# Patient Record
Sex: FEMALE | Race: WHITE | NOT HISPANIC OR LATINO | Employment: OTHER | URBAN - METROPOLITAN AREA
[De-identification: names, ages, dates, MRNs, and addresses within clinical notes are randomized per-mention and may not be internally consistent; named-entity substitution may affect disease eponyms.]

---

## 2017-02-26 ENCOUNTER — HOSPITAL ENCOUNTER (OUTPATIENT)
Facility: HOSPITAL | Age: 72
Setting detail: OBSERVATION
Discharge: HOME | End: 2017-03-01
Attending: EMERGENCY MEDICINE | Admitting: HOSPITALIST

## 2017-02-26 ENCOUNTER — APPOINTMENT (OUTPATIENT)
Dept: GENERAL RADIOLOGY | Facility: HOSPITAL | Age: 72
End: 2017-02-26

## 2017-02-26 DIAGNOSIS — J44.1 COPD EXACERBATION (HCC): Primary | ICD-10-CM

## 2017-02-26 DIAGNOSIS — J96.01 ACUTE RESPIRATORY FAILURE WITH HYPOXIA (HCC): ICD-10-CM

## 2017-02-26 LAB
ALBUMIN SERPL-MCNC: 4.1 G/DL (ref 3.5–5.2)
ALBUMIN/GLOB SERPL: 1.4 G/DL
ALP SERPL-CCNC: 270 U/L (ref 39–117)
ALT SERPL W P-5'-P-CCNC: 24 U/L (ref 1–33)
ANION GAP SERPL CALCULATED.3IONS-SCNC: 13.2 MMOL/L
ARTERIAL PATENCY WRIST A: POSITIVE
AST SERPL-CCNC: 18 U/L (ref 1–32)
ATMOSPHERIC PRESS: 754.4 MMHG
B PERT DNA SPEC QL NAA+PROBE: NOT DETECTED
BASE EXCESS BLDA CALC-SCNC: 4.6 MMOL/L (ref 0–2)
BASOPHILS # BLD AUTO: 0.02 10*3/MM3 (ref 0–0.2)
BASOPHILS NFR BLD AUTO: 0.1 % (ref 0–1.5)
BDY SITE: ABNORMAL
BILIRUB SERPL-MCNC: 0.4 MG/DL (ref 0.1–1.2)
BUN BLD-MCNC: 10 MG/DL (ref 8–23)
BUN/CREAT SERPL: 11.2 (ref 7–25)
C PNEUM DNA NPH QL NAA+NON-PROBE: NOT DETECTED
CALCIUM SPEC-SCNC: 9.8 MG/DL (ref 8.6–10.5)
CHLORIDE SERPL-SCNC: 94 MMOL/L (ref 98–107)
CHOLEST SERPL-MCNC: 233 MG/DL (ref 0–200)
CK MB SERPL-CCNC: 6.15 NG/ML
CK SERPL-CCNC: 102 U/L (ref 20–180)
CO2 SERPL-SCNC: 27.8 MMOL/L (ref 22–29)
CREAT BLD-MCNC: 0.89 MG/DL (ref 0.57–1)
DEPRECATED RDW RBC AUTO: 45.4 FL (ref 37–54)
EOSINOPHIL # BLD AUTO: 0.12 10*3/MM3 (ref 0–0.7)
EOSINOPHIL NFR BLD AUTO: 0.9 % (ref 0.3–6.2)
ERYTHROCYTE [DISTWIDTH] IN BLOOD BY AUTOMATED COUNT: 14 % (ref 11.7–13)
FLUAV H1 2009 PAND RNA NPH QL NAA+PROBE: NOT DETECTED
FLUAV H1 HA GENE NPH QL NAA+PROBE: NOT DETECTED
FLUAV H3 RNA NPH QL NAA+PROBE: NOT DETECTED
FLUAV SUBTYP SPEC NAA+PROBE: NOT DETECTED
FLUBV RNA ISLT QL NAA+PROBE: NOT DETECTED
GFR SERPL CREATININE-BSD FRML MDRD: 62 ML/MIN/1.73
GLOBULIN UR ELPH-MCNC: 3 GM/DL
GLUCOSE BLD-MCNC: 88 MG/DL (ref 65–99)
GLUCOSE BLDC GLUCOMTR-MCNC: 119 MG/DL (ref 70–130)
GLUCOSE BLDC GLUCOMTR-MCNC: 183 MG/DL (ref 70–130)
HADV DNA SPEC NAA+PROBE: NOT DETECTED
HBA1C MFR BLD: 5.63 % (ref 4.8–5.6)
HCO3 BLDA-SCNC: 28 MMOL/L (ref 22–28)
HCOV 229E RNA SPEC QL NAA+PROBE: NOT DETECTED
HCOV HKU1 RNA SPEC QL NAA+PROBE: NOT DETECTED
HCOV NL63 RNA SPEC QL NAA+PROBE: NOT DETECTED
HCOV OC43 RNA SPEC QL NAA+PROBE: NOT DETECTED
HCT VFR BLD AUTO: 39.7 % (ref 35.6–45.5)
HDLC SERPL-MCNC: 91 MG/DL (ref 40–60)
HGB BLD-MCNC: 13.5 G/DL (ref 11.9–15.5)
HMPV RNA NPH QL NAA+NON-PROBE: NOT DETECTED
HOLD SPECIMEN: NORMAL
HOLD SPECIMEN: NORMAL
HPIV1 RNA SPEC QL NAA+PROBE: NOT DETECTED
HPIV2 RNA SPEC QL NAA+PROBE: NOT DETECTED
HPIV3 RNA NPH QL NAA+PROBE: NOT DETECTED
HPIV4 P GENE NPH QL NAA+PROBE: NOT DETECTED
IMM GRANULOCYTES # BLD: 0.11 10*3/MM3 (ref 0–0.03)
IMM GRANULOCYTES NFR BLD: 0.8 % (ref 0–0.5)
LDLC SERPL CALC-MCNC: 126 MG/DL (ref 0–100)
LDLC/HDLC SERPL: 1.38 {RATIO}
LYMPHOCYTES # BLD AUTO: 4.27 10*3/MM3 (ref 0.9–4.8)
LYMPHOCYTES NFR BLD AUTO: 31.1 % (ref 19.6–45.3)
M PNEUMO IGG SER IA-ACNC: NOT DETECTED
MAGNESIUM SERPL-MCNC: 1.8 MG/DL (ref 1.6–2.4)
MCH RBC QN AUTO: 30.3 PG (ref 26.9–32)
MCHC RBC AUTO-ENTMCNC: 34 G/DL (ref 32.4–36.3)
MCV RBC AUTO: 89.2 FL (ref 80.5–98.2)
MODALITY: ABNORMAL
MONOCYTES # BLD AUTO: 2.06 10*3/MM3 (ref 0.2–1.2)
MONOCYTES NFR BLD AUTO: 15 % (ref 5–12)
NEUTROPHILS # BLD AUTO: 7.13 10*3/MM3 (ref 1.9–8.1)
NEUTROPHILS NFR BLD AUTO: 52.1 % (ref 42.7–76)
NT-PROBNP SERPL-MCNC: 171 PG/ML (ref 5–900)
PCO2 BLDA: 36.5 MM HG (ref 35–45)
PH BLDA: 7.49 PH UNITS (ref 7.35–7.45)
PLATELET # BLD AUTO: 376 10*3/MM3 (ref 140–500)
PMV BLD AUTO: 10.1 FL (ref 6–12)
PO2 BLDA: 70 MM HG (ref 80–100)
POTASSIUM BLD-SCNC: 3.9 MMOL/L (ref 3.5–5.2)
PROT SERPL-MCNC: 7.1 G/DL (ref 6–8.5)
RBC # BLD AUTO: 4.45 10*6/MM3 (ref 3.9–5.2)
RHINOVIRUS RNA SPEC NAA+PROBE: NOT DETECTED
RSV RNA NPH QL NAA+NON-PROBE: NOT DETECTED
SAO2 % BLDCOA: 95.2 % (ref 92–99)
SODIUM BLD-SCNC: 135 MMOL/L (ref 136–145)
TOTAL RATE: 20 BREATHS/MINUTE
TRIGL SERPL-MCNC: 82 MG/DL (ref 0–150)
TROPONIN T SERPL-MCNC: 0.01 NG/ML (ref 0–0.03)
TROPONIN T SERPL-MCNC: <0.01 NG/ML (ref 0–0.03)
TROPONIN T SERPL-MCNC: <0.01 NG/ML (ref 0–0.03)
TSH SERPL DL<=0.05 MIU/L-ACNC: 0.3 MIU/ML (ref 0.27–4.2)
VLDLC SERPL-MCNC: 16.4 MG/DL (ref 5–40)
WBC NRBC COR # BLD: 13.71 10*3/MM3 (ref 4.5–10.7)
WHOLE BLOOD HOLD SPECIMEN: NORMAL
WHOLE BLOOD HOLD SPECIMEN: NORMAL

## 2017-02-26 PROCEDURE — 99285 EMERGENCY DEPT VISIT HI MDM: CPT

## 2017-02-26 PROCEDURE — 84443 ASSAY THYROID STIM HORMONE: CPT | Performed by: NURSE PRACTITIONER

## 2017-02-26 PROCEDURE — 80053 COMPREHEN METABOLIC PANEL: CPT | Performed by: EMERGENCY MEDICINE

## 2017-02-26 PROCEDURE — 93005 ELECTROCARDIOGRAM TRACING: CPT | Performed by: EMERGENCY MEDICINE

## 2017-02-26 PROCEDURE — 87205 SMEAR GRAM STAIN: CPT | Performed by: INTERNAL MEDICINE

## 2017-02-26 PROCEDURE — 83880 ASSAY OF NATRIURETIC PEPTIDE: CPT | Performed by: EMERGENCY MEDICINE

## 2017-02-26 PROCEDURE — 25010000002 MAGNESIUM SULFATE IN D5W 1G/100ML (PREMIX) 10-5 MG/ML-% SOLUTION: Performed by: NURSE PRACTITIONER

## 2017-02-26 PROCEDURE — 96376 TX/PRO/DX INJ SAME DRUG ADON: CPT

## 2017-02-26 PROCEDURE — 83735 ASSAY OF MAGNESIUM: CPT | Performed by: NURSE PRACTITIONER

## 2017-02-26 PROCEDURE — 93005 ELECTROCARDIOGRAM TRACING: CPT | Performed by: INTERNAL MEDICINE

## 2017-02-26 PROCEDURE — 84484 ASSAY OF TROPONIN QUANT: CPT | Performed by: EMERGENCY MEDICINE

## 2017-02-26 PROCEDURE — 82803 BLOOD GASES ANY COMBINATION: CPT

## 2017-02-26 PROCEDURE — 94640 AIRWAY INHALATION TREATMENT: CPT

## 2017-02-26 PROCEDURE — 87798 DETECT AGENT NOS DNA AMP: CPT | Performed by: INTERNAL MEDICINE

## 2017-02-26 PROCEDURE — 71010 HC CHEST PA OR AP: CPT

## 2017-02-26 PROCEDURE — 87581 M.PNEUMON DNA AMP PROBE: CPT | Performed by: INTERNAL MEDICINE

## 2017-02-26 PROCEDURE — G0378 HOSPITAL OBSERVATION PER HR: HCPCS

## 2017-02-26 PROCEDURE — 85025 COMPLETE CBC W/AUTO DIFF WBC: CPT | Performed by: EMERGENCY MEDICINE

## 2017-02-26 PROCEDURE — 84484 ASSAY OF TROPONIN QUANT: CPT | Performed by: NURSE PRACTITIONER

## 2017-02-26 PROCEDURE — 80061 LIPID PANEL: CPT | Performed by: NURSE PRACTITIONER

## 2017-02-26 PROCEDURE — 94799 UNLISTED PULMONARY SVC/PX: CPT

## 2017-02-26 PROCEDURE — 99222 1ST HOSP IP/OBS MODERATE 55: CPT | Performed by: INTERNAL MEDICINE

## 2017-02-26 PROCEDURE — 25010000002 METHYLPREDNISOLONE PER 125 MG: Performed by: EMERGENCY MEDICINE

## 2017-02-26 PROCEDURE — 87070 CULTURE OTHR SPECIMN AEROBIC: CPT | Performed by: INTERNAL MEDICINE

## 2017-02-26 PROCEDURE — 25010000003 CEFTRIAXONE PER 250 MG: Performed by: INTERNAL MEDICINE

## 2017-02-26 PROCEDURE — 25010000002 METHYLPREDNISOLONE PER 125 MG: Performed by: INTERNAL MEDICINE

## 2017-02-26 PROCEDURE — 87486 CHLMYD PNEUM DNA AMP PROBE: CPT | Performed by: INTERNAL MEDICINE

## 2017-02-26 PROCEDURE — 83036 HEMOGLOBIN GLYCOSYLATED A1C: CPT | Performed by: INTERNAL MEDICINE

## 2017-02-26 PROCEDURE — 82962 GLUCOSE BLOOD TEST: CPT

## 2017-02-26 PROCEDURE — 63710000001 INSULIN ASPART PER 5 UNITS: Performed by: INTERNAL MEDICINE

## 2017-02-26 PROCEDURE — 87633 RESP VIRUS 12-25 TARGETS: CPT | Performed by: INTERNAL MEDICINE

## 2017-02-26 PROCEDURE — 93010 ELECTROCARDIOGRAM REPORT: CPT | Performed by: INTERNAL MEDICINE

## 2017-02-26 PROCEDURE — 36600 WITHDRAWAL OF ARTERIAL BLOOD: CPT

## 2017-02-26 PROCEDURE — 82550 ASSAY OF CK (CPK): CPT | Performed by: NURSE PRACTITIONER

## 2017-02-26 PROCEDURE — 84484 ASSAY OF TROPONIN QUANT: CPT | Performed by: INTERNAL MEDICINE

## 2017-02-26 PROCEDURE — 82553 CREATINE MB FRACTION: CPT | Performed by: NURSE PRACTITIONER

## 2017-02-26 RX ORDER — CYCLOBENZAPRINE HCL 10 MG
10 TABLET ORAL 3 TIMES DAILY PRN
Status: DISCONTINUED | OUTPATIENT
Start: 2017-02-26 | End: 2017-03-01 | Stop reason: HOSPADM

## 2017-02-26 RX ORDER — CEFUROXIME AXETIL 250 MG/1
250 TABLET ORAL 2 TIMES DAILY
COMMUNITY
End: 2018-02-19

## 2017-02-26 RX ORDER — METHYLPREDNISOLONE SODIUM SUCCINATE 125 MG/2ML
60 INJECTION, POWDER, LYOPHILIZED, FOR SOLUTION INTRAMUSCULAR; INTRAVENOUS EVERY 6 HOURS
Status: DISCONTINUED | OUTPATIENT
Start: 2017-02-26 | End: 2017-02-27

## 2017-02-26 RX ORDER — POTASSIUM CHLORIDE 750 MG/1
10 TABLET, FILM COATED, EXTENDED RELEASE ORAL 2 TIMES DAILY
COMMUNITY
End: 2017-03-30 | Stop reason: HOSPADM

## 2017-02-26 RX ORDER — METHYLPREDNISOLONE SODIUM SUCCINATE 125 MG/2ML
125 INJECTION, POWDER, LYOPHILIZED, FOR SOLUTION INTRAMUSCULAR; INTRAVENOUS ONCE
Status: COMPLETED | OUTPATIENT
Start: 2017-02-26 | End: 2017-02-26

## 2017-02-26 RX ORDER — IPRATROPIUM BROMIDE AND ALBUTEROL SULFATE 2.5; .5 MG/3ML; MG/3ML
3 SOLUTION RESPIRATORY (INHALATION) EVERY 4 HOURS PRN
Status: ON HOLD | COMMUNITY
End: 2020-03-26 | Stop reason: SDUPTHER

## 2017-02-26 RX ORDER — CYCLOBENZAPRINE HCL 10 MG
10 TABLET ORAL 3 TIMES DAILY PRN
Status: ON HOLD | COMMUNITY
End: 2017-03-30

## 2017-02-26 RX ORDER — SODIUM CHLORIDE 0.9 % (FLUSH) 0.9 %
10 SYRINGE (ML) INJECTION AS NEEDED
Status: DISCONTINUED | OUTPATIENT
Start: 2017-02-26 | End: 2017-03-01 | Stop reason: HOSPADM

## 2017-02-26 RX ORDER — FAMOTIDINE 20 MG/1
20 TABLET, FILM COATED ORAL 2 TIMES DAILY
Status: DISCONTINUED | OUTPATIENT
Start: 2017-02-26 | End: 2017-02-27

## 2017-02-26 RX ORDER — BUDESONIDE 0.5 MG/2ML
0.5 INHALANT ORAL
Status: DISCONTINUED | OUTPATIENT
Start: 2017-02-26 | End: 2017-03-01

## 2017-02-26 RX ORDER — CEFTRIAXONE SODIUM 1 G/50ML
1 INJECTION, SOLUTION INTRAVENOUS EVERY 24 HOURS
Status: DISCONTINUED | OUTPATIENT
Start: 2017-02-26 | End: 2017-03-01 | Stop reason: HOSPADM

## 2017-02-26 RX ORDER — PREDNISONE 10 MG/1
10 TABLET ORAL DAILY
COMMUNITY
End: 2017-03-30 | Stop reason: HOSPADM

## 2017-02-26 RX ORDER — MELATONIN
1000 DAILY
COMMUNITY
End: 2022-01-13

## 2017-02-26 RX ORDER — NICOTINE POLACRILEX 4 MG
15 LOZENGE BUCCAL
Status: DISCONTINUED | OUTPATIENT
Start: 2017-02-26 | End: 2017-03-01 | Stop reason: HOSPADM

## 2017-02-26 RX ORDER — DEXTROSE MONOHYDRATE 25 G/50ML
25 INJECTION, SOLUTION INTRAVENOUS
Status: DISCONTINUED | OUTPATIENT
Start: 2017-02-26 | End: 2017-03-01 | Stop reason: HOSPADM

## 2017-02-26 RX ORDER — METOPROLOL SUCCINATE 25 MG/1
25 TABLET, EXTENDED RELEASE ORAL EVERY 12 HOURS SCHEDULED
Status: DISCONTINUED | OUTPATIENT
Start: 2017-02-26 | End: 2017-03-01 | Stop reason: HOSPADM

## 2017-02-26 RX ORDER — RANITIDINE 150 MG/1
150 TABLET ORAL 2 TIMES DAILY
Status: ON HOLD | COMMUNITY
End: 2020-03-21

## 2017-02-26 RX ORDER — BUDESONIDE AND FORMOTEROL FUMARATE DIHYDRATE 160; 4.5 UG/1; UG/1
2 AEROSOL RESPIRATORY (INHALATION)
Status: DISCONTINUED | OUTPATIENT
Start: 2017-02-26 | End: 2017-02-26

## 2017-02-26 RX ORDER — IPRATROPIUM BROMIDE AND ALBUTEROL SULFATE 2.5; .5 MG/3ML; MG/3ML
3 SOLUTION RESPIRATORY (INHALATION) EVERY 4 HOURS PRN
Status: DISCONTINUED | OUTPATIENT
Start: 2017-02-26 | End: 2017-03-01 | Stop reason: HOSPADM

## 2017-02-26 RX ORDER — IPRATROPIUM BROMIDE AND ALBUTEROL SULFATE 2.5; .5 MG/3ML; MG/3ML
3 SOLUTION RESPIRATORY (INHALATION)
Status: DISCONTINUED | OUTPATIENT
Start: 2017-02-26 | End: 2017-02-26

## 2017-02-26 RX ORDER — IPRATROPIUM BROMIDE AND ALBUTEROL SULFATE 2.5; .5 MG/3ML; MG/3ML
3 SOLUTION RESPIRATORY (INHALATION) ONCE
Status: COMPLETED | OUTPATIENT
Start: 2017-02-26 | End: 2017-02-26

## 2017-02-26 RX ORDER — MELATONIN
1000 DAILY
Status: DISCONTINUED | OUTPATIENT
Start: 2017-02-26 | End: 2017-03-01 | Stop reason: HOSPADM

## 2017-02-26 RX ORDER — ARFORMOTEROL TARTRATE 15 UG/2ML
15 SOLUTION RESPIRATORY (INHALATION)
Status: DISCONTINUED | OUTPATIENT
Start: 2017-02-26 | End: 2017-03-01

## 2017-02-26 RX ORDER — ALBUTEROL SULFATE 90 UG/1
2 AEROSOL, METERED RESPIRATORY (INHALATION) EVERY 4 HOURS PRN
COMMUNITY
End: 2018-02-19 | Stop reason: ALTCHOICE

## 2017-02-26 RX ORDER — ALBUTEROL SULFATE 2.5 MG/3ML
2.5 SOLUTION RESPIRATORY (INHALATION)
Status: COMPLETED | OUTPATIENT
Start: 2017-02-26 | End: 2017-02-26

## 2017-02-26 RX ORDER — POTASSIUM CHLORIDE 750 MG/1
20 CAPSULE, EXTENDED RELEASE ORAL DAILY
Status: DISCONTINUED | OUTPATIENT
Start: 2017-02-26 | End: 2017-02-27

## 2017-02-26 RX ADMIN — METHYLPREDNISOLONE SODIUM SUCCINATE 60 MG: 125 INJECTION, POWDER, FOR SOLUTION INTRAMUSCULAR; INTRAVENOUS at 14:32

## 2017-02-26 RX ADMIN — VITAMIN D, TAB 1000IU (100/BT) 1000 UNITS: 25 TAB at 14:32

## 2017-02-26 RX ADMIN — IPRATROPIUM BROMIDE AND ALBUTEROL SULFATE 3 ML: .5; 3 SOLUTION RESPIRATORY (INHALATION) at 10:09

## 2017-02-26 RX ADMIN — METOPROLOL SUCCINATE 25 MG: 25 TABLET, FILM COATED, EXTENDED RELEASE ORAL at 20:50

## 2017-02-26 RX ADMIN — CEFTRIAXONE SODIUM 1 G: 1 INJECTION, SOLUTION INTRAVENOUS at 16:29

## 2017-02-26 RX ADMIN — METHYLPREDNISOLONE SODIUM SUCCINATE 60 MG: 125 INJECTION, POWDER, FOR SOLUTION INTRAMUSCULAR; INTRAVENOUS at 20:50

## 2017-02-26 RX ADMIN — MAGNESIUM SULFATE HEPTAHYDRATE 1 G: 1 INJECTION, SOLUTION INTRAVENOUS at 20:50

## 2017-02-26 RX ADMIN — ALBUTEROL SULFATE 2.5 MG: 2.5 SOLUTION RESPIRATORY (INHALATION) at 10:40

## 2017-02-26 RX ADMIN — FAMOTIDINE 20 MG: 20 TABLET, FILM COATED ORAL at 17:24

## 2017-02-26 RX ADMIN — ALBUTEROL SULFATE 2.5 MG: 2.5 SOLUTION RESPIRATORY (INHALATION) at 11:01

## 2017-02-26 RX ADMIN — METHYLPREDNISOLONE SODIUM SUCCINATE 125 MG: 125 INJECTION, POWDER, FOR SOLUTION INTRAMUSCULAR; INTRAVENOUS at 10:22

## 2017-02-26 RX ADMIN — INSULIN ASPART 2 UNITS: 100 INJECTION, SOLUTION INTRAVENOUS; SUBCUTANEOUS at 20:51

## 2017-02-26 RX ADMIN — POTASSIUM CHLORIDE 20 MEQ: 750 CAPSULE, EXTENDED RELEASE ORAL at 14:32

## 2017-02-26 RX ADMIN — ARFORMOTEROL TARTRATE 15 MCG: 15 SOLUTION RESPIRATORY (INHALATION) at 21:49

## 2017-02-26 RX ADMIN — BUDESONIDE 0.5 MG: 0.5 INHALANT RESPIRATORY (INHALATION) at 21:49

## 2017-02-26 RX ADMIN — NICOTINE 1 PATCH: 7 PATCH TRANSDERMAL at 14:33

## 2017-02-26 RX ADMIN — AZITHROMYCIN DIHYDRATE 500 MG: 500 INJECTION, POWDER, LYOPHILIZED, FOR SOLUTION INTRAVENOUS at 14:32

## 2017-02-27 ENCOUNTER — APPOINTMENT (OUTPATIENT)
Dept: CARDIOLOGY | Facility: HOSPITAL | Age: 72
End: 2017-02-27

## 2017-02-27 LAB
ANION GAP SERPL CALCULATED.3IONS-SCNC: 15.1 MMOL/L
BH CV ECHO MEAS - ACS: 1.6 CM
BH CV ECHO MEAS - AO MAX PG (FULL): 10 MMHG
BH CV ECHO MEAS - AO MEAN PG (FULL): 2 MMHG
BH CV ECHO MEAS - AO MEAN PG: 5 MMHG
BH CV ECHO MEAS - AO ROOT AREA (BSA CORRECTED): 2.2
BH CV ECHO MEAS - AO ROOT AREA: 9.1 CM^2
BH CV ECHO MEAS - AO ROOT DIAM: 3.4 CM
BH CV ECHO MEAS - AO V2 MAX: 160 CM/SEC
BH CV ECHO MEAS - AO V2 MEAN: 108 CM/SEC
BH CV ECHO MEAS - AO V2 VTI: 27.9 CM
BH CV ECHO MEAS - AVA(I,A): 1.8 CM^2
BH CV ECHO MEAS - AVA(I,D): 1.8 CM^2
BH CV ECHO MEAS - BSA(HAYCOCK): 1.5 M^2
BH CV ECHO MEAS - BSA: 1.5 M^2
BH CV ECHO MEAS - BZI_BMI: 21.2 KILOGRAMS/M^2
BH CV ECHO MEAS - BZI_METRIC_HEIGHT: 157.5 CM
BH CV ECHO MEAS - BZI_METRIC_WEIGHT: 52.6 KG
BH CV ECHO MEAS - CONTRAST EF (2CH): 73 ML/M^2
BH CV ECHO MEAS - CONTRAST EF 4CH: 73.1 ML/M^2
BH CV ECHO MEAS - EDV(CUBED): 91.1 ML
BH CV ECHO MEAS - EDV(MOD-SP2): 63 ML
BH CV ECHO MEAS - EDV(MOD-SP4): 67 ML
BH CV ECHO MEAS - EDV(TEICH): 92.4 ML
BH CV ECHO MEAS - EF(CUBED): 84.8 %
BH CV ECHO MEAS - EF(MOD-SP2): 73 %
BH CV ECHO MEAS - EF(MOD-SP4): 73.1 %
BH CV ECHO MEAS - EF(TEICH): 78.2 %
BH CV ECHO MEAS - ESV(CUBED): 13.8 ML
BH CV ECHO MEAS - ESV(MOD-SP2): 17 ML
BH CV ECHO MEAS - ESV(MOD-SP4): 18 ML
BH CV ECHO MEAS - ESV(TEICH): 20.2 ML
BH CV ECHO MEAS - FS: 46.7 %
BH CV ECHO MEAS - IVS/LVPW: 1
BH CV ECHO MEAS - IVSD: 0.8 CM
BH CV ECHO MEAS - LA DIMENSION: 2.6 CM
BH CV ECHO MEAS - LA/AO: 0.76
BH CV ECHO MEAS - LAT PEAK E' VEL: 7 CM/SEC
BH CV ECHO MEAS - LV DIASTOLIC VOL/BSA (35-75): 44.2 ML/M^2
BH CV ECHO MEAS - LV MASS(C)D: 113.6 GRAMS
BH CV ECHO MEAS - LV MASS(C)DI: 74.9 GRAMS/M^2
BH CV ECHO MEAS - LV MEAN PG: 3 MMHG
BH CV ECHO MEAS - LV SYSTOLIC VOL/BSA (12-30): 11.9 ML/M^2
BH CV ECHO MEAS - LV V1 MAX: 112 CM/SEC
BH CV ECHO MEAS - LV V1 MEAN: 75.8 CM/SEC
BH CV ECHO MEAS - LV V1 VTI: 21.8 CM
BH CV ECHO MEAS - LVIDD: 4.5 CM
BH CV ECHO MEAS - LVIDS: 2.4 CM
BH CV ECHO MEAS - LVLD AP2: 6.8 CM
BH CV ECHO MEAS - LVLD AP4: 6.8 CM
BH CV ECHO MEAS - LVLS AP2: 5.6 CM
BH CV ECHO MEAS - LVLS AP4: 5.7 CM
BH CV ECHO MEAS - LVOT AREA (M): 2.3 CM^2
BH CV ECHO MEAS - LVOT AREA: 2.3 CM^2
BH CV ECHO MEAS - LVOT DIAM: 1.7 CM
BH CV ECHO MEAS - LVPWD: 0.8 CM
BH CV ECHO MEAS - MED PEAK E' VEL: 9 CM/SEC
BH CV ECHO MEAS - MV A DUR: 0.1 SEC
BH CV ECHO MEAS - MV A MAX VEL: 75.8 CM/SEC
BH CV ECHO MEAS - MV DEC SLOPE: 458 CM/SEC^2
BH CV ECHO MEAS - MV DEC TIME: 0.18 SEC
BH CV ECHO MEAS - MV E MAX VEL: 78.8 CM/SEC
BH CV ECHO MEAS - MV E/A: 1
BH CV ECHO MEAS - MV MEAN PG: 3 MMHG
BH CV ECHO MEAS - MV P1/2T MAX VEL: 80.1 CM/SEC
BH CV ECHO MEAS - MV P1/2T: 51.2 MSEC
BH CV ECHO MEAS - MV V2 MEAN: 80 CM/SEC
BH CV ECHO MEAS - MV V2 VTI: 19.1 CM
BH CV ECHO MEAS - MVA P1/2T LCG: 2.7 CM^2
BH CV ECHO MEAS - MVA(P1/2T): 4.3 CM^2
BH CV ECHO MEAS - MVA(VTI): 2.6 CM^2
BH CV ECHO MEAS - PA ACC SLOPE: 1371 CM/SEC^2
BH CV ECHO MEAS - PA ACC TIME: 0.1 SEC
BH CV ECHO MEAS - PA MAX PG: 7.4 MMHG
BH CV ECHO MEAS - PA PR(ACCEL): 36.3 MMHG
BH CV ECHO MEAS - PA V2 MAX: 136 CM/SEC
BH CV ECHO MEAS - PULM A REVS DUR: 0.12 SEC
BH CV ECHO MEAS - PULM A REVS VEL: 39.4 CM/SEC
BH CV ECHO MEAS - PULM DIAS VEL: 64.1 CM/SEC
BH CV ECHO MEAS - PULM S/D: 0.57
BH CV ECHO MEAS - PULM SYS VEL: 36.8 CM/SEC
BH CV ECHO MEAS - QP/QS: 0.93
BH CV ECHO MEAS - RV MEAN PG: 1 MMHG
BH CV ECHO MEAS - RV V1 MEAN: 53.3 CM/SEC
BH CV ECHO MEAS - RV V1 VTI: 16.2 CM
BH CV ECHO MEAS - RVOT AREA: 2.8 CM^2
BH CV ECHO MEAS - RVOT DIAM: 1.9 CM
BH CV ECHO MEAS - SI(AO): 167 ML/M^2
BH CV ECHO MEAS - SI(CUBED): 51 ML/M^2
BH CV ECHO MEAS - SI(LVOT): 32.6 ML/M^2
BH CV ECHO MEAS - SI(MOD-SP2): 30.3 ML/M^2
BH CV ECHO MEAS - SI(MOD-SP4): 32.3 ML/M^2
BH CV ECHO MEAS - SI(TEICH): 47.7 ML/M^2
BH CV ECHO MEAS - SV(AO): 253.3 ML
BH CV ECHO MEAS - SV(CUBED): 77.3 ML
BH CV ECHO MEAS - SV(LVOT): 49.5 ML
BH CV ECHO MEAS - SV(MOD-SP2): 46 ML
BH CV ECHO MEAS - SV(MOD-SP4): 49 ML
BH CV ECHO MEAS - SV(RVOT): 45.9 ML
BH CV ECHO MEAS - SV(TEICH): 72.3 ML
BH CV ECHO MEAS - TAPSE (>1.6): 2.4 CM2
BH CV XLRA - RV BASE: 2.7 CM
BH CV XLRA - RV LENGTH: 5.6 CM
BH CV XLRA - RV MID: 2 CM
BH CV XLRA - TDI S': 23 CM/SEC
BUN BLD-MCNC: 12 MG/DL (ref 8–23)
BUN/CREAT SERPL: 14.5 (ref 7–25)
CALCIUM SPEC-SCNC: 9.4 MG/DL (ref 8.6–10.5)
CHLORIDE SERPL-SCNC: 95 MMOL/L (ref 98–107)
CO2 SERPL-SCNC: 22.9 MMOL/L (ref 22–29)
CREAT BLD-MCNC: 0.83 MG/DL (ref 0.57–1)
DEPRECATED RDW RBC AUTO: 45.6 FL (ref 37–54)
E/E' RATIO: 10
ERYTHROCYTE [DISTWIDTH] IN BLOOD BY AUTOMATED COUNT: 14 % (ref 11.7–13)
GFR SERPL CREATININE-BSD FRML MDRD: 68 ML/MIN/1.73
GLUCOSE BLD-MCNC: 168 MG/DL (ref 65–99)
GLUCOSE BLDC GLUCOMTR-MCNC: 135 MG/DL (ref 70–130)
GLUCOSE BLDC GLUCOMTR-MCNC: 145 MG/DL (ref 70–130)
GLUCOSE BLDC GLUCOMTR-MCNC: 179 MG/DL (ref 70–130)
GLUCOSE BLDC GLUCOMTR-MCNC: 187 MG/DL (ref 70–130)
HCT VFR BLD AUTO: 36.3 % (ref 35.6–45.5)
HGB BLD-MCNC: 12.4 G/DL (ref 11.9–15.5)
LEFT ATRIUM VOLUME INDEX: 28 ML/M2
LEFT ATRIUM VOLUME: 36 CM3
LV EF 2D ECHO EST: 71 %
MAGNESIUM SERPL-MCNC: 2.3 MG/DL (ref 1.6–2.4)
MCH RBC QN AUTO: 30.4 PG (ref 26.9–32)
MCHC RBC AUTO-ENTMCNC: 34.2 G/DL (ref 32.4–36.3)
MCV RBC AUTO: 89 FL (ref 80.5–98.2)
PLATELET # BLD AUTO: 360 10*3/MM3 (ref 140–500)
PMV BLD AUTO: 10.9 FL (ref 6–12)
POTASSIUM BLD-SCNC: 4.2 MMOL/L (ref 3.5–5.2)
RBC # BLD AUTO: 4.08 10*6/MM3 (ref 3.9–5.2)
SODIUM BLD-SCNC: 133 MMOL/L (ref 136–145)
WBC NRBC COR # BLD: 10.24 10*3/MM3 (ref 4.5–10.7)

## 2017-02-27 PROCEDURE — 82962 GLUCOSE BLOOD TEST: CPT

## 2017-02-27 PROCEDURE — 93306 TTE W/DOPPLER COMPLETE: CPT | Performed by: INTERNAL MEDICINE

## 2017-02-27 PROCEDURE — 25010000003 CEFTRIAXONE PER 250 MG: Performed by: INTERNAL MEDICINE

## 2017-02-27 PROCEDURE — 63710000001 INSULIN ASPART PER 5 UNITS: Performed by: INTERNAL MEDICINE

## 2017-02-27 PROCEDURE — 85027 COMPLETE CBC AUTOMATED: CPT | Performed by: INTERNAL MEDICINE

## 2017-02-27 PROCEDURE — 94799 UNLISTED PULMONARY SVC/PX: CPT

## 2017-02-27 PROCEDURE — 80048 BASIC METABOLIC PNL TOTAL CA: CPT | Performed by: INTERNAL MEDICINE

## 2017-02-27 PROCEDURE — 25010000002 METHYLPREDNISOLONE PER 125 MG: Performed by: INTERNAL MEDICINE

## 2017-02-27 PROCEDURE — 83735 ASSAY OF MAGNESIUM: CPT | Performed by: INTERNAL MEDICINE

## 2017-02-27 PROCEDURE — 25010000002 PERFLUTREN (DEFINITY) 8.476 MG IN SODIUM CHLORIDE 10 ML INJECTION: Performed by: HOSPITALIST

## 2017-02-27 PROCEDURE — 96376 TX/PRO/DX INJ SAME DRUG ADON: CPT

## 2017-02-27 PROCEDURE — C8929 TTE W OR WO FOL WCON,DOPPLER: HCPCS

## 2017-02-27 PROCEDURE — 99233 SBSQ HOSP IP/OBS HIGH 50: CPT | Performed by: INTERNAL MEDICINE

## 2017-02-27 PROCEDURE — G0378 HOSPITAL OBSERVATION PER HR: HCPCS

## 2017-02-27 RX ORDER — PANTOPRAZOLE SODIUM 40 MG/1
40 TABLET, DELAYED RELEASE ORAL
Status: DISCONTINUED | OUTPATIENT
Start: 2017-02-28 | End: 2017-03-01 | Stop reason: HOSPADM

## 2017-02-27 RX ORDER — METHYLPREDNISOLONE SODIUM SUCCINATE 40 MG/ML
40 INJECTION, POWDER, LYOPHILIZED, FOR SOLUTION INTRAMUSCULAR; INTRAVENOUS EVERY 12 HOURS
Status: DISCONTINUED | OUTPATIENT
Start: 2017-02-28 | End: 2017-02-28

## 2017-02-27 RX ADMIN — METOPROLOL SUCCINATE 25 MG: 25 TABLET, FILM COATED, EXTENDED RELEASE ORAL at 08:41

## 2017-02-27 RX ADMIN — POTASSIUM CHLORIDE 20 MEQ: 750 CAPSULE, EXTENDED RELEASE ORAL at 08:41

## 2017-02-27 RX ADMIN — PERFLUTREN 2 ML: 6.52 INJECTION, SUSPENSION INTRAVENOUS at 13:28

## 2017-02-27 RX ADMIN — ARFORMOTEROL TARTRATE 15 MCG: 15 SOLUTION RESPIRATORY (INHALATION) at 21:07

## 2017-02-27 RX ADMIN — ARFORMOTEROL TARTRATE 15 MCG: 15 SOLUTION RESPIRATORY (INHALATION) at 08:03

## 2017-02-27 RX ADMIN — METHYLPREDNISOLONE SODIUM SUCCINATE 60 MG: 125 INJECTION, POWDER, FOR SOLUTION INTRAMUSCULAR; INTRAVENOUS at 02:08

## 2017-02-27 RX ADMIN — BUDESONIDE 0.5 MG: 0.5 INHALANT RESPIRATORY (INHALATION) at 08:03

## 2017-02-27 RX ADMIN — METHYLPREDNISOLONE SODIUM SUCCINATE 60 MG: 125 INJECTION, POWDER, FOR SOLUTION INTRAMUSCULAR; INTRAVENOUS at 08:42

## 2017-02-27 RX ADMIN — CEFTRIAXONE SODIUM 1 G: 1 INJECTION, SOLUTION INTRAVENOUS at 15:10

## 2017-02-27 RX ADMIN — INSULIN ASPART 2 UNITS: 100 INJECTION, SOLUTION INTRAVENOUS; SUBCUTANEOUS at 08:43

## 2017-02-27 RX ADMIN — VITAMIN D, TAB 1000IU (100/BT) 1000 UNITS: 25 TAB at 08:41

## 2017-02-27 RX ADMIN — METHYLPREDNISOLONE SODIUM SUCCINATE 60 MG: 125 INJECTION, POWDER, FOR SOLUTION INTRAMUSCULAR; INTRAVENOUS at 15:11

## 2017-02-27 RX ADMIN — METOPROLOL SUCCINATE 25 MG: 25 TABLET, FILM COATED, EXTENDED RELEASE ORAL at 22:40

## 2017-02-27 RX ADMIN — FAMOTIDINE 20 MG: 20 TABLET, FILM COATED ORAL at 08:41

## 2017-02-27 RX ADMIN — AZITHROMYCIN DIHYDRATE 500 MG: 500 INJECTION, POWDER, LYOPHILIZED, FOR SOLUTION INTRAVENOUS at 15:53

## 2017-02-27 RX ADMIN — NICOTINE 1 PATCH: 7 PATCH TRANSDERMAL at 15:11

## 2017-02-28 LAB
ALBUMIN SERPL-MCNC: 3.4 G/DL (ref 3.5–5.2)
ALBUMIN/GLOB SERPL: 1.2 G/DL
ALP SERPL-CCNC: 230 U/L (ref 39–117)
ALT SERPL W P-5'-P-CCNC: 27 U/L (ref 1–33)
ANION GAP SERPL CALCULATED.3IONS-SCNC: 13.3 MMOL/L
AST SERPL-CCNC: 19 U/L (ref 1–32)
BASOPHILS # BLD AUTO: 0.01 10*3/MM3 (ref 0–0.2)
BASOPHILS NFR BLD AUTO: 0.1 % (ref 0–1.5)
BILIRUB SERPL-MCNC: 0.3 MG/DL (ref 0.1–1.2)
BUN BLD-MCNC: 18 MG/DL (ref 8–23)
BUN/CREAT SERPL: 22.8 (ref 7–25)
CALCIUM SPEC-SCNC: 9.7 MG/DL (ref 8.6–10.5)
CHLORIDE SERPL-SCNC: 95 MMOL/L (ref 98–107)
CHOLEST SERPL-MCNC: 235 MG/DL (ref 0–200)
CO2 SERPL-SCNC: 26.7 MMOL/L (ref 22–29)
CREAT BLD-MCNC: 0.79 MG/DL (ref 0.57–1)
DEPRECATED RDW RBC AUTO: 47 FL (ref 37–54)
EOSINOPHIL # BLD AUTO: 0 10*3/MM3 (ref 0–0.7)
EOSINOPHIL NFR BLD AUTO: 0 % (ref 0.3–6.2)
ERYTHROCYTE [DISTWIDTH] IN BLOOD BY AUTOMATED COUNT: 14.3 % (ref 11.7–13)
GFR SERPL CREATININE-BSD FRML MDRD: 72 ML/MIN/1.73
GLOBULIN UR ELPH-MCNC: 2.9 GM/DL
GLUCOSE BLD-MCNC: 121 MG/DL (ref 65–99)
GLUCOSE BLDC GLUCOMTR-MCNC: 112 MG/DL (ref 70–130)
GLUCOSE BLDC GLUCOMTR-MCNC: 136 MG/DL (ref 70–130)
GLUCOSE BLDC GLUCOMTR-MCNC: 138 MG/DL (ref 70–130)
GLUCOSE BLDC GLUCOMTR-MCNC: 186 MG/DL (ref 70–130)
HCT VFR BLD AUTO: 36.7 % (ref 35.6–45.5)
HDLC SERPL-MCNC: 91 MG/DL (ref 40–60)
HGB BLD-MCNC: 12.1 G/DL (ref 11.9–15.5)
IMM GRANULOCYTES # BLD: 0.09 10*3/MM3 (ref 0–0.03)
IMM GRANULOCYTES NFR BLD: 0.6 % (ref 0–0.5)
LDLC SERPL CALC-MCNC: 130 MG/DL (ref 0–100)
LDLC/HDLC SERPL: 1.42 {RATIO}
LYMPHOCYTES # BLD AUTO: 2.45 10*3/MM3 (ref 0.9–4.8)
LYMPHOCYTES NFR BLD AUTO: 17 % (ref 19.6–45.3)
MCH RBC QN AUTO: 29.7 PG (ref 26.9–32)
MCHC RBC AUTO-ENTMCNC: 33 G/DL (ref 32.4–36.3)
MCV RBC AUTO: 90.2 FL (ref 80.5–98.2)
MONOCYTES # BLD AUTO: 1.22 10*3/MM3 (ref 0.2–1.2)
MONOCYTES NFR BLD AUTO: 8.5 % (ref 5–12)
NEUTROPHILS # BLD AUTO: 10.63 10*3/MM3 (ref 1.9–8.1)
NEUTROPHILS NFR BLD AUTO: 73.8 % (ref 42.7–76)
NT-PROBNP SERPL-MCNC: 300.5 PG/ML (ref 0–900)
PLATELET # BLD AUTO: 371 10*3/MM3 (ref 140–500)
PMV BLD AUTO: 10.9 FL (ref 6–12)
POTASSIUM BLD-SCNC: 4.3 MMOL/L (ref 3.5–5.2)
PROT SERPL-MCNC: 6.3 G/DL (ref 6–8.5)
RBC # BLD AUTO: 4.07 10*6/MM3 (ref 3.9–5.2)
SODIUM BLD-SCNC: 135 MMOL/L (ref 136–145)
TRIGL SERPL-MCNC: 72 MG/DL (ref 0–150)
TSH SERPL DL<=0.05 MIU/L-ACNC: 0.53 MIU/ML (ref 0.27–4.2)
VLDLC SERPL-MCNC: 14.4 MG/DL (ref 5–40)
WBC NRBC COR # BLD: 14.4 10*3/MM3 (ref 4.5–10.7)

## 2017-02-28 PROCEDURE — G8980 MOBILITY D/C STATUS: HCPCS

## 2017-02-28 PROCEDURE — 96376 TX/PRO/DX INJ SAME DRUG ADON: CPT

## 2017-02-28 PROCEDURE — G0378 HOSPITAL OBSERVATION PER HR: HCPCS

## 2017-02-28 PROCEDURE — 80061 LIPID PANEL: CPT | Performed by: HOSPITALIST

## 2017-02-28 PROCEDURE — 82962 GLUCOSE BLOOD TEST: CPT

## 2017-02-28 PROCEDURE — G8989 SELF CARE D/C STATUS: HCPCS

## 2017-02-28 PROCEDURE — 93005 ELECTROCARDIOGRAM TRACING: CPT | Performed by: INTERNAL MEDICINE

## 2017-02-28 PROCEDURE — 97110 THERAPEUTIC EXERCISES: CPT

## 2017-02-28 PROCEDURE — G8987 SELF CARE CURRENT STATUS: HCPCS

## 2017-02-28 PROCEDURE — 94640 AIRWAY INHALATION TREATMENT: CPT

## 2017-02-28 PROCEDURE — 94799 UNLISTED PULMONARY SVC/PX: CPT

## 2017-02-28 PROCEDURE — 85025 COMPLETE CBC W/AUTO DIFF WBC: CPT | Performed by: HOSPITALIST

## 2017-02-28 PROCEDURE — 96372 THER/PROPH/DIAG INJ SC/IM: CPT

## 2017-02-28 PROCEDURE — 80053 COMPREHEN METABOLIC PANEL: CPT | Performed by: HOSPITALIST

## 2017-02-28 PROCEDURE — 83880 ASSAY OF NATRIURETIC PEPTIDE: CPT | Performed by: HOSPITALIST

## 2017-02-28 PROCEDURE — G8979 MOBILITY GOAL STATUS: HCPCS

## 2017-02-28 PROCEDURE — 97161 PT EVAL LOW COMPLEX 20 MIN: CPT

## 2017-02-28 PROCEDURE — 25010000003 CEFTRIAXONE PER 250 MG: Performed by: INTERNAL MEDICINE

## 2017-02-28 PROCEDURE — G8978 MOBILITY CURRENT STATUS: HCPCS

## 2017-02-28 PROCEDURE — 25010000002 ENOXAPARIN PER 10 MG: Performed by: INTERNAL MEDICINE

## 2017-02-28 PROCEDURE — 25010000002 METHYLPREDNISOLONE PER 40 MG: Performed by: HOSPITALIST

## 2017-02-28 PROCEDURE — G8988 SELF CARE GOAL STATUS: HCPCS

## 2017-02-28 PROCEDURE — 63710000001 INSULIN ASPART PER 5 UNITS: Performed by: INTERNAL MEDICINE

## 2017-02-28 PROCEDURE — 84443 ASSAY THYROID STIM HORMONE: CPT | Performed by: HOSPITALIST

## 2017-02-28 PROCEDURE — 97165 OT EVAL LOW COMPLEX 30 MIN: CPT

## 2017-02-28 PROCEDURE — 93010 ELECTROCARDIOGRAM REPORT: CPT | Performed by: INTERNAL MEDICINE

## 2017-02-28 PROCEDURE — 99233 SBSQ HOSP IP/OBS HIGH 50: CPT | Performed by: INTERNAL MEDICINE

## 2017-02-28 RX ORDER — PREDNISONE 20 MG/1
40 TABLET ORAL
Status: DISCONTINUED | OUTPATIENT
Start: 2017-03-01 | End: 2017-03-01 | Stop reason: HOSPADM

## 2017-02-28 RX ADMIN — ARFORMOTEROL TARTRATE 15 MCG: 15 SOLUTION RESPIRATORY (INHALATION) at 21:08

## 2017-02-28 RX ADMIN — IPRATROPIUM BROMIDE AND ALBUTEROL SULFATE 3 ML: .5; 3 SOLUTION RESPIRATORY (INHALATION) at 13:25

## 2017-02-28 RX ADMIN — ENOXAPARIN SODIUM 50 MG: 60 INJECTION SUBCUTANEOUS at 07:27

## 2017-02-28 RX ADMIN — METHYLPREDNISOLONE SODIUM SUCCINATE 40 MG: 40 INJECTION, POWDER, FOR SOLUTION INTRAMUSCULAR; INTRAVENOUS at 14:32

## 2017-02-28 RX ADMIN — TIOTROPIUM BROMIDE 1 CAPSULE: 18 CAPSULE ORAL; RESPIRATORY (INHALATION) at 07:08

## 2017-02-28 RX ADMIN — METOPROLOL SUCCINATE 25 MG: 25 TABLET, FILM COATED, EXTENDED RELEASE ORAL at 10:04

## 2017-02-28 RX ADMIN — AZITHROMYCIN DIHYDRATE 500 MG: 500 INJECTION, POWDER, LYOPHILIZED, FOR SOLUTION INTRAVENOUS at 14:31

## 2017-02-28 RX ADMIN — INSULIN ASPART 2 UNITS: 100 INJECTION, SOLUTION INTRAVENOUS; SUBCUTANEOUS at 12:57

## 2017-02-28 RX ADMIN — CEFTRIAXONE SODIUM 1 G: 1 INJECTION, SOLUTION INTRAVENOUS at 12:57

## 2017-02-28 RX ADMIN — METOPROLOL SUCCINATE 25 MG: 25 TABLET, FILM COATED, EXTENDED RELEASE ORAL at 21:42

## 2017-02-28 RX ADMIN — NICOTINE 1 PATCH: 7 PATCH TRANSDERMAL at 14:32

## 2017-02-28 RX ADMIN — METHYLPREDNISOLONE SODIUM SUCCINATE 40 MG: 40 INJECTION, POWDER, FOR SOLUTION INTRAMUSCULAR; INTRAVENOUS at 03:22

## 2017-02-28 RX ADMIN — BUDESONIDE 0.5 MG: 0.5 INHALANT RESPIRATORY (INHALATION) at 07:08

## 2017-02-28 RX ADMIN — VITAMIN D, TAB 1000IU (100/BT) 1000 UNITS: 25 TAB at 10:05

## 2017-02-28 RX ADMIN — PANTOPRAZOLE SODIUM 40 MG: 40 TABLET, DELAYED RELEASE ORAL at 07:23

## 2017-02-28 RX ADMIN — ENOXAPARIN SODIUM 50 MG: 60 INJECTION SUBCUTANEOUS at 15:49

## 2017-02-28 RX ADMIN — ARFORMOTEROL TARTRATE 15 MCG: 15 SOLUTION RESPIRATORY (INHALATION) at 07:08

## 2017-03-01 ENCOUNTER — APPOINTMENT (OUTPATIENT)
Dept: RESPIRATORY THERAPY | Facility: HOSPITAL | Age: 72
End: 2017-03-01
Attending: INTERNAL MEDICINE

## 2017-03-01 VITALS
HEART RATE: 76 BPM | WEIGHT: 116 LBS | HEIGHT: 62 IN | OXYGEN SATURATION: 92 % | SYSTOLIC BLOOD PRESSURE: 120 MMHG | BODY MASS INDEX: 21.35 KG/M2 | DIASTOLIC BLOOD PRESSURE: 75 MMHG | RESPIRATION RATE: 20 BRPM | TEMPERATURE: 97.9 F

## 2017-03-01 LAB
ALBUMIN SERPL-MCNC: 3 G/DL (ref 3.5–5.2)
ALBUMIN/GLOB SERPL: 1 G/DL
ALP SERPL-CCNC: 212 U/L (ref 39–117)
ALT SERPL W P-5'-P-CCNC: 28 U/L (ref 1–33)
ANION GAP SERPL CALCULATED.3IONS-SCNC: 10.9 MMOL/L
AST SERPL-CCNC: 19 U/L (ref 1–32)
BACTERIA SPEC RESP CULT: NORMAL
BASOPHILS # BLD AUTO: 0.02 10*3/MM3 (ref 0–0.2)
BASOPHILS NFR BLD AUTO: 0.1 % (ref 0–1.5)
BILIRUB SERPL-MCNC: 0.4 MG/DL (ref 0.1–1.2)
BUN BLD-MCNC: 18 MG/DL (ref 8–23)
BUN/CREAT SERPL: 23.7 (ref 7–25)
CALCIUM SPEC-SCNC: 9.4 MG/DL (ref 8.6–10.5)
CHLORIDE SERPL-SCNC: 97 MMOL/L (ref 98–107)
CK MB SERPL-CCNC: 2.08 NG/ML
CK SERPL-CCNC: 30 U/L (ref 20–180)
CO2 SERPL-SCNC: 27.1 MMOL/L (ref 22–29)
CREAT BLD-MCNC: 0.76 MG/DL (ref 0.57–1)
DEPRECATED RDW RBC AUTO: 48.4 FL (ref 37–54)
EOSINOPHIL # BLD AUTO: 0.01 10*3/MM3 (ref 0–0.7)
EOSINOPHIL NFR BLD AUTO: 0.1 % (ref 0.3–6.2)
ERYTHROCYTE [DISTWIDTH] IN BLOOD BY AUTOMATED COUNT: 14.4 % (ref 11.7–13)
GFR SERPL CREATININE-BSD FRML MDRD: 75 ML/MIN/1.73
GLOBULIN UR ELPH-MCNC: 3.1 GM/DL
GLUCOSE BLD-MCNC: 95 MG/DL (ref 65–99)
GLUCOSE BLDC GLUCOMTR-MCNC: 104 MG/DL (ref 70–130)
GLUCOSE BLDC GLUCOMTR-MCNC: 94 MG/DL (ref 70–130)
GRAM STN SPEC: NORMAL
HCT VFR BLD AUTO: 37.5 % (ref 35.6–45.5)
HGB BLD-MCNC: 12.4 G/DL (ref 11.9–15.5)
IMM GRANULOCYTES # BLD: 0.09 10*3/MM3 (ref 0–0.03)
IMM GRANULOCYTES NFR BLD: 0.7 % (ref 0–0.5)
LYMPHOCYTES # BLD AUTO: 2.68 10*3/MM3 (ref 0.9–4.8)
LYMPHOCYTES NFR BLD AUTO: 19.8 % (ref 19.6–45.3)
MAGNESIUM SERPL-MCNC: 2.3 MG/DL (ref 1.6–2.4)
MCH RBC QN AUTO: 30.5 PG (ref 26.9–32)
MCHC RBC AUTO-ENTMCNC: 33.1 G/DL (ref 32.4–36.3)
MCV RBC AUTO: 92.4 FL (ref 80.5–98.2)
MONOCYTES # BLD AUTO: 1.86 10*3/MM3 (ref 0.2–1.2)
MONOCYTES NFR BLD AUTO: 13.8 % (ref 5–12)
NEUTROPHILS # BLD AUTO: 8.86 10*3/MM3 (ref 1.9–8.1)
NEUTROPHILS NFR BLD AUTO: 65.5 % (ref 42.7–76)
NT-PROBNP SERPL-MCNC: 222.9 PG/ML (ref 5–900)
PLATELET # BLD AUTO: 312 10*3/MM3 (ref 140–500)
PMV BLD AUTO: 10.4 FL (ref 6–12)
POTASSIUM BLD-SCNC: 3.9 MMOL/L (ref 3.5–5.2)
PROT SERPL-MCNC: 6.1 G/DL (ref 6–8.5)
RBC # BLD AUTO: 4.06 10*6/MM3 (ref 3.9–5.2)
SODIUM BLD-SCNC: 135 MMOL/L (ref 136–145)
TROPONIN T SERPL-MCNC: <0.01 NG/ML (ref 0–0.03)
WBC NRBC COR # BLD: 13.52 10*3/MM3 (ref 4.5–10.7)

## 2017-03-01 PROCEDURE — 25010000003 CEFTRIAXONE PER 250 MG: Performed by: INTERNAL MEDICINE

## 2017-03-01 PROCEDURE — 63710000001 PREDNISONE PER 5 MG: Performed by: HOSPITALIST

## 2017-03-01 PROCEDURE — 83880 ASSAY OF NATRIURETIC PEPTIDE: CPT | Performed by: INTERNAL MEDICINE

## 2017-03-01 PROCEDURE — G0378 HOSPITAL OBSERVATION PER HR: HCPCS

## 2017-03-01 PROCEDURE — 80053 COMPREHEN METABOLIC PANEL: CPT | Performed by: INTERNAL MEDICINE

## 2017-03-01 PROCEDURE — 94799 UNLISTED PULMONARY SVC/PX: CPT

## 2017-03-01 PROCEDURE — 82553 CREATINE MB FRACTION: CPT | Performed by: INTERNAL MEDICINE

## 2017-03-01 PROCEDURE — 83735 ASSAY OF MAGNESIUM: CPT | Performed by: INTERNAL MEDICINE

## 2017-03-01 PROCEDURE — 94010 BREATHING CAPACITY TEST: CPT

## 2017-03-01 PROCEDURE — 82962 GLUCOSE BLOOD TEST: CPT

## 2017-03-01 PROCEDURE — 85025 COMPLETE CBC W/AUTO DIFF WBC: CPT | Performed by: INTERNAL MEDICINE

## 2017-03-01 PROCEDURE — 84484 ASSAY OF TROPONIN QUANT: CPT | Performed by: INTERNAL MEDICINE

## 2017-03-01 PROCEDURE — 82550 ASSAY OF CK (CPK): CPT | Performed by: INTERNAL MEDICINE

## 2017-03-01 PROCEDURE — 93005 ELECTROCARDIOGRAM TRACING: CPT | Performed by: INTERNAL MEDICINE

## 2017-03-01 PROCEDURE — 93010 ELECTROCARDIOGRAM REPORT: CPT | Performed by: INTERNAL MEDICINE

## 2017-03-01 RX ORDER — CETIRIZINE HYDROCHLORIDE 5 MG/1
5 TABLET ORAL DAILY
Qty: 30 TABLET | Refills: 0 | Status: SHIPPED | OUTPATIENT
Start: 2017-03-01 | End: 2017-03-31

## 2017-03-01 RX ORDER — PROPAFENONE HYDROCHLORIDE 150 MG/1
150 TABLET, COATED ORAL EVERY 8 HOURS SCHEDULED
Qty: 90 TABLET | Refills: 0 | Status: SHIPPED | OUTPATIENT
Start: 2017-03-01 | End: 2017-03-30 | Stop reason: HOSPADM

## 2017-03-01 RX ORDER — PROPAFENONE HYDROCHLORIDE 150 MG/1
150 TABLET, COATED ORAL EVERY 8 HOURS SCHEDULED
Status: DISCONTINUED | OUTPATIENT
Start: 2017-03-01 | End: 2017-03-01 | Stop reason: HOSPADM

## 2017-03-01 RX ORDER — CETIRIZINE HYDROCHLORIDE 10 MG/1
5 TABLET ORAL DAILY
Status: DISCONTINUED | OUTPATIENT
Start: 2017-03-01 | End: 2017-03-01 | Stop reason: HOSPADM

## 2017-03-01 RX ORDER — METOPROLOL SUCCINATE 25 MG/1
25 TABLET, EXTENDED RELEASE ORAL EVERY 12 HOURS SCHEDULED
Qty: 60 TABLET | Refills: 0 | Status: SHIPPED | OUTPATIENT
Start: 2017-03-01 | End: 2017-03-30 | Stop reason: HOSPADM

## 2017-03-01 RX ORDER — ALBUTEROL SULFATE 2.5 MG/3ML
2.5 SOLUTION RESPIRATORY (INHALATION) ONCE
Status: COMPLETED | OUTPATIENT
Start: 2017-03-01 | End: 2017-03-01

## 2017-03-01 RX ORDER — BUDESONIDE AND FORMOTEROL FUMARATE DIHYDRATE 160; 4.5 UG/1; UG/1
2 AEROSOL RESPIRATORY (INHALATION)
Status: DISCONTINUED | OUTPATIENT
Start: 2017-03-01 | End: 2017-03-01 | Stop reason: HOSPADM

## 2017-03-01 RX ADMIN — ALBUTEROL SULFATE 2.5 MG: 2.5 SOLUTION RESPIRATORY (INHALATION) at 08:59

## 2017-03-01 RX ADMIN — VITAMIN D, TAB 1000IU (100/BT) 1000 UNITS: 25 TAB at 08:30

## 2017-03-01 RX ADMIN — NICOTINE 1 PATCH: 7 PATCH TRANSDERMAL at 14:03

## 2017-03-01 RX ADMIN — PROPAFENONE HYDROCHLORIDE 150 MG: 150 TABLET, COATED ORAL at 14:02

## 2017-03-01 RX ADMIN — CETIRIZINE HYDROCHLORIDE 5 MG: 10 TABLET, FILM COATED ORAL at 08:30

## 2017-03-01 RX ADMIN — CEFTRIAXONE SODIUM 1 G: 1 INJECTION, SOLUTION INTRAVENOUS at 14:02

## 2017-03-01 RX ADMIN — PREDNISONE 40 MG: 20 TABLET ORAL at 08:31

## 2017-03-01 RX ADMIN — METOPROLOL SUCCINATE 25 MG: 25 TABLET, FILM COATED, EXTENDED RELEASE ORAL at 08:30

## 2017-03-01 RX ADMIN — PANTOPRAZOLE SODIUM 40 MG: 40 TABLET, DELAYED RELEASE ORAL at 07:33

## 2017-03-01 RX ADMIN — APIXABAN 5 MG: 5 TABLET, FILM COATED ORAL at 08:30

## 2017-03-01 RX ADMIN — PROPAFENONE HYDROCHLORIDE 150 MG: 150 TABLET, COATED ORAL at 04:20

## 2017-03-01 NOTE — PROGRESS NOTES
"[facility]      Patient ID: Celia Baird   Age: 72 y.o.  Sex: female  :  1945  MRN: [id number]                LOS: 3 days   Patient Care Team:  Slick Gomez MD as PCP - General (Internal Medicine)      Referring MD:  Eulogio Mcdaniel MD    .  Chief Complaint   Patient presents with   • Shortness of Breath     Pt had husbands home O2  on upon arrival.        Admitting Diagnosis (chief complaint):  <principal problem not specified>    HPI        This is a pleasant 72 year white female who presented to [facility] after 4 weeks progressive worsening in her breathing. She states she has had increased cough, Shob, congestion and saw her PCP and was started on antibiotics, steroids, and breathing treatments. These did not help and she was noted SHOB with min exertion. She has been limited in her ability to perform ADL's where as about 4 weeks ago she was able to clean her house, grocery shop, and perform ADL's without much difficulty. She is a smoker and has COPD. She is not on 02 but her  is so she has been using his 02 to help when she gets very shob. Admits to fever and chills and general malaise.      We have been asked to see her for AFib with RVR. She has a history of afib dating back many years and did have a work up at [facility] about 4-5 years ago for a similar episode of COPD and did have palps then. She was seen by cardiology and had a stress test and an echo of her heart per her report and everything was normal. She was started on diltiazem po but after 2 years had swelling in her legs with this. This was then switched by one of her providers to metoprolol tartrate 12.5 mg BID. She admits to breakthrough palpitations off and on but mostly when her \"breathing acts up\". Denies any syncope or near syncope with these. She does not have any chest pain or pressure, orthopnea, PND, or issues with weight gain or swelling. She feel her heart pounds in her chest very forceful at times " so much so she feels like the bed is shaking. She has never been on any blood thinner. She does not recall ever discussing the risks of stroke with afib with any of her providers. She is not prone to falls and denies any issues with bleeding. She does bruise very easily and has very frail skin she reports.         Subjective     Review of Systems   Respiratory: Positive for shortness of breath.         Constitution: Positive for weakness and malaise/fatigue.   HENT: Positive for congestion.   Eyes: Negative for blurred vision and pain.   Cardiovascular: Positive for dyspnea on exertion, irregular heartbeat and palpitations. Negative for chest pain, leg swelling, near-syncope and paroxysmal nocturnal dyspnea.   Respiratory: Positive for cough, shortness of breath, sputum production and wheezing.   Endocrine: Negative for cold intolerance.   Hematologic/Lymphatic: Negative for bleeding problem. Bruises/bleeds easily.   Skin: Positive for dry skin. Negative for rash.   Musculoskeletal: Positive for muscle weakness. Negative for joint swelling.   Gastrointestinal: Negative for melena, nausea and vomiting.   Genitourinary: Negative for hematuria.   Neurological: Negative for dizziness.   Psychiatric/Behavioral: Negative for altered mental status. The patient is nervous/anxious.   Allergic/Immunologic: Positive for environmental allergies and persistent infections.        Past Medical History   Diagnosis Date   • COPD (chronic obstructive pulmonary disease)    • History of frequent urinary tract infections    • Irregular heart beat    • Kidney stones    • PBC (primary biliary cirrhosis)        Past Surgical History   Procedure Laterality Date   • Cholecystectomy     • Tubal abdominal ligation         Social History     Social History   • Marital status:      Spouse name: N/A   • Number of children: N/A   • Years of education: N/A     Occupational History   • Not on file.     Social History Main Topics   • Smoking  status: Current Every Day Smoker     Packs/day: 0.50     Types: Cigarettes   • Smokeless tobacco: Not on file   • Alcohol use No   • Drug use: No   • Sexual activity: Not on file     Other Topics Concern   • Not on file     Social History Narrative   • No narrative on file       History reviewed. No pertinent family history.    Shortness of Breath         Allergies   Allergen Reactions   • Levaquin [Levofloxacin]    • Sulfa Antibiotics             Current Meds:   Current Facility-Administered Medications   Medication Dose Route Frequency Provider Last Rate Last Dose   • arformoterol (BROVANA) nebulizer solution 15 mcg  15 mcg Nebulization BID - RT Toño Patel MD   15 mcg at 02/28/17 2108   • budesonide (PULMICORT) nebulizer solution 0.5 mg  0.5 mg Nebulization Daily - RT Toño Patel MD   0.5 mg at 02/28/17 0708   • cefTRIAXone (ROCEPHIN) IVPB 1 g  1 g Intravenous Q24H Pao Bautista MD   1 g at 02/28/17 1257   • cholecalciferol (VITAMIN D3) tablet 1,000 Units  1,000 Units Oral Daily Pao Bautista MD   1,000 Units at 02/28/17 1005   • cyclobenzaprine (FLEXERIL) tablet 10 mg  10 mg Oral TID PRN Pao Bautista MD       • dextrose (D50W) solution 25 g  25 g Intravenous Q15 Min PRN Pao Bautista MD       • dextrose (GLUTOSE) oral gel 15 g  15 g Oral Q15 Min PRN Pao Bautista MD       • enoxaparin (LOVENOX) syringe 50 mg  1 mg/kg Subcutaneous Q12H Andrew Shipley Jr., MD   50 mg at 02/28/17 1549   • glucagon (human recombinant) (GLUCAGEN DIAGNOSTIC) injection 1 mg  1 mg Subcutaneous Q15 Min PRN Pao Bautista MD       • Influenza Vac Subunit Quad (FLUCELVAX) injection 0.5 mL  0.5 mL Intramuscular During Hospitalization Pao Bautista MD       • insulin aspart (novoLOG) injection 0-7 Units  0-7 Units Subcutaneous 4x Daily AC & at Bedtime Pao Bautista MD   2 Units at 02/28/17 1257   • ipratropium-albuterol (DUO-NEB) nebulizer solution 3 mL  3 mL  "Nebulization Q4H PRN Pao Bautista MD   3 mL at 02/28/17 1325   • metoprolol succinate XL (TOPROL-XL) 24 hr tablet 25 mg  25 mg Oral Q12H BRAYAN Saucedo   25 mg at 02/28/17 2142   • nicotine (NICODERM CQ) 7 MG/24HR patch 1 patch  1 patch Transdermal Q24H Pao Bautista MD   1 patch at 02/28/17 1432   • pantoprazole (PROTONIX) EC tablet 40 mg  40 mg Oral Q AM Eulogio Mcdaniel MD   40 mg at 02/28/17 0723   • predniSONE (DELTASONE) tablet 40 mg  40 mg Oral Daily With Breakfast Eulogio Mcdaniel MD       • sodium chloride 0.9 % flush 10 mL  10 mL Intravenous PRN Yovani Ojeda MD       • sodium chloride 0.9 % flush 10 mL  10 mL Intravenous PRN Yovani Ojeda MD       • tiotropium (SPIRIVA) 18 MCG per inhalation capsule 1 capsule  1 capsule Inhalation Daily - RT Toño Patel MD   1 capsule at 02/28/17 0708         Medication Review:     arformoterol 15 mcg Nebulization BID - RT   budesonide 0.5 mg Nebulization Daily - RT   ceftriaxone 1 g Intravenous Q24H   cholecalciferol 1,000 Units Oral Daily   enoxaparin 1 mg/kg Subcutaneous Q12H   insulin aspart 0-7 Units Subcutaneous 4x Daily AC & at Bedtime   metoprolol succinate XL 25 mg Oral Q12H   nicotine 1 patch Transdermal Q24H   pantoprazole 40 mg Oral Q AM   predniSONE 40 mg Oral Daily With Breakfast   tiotropium 1 capsule Inhalation Daily - RT         Objective     Vital Signs  Temp:  [97.8 °F (36.6 °C)-98.9 °F (37.2 °C)] 98.3 °F (36.8 °C)  Heart Rate:  [79-91] 80  Resp:  [16-20] 16  BP: (126-152)/(69-89) 133/73    Visit Vitals   • /73 (BP Location: Right arm, Patient Position: Lying)   • Pulse 80   • Temp 98.3 °F (36.8 °C) (Oral)   • Resp 16   • Ht 62\" (157.5 cm)   • Wt 116 lb (52.6 kg)   • SpO2 90%   • BMI 21.22 kg/m2       OBJNOHEADERBEGIN@ Physical Exam  Constitutional: She is oriented to person, place, and time. She appears well-developed.   Thin, frail, elderly   HENT:   Head: Normocephalic and atraumatic.   Right Ear: External ear " normal.   Left Ear: External ear normal.   Mouth/Throat: Oropharynx is clear and moist. No oropharyngeal exudate.   Eyes: Conjunctivae and EOM are normal. Pupils are equal, round, and reactive to light. Right eye exhibits no discharge. Left eye exhibits no discharge. No scleral icterus.   Neck: No JVD present.   Cardiovascular: Normal rate and regular rhythm.   No murmur heard.  Pulmonary/Chest: No respiratory distress. She has wheezes. She exhibits no tenderness.   Short of breath with conversation   Abdominal: Soft. Bowel sounds are normal. She exhibits no distension and no mass. There is no tenderness. There is no rebound and no guarding.   Musculoskeletal: She exhibits no edema, tenderness or deformity.   Lymphadenopathy:   She has no cervical adenopathy.   Neurological: She is alert and oriented to person, place, and time. No cranial nerve deficit.   Skin: Skin is warm and dry. No rash noted.   Left lower arm bruising/ hematoma from lab stick    Psychiatric: She has a normal mood and affect. Her behavior is normal. Judgment and thought content normal.     WEIGHTS:     Wt Readings from Last 1 Encounters:   02/26/17 1308 116 lb (52.6 kg)   02/26/17 0902 116 lb (52.6 kg)       Results Review:     I reviewed the patient's new clinical results    LABS:  Lab Results   Component Value Date    CALCIUM 9.7 02/28/2017   @LABRCNTIP(MG:3,NA:3,K:3,CL:3,co2:3,bun:3,  creatinine:3,labglom:3,glucose,calcium:3,WBC:3,HGB:3,PLT:3,ALT:3,AST:3)@  Lab Results   Component Value Date    CKTOTAL 102 02/26/2017    CKMB 6.15 (H) 02/26/2017    TROPONINT 0.010 02/26/2017     Estimated Creatinine Clearance: 50.3 mL/min (by C-G formula based on Cr of 0.79).  Lab Results   Component Value Date    CHOL 235 (H) 02/28/2017    CHOL 233 (H) 02/26/2017     Lab Results   Component Value Date    HDL 91 (H) 02/28/2017    HDL 91 (H) 02/26/2017     No results found for: LDL  Lab Results   Component Value Date    TRIG 72 02/28/2017    TRIG 82 02/26/2017      No components found for: CHOLHDL  @RESUFAST(GLUCOSE,BUN,CREATININE,EGFRIFNONA,EGFRIFAFRI,BCR,  SODIUM,POTASSIUM,CHLORIDE,CO2,CALCIUM,PROTENTOTREF,ALBUMIN,  GLOBULIN,LABIL2,BILIRUBIN,ALK PHOS,AST,ALT)@  @RESUFAST(GLUCOSE,CALCIUM,NA,K,CO2,CL,BUN,CREATININE,EGFRIFAFRI,  EGFRIFNONA,BCR,ANIONGAP)@  Lab Results   Component Value Date    WBC 14.40 (H) 02/28/2017    HGB 12.1 02/28/2017    HCT 36.7 02/28/2017    MCV 90.2 02/28/2017     02/28/2017     No results found for: DDIMER  Lab Results   Component Value Date    TSH 0.525 02/28/2017     Lab Results   Component Value Date    CKTOTAL 102 02/26/2017     No results found for: DIGOXIN  No results found for: INR, PROTIME  Estimated Creatinine Clearance: 50.3 mL/min (by C-G formula based on Cr of 0.79).      Results from last 7 days  Lab Units 02/26/17  1833 02/26/17  1711 02/26/17  0919   CK TOTAL U/L 102  --   --    TROPONIN T ng/mL 0.010 <0.010 <0.010       Results from last 7 days  Lab Units 02/27/17  0314   MAGNESIUM mg/dL 2.3       Results from last 7 days  Lab Units 02/28/17  0312   SODIUM mmol/L 135*   POTASSIUM mmol/L 4.3   BUN mg/dL 18   CREATININE mg/dL 0.79   CALCIUM mg/dL 9.7     @LABRCNTIPbnp@    Results from last 7 days  Lab Units 02/28/17  0312 02/27/17  0314 02/26/17  0919   WBC 10*3/mm3 14.40* 10.24 13.71*   HEMOGLOBIN g/dL 12.1 12.4 13.5   HEMATOCRIT % 36.7 36.3 39.7   PLATELETS 10*3/mm3 371 360 376           Lab Results (last 24 hours)     Procedure Component Value Units Date/Time    CBC & Differential [[id number]] Collected:  02/28/17 0312    Specimen:  Blood Updated:  02/28/17 1907    Narrative:       The following orders were created for panel order CBC & Differential.  Procedure                               Abnormality         Status                     ---------                               -----------         ------                     CBC Auto Differential[[id number]]         Abnormal            Final result                 Please view  results for these tests on the individual orders.    CBC Auto Differential [[id number]]  (Abnormal) Collected:  02/28/17 0312    Specimen:  Blood Updated:  02/28/17 0427     WBC 14.40 (H) 10*3/mm3      RBC 4.07 10*6/mm3      Hemoglobin 12.1 g/dL      Hematocrit 36.7 %      MCV 90.2 fL      MCH 29.7 pg      MCHC 33.0 g/dL      RDW 14.3 (H) %      RDW-SD 47.0 fl      MPV 10.9 fL      Platelets 371 10*3/mm3      Neutrophil % 73.8 %      Lymphocyte % 17.0 (L) %      Monocyte % 8.5 %      Eosinophil % 0.0 (L) %      Basophil % 0.1 %      Immature Grans % 0.6 (H) %      Neutrophils, Absolute 10.63 (H) 10*3/mm3      Lymphocytes, Absolute 2.45 10*3/mm3      Monocytes, Absolute 1.22 (H) 10*3/mm3      Eosinophils, Absolute 0.00 10*3/mm3      Basophils, Absolute 0.01 10*3/mm3      Immature Grans, Absolute 0.09 (H) 10*3/mm3     Lipid Panel [[id number]]  (Abnormal) Collected:  02/28/17 0312    Specimen:  Blood Updated:  02/28/17 0452     Total Cholesterol 235 (H) mg/dL      Triglycerides 72 mg/dL      HDL Cholesterol 91 (H) mg/dL      LDL Cholesterol  130 (H) mg/dL      VLDL Cholesterol 14.4 mg/dL      LDL/HDL Ratio 1.42     Narrative:       Cholesterol Reference Ranges  (U.S. Department of Health and Human Services ATP III Classifications)    Desirable          <200 mg/dL  Borderline High    200-239 mg/dL  High Risk          >240 mg/dL      Triglyceride Reference Ranges  (U.S. Department of Health and Human Services ATP III Classifications)    Normal           <150 mg/dL  Borderline High  150-199 mg/dL  High             200-499 mg/dL  Very High        >500 mg/dL    HDL Reference Ranges  (U.S. Department of Health and Human Services ATP III Classifcations)    Low     <40 mg/dl (major risk factor for CHD)  High    >60 mg/dl ('negative' risk factor for CHD)        LDL Reference Ranges  (U.S. Department of Health and Human Services ATP III Classifcations)    Optimal          <100 mg/dL  Near Optimal     100-129 mg/dL  Borderline High   130-159 mg/dL  High             160-189 mg/dL  Very High        >189 mg/dL    Comprehensive Metabolic Panel [[id number]]  (Abnormal) Collected:  02/28/17 0312    Specimen:  Blood Updated:  02/28/17 0454     Glucose 121 (H) mg/dL      BUN 18 mg/dL      Creatinine 0.79 mg/dL      Sodium 135 (L) mmol/L      Potassium 4.3 mmol/L      Chloride 95 (L) mmol/L      CO2 26.7 mmol/L      Calcium 9.7 mg/dL      Total Protein 6.3 g/dL      Albumin 3.40 (L) g/dL      ALT (SGPT) 27 U/L      AST (SGOT) 19 U/L      Alkaline Phosphatase 230 (H) U/L      Total Bilirubin 0.3 mg/dL      eGFR Non African Amer 72 mL/min/1.73      Globulin 2.9 gm/dL      A/G Ratio 1.2 g/dL      BUN/Creatinine Ratio 22.8      Anion Gap 13.3 mmol/L     Narrative:       The MDRD GFR formula is only valid for adults with stable renal function between ages 18 and 70.    TSH [[id number]]  (Normal) Collected:  02/28/17 0312    Specimen:  Blood Updated:  02/28/17 0501     TSH 0.525 mIU/mL     BNP [[id number]]  (Normal) Collected:  02/28/17 0312    Specimen:  Blood Updated:  02/28/17 0501     proBNP 300.5 pg/mL     Narrative:       Among patients with dyspnea, NT-proBNP is highly sensitive for the detection of acute congestive heart failure. In addition NT-proBNP of <300 pg/ml effectively rules out acute congestive heart failure with 99% negative predictive value.    POC Glucose Fingerstick [[id number]]  (Abnormal) Collected:  02/28/17 0724    Specimen:  Blood Updated:  02/28/17 0725     Glucose 136 (H) mg/dL     Narrative:       Meter: [id number] : [id number] Britt Larry    Respiratory Culture [[id number]] Collected:  02/26/17 2203    Specimen:  Sputum from Cough Updated:  02/28/17 0920     Respiratory Culture Heavy growth (4+) Normal Respiratory Nasreen       Organism under investigation.        Gram Stain Result Rare (1+) WBCs seen              Rare (1+) Epithelial cells per low power field             Mixed bacterial morphotypes seen on Gram Stain    POC Glucose  Fingerstick [[id number]]  (Abnormal) Collected:  02/28/17 1242    Specimen:  Blood Updated:  02/28/17 1254     Glucose 186 (H) mg/dL     Narrative:       Meter: [id number] : [id number] Collins Villa    POC Glucose Fingerstick [[id number]]  (Normal) Collected:  02/28/17 1637    Specimen:  Blood Updated:  02/28/17 1638     Glucose 112 mg/dL     Narrative:       Meter: [id number] : [id number] Britt Larry    POC Glucose Fingerstick [[id number]]  (Abnormal) Collected:  02/28/17 2057    Specimen:  Blood Updated:  02/28/17 2100     Glucose 138 (H) mg/dL     Narrative:       Meter: [id number] : [id number] Gerardo Portillo          Imaging Results (last 72 hours)     Procedure Component Value Units Date/Time    XR Chest 1 View [[id number]] Collected:  02/26/17 1027     Updated:  02/26/17 1511    Narrative:       AP CHEST 02/26/2017     HISTORY: Shortness of breath.     FINDINGS: The heart size is within normal limits. Lungs appear free of  acute infiltrates. 1 or 2 tiny calcified granulomas are seen in the  right lower lung. There is some aortic calcification. Right carotid and  possibly left carotid calcification is also noted.       Impression:       No acute process.     This report was finalized on 2/26/2017 3:08 PM by Dr. Han James MD.             Imaging Results (all)     Procedure Component Value Units Date/Time    XR Chest 1 View [[id number]] Collected:  02/26/17 1027     Updated:  02/26/17 1511    Narrative:       AP CHEST 02/26/2017     HISTORY: Shortness of breath.     FINDINGS: The heart size is within normal limits. Lungs appear free of  acute infiltrates. 1 or 2 tiny calcified granulomas are seen in the  right lower lung. There is some aortic calcification. Right carotid and  possibly left carotid calcification is also noted.       Impression:       No acute process.     This report was finalized on 2/26/2017 3:08 PM by Dr. Han James MD.             ECG/EMG Results (last 24 hours)      Procedure Component Value Units Date/Time    Adult Transthoracic Echo Complete With Contrast [[id number]] Collected:  02/27/17 1248     BSA 1.5 m^2 Updated:  02/27/17 2254     IVSd 0.8 cm      LVIDd 4.5 cm      LVIDs 2.4 cm      LVPWd 0.8 cm      IVS/LVPW 1.0      FS 46.7 %      EDV(Teich) 92.4 ml      ESV(Teich) 20.2 ml      EF(Teich) 78.2 %      EDV(cubed) 91.1 ml      ESV(cubed) 13.8 ml      EF(cubed) 84.8 %      LV mass(C)d 113.6 grams      LV mass(C)dI 74.9 grams/m^2      SV(Teich) 72.3 ml      SI(Teich) 47.7 ml/m^2      SV(cubed) 77.3 ml      SI(cubed) 51.0 ml/m^2      Ao root diam 3.4 cm      Ao root area 9.1 cm^2      ACS 1.6 cm      LA dimension 2.6 cm      LA/Ao 0.76      LVOT diam 1.7 cm      LVOT area 2.3 cm^2      LVOT area(traced) 2.3 cm^2      RVOT diam 1.9 cm      RVOT area 2.8 cm^2      LVLd ap4 6.8 cm      EDV(MOD-sp4) 67.0 ml      LVLs ap4 5.7 cm      ESV(MOD-sp4) 18.0 ml      EF(MOD-sp4) 73.1 %      LVLd ap2 6.8 cm      EDV(MOD-sp2) 63.0 ml      LVLs ap2 5.6 cm      ESV(MOD-sp2) 17.0 ml      EF(MOD-sp2) 73.0 %      SV(MOD-sp4) 49.0 ml      SI(MOD-sp4) 32.3 ml/m^2      SV(MOD-sp2) 46.0 ml      SI(MOD-sp2) 30.3 ml/m^2      Ao root area (BSA corrected) 2.2      Ao root area (BSA corrected) 73.0 ml/m^2      CONTRAST EF 4CH 73.1 ml/m^2      LV Diastolic corrected for BSA 44.2 ml/m^2      LV Systolic corrected for BSA 11.9 ml/m^2      MV A dur 0.1 sec      MV E max delmi 78.8 cm/sec      MV A max delmi 75.8 cm/sec      MV E/A 1.0      MV V2 mean 80.0 cm/sec      MV mean PG 3.0 mmHg      MV V2 VTI 19.1 cm      MVA(VTI) 2.6 cm^2      MV P1/2t max delmi 80.1 cm/sec      MV P1/2t 51.2 msec      MVA(P1/2t) 4.3 cm^2      MV dec slope 458.0 cm/sec^2      MV dec time 0.18 sec      Ao V2 mean 108.0 cm/sec      Ao mean PG 5.0 mmHg      Ao mean PG (full) 2.0 mmHg      Ao V2 VTI 27.9 cm      LUIS(I,A) 1.8 cm^2      LUIS(I,D) 1.8 cm^2      LV V1 mean PG 3.0 mmHg      LV V1 mean 75.8 cm/sec      LV V1 VTI 21.8 cm      SV(Ao)  253.3 ml      SI(Ao) 167.0 ml/m^2      SV(LVOT) 49.5 ml      SV(RVOT) 45.9 ml      SI(LVOT) 32.6 ml/m^2      PA V2 max 136.0 cm/sec      PA max PG 7.4 mmHg      PA acc slope 1371 cm/sec^2      PA acc time 0.1 sec      RV V1 mean PG 1.0 mmHg      RV V1 mean 53.3 cm/sec      RV V1 VTI 16.2 cm      PA pr(Accel) 36.3 mmHg      Pulm Sys Jae 36.8 cm/sec      Pulm Cano Jae 64.1 cm/sec      Pulm S/D 0.57      Qp/Qs 0.93      Pulm A Revs Dur 0.12 sec      Pulm A Revs Jae 39.4 cm/sec      MVA P1/2T LCG 2.7 cm^2       CV ECHO ADALBERTO - BZI_BMI 21.2 kilograms/m^2       CV ECHO ADALBERTO - BSA(HAYCOCK) 1.5 m^2       CV ECHO ADALBERTO - BZI_METRIC_WEIGHT 52.6 kg       CV ECHO ADALBERTO - BZI_METRIC_HEIGHT 157.5 cm      TDI S' 23.00 cm/sec      RV Base 2.70 cm      RV Length 5.60 cm      RV Mid 2.00 cm      LA volume 36.0 cm3      E/E' ratio 10.0      LA Volume Index 28.0 mL/m2      Ao max PG (full) 10.0 mmHg      Ao pk jae 160.0 cm/sec      Lat Peak E' Jae 7.0 cm/sec      LV V1 max 112.0 cm/sec      Med Peak E' Jae 9.00 cm/sec      TAPSE (>1.6) 2.40 cm2      Echo EF Estimated 71 %     Narrative:       · All left ventricular wall segments contract normally.  · Left ventricular function is hyperdynamic (EF > 70).  · Mild aortic valve stenosis is present.                 Assessment:         [unfilled]    Active Problems:    COPD exacerbation      Assessment/Plan     I not only counseled the patient today on the significant factors noted in the assessment and plan, but I also recommended that the patient reduce salt and saturated animal fat intake in diet, as well as to perform scheduled exercise on a regular basis.    Pt is likely having in and out of AFib at home based off her history. We will up her metoprolol to xl 25 mg BID with hold parameters. We will need to monitor for worsening pulmonary status with this. She has tolerated low dose tartrate well in past, denying any worsening in her breathing when this was started 2 years ago. Ideally  "because of her underlying pulmonary disease CCB would be a better choice but she states she had issues with \"severe\" swelling with this. [facility] would also not be a good choice with her lung disease. WE will obtain echo and repeat an ekg. If heart structurally normal could consider other antiarrhythmics. Will check thyroid panel. She is currently not on telemetry floor. I feel she would benefit from closer monitoring of her heart rhythm while we make adjustments in her medication. Currently her heart rate is 80-90's and is regular. First trop neg. Will order second trop to assess for ischemia and check lipid panel, cpk, and mb, and mag. If mag is low or low side of normal will replace as this will help maintain SR.      I did begin the conversation about oral anticoagulants and her CVA risk. [id number] is 2 so oral AC is recommended to prevent stroke due to embolization of left atrial thrombus. He son is at bedside. Both seemed overwhelmed with this information and she kept repeating, they said my heart was good 4 years ago. We will start with lovenox therapeutic dosing for now and discuss oral AC further.      With her smoking history, ischemia workup should be done once her pulmonary status has improved. Last nuc stress > 4 years ago (normal by her report).         I did discuss patient with Dr. Yan and his plan is outlined above.      Thank you for the consult we look forward to following along in her care.     Oralia Mendes, BRAYAN  02/26/17  5:50 PM     Addendum: Lipid panel shows total cholesterol 23, HDL 91, , VLDL16.4, Trigs 82. TSH 0.305, Mag was 1.8. I have ordered 1 GM mag IV now as this is at lower limits of normal. Would like to keep mag above 2. Trop < 0.01 x 2.  with MB 6.15.      I agree with above note. I personally carried out physical exam, reviewed all pertinent clinical data, discussed assessment with APRN, then determined optimal plan.   I have also discussed the assessment and " plan with family.     72-year-old white female with history of paroxysmal atrial fibrillation, COPD, frequent UTIs, biliary cirrhosis, who presents with worsening shortness of breath associated with cough and chest congestion. She been tried on antibiotics steroids and breathing treatments at home without effect. Cardiology consultation was requested to evaluate atrial fibrillation with RVR. The patient states that the only time she has significant episodes of atrial fibrillation when she is in the hospital, sick with COPD exacerbation, treated with beta agonists. She can tell when she goes out of rhythm as her heart beats hard like it is pounding. Sometimes it will race but that rarely happens now that she's been put on metoprolol by her primary care physician. To further evaluate her atrial fibrillation, will obtain echocardiogram with Doppler. We'll get thyroid function tests TSH, magnesium level. The cause of her atrial fibrillation this admission is likely lung disease, beta agonist therapy and steroids therapy. If she does convert to sinus rhythm, I will put her on an antiarrhythmic agent such as Rythmol. I will look of the echo first before I do that.     She has never been on anticoagulation. As she gets older, there is more chance that the atrial fibrillation will recur. She therefore is at risk for cardioembolic event. She needs to make up her mind whether she wants to go on anticoagulation or not.     2/27    Echocardiogram today shows hyperdynamic left ventricle with ejection fraction greater than 70%.  There is mild aortic stenosis.  No contraindication to antiarrhythmic agent.  If She does convert to sinus rhythm, then I will put her on Rythmol.  I don't want to start Rythmol until she converts to sinus rhythm, to decrease the probability of cardiac embolic event.    We'll start on Lovenox 1 mg/kg subcutaneous every 12 hours.    2/28    She is converted to sinus rhythm spontaneously.  We'll start  Rythmol to prevent recurrence of atrial fibrillation.  As an outpatient, I may decide to stop the Rythmol, as she seems he had only when she is sick in the hospital with COPD exacerbation treated with beta agonist.  If she wants to check her pulse, she can take Rythmol on a when necessary basis should she go back in atrial fibrillation.  She has to be very compliant with taking her pulse on a every day basis.      QRS duration is normal.  We'll follow QRS duration while on Rythmol to make sure QRS duration does not lengthen exceedingly.    Andrew Shipley M.D.  February 28, 2017    Andrew Shipley MD  2/28/17    EMR Dragon/Transcription disclaimer:   Much of this encounter note is an electronic transcription/translation of spoken language to printed text. The electronic translation of spoken language may permit erroneous, or at times, nonsensical words or phrases to be inadvertently transcribed; Although I have reviewed the note for such errors, some may still exist.

## 2017-03-01 NOTE — PROGRESS NOTES
DAILY PROGRESS NOTE   [facility]        Patient Identification:  Name: Celia Baird  Age: 72 y.o.  Sex: female  :  1945  MRN: [id number]                     Primary Care Physician: Slick Gomez MD    Chief Complaint:    DOING BETTER  NO NEW COMPLAINTS    HPI;72 year old female who came to the emergency room with cough, congestion and shortness of breath which has been present for several weeks off and on; she was seen by her pcp and has been on antibiotics and steroids as well as breathing treatments but did not improved; her  has had similar symptoms; she has a history of copd and continues to smoke 1/2ppd; her  is on home oxygen and she has been using his portable oxygen at home although she is not usually on it; she has had subjective fever and chills; she feels better after receiving steroids, mininebs and antibiotics in the ED        Review of Systems  See history of present illness and past medical history.  Patient denies headache, dizziness, syncope, falls, trauma, change in vision, change in hearing, change in taste, changes in weight, changes in appetite, focal weakness, numbness, or paresthesia.  Patient denies chest pain, palpitations, dyspnea, orthopnea, PND, cough, sinus pressure, rhinorrhea, epistaxis, hemoptysis, nausea, vomiting,hematemesis, diarrhea, constipation or hematchezia.  Denies cold or heat intolerance, polydipsia, polyuria, polyphagia. Denies hematuria, pyuria, dysuria, hesitancy, frequency or urgency. Denies consumption of raw and under cooked meats foods or change in water source.  Denies fever, chills, sweats, night sweats.  Denies missing any routine medications. Remainder of ROS is negative.    Objective:  tMax 24 hrs: Temp (24hrs), Av.1 °F (36.7 °C), Min:97.9 °F (36.6 °C), Max:98.3 °F (36.8 °C)    Vitals Ranges:   Temp:  [97.9 °F (36.6 °C)-98.3 °F (36.8 °C)] 97.9 °F (36.6 °C)  Heart Rate:  [70-91] 76  Resp:  [16-20] 20  BP:  "(120-149)/(73-89) 120/75      Exam:  Visit Vitals   • /75 (BP Location: Right arm, Patient Position: Lying)   • Pulse 76   • Temp 97.9 °F (36.6 °C) (Oral)   • Resp 20   • Ht 62\" (157.5 cm)   • Wt 116 lb (52.6 kg)   • SpO2 92%   • BMI 21.22 kg/m2       General Appearance:    Alert, cooperative, no distress, appears stated age   Head:    Normocephalic, without obvious abnormality, atraumatic   Eyes:    PERRL, conjunctiva/corneas clear, EOM's intact, both eyes   Ears:    Normal external ear canals, both ears   Nose:   Nares normal, septum midline, mucosa normal, no drainage    or sinus tenderness   Throat:   Lips, mucosa, and tongue normal   Neck:   Supple, symmetrical, trachea midline, no adenopathy;     thyroid:  no enlargement/tenderness/nodules; no carotid    bruit or JVD   Back:     Symmetric, no curvature, ROM normal, no CVA tenderness   Lungs:     Clear to auscultation bilaterally, respirations unlabored   Chest Wall:    No tenderness or deformity    Heart:    Regular rate and rhythm, S1 and S2 normal, no murmur, rub   or gallop   Abdomen:     Soft, non-tender, bowel sounds active all four quadrants,     no masses, no hepatomegaly, no splenomegaly   Extremities:   Extremities normal, atraumatic, no cyanosis or edema   Pulses:   2+ and symmetric all extremities   Skin:   Skin color, texture, turgor normal, no rashes or lesions   Lymph nodes:   Cervical, supraclavicular, and axillary nodes normal   Neurologic:   CNII-XII intact, normal strength, sensation intact throughout      .    Data Review:  Lab Results   Component Value Date    WBC 13.52 (H) 03/01/2017    HGB 12.4 03/01/2017    HCT 37.5 03/01/2017     03/01/2017     Lab Results   Component Value Date     (L) 03/01/2017    K 3.9 03/01/2017    CL 97 (L) 03/01/2017    CO2 27.1 03/01/2017    BUN 18 03/01/2017    CREATININE 0.76 03/01/2017    GLUCOSE 95 03/01/2017     Lab Results   Component Value Date    CALCIUM 9.4 03/01/2017    MG 2.3 " 03/01/2017     Lab Results   Component Value Date    AST 19 03/01/2017    ALT 28 03/01/2017    ALKPHOS 212 (H) 03/01/2017         Results from last 7 days  Lab Units 02/28/17  0312   TSH mIU/mL 0.525       Results from last 7 days  Lab Units 02/26/17  1711   HEMOGLOBIN A1C % 5.63*      Imaging Results (all)     Procedure Component Value Units Date/Time    XR Chest 1 View [[id number]] Collected:  02/26/17 1027     Updated:  02/26/17 1027    Narrative:       AP CHEST 02/26/2017     HISTORY: Shortness of breath.     FINDINGS: The heart size is within normal limits. Lungs appear free of  acute infiltrates. 1 or 2 tiny calcified granulomas are seen in the  right lower lung. There is some aortic calcification. Right carotid and  possibly left carotid calcification is also noted.       Impression:       No acute process.             Current Facility-Administered Medications:   •  apixaban (ELIQUIS) tablet 5 mg, 5 mg, Oral, Q12H, Andrew Shipley Jr., MD, 5 mg at 03/01/17 0830  •  budesonide-formoterol (SYMBICORT) 160-4.5 MCG/ACT inhaler 2 puff, 2 puff, Inhalation, BID - RT, Toño Patel MD  •  cefTRIAXone (ROCEPHIN) IVPB 1 g, 1 g, Intravenous, Q24H, Pao Bautista MD, 1 g at 02/28/17 1257  •  cetirizine (zyrTEC) tablet 5 mg, 5 mg, Oral, Daily, Andrew Shipley Jr., MD, 5 mg at 03/01/17 0830  •  cholecalciferol (VITAMIN D3) tablet 1,000 Units, 1,000 Units, Oral, Daily, Pao Bautista MD, 1,000 Units at 03/01/17 0830  •  cyclobenzaprine (FLEXERIL) tablet 10 mg, 10 mg, Oral, TID PRN, Pao Bautista MD  •  dextrose (D50W) solution 25 g, 25 g, Intravenous, Q15 Min PRN, Pao Bautista MD  •  dextrose (GLUTOSE) oral gel 15 g, 15 g, Oral, Q15 Min PRN, Pao Bautista MD  •  glucagon (human recombinant) (GLUCAGEN DIAGNOSTIC) injection 1 mg, 1 mg, Subcutaneous, Q15 Min PRN, Pao Bautista MD  •  Influenza Vac Subunit Quad (FLUCELVAX) injection 0.5 mL, 0.5 mL, Intramuscular, During  Hospitalization, Pao Bautista MD  •  insulin aspart (novoLOG) injection 0-7 Units, 0-7 Units, Subcutaneous, 4x Daily AC & at Bedtime, Pao Bautista MD, 2 Units at 02/28/17 1257  •  ipratropium-albuterol (DUO-NEB) nebulizer solution 3 mL, 3 mL, Nebulization, Q4H PRN, Pao Bautista MD, 3 mL at 02/28/17 1325  •  metoprolol succinate XL (TOPROL-XL) 24 hr tablet 25 mg, 25 mg, Oral, Q12H, Oralia Mendes, APRN, 25 mg at 03/01/17 0830  •  nicotine (NICODERM CQ) 7 MG/24HR patch 1 patch, 1 patch, Transdermal, Q24H, Pao Bautista MD, 1 patch at 02/28/17 1432  •  pantoprazole (PROTONIX) EC tablet 40 mg, 40 mg, Oral, Q AM, Eulogio Mcdaniel MD, 40 mg at 03/01/17 0733  •  predniSONE (DELTASONE) tablet 40 mg, 40 mg, Oral, Daily With Breakfast, Eulogio Mcdaniel MD, 40 mg at 03/01/17 0831  •  propafenone (RHTHYMOL) tablet 150 mg, 150 mg, Oral, Q8H, Andrew Shipley Jr., MD, 150 mg at 03/01/17 0420  •  sodium chloride 0.9 % flush 10 mL, 10 mL, Intravenous, PRN, Yovani Ojeda MD  •  Insert peripheral IV, , , Once **AND** sodium chloride 0.9 % flush 10 mL, 10 mL, Intravenous, PRN, Yovani Ojeda MD  •  tiotropium (SPIRIVA) 18 MCG per inhalation capsule 1 capsule, 1 capsule, Inhalation, Daily - RT, Toño Patel MD, 1 capsule at 02/28/17 0708       Assessment:  Active Problems:    COPD exacerbation  acute hypoxic respiratory failure  Acute purulent bronchitis  PAF  HTN  GERD    Plan:  DISCHARGE  SUMMARY DICTATED      Eulogio Mcdaniel MD  3/1/2017  1:43 PM

## 2017-03-01 NOTE — PROGRESS NOTES
Continued Stay Note  [facility]     Patient Name: Celia Baird  MRN: [id number]  Today's Date: 3/1/2017    Admit Date: 2/26/2017          Discharge Plan       03/01/17 1428    Case Management/Social Work Plan    Plan Home    Patient/Family In Agreement With Plan yes    Additional Comments Noted plans for discharge. Followed up with patient who says no home needs. She plans home today with family assist.               Discharge Codes     None        Expected Discharge Date and Time     Expected Discharge Date Expected Discharge Time    Mar 1, 2017             Shameka Mccray RN

## 2017-03-01 NOTE — PROGRESS NOTES
Daily Progress Note.     Celia Baird  72 y.o.  female  3/1/2017    Brief problem (summary)  This is a 72-year-old female who is a smoker and still smoking is been having increasing shortness breath for the past 1 month. She has been treated as an outpatient with antibiotics and steroids as she was not improving she was brought to the hospital. She is on oxygen but visibly dyspneic. She also has atrial fibrillation. She complains of having some cough but not able to bring up any sputum. No hemoptysis and no fever    Today, feels lot better, off oxygen and less dyspneic      She had pulmonary function test today at the [facility].  FVC 1.55 L 57%  FEV1  0.73 L which is 36%  Ratio 47%, no significant response to bronchodilator therapy  Total lung capacity 4.24 L 89%, with a residual volume of 2.58 L which is 120%  Carbon monoxide diffusion is 3.86 which is 22% of the predicted    His pulmonary function tests shows patient has stage IV COPD    PMS/FH/SH: reviewed and unchanged.  Medications:     apixaban 5 mg Oral Q12H   budesonide-formoterol 2 puff Inhalation BID - RT   ceftriaxone 1 g Intravenous Q24H   cetirizine 5 mg Oral Daily   cholecalciferol 1,000 Units Oral Daily   insulin aspart 0-7 Units Subcutaneous 4x Daily AC & at Bedtime   metoprolol succinate XL 25 mg Oral Q12H   nicotine 1 patch Transdermal Q24H   pantoprazole 40 mg Oral Q AM   predniSONE 40 mg Oral Daily With Breakfast   propafenone 150 mg Oral Q8H   tiotropium 1 capsule Inhalation Daily - RT          ROS:  Respiratory ROS: positive for - shortness of breath  negative for - cough, hemoptysis or wheezing    Objective:  Temp:  [97.9 °F (36.6 °C)-98.3 °F (36.8 °C)] 97.9 °F (36.6 °C)  I/O last 3 completed shifts:  In: 1080 [P.O.:1080]  Out: 1300 [Urine:1300]  I/O this shift:  In: 240 [P.O.:240]  Out: -     Physical Exam   Constitutional: She is oriented to person, place, and time. She appears well-developed and well-nourished. No distress.   HENT:   Head:  Normocephalic and atraumatic.   Eyes: Pupils are equal, round, and reactive to light. No scleral icterus.   Cardiovascular: Normal rate and regular rhythm.    Pulmonary/Chest: Effort normal. No respiratory distress. She has decreased breath sounds. Wheezes: scant.   Abdominal: Soft. Bowel sounds are normal. There is no hepatosplenomegaly.   Neurological: She is alert and oriented to person, place, and time.   Skin: No cyanosis. Nails show no clubbing.   Psychiatric: She has a normal mood and affect. Her behavior is normal.         Results from last 7 days  Lab Units 03/01/17 0522 02/28/17 0312 02/27/17 0314   WBC 10*3/mm3 13.52* 14.40* 10.24   HEMOGLOBIN g/dL 12.4 12.1 12.4   HEMATOCRIT % 37.5 36.7 36.3   PLATELETS 10*3/mm3 312 371 360       Results from last 7 days  Lab Units 03/01/17 0522 02/28/17 0312 02/27/17 0314   SODIUM mmol/L 135* 135* 133*   POTASSIUM mmol/L 3.9 4.3 4.2   CHLORIDE mmol/L 97* 95* 95*   TOTAL CO2 mmol/L 27.1 26.7 22.9   BUN mg/dL 18 18 12   CREATININE mg/dL 0.76 0.79 0.83   GLUCOSE mg/dL 95 121* 168*   CALCIUM mg/dL 9.4 9.7 9.4       ASSESSMENT/ PLAN:  · Acute hypoxic respiratory failure, resolved  · COPD exacerbation, she has stage IV COPD  · Continued tobacco use. I talked to the patient about smoking cessation and health benefits.   · Atrial fibrillation. Now, NSR.       OK discharged home on prednisone taper and also to resume her Spiriva and Advair at home.  She wants to follow-up with me in the office.   Make an appointment in 4 weeks to see me      Toño Patel MD,[facility]  Pulmonary, Critical Care and Sleep Medicine.  [facility]    3/1/2017    EMR Dragon/Transcription disclaimer:   Much of this encounter note is an electronic transcription/translation of spoken language to printed text. The electronic translation of spoken language may permit erroneous, or at times, nonsensical words or phrases to be inadvertently transcribed; Although I have  reviewed the note for such errors, some may still exist.

## 2017-03-01 NOTE — DISCHARGE SUMMARY
DATE OF ADMISSION: 02/26/2017  DATE OF DISCHARGE: 03/01/2017    FINAL DIAGNOSES:  1. Acute hypoxic respiratory failure, resolved.  2. Acute exacerbation of chronic obstructive pulmonary disease.   3. Paroxysmal atrial fibrillation, now in normal sinus rhythm.   4. Hypertension.   5. Gastroesophageal reflux disease.     DISCHARGE MEDICATIONS: Per discharge medication reconciliation sheet.     CONSULTATIONS:   1. Pulmonary   2. Cardiology     PROCEDURE: Echocardiogram was obtained, which showed ejection fraction of 70%.    HISTORY OF PRESENT ILLNESS: Patient is a 72-year-old white female with history of COPD, who continues to smoke, admitted through emergency room with cough, congestion, shortness of breath. Workup revealed acute hypoxic respiratory failure with acute exacerbation of COPD, and further workup revealed that she was in AFib. She was admitted for management.     HOSPITAL COURSE: Patient was admitted. She was treated for COPD and hypoxic respiratory failure, which has resolved, and she converted back to sinus rhythm. Patients antibiotics and steroids have been tapered. She was started on Rythmol and Eliquis, which she is going to follow with Dr. Andrew Shipley. Patient has been cleared by Cardiology and Pulmonary to be discharged. She is afebrile, blood pressure 120/75, heart rate 76, O2 saturation 92% on room air. All of her labs look good. Potassium is 3.9. White count is high, but that is from the steroids.     DIET: Regular.     ACTIVITY: As tolerated.     DISCHARGE INSTRUCTIONS:   1. She is to keep followup with Primary Doctor in 1 week.   2. Follow up with Cardiology and Pulmonary per their instructions.   3. Patient is to keep all followup appointments and take medications as directed.         Eulogio Mcdaniel M.D.  AA:winifred  D:   03/01/2017 13:50:06  T:   03/01/2017 14:13:18  Job ID:   [id number]  Document ID:   [id number]  cc:

## 2017-03-01 NOTE — PLAN OF CARE
Problem: Patient Care Overview (Adult)  Goal: Plan of Care Review  Outcome: Ongoing (interventions implemented as appropriate)    03/01/17 1204   Coping/Psychosocial Response Interventions   Plan Of Care Reviewed With patient   Patient Care Overview   Progress improving   Outcome Evaluation   Outcome Summary/Follow up Plan Pt had PFTs this AM and did well. Small amount of wheezes present. No complaints at this time. Will continue to monitor.       Goal: Adult Individualization and Mutuality  Outcome: Ongoing (interventions implemented as appropriate)  Goal: Discharge Needs Assessment  Outcome: Ongoing (interventions implemented as appropriate)    Problem: COPD, Chronic Bronchitis/Emphysema (Adult)  Goal: Signs and Symptoms of Listed Potential Problems Will be Absent or Manageable (COPD, Chronic Bronchitis/Emphysema)  Outcome: Ongoing (interventions implemented as appropriate)    Problem: Fall Risk (Adult)  Goal: Identify Related Risk Factors and Signs and Symptoms  Outcome: Ongoing (interventions implemented as appropriate)  Goal: Absence of Falls  Outcome: Ongoing (interventions implemented as appropriate)

## 2017-03-06 ENCOUNTER — TELEPHONE (OUTPATIENT)
Dept: CARDIOLOGY | Facility: CLINIC | Age: 72
End: 2017-03-06

## 2017-03-06 NOTE — TELEPHONE ENCOUNTER
----- Message from Vera Mendoza sent at 3/3/2017  1:49 PM EST -----  Regarding: HP 4 WK F/U GPS OFF  Contact: [phone]  Pt is calling to schedule her 4 wk f/u from [facility]  GPS is off. When do you want to see her?  She only has enough meds for 4 wks.

## 2017-03-26 ENCOUNTER — APPOINTMENT (OUTPATIENT)
Dept: GENERAL RADIOLOGY | Facility: HOSPITAL | Age: 72
End: 2017-03-26

## 2017-03-26 ENCOUNTER — APPOINTMENT (OUTPATIENT)
Dept: CT IMAGING | Facility: HOSPITAL | Age: 72
End: 2017-03-26

## 2017-03-26 ENCOUNTER — HOSPITAL ENCOUNTER (INPATIENT)
Facility: HOSPITAL | Age: 72
LOS: 4 days | Discharge: HOME HEALTH | End: 2017-03-30
Attending: EMERGENCY MEDICINE | Admitting: INTERNAL MEDICINE

## 2017-03-26 DIAGNOSIS — S16.1XXA CERVICAL STRAIN, INITIAL ENCOUNTER: ICD-10-CM

## 2017-03-26 DIAGNOSIS — I60.9 SUBARACHNOID HEMORRHAGE (HCC): Primary | ICD-10-CM

## 2017-03-26 DIAGNOSIS — S42.002A CLOSED NONDISPLACED FRACTURE OF LEFT CLAVICLE, UNSPECIFIED PART OF CLAVICLE, INITIAL ENCOUNTER: ICD-10-CM
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
## 2017-03-26 DIAGNOSIS — T14.8XXA MULTIPLE SKIN TEARS: ICD-10-CM

## 2017-03-26 DIAGNOSIS — R26.2 DIFFICULTY WALKING: ICD-10-CM

## 2017-03-26 LAB
ALBUMIN SERPL-MCNC: 3.4 G/DL (ref 3.5–5.2)
ALBUMIN/GLOB SERPL: 1.1 G/DL
ALP SERPL-CCNC: 291 U/L (ref 39–117)
ALT SERPL W P-5'-P-CCNC: 31 U/L (ref 1–33)
ANION GAP SERPL CALCULATED.3IONS-SCNC: 10.6 MMOL/L
APTT PPP: 37.7 SECONDS (ref 22.7–35.4)
AST SERPL-CCNC: 35 U/L (ref 1–32)
BACTERIA UR QL AUTO: ABNORMAL /HPF
BASOPHILS # BLD AUTO: 0.1 10*3/MM3 (ref 0–0.2)
BASOPHILS NFR BLD AUTO: 0.6 % (ref 0–1.5)
BILIRUB SERPL-MCNC: 0.5 MG/DL (ref 0.1–1.2)
BILIRUB UR QL STRIP: NEGATIVE
BUN BLD-MCNC: 10 MG/DL (ref 8–23)
BUN/CREAT SERPL: 9.7 (ref 7–25)
CALCIUM SPEC-SCNC: 9.5 MG/DL (ref 8.6–10.5)
CHLORIDE SERPL-SCNC: 100 MMOL/L (ref 98–107)
CLARITY UR: ABNORMAL
CO2 SERPL-SCNC: 25.4 MMOL/L (ref 22–29)
COLOR UR: ABNORMAL
CREAT BLD-MCNC: 1.03 MG/DL (ref 0.57–1)
DEPRECATED RDW RBC AUTO: 47.7 FL (ref 37–54)
EOSINOPHIL # BLD AUTO: 0.2 10*3/MM3 (ref 0–0.7)
EOSINOPHIL NFR BLD AUTO: 1.3 % (ref 0.3–6.2)
ERYTHROCYTE [DISTWIDTH] IN BLOOD BY AUTOMATED COUNT: 14.8 % (ref 11.7–13)
GFR SERPL CREATININE-BSD FRML MDRD: 53 ML/MIN/1.73
GLOBULIN UR ELPH-MCNC: 3 GM/DL
GLUCOSE BLD-MCNC: 96 MG/DL (ref 65–99)
GLUCOSE BLDC GLUCOMTR-MCNC: 107 MG/DL (ref 70–130)
GLUCOSE BLDC GLUCOMTR-MCNC: 113 MG/DL (ref 70–130)
GLUCOSE UR STRIP-MCNC: NEGATIVE MG/DL
HCT VFR BLD AUTO: 36.2 % (ref 35.6–45.5)
HGB BLD-MCNC: 12.2 G/DL (ref 11.9–15.5)
HGB UR QL STRIP.AUTO: NEGATIVE
HYALINE CASTS UR QL AUTO: ABNORMAL /LPF
IMM GRANULOCYTES # BLD: 0.13 10*3/MM3 (ref 0–0.03)
IMM GRANULOCYTES NFR BLD: 0.8 % (ref 0–0.5)
INR PPP: 1.64 (ref 0.9–1.1)
KETONES UR QL STRIP: NEGATIVE
LEUKOCYTE ESTERASE UR QL STRIP.AUTO: ABNORMAL
LYMPHOCYTES # BLD AUTO: 3.51 10*3/MM3 (ref 0.9–4.8)
LYMPHOCYTES NFR BLD AUTO: 22.2 % (ref 19.6–45.3)
MAGNESIUM SERPL-MCNC: 2 MG/DL (ref 1.6–2.4)
MCH RBC QN AUTO: 29.9 PG (ref 26.9–32)
MCHC RBC AUTO-ENTMCNC: 33.7 G/DL (ref 32.4–36.3)
MCV RBC AUTO: 88.7 FL (ref 80.5–98.2)
MONOCYTES # BLD AUTO: 1.88 10*3/MM3 (ref 0.2–1.2)
MONOCYTES NFR BLD AUTO: 11.9 % (ref 5–12)
NEUTROPHILS # BLD AUTO: 9.99 10*3/MM3 (ref 1.9–8.1)
NEUTROPHILS NFR BLD AUTO: 63.2 % (ref 42.7–76)
NITRITE UR QL STRIP: NEGATIVE
NRBC BLD MANUAL-RTO: 0 /100 WBC (ref 0–0)
PH UR STRIP.AUTO: 6 [PH] (ref 5–8)
PLATELET # BLD AUTO: 379 10*3/MM3 (ref 140–500)
PMV BLD AUTO: 10.3 FL (ref 6–12)
POTASSIUM BLD-SCNC: 4.5 MMOL/L (ref 3.5–5.2)
PROT SERPL-MCNC: 6.4 G/DL (ref 6–8.5)
PROT UR QL STRIP: ABNORMAL
PROTHROMBIN TIME: 18.8 SECONDS (ref 11.7–14.2)
RBC # BLD AUTO: 4.08 10*6/MM3 (ref 3.9–5.2)
RBC # UR: ABNORMAL /HPF
REF LAB TEST METHOD: ABNORMAL
SODIUM BLD-SCNC: 136 MMOL/L (ref 136–145)
SP GR UR STRIP: 1.02 (ref 1–1.03)
SQUAMOUS #/AREA URNS HPF: ABNORMAL /HPF
TROPONIN T SERPL-MCNC: <0.01 NG/ML (ref 0–0.03)
UROBILINOGEN UR QL STRIP: ABNORMAL
WBC NRBC COR # BLD: 15.81 10*3/MM3 (ref 4.5–10.7)
WBC UR QL AUTO: ABNORMAL /HPF

## 2017-03-26 PROCEDURE — 85610 PROTHROMBIN TIME: CPT | Performed by: NURSE PRACTITIONER

## 2017-03-26 PROCEDURE — 73030 X-RAY EXAM OF SHOULDER: CPT

## 2017-03-26 PROCEDURE — 25010000002 MORPHINE PER 10 MG: Performed by: NURSE PRACTITIONER

## 2017-03-26 PROCEDURE — 70450 CT HEAD/BRAIN W/O DYE: CPT

## 2017-03-26 PROCEDURE — 94799 UNLISTED PULMONARY SVC/PX: CPT

## 2017-03-26 PROCEDURE — 73060 X-RAY EXAM OF HUMERUS: CPT

## 2017-03-26 PROCEDURE — 25010000002 ONDANSETRON PER 1 MG: Performed by: NURSE PRACTITIONER

## 2017-03-26 PROCEDURE — 87186 SC STD MICRODIL/AGAR DIL: CPT | Performed by: NURSE PRACTITIONER

## 2017-03-26 PROCEDURE — 84484 ASSAY OF TROPONIN QUANT: CPT | Performed by: NURSE PRACTITIONER

## 2017-03-26 PROCEDURE — 81001 URINALYSIS AUTO W/SCOPE: CPT | Performed by: NURSE PRACTITIONER

## 2017-03-26 PROCEDURE — 25010000002 PROTHROMBIN COMPLEX CONC HUMAN 1000 UNITS KIT: Performed by: NURSE PRACTITIONER

## 2017-03-26 PROCEDURE — 83735 ASSAY OF MAGNESIUM: CPT | Performed by: NURSE PRACTITIONER

## 2017-03-26 PROCEDURE — 87077 CULTURE AEROBIC IDENTIFY: CPT | Performed by: NURSE PRACTITIONER

## 2017-03-26 PROCEDURE — 71100 X-RAY EXAM RIBS UNI 2 VIEWS: CPT

## 2017-03-26 PROCEDURE — 25010000002 MORPHINE PER 10 MG

## 2017-03-26 PROCEDURE — 87086 URINE CULTURE/COLONY COUNT: CPT | Performed by: NURSE PRACTITIONER

## 2017-03-26 PROCEDURE — 80053 COMPREHEN METABOLIC PANEL: CPT | Performed by: NURSE PRACTITIONER

## 2017-03-26 PROCEDURE — 71010 HC CHEST PA OR AP: CPT

## 2017-03-26 PROCEDURE — C9132 KCENTRA, PER I.U.: HCPCS | Performed by: NURSE PRACTITIONER

## 2017-03-26 PROCEDURE — 93005 ELECTROCARDIOGRAM TRACING: CPT | Performed by: NURSE PRACTITIONER

## 2017-03-26 PROCEDURE — 85025 COMPLETE CBC W/AUTO DIFF WBC: CPT | Performed by: NURSE PRACTITIONER

## 2017-03-26 PROCEDURE — 99284 EMERGENCY DEPT VISIT MOD MDM: CPT

## 2017-03-26 PROCEDURE — 93010 ELECTROCARDIOGRAM REPORT: CPT | Performed by: INTERNAL MEDICINE

## 2017-03-26 PROCEDURE — 72125 CT NECK SPINE W/O DYE: CPT

## 2017-03-26 PROCEDURE — 25010000002 ONDANSETRON PER 1 MG

## 2017-03-26 PROCEDURE — 82962 GLUCOSE BLOOD TEST: CPT

## 2017-03-26 PROCEDURE — 85730 THROMBOPLASTIN TIME PARTIAL: CPT | Performed by: NURSE PRACTITIONER

## 2017-03-26 RX ORDER — PREDNISONE 10 MG/1
10 TABLET ORAL DAILY
Status: DISCONTINUED | OUTPATIENT
Start: 2017-03-26 | End: 2017-03-27

## 2017-03-26 RX ORDER — ONDANSETRON 2 MG/ML
4 INJECTION INTRAMUSCULAR; INTRAVENOUS ONCE
Status: COMPLETED | OUTPATIENT
Start: 2017-03-26 | End: 2017-03-26

## 2017-03-26 RX ORDER — BUDESONIDE AND FORMOTEROL FUMARATE DIHYDRATE 160; 4.5 UG/1; UG/1
2 AEROSOL RESPIRATORY (INHALATION)
Status: DISCONTINUED | OUTPATIENT
Start: 2017-03-26 | End: 2017-03-30 | Stop reason: HOSPADM

## 2017-03-26 RX ORDER — SODIUM CHLORIDE 0.9 % (FLUSH) 0.9 %
10 SYRINGE (ML) INJECTION AS NEEDED
Status: DISCONTINUED | OUTPATIENT
Start: 2017-03-26 | End: 2017-03-30 | Stop reason: HOSPADM

## 2017-03-26 RX ORDER — IPRATROPIUM BROMIDE AND ALBUTEROL SULFATE 2.5; .5 MG/3ML; MG/3ML
3 SOLUTION RESPIRATORY (INHALATION) EVERY 4 HOURS PRN
Status: DISCONTINUED | OUTPATIENT
Start: 2017-03-26 | End: 2017-03-30 | Stop reason: HOSPADM

## 2017-03-26 RX ORDER — IPRATROPIUM BROMIDE AND ALBUTEROL SULFATE 2.5; .5 MG/3ML; MG/3ML
SOLUTION RESPIRATORY (INHALATION)
Status: DISPENSED
Start: 2017-03-26 | End: 2017-03-27

## 2017-03-26 RX ORDER — ONDANSETRON 2 MG/ML
INJECTION INTRAMUSCULAR; INTRAVENOUS
Status: COMPLETED
Start: 2017-03-26 | End: 2017-03-26

## 2017-03-26 RX ORDER — ONDANSETRON 2 MG/ML
4 INJECTION INTRAMUSCULAR; INTRAVENOUS EVERY 6 HOURS PRN
Status: DISCONTINUED | OUTPATIENT
Start: 2017-03-26 | End: 2017-03-30 | Stop reason: HOSPADM

## 2017-03-26 RX ORDER — SODIUM CHLORIDE 0.9 % (FLUSH) 0.9 %
1-10 SYRINGE (ML) INJECTION AS NEEDED
Status: DISCONTINUED | OUTPATIENT
Start: 2017-03-26 | End: 2017-03-30 | Stop reason: HOSPADM

## 2017-03-26 RX ORDER — IPRATROPIUM BROMIDE AND ALBUTEROL SULFATE 2.5; .5 MG/3ML; MG/3ML
3 SOLUTION RESPIRATORY (INHALATION) EVERY 6 HOURS PRN
Status: DISCONTINUED | OUTPATIENT
Start: 2017-03-26 | End: 2017-03-30 | Stop reason: HOSPADM

## 2017-03-26 RX ORDER — IPRATROPIUM BROMIDE AND ALBUTEROL SULFATE 2.5; .5 MG/3ML; MG/3ML
3 SOLUTION RESPIRATORY (INHALATION)
Status: DISCONTINUED | OUTPATIENT
Start: 2017-03-26 | End: 2017-03-27

## 2017-03-26 RX ADMIN — ONDANSETRON 4 MG: 2 INJECTION INTRAMUSCULAR; INTRAVENOUS at 15:32

## 2017-03-26 RX ADMIN — LIDOCAINE HYDROCHLORIDE: 20 JELLY TOPICAL at 15:34

## 2017-03-26 RX ADMIN — MORPHINE SULFATE 4 MG: 4 INJECTION, SOLUTION INTRAMUSCULAR; INTRAVENOUS at 13:23

## 2017-03-26 RX ADMIN — MORPHINE SULFATE 4 MG: 4 INJECTION, SOLUTION INTRAMUSCULAR; INTRAVENOUS at 15:33

## 2017-03-26 RX ADMIN — ONDANSETRON 4 MG: 2 INJECTION INTRAMUSCULAR; INTRAVENOUS at 13:21

## 2017-03-26 RX ADMIN — PROTHROMBIN, COAGULATION FACTOR VII HUMAN, COAGULATION FACTOR IX HUMAN, COAGULATION FACTOR X HUMAN, PROTEIN C, PROTEIN S HUMAN, AND WATER 2754 UNITS: KIT at 14:50

## 2017-03-26 RX ADMIN — IPRATROPIUM BROMIDE AND ALBUTEROL SULFATE 3 ML: .5; 3 SOLUTION RESPIRATORY (INHALATION) at 16:38

## 2017-03-26 RX ADMIN — Medication 4 MG: at 15:33

## 2017-03-26 RX ADMIN — Medication 1.5 ML: at 15:00

## 2017-03-27 ENCOUNTER — APPOINTMENT (OUTPATIENT)
Dept: CARDIOLOGY | Facility: HOSPITAL | Age: 72
End: 2017-03-27

## 2017-03-27 ENCOUNTER — APPOINTMENT (OUTPATIENT)
Dept: CT IMAGING | Facility: HOSPITAL | Age: 72
End: 2017-03-27

## 2017-03-27 LAB
CHOLEST SERPL-MCNC: 223 MG/DL (ref 0–200)
GLUCOSE BLDC GLUCOMTR-MCNC: 106 MG/DL (ref 70–130)
GLUCOSE BLDC GLUCOMTR-MCNC: 109 MG/DL (ref 70–130)
GLUCOSE BLDC GLUCOMTR-MCNC: 119 MG/DL (ref 70–130)
GLUCOSE BLDC GLUCOMTR-MCNC: 21 MG/DL (ref 70–130)
HBA1C MFR BLD: 5.7 % (ref 4.8–5.6)
HDLC SERPL-MCNC: 54 MG/DL (ref 40–60)
LDLC SERPL CALC-MCNC: 149 MG/DL (ref 0–100)
LDLC/HDLC SERPL: 2.77 {RATIO}
TRIGL SERPL-MCNC: 98 MG/DL (ref 0–150)
TROPONIN T SERPL-MCNC: <0.01 NG/ML (ref 0–0.03)
VLDLC SERPL-MCNC: 19.6 MG/DL (ref 5–40)

## 2017-03-27 PROCEDURE — 97110 THERAPEUTIC EXERCISES: CPT

## 2017-03-27 PROCEDURE — 92610 EVALUATE SWALLOWING FUNCTION: CPT

## 2017-03-27 PROCEDURE — 93225 XTRNL ECG REC<48 HRS REC: CPT

## 2017-03-27 PROCEDURE — 97162 PT EVAL MOD COMPLEX 30 MIN: CPT

## 2017-03-27 PROCEDURE — 94799 UNLISTED PULMONARY SVC/PX: CPT

## 2017-03-27 PROCEDURE — 99221 1ST HOSP IP/OBS SF/LOW 40: CPT | Performed by: INTERNAL MEDICINE

## 2017-03-27 PROCEDURE — 94640 AIRWAY INHALATION TREATMENT: CPT

## 2017-03-27 PROCEDURE — 25010000002 ONDANSETRON PER 1 MG: Performed by: INTERNAL MEDICINE

## 2017-03-27 PROCEDURE — 83036 HEMOGLOBIN GLYCOSYLATED A1C: CPT | Performed by: INTERNAL MEDICINE

## 2017-03-27 PROCEDURE — 80061 LIPID PANEL: CPT | Performed by: INTERNAL MEDICINE

## 2017-03-27 PROCEDURE — 99222 1ST HOSP IP/OBS MODERATE 55: CPT | Performed by: NURSE PRACTITIONER

## 2017-03-27 PROCEDURE — 93226 XTRNL ECG REC<48 HR SCAN A/R: CPT

## 2017-03-27 PROCEDURE — 82962 GLUCOSE BLOOD TEST: CPT

## 2017-03-27 PROCEDURE — 70450 CT HEAD/BRAIN W/O DYE: CPT

## 2017-03-27 PROCEDURE — 84484 ASSAY OF TROPONIN QUANT: CPT | Performed by: NURSE PRACTITIONER

## 2017-03-27 RX ORDER — FAMOTIDINE 20 MG/1
20 TABLET, FILM COATED ORAL DAILY
Status: DISCONTINUED | OUTPATIENT
Start: 2017-03-27 | End: 2017-03-27

## 2017-03-27 RX ORDER — FAMOTIDINE 20 MG/1
20 TABLET, FILM COATED ORAL 2 TIMES DAILY
Status: DISCONTINUED | OUTPATIENT
Start: 2017-03-27 | End: 2017-03-27 | Stop reason: SDUPTHER

## 2017-03-27 RX ORDER — MELATONIN
1000 DAILY
Status: DISCONTINUED | OUTPATIENT
Start: 2017-03-27 | End: 2017-03-30 | Stop reason: HOSPADM

## 2017-03-27 RX ORDER — CETIRIZINE HYDROCHLORIDE 10 MG/1
5 TABLET ORAL DAILY
Status: COMPLETED | OUTPATIENT
Start: 2017-03-27 | End: 2017-03-30

## 2017-03-27 RX ORDER — HYDROCODONE BITARTRATE AND ACETAMINOPHEN 5; 325 MG/1; MG/1
1 TABLET ORAL EVERY 6 HOURS PRN
Status: DISCONTINUED | OUTPATIENT
Start: 2017-03-27 | End: 2017-03-30 | Stop reason: HOSPADM

## 2017-03-27 RX ORDER — PROPAFENONE HYDROCHLORIDE 150 MG/1
75 TABLET, COATED ORAL EVERY 8 HOURS SCHEDULED
Status: DISCONTINUED | OUTPATIENT
Start: 2017-03-27 | End: 2017-03-30 | Stop reason: HOSPADM

## 2017-03-27 RX ORDER — NICOTINE 21 MG/24HR
1 PATCH, TRANSDERMAL 24 HOURS TRANSDERMAL EVERY 24 HOURS
Status: DISCONTINUED | OUTPATIENT
Start: 2017-03-27 | End: 2017-03-30 | Stop reason: HOSPADM

## 2017-03-27 RX ORDER — METOPROLOL SUCCINATE 25 MG/1
25 TABLET, EXTENDED RELEASE ORAL
Status: DISCONTINUED | OUTPATIENT
Start: 2017-03-27 | End: 2017-03-27

## 2017-03-27 RX ORDER — PROPAFENONE HYDROCHLORIDE 150 MG/1
150 TABLET, COATED ORAL EVERY 8 HOURS SCHEDULED
Status: DISCONTINUED | OUTPATIENT
Start: 2017-03-27 | End: 2017-03-27

## 2017-03-27 RX ORDER — ECHINACEA PURPUREA EXTRACT 125 MG
1 TABLET ORAL AS NEEDED
Status: DISCONTINUED | OUTPATIENT
Start: 2017-03-27 | End: 2017-03-30 | Stop reason: HOSPADM

## 2017-03-27 RX ORDER — NICOTINE 21 MG/24HR
1 PATCH, TRANSDERMAL 24 HOURS TRANSDERMAL EVERY 24 HOURS
Status: DISCONTINUED | OUTPATIENT
Start: 2017-03-27 | End: 2017-03-27 | Stop reason: SDUPTHER

## 2017-03-27 RX ADMIN — IPRATROPIUM BROMIDE AND ALBUTEROL SULFATE 3 ML: .5; 3 SOLUTION RESPIRATORY (INHALATION) at 08:01

## 2017-03-27 RX ADMIN — HYDROCODONE BITARTATE AND ACETAMINOPHEN 1 TABLET: 5; 325 TABLET ORAL at 10:42

## 2017-03-27 RX ADMIN — SALINE NASAL SPRAY 1 SPRAY: 1.5 SOLUTION NASAL at 20:34

## 2017-03-27 RX ADMIN — BUDESONIDE AND FORMOTEROL FUMARATE DIHYDRATE 2 PUFF: 160; 4.5 AEROSOL RESPIRATORY (INHALATION) at 20:51

## 2017-03-27 RX ADMIN — TIOTROPIUM BROMIDE 1 CAPSULE: 18 CAPSULE ORAL; RESPIRATORY (INHALATION) at 11:34

## 2017-03-27 RX ADMIN — ONDANSETRON 4 MG: 2 INJECTION INTRAMUSCULAR; INTRAVENOUS at 04:49

## 2017-03-27 RX ADMIN — BUDESONIDE AND FORMOTEROL FUMARATE DIHYDRATE 2 PUFF: 160; 4.5 AEROSOL RESPIRATORY (INHALATION) at 08:01

## 2017-03-27 RX ADMIN — NICOTINE 1 PATCH: 7 PATCH, EXTENDED RELEASE TRANSDERMAL at 14:42

## 2017-03-27 RX ADMIN — CETIRIZINE HYDROCHLORIDE 5 MG: 10 TABLET, FILM COATED ORAL at 10:41

## 2017-03-27 RX ADMIN — HYDROCODONE BITARTATE AND ACETAMINOPHEN 1 TABLET: 5; 325 TABLET ORAL at 19:13

## 2017-03-27 RX ADMIN — PROPAFENONE HYDROCHLORIDE 75 MG: 150 TABLET, COATED ORAL at 21:06

## 2017-03-27 RX ADMIN — VITAMIN D, TAB 1000IU (100/BT) 1000 UNITS: 25 TAB at 10:41

## 2017-03-27 RX ADMIN — PROPAFENONE HYDROCHLORIDE 75 MG: 150 TABLET, COATED ORAL at 14:43

## 2017-03-27 RX ADMIN — FAMOTIDINE 20 MG: 20 TABLET, FILM COATED ORAL at 10:42

## 2017-03-28 ENCOUNTER — APPOINTMENT (OUTPATIENT)
Dept: NUCLEAR MEDICINE | Facility: HOSPITAL | Age: 72
End: 2017-03-28

## 2017-03-28 LAB
ANION GAP SERPL CALCULATED.3IONS-SCNC: 13.1 MMOL/L
BACTERIA SPEC AEROBE CULT: ABNORMAL
BASOPHILS # BLD AUTO: 0.12 10*3/MM3 (ref 0–0.2)
BASOPHILS NFR BLD AUTO: 1.1 % (ref 0–1.5)
BH CV STRESS COMMENTS STAGE 1: NORMAL
BH CV STRESS DOSE REGADENOSON STAGE 1: 0.4
BH CV STRESS DURATION MIN STAGE 1: 0
BH CV STRESS DURATION SEC STAGE 1: 15
BH CV STRESS PROTOCOL 1: NORMAL
BH CV STRESS RECOVERY BP: NORMAL MMHG
BH CV STRESS RECOVERY HR: 86 BPM
BH CV STRESS STAGE 1: 1
BUN BLD-MCNC: 12 MG/DL (ref 8–23)
BUN/CREAT SERPL: 11.2 (ref 7–25)
CALCIUM SPEC-SCNC: 9.1 MG/DL (ref 8.6–10.5)
CHLORIDE SERPL-SCNC: 99 MMOL/L (ref 98–107)
CO2 SERPL-SCNC: 23.9 MMOL/L (ref 22–29)
CREAT BLD-MCNC: 1.07 MG/DL (ref 0.57–1)
DEPRECATED RDW RBC AUTO: 50 FL (ref 37–54)
EOSINOPHIL # BLD AUTO: 0.26 10*3/MM3 (ref 0–0.7)
EOSINOPHIL NFR BLD AUTO: 2.4 % (ref 0.3–6.2)
ERYTHROCYTE [DISTWIDTH] IN BLOOD BY AUTOMATED COUNT: 15.2 % (ref 11.7–13)
GFR SERPL CREATININE-BSD FRML MDRD: 50 ML/MIN/1.73
GLUCOSE BLD-MCNC: 103 MG/DL (ref 65–99)
HCT VFR BLD AUTO: 33.7 % (ref 35.6–45.5)
HGB BLD-MCNC: 11.1 G/DL (ref 11.9–15.5)
IMM GRANULOCYTES # BLD: 0.09 10*3/MM3 (ref 0–0.03)
IMM GRANULOCYTES NFR BLD: 0.8 % (ref 0–0.5)
LV EF NUC BP: 60 %
LYMPHOCYTES # BLD AUTO: 4.06 10*3/MM3 (ref 0.9–4.8)
LYMPHOCYTES NFR BLD AUTO: 38 % (ref 19.6–45.3)
MAXIMAL PREDICTED HEART RATE: 148 BPM
MCH RBC QN AUTO: 29.8 PG (ref 26.9–32)
MCHC RBC AUTO-ENTMCNC: 32.9 G/DL (ref 32.4–36.3)
MCV RBC AUTO: 90.3 FL (ref 80.5–98.2)
MONOCYTES # BLD AUTO: 1.95 10*3/MM3 (ref 0.2–1.2)
MONOCYTES NFR BLD AUTO: 18.3 % (ref 5–12)
NEUTROPHILS # BLD AUTO: 4.2 10*3/MM3 (ref 1.9–8.1)
NEUTROPHILS NFR BLD AUTO: 39.4 % (ref 42.7–76)
NRBC BLD MANUAL-RTO: 0 /100 WBC (ref 0–0)
PERCENT MAX PREDICTED HR: 62.84 %
PLATELET # BLD AUTO: 350 10*3/MM3 (ref 140–500)
PMV BLD AUTO: 10.6 FL (ref 6–12)
POTASSIUM BLD-SCNC: 4.2 MMOL/L (ref 3.5–5.2)
RBC # BLD AUTO: 3.73 10*6/MM3 (ref 3.9–5.2)
SODIUM BLD-SCNC: 136 MMOL/L (ref 136–145)
STRESS BASELINE BP: NORMAL MMHG
STRESS BASELINE HR: 76 BPM
STRESS PERCENT HR: 74 %
STRESS POST PEAK BP: NORMAL MMHG
STRESS POST PEAK HR: 93 BPM
STRESS TARGET HR: 126 BPM
TROPONIN T SERPL-MCNC: <0.01 NG/ML (ref 0–0.03)
WBC NRBC COR # BLD: 10.68 10*3/MM3 (ref 4.5–10.7)

## 2017-03-28 PROCEDURE — 80048 BASIC METABOLIC PNL TOTAL CA: CPT | Performed by: INTERNAL MEDICINE

## 2017-03-28 PROCEDURE — 97110 THERAPEUTIC EXERCISES: CPT

## 2017-03-28 PROCEDURE — A9500 TC99M SESTAMIBI: HCPCS | Performed by: INTERNAL MEDICINE

## 2017-03-28 PROCEDURE — 78452 HT MUSCLE IMAGE SPECT MULT: CPT

## 2017-03-28 PROCEDURE — 85025 COMPLETE CBC W/AUTO DIFF WBC: CPT | Performed by: INTERNAL MEDICINE

## 2017-03-28 PROCEDURE — 78452 HT MUSCLE IMAGE SPECT MULT: CPT | Performed by: INTERNAL MEDICINE

## 2017-03-28 PROCEDURE — 94799 UNLISTED PULMONARY SVC/PX: CPT

## 2017-03-28 PROCEDURE — 99232 SBSQ HOSP IP/OBS MODERATE 35: CPT | Performed by: INTERNAL MEDICINE

## 2017-03-28 PROCEDURE — 0 TECHNETIUM SESTAMIBI: Performed by: INTERNAL MEDICINE

## 2017-03-28 PROCEDURE — 93017 CV STRESS TEST TRACING ONLY: CPT

## 2017-03-28 PROCEDURE — 93018 CV STRESS TEST I&R ONLY: CPT | Performed by: INTERNAL MEDICINE

## 2017-03-28 PROCEDURE — 84484 ASSAY OF TROPONIN QUANT: CPT | Performed by: NURSE PRACTITIONER

## 2017-03-28 PROCEDURE — 25010000002 REGADENOSON 0.4 MG/5ML SOLUTION: Performed by: INTERNAL MEDICINE

## 2017-03-28 PROCEDURE — 93016 CV STRESS TEST SUPVJ ONLY: CPT | Performed by: INTERNAL MEDICINE

## 2017-03-28 RX ADMIN — HYDROCODONE BITARTATE AND ACETAMINOPHEN 1 TABLET: 5; 325 TABLET ORAL at 08:56

## 2017-03-28 RX ADMIN — PROPAFENONE HYDROCHLORIDE 75 MG: 150 TABLET, COATED ORAL at 21:06

## 2017-03-28 RX ADMIN — PROPAFENONE HYDROCHLORIDE 75 MG: 150 TABLET, COATED ORAL at 06:24

## 2017-03-28 RX ADMIN — TIOTROPIUM BROMIDE 1 CAPSULE: 18 CAPSULE ORAL; RESPIRATORY (INHALATION) at 08:53

## 2017-03-28 RX ADMIN — BUDESONIDE AND FORMOTEROL FUMARATE DIHYDRATE 2 PUFF: 160; 4.5 AEROSOL RESPIRATORY (INHALATION) at 08:53

## 2017-03-28 RX ADMIN — Medication 1 DOSE: at 11:55

## 2017-03-28 RX ADMIN — HYDROCODONE BITARTATE AND ACETAMINOPHEN 1 TABLET: 5; 325 TABLET ORAL at 21:06

## 2017-03-28 RX ADMIN — Medication 1 DOSE: at 07:05

## 2017-03-28 RX ADMIN — HYDROCODONE BITARTATE AND ACETAMINOPHEN 1 TABLET: 5; 325 TABLET ORAL at 03:00

## 2017-03-28 RX ADMIN — CETIRIZINE HYDROCHLORIDE 5 MG: 10 TABLET, FILM COATED ORAL at 08:38

## 2017-03-28 RX ADMIN — BUDESONIDE AND FORMOTEROL FUMARATE DIHYDRATE 2 PUFF: 160; 4.5 AEROSOL RESPIRATORY (INHALATION) at 20:58

## 2017-03-28 RX ADMIN — VITAMIN D, TAB 1000IU (100/BT) 1000 UNITS: 25 TAB at 08:38

## 2017-03-28 RX ADMIN — PROPAFENONE HYDROCHLORIDE 75 MG: 150 TABLET, COATED ORAL at 13:52

## 2017-03-28 RX ADMIN — REGADENOSON 0.4 MG: 0.08 INJECTION, SOLUTION INTRAVENOUS at 11:55

## 2017-03-28 RX ADMIN — NICOTINE 1 PATCH: 7 PATCH, EXTENDED RELEASE TRANSDERMAL at 13:51

## 2017-03-29 PROCEDURE — 93005 ELECTROCARDIOGRAM TRACING: CPT | Performed by: NURSE PRACTITIONER

## 2017-03-29 PROCEDURE — 93010 ELECTROCARDIOGRAM REPORT: CPT | Performed by: INTERNAL MEDICINE

## 2017-03-29 PROCEDURE — 94799 UNLISTED PULMONARY SVC/PX: CPT

## 2017-03-29 PROCEDURE — 97165 OT EVAL LOW COMPLEX 30 MIN: CPT

## 2017-03-29 PROCEDURE — 93227 XTRNL ECG REC<48 HR R&I: CPT | Performed by: INTERNAL MEDICINE

## 2017-03-29 PROCEDURE — 97110 THERAPEUTIC EXERCISES: CPT

## 2017-03-29 RX ADMIN — HYDROCODONE BITARTATE AND ACETAMINOPHEN 1 TABLET: 5; 325 TABLET ORAL at 14:18

## 2017-03-29 RX ADMIN — VITAMIN D, TAB 1000IU (100/BT) 1000 UNITS: 25 TAB at 10:12

## 2017-03-29 RX ADMIN — PROPAFENONE HYDROCHLORIDE 75 MG: 150 TABLET, COATED ORAL at 14:18

## 2017-03-29 RX ADMIN — PROPAFENONE HYDROCHLORIDE 75 MG: 150 TABLET, COATED ORAL at 22:10

## 2017-03-29 RX ADMIN — BUDESONIDE AND FORMOTEROL FUMARATE DIHYDRATE 2 PUFF: 160; 4.5 AEROSOL RESPIRATORY (INHALATION) at 07:53

## 2017-03-29 RX ADMIN — PROPAFENONE HYDROCHLORIDE 75 MG: 150 TABLET, COATED ORAL at 06:48

## 2017-03-29 RX ADMIN — CETIRIZINE HYDROCHLORIDE 5 MG: 10 TABLET, FILM COATED ORAL at 10:12

## 2017-03-29 RX ADMIN — TIOTROPIUM BROMIDE 1 CAPSULE: 18 CAPSULE ORAL; RESPIRATORY (INHALATION) at 07:53

## 2017-03-29 RX ADMIN — HYDROCODONE BITARTATE AND ACETAMINOPHEN 1 TABLET: 5; 325 TABLET ORAL at 23:10

## 2017-03-29 RX ADMIN — NICOTINE 1 PATCH: 7 PATCH, EXTENDED RELEASE TRANSDERMAL at 11:41

## 2017-03-29 RX ADMIN — BUDESONIDE AND FORMOTEROL FUMARATE DIHYDRATE 2 PUFF: 160; 4.5 AEROSOL RESPIRATORY (INHALATION) at 22:03

## 2017-03-30 VITALS
OXYGEN SATURATION: 95 % | SYSTOLIC BLOOD PRESSURE: 106 MMHG | DIASTOLIC BLOOD PRESSURE: 64 MMHG | BODY MASS INDEX: 20.91 KG/M2 | RESPIRATION RATE: 16 BRPM | HEART RATE: 76 BPM | WEIGHT: 113.6 LBS | TEMPERATURE: 97.5 F | HEIGHT: 62 IN

## 2017-03-30 PROBLEM — T14.8XXA MULTIPLE SKIN TEARS: Status: ACTIVE | Noted: 2017-03-30

## 2017-03-30 PROBLEM — S42.002A CLOSED NONDISPLACED FRACTURE OF LEFT CLAVICLE: Status: ACTIVE | Noted: 2017-03-30

## 2017-03-30 PROBLEM — I48.20 CHRONIC ATRIAL FIBRILLATION: Status: ACTIVE | Noted: 2017-03-30

## 2017-03-30 LAB
ANION GAP SERPL CALCULATED.3IONS-SCNC: 11.7 MMOL/L
BUN BLD-MCNC: 12 MG/DL (ref 8–23)
BUN/CREAT SERPL: 14.5 (ref 7–25)
CALCIUM SPEC-SCNC: 8.7 MG/DL (ref 8.6–10.5)
CHLORIDE SERPL-SCNC: 101 MMOL/L (ref 98–107)
CO2 SERPL-SCNC: 24.3 MMOL/L (ref 22–29)
CREAT BLD-MCNC: 0.83 MG/DL (ref 0.57–1)
DEPRECATED RDW RBC AUTO: 51.9 FL (ref 37–54)
ERYTHROCYTE [DISTWIDTH] IN BLOOD BY AUTOMATED COUNT: 15.3 % (ref 11.7–13)
GFR SERPL CREATININE-BSD FRML MDRD: 68 ML/MIN/1.73
GLUCOSE BLD-MCNC: 95 MG/DL (ref 65–99)
HCT VFR BLD AUTO: 31.1 % (ref 35.6–45.5)
HGB BLD-MCNC: 10.2 G/DL (ref 11.9–15.5)
INR PPP: 1.04 (ref 0.9–1.1)
MAGNESIUM SERPL-MCNC: 2.1 MG/DL (ref 1.6–2.4)
MCH RBC QN AUTO: 30.4 PG (ref 26.9–32)
MCHC RBC AUTO-ENTMCNC: 32.8 G/DL (ref 32.4–36.3)
MCV RBC AUTO: 92.6 FL (ref 80.5–98.2)
PLATELET # BLD AUTO: 363 10*3/MM3 (ref 140–500)
PMV BLD AUTO: 10.6 FL (ref 6–12)
POTASSIUM BLD-SCNC: 4 MMOL/L (ref 3.5–5.2)
PROTHROMBIN TIME: 13.2 SECONDS (ref 11.7–14.2)
RBC # BLD AUTO: 3.36 10*6/MM3 (ref 3.9–5.2)
SODIUM BLD-SCNC: 137 MMOL/L (ref 136–145)
WBC NRBC COR # BLD: 8.74 10*3/MM3 (ref 4.5–10.7)

## 2017-03-30 PROCEDURE — 97110 THERAPEUTIC EXERCISES: CPT

## 2017-03-30 PROCEDURE — C1764 EVENT RECORDER, CARDIAC: HCPCS | Performed by: INTERNAL MEDICINE

## 2017-03-30 PROCEDURE — 25010000002 MIDAZOLAM PER 1 MG: Performed by: INTERNAL MEDICINE

## 2017-03-30 PROCEDURE — G8998 SWALLOW D/C STATUS: HCPCS

## 2017-03-30 PROCEDURE — G9164 LANG EXPRESS D/C STATUS: HCPCS

## 2017-03-30 PROCEDURE — 97530 THERAPEUTIC ACTIVITIES: CPT

## 2017-03-30 PROCEDURE — 33282 HC INSERTION PT ACTIV CARD EVNT REC: CPT | Performed by: INTERNAL MEDICINE

## 2017-03-30 PROCEDURE — G8996 SWALLOW CURRENT STATUS: HCPCS

## 2017-03-30 PROCEDURE — 92523 SPEECH SOUND LANG COMPREHEN: CPT

## 2017-03-30 PROCEDURE — 94799 UNLISTED PULMONARY SVC/PX: CPT

## 2017-03-30 PROCEDURE — G9162 LANG EXPRESS CURRENT STATUS: HCPCS

## 2017-03-30 PROCEDURE — 85610 PROTHROMBIN TIME: CPT | Performed by: NURSE PRACTITIONER

## 2017-03-30 PROCEDURE — 25010000002 FENTANYL CITRATE (PF) 100 MCG/2ML SOLUTION: Performed by: INTERNAL MEDICINE

## 2017-03-30 PROCEDURE — 80048 BASIC METABOLIC PNL TOTAL CA: CPT | Performed by: NURSE PRACTITIONER

## 2017-03-30 PROCEDURE — 0JH632Z INSERTION OF MONITORING DEVICE INTO CHEST SUBCUTANEOUS TISSUE AND FASCIA, PERCUTANEOUS APPROACH: ICD-10-PCS | Performed by: INTERNAL MEDICINE

## 2017-03-30 PROCEDURE — 33282 PR IMPLANTATION PT-ACTIVATED CARDIAC EVENT RECORDER: CPT | Performed by: INTERNAL MEDICINE

## 2017-03-30 PROCEDURE — 83735 ASSAY OF MAGNESIUM: CPT | Performed by: NURSE PRACTITIONER

## 2017-03-30 PROCEDURE — 92526 ORAL FUNCTION THERAPY: CPT

## 2017-03-30 PROCEDURE — 25010000003 CEFAZOLIN PER 500 MG: Performed by: INTERNAL MEDICINE

## 2017-03-30 PROCEDURE — 85027 COMPLETE CBC AUTOMATED: CPT | Performed by: NURSE PRACTITIONER

## 2017-03-30 DEVICE — SYS ICM REVEAL LINQ W/MONTR/PATNT ASS/LINQ11 44.8X7.2X4MM: Type: IMPLANTABLE DEVICE | Status: FUNCTIONAL

## 2017-03-30 RX ORDER — PROPAFENONE HYDROCHLORIDE 150 MG/1
75 TABLET, COATED ORAL EVERY 8 HOURS SCHEDULED
Qty: 45 TABLET | Refills: 0 | Status: SHIPPED | OUTPATIENT
Start: 2017-03-30 | End: 2017-05-15 | Stop reason: SDUPTHER

## 2017-03-30 RX ORDER — HYDROCODONE BITARTRATE AND ACETAMINOPHEN 5; 325 MG/1; MG/1
1 TABLET ORAL EVERY 8 HOURS PRN
Qty: 20 TABLET | Refills: 0 | Status: SHIPPED | OUTPATIENT
Start: 2017-03-30 | End: 2017-04-06

## 2017-03-30 RX ORDER — MIDAZOLAM HYDROCHLORIDE 1 MG/ML
INJECTION INTRAMUSCULAR; INTRAVENOUS AS NEEDED
Status: DISCONTINUED | OUTPATIENT
Start: 2017-03-30 | End: 2017-03-30 | Stop reason: HOSPADM

## 2017-03-30 RX ORDER — FENTANYL CITRATE 50 UG/ML
INJECTION, SOLUTION INTRAMUSCULAR; INTRAVENOUS AS NEEDED
Status: DISCONTINUED | OUTPATIENT
Start: 2017-03-30 | End: 2017-03-30 | Stop reason: HOSPADM

## 2017-03-30 RX ORDER — LIDOCAINE HYDROCHLORIDE AND EPINEPHRINE 10; 10 MG/ML; UG/ML
INJECTION, SOLUTION INFILTRATION; PERINEURAL AS NEEDED
Status: DISCONTINUED | OUTPATIENT
Start: 2017-03-30 | End: 2017-03-30 | Stop reason: HOSPADM

## 2017-03-30 RX ORDER — VANCOMYCIN HYDROCHLORIDE 1 G/200ML
1000 INJECTION, SOLUTION INTRAVENOUS
Status: DISCONTINUED | OUTPATIENT
Start: 2017-03-30 | End: 2017-03-30 | Stop reason: HOSPADM

## 2017-03-30 RX ORDER — CYCLOBENZAPRINE HCL 10 MG
10 TABLET ORAL 2 TIMES DAILY PRN
Refills: 0 | Status: ON HOLD
Start: 2017-03-30 | End: 2020-03-21

## 2017-03-30 RX ADMIN — NICOTINE 1 PATCH: 7 PATCH, EXTENDED RELEASE TRANSDERMAL at 12:22

## 2017-03-30 RX ADMIN — BUDESONIDE AND FORMOTEROL FUMARATE DIHYDRATE 2 PUFF: 160; 4.5 AEROSOL RESPIRATORY (INHALATION) at 08:30

## 2017-03-30 RX ADMIN — PROPAFENONE HYDROCHLORIDE 75 MG: 150 TABLET, COATED ORAL at 14:33

## 2017-03-30 RX ADMIN — CEFAZOLIN SODIUM 1 G: 1 INJECTION, SOLUTION INTRAVENOUS at 09:30

## 2017-03-30 RX ADMIN — VITAMIN D, TAB 1000IU (100/BT) 1000 UNITS: 25 TAB at 08:34

## 2017-03-30 RX ADMIN — PROPAFENONE HYDROCHLORIDE 75 MG: 150 TABLET, COATED ORAL at 06:04

## 2017-03-30 RX ADMIN — CETIRIZINE HYDROCHLORIDE 5 MG: 10 TABLET, FILM COATED ORAL at 08:34

## 2017-03-30 RX ADMIN — TIOTROPIUM BROMIDE 1 CAPSULE: 18 CAPSULE ORAL; RESPIRATORY (INHALATION) at 08:30

## 2017-04-12 ENCOUNTER — OFFICE VISIT (OUTPATIENT)
Dept: CARDIOLOGY | Facility: CLINIC | Age: 72
End: 2017-04-12

## 2017-04-12 VITALS
HEART RATE: 91 BPM | HEIGHT: 62 IN | DIASTOLIC BLOOD PRESSURE: 69 MMHG | BODY MASS INDEX: 21.9 KG/M2 | SYSTOLIC BLOOD PRESSURE: 118 MMHG | WEIGHT: 119 LBS

## 2017-04-12 DIAGNOSIS — I48.20 CHRONIC ATRIAL FIBRILLATION (HCC): Primary | ICD-10-CM

## 2017-04-12 PROCEDURE — 99213 OFFICE O/P EST LOW 20 MIN: CPT | Performed by: INTERNAL MEDICINE

## 2017-04-12 PROCEDURE — 93000 ELECTROCARDIOGRAM COMPLETE: CPT | Performed by: INTERNAL MEDICINE

## 2017-04-12 NOTE — PROGRESS NOTES
" Subjective:       Celia Baird is a 72 y.o. female who here for follow up    CC  hosp follw up  HPI  Pt recently admitted to hosp for syncope , has been taken off the anticoagulation, also had Linq implantation , denies cp, palpitation     Problem List Items Addressed This Visit        Cardiovascular and Mediastinum    Chronic atrial fibrillation - Primary        Previous treatments/evaluations include: ASA. Cardiac risk factors: advanced age (older than 55 for men, 65 for women).    The following portions of the patient's history were reviewed and updated as appropriate: allergies, current medications, past family history, past medical history, past social history, past surgical history and problem list.    Past Medical History:   Diagnosis Date   • COPD (chronic obstructive pulmonary disease)    • History of frequent urinary tract infections    • Irregular heart beat    • Kidney stones    • PBC (primary biliary cirrhosis)     reports that she has been smoking Cigarettes.  She has been smoking about 0.50 packs per day. She does not have any smokeless tobacco history on file. She reports that she does not drink alcohol or use illicit drugs.History reviewed. No pertinent family history.    Review of Systems  Constitutional: No wt loss, fever, fatigue  Gastrointestinal: No nausea, abdominal pain  Behavioral/Psych: No insomnia or anxiety   Cardiovascular no cp  Objective:       Physical Exam             Physical Exam  /69  Pulse 91  Ht 62\" (157.5 cm)  Wt 119 lb (54 kg)  BMI 21.77 kg/m2    General appearance: NAD, conversant   Eyes: anicteric sclerae, moist conjunctivae; no lid-lag; PERRLA   HENT: Atraumatic; oropharynx clear with moist mucous membranes and no mucosal ulcerations;  normal hard and soft palate   Neck: Trachea midline; FROM, supple, no thyromegaly or lymphadenopathy   Lungs: CTA, with normal respiratory effort and no intercostal retractions   CV: S1-S2 regular, no murmurs, no rub, no gallop "   Abdomen: Soft, non-tender; no masses or HSM   Extremities: No peripheral edema or extremity lymphadenopathy  Skin: Normal temperature, turgor and texture; no rash, ulcers or subcutaneous nodules   Psych: Appropriate affect, alert and oriented to person, place and time           Cardiographics  @  ECG 12 Lead  Date/Time: 4/12/2017 4:06 PM  Performed by: SHERITA OWEN  Authorized by: SHERITA OWEN   Comparison: compared with previous ECG   Similar to previous ECG  Rhythm: sinus rhythm  ST Flattening: all  T flattening: all  Clinical impression: non-specific ECG           Discharge Diagnosis:  · Small subarachnoid hemorrhage  · Scalp hemorrhage  · COPD without exacerbation  · Current smoker cigarettes  · Left clavicular fracture, medical management   · Rib fracture  · Hx of A fib, eliquis on hold, Rythmol dose is reduced and metoprolol on hold   · Urine culture positive for enterococcus faecalis, no other sign of infection, could be contamination      Echocardiogram:        Current Outpatient Prescriptions:   •  albuterol (PROVENTIL HFA;VENTOLIN HFA) 108 (90 BASE) MCG/ACT inhaler, Inhale 2 puffs Every 4 (Four) Hours As Needed for wheezing., Disp: , Rfl:   •  cholecalciferol (VITAMIN D3) 1000 UNITS tablet, Take 1,000 Units by mouth Daily., Disp: , Rfl:   •  fluticasone-salmeterol (ADVAIR DISKUS) 250-50 MCG/DOSE DISKUS, Inhale 2 (Two) Times a Day., Disp: , Rfl:   •  ipratropium-albuterol (DUO-NEB) 0.5-2.5 mg/mL nebulizer, Take 3 mL by nebulization Every 4 (Four) Hours As Needed for wheezing., Disp: , Rfl:   •  propafenone (RHTHYMOL) 150 MG tablet, Take 0.5 tablets by mouth Every 8 (Eight) Hours., Disp: 45 tablet, Rfl: 0  •  raNITIdine (ZANTAC) 150 MG tablet, Take 150 mg by mouth 2 (Two) Times a Day., Disp: , Rfl:   •  tiotropium (SPIRIVA) 18 MCG per inhalation capsule, Place 1 capsule into inhaler and inhale Daily., Disp: 30 capsule, Rfl: 0  •  cefuroxime (CEFTIN) 250 MG tablet, Take 250 mg by mouth 2  (Two) Times a Day., Disp: , Rfl:   •  cyclobenzaprine (FLEXERIL) 10 MG tablet, Take 1 tablet by mouth 2 (Two) Times a Day As Needed for Muscle Spasms., Disp: , Rfl: 0   Assessment:        Patient Active Problem List   Diagnosis   • COPD exacerbation   • Subarachnoid hemorrhage   • Multiple skin tears   • Closed nondisplaced fracture of left clavicle   • Chronic atrial fibrillation               Plan:            ICD-10-CM ICD-9-CM   1. Chronic atrial fibrillation I48.2 427.31     1. Paroxysmal afib  Celia Baird needs anti coagulation  At this point pt is not on anticoagulations.  Pros and cons of anticoagulations has been discussed  Alternate methods including Watchman has been discussed  At this stage it has been decided NO ANTICOAGULATIONS      LINQ OK    No arr seen    SEE US 3 MONTHS  COUNSELING:    Celia BairdXenadora was given to patient for the following topics: diagnostic results, risk factor reductions, impressions, risks and benefits of treatment options and importance of treatment compliance .       SMOKING COUNSELING:    Ready to quit: Not Answered  Counseling given: Not Answered      EMR Dragon/Transcription disclaimer:   Much of this encounter note is an electronic transcription/translation of spoken language to printed text. The electronic translation of spoken language may permit erroneous, or at times, nonsensical words or phrases to be inadvertently transcribed; Although I have reviewed the note for such errors, some may still exist.

## 2017-05-15 RX ORDER — PROPAFENONE HYDROCHLORIDE 150 MG/1
75 TABLET, COATED ORAL EVERY 8 HOURS SCHEDULED
Qty: 45 TABLET | Refills: 5 | Status: SHIPPED | OUTPATIENT
Start: 2017-05-15 | End: 2017-11-24 | Stop reason: SDUPTHER

## 2017-07-07 ENCOUNTER — CLINICAL SUPPORT NO REQUIREMENTS (OUTPATIENT)
Dept: CARDIOLOGY | Facility: CLINIC | Age: 72
End: 2017-07-07

## 2017-07-07 ENCOUNTER — OFFICE VISIT (OUTPATIENT)
Dept: CARDIOLOGY | Facility: CLINIC | Age: 72
End: 2017-07-07

## 2017-07-07 VITALS
WEIGHT: 116 LBS | SYSTOLIC BLOOD PRESSURE: 128 MMHG | BODY MASS INDEX: 21.35 KG/M2 | DIASTOLIC BLOOD PRESSURE: 70 MMHG | HEIGHT: 62 IN

## 2017-07-07 DIAGNOSIS — I48.0 PAROXYSMAL ATRIAL FIBRILLATION (HCC): Primary | ICD-10-CM

## 2017-07-07 DIAGNOSIS — I60.9 SUBARACHNOID HEMORRHAGE (HCC): ICD-10-CM

## 2017-07-07 DIAGNOSIS — Z95.818 STATUS POST PLACEMENT OF IMPLANTABLE LOOP RECORDER: Primary | ICD-10-CM

## 2017-07-07 PROCEDURE — 99213 OFFICE O/P EST LOW 20 MIN: CPT | Performed by: INTERNAL MEDICINE

## 2017-07-07 PROCEDURE — 93291 INTERROG DEV EVAL SCRMS IP: CPT | Performed by: INTERNAL MEDICINE

## 2017-07-07 PROCEDURE — 93000 ELECTROCARDIOGRAM COMPLETE: CPT | Performed by: INTERNAL MEDICINE

## 2017-07-07 RX ORDER — CETIRIZINE HYDROCHLORIDE 10 MG/1
10 TABLET ORAL DAILY
COMMUNITY
End: 2018-02-19

## 2017-07-07 NOTE — PROGRESS NOTES
" Subjective:       Celia Baird is a 72 y.o. female who here for follow up    CC  Follow up for afib and anticoagulation  HPI       Problem List Items Addressed This Visit        Nervous and Auditory    Subarachnoid hemorrhage      Other Visit Diagnoses     Paroxysmal atrial fibrillation    -  Primary    Relevant Orders    ECG 12 Lead        .    The following portions of the patient's history were reviewed and updated as appropriate: allergies, current medications, past family history, past medical history, past social history, past surgical history and problem list.    Past Medical History:   Diagnosis Date   • COPD (chronic obstructive pulmonary disease)    • History of frequent urinary tract infections    • Irregular heart beat    • Kidney stones    • PBC (primary biliary cirrhosis)    • PBC (primary biliary cirrhosis)     reports that she has been smoking Cigarettes.  She has been smoking about 0.25 packs per day. She does not have any smokeless tobacco history on file. She reports that she does not drink alcohol or use illicit drugs.  Family History   Problem Relation Age of Onset   • Heart disease Father    • Heart disease Brother        Review of Systems  Constitutional: No wt loss, fever, fatigue  Gastrointestinal: No nausea, abdominal pain  Behavioral/Psych: No insomnia or anxiety   Cardiovascular no cp  Objective:       Physical Exam           Physical Exam  /70  Ht 62\" (157.5 cm)  Wt 116 lb (52.6 kg)  BMI 21.22 kg/m2    General appearance: NAD, conversant   Eyes: anicteric sclerae, moist conjunctivae; no lid-lag; PERRLA   HENT: Atraumatic; oropharynx clear with moist mucous membranes and no mucosal ulcerations;  normal hard and soft palate   Neck: Trachea midline; FROM, supple, no thyromegaly or lymphadenopathy   Lungs: CTA, with normal respiratory effort and no intercostal retractions   CV: S1-S2 regular, no murmurs, no rub, no gallop   Abdomen: Soft, non-tender; no masses or HSM "   Extremities: No peripheral edema or extremity lymphadenopathy  Skin: Normal temperature, turgor and texture; no rash, ulcers or subcutaneous nodules   Psych: Appropriate affect, alert and oriented to person, place and time           Cardiographics  @  ECG 12 Lead  Date/Time: 7/7/2017 1:45 PM  Performed by: SHERITA OWEN  Authorized by: SHERITA OWEN   Comparison: compared with previous ECG   Similar to previous ECG  Rhythm: sinus rhythm  Clinical impression: normal ECG          Interpretation Summary   · Findings consistent with a normal ECG stress test.  · Left ventricular ejection fraction is normal (Calculated EF = 60%).  · Myocardial perfusion imaging indicates a normal myocardial perfusion study with no evidence of ischemia.  · Impressions are consistent with a low risk study     Echocardiogram:        Current Outpatient Prescriptions:   •  albuterol (PROVENTIL HFA;VENTOLIN HFA) 108 (90 BASE) MCG/ACT inhaler, Inhale 2 puffs Every 4 (Four) Hours As Needed for wheezing., Disp: , Rfl:   •  cetirizine (zyrTEC) 10 MG tablet, Take 10 mg by mouth Daily., Disp: , Rfl:   •  cholecalciferol (VITAMIN D3) 1000 UNITS tablet, Take 1,000 Units by mouth Daily., Disp: , Rfl:   •  fluticasone-salmeterol (ADVAIR DISKUS) 250-50 MCG/DOSE DISKUS, Inhale 2 (Two) Times a Day., Disp: , Rfl:   •  propafenone (RHTHYMOL) 150 MG tablet, Take 0.5 tablets by mouth Every 8 (Eight) Hours., Disp: 45 tablet, Rfl: 5  •  raNITIdine (ZANTAC) 150 MG tablet, Take 150 mg by mouth 2 (Two) Times a Day., Disp: , Rfl:   •  cefuroxime (CEFTIN) 250 MG tablet, Take 250 mg by mouth 2 (Two) Times a Day., Disp: , Rfl:   •  cyclobenzaprine (FLEXERIL) 10 MG tablet, Take 1 tablet by mouth 2 (Two) Times a Day As Needed for Muscle Spasms., Disp: , Rfl: 0  •  ipratropium-albuterol (DUO-NEB) 0.5-2.5 mg/mL nebulizer, Take 3 mL by nebulization Every 4 (Four) Hours As Needed for wheezing., Disp: , Rfl:   •  tiotropium (SPIRIVA) 18 MCG per inhalation  capsule, Place 1 capsule into inhaler and inhale Daily., Disp: 30 capsule, Rfl: 0   Assessment:        Patient Active Problem List   Diagnosis   • COPD exacerbation   • Subarachnoid hemorrhage   • Multiple skin tears   • Closed nondisplaced fracture of left clavicle   • Chronic atrial fibrillation               Plan:            ICD-10-CM ICD-9-CM   1. Paroxysmal atrial fibrillation I48.0 427.31   2. Subarachnoid hemorrhage I60.9 430     1. Paroxysmal atrial fibrillation    - ECG 12 Lead    2. Subarachnoid hemorrhage    Celia Baird needs anti coagulation  At this point pt is not on anticoagulations.  Pros and cons of anticoagulations has been discussed  Alternate methods including Watchman has been discussed  At this stage it has been decided NO ANTICOAGULATIONS    Pros and cons of ASA in primary and secondary prevention of CAD has been discussed.  Risks of bleeding and other possible side effects have been discussed, Diff advantages and disadvantages of 325 vs 81  Mg of ASA were discussed, at this stage it has been recommended to start ASA 81 mg /day     Still have afib, not a candidate for anticoagulations    See us 6 months  COUNSELING:    Celia BairdBrodie was given to patient for the following topics: diagnostic results, risk factor reductions, impressions, risks and benefits of treatment options and importance of treatment compliance .       SMOKING COUNSELING:    Ready to quit: Yes  Counseling given: Yes      EMR Dragon/Transcription disclaimer:   Much of this encounter note is an electronic transcription/translation of spoken language to printed text. The electronic translation of spoken language may permit erroneous, or at times, nonsensical words or phrases to be inadvertently transcribed; Although I have reviewed the note for such errors, some may still exist.

## 2017-08-11 ENCOUNTER — CLINICAL SUPPORT NO REQUIREMENTS (OUTPATIENT)
Dept: CARDIOLOGY | Facility: CLINIC | Age: 72
End: 2017-08-11

## 2017-08-11 DIAGNOSIS — Z95.818 STATUS POST PLACEMENT OF IMPLANTABLE LOOP RECORDER: Primary | ICD-10-CM

## 2017-08-11 PROCEDURE — 93298 REM INTERROG DEV EVAL SCRMS: CPT | Performed by: INTERNAL MEDICINE

## 2017-08-11 PROCEDURE — 93299 PR REM INTERROG ICPMS/SCRMS <30 D TECH REVIEW: CPT | Performed by: INTERNAL MEDICINE

## 2017-09-15 ENCOUNTER — CLINICAL SUPPORT NO REQUIREMENTS (OUTPATIENT)
Dept: CARDIOLOGY | Facility: CLINIC | Age: 72
End: 2017-09-15

## 2017-09-15 DIAGNOSIS — Z95.818 PRESENCE OF CARDIAC DEVICE: Primary | ICD-10-CM

## 2017-09-15 PROCEDURE — 93298 REM INTERROG DEV EVAL SCRMS: CPT | Performed by: INTERNAL MEDICINE

## 2017-10-20 ENCOUNTER — CLINICAL SUPPORT NO REQUIREMENTS (OUTPATIENT)
Dept: CARDIOLOGY | Facility: CLINIC | Age: 72
End: 2017-10-20

## 2017-10-20 DIAGNOSIS — Z95.818 PRESENCE OF CARDIAC DEVICE: Primary | ICD-10-CM

## 2017-10-20 PROCEDURE — 93298 REM INTERROG DEV EVAL SCRMS: CPT | Performed by: INTERNAL MEDICINE

## 2017-11-18 ENCOUNTER — CLINICAL SUPPORT NO REQUIREMENTS (OUTPATIENT)
Dept: CARDIOLOGY | Facility: CLINIC | Age: 72
End: 2017-11-18

## 2017-11-18 DIAGNOSIS — Z95.818 PRESENCE OF CARDIAC DEVICE: Primary | ICD-10-CM

## 2017-11-18 PROCEDURE — 93298 REM INTERROG DEV EVAL SCRMS: CPT | Performed by: INTERNAL MEDICINE

## 2017-11-27 RX ORDER — PROPAFENONE HYDROCHLORIDE 150 MG/1
TABLET, COATED ORAL
Qty: 135 TABLET | Refills: 1 | Status: SHIPPED | OUTPATIENT
Start: 2017-11-27 | End: 2018-05-25 | Stop reason: SDUPTHER

## 2017-12-18 ENCOUNTER — CLINICAL SUPPORT NO REQUIREMENTS (OUTPATIENT)
Dept: CARDIOLOGY | Facility: CLINIC | Age: 72
End: 2017-12-18

## 2017-12-18 DIAGNOSIS — Z95.818 PRESENCE OF CARDIAC DEVICE: Primary | ICD-10-CM

## 2017-12-18 PROCEDURE — 93298 REM INTERROG DEV EVAL SCRMS: CPT | Performed by: INTERNAL MEDICINE

## 2018-01-17 ENCOUNTER — CLINICAL SUPPORT NO REQUIREMENTS (OUTPATIENT)
Dept: CARDIOLOGY | Facility: CLINIC | Age: 73
End: 2018-01-17

## 2018-01-17 DIAGNOSIS — Z95.818 STATUS POST PLACEMENT OF IMPLANTABLE LOOP RECORDER: Primary | ICD-10-CM

## 2018-01-17 PROCEDURE — 93299 PR REM INTERROG ICPMS/SCRMS <30 D TECH REVIEW: CPT | Performed by: INTERNAL MEDICINE

## 2018-01-17 PROCEDURE — 93298 REM INTERROG DEV EVAL SCRMS: CPT | Performed by: INTERNAL MEDICINE

## 2018-02-16 ENCOUNTER — CLINICAL SUPPORT NO REQUIREMENTS (OUTPATIENT)
Dept: CARDIOLOGY | Facility: CLINIC | Age: 73
End: 2018-02-16

## 2018-02-16 DIAGNOSIS — Z95.818 STATUS POST PLACEMENT OF IMPLANTABLE LOOP RECORDER: Primary | ICD-10-CM

## 2018-02-16 PROCEDURE — 93299 PR REM INTERROG ICPMS/SCRMS <30 D TECH REVIEW: CPT | Performed by: INTERNAL MEDICINE

## 2018-02-16 PROCEDURE — 93298 REM INTERROG DEV EVAL SCRMS: CPT | Performed by: INTERNAL MEDICINE

## 2018-02-19 ENCOUNTER — OFFICE VISIT (OUTPATIENT)
Dept: CARDIOLOGY | Facility: CLINIC | Age: 73
End: 2018-02-19

## 2018-02-19 VITALS
WEIGHT: 107 LBS | DIASTOLIC BLOOD PRESSURE: 78 MMHG | HEIGHT: 60 IN | SYSTOLIC BLOOD PRESSURE: 124 MMHG | HEART RATE: 72 BPM | BODY MASS INDEX: 21.01 KG/M2

## 2018-02-19 DIAGNOSIS — Z72.0 TOBACCO ABUSE: ICD-10-CM

## 2018-02-19 DIAGNOSIS — Z95.818 STATUS POST PLACEMENT OF IMPLANTABLE LOOP RECORDER: ICD-10-CM

## 2018-02-19 DIAGNOSIS — I48.0 PAROXYSMAL ATRIAL FIBRILLATION (HCC): Primary | ICD-10-CM

## 2018-02-19 PROBLEM — I48.20 CHRONIC ATRIAL FIBRILLATION: Status: RESOLVED | Noted: 2017-03-30 | Resolved: 2018-02-19

## 2018-02-19 PROCEDURE — 99213 OFFICE O/P EST LOW 20 MIN: CPT | Performed by: INTERNAL MEDICINE

## 2018-02-19 RX ORDER — URSODIOL 300 MG/1
300 CAPSULE ORAL 3 TIMES DAILY
COMMUNITY

## 2018-02-19 RX ORDER — ASPIRIN 81 MG/1
81 TABLET ORAL DAILY
COMMUNITY

## 2018-03-18 ENCOUNTER — CLINICAL SUPPORT NO REQUIREMENTS (OUTPATIENT)
Dept: CARDIOLOGY | Facility: CLINIC | Age: 73
End: 2018-03-18

## 2018-03-18 DIAGNOSIS — Z95.818 STATUS POST PLACEMENT OF IMPLANTABLE LOOP RECORDER: Primary | ICD-10-CM

## 2018-03-18 PROCEDURE — 93299 PR REM INTERROG ICPMS/SCRMS <30 D TECH REVIEW: CPT | Performed by: INTERNAL MEDICINE

## 2018-03-18 PROCEDURE — 93298 REM INTERROG DEV EVAL SCRMS: CPT | Performed by: INTERNAL MEDICINE

## 2018-04-17 ENCOUNTER — CLINICAL SUPPORT NO REQUIREMENTS (OUTPATIENT)
Dept: CARDIOLOGY | Facility: CLINIC | Age: 73
End: 2018-04-17

## 2018-04-17 DIAGNOSIS — Z95.818 STATUS POST PLACEMENT OF IMPLANTABLE LOOP RECORDER: Primary | ICD-10-CM

## 2018-04-17 PROCEDURE — 93299 PR REM INTERROG ICPMS/SCRMS <30 D TECH REVIEW: CPT | Performed by: INTERNAL MEDICINE

## 2018-04-17 PROCEDURE — 93298 REM INTERROG DEV EVAL SCRMS: CPT | Performed by: INTERNAL MEDICINE

## 2018-05-17 ENCOUNTER — CLINICAL SUPPORT NO REQUIREMENTS (OUTPATIENT)
Dept: CARDIOLOGY | Facility: CLINIC | Age: 73
End: 2018-05-17

## 2018-05-17 DIAGNOSIS — Z95.818 STATUS POST PLACEMENT OF IMPLANTABLE LOOP RECORDER: Primary | ICD-10-CM

## 2018-05-17 PROCEDURE — 93298 REM INTERROG DEV EVAL SCRMS: CPT | Performed by: INTERNAL MEDICINE

## 2018-05-17 PROCEDURE — 93299 PR REM INTERROG ICPMS/SCRMS <30 D TECH REVIEW: CPT | Performed by: INTERNAL MEDICINE

## 2018-05-24 ENCOUNTER — TRANSCRIBE ORDERS (OUTPATIENT)
Dept: ADMINISTRATIVE | Facility: HOSPITAL | Age: 73
End: 2018-05-24

## 2018-05-24 DIAGNOSIS — Z12.2 ENCOUNTER FOR SCREENING FOR LUNG CANCER: Primary | ICD-10-CM

## 2018-05-25 RX ORDER — PROPAFENONE HYDROCHLORIDE 150 MG/1
TABLET, COATED ORAL
Qty: 135 TABLET | Refills: 1 | Status: SHIPPED | OUTPATIENT
Start: 2018-05-25 | End: 2018-11-27 | Stop reason: SDUPTHER

## 2018-05-29 ENCOUNTER — HOSPITAL ENCOUNTER (OUTPATIENT)
Dept: PET IMAGING | Facility: HOSPITAL | Age: 73
Discharge: HOME | End: 2018-05-29
Attending: INTERNAL MEDICINE | Admitting: INTERNAL MEDICINE

## 2018-05-29 DIAGNOSIS — Z12.2 ENCOUNTER FOR SCREENING FOR LUNG CANCER: ICD-10-CM

## 2018-05-29 PROCEDURE — G0297 LDCT FOR LUNG CA SCREEN: HCPCS

## 2018-05-31 ENCOUNTER — TELEPHONE (OUTPATIENT)
Dept: CARDIOLOGY | Facility: CLINIC | Age: 73
End: 2018-05-31

## 2018-06-16 ENCOUNTER — CLINICAL SUPPORT NO REQUIREMENTS (OUTPATIENT)
Dept: CARDIOLOGY | Facility: CLINIC | Age: 73
End: 2018-06-16

## 2018-06-16 DIAGNOSIS — Z95.818 STATUS POST PLACEMENT OF IMPLANTABLE LOOP RECORDER: Primary | ICD-10-CM

## 2018-06-16 PROCEDURE — 93298 REM INTERROG DEV EVAL SCRMS: CPT | Performed by: INTERNAL MEDICINE

## 2018-06-16 PROCEDURE — 93299 PR REM INTERROG ICPMS/SCRMS <30 D TECH REVIEW: CPT | Performed by: INTERNAL MEDICINE

## 2018-07-16 ENCOUNTER — CLINICAL SUPPORT NO REQUIREMENTS (OUTPATIENT)
Dept: CARDIOLOGY | Facility: CLINIC | Age: 73
End: 2018-07-16

## 2018-07-16 DIAGNOSIS — Z95.818 STATUS POST PLACEMENT OF IMPLANTABLE LOOP RECORDER: Primary | ICD-10-CM

## 2018-07-16 PROCEDURE — 93298 REM INTERROG DEV EVAL SCRMS: CPT | Performed by: INTERNAL MEDICINE

## 2018-07-16 PROCEDURE — 93299 PR REM INTERROG ICPMS/SCRMS <30 D TECH REVIEW: CPT | Performed by: INTERNAL MEDICINE

## 2018-08-16 ENCOUNTER — CLINICAL SUPPORT NO REQUIREMENTS (OUTPATIENT)
Dept: CARDIOLOGY | Facility: CLINIC | Age: 73
End: 2018-08-16

## 2018-08-16 DIAGNOSIS — Z95.818 STATUS POST PLACEMENT OF IMPLANTABLE LOOP RECORDER: Primary | ICD-10-CM

## 2018-08-16 PROCEDURE — 93298 REM INTERROG DEV EVAL SCRMS: CPT | Performed by: INTERNAL MEDICINE

## 2018-08-16 PROCEDURE — 93299 PR REM INTERROG ICPMS/SCRMS <30 D TECH REVIEW: CPT | Performed by: INTERNAL MEDICINE

## 2018-08-20 ENCOUNTER — OFFICE VISIT (OUTPATIENT)
Dept: CARDIOLOGY | Facility: CLINIC | Age: 73
End: 2018-08-20

## 2018-08-20 VITALS
DIASTOLIC BLOOD PRESSURE: 75 MMHG | WEIGHT: 105 LBS | SYSTOLIC BLOOD PRESSURE: 138 MMHG | HEART RATE: 75 BPM | HEIGHT: 60 IN | BODY MASS INDEX: 20.62 KG/M2

## 2018-08-20 DIAGNOSIS — Z95.818 STATUS POST PLACEMENT OF IMPLANTABLE LOOP RECORDER: ICD-10-CM

## 2018-08-20 DIAGNOSIS — I48.0 PAROXYSMAL ATRIAL FIBRILLATION (HCC): Primary | ICD-10-CM

## 2018-08-20 DIAGNOSIS — Z72.0 TOBACCO ABUSE: ICD-10-CM

## 2018-08-20 PROCEDURE — 99213 OFFICE O/P EST LOW 20 MIN: CPT | Performed by: INTERNAL MEDICINE

## 2018-08-20 NOTE — PROGRESS NOTES
" Subjective:       Celia Baird is a 73 y.o. female who here for follow up    CC  One episode of palpitation on 8 th aug  HPI  73-year-old female with a history of the subdural hematoma, on paroxysmal atrial fibrillation no blood thinners has been one episode of atrial fibrillation on 8 August lasted for approximately 9 hours associated with the shortness of breath and weakness and no chest pain     Problem List Items Addressed This Visit        Cardiovascular and Mediastinum    Paroxysmal atrial fibrillation (CMS/HCC) - Primary       Other    Tobacco abuse    Status post placement of implantable loop recorder        .    The following portions of the patient's history were reviewed and updated as appropriate: allergies, current medications, past family history, past medical history, past social history, past surgical history and problem list.    Past Medical History:   Diagnosis Date   • COPD (chronic obstructive pulmonary disease) (CMS/HCC)    • History of frequent urinary tract infections    • Irregular heart beat    • Kidney stones    • PBC (primary biliary cirrhosis)    • PBC (primary biliary cirrhosis)     reports that she has been smoking Cigarettes.  She has been smoking about 0.50 packs per day. She does not have any smokeless tobacco history on file. She reports that she does not drink alcohol or use drugs.  Family History   Problem Relation Age of Onset   • Heart disease Father    • Heart disease Brother        Review of Systems  Constitutional: No wt loss, fever, fatigue  Gastrointestinal: No nausea, abdominal pain  Behavioral/Psych: No insomnia or anxiety   Cardiovascular palpitation  Objective:                 Physical Exam  /75 (BP Location: Right arm, Patient Position: Sitting)   Pulse 75   Ht 152.4 cm (60\")   Wt 47.6 kg (105 lb)   BMI 20.51 kg/m²     General appearance: NAD, conversant   Eyes: anicteric sclerae, moist conjunctivae; no lid-lag; PERRLA   HENT: Atraumatic; oropharynx clear " with moist mucous membranes and no mucosal ulcerations;  normal hard and soft palate   Neck: Trachea midline; FROM, supple, no thyromegaly or lymphadenopathy   Lungs: CTA, with normal respiratory effort and no intercostal retractions   CV: S1-S2 regular, no murmurs, no rub, no gallop   Abdomen: Soft, non-tender; no masses or HSM   Extremities: No peripheral edema or extremity lymphadenopathy  Skin: Normal temperature, turgor and texture; no rash, ulcers or subcutaneous nodules   Psych: Appropriate affect, alert and oriented to person, place and time           Cardiographics  @Procedures    Echocardiogram:        Current Outpatient Prescriptions:   •  aspirin 81 MG EC tablet, Take 81 mg by mouth Daily., Disp: , Rfl:   •  cholecalciferol (VITAMIN D3) 1000 UNITS tablet, Take 1,000 Units by mouth Daily., Disp: , Rfl:   •  cyclobenzaprine (FLEXERIL) 10 MG tablet, Take 1 tablet by mouth 2 (Two) Times a Day As Needed for Muscle Spasms. (Patient taking differently: Take 10 mg by mouth As Needed for Muscle Spasms.), Disp: , Rfl: 0  •  fluticasone-salmeterol (ADVAIR DISKUS) 250-50 MCG/DOSE DISKUS, Inhale 2 (Two) Times a Day., Disp: , Rfl:   •  ipratropium-albuterol (DUO-NEB) 0.5-2.5 mg/mL nebulizer, Take 3 mL by nebulization Every 4 (Four) Hours As Needed for wheezing., Disp: , Rfl:   •  propafenone (RYTHMOL) 150 MG tablet, TAKE ONE-HALF TABLET BY MOUTH EVERY 8 HOURS, Disp: 135 tablet, Rfl: 1  •  raNITIdine (ZANTAC) 150 MG tablet, Take 150 mg by mouth 2 (Two) Times a Day., Disp: , Rfl:   •  ursodiol (ACTIGALL) 300 MG capsule, Take 300 mg by mouth 2 (Two) Times a Day., Disp: , Rfl:    Assessment:        Patient Active Problem List   Diagnosis   • COPD exacerbation (CMS/HCC)   • Subarachnoid hemorrhage (CMS/HCC)   • Multiple skin tears   • Closed nondisplaced fracture of left clavicle   • Paroxysmal atrial fibrillation (CMS/HCC)   • Tobacco abuse   • Status post placement of implantable loop recorder               Plan:             ICD-10-CM ICD-9-CM   1. Paroxysmal atrial fibrillation (CMS/HCC) I48.0 427.31   2. Tobacco abuse Z72.0 305.1   3. Status post placement of implantable loop recorder Z95.818 V45.09     1. Paroxysmal atrial fibrillation (CMS/HCC)  Atrial fibrillation rate is under control    2. Tobacco abuse  Celia Baird has been explained that tobacco abuse is extremely harmful to the body including to the cardiovascular, it significantly increases the risk of atherosclerosis, myocardial infarction, strokes and peripheral vascular disease  Strongly advised to stop tobacco abuse  Secondhand smoking also has been explained    [unfilled]      3. Status post placement of implantable loop recorder  Showed atrial fibrillation for 9 hours       NO BLOOD THINNER DUE TO SUB ARACHNOID HEMORRHAGE    SEE US 6 MONTHS    Check loop recorder  COUNSELING:    Celia Colmenares was given to patient for the following topics: diagnostic results, risk factor reductions, impressions, risks and benefits of treatment options and importance of treatment compliance .       SMOKING COUNSELING:    Ready to quit: Not Answered  Counseling given: Yes      EMR Dragon/Transcription disclaimer:   Much of this encounter note is an electronic transcription/translation of spoken language to printed text. The electronic translation of spoken language may permit erroneous, or at times, nonsensical words or phrases to be inadvertently transcribed; Although I have reviewed the note for such errors, some may still exist.

## 2018-09-16 ENCOUNTER — CLINICAL SUPPORT NO REQUIREMENTS (OUTPATIENT)
Dept: CARDIOLOGY | Facility: CLINIC | Age: 73
End: 2018-09-16

## 2018-09-16 DIAGNOSIS — Z95.818 STATUS POST PLACEMENT OF IMPLANTABLE LOOP RECORDER: Primary | ICD-10-CM

## 2018-09-16 PROCEDURE — 93298 REM INTERROG DEV EVAL SCRMS: CPT | Performed by: INTERNAL MEDICINE

## 2018-09-16 PROCEDURE — 93299 PR REM INTERROG ICPMS/SCRMS <30 D TECH REVIEW: CPT | Performed by: INTERNAL MEDICINE

## 2018-10-16 ENCOUNTER — CLINICAL SUPPORT NO REQUIREMENTS (OUTPATIENT)
Dept: CARDIOLOGY | Facility: CLINIC | Age: 73
End: 2018-10-16

## 2018-10-16 DIAGNOSIS — Z95.818 STATUS POST PLACEMENT OF IMPLANTABLE LOOP RECORDER: Primary | ICD-10-CM

## 2018-10-16 PROCEDURE — 93298 REM INTERROG DEV EVAL SCRMS: CPT | Performed by: INTERNAL MEDICINE

## 2018-10-16 PROCEDURE — 93299 PR REM INTERROG ICPMS/SCRMS <30 D TECH REVIEW: CPT | Performed by: INTERNAL MEDICINE

## 2018-11-16 ENCOUNTER — CLINICAL SUPPORT NO REQUIREMENTS (OUTPATIENT)
Dept: CARDIOLOGY | Facility: CLINIC | Age: 73
End: 2018-11-16

## 2018-11-16 DIAGNOSIS — Z95.818 STATUS POST PLACEMENT OF IMPLANTABLE LOOP RECORDER: Primary | ICD-10-CM

## 2018-11-16 PROCEDURE — 93299 PR REM INTERROG ICPMS/SCRMS <30 D TECH REVIEW: CPT | Performed by: INTERNAL MEDICINE

## 2018-11-16 PROCEDURE — 93298 REM INTERROG DEV EVAL SCRMS: CPT | Performed by: INTERNAL MEDICINE

## 2018-11-29 RX ORDER — PROPAFENONE HYDROCHLORIDE 150 MG/1
TABLET, COATED ORAL
Qty: 135 TABLET | Refills: 1 | Status: SHIPPED | OUTPATIENT
Start: 2018-11-29 | End: 2018-11-29 | Stop reason: SDUPTHER

## 2018-11-29 RX ORDER — PROPAFENONE HYDROCHLORIDE 150 MG/1
150 TABLET, COATED ORAL EVERY 8 HOURS
Qty: 135 TABLET | Refills: 0 | Status: SHIPPED | OUTPATIENT
Start: 2018-11-29 | End: 2019-03-11

## 2018-12-16 ENCOUNTER — CLINICAL SUPPORT NO REQUIREMENTS (OUTPATIENT)
Dept: CARDIOLOGY | Facility: CLINIC | Age: 73
End: 2018-12-16

## 2018-12-16 DIAGNOSIS — Z95.818 STATUS POST PLACEMENT OF IMPLANTABLE LOOP RECORDER: Primary | ICD-10-CM

## 2018-12-16 PROCEDURE — 93299 PR REM INTERROG ICPMS/SCRMS <30 D TECH REVIEW: CPT | Performed by: INTERNAL MEDICINE

## 2018-12-16 PROCEDURE — 93298 REM INTERROG DEV EVAL SCRMS: CPT | Performed by: INTERNAL MEDICINE

## 2019-01-15 ENCOUNTER — CLINICAL SUPPORT NO REQUIREMENTS (OUTPATIENT)
Dept: CARDIOLOGY | Facility: CLINIC | Age: 74
End: 2019-01-15

## 2019-01-15 DIAGNOSIS — Z95.818 STATUS POST PLACEMENT OF IMPLANTABLE LOOP RECORDER: Primary | ICD-10-CM

## 2019-01-15 PROCEDURE — 93298 REM INTERROG DEV EVAL SCRMS: CPT | Performed by: INTERNAL MEDICINE

## 2019-01-15 PROCEDURE — 93299 PR REM INTERROG ICPMS/SCRMS <30 D TECH REVIEW: CPT | Performed by: INTERNAL MEDICINE

## 2019-02-14 ENCOUNTER — CLINICAL SUPPORT NO REQUIREMENTS (OUTPATIENT)
Dept: CARDIOLOGY | Facility: CLINIC | Age: 74
End: 2019-02-14

## 2019-02-14 DIAGNOSIS — Z95.818 STATUS POST PLACEMENT OF IMPLANTABLE LOOP RECORDER: Primary | ICD-10-CM

## 2019-02-14 PROCEDURE — 93299 PR REM INTERROG ICPMS/SCRMS <30 D TECH REVIEW: CPT | Performed by: INTERNAL MEDICINE

## 2019-02-14 PROCEDURE — 93298 REM INTERROG DEV EVAL SCRMS: CPT | Performed by: INTERNAL MEDICINE

## 2019-02-18 ENCOUNTER — OFFICE VISIT (OUTPATIENT)
Dept: CARDIOLOGY | Facility: CLINIC | Age: 74
End: 2019-02-18

## 2019-02-18 VITALS
HEART RATE: 77 BPM | WEIGHT: 105 LBS | HEIGHT: 61 IN | SYSTOLIC BLOOD PRESSURE: 118 MMHG | BODY MASS INDEX: 19.83 KG/M2 | DIASTOLIC BLOOD PRESSURE: 74 MMHG

## 2019-02-18 DIAGNOSIS — I48.0 PAROXYSMAL ATRIAL FIBRILLATION (HCC): ICD-10-CM

## 2019-02-18 DIAGNOSIS — R07.2 PRECORDIAL PAIN: ICD-10-CM

## 2019-02-18 DIAGNOSIS — Z72.0 TOBACCO ABUSE: ICD-10-CM

## 2019-02-18 DIAGNOSIS — R06.02 SOB (SHORTNESS OF BREATH): ICD-10-CM

## 2019-02-18 DIAGNOSIS — R94.31 ABNORMAL EKG: Primary | ICD-10-CM

## 2019-02-18 PROCEDURE — 93000 ELECTROCARDIOGRAM COMPLETE: CPT | Performed by: INTERNAL MEDICINE

## 2019-02-18 PROCEDURE — 99213 OFFICE O/P EST LOW 20 MIN: CPT | Performed by: INTERNAL MEDICINE

## 2019-02-18 RX ORDER — POTASSIUM CHLORIDE 750 MG/1
10 TABLET, FILM COATED, EXTENDED RELEASE ORAL DAILY
COMMUNITY
Start: 2019-01-30 | End: 2022-01-13

## 2019-02-18 NOTE — PROGRESS NOTES
Subjective:        Celia Baird is a 74 y.o. female who here for follow up    CC  SOB OCCASIONALLY    TIGHTNESS ACROSS BACK BET SHOULDER BLADES  HPI      74-year-old female with known history of paroxysmal atrial fibrillation, shortness of breath and tobacco abuse here for the follow-up complaints of shortness of breath occasionally along with a tightness across the back and between the shoulder blades off and on mild to moderate in intensity     Problem List Items Addressed This Visit        Cardiovascular and Mediastinum    Paroxysmal atrial fibrillation (CMS/HCC)    Relevant Orders    Lipid Panel    Comprehensive Metabolic Panel    Stress Test With Myocardial Perfusion One Day    Adult Transthoracic Echo Complete W/ Cont if Necessary Per Protocol       Respiratory    SOB (shortness of breath)    Relevant Orders    Lipid Panel    Comprehensive Metabolic Panel    Stress Test With Myocardial Perfusion One Day    Adult Transthoracic Echo Complete W/ Cont if Necessary Per Protocol       Other    Tobacco abuse    Relevant Orders    Lipid Panel    Comprehensive Metabolic Panel    Stress Test With Myocardial Perfusion One Day    Adult Transthoracic Echo Complete W/ Cont if Necessary Per Protocol      Other Visit Diagnoses     Abnormal EKG    -  Primary    Relevant Orders    Lipid Panel    Comprehensive Metabolic Panel    Stress Test With Myocardial Perfusion One Day    Adult Transthoracic Echo Complete W/ Cont if Necessary Per Protocol    Precordial pain         Relevant Orders    Lipid Panel        .    The following portions of the patient's history were reviewed and updated as appropriate: allergies, current medications, past family history, past medical history, past social history, past surgical history and problem list.    Past Medical History:   Diagnosis Date   • Atrial fibrillation (CMS/HCC)    • COPD (chronic obstructive pulmonary disease) (CMS/HCC)    • History of frequent urinary tract infections    •  "Irregular heart beat    • Kidney stones    • PBC (primary biliary cirrhosis)    • PBC (primary biliary cirrhosis)      reports that she has been smoking cigarettes.  She has been smoking about 0.50 packs per day. she has never used smokeless tobacco. She reports that she does not drink alcohol or use drugs.   Family History   Problem Relation Age of Onset   • Heart disease Father    • Heart disease Brother        Review of Systems  Constitutional: No wt loss, fever, fatigue  Gastrointestinal: No nausea, abdominal pain  Behavioral/Psych: No insomnia or anxiety   Cardiovascular shortness of breath and a tightness between the shoulder blades  Objective:      /74   Pulse 77   Ht 153.7 cm (60.5\")   Wt 47.6 kg (105 lb)   BMI 20.17 kg/m²   General appearance: No acute changes   Neck: Trachea midline; NECK, supple, no thyromegaly or lymphadenopathy   Lungs: Normal size and shape, normal breath sounds, equal distribution of air, no rales and rhonchi   CV: S1-S2 regular, no murmurs, no rub, no gallop   Abdomen: Soft, non-tender; no masses , no abnormal abdominal sounds   Extremities: No deformity , normal color , no peripheral edema   Skin: Normal temperature, turgor and texture; no rash, ulcers            ECG 12 Lead  Date/Time: 2/18/2019 12:48 PM  Performed by: Ailyn Solorio MD  Authorized by: Ailyn Solorio MD   Comparison: compared with previous ECG   Similar to previous ECG  Rhythm: sinus rhythm  ST Flattening: all    Clinical impression: non-specific ECG              Echocardiogram:        Current Outpatient Medications:   •  aspirin 81 MG EC tablet, Take 81 mg by mouth Daily., Disp: , Rfl:   •  cholecalciferol (VITAMIN D3) 1000 UNITS tablet, Take 1,000 Units by mouth Daily., Disp: , Rfl:   •  cyclobenzaprine (FLEXERIL) 10 MG tablet, Take 1 tablet by mouth 2 (Two) Times a Day As Needed for Muscle Spasms. (Patient taking differently: Take 10 mg by mouth As Needed for Muscle Spasms.), Disp: , " Rfl: 0  •  fluticasone-salmeterol (ADVAIR DISKUS) 250-50 MCG/DOSE DISKUS, Inhale 2 (Two) Times a Day., Disp: , Rfl:   •  ipratropium-albuterol (DUO-NEB) 0.5-2.5 mg/mL nebulizer, Take 3 mL by nebulization Every 4 (Four) Hours As Needed for wheezing., Disp: , Rfl:   •  potassium chloride (K-DUR) 10 MEQ CR tablet, , Disp: , Rfl:   •  propafenone (RYTHMOL) 150 MG tablet, Take 1 tablet by mouth Every 8 (Eight) Hours., Disp: 135 tablet, Rfl: 0  •  raNITIdine (ZANTAC) 150 MG tablet, Take 150 mg by mouth 2 (Two) Times a Day., Disp: , Rfl:   •  ursodiol (ACTIGALL) 300 MG capsule, Take 300 mg by mouth 2 (Two) Times a Day., Disp: , Rfl:    Assessment:        Patient Active Problem List   Diagnosis   • COPD exacerbation (CMS/HCC)   • Subarachnoid hemorrhage (CMS/HCC)   • Multiple skin tears   • Closed nondisplaced fracture of left clavicle   • Paroxysmal atrial fibrillation (CMS/HCC)   • Tobacco abuse   • Status post placement of implantable loop recorder               Plan:            ICD-10-CM ICD-9-CM   1. Abnormal EKG R94.31 794.31   2. SOB (shortness of breath) R06.02 786.05   3. Tobacco abuse Z72.0 305.1   4. Paroxysmal atrial fibrillation (CMS/HCC) I48.0 427.31   5. Precordial pain  R07.2 786.51     1. SOB (shortness of breath)  Considering the patient's symptoms as well as clinical situation and  EKG findings, along with cardiac risk factors, ischemic workup is necessary to rule out ischemic cardiomyopathy, stress induced arrhythmias, and functional capacity for diagnosis as well as prognostic consideration    - Lipid Panel  - Comprehensive Metabolic Panel; Future  - Stress Test With Myocardial Perfusion One Day  - Adult Transthoracic Echo Complete W/ Cont if Necessary Per Protocol    2. Tobacco abuse  Counseling done  - Lipid Panel  - Comprehensive Metabolic Panel; Future  - Stress Test With Myocardial Perfusion One Day  - Adult Transthoracic Echo Complete W/ Cont if Necessary Per Protocol    3. Paroxysmal atrial  fibrillation (CMS/HCC)  Controlled  - Lipid Panel  - Comprehensive Metabolic Panel; Future  - Stress Test With Myocardial Perfusion One Day  - Adult Transthoracic Echo Complete W/ Cont if Necessary Per Protocol    4. Abnormal EKG  Workup pending  - Lipid Panel  - Comprehensive Metabolic Panel; Future  - Stress Test With Myocardial Perfusion One Day  - Adult Transthoracic Echo Complete W/ Cont if Necessary Per Protocol    5. Precordial pain   Atypical  - Lipid Panel       WALKING LEXISCAN , ECHO    FLP, CMP    PT KN ELEVATED LIVER ENZYMES    SEE IN 2 WKS  COUNSELING:    Celia Colmenares was given to patient for the following topics: diagnostic results, risk factor reductions, impressions, risks and benefits of treatment options and importance of treatment compliance .       SMOKING COUNSELING:    Ready to quit: No  Counseling given: Yes      Dictated using Dragon dictation

## 2019-02-26 ENCOUNTER — HOSPITAL ENCOUNTER (OUTPATIENT)
Dept: CARDIOLOGY | Facility: HOSPITAL | Age: 74
Discharge: HOME | End: 2019-02-26
Admitting: INTERNAL MEDICINE

## 2019-02-26 VITALS
BODY MASS INDEX: 20.62 KG/M2 | SYSTOLIC BLOOD PRESSURE: 112 MMHG | HEIGHT: 60 IN | WEIGHT: 105 LBS | DIASTOLIC BLOOD PRESSURE: 66 MMHG | HEART RATE: 73 BPM

## 2019-02-26 LAB
BH CV ECHO MEAS - ACS: 1.9 CM
BH CV ECHO MEAS - AO MAX PG (FULL): 3.7 MMHG
BH CV ECHO MEAS - AO MAX PG: 8.4 MMHG
BH CV ECHO MEAS - AO MEAN PG (FULL): 2 MMHG
BH CV ECHO MEAS - AO MEAN PG: 4 MMHG
BH CV ECHO MEAS - AO ROOT AREA (BSA CORRECTED): 2.1
BH CV ECHO MEAS - AO ROOT AREA: 7.1 CM^2
BH CV ECHO MEAS - AO ROOT DIAM: 3 CM
BH CV ECHO MEAS - AO V2 MAX: 145 CM/SEC
BH CV ECHO MEAS - AO V2 MEAN: 95.5 CM/SEC
BH CV ECHO MEAS - AO V2 VTI: 28 CM
BH CV ECHO MEAS - AVA(I,A): 2.2 CM^2
BH CV ECHO MEAS - AVA(I,D): 2.2 CM^2
BH CV ECHO MEAS - AVA(V,A): 2.1 CM^2
BH CV ECHO MEAS - AVA(V,D): 2.1 CM^2
BH CV ECHO MEAS - BSA(HAYCOCK): 1.4 M^2
BH CV ECHO MEAS - BSA: 1.4 M^2
BH CV ECHO MEAS - BZI_BMI: 20.5 KILOGRAMS/M^2
BH CV ECHO MEAS - BZI_METRIC_HEIGHT: 152.4 CM
BH CV ECHO MEAS - BZI_METRIC_WEIGHT: 47.6 KG
BH CV ECHO MEAS - EDV(CUBED): 74.1 ML
BH CV ECHO MEAS - EDV(MOD-SP2): 43 ML
BH CV ECHO MEAS - EDV(MOD-SP4): 60 ML
BH CV ECHO MEAS - EDV(TEICH): 78.6 ML
BH CV ECHO MEAS - EF(CUBED): 59.8 %
BH CV ECHO MEAS - EF(MOD-BP): 66 %
BH CV ECHO MEAS - EF(MOD-SP2): 67.4 %
BH CV ECHO MEAS - EF(MOD-SP4): 63.3 %
BH CV ECHO MEAS - EF(TEICH): 51.7 %
BH CV ECHO MEAS - ESV(CUBED): 29.8 ML
BH CV ECHO MEAS - ESV(MOD-SP2): 14 ML
BH CV ECHO MEAS - ESV(MOD-SP4): 22 ML
BH CV ECHO MEAS - ESV(TEICH): 37.9 ML
BH CV ECHO MEAS - FS: 26.2 %
BH CV ECHO MEAS - IVS/LVPW: 0.67
BH CV ECHO MEAS - IVSD: 0.8 CM
BH CV ECHO MEAS - LA DIMENSION: 3.8 CM
BH CV ECHO MEAS - LA/AO: 1.3
BH CV ECHO MEAS - LAT PEAK E' VEL: 7.6 CM/SEC
BH CV ECHO MEAS - LV DIASTOLIC VOL/BSA (35-75): 42.3 ML/M^2
BH CV ECHO MEAS - LV MASS(C)D: 137.2 GRAMS
BH CV ECHO MEAS - LV MASS(C)DI: 96.7 GRAMS/M^2
BH CV ECHO MEAS - LV MAX PG: 4.8 MMHG
BH CV ECHO MEAS - LV MEAN PG: 2 MMHG
BH CV ECHO MEAS - LV SYSTOLIC VOL/BSA (12-30): 15.5 ML/M^2
BH CV ECHO MEAS - LV V1 MAX: 109 CM/SEC
BH CV ECHO MEAS - LV V1 MEAN: 66.7 CM/SEC
BH CV ECHO MEAS - LV V1 VTI: 21.5 CM
BH CV ECHO MEAS - LVIDD: 4.2 CM
BH CV ECHO MEAS - LVIDS: 3.1 CM
BH CV ECHO MEAS - LVLD AP2: 6 CM
BH CV ECHO MEAS - LVLD AP4: 6.1 CM
BH CV ECHO MEAS - LVLS AP2: 5 CM
BH CV ECHO MEAS - LVLS AP4: 4.9 CM
BH CV ECHO MEAS - LVOT AREA (M): 2.8 CM^2
BH CV ECHO MEAS - LVOT AREA: 2.8 CM^2
BH CV ECHO MEAS - LVOT DIAM: 1.9 CM
BH CV ECHO MEAS - LVPWD: 1.2 CM
BH CV ECHO MEAS - MED PEAK E' VEL: 6.7 CM/SEC
BH CV ECHO MEAS - MV A DUR: 0.17 SEC
BH CV ECHO MEAS - MV A MAX VEL: 85.9 CM/SEC
BH CV ECHO MEAS - MV DEC SLOPE: 308 CM/SEC^2
BH CV ECHO MEAS - MV DEC TIME: 0.21 SEC
BH CV ECHO MEAS - MV E MAX VEL: 71.8 CM/SEC
BH CV ECHO MEAS - MV E/A: 0.84
BH CV ECHO MEAS - MV MAX PG: 3.3 MMHG
BH CV ECHO MEAS - MV MEAN PG: 1 MMHG
BH CV ECHO MEAS - MV P1/2T MAX VEL: 81.4 CM/SEC
BH CV ECHO MEAS - MV P1/2T: 77.4 MSEC
BH CV ECHO MEAS - MV V2 MAX: 90.4 CM/SEC
BH CV ECHO MEAS - MV V2 MEAN: 56.4 CM/SEC
BH CV ECHO MEAS - MV V2 VTI: 21 CM
BH CV ECHO MEAS - MVA P1/2T LCG: 2.7 CM^2
BH CV ECHO MEAS - MVA(P1/2T): 2.8 CM^2
BH CV ECHO MEAS - MVA(VTI): 2.9 CM^2
BH CV ECHO MEAS - PA ACC TIME: 0.09 SEC
BH CV ECHO MEAS - PA MAX PG (FULL): 3.9 MMHG
BH CV ECHO MEAS - PA MAX PG: 4.8 MMHG
BH CV ECHO MEAS - PA PR(ACCEL): 38.5 MMHG
BH CV ECHO MEAS - PA V2 MAX: 109 CM/SEC
BH CV ECHO MEAS - PULM A REVS DUR: 0.13 SEC
BH CV ECHO MEAS - PULM A REVS VEL: 33 CM/SEC
BH CV ECHO MEAS - PULM DIAS VEL: 34 CM/SEC
BH CV ECHO MEAS - PULM S/D: 1.4
BH CV ECHO MEAS - PULM SYS VEL: 46.6 CM/SEC
BH CV ECHO MEAS - PVA(V,A): 0.94 CM^2
BH CV ECHO MEAS - PVA(V,D): 0.94 CM^2
BH CV ECHO MEAS - QP/QS: 0.39
BH CV ECHO MEAS - RAP SYSTOLE: 3 MMHG
BH CV ECHO MEAS - RV MAX PG: 0.81 MMHG
BH CV ECHO MEAS - RV MEAN PG: 0 MMHG
BH CV ECHO MEAS - RV V1 MAX: 45.1 CM/SEC
BH CV ECHO MEAS - RV V1 MEAN: 32.1 CM/SEC
BH CV ECHO MEAS - RV V1 VTI: 10.4 CM
BH CV ECHO MEAS - RVDD: 2.4 CM
BH CV ECHO MEAS - RVOT AREA: 2.3 CM^2
BH CV ECHO MEAS - RVOT DIAM: 1.7 CM
BH CV ECHO MEAS - RVSP: 19.8 MMHG
BH CV ECHO MEAS - SI(AO): 139.4 ML/M^2
BH CV ECHO MEAS - SI(CUBED): 31.2 ML/M^2
BH CV ECHO MEAS - SI(LVOT): 42.9 ML/M^2
BH CV ECHO MEAS - SI(MOD-SP2): 20.4 ML/M^2
BH CV ECHO MEAS - SI(MOD-SP4): 26.8 ML/M^2
BH CV ECHO MEAS - SI(TEICH): 28.6 ML/M^2
BH CV ECHO MEAS - SV(AO): 197.9 ML
BH CV ECHO MEAS - SV(CUBED): 44.3 ML
BH CV ECHO MEAS - SV(LVOT): 61 ML
BH CV ECHO MEAS - SV(MOD-SP2): 29 ML
BH CV ECHO MEAS - SV(MOD-SP4): 38 ML
BH CV ECHO MEAS - SV(RVOT): 23.6 ML
BH CV ECHO MEAS - SV(TEICH): 40.7 ML
BH CV ECHO MEAS - TAPSE (>1.6): 2 CM2
BH CV ECHO MEAS - TR MAX VEL: 205 CM/SEC
BH CV ECHO MEASUREMENTS AVERAGE E/E' RATIO: 10.04
BH CV XLRA - RV BASE: 2.7 CM
BH CV XLRA - RV LENGTH: 5 CM
BH CV XLRA - RV MID: 2.1 CM
BH CV XLRA - TDI S': 13.2 CM/SEC
LEFT ATRIUM VOLUME INDEX: 23 ML/M2
MAXIMAL PREDICTED HEART RATE: 146 BPM
STRESS TARGET HR: 124 BPM

## 2019-02-26 PROCEDURE — 93306 TTE W/DOPPLER COMPLETE: CPT

## 2019-02-26 PROCEDURE — 93306 TTE W/DOPPLER COMPLETE: CPT | Performed by: INTERNAL MEDICINE

## 2019-02-28 ENCOUNTER — APPOINTMENT (OUTPATIENT)
Dept: CARDIOLOGY | Facility: HOSPITAL | Age: 74
End: 2019-02-28

## 2019-03-05 ENCOUNTER — HOSPITAL ENCOUNTER (OUTPATIENT)
Dept: CARDIOLOGY | Facility: HOSPITAL | Age: 74
Discharge: HOME | End: 2019-03-05

## 2019-03-05 ENCOUNTER — HOSPITAL ENCOUNTER (OUTPATIENT)
Dept: CARDIOLOGY | Facility: HOSPITAL | Age: 74
Discharge: HOME | End: 2019-03-05
Admitting: INTERNAL MEDICINE

## 2019-03-05 VITALS
OXYGEN SATURATION: 96 % | HEART RATE: 79 BPM | SYSTOLIC BLOOD PRESSURE: 120 MMHG | HEIGHT: 61 IN | DIASTOLIC BLOOD PRESSURE: 64 MMHG | RESPIRATION RATE: 20 BRPM | BODY MASS INDEX: 19.83 KG/M2 | WEIGHT: 105 LBS

## 2019-03-05 DIAGNOSIS — Z72.0 TOBACCO ABUSE: ICD-10-CM

## 2019-03-05 DIAGNOSIS — R06.02 SOB (SHORTNESS OF BREATH): ICD-10-CM

## 2019-03-05 DIAGNOSIS — R94.31 ABNORMAL EKG: ICD-10-CM

## 2019-03-05 DIAGNOSIS — I48.0 PAROXYSMAL ATRIAL FIBRILLATION (HCC): ICD-10-CM

## 2019-03-05 LAB
ALBUMIN SERPL-MCNC: 3.6 G/DL (ref 3.5–5.2)
ALBUMIN/GLOB SERPL: 1.3 G/DL
ALP SERPL-CCNC: 167 U/L (ref 39–117)
ALT SERPL W P-5'-P-CCNC: 8 U/L (ref 1–33)
ANION GAP SERPL CALCULATED.3IONS-SCNC: 9.9 MMOL/L
AST SERPL-CCNC: 13 U/L (ref 1–32)
BILIRUB SERPL-MCNC: 0.3 MG/DL (ref 0.1–1.2)
BUN BLD-MCNC: 8 MG/DL (ref 8–23)
BUN/CREAT SERPL: 9.4 (ref 7–25)
CALCIUM SPEC-SCNC: 9.4 MG/DL (ref 8.6–10.5)
CHLORIDE SERPL-SCNC: 99 MMOL/L (ref 98–107)
CHOLEST SERPL-MCNC: 202 MG/DL (ref 0–200)
CO2 SERPL-SCNC: 22.1 MMOL/L (ref 22–29)
CREAT BLD-MCNC: 0.85 MG/DL (ref 0.57–1)
GFR SERPL CREATININE-BSD FRML MDRD: 65 ML/MIN/1.73
GLOBULIN UR ELPH-MCNC: 2.8 GM/DL
GLUCOSE BLD-MCNC: 90 MG/DL (ref 65–99)
HDLC SERPL-MCNC: 56 MG/DL (ref 40–60)
LDLC SERPL CALC-MCNC: 125 MG/DL (ref 0–100)
LDLC/HDLC SERPL: 2.23 {RATIO}
POTASSIUM BLD-SCNC: 4.5 MMOL/L (ref 3.5–5.2)
PROT SERPL-MCNC: 6.4 G/DL (ref 6–8.5)
SODIUM BLD-SCNC: 131 MMOL/L (ref 136–145)
TRIGL SERPL-MCNC: 105 MG/DL (ref 0–150)
VLDLC SERPL-MCNC: 21 MG/DL (ref 5–40)

## 2019-03-05 PROCEDURE — 0 TECHNETIUM SESTAMIBI: Performed by: INTERNAL MEDICINE

## 2019-03-05 PROCEDURE — 36415 COLL VENOUS BLD VENIPUNCTURE: CPT | Performed by: INTERNAL MEDICINE

## 2019-03-05 PROCEDURE — 80061 LIPID PANEL: CPT | Performed by: INTERNAL MEDICINE

## 2019-03-05 PROCEDURE — 78452 HT MUSCLE IMAGE SPECT MULT: CPT | Performed by: INTERNAL MEDICINE

## 2019-03-05 PROCEDURE — 78452 HT MUSCLE IMAGE SPECT MULT: CPT

## 2019-03-05 PROCEDURE — 25010000002 REGADENOSON 0.4 MG/5ML SOLUTION: Performed by: INTERNAL MEDICINE

## 2019-03-05 PROCEDURE — A9500 TC99M SESTAMIBI: HCPCS | Performed by: INTERNAL MEDICINE

## 2019-03-05 PROCEDURE — 93018 CV STRESS TEST I&R ONLY: CPT | Performed by: INTERNAL MEDICINE

## 2019-03-05 PROCEDURE — 80053 COMPREHEN METABOLIC PANEL: CPT | Performed by: INTERNAL MEDICINE

## 2019-03-05 PROCEDURE — 93016 CV STRESS TEST SUPVJ ONLY: CPT | Performed by: INTERNAL MEDICINE

## 2019-03-05 PROCEDURE — 93017 CV STRESS TEST TRACING ONLY: CPT

## 2019-03-05 RX ADMIN — TECHNETIUM TC 99M SESTAMIBI 1 DOSE: 1 INJECTION INTRAVENOUS at 11:40

## 2019-03-05 RX ADMIN — TECHNETIUM TC 99M SESTAMIBI 1 DOSE: 1 INJECTION INTRAVENOUS at 08:52

## 2019-03-05 RX ADMIN — REGADENOSON 0.4 MG: 0.08 INJECTION, SOLUTION INTRAVENOUS at 11:41

## 2019-03-07 LAB
BH CV STRESS BP STAGE 1: NORMAL
BH CV STRESS COMMENTS STAGE 1: NORMAL
BH CV STRESS DOSE REGADENOSON STAGE 1: 0.4
BH CV STRESS DURATION MIN STAGE 1: 2
BH CV STRESS DURATION SEC STAGE 1: 31
BH CV STRESS GRADE STAGE 1: 0
BH CV STRESS HR STAGE 1: 103
BH CV STRESS METS STAGE 1: 1.4
BH CV STRESS PROTOCOL 1: NORMAL
BH CV STRESS RECOVERY BP: NORMAL MMHG
BH CV STRESS RECOVERY HR: 89 BPM
BH CV STRESS RECOVERY O2: 98 %
BH CV STRESS SPEED STAGE 1: 1
BH CV STRESS STAGE 1: 1
LV EF NUC BP: 70 %
MAXIMAL PREDICTED HEART RATE: 146 BPM
PERCENT MAX PREDICTED HR: 70.55 %
STRESS BASELINE BP: NORMAL MMHG
STRESS BASELINE HR: 97 BPM
STRESS O2 SAT REST: 96 %
STRESS PERCENT HR: 83 %
STRESS POST ESTIMATED WORKLOAD: 1.4 METS
STRESS POST EXERCISE DUR MIN: 2 MIN
STRESS POST EXERCISE DUR SEC: 31 SEC
STRESS POST PEAK BP: NORMAL MMHG
STRESS POST PEAK HR: 103 BPM
STRESS TARGET HR: 124 BPM

## 2019-03-11 ENCOUNTER — OFFICE VISIT (OUTPATIENT)
Dept: CARDIOLOGY | Facility: CLINIC | Age: 74
End: 2019-03-11

## 2019-03-11 VITALS
HEART RATE: 73 BPM | SYSTOLIC BLOOD PRESSURE: 127 MMHG | BODY MASS INDEX: 19.83 KG/M2 | DIASTOLIC BLOOD PRESSURE: 75 MMHG | HEIGHT: 61 IN | WEIGHT: 105 LBS

## 2019-03-11 DIAGNOSIS — R06.02 SOB (SHORTNESS OF BREATH): ICD-10-CM

## 2019-03-11 DIAGNOSIS — I48.0 PAROXYSMAL ATRIAL FIBRILLATION (HCC): Primary | ICD-10-CM

## 2019-03-11 DIAGNOSIS — Z95.818 STATUS POST PLACEMENT OF IMPLANTABLE LOOP RECORDER: ICD-10-CM

## 2019-03-11 DIAGNOSIS — Z72.0 TOBACCO ABUSE: ICD-10-CM

## 2019-03-11 PROCEDURE — 99213 OFFICE O/P EST LOW 20 MIN: CPT | Performed by: INTERNAL MEDICINE

## 2019-03-11 RX ORDER — PROPAFENONE HYDROCHLORIDE 150 MG/1
75 TABLET, COATED ORAL EVERY 8 HOURS
COMMUNITY
End: 2019-05-30 | Stop reason: SDUPTHER

## 2019-03-11 NOTE — PROGRESS NOTES
Subjective:        Celia Baird is a 74 y.o. female who here for follow up    CC  Follow-up for the paroxysmal atrial fibrillation shortness of breath tobacco abuse  HPI  74-year-old female with known history of the paroxysmal atrial fibrillation shortness of breath tobacco abuse and a loop recorder implantation here for the follow-up with no complaints of chest pains or tightness in chest no heaviness of the pressure sensation     Problem List Items Addressed This Visit        Cardiovascular and Mediastinum    Paroxysmal atrial fibrillation (CMS/HCC) - Primary       Respiratory    SOB (shortness of breath)       Other    Tobacco abuse    Status post placement of implantable loop recorder        .Interpretation Summary        · Findings consistent with a normal ECG stress test.  · Left ventricular ejection fraction is normal (Calculated EF = 70%).  · Myocardial perfusion imaging indicates a normal myocardial perfusion study with no evidence of ischemia.  · Impressions are consistent with a low risk study.     Interpretation Summary     · Left atrial cavity size is borderline dilated.  · Calculated EF = 66%  · There is no evidence of pericardial effusion.     Total Cholesterol 0 - 200 mg/dL 202 Abnormally high     Triglycerides 0 - 150 mg/dL 105    HDL Cholesterol 40 - 60 mg/dL 56    LDL Cholesterol  0 - 100 mg/dL 125 Abnormally high     VLDL Cholesterol 5 - 40 mg/dL 21       Ref Range & Units 6d ago   Glucose 65 - 99 mg/dL 90    BUN 8 - 23 mg/dL 8    Creatinine 0.57 - 1.00 mg/dL 0.85    Sodium 136 - 145 mmol/L 131 Abnormally low     Potassium 3.5 - 5.2 mmol/L 4.5    Chloride 98 - 107 mmol/L 99    CO2 22.0 - 29.0 mmol/L 22.1    Calcium 8.6 - 10.5 mg/dL 9.4    Total Protein 6.0 - 8.5 g/dL 6.4    Albumin 3.50 - 5.20 g/dL 3.60    ALT (SGPT) 1 - 33 U/L 8    AST (SGOT) 1 - 32 U/L 13    Alkaline Phosphatase 39 - 117 U/L 167 Abnormally high     Total Bilirubin 0.1 - 1.2 mg/dL 0.3    eGFR Non African Amer >60 mL/min/1.73  65    Globulin gm/dL 2.8    A/G Ratio g/dL 1.3    BUN/Creatinine Ratio 7.0 - 25.0 9.4    Anion Gap mmol/L 9.9    Resulting Agency  [facility]      Narrative   Performed by: [facility]   The MDRD GFR formula is only valid for adults with stable renal function between ages 18 and 70.      Specimen Collected: 03/05/19 08:45   Last Resulted: 03/05/19 16:40        Lab Flowsheet      Order Details      View Encounter      Lab and Collection Details      Routing      Result History               3/5/2019  4:40 PM     Component Value Flag Ref Range Units Status   Glucose 90   65 - 99 mg/dL Final   BUN 8   8 - 23 mg/dL Final   Creatinine 0.85   0.57 - 1.00 mg/dL Final   Sodium 131 Abnormally low   L  136 - 145 mmol/L Final   Potassium 4.5   3.5 - 5.2 mmol/L Final   Chloride 99   98 - 107 mmol/L Final   CO2 22.1   22.0 - 29.0 mmol/L Final   Calcium 9.4   8.6 - 10.5 mg/dL Final   Total Protein 6.4   6.0 - 8.5 g/dL Final   Albumin 3.60   3.50 - 5.20 g/dL Final   ALT (SGPT) 8   1 - 33 U/L Final   AST (SGOT) 13   1 - 32 U/L Final   Alkaline Phosphatase 167 Abnormally high   H  39 - 117 U/L Final   Total Bilirubin 0.3   0.1 - 1.2 mg/dL Final   eGFR Non African Amer 65   >60 mL/min/1.73 Final   Globulin 2.8    gm/dL Final   A/G Ratio 1.3    g/dL Final   BUN/Creatinine Ratio 9.4   7.0 - 25.0  Final   Anion Gap 9.9    mmol/L Final   Narrative     The MDRD GFR formula is only valid for adults with stable renal function between ages 18 and 70.   Lab and Collection     Comprehensive Metabolic Panel (Order: [id number]) - 3/5/2019   Routing History     Priority Sent On From To Message Type    3/5/2019  4:40 PM Lab, Background User Ailyn Solorio MD Results   Related Result Highlights         Lipid Panel  Final result 3/5/2019                  The following portions of the patient's history were reviewed and updated as appropriate: allergies, current medications, past family history, past medical history, past social history, past  "surgical history and problem list.    Past Medical History:   Diagnosis Date   • Atrial fibrillation (CMS/HCC)    • COPD (chronic obstructive pulmonary disease) (CMS/HCC)    • History of frequent urinary tract infections    • Irregular heart beat    • Kidney stones    • PBC (primary biliary cirrhosis)    • PBC (primary biliary cirrhosis)      reports that she has been smoking cigarettes.  She has a 29.50 pack-year smoking history. she has never used smokeless tobacco. She reports that she does not drink alcohol or use drugs.   Family History   Problem Relation Age of Onset   • Heart disease Father    • Heart attack Father    • Heart disease Brother    • Stroke Mother        Review of Systems  Constitutional: No wt loss, fever, fatigue  Gastrointestinal: No nausea, abdominal pain  Behavioral/Psych: No insomnia or anxiety   Cardiovascular no chest pains or tightness in the chest  Objective:       Physical Exam  /75 (BP Location: Left arm, Patient Position: Sitting)   Pulse 73   Ht 153.7 cm (60.5\")   Wt 47.6 kg (105 lb)   BMI 20.17 kg/m²   General appearance: No acute changes   Neck: Trachea midline; NECK, supple, no thyromegaly or lymphadenopathy   Lungs: Normal size and shape, normal breath sounds, equal distribution of air, no rales and rhonchi   CV: S1-S2 regular, no murmurs, no rub, no gallop   Abdomen: Soft, non-tender; no masses , no abnormal abdominal sounds   Extremities: No deformity , normal color , no peripheral edema   Skin: Normal temperature, turgor and texture; no rash, ulcers          Procedures      Echocardiogram:        Current Outpatient Medications:   •  aspirin 81 MG EC tablet, Take 81 mg by mouth Daily., Disp: , Rfl:   •  cholecalciferol (VITAMIN D3) 1000 UNITS tablet, Take 1,000 Units by mouth Daily., Disp: , Rfl:   •  cyclobenzaprine (FLEXERIL) 10 MG tablet, Take 1 tablet by mouth 2 (Two) Times a Day As Needed for Muscle Spasms. (Patient taking differently: Take 10 mg by mouth As " Needed for Muscle Spasms.), Disp: , Rfl: 0  •  fluticasone-salmeterol (ADVAIR DISKUS) 250-50 MCG/DOSE DISKUS, Inhale 2 (Two) Times a Day., Disp: , Rfl:   •  ipratropium-albuterol (DUO-NEB) 0.5-2.5 mg/mL nebulizer, Take 3 mL by nebulization Every 4 (Four) Hours As Needed for wheezing., Disp: , Rfl:   •  potassium chloride (K-DUR) 10 MEQ CR tablet, , Disp: , Rfl:   •  propafenone (RYTHMOL) 150 MG tablet, Take 1 tablet by mouth Every 8 (Eight) Hours., Disp: 135 tablet, Rfl: 0  •  raNITIdine (ZANTAC) 150 MG tablet, Take 150 mg by mouth 2 (Two) Times a Day., Disp: , Rfl:   •  ursodiol (ACTIGALL) 300 MG capsule, Take 300 mg by mouth 3 (Three) Times a Day., Disp: , Rfl:    Assessment:        Patient Active Problem List   Diagnosis   • COPD exacerbation (CMS/HCC)   • Subarachnoid hemorrhage (CMS/HCC)   • Multiple skin tears   • Closed nondisplaced fracture of left clavicle   • Paroxysmal atrial fibrillation (CMS/HCC)   • Tobacco abuse   • Status post placement of implantable loop recorder   • SOB (shortness of breath)               Plan:            ICD-10-CM ICD-9-CM   1. Paroxysmal atrial fibrillation (CMS/HCC) I48.0 427.31   2. SOB (shortness of breath) R06.02 786.05   3. Tobacco abuse Z72.0 305.1   4. Status post placement of implantable loop recorder Z95.818 V45.09     1. Paroxysmal atrial fibrillation (CMS/HCC)  Under control    2. SOB (shortness of breath)  Multifactorial    3. Tobacco abuse  Counseling done    4. Status post placement of implantable loop recorder  Being monitored regularly     See in 1 yr  COUNSELING:    Celia Colmenares was given to patient for the following topics: diagnostic results, risk factor reductions, impressions, risks and benefits of treatment options and importance of treatment compliance .       SMOKING COUNSELING:    Ready to quit: Not Answered  Counseling given: Yes      Dictated using Dragon dictation

## 2019-03-16 ENCOUNTER — CLINICAL SUPPORT NO REQUIREMENTS (OUTPATIENT)
Dept: CARDIOLOGY | Facility: CLINIC | Age: 74
End: 2019-03-16

## 2019-03-16 DIAGNOSIS — Z95.818 STATUS POST PLACEMENT OF IMPLANTABLE LOOP RECORDER: Primary | ICD-10-CM

## 2019-03-16 PROCEDURE — 93299 PR REM INTERROG ICPMS/SCRMS <30 D TECH REVIEW: CPT | Performed by: INTERNAL MEDICINE

## 2019-03-16 PROCEDURE — 93298 REM INTERROG DEV EVAL SCRMS: CPT | Performed by: INTERNAL MEDICINE

## 2019-04-16 ENCOUNTER — CLINICAL SUPPORT NO REQUIREMENTS (OUTPATIENT)
Dept: CARDIOLOGY | Facility: CLINIC | Age: 74
End: 2019-04-16

## 2019-04-16 DIAGNOSIS — Z95.818 STATUS POST PLACEMENT OF IMPLANTABLE LOOP RECORDER: Primary | ICD-10-CM

## 2019-04-16 PROCEDURE — 93299 PR REM INTERROG ICPMS/SCRMS <30 D TECH REVIEW: CPT | Performed by: INTERNAL MEDICINE

## 2019-04-16 PROCEDURE — 93298 REM INTERROG DEV EVAL SCRMS: CPT | Performed by: INTERNAL MEDICINE

## 2019-05-17 ENCOUNTER — CLINICAL SUPPORT NO REQUIREMENTS (OUTPATIENT)
Dept: CARDIOLOGY | Facility: CLINIC | Age: 74
End: 2019-05-17

## 2019-05-17 DIAGNOSIS — Z95.818 STATUS POST PLACEMENT OF IMPLANTABLE LOOP RECORDER: Primary | ICD-10-CM

## 2019-05-17 PROCEDURE — 93299 PR REM INTERROG ICPMS/SCRMS <30 D TECH REVIEW: CPT | Performed by: INTERNAL MEDICINE

## 2019-05-17 PROCEDURE — 93298 REM INTERROG DEV EVAL SCRMS: CPT | Performed by: INTERNAL MEDICINE

## 2019-05-30 RX ORDER — PROPAFENONE HYDROCHLORIDE 150 MG/1
TABLET, COATED ORAL
Qty: 135 TABLET | Refills: 1 | Status: SHIPPED | OUTPATIENT
Start: 2019-05-30 | End: 2019-11-25 | Stop reason: SDUPTHER

## 2019-06-17 ENCOUNTER — CLINICAL SUPPORT NO REQUIREMENTS (OUTPATIENT)
Dept: CARDIOLOGY | Facility: CLINIC | Age: 74
End: 2019-06-17

## 2019-06-17 DIAGNOSIS — Z95.818 STATUS POST PLACEMENT OF IMPLANTABLE LOOP RECORDER: Primary | ICD-10-CM

## 2019-06-17 PROCEDURE — 93298 REM INTERROG DEV EVAL SCRMS: CPT | Performed by: INTERNAL MEDICINE

## 2019-06-17 PROCEDURE — 93299 PR REM INTERROG ICPMS/SCRMS <30 D TECH REVIEW: CPT | Performed by: INTERNAL MEDICINE

## 2019-07-18 ENCOUNTER — CLINICAL SUPPORT NO REQUIREMENTS (OUTPATIENT)
Dept: CARDIOLOGY | Facility: CLINIC | Age: 74
End: 2019-07-18

## 2019-07-18 DIAGNOSIS — Z95.818 STATUS POST PLACEMENT OF IMPLANTABLE LOOP RECORDER: Primary | ICD-10-CM

## 2019-07-18 PROCEDURE — 93298 REM INTERROG DEV EVAL SCRMS: CPT | Performed by: INTERNAL MEDICINE

## 2019-07-18 PROCEDURE — 93299 PR REM INTERROG ICPMS/SCRMS <30 D TECH REVIEW: CPT | Performed by: INTERNAL MEDICINE

## 2019-08-18 ENCOUNTER — CLINICAL SUPPORT NO REQUIREMENTS (OUTPATIENT)
Dept: CARDIOLOGY | Facility: CLINIC | Age: 74
End: 2019-08-18

## 2019-08-18 DIAGNOSIS — Z95.818 STATUS POST PLACEMENT OF IMPLANTABLE LOOP RECORDER: Primary | ICD-10-CM

## 2019-08-18 PROCEDURE — 93299 PR REM INTERROG ICPMS/SCRMS <30 D TECH REVIEW: CPT | Performed by: INTERNAL MEDICINE

## 2019-08-18 PROCEDURE — 93298 REM INTERROG DEV EVAL SCRMS: CPT | Performed by: INTERNAL MEDICINE

## 2019-09-18 ENCOUNTER — CLINICAL SUPPORT NO REQUIREMENTS (OUTPATIENT)
Dept: CARDIOLOGY | Facility: CLINIC | Age: 74
End: 2019-09-18

## 2019-09-18 DIAGNOSIS — Z95.818 STATUS POST PLACEMENT OF IMPLANTABLE LOOP RECORDER: Primary | ICD-10-CM

## 2019-09-18 PROCEDURE — 93299 PR REM INTERROG ICPMS/SCRMS <30 D TECH REVIEW: CPT | Performed by: INTERNAL MEDICINE

## 2019-09-18 PROCEDURE — 93298 REM INTERROG DEV EVAL SCRMS: CPT | Performed by: INTERNAL MEDICINE

## 2019-10-19 ENCOUNTER — CLINICAL SUPPORT NO REQUIREMENTS (OUTPATIENT)
Dept: CARDIOLOGY | Facility: CLINIC | Age: 74
End: 2019-10-19

## 2019-10-19 DIAGNOSIS — Z95.818 STATUS POST PLACEMENT OF IMPLANTABLE LOOP RECORDER: Primary | ICD-10-CM

## 2019-10-19 PROCEDURE — 93299 PR REM INTERROG ICPMS/SCRMS <30 D TECH REVIEW: CPT | Performed by: INTERNAL MEDICINE

## 2019-10-19 PROCEDURE — 93298 REM INTERROG DEV EVAL SCRMS: CPT | Performed by: INTERNAL MEDICINE

## 2019-11-19 ENCOUNTER — CLINICAL SUPPORT NO REQUIREMENTS (OUTPATIENT)
Dept: CARDIOLOGY | Facility: CLINIC | Age: 74
End: 2019-11-19

## 2019-11-19 DIAGNOSIS — Z95.818 STATUS POST PLACEMENT OF IMPLANTABLE LOOP RECORDER: Primary | ICD-10-CM

## 2019-11-19 PROCEDURE — 93299 PR REM INTERROG ICPMS/SCRMS <30 D TECH REVIEW: CPT | Performed by: INTERNAL MEDICINE

## 2019-11-19 PROCEDURE — 93298 REM INTERROG DEV EVAL SCRMS: CPT | Performed by: INTERNAL MEDICINE

## 2019-11-25 RX ORDER — PROPAFENONE HYDROCHLORIDE 150 MG/1
TABLET, COATED ORAL
Qty: 135 TABLET | Refills: 1 | Status: SHIPPED | OUTPATIENT
Start: 2019-11-25 | End: 2020-06-11 | Stop reason: SDUPTHER

## 2019-12-20 ENCOUNTER — CLINICAL SUPPORT NO REQUIREMENTS (OUTPATIENT)
Dept: CARDIOLOGY | Facility: CLINIC | Age: 74
End: 2019-12-20

## 2019-12-20 DIAGNOSIS — Z95.818 STATUS POST PLACEMENT OF IMPLANTABLE LOOP RECORDER: Primary | ICD-10-CM

## 2019-12-20 PROCEDURE — 93298 REM INTERROG DEV EVAL SCRMS: CPT | Performed by: INTERNAL MEDICINE

## 2019-12-20 PROCEDURE — 93299 PR REM INTERROG ICPMS/SCRMS <30 D TECH REVIEW: CPT | Performed by: INTERNAL MEDICINE

## 2020-01-20 ENCOUNTER — CLINICAL SUPPORT NO REQUIREMENTS (OUTPATIENT)
Dept: CARDIOLOGY | Facility: CLINIC | Age: 75
End: 2020-01-20

## 2020-01-20 DIAGNOSIS — Z95.818 STATUS POST PLACEMENT OF IMPLANTABLE LOOP RECORDER: Primary | ICD-10-CM

## 2020-01-20 PROCEDURE — 93298 REM INTERROG DEV EVAL SCRMS: CPT | Performed by: INTERNAL MEDICINE

## 2020-01-20 PROCEDURE — G2066 INTER DEVC REMOTE 30D: HCPCS | Performed by: INTERNAL MEDICINE

## 2020-02-20 ENCOUNTER — CLINICAL SUPPORT NO REQUIREMENTS (OUTPATIENT)
Dept: CARDIOLOGY | Facility: CLINIC | Age: 75
End: 2020-02-20

## 2020-02-20 DIAGNOSIS — Z95.818 STATUS POST PLACEMENT OF IMPLANTABLE LOOP RECORDER: Primary | ICD-10-CM

## 2020-02-20 PROCEDURE — 93298 REM INTERROG DEV EVAL SCRMS: CPT | Performed by: INTERNAL MEDICINE

## 2020-02-20 PROCEDURE — G2066 INTER DEVC REMOTE 30D: HCPCS | Performed by: INTERNAL MEDICINE

## 2020-03-20 ENCOUNTER — HOSPITAL ENCOUNTER (INPATIENT)
Facility: HOSPITAL | Age: 75
LOS: 6 days | Discharge: HOME | End: 2020-03-26
Attending: EMERGENCY MEDICINE | Admitting: INTERNAL MEDICINE

## 2020-03-20 ENCOUNTER — APPOINTMENT (OUTPATIENT)
Dept: GENERAL RADIOLOGY | Facility: HOSPITAL | Age: 75
End: 2020-03-20

## 2020-03-20 DIAGNOSIS — J44.1 COPD WITH ACUTE EXACERBATION (HCC): Primary | ICD-10-CM

## 2020-03-20 DIAGNOSIS — J44.1 COPD EXACERBATION (HCC): ICD-10-CM

## 2020-03-20 DIAGNOSIS — R26.2 DIFFICULTY WALKING: ICD-10-CM

## 2020-03-20 LAB
ALBUMIN SERPL-MCNC: 4 G/DL (ref 3.5–5.2)
ALBUMIN/GLOB SERPL: 1.6 G/DL
ALP SERPL-CCNC: 130 U/L (ref 39–117)
ALT SERPL W P-5'-P-CCNC: 10 U/L (ref 1–33)
ANION GAP SERPL CALCULATED.3IONS-SCNC: 10.1 MMOL/L (ref 5–15)
AST SERPL-CCNC: 8 U/L (ref 1–32)
B PARAPERT DNA SPEC QL NAA+PROBE: NOT DETECTED
B PERT DNA SPEC QL NAA+PROBE: NOT DETECTED
BASOPHILS # BLD AUTO: 0.06 10*3/MM3 (ref 0–0.2)
BASOPHILS NFR BLD AUTO: 0.5 % (ref 0–1.5)
BILIRUB SERPL-MCNC: 0.5 MG/DL (ref 0.2–1.2)
BILIRUB UR QL STRIP: NEGATIVE
BUN BLD-MCNC: 12 MG/DL (ref 8–23)
BUN/CREAT SERPL: 14.6 (ref 7–25)
C PNEUM DNA NPH QL NAA+NON-PROBE: NOT DETECTED
CALCIUM SPEC-SCNC: 9.1 MG/DL (ref 8.6–10.5)
CHLORIDE SERPL-SCNC: 100 MMOL/L (ref 98–107)
CLARITY UR: CLEAR
CO2 SERPL-SCNC: 25.9 MMOL/L (ref 22–29)
COLOR UR: YELLOW
CREAT BLD-MCNC: 0.82 MG/DL (ref 0.57–1)
D-LACTATE SERPL-SCNC: 0.6 MMOL/L (ref 0.5–2)
DEPRECATED RDW RBC AUTO: 42.4 FL (ref 37–54)
EOSINOPHIL # BLD AUTO: 0.28 10*3/MM3 (ref 0–0.4)
EOSINOPHIL NFR BLD AUTO: 2.2 % (ref 0.3–6.2)
ERYTHROCYTE [DISTWIDTH] IN BLOOD BY AUTOMATED COUNT: 12.9 % (ref 12.3–15.4)
FLUAV H1 2009 PAND RNA NPH QL NAA+PROBE: NOT DETECTED
FLUAV H1 HA GENE NPH QL NAA+PROBE: NOT DETECTED
FLUAV H3 RNA NPH QL NAA+PROBE: NOT DETECTED
FLUAV SUBTYP SPEC NAA+PROBE: NOT DETECTED
FLUBV RNA ISLT QL NAA+PROBE: NOT DETECTED
GFR SERPL CREATININE-BSD FRML MDRD: 68 ML/MIN/1.73
GLOBULIN UR ELPH-MCNC: 2.5 GM/DL
GLUCOSE BLD-MCNC: 97 MG/DL (ref 65–99)
GLUCOSE UR STRIP-MCNC: NEGATIVE MG/DL
HADV DNA SPEC NAA+PROBE: NOT DETECTED
HCOV 229E RNA SPEC QL NAA+PROBE: NOT DETECTED
HCOV HKU1 RNA SPEC QL NAA+PROBE: NOT DETECTED
HCOV NL63 RNA SPEC QL NAA+PROBE: NOT DETECTED
HCOV OC43 RNA SPEC QL NAA+PROBE: NOT DETECTED
HCT VFR BLD AUTO: 36.9 % (ref 34–46.6)
HGB BLD-MCNC: 12.4 G/DL (ref 12–15.9)
HGB UR QL STRIP.AUTO: NEGATIVE
HMPV RNA NPH QL NAA+NON-PROBE: NOT DETECTED
HPIV1 RNA SPEC QL NAA+PROBE: NOT DETECTED
HPIV2 RNA SPEC QL NAA+PROBE: NOT DETECTED
HPIV3 RNA NPH QL NAA+PROBE: NOT DETECTED
HPIV4 P GENE NPH QL NAA+PROBE: NOT DETECTED
IMM GRANULOCYTES # BLD AUTO: 0.07 10*3/MM3 (ref 0–0.05)
IMM GRANULOCYTES NFR BLD AUTO: 0.6 % (ref 0–0.5)
KETONES UR QL STRIP: NEGATIVE
LEUKOCYTE ESTERASE UR QL STRIP.AUTO: NEGATIVE
LYMPHOCYTES # BLD AUTO: 4.03 10*3/MM3 (ref 0.7–3.1)
LYMPHOCYTES NFR BLD AUTO: 32.3 % (ref 19.6–45.3)
M PNEUMO IGG SER IA-ACNC: NOT DETECTED
MCH RBC QN AUTO: 30.5 PG (ref 26.6–33)
MCHC RBC AUTO-ENTMCNC: 33.6 G/DL (ref 31.5–35.7)
MCV RBC AUTO: 90.7 FL (ref 79–97)
MONOCYTES # BLD AUTO: 1.94 10*3/MM3 (ref 0.1–0.9)
MONOCYTES NFR BLD AUTO: 15.6 % (ref 5–12)
NEUTROPHILS # BLD AUTO: 6.09 10*3/MM3 (ref 1.7–7)
NEUTROPHILS NFR BLD AUTO: 48.8 % (ref 42.7–76)
NITRITE UR QL STRIP: NEGATIVE
NRBC BLD AUTO-RTO: 0 /100 WBC (ref 0–0.2)
NT-PROBNP SERPL-MCNC: 646.3 PG/ML (ref 5–1800)
PH UR STRIP.AUTO: 6 [PH] (ref 5–8)
PLATELET # BLD AUTO: 333 10*3/MM3 (ref 140–450)
PMV BLD AUTO: 10.9 FL (ref 6–12)
POTASSIUM BLD-SCNC: 4.2 MMOL/L (ref 3.5–5.2)
PROCALCITONIN SERPL-MCNC: 0.05 NG/ML (ref 0.1–0.25)
PROT SERPL-MCNC: 6.5 G/DL (ref 6–8.5)
PROT UR QL STRIP: NEGATIVE
RBC # BLD AUTO: 4.07 10*6/MM3 (ref 3.77–5.28)
RHINOVIRUS RNA SPEC NAA+PROBE: NOT DETECTED
RSV RNA NPH QL NAA+NON-PROBE: NOT DETECTED
SODIUM BLD-SCNC: 136 MMOL/L (ref 136–145)
SP GR UR STRIP: 1.01 (ref 1–1.03)
UROBILINOGEN UR QL STRIP: NORMAL
WBC NRBC COR # BLD: 12.47 10*3/MM3 (ref 3.4–10.8)

## 2020-03-20 PROCEDURE — 93005 ELECTROCARDIOGRAM TRACING: CPT | Performed by: EMERGENCY MEDICINE

## 2020-03-20 PROCEDURE — 71045 X-RAY EXAM CHEST 1 VIEW: CPT

## 2020-03-20 PROCEDURE — 81003 URINALYSIS AUTO W/O SCOPE: CPT | Performed by: NURSE PRACTITIONER

## 2020-03-20 PROCEDURE — 93010 ELECTROCARDIOGRAM REPORT: CPT | Performed by: INTERNAL MEDICINE

## 2020-03-20 PROCEDURE — 85025 COMPLETE CBC W/AUTO DIFF WBC: CPT | Performed by: NURSE PRACTITIONER

## 2020-03-20 PROCEDURE — P9612 CATHETERIZE FOR URINE SPEC: HCPCS

## 2020-03-20 PROCEDURE — 83605 ASSAY OF LACTIC ACID: CPT | Performed by: NURSE PRACTITIONER

## 2020-03-20 PROCEDURE — 99284 EMERGENCY DEPT VISIT MOD MDM: CPT

## 2020-03-20 PROCEDURE — 80053 COMPREHEN METABOLIC PANEL: CPT | Performed by: NURSE PRACTITIONER

## 2020-03-20 PROCEDURE — 83880 ASSAY OF NATRIURETIC PEPTIDE: CPT | Performed by: NURSE PRACTITIONER

## 2020-03-20 PROCEDURE — 25010000002 METHYLPREDNISOLONE PER 125 MG: Performed by: NURSE PRACTITIONER

## 2020-03-20 PROCEDURE — 84145 PROCALCITONIN (PCT): CPT | Performed by: NURSE PRACTITIONER

## 2020-03-20 PROCEDURE — 87040 BLOOD CULTURE FOR BACTERIA: CPT | Performed by: NURSE PRACTITIONER

## 2020-03-20 PROCEDURE — 0100U HC BIOFIRE FILMARRAY RESP PANEL 2: CPT | Performed by: NURSE PRACTITIONER

## 2020-03-20 PROCEDURE — G0378 HOSPITAL OBSERVATION PER HR: HCPCS

## 2020-03-20 PROCEDURE — 93005 ELECTROCARDIOGRAM TRACING: CPT

## 2020-03-20 RX ORDER — SODIUM CHLORIDE 0.9 % (FLUSH) 0.9 %
10 SYRINGE (ML) INJECTION AS NEEDED
Status: DISCONTINUED | OUTPATIENT
Start: 2020-03-20 | End: 2020-03-26 | Stop reason: HOSPADM

## 2020-03-20 RX ORDER — METHYLPREDNISOLONE SODIUM SUCCINATE 125 MG/2ML
125 INJECTION, POWDER, LYOPHILIZED, FOR SOLUTION INTRAMUSCULAR; INTRAVENOUS ONCE
Status: COMPLETED | OUTPATIENT
Start: 2020-03-20 | End: 2020-03-20

## 2020-03-20 RX ADMIN — METHYLPREDNISOLONE SODIUM SUCCINATE 125 MG: 125 INJECTION, POWDER, FOR SOLUTION INTRAMUSCULAR; INTRAVENOUS at 21:11

## 2020-03-21 LAB
GLUCOSE BLDC GLUCOMTR-MCNC: 136 MG/DL (ref 70–130)
GLUCOSE BLDC GLUCOMTR-MCNC: 168 MG/DL (ref 70–130)
GLUCOSE BLDC GLUCOMTR-MCNC: 191 MG/DL (ref 70–130)
GLUCOSE BLDC GLUCOMTR-MCNC: 195 MG/DL (ref 70–130)

## 2020-03-21 PROCEDURE — 94799 UNLISTED PULMONARY SVC/PX: CPT

## 2020-03-21 PROCEDURE — 82962 GLUCOSE BLOOD TEST: CPT

## 2020-03-21 PROCEDURE — 99213 OFFICE O/P EST LOW 20 MIN: CPT | Performed by: INTERNAL MEDICINE

## 2020-03-21 PROCEDURE — 63710000001 INSULIN LISPRO (HUMAN) PER 5 UNITS: Performed by: INTERNAL MEDICINE

## 2020-03-21 PROCEDURE — G0378 HOSPITAL OBSERVATION PER HR: HCPCS

## 2020-03-21 PROCEDURE — 94640 AIRWAY INHALATION TREATMENT: CPT

## 2020-03-21 PROCEDURE — 25010000002 METHYLPREDNISOLONE PER 125 MG: Performed by: INTERNAL MEDICINE

## 2020-03-21 RX ORDER — ASPIRIN 81 MG/1
81 TABLET ORAL DAILY
Status: DISCONTINUED | OUTPATIENT
Start: 2020-03-21 | End: 2020-03-26 | Stop reason: HOSPADM

## 2020-03-21 RX ORDER — NITROGLYCERIN 0.4 MG/1
0.4 TABLET SUBLINGUAL
Status: DISCONTINUED | OUTPATIENT
Start: 2020-03-21 | End: 2020-03-26 | Stop reason: HOSPADM

## 2020-03-21 RX ORDER — SODIUM CHLORIDE 0.9 % (FLUSH) 0.9 %
10 SYRINGE (ML) INJECTION AS NEEDED
Status: DISCONTINUED | OUTPATIENT
Start: 2020-03-21 | End: 2020-03-26 | Stop reason: HOSPADM

## 2020-03-21 RX ORDER — IPRATROPIUM BROMIDE AND ALBUTEROL SULFATE 2.5; .5 MG/3ML; MG/3ML
3 SOLUTION RESPIRATORY (INHALATION)
Status: DISCONTINUED | OUTPATIENT
Start: 2020-03-21 | End: 2020-03-26 | Stop reason: HOSPADM

## 2020-03-21 RX ORDER — IPRATROPIUM BROMIDE AND ALBUTEROL SULFATE 2.5; .5 MG/3ML; MG/3ML
3 SOLUTION RESPIRATORY (INHALATION) EVERY 4 HOURS PRN
Status: DISCONTINUED | OUTPATIENT
Start: 2020-03-21 | End: 2020-03-24

## 2020-03-21 RX ORDER — PROPAFENONE HYDROCHLORIDE 150 MG/1
150 TABLET, COATED ORAL 3 TIMES DAILY
Status: DISCONTINUED | OUTPATIENT
Start: 2020-03-21 | End: 2020-03-26 | Stop reason: HOSPADM

## 2020-03-21 RX ORDER — ACETAMINOPHEN 650 MG/1
650 SUPPOSITORY RECTAL EVERY 4 HOURS PRN
Status: DISCONTINUED | OUTPATIENT
Start: 2020-03-21 | End: 2020-03-26 | Stop reason: HOSPADM

## 2020-03-21 RX ORDER — FAMOTIDINE 20 MG/1
20 TABLET, FILM COATED ORAL DAILY
Status: DISCONTINUED | OUTPATIENT
Start: 2020-03-21 | End: 2020-03-26 | Stop reason: HOSPADM

## 2020-03-21 RX ORDER — URSODIOL 300 MG/1
300 CAPSULE ORAL 3 TIMES DAILY
Status: DISCONTINUED | OUTPATIENT
Start: 2020-03-21 | End: 2020-03-26 | Stop reason: HOSPADM

## 2020-03-21 RX ORDER — DEXTROSE MONOHYDRATE 25 G/50ML
25 INJECTION, SOLUTION INTRAVENOUS
Status: DISCONTINUED | OUTPATIENT
Start: 2020-03-21 | End: 2020-03-26 | Stop reason: HOSPADM

## 2020-03-21 RX ORDER — SODIUM CHLORIDE 0.9 % (FLUSH) 0.9 %
10 SYRINGE (ML) INJECTION EVERY 12 HOURS SCHEDULED
Status: DISCONTINUED | OUTPATIENT
Start: 2020-03-21 | End: 2020-03-26 | Stop reason: HOSPADM

## 2020-03-21 RX ORDER — NICOTINE POLACRILEX 4 MG
15 LOZENGE BUCCAL
Status: DISCONTINUED | OUTPATIENT
Start: 2020-03-21 | End: 2020-03-26 | Stop reason: HOSPADM

## 2020-03-21 RX ORDER — METHYLPREDNISOLONE SODIUM SUCCINATE 125 MG/2ML
60 INJECTION, POWDER, LYOPHILIZED, FOR SOLUTION INTRAMUSCULAR; INTRAVENOUS EVERY 12 HOURS
Status: DISCONTINUED | OUTPATIENT
Start: 2020-03-21 | End: 2020-03-22

## 2020-03-21 RX ORDER — METHYLPREDNISOLONE SODIUM SUCCINATE 125 MG/2ML
60 INJECTION, POWDER, LYOPHILIZED, FOR SOLUTION INTRAMUSCULAR; INTRAVENOUS EVERY 6 HOURS
Status: DISCONTINUED | OUTPATIENT
Start: 2020-03-21 | End: 2020-03-22

## 2020-03-21 RX ORDER — ACETAMINOPHEN 325 MG/1
650 TABLET ORAL EVERY 4 HOURS PRN
Status: DISCONTINUED | OUTPATIENT
Start: 2020-03-21 | End: 2020-03-26 | Stop reason: HOSPADM

## 2020-03-21 RX ORDER — ACETAMINOPHEN 160 MG/5ML
650 SOLUTION ORAL EVERY 4 HOURS PRN
Status: DISCONTINUED | OUTPATIENT
Start: 2020-03-21 | End: 2020-03-26 | Stop reason: HOSPADM

## 2020-03-21 RX ORDER — FAMOTIDINE 20 MG/1
20 TABLET, FILM COATED ORAL 2 TIMES DAILY
COMMUNITY
End: 2022-01-13

## 2020-03-21 RX ORDER — BUDESONIDE AND FORMOTEROL FUMARATE DIHYDRATE 160; 4.5 UG/1; UG/1
2 AEROSOL RESPIRATORY (INHALATION)
Status: DISCONTINUED | OUTPATIENT
Start: 2020-03-21 | End: 2020-03-26 | Stop reason: HOSPADM

## 2020-03-21 RX ADMIN — URSODIOL 300 MG: 300 CAPSULE ORAL at 09:09

## 2020-03-21 RX ADMIN — INSULIN LISPRO 2 UNITS: 100 INJECTION, SOLUTION INTRAVENOUS; SUBCUTANEOUS at 12:17

## 2020-03-21 RX ADMIN — BUDESONIDE AND FORMOTEROL FUMARATE DIHYDRATE 2 PUFF: 160; 4.5 AEROSOL RESPIRATORY (INHALATION) at 20:38

## 2020-03-21 RX ADMIN — INSULIN LISPRO 2 UNITS: 100 INJECTION, SOLUTION INTRAVENOUS; SUBCUTANEOUS at 21:40

## 2020-03-21 RX ADMIN — METHYLPREDNISOLONE SODIUM SUCCINATE 60 MG: 125 INJECTION, POWDER, FOR SOLUTION INTRAMUSCULAR; INTRAVENOUS at 21:39

## 2020-03-21 RX ADMIN — IPRATROPIUM BROMIDE AND ALBUTEROL SULFATE 3 ML: 2.5; .5 SOLUTION RESPIRATORY (INHALATION) at 16:27

## 2020-03-21 RX ADMIN — FAMOTIDINE 20 MG: 20 TABLET, FILM COATED ORAL at 09:09

## 2020-03-21 RX ADMIN — PROPAFENONE HYDROCHLORIDE 150 MG: 150 TABLET, COATED ORAL at 21:40

## 2020-03-21 RX ADMIN — URSODIOL 300 MG: 300 CAPSULE ORAL at 16:07

## 2020-03-21 RX ADMIN — URSODIOL 300 MG: 300 CAPSULE ORAL at 21:40

## 2020-03-21 RX ADMIN — IPRATROPIUM BROMIDE AND ALBUTEROL SULFATE 3 ML: 2.5; .5 SOLUTION RESPIRATORY (INHALATION) at 12:25

## 2020-03-21 RX ADMIN — PROPAFENONE HYDROCHLORIDE 150 MG: 150 TABLET, COATED ORAL at 09:09

## 2020-03-21 RX ADMIN — ASPIRIN 81 MG: 81 TABLET, COATED ORAL at 09:09

## 2020-03-21 RX ADMIN — SODIUM CHLORIDE, PRESERVATIVE FREE 10 ML: 5 INJECTION INTRAVENOUS at 21:40

## 2020-03-21 RX ADMIN — METHYLPREDNISOLONE SODIUM SUCCINATE 60 MG: 125 INJECTION, POWDER, FOR SOLUTION INTRAMUSCULAR; INTRAVENOUS at 16:07

## 2020-03-21 RX ADMIN — METHYLPREDNISOLONE SODIUM SUCCINATE 60 MG: 125 INJECTION, POWDER, FOR SOLUTION INTRAMUSCULAR; INTRAVENOUS at 06:41

## 2020-03-21 RX ADMIN — BUDESONIDE AND FORMOTEROL FUMARATE DIHYDRATE 2 PUFF: 160; 4.5 AEROSOL RESPIRATORY (INHALATION) at 08:51

## 2020-03-21 RX ADMIN — SODIUM CHLORIDE, PRESERVATIVE FREE 10 ML: 5 INJECTION INTRAVENOUS at 09:11

## 2020-03-21 RX ADMIN — PROPAFENONE HYDROCHLORIDE 150 MG: 150 TABLET, COATED ORAL at 16:07

## 2020-03-21 RX ADMIN — ACETAMINOPHEN 650 MG: 325 TABLET, FILM COATED ORAL at 21:40

## 2020-03-22 ENCOUNTER — CLINICAL SUPPORT NO REQUIREMENTS (OUTPATIENT)
Dept: CARDIOLOGY | Facility: CLINIC | Age: 75
End: 2020-03-22

## 2020-03-22 DIAGNOSIS — Z95.818 STATUS POST PLACEMENT OF IMPLANTABLE LOOP RECORDER: Primary | ICD-10-CM

## 2020-03-22 LAB
GLUCOSE BLDC GLUCOMTR-MCNC: 100 MG/DL (ref 70–130)
GLUCOSE BLDC GLUCOMTR-MCNC: 141 MG/DL (ref 70–130)
GLUCOSE BLDC GLUCOMTR-MCNC: 147 MG/DL (ref 70–130)
GLUCOSE BLDC GLUCOMTR-MCNC: 160 MG/DL (ref 70–130)

## 2020-03-22 PROCEDURE — 94799 UNLISTED PULMONARY SVC/PX: CPT

## 2020-03-22 PROCEDURE — 63710000001 INSULIN LISPRO (HUMAN) PER 5 UNITS: Performed by: INTERNAL MEDICINE

## 2020-03-22 PROCEDURE — 25010000002 METHYLPREDNISOLONE PER 40 MG: Performed by: INTERNAL MEDICINE

## 2020-03-22 PROCEDURE — 25010000002 METHYLPREDNISOLONE PER 125 MG: Performed by: INTERNAL MEDICINE

## 2020-03-22 PROCEDURE — 93298 REM INTERROG DEV EVAL SCRMS: CPT | Performed by: INTERNAL MEDICINE

## 2020-03-22 PROCEDURE — 82962 GLUCOSE BLOOD TEST: CPT

## 2020-03-22 PROCEDURE — G2066 INTER DEVC REMOTE 30D: HCPCS | Performed by: INTERNAL MEDICINE

## 2020-03-22 RX ORDER — METHYLPREDNISOLONE SODIUM SUCCINATE 40 MG/ML
40 INJECTION, POWDER, LYOPHILIZED, FOR SOLUTION INTRAMUSCULAR; INTRAVENOUS EVERY 8 HOURS
Status: DISCONTINUED | OUTPATIENT
Start: 2020-03-22 | End: 2020-03-26 | Stop reason: HOSPADM

## 2020-03-22 RX ADMIN — ASPIRIN 81 MG: 81 TABLET, COATED ORAL at 08:31

## 2020-03-22 RX ADMIN — BUDESONIDE AND FORMOTEROL FUMARATE DIHYDRATE 2 PUFF: 160; 4.5 AEROSOL RESPIRATORY (INHALATION) at 08:20

## 2020-03-22 RX ADMIN — METHYLPREDNISOLONE SODIUM SUCCINATE 60 MG: 125 INJECTION, POWDER, FOR SOLUTION INTRAMUSCULAR; INTRAVENOUS at 11:09

## 2020-03-22 RX ADMIN — SODIUM CHLORIDE, PRESERVATIVE FREE 10 ML: 5 INJECTION INTRAVENOUS at 08:32

## 2020-03-22 RX ADMIN — PROPAFENONE HYDROCHLORIDE 150 MG: 150 TABLET, COATED ORAL at 21:26

## 2020-03-22 RX ADMIN — FAMOTIDINE 20 MG: 20 TABLET, FILM COATED ORAL at 08:31

## 2020-03-22 RX ADMIN — SODIUM CHLORIDE, PRESERVATIVE FREE 10 ML: 5 INJECTION INTRAVENOUS at 16:00

## 2020-03-22 RX ADMIN — URSODIOL 300 MG: 300 CAPSULE ORAL at 08:31

## 2020-03-22 RX ADMIN — METHYLPREDNISOLONE SODIUM SUCCINATE 40 MG: 40 INJECTION, POWDER, LYOPHILIZED, FOR SOLUTION INTRAMUSCULAR; INTRAVENOUS at 18:46

## 2020-03-22 RX ADMIN — METHYLPREDNISOLONE SODIUM SUCCINATE 60 MG: 125 INJECTION, POWDER, FOR SOLUTION INTRAMUSCULAR; INTRAVENOUS at 05:13

## 2020-03-22 RX ADMIN — IPRATROPIUM BROMIDE AND ALBUTEROL SULFATE 3 ML: 2.5; .5 SOLUTION RESPIRATORY (INHALATION) at 23:16

## 2020-03-22 RX ADMIN — URSODIOL 300 MG: 300 CAPSULE ORAL at 21:26

## 2020-03-22 RX ADMIN — PROPAFENONE HYDROCHLORIDE 150 MG: 150 TABLET, COATED ORAL at 15:58

## 2020-03-22 RX ADMIN — IPRATROPIUM BROMIDE AND ALBUTEROL SULFATE 3 ML: 2.5; .5 SOLUTION RESPIRATORY (INHALATION) at 13:49

## 2020-03-22 RX ADMIN — URSODIOL 300 MG: 300 CAPSULE ORAL at 15:58

## 2020-03-22 RX ADMIN — IPRATROPIUM BROMIDE AND ALBUTEROL SULFATE 3 ML: 2.5; .5 SOLUTION RESPIRATORY (INHALATION) at 15:44

## 2020-03-22 RX ADMIN — BUDESONIDE AND FORMOTEROL FUMARATE DIHYDRATE 2 PUFF: 160; 4.5 AEROSOL RESPIRATORY (INHALATION) at 20:22

## 2020-03-22 RX ADMIN — IPRATROPIUM BROMIDE AND ALBUTEROL SULFATE 3 ML: 2.5; .5 SOLUTION RESPIRATORY (INHALATION) at 03:12

## 2020-03-22 RX ADMIN — PROPAFENONE HYDROCHLORIDE 150 MG: 150 TABLET, COATED ORAL at 08:31

## 2020-03-22 RX ADMIN — INSULIN LISPRO 2 UNITS: 100 INJECTION, SOLUTION INTRAVENOUS; SUBCUTANEOUS at 08:31

## 2020-03-23 LAB
ANION GAP SERPL CALCULATED.3IONS-SCNC: 10.7 MMOL/L (ref 5–15)
BUN BLD-MCNC: 21 MG/DL (ref 8–23)
BUN/CREAT SERPL: 28 (ref 7–25)
CALCIUM SPEC-SCNC: 8.9 MG/DL (ref 8.6–10.5)
CHLORIDE SERPL-SCNC: 97 MMOL/L (ref 98–107)
CO2 SERPL-SCNC: 28.3 MMOL/L (ref 22–29)
CREAT BLD-MCNC: 0.75 MG/DL (ref 0.57–1)
DEPRECATED RDW RBC AUTO: 39.6 FL (ref 37–54)
ERYTHROCYTE [DISTWIDTH] IN BLOOD BY AUTOMATED COUNT: 12.3 % (ref 12.3–15.4)
GFR SERPL CREATININE-BSD FRML MDRD: 75 ML/MIN/1.73
GLUCOSE BLD-MCNC: 145 MG/DL (ref 65–99)
GLUCOSE BLDC GLUCOMTR-MCNC: 132 MG/DL (ref 70–130)
GLUCOSE BLDC GLUCOMTR-MCNC: 139 MG/DL (ref 70–130)
GLUCOSE BLDC GLUCOMTR-MCNC: 144 MG/DL (ref 70–130)
GLUCOSE BLDC GLUCOMTR-MCNC: 153 MG/DL (ref 70–130)
HCT VFR BLD AUTO: 33 % (ref 34–46.6)
HGB BLD-MCNC: 11.4 G/DL (ref 12–15.9)
MCH RBC QN AUTO: 30.3 PG (ref 26.6–33)
MCHC RBC AUTO-ENTMCNC: 34.5 G/DL (ref 31.5–35.7)
MCV RBC AUTO: 87.8 FL (ref 79–97)
PLATELET # BLD AUTO: 288 10*3/MM3 (ref 140–450)
PMV BLD AUTO: 10.9 FL (ref 6–12)
POTASSIUM BLD-SCNC: 4 MMOL/L (ref 3.5–5.2)
RBC # BLD AUTO: 3.76 10*6/MM3 (ref 3.77–5.28)
SODIUM BLD-SCNC: 136 MMOL/L (ref 136–145)
TROPONIN T SERPL-MCNC: <0.01 NG/ML (ref 0–0.03)
WBC NRBC COR # BLD: 11.19 10*3/MM3 (ref 3.4–10.8)

## 2020-03-23 PROCEDURE — 25010000002 METHYLPREDNISOLONE PER 40 MG: Performed by: INTERNAL MEDICINE

## 2020-03-23 PROCEDURE — 94799 UNLISTED PULMONARY SVC/PX: CPT

## 2020-03-23 PROCEDURE — 99232 SBSQ HOSP IP/OBS MODERATE 35: CPT | Performed by: INTERNAL MEDICINE

## 2020-03-23 PROCEDURE — 85027 COMPLETE CBC AUTOMATED: CPT | Performed by: INTERNAL MEDICINE

## 2020-03-23 PROCEDURE — 84484 ASSAY OF TROPONIN QUANT: CPT | Performed by: NURSE PRACTITIONER

## 2020-03-23 PROCEDURE — 80048 BASIC METABOLIC PNL TOTAL CA: CPT | Performed by: INTERNAL MEDICINE

## 2020-03-23 PROCEDURE — 82962 GLUCOSE BLOOD TEST: CPT

## 2020-03-23 RX ADMIN — PROPAFENONE HYDROCHLORIDE 150 MG: 150 TABLET, COATED ORAL at 21:19

## 2020-03-23 RX ADMIN — SODIUM CHLORIDE, PRESERVATIVE FREE 10 ML: 5 INJECTION INTRAVENOUS at 08:19

## 2020-03-23 RX ADMIN — METHYLPREDNISOLONE SODIUM SUCCINATE 40 MG: 40 INJECTION, POWDER, LYOPHILIZED, FOR SOLUTION INTRAMUSCULAR; INTRAVENOUS at 12:04

## 2020-03-23 RX ADMIN — URSODIOL 300 MG: 300 CAPSULE ORAL at 18:16

## 2020-03-23 RX ADMIN — FAMOTIDINE 20 MG: 20 TABLET, FILM COATED ORAL at 08:18

## 2020-03-23 RX ADMIN — PROPAFENONE HYDROCHLORIDE 150 MG: 150 TABLET, COATED ORAL at 18:16

## 2020-03-23 RX ADMIN — ASPIRIN 81 MG: 81 TABLET, COATED ORAL at 08:18

## 2020-03-23 RX ADMIN — PROPAFENONE HYDROCHLORIDE 150 MG: 150 TABLET, COATED ORAL at 08:18

## 2020-03-23 RX ADMIN — IPRATROPIUM BROMIDE AND ALBUTEROL SULFATE 3 ML: 2.5; .5 SOLUTION RESPIRATORY (INHALATION) at 06:45

## 2020-03-23 RX ADMIN — BUDESONIDE AND FORMOTEROL FUMARATE DIHYDRATE 2 PUFF: 160; 4.5 AEROSOL RESPIRATORY (INHALATION) at 09:02

## 2020-03-23 RX ADMIN — IPRATROPIUM BROMIDE AND ALBUTEROL SULFATE 3 ML: 2.5; .5 SOLUTION RESPIRATORY (INHALATION) at 15:46

## 2020-03-23 RX ADMIN — IPRATROPIUM BROMIDE AND ALBUTEROL SULFATE 3 ML: 2.5; .5 SOLUTION RESPIRATORY (INHALATION) at 19:44

## 2020-03-23 RX ADMIN — IPRATROPIUM BROMIDE AND ALBUTEROL SULFATE 3 ML: 2.5; .5 SOLUTION RESPIRATORY (INHALATION) at 23:53

## 2020-03-23 RX ADMIN — URSODIOL 300 MG: 300 CAPSULE ORAL at 21:19

## 2020-03-23 RX ADMIN — METHYLPREDNISOLONE SODIUM SUCCINATE 40 MG: 40 INJECTION, POWDER, LYOPHILIZED, FOR SOLUTION INTRAMUSCULAR; INTRAVENOUS at 18:18

## 2020-03-23 RX ADMIN — IPRATROPIUM BROMIDE AND ALBUTEROL SULFATE 3 ML: 2.5; .5 SOLUTION RESPIRATORY (INHALATION) at 11:16

## 2020-03-23 RX ADMIN — URSODIOL 300 MG: 300 CAPSULE ORAL at 08:18

## 2020-03-23 RX ADMIN — BUDESONIDE AND FORMOTEROL FUMARATE DIHYDRATE 2 PUFF: 160; 4.5 AEROSOL RESPIRATORY (INHALATION) at 19:44

## 2020-03-23 RX ADMIN — METHYLPREDNISOLONE SODIUM SUCCINATE 40 MG: 40 INJECTION, POWDER, LYOPHILIZED, FOR SOLUTION INTRAMUSCULAR; INTRAVENOUS at 03:03

## 2020-03-24 LAB
ANION GAP SERPL CALCULATED.3IONS-SCNC: 9.3 MMOL/L (ref 5–15)
BUN BLD-MCNC: 23 MG/DL (ref 8–23)
BUN/CREAT SERPL: 28 (ref 7–25)
CALCIUM SPEC-SCNC: 9.2 MG/DL (ref 8.6–10.5)
CHLORIDE SERPL-SCNC: 97 MMOL/L (ref 98–107)
CO2 SERPL-SCNC: 28.7 MMOL/L (ref 22–29)
CREAT BLD-MCNC: 0.82 MG/DL (ref 0.57–1)
DEPRECATED RDW RBC AUTO: 39.2 FL (ref 37–54)
ERYTHROCYTE [DISTWIDTH] IN BLOOD BY AUTOMATED COUNT: 12.3 % (ref 12.3–15.4)
GFR SERPL CREATININE-BSD FRML MDRD: 68 ML/MIN/1.73
GLUCOSE BLD-MCNC: 136 MG/DL (ref 65–99)
GLUCOSE BLDC GLUCOMTR-MCNC: 131 MG/DL (ref 70–130)
GLUCOSE BLDC GLUCOMTR-MCNC: 135 MG/DL (ref 70–130)
GLUCOSE BLDC GLUCOMTR-MCNC: 135 MG/DL (ref 70–130)
GLUCOSE BLDC GLUCOMTR-MCNC: 137 MG/DL (ref 70–130)
HCT VFR BLD AUTO: 34.4 % (ref 34–46.6)
HGB BLD-MCNC: 11.5 G/DL (ref 12–15.9)
MAGNESIUM SERPL-MCNC: 2.2 MG/DL (ref 1.6–2.4)
MCH RBC QN AUTO: 29.4 PG (ref 26.6–33)
MCHC RBC AUTO-ENTMCNC: 33.4 G/DL (ref 31.5–35.7)
MCV RBC AUTO: 88 FL (ref 79–97)
PLATELET # BLD AUTO: 278 10*3/MM3 (ref 140–450)
PMV BLD AUTO: 11.2 FL (ref 6–12)
POTASSIUM BLD-SCNC: 4 MMOL/L (ref 3.5–5.2)
RBC # BLD AUTO: 3.91 10*6/MM3 (ref 3.77–5.28)
SODIUM BLD-SCNC: 135 MMOL/L (ref 136–145)
TROPONIN T SERPL-MCNC: <0.01 NG/ML (ref 0–0.03)
WBC NRBC COR # BLD: 9.43 10*3/MM3 (ref 3.4–10.8)

## 2020-03-24 PROCEDURE — 97161 PT EVAL LOW COMPLEX 20 MIN: CPT

## 2020-03-24 PROCEDURE — 97116 GAIT TRAINING THERAPY: CPT

## 2020-03-24 PROCEDURE — 93005 ELECTROCARDIOGRAM TRACING: CPT | Performed by: INTERNAL MEDICINE

## 2020-03-24 PROCEDURE — 85027 COMPLETE CBC AUTOMATED: CPT | Performed by: INTERNAL MEDICINE

## 2020-03-24 PROCEDURE — 84484 ASSAY OF TROPONIN QUANT: CPT | Performed by: NURSE PRACTITIONER

## 2020-03-24 PROCEDURE — 80048 BASIC METABOLIC PNL TOTAL CA: CPT | Performed by: INTERNAL MEDICINE

## 2020-03-24 PROCEDURE — 25010000002 METHYLPREDNISOLONE PER 40 MG: Performed by: INTERNAL MEDICINE

## 2020-03-24 PROCEDURE — 83735 ASSAY OF MAGNESIUM: CPT | Performed by: INTERNAL MEDICINE

## 2020-03-24 PROCEDURE — 82962 GLUCOSE BLOOD TEST: CPT

## 2020-03-24 PROCEDURE — 94799 UNLISTED PULMONARY SVC/PX: CPT

## 2020-03-24 PROCEDURE — 93010 ELECTROCARDIOGRAM REPORT: CPT | Performed by: INTERNAL MEDICINE

## 2020-03-24 RX ADMIN — SODIUM CHLORIDE, PRESERVATIVE FREE 10 ML: 5 INJECTION INTRAVENOUS at 20:55

## 2020-03-24 RX ADMIN — PROPAFENONE HYDROCHLORIDE 150 MG: 150 TABLET, COATED ORAL at 20:54

## 2020-03-24 RX ADMIN — FAMOTIDINE 20 MG: 20 TABLET, FILM COATED ORAL at 10:27

## 2020-03-24 RX ADMIN — BUDESONIDE AND FORMOTEROL FUMARATE DIHYDRATE 2 PUFF: 160; 4.5 AEROSOL RESPIRATORY (INHALATION) at 19:29

## 2020-03-24 RX ADMIN — IPRATROPIUM BROMIDE AND ALBUTEROL SULFATE 3 ML: 2.5; .5 SOLUTION RESPIRATORY (INHALATION) at 03:46

## 2020-03-24 RX ADMIN — PROPAFENONE HYDROCHLORIDE 150 MG: 150 TABLET, COATED ORAL at 10:27

## 2020-03-24 RX ADMIN — METHYLPREDNISOLONE SODIUM SUCCINATE 40 MG: 40 INJECTION, POWDER, LYOPHILIZED, FOR SOLUTION INTRAMUSCULAR; INTRAVENOUS at 04:33

## 2020-03-24 RX ADMIN — IPRATROPIUM BROMIDE AND ALBUTEROL SULFATE 3 ML: 2.5; .5 SOLUTION RESPIRATORY (INHALATION) at 11:32

## 2020-03-24 RX ADMIN — PROPAFENONE HYDROCHLORIDE 150 MG: 150 TABLET, COATED ORAL at 15:18

## 2020-03-24 RX ADMIN — BUDESONIDE AND FORMOTEROL FUMARATE DIHYDRATE 2 PUFF: 160; 4.5 AEROSOL RESPIRATORY (INHALATION) at 07:26

## 2020-03-24 RX ADMIN — ASPIRIN 81 MG: 81 TABLET, COATED ORAL at 10:27

## 2020-03-24 RX ADMIN — IPRATROPIUM BROMIDE AND ALBUTEROL SULFATE 3 ML: 2.5; .5 SOLUTION RESPIRATORY (INHALATION) at 15:32

## 2020-03-24 RX ADMIN — METHYLPREDNISOLONE SODIUM SUCCINATE 40 MG: 40 INJECTION, POWDER, LYOPHILIZED, FOR SOLUTION INTRAMUSCULAR; INTRAVENOUS at 10:27

## 2020-03-24 RX ADMIN — SODIUM CHLORIDE, PRESERVATIVE FREE 10 ML: 5 INJECTION INTRAVENOUS at 10:27

## 2020-03-24 RX ADMIN — URSODIOL 300 MG: 300 CAPSULE ORAL at 15:18

## 2020-03-24 RX ADMIN — IPRATROPIUM BROMIDE AND ALBUTEROL SULFATE 3 ML: 2.5; .5 SOLUTION RESPIRATORY (INHALATION) at 23:21

## 2020-03-24 RX ADMIN — METHYLPREDNISOLONE SODIUM SUCCINATE 40 MG: 40 INJECTION, POWDER, LYOPHILIZED, FOR SOLUTION INTRAMUSCULAR; INTRAVENOUS at 20:54

## 2020-03-24 RX ADMIN — URSODIOL 300 MG: 300 CAPSULE ORAL at 20:54

## 2020-03-24 RX ADMIN — URSODIOL 300 MG: 300 CAPSULE ORAL at 10:26

## 2020-03-25 LAB
ANION GAP SERPL CALCULATED.3IONS-SCNC: 6.5 MMOL/L (ref 5–15)
BACTERIA SPEC AEROBE CULT: NORMAL
BUN BLD-MCNC: 27 MG/DL (ref 8–23)
BUN/CREAT SERPL: 33.3 (ref 7–25)
CALCIUM SPEC-SCNC: 8.7 MG/DL (ref 8.6–10.5)
CHLORIDE SERPL-SCNC: 97 MMOL/L (ref 98–107)
CO2 SERPL-SCNC: 28.5 MMOL/L (ref 22–29)
CREAT BLD-MCNC: 0.81 MG/DL (ref 0.57–1)
DEPRECATED RDW RBC AUTO: 39.3 FL (ref 37–54)
ERYTHROCYTE [DISTWIDTH] IN BLOOD BY AUTOMATED COUNT: 12.4 % (ref 12.3–15.4)
GFR SERPL CREATININE-BSD FRML MDRD: 69 ML/MIN/1.73
GLUCOSE BLD-MCNC: 142 MG/DL (ref 65–99)
GLUCOSE BLDC GLUCOMTR-MCNC: 139 MG/DL (ref 70–130)
GLUCOSE BLDC GLUCOMTR-MCNC: 146 MG/DL (ref 70–130)
GLUCOSE BLDC GLUCOMTR-MCNC: 160 MG/DL (ref 70–130)
GLUCOSE BLDC GLUCOMTR-MCNC: 80 MG/DL (ref 70–130)
HCT VFR BLD AUTO: 33.2 % (ref 34–46.6)
HGB BLD-MCNC: 11.1 G/DL (ref 12–15.9)
MCH RBC QN AUTO: 29.4 PG (ref 26.6–33)
MCHC RBC AUTO-ENTMCNC: 33.4 G/DL (ref 31.5–35.7)
MCV RBC AUTO: 87.8 FL (ref 79–97)
PLATELET # BLD AUTO: 250 10*3/MM3 (ref 140–450)
PMV BLD AUTO: 11.2 FL (ref 6–12)
POTASSIUM BLD-SCNC: 4.1 MMOL/L (ref 3.5–5.2)
RBC # BLD AUTO: 3.78 10*6/MM3 (ref 3.77–5.28)
SODIUM BLD-SCNC: 132 MMOL/L (ref 136–145)
WBC NRBC COR # BLD: 8.37 10*3/MM3 (ref 3.4–10.8)

## 2020-03-25 PROCEDURE — 94799 UNLISTED PULMONARY SVC/PX: CPT

## 2020-03-25 PROCEDURE — 25010000002 METHYLPREDNISOLONE PER 40 MG: Performed by: INTERNAL MEDICINE

## 2020-03-25 PROCEDURE — 25010000002 FUROSEMIDE PER 20 MG: Performed by: INTERNAL MEDICINE

## 2020-03-25 PROCEDURE — 80048 BASIC METABOLIC PNL TOTAL CA: CPT | Performed by: INTERNAL MEDICINE

## 2020-03-25 PROCEDURE — 63710000001 INSULIN LISPRO (HUMAN) PER 5 UNITS: Performed by: INTERNAL MEDICINE

## 2020-03-25 PROCEDURE — 85027 COMPLETE CBC AUTOMATED: CPT | Performed by: INTERNAL MEDICINE

## 2020-03-25 PROCEDURE — 82962 GLUCOSE BLOOD TEST: CPT

## 2020-03-25 RX ORDER — OXYMETAZOLINE HYDROCHLORIDE 0.05 G/100ML
2 SPRAY NASAL 2 TIMES DAILY
Status: DISCONTINUED | OUTPATIENT
Start: 2020-03-25 | End: 2020-03-26 | Stop reason: HOSPADM

## 2020-03-25 RX ORDER — FUROSEMIDE 10 MG/ML
40 INJECTION INTRAMUSCULAR; INTRAVENOUS ONCE
Status: COMPLETED | OUTPATIENT
Start: 2020-03-25 | End: 2020-03-25

## 2020-03-25 RX ORDER — TEMAZEPAM 7.5 MG/1
7.5 CAPSULE ORAL NIGHTLY PRN
Status: DISCONTINUED | OUTPATIENT
Start: 2020-03-25 | End: 2020-03-26 | Stop reason: HOSPADM

## 2020-03-25 RX ADMIN — PROPAFENONE HYDROCHLORIDE 150 MG: 150 TABLET, COATED ORAL at 21:11

## 2020-03-25 RX ADMIN — OXYMETAZOLINE HCL 2 SPRAY: 0.05 SPRAY NASAL at 10:50

## 2020-03-25 RX ADMIN — METHYLPREDNISOLONE SODIUM SUCCINATE 40 MG: 40 INJECTION, POWDER, LYOPHILIZED, FOR SOLUTION INTRAMUSCULAR; INTRAVENOUS at 18:38

## 2020-03-25 RX ADMIN — PROPAFENONE HYDROCHLORIDE 150 MG: 150 TABLET, COATED ORAL at 16:05

## 2020-03-25 RX ADMIN — URSODIOL 300 MG: 300 CAPSULE ORAL at 16:05

## 2020-03-25 RX ADMIN — IPRATROPIUM BROMIDE AND ALBUTEROL SULFATE 3 ML: 2.5; .5 SOLUTION RESPIRATORY (INHALATION) at 03:17

## 2020-03-25 RX ADMIN — BUDESONIDE AND FORMOTEROL FUMARATE DIHYDRATE 2 PUFF: 160; 4.5 AEROSOL RESPIRATORY (INHALATION) at 19:22

## 2020-03-25 RX ADMIN — INSULIN LISPRO 2 UNITS: 100 INJECTION, SOLUTION INTRAVENOUS; SUBCUTANEOUS at 17:21

## 2020-03-25 RX ADMIN — METHYLPREDNISOLONE SODIUM SUCCINATE 40 MG: 40 INJECTION, POWDER, LYOPHILIZED, FOR SOLUTION INTRAMUSCULAR; INTRAVENOUS at 04:07

## 2020-03-25 RX ADMIN — PROPAFENONE HYDROCHLORIDE 150 MG: 150 TABLET, COATED ORAL at 09:37

## 2020-03-25 RX ADMIN — SODIUM CHLORIDE, PRESERVATIVE FREE 10 ML: 5 INJECTION INTRAVENOUS at 09:40

## 2020-03-25 RX ADMIN — SODIUM CHLORIDE, PRESERVATIVE FREE 10 ML: 5 INJECTION INTRAVENOUS at 21:13

## 2020-03-25 RX ADMIN — ASPIRIN 81 MG: 81 TABLET, COATED ORAL at 09:36

## 2020-03-25 RX ADMIN — IPRATROPIUM BROMIDE AND ALBUTEROL SULFATE 3 ML: 2.5; .5 SOLUTION RESPIRATORY (INHALATION) at 06:47

## 2020-03-25 RX ADMIN — FUROSEMIDE 40 MG: 10 INJECTION, SOLUTION INTRAMUSCULAR; INTRAVENOUS at 11:56

## 2020-03-25 RX ADMIN — OXYMETAZOLINE HCL 2 SPRAY: 0.05 SPRAY NASAL at 21:13

## 2020-03-25 RX ADMIN — BUDESONIDE AND FORMOTEROL FUMARATE DIHYDRATE 2 PUFF: 160; 4.5 AEROSOL RESPIRATORY (INHALATION) at 08:40

## 2020-03-25 RX ADMIN — URSODIOL 300 MG: 300 CAPSULE ORAL at 09:36

## 2020-03-25 RX ADMIN — METHYLPREDNISOLONE SODIUM SUCCINATE 40 MG: 40 INJECTION, POWDER, LYOPHILIZED, FOR SOLUTION INTRAMUSCULAR; INTRAVENOUS at 10:51

## 2020-03-25 RX ADMIN — IPRATROPIUM BROMIDE AND ALBUTEROL SULFATE 3 ML: 2.5; .5 SOLUTION RESPIRATORY (INHALATION) at 23:13

## 2020-03-25 RX ADMIN — IPRATROPIUM BROMIDE AND ALBUTEROL SULFATE 3 ML: 2.5; .5 SOLUTION RESPIRATORY (INHALATION) at 10:41

## 2020-03-25 RX ADMIN — URSODIOL 300 MG: 300 CAPSULE ORAL at 21:11

## 2020-03-25 RX ADMIN — TEMAZEPAM 7.5 MG: 7.5 CAPSULE ORAL at 22:19

## 2020-03-25 RX ADMIN — FAMOTIDINE 20 MG: 20 TABLET, FILM COATED ORAL at 09:36

## 2020-03-26 VITALS
OXYGEN SATURATION: 92 % | HEART RATE: 84 BPM | HEIGHT: 60 IN | WEIGHT: 96.2 LBS | BODY MASS INDEX: 18.89 KG/M2 | RESPIRATION RATE: 18 BRPM | DIASTOLIC BLOOD PRESSURE: 65 MMHG | TEMPERATURE: 98.1 F | SYSTOLIC BLOOD PRESSURE: 116 MMHG

## 2020-03-26 LAB
ANION GAP SERPL CALCULATED.3IONS-SCNC: 9.5 MMOL/L (ref 5–15)
BACTERIA SPEC AEROBE CULT: NORMAL
BUN BLD-MCNC: 24 MG/DL (ref 8–23)
BUN/CREAT SERPL: 26.7 (ref 7–25)
CALCIUM SPEC-SCNC: 9.1 MG/DL (ref 8.6–10.5)
CHLORIDE SERPL-SCNC: 93 MMOL/L (ref 98–107)
CO2 SERPL-SCNC: 30.5 MMOL/L (ref 22–29)
CREAT BLD-MCNC: 0.9 MG/DL (ref 0.57–1)
DEPRECATED RDW RBC AUTO: 40.1 FL (ref 37–54)
ERYTHROCYTE [DISTWIDTH] IN BLOOD BY AUTOMATED COUNT: 12.7 % (ref 12.3–15.4)
GFR SERPL CREATININE-BSD FRML MDRD: 61 ML/MIN/1.73
GLUCOSE BLD-MCNC: 137 MG/DL (ref 65–99)
GLUCOSE BLDC GLUCOMTR-MCNC: 131 MG/DL (ref 70–130)
GLUCOSE BLDC GLUCOMTR-MCNC: 139 MG/DL (ref 70–130)
HCT VFR BLD AUTO: 34.4 % (ref 34–46.6)
HGB BLD-MCNC: 12.1 G/DL (ref 12–15.9)
MCH RBC QN AUTO: 30.9 PG (ref 26.6–33)
MCHC RBC AUTO-ENTMCNC: 35.2 G/DL (ref 31.5–35.7)
MCV RBC AUTO: 88 FL (ref 79–97)
PLATELET # BLD AUTO: 270 10*3/MM3 (ref 140–450)
PMV BLD AUTO: 11.2 FL (ref 6–12)
POTASSIUM BLD-SCNC: 3.8 MMOL/L (ref 3.5–5.2)
RBC # BLD AUTO: 3.91 10*6/MM3 (ref 3.77–5.28)
SODIUM BLD-SCNC: 133 MMOL/L (ref 136–145)
WBC NRBC COR # BLD: 11.5 10*3/MM3 (ref 3.4–10.8)

## 2020-03-26 PROCEDURE — 94618 PULMONARY STRESS TESTING: CPT

## 2020-03-26 PROCEDURE — 82962 GLUCOSE BLOOD TEST: CPT

## 2020-03-26 PROCEDURE — 85027 COMPLETE CBC AUTOMATED: CPT | Performed by: INTERNAL MEDICINE

## 2020-03-26 PROCEDURE — 25010000002 METHYLPREDNISOLONE PER 40 MG: Performed by: INTERNAL MEDICINE

## 2020-03-26 PROCEDURE — 80048 BASIC METABOLIC PNL TOTAL CA: CPT | Performed by: INTERNAL MEDICINE

## 2020-03-26 PROCEDURE — 94799 UNLISTED PULMONARY SVC/PX: CPT

## 2020-03-26 RX ORDER — PREDNISONE 10 MG/1
TABLET ORAL
Qty: 31 TABLET | Refills: 0 | Status: SHIPPED | OUTPATIENT
Start: 2020-03-26 | End: 2020-04-13

## 2020-03-26 RX ORDER — IPRATROPIUM BROMIDE AND ALBUTEROL SULFATE 2.5; .5 MG/3ML; MG/3ML
3 SOLUTION RESPIRATORY (INHALATION)
Qty: 360 ML | Refills: 1 | Status: SHIPPED | OUTPATIENT
Start: 2020-03-26 | End: 2022-01-13

## 2020-03-26 RX ORDER — OXYMETAZOLINE HYDROCHLORIDE 0.05 G/100ML
2 SPRAY NASAL 2 TIMES DAILY
Qty: 30 ML | Refills: 0 | Status: SHIPPED | OUTPATIENT
Start: 2020-03-26 | End: 2020-06-09

## 2020-03-26 RX ADMIN — ASPIRIN 81 MG: 81 TABLET, COATED ORAL at 08:40

## 2020-03-26 RX ADMIN — OXYMETAZOLINE HCL 2 SPRAY: 0.05 SPRAY NASAL at 08:42

## 2020-03-26 RX ADMIN — BUDESONIDE AND FORMOTEROL FUMARATE DIHYDRATE 2 PUFF: 160; 4.5 AEROSOL RESPIRATORY (INHALATION) at 08:43

## 2020-03-26 RX ADMIN — PROPAFENONE HYDROCHLORIDE 150 MG: 150 TABLET, COATED ORAL at 08:40

## 2020-03-26 RX ADMIN — FAMOTIDINE 20 MG: 20 TABLET, FILM COATED ORAL at 08:40

## 2020-03-26 RX ADMIN — METHYLPREDNISOLONE SODIUM SUCCINATE 40 MG: 40 INJECTION, POWDER, LYOPHILIZED, FOR SOLUTION INTRAMUSCULAR; INTRAVENOUS at 03:12

## 2020-03-26 RX ADMIN — URSODIOL 300 MG: 300 CAPSULE ORAL at 08:40

## 2020-03-26 RX ADMIN — IPRATROPIUM BROMIDE AND ALBUTEROL SULFATE 3 ML: 2.5; .5 SOLUTION RESPIRATORY (INHALATION) at 11:37

## 2020-03-26 RX ADMIN — SODIUM CHLORIDE, PRESERVATIVE FREE 10 ML: 5 INJECTION INTRAVENOUS at 08:41

## 2020-03-26 RX ADMIN — METHYLPREDNISOLONE SODIUM SUCCINATE 40 MG: 40 INJECTION, POWDER, LYOPHILIZED, FOR SOLUTION INTRAMUSCULAR; INTRAVENOUS at 11:19

## 2020-03-26 RX ADMIN — IPRATROPIUM BROMIDE AND ALBUTEROL SULFATE 3 ML: 2.5; .5 SOLUTION RESPIRATORY (INHALATION) at 03:13

## 2020-03-27 ENCOUNTER — READMISSION MANAGEMENT (OUTPATIENT)
Dept: CALL CENTER | Facility: HOSPITAL | Age: 75
End: 2020-03-27

## 2020-03-27 ENCOUNTER — TELEPHONE (OUTPATIENT)
Dept: CARDIOLOGY | Facility: CLINIC | Age: 75
End: 2020-03-27

## 2020-03-27 NOTE — TELEPHONE ENCOUNTER
afib on loop check for 7-8hrs.  Discussed with dr mckeon, patient needs to be seen in office to discuss anticoagulation again.  ALP

## 2020-03-27 NOTE — OUTREACH NOTE
Prep Survey      Responses   [facility] patient discharged from?  [place]   Is LACE score < 7 ?  No   Eligibility  Readm Mgmt   Discharge diagnosis  AE COPD, chronic afib., hx of recurrent UTI, Gen. weakness, steroid induced Hyperglycemia, anemia   Does the patient have one of the following disease processes/diagnoses(primary or secondary)?  COPD/Pneumonia   Does the patient have Home health ordered?  Yes   What is the Home health agency?   [facility]   Is there a DME ordered?  No   Comments regarding appointments  Pt to schedule F/U appointments   Prep survey completed?  Yes          Vera Bruce RN

## 2020-03-30 ENCOUNTER — READMISSION MANAGEMENT (OUTPATIENT)
Dept: CALL CENTER | Facility: HOSPITAL | Age: 75
End: 2020-03-30

## 2020-03-30 NOTE — OUTREACH NOTE
COPD/PN Week 1 Survey      Responses   [facility] patient discharged from?  [place]   Does the patient have one of the following disease processes/diagnoses(primary or secondary)?  COPD/Pneumonia   Is there a successful TCM telephone encounter documented?  No   Was the primary reason for admission:  COPD exacerbation   Week 1 attempt successful?  Yes   Call start time  1018   Call end time  1021   Meds reviewed with patient/caregiver?  Yes   Is the patient having any side effects they believe may be caused by any medication additions or changes?  No   Does the patient have all medications ordered at discharge?  Yes   Is the patient taking all medications as directed (includes completed medication regime)?  Yes   Comments regarding appointments  PATIENT HAS AN APPOINTMENT WITH HER CARDIOLOGIS 4/13/20   Does the patient have a primary care provider?   Yes   Does the patient have an appointment with their PCP or pulmonologist within 7 days of discharge?  No   Comments regarding PCP  PATIENT ADVISED TO CALL HER PCP TO MAKE FOLLOW UP APPOINTMENT AND INQUIRE IF THIS WILL BE A FACE TO FACE APPOINTMENT OR A PHONE APPOINTMENT.    What is preventing the patient from scheduling follow up appointments within 7 days of discharge?  Haven't had time   Nursing Interventions  Educated patient on importance of making appointment   Has the patient kept scheduled appointments due by today?  N/A   What is the Home health agency?   [facility]   Has home health visited the patient within 72 hours of discharge?  Call prior to 72 hours   Did the patient receive a copy of their discharge instructions?  Yes   Nursing interventions  Reviewed instructions with patient   What is the patient's perception of their health status since discharge?  Improving   Nursing Interventions  Nurse provided patient education   Are the patient's immunizations up to date?   Yes   Is the patient/caregiver able to teach back the hierarchy of who to call/visit  for symptoms/problems? PCP, Specialist, Home health nurse, Urgent Care, ED, 911  Yes   Is the patient able to teach back COPD zones?  Yes   Nursing interventions  Education provided on various zones   Patient reports what zone on this call?  Green Zone   Green Zone  Reports doing well, Breathing without shortness of breath, Usual activity and exercise level, Usual amount of phlegm/mucus without difficulty coughing up, Sleeping well, Appetite is good   Green Zone interventions:  Take daily medications, Use oxygen as prescribed, Avoid indoor/outdoor triggers, Continue regular exercise/diet plan   Week 1 call completed?  Yes          Juani Sahu LPN

## 2020-04-07 ENCOUNTER — READMISSION MANAGEMENT (OUTPATIENT)
Dept: CALL CENTER | Facility: HOSPITAL | Age: 75
End: 2020-04-07

## 2020-04-07 NOTE — OUTREACH NOTE
COPD/PN Week 2 Survey      Responses   [facility] patient discharged from?  [place]   COVID-19 Test Status  Not tested   Does the patient have one of the following disease processes/diagnoses(primary or secondary)?  COPD/Pneumonia   Was the primary reason for admission:  COPD exacerbation   Week 2 attempt successful?  Yes   Call start time  1520   Call end time  1524   Meds reviewed with patient/caregiver?  Yes   Is the patient having any side effects they believe may be caused by any medication additions or changes?  No   Does the patient have all medications ordered at discharge?  Yes   Is the patient taking all medications as directed (includes completed medication regime)?  Yes   Comments regarding appointments  PATIENT HAS AN APPOINTMENT WITH HER CARDIOLOGIST 4/13/20   Does the patient have a primary care provider?   Yes   Does the patient have an appointment with their PCP or pulmonologist within 7 days of discharge?  No   Comments regarding PCP  PATIENT STATES SHE IS WAITING FOR A CALL FROM HER PCP   Has the patient kept scheduled appointments due by today?  N/A   What is the Home health agency?   [facility]   Has home health visited the patient within 72 hours of discharge?  Yes   Did the patient receive a copy of their discharge instructions?  Yes   Nursing interventions  Reviewed instructions with patient   What is the patient's perception of their health status since discharge?  Improving   Nursing Interventions  Nurse provided patient education   Is the patient/caregiver able to teach back the hierarchy of who to call/visit for symptoms/problems? PCP, Specialist, Home health nurse, Urgent Care, ED, 911  Yes   Is the patient able to teach back COPD zones?  Yes   Nursing interventions  Education provided on various zones   Patient reports what zone on this call?  Green Zone   Green Zone  Reports doing well, Breathing without shortness of breath, Usual activity and exercise level, Usual amount of  phlegm/mucus without difficulty coughing up, Sleeping well, Appetite is good   Green Zone interventions:  Take daily medications, Continue regular exercise/diet plan, Avoid indoor/outdoor triggers, Use oxygen as prescribed   Week 2 call completed?  Yes          Juani Sahu LPN

## 2020-04-13 ENCOUNTER — OFFICE VISIT (OUTPATIENT)
Dept: CARDIOLOGY | Facility: CLINIC | Age: 75
End: 2020-04-13

## 2020-04-13 ENCOUNTER — READMISSION MANAGEMENT (OUTPATIENT)
Dept: CALL CENTER | Facility: HOSPITAL | Age: 75
End: 2020-04-13

## 2020-04-13 VITALS
HEART RATE: 74 BPM | SYSTOLIC BLOOD PRESSURE: 116 MMHG | BODY MASS INDEX: 19.91 KG/M2 | WEIGHT: 101.4 LBS | HEIGHT: 60 IN | DIASTOLIC BLOOD PRESSURE: 58 MMHG

## 2020-04-13 DIAGNOSIS — I48.0 PAROXYSMAL ATRIAL FIBRILLATION (HCC): ICD-10-CM

## 2020-04-13 PROCEDURE — 99442 PR PHYS/QHP TELEPHONE EVALUATION 11-20 MIN: CPT | Performed by: INTERNAL MEDICINE

## 2020-04-13 RX ORDER — FUROSEMIDE 20 MG/1
10 TABLET ORAL DAILY PRN
COMMUNITY
End: 2022-01-13

## 2020-04-13 NOTE — OUTREACH NOTE
COPD/PN Week 3 Survey      Responses   [facility] patient discharged from?  [place]   COVID-19 Test Status  Not tested   Does the patient have one of the following disease processes/diagnoses(primary or secondary)?  COPD/Pneumonia   Was the primary reason for admission:  COPD exacerbation   Week 3 attempt successful?  Yes   Call start time  0921   Call end time  0925   Discharge diagnosis  AE COPD, chronic afib., hx of recurrent UTI, Gen. weakness, steroid induced Hyperglycemia, anemia   Meds reviewed with patient/caregiver?  Yes   Is the patient having any side effects they believe may be caused by any medication additions or changes?  No   Does the patient have all medications ordered at discharge?  Yes   Is the patient taking all medications as directed (includes completed medication regime)?  Yes   Does the patient have a primary care provider?   Yes   Does the patient have an appointment with their PCP or pulmonologist within 7 days of discharge?  No   Nursing Interventions  Educated patient on importance of making appointment, Advised patient to make appointment   Has the patient kept scheduled appointments due by today?  N/A   Comments  Patient has appt with her heart doctor today.  States her PCP wasn't making appts. Advised to call due to they are making telephone visit and evisits.  States she will call and check   What is the Home health agency?   [facility]   Has home health visited the patient within 72 hours of discharge?  Yes   Did the patient receive a copy of their discharge instructions?  Yes   Nursing interventions  Reviewed instructions with patient   What is the patient's perception of their health status since discharge?  Improving   Nursing Interventions  Nurse provided patient education   Is the patient/caregiver able to teach back the hierarchy of who to call/visit for symptoms/problems? PCP, Specialist, Home health nurse, Urgent Care, ED, 911  Yes   Additional teach back comments  Patient  states she is doing well.  She has not needing the oxygen the last 2 days.  Sats have maintained at 97% and heart rate 78.  Her and her  are staying in self isolation.  Only time she goes out is to  medications and she uses the drive thru.   Is the patient able to teach back COPD zones?  Yes   Nursing interventions  Education provided on various zones   Patient reports what zone on this call?  Green Zone   Green Zone  Reports doing well, Breathing without shortness of breath, Usual activity and exercise level, Usual amount of phlegm/mucus without difficulty coughing up, Sleeping well, Appetite is good   Green Zone interventions:  Take daily medications, Avoid indoor/outdoor triggers, Continue regular exercise/diet plan, Use oxygen as prescribed   Week 3 call completed?  Yes   Wrap up additional comments  No questions or needs at this time          Lisa Guerrero LPN

## 2020-04-13 NOTE — PROGRESS NOTES
You have chosen to receive care through a telephone visit. Do you consent to use a telephone visit for your medical care today? Yes   Subjective:        Celia Baird is a 75 y.o. female who here for follow up    CC  Telephone call only  afib  HPI  75-year-old female with known history of the atrial fibrillation, has a loop recorder implanted which revealed episodes of atrial fibrillation lasting up to 7 hours     Problem List Items Addressed This Visit     None        .    The following portions of the patient's history were reviewed and updated as appropriate: allergies, current medications, past family history, past medical history, past social history, past surgical history and problem list.    Past Medical History:   Diagnosis Date   • Atrial fibrillation (CMS/HCC)    • COPD (chronic obstructive pulmonary disease) (CMS/HCC)    • History of frequent urinary tract infections    • Irregular heart beat    • Kidney stones    • PBC (primary biliary cirrhosis)    • PBC (primary biliary cirrhosis)      reports that she has been smoking cigarettes. She has a 29.50 pack-year smoking history. She has never used smokeless tobacco. She reports that she does not drink alcohol or use drugs.   Family History   Problem Relation Age of Onset   • Heart disease Father    • Heart attack Father    • Heart disease Brother    • Stroke Mother        Review of Systems  Constitutional: No wt loss, fever, fatigue  Gastrointestinal: No nausea, abdominal pain  Behavioral/Psych: No insomnia or anxiety   Cardiovascular no chest pains or tightness in the chest o  Objective:       Physical Exam  Telephone visit only      Procedures      Echocardiogram:        Current Outpatient Medications:   •  aspirin 81 MG EC tablet, Take 81 mg by mouth Daily., Disp: , Rfl:   •  cholecalciferol (VITAMIN D3) 1000 UNITS tablet, Take 1,000 Units by mouth Daily., Disp: , Rfl:   •  famotidine (PEPCID) 20 MG tablet, Take 20 mg by mouth 2 (Two) Times a Day., Disp:  , Rfl:   •  fluticasone-salmeterol (ADVAIR DISKUS) 250-50 MCG/DOSE DISKUS, Inhale 2 (Two) Times a Day., Disp: , Rfl:   •  furosemide (LASIX) 20 MG tablet, Take 20 mg by mouth Daily As Needed., Disp: , Rfl:   •  ipratropium-albuterol (DUO-NEB) 0.5-2.5 mg/3 ml nebulizer, Take 3 mL by nebulization 4 (Four) times a day., Disp: 360 mL, Rfl: 1  •  oxymetazoline (AFRIN) 0.05 % nasal spray, Instil 2 sprays into the nostril(s) as directed by provider 2 (Two) Times a Day for 2 doses, Disp: 30 mL, Rfl: 0  •  potassium chloride (K-DUR) 10 MEQ CR tablet, Take 10 mEq by mouth Daily., Disp: , Rfl:   •  propafenone (RYTHMOL) 150 MG tablet, TAKE 1/2 (ONE-HALF) TABLET BY MOUTH EVERY 8 HOURS (Patient taking differently: Take 150 mg by mouth 3 (Three) Times a Day. q 6 hours), Disp: 135 tablet, Rfl: 1  •  ursodiol (ACTIGALL) 300 MG capsule, Take 300 mg by mouth 3 (Three) Times a Day., Disp: , Rfl:    Assessment:        Patient Active Problem List   Diagnosis   • COPD exacerbation (CMS/HCC)   • Subarachnoid hemorrhage (CMS/HCC)   • Multiple skin tears   • Closed nondisplaced fracture of left clavicle   • Paroxysmal atrial fibrillation (CMS/HCC)   • Tobacco abuse   • Status post placement of implantable loop recorder   • SOB (shortness of breath)   • COPD with acute exacerbation (CMS/HCC)               Plan:            Atrial fibrillation  Loop recorder  Telephone visit only    Had a long discussion with the patient patient has been having several hours of atrial fibrillation the choices of the anticoagulation, versus increasing the antiarrhythmic medication has been discussed, at this point patient is reluctant for the anticoagulation, considering the patient cannot have a office visit because of the situation at this point it has been decided to stay put for approximately 2 more months continue to watch on the loop recorder, in 2 months we will discuss the further interventions of anticoagulation versus watchman  COUNSELING:    Celia  Dedra was given to patient for the following topics: diagnostic results, risk factor reductions, impressions, risks and benefits of treatment options and importance of treatment compliance .     This patient has consented to a telehealth visit via TELEPHONE. The visit was scheduled as a TELEPHONE visit to comply with patient safety concerns in accordance with CDC recommendations.  All vitals recorded within this visit are reported by the patient.  I spent  15 minutes in total including but not limited to the 15 minutes spent in direct conversation with this patient.       SMOKING COUNSELING:    [unfilled]    Dictated using Dragon dictation

## 2020-04-20 ENCOUNTER — READMISSION MANAGEMENT (OUTPATIENT)
Dept: CALL CENTER | Facility: HOSPITAL | Age: 75
End: 2020-04-20

## 2020-04-22 ENCOUNTER — CLINICAL SUPPORT NO REQUIREMENTS (OUTPATIENT)
Dept: CARDIOLOGY | Facility: CLINIC | Age: 75
End: 2020-04-22

## 2020-04-22 DIAGNOSIS — Z95.818 STATUS POST PLACEMENT OF IMPLANTABLE LOOP RECORDER: Primary | ICD-10-CM

## 2020-04-22 PROCEDURE — G2066 INTER DEVC REMOTE 30D: HCPCS | Performed by: INTERNAL MEDICINE

## 2020-04-22 PROCEDURE — 93298 REM INTERROG DEV EVAL SCRMS: CPT | Performed by: INTERNAL MEDICINE

## 2020-05-23 ENCOUNTER — CLINICAL SUPPORT NO REQUIREMENTS (OUTPATIENT)
Dept: CARDIOLOGY | Facility: CLINIC | Age: 75
End: 2020-05-23

## 2020-05-23 DIAGNOSIS — Z95.818 STATUS POST PLACEMENT OF IMPLANTABLE LOOP RECORDER: Primary | ICD-10-CM

## 2020-05-23 PROCEDURE — G2066 INTER DEVC REMOTE 30D: HCPCS | Performed by: INTERNAL MEDICINE

## 2020-05-23 PROCEDURE — 93298 REM INTERROG DEV EVAL SCRMS: CPT | Performed by: INTERNAL MEDICINE

## 2020-06-08 ENCOUNTER — OFFICE VISIT (OUTPATIENT)
Dept: CARDIOLOGY | Facility: CLINIC | Age: 75
End: 2020-06-08

## 2020-06-08 VITALS
WEIGHT: 97 LBS | HEIGHT: 60 IN | DIASTOLIC BLOOD PRESSURE: 77 MMHG | HEART RATE: 76 BPM | BODY MASS INDEX: 19.04 KG/M2 | SYSTOLIC BLOOD PRESSURE: 120 MMHG

## 2020-06-08 DIAGNOSIS — R06.02 SOB (SHORTNESS OF BREATH): ICD-10-CM

## 2020-06-08 DIAGNOSIS — I48.0 PAROXYSMAL ATRIAL FIBRILLATION (HCC): Primary | ICD-10-CM

## 2020-06-08 PROCEDURE — 99213 OFFICE O/P EST LOW 20 MIN: CPT | Performed by: INTERNAL MEDICINE

## 2020-06-08 NOTE — PROGRESS NOTES
" Subjective:        Celia Baird is a 75 y.o. female who here for follow up    CC  afib  HPI  75-year-old female with paroxysmal atrial fibrillation and shortness of breath here for the follow-up denies any chest pains or tightness in the chest     Problem List Items Addressed This Visit        Cardiovascular and Mediastinum    Paroxysmal atrial fibrillation (CMS/HCC) - Primary       Respiratory    SOB (shortness of breath)        .    The following portions of the patient's history were reviewed and updated as appropriate: allergies, current medications, past family history, past medical history, past social history, past surgical history and problem list.    Past Medical History:   Diagnosis Date   • Atrial fibrillation (CMS/HCC)    • COPD (chronic obstructive pulmonary disease) (CMS/HCC)    • History of frequent urinary tract infections    • Irregular heart beat    • Kidney stones    • PBC (primary biliary cirrhosis)    • PBC (primary biliary cirrhosis)      reports that she has been smoking cigarettes. She has a 29.50 pack-year smoking history. She has never used smokeless tobacco. She reports that she does not drink alcohol or use drugs.   Family History   Problem Relation Age of Onset   • Heart disease Father    • Heart attack Father    • Heart disease Brother    • Stroke Mother        Review of Systems  Constitutional: No wt loss, fever, fatigue  Gastrointestinal: No nausea, abdominal pain  Behavioral/Psych: No insomnia or anxiety   Cardiovascular no chest pains or tightness in the chest  Objective:       Physical Exam  /77   Pulse 76   Ht 152.4 cm (60\")   Wt 44 kg (97 lb)   BMI 18.94 kg/m²   General appearance: No acute changes   Neck: Trachea midline; NECK, supple, no thyromegaly or lymphadenopathy   Lungs: Normal size and shape, normal breath sounds, equal distribution of air, no rales and rhonchi   CV: S1-S2 regular, no murmurs, no rub, no gallop   Abdomen: Soft, non-tender; no masses , no " abnormal abdominal sounds   Extremities: No deformity , normal color , no peripheral edema   Skin: Normal temperature, turgor and texture; no rash, ulcers          Procedures      Echocardiogram:        Current Outpatient Medications:   •  aspirin 81 MG EC tablet, Take 81 mg by mouth Daily., Disp: , Rfl:   •  cholecalciferol (VITAMIN D3) 1000 UNITS tablet, Take 1,000 Units by mouth Daily., Disp: , Rfl:   •  famotidine (PEPCID) 20 MG tablet, Take 20 mg by mouth 2 (Two) Times a Day., Disp: , Rfl:   •  fluticasone-salmeterol (ADVAIR DISKUS) 250-50 MCG/DOSE DISKUS, Inhale 2 (Two) Times a Day., Disp: , Rfl:   •  furosemide (LASIX) 20 MG tablet, Take 20 mg by mouth Daily As Needed., Disp: , Rfl:   •  ipratropium-albuterol (DUO-NEB) 0.5-2.5 mg/3 ml nebulizer, Take 3 mL by nebulization 4 (Four) times a day., Disp: 360 mL, Rfl: 1  •  oxymetazoline (AFRIN) 0.05 % nasal spray, Instil 2 sprays into the nostril(s) as directed by provider 2 (Two) Times a Day for 2 doses, Disp: 30 mL, Rfl: 0  •  potassium chloride (K-DUR) 10 MEQ CR tablet, Take 10 mEq by mouth Daily., Disp: , Rfl:   •  propafenone (RYTHMOL) 150 MG tablet, TAKE 1/2 (ONE-HALF) TABLET BY MOUTH EVERY 8 HOURS (Patient taking differently: Take 150 mg by mouth 3 (Three) Times a Day. q 6 hours), Disp: 135 tablet, Rfl: 1  •  ursodiol (ACTIGALL) 300 MG capsule, Take 300 mg by mouth 3 (Three) Times a Day., Disp: , Rfl:    Assessment:        Patient Active Problem List   Diagnosis   • COPD exacerbation (CMS/HCC)   • Subarachnoid hemorrhage (CMS/HCC)   • Multiple skin tears   • Closed nondisplaced fracture of left clavicle   • Paroxysmal atrial fibrillation (CMS/HCC)   • Tobacco abuse   • Status post placement of implantable loop recorder   • SOB (shortness of breath)   • COPD with acute exacerbation (CMS/HCC)               Plan:            ICD-10-CM ICD-9-CM   1. Paroxysmal atrial fibrillation (CMS/HCC) I48.0 427.31   2. SOB (shortness of breath) R06.02 786.05     1.  Paroxysmal atrial fibrillation (CMS/HCC)  Under control    2. SOB (shortness of breath)  Multifactorial       Celia Baird needs anti coagulation  At this point pt is not on anticoagulations.  Pros and cons of anticoagulations has been discussed  Alternate methods including Watchman has been discussed  At this stage it has been decided NO ANTICOAGULATIONS      See in  1 yr  COUNSELING:    Celia BairdBrodie was given to patient for the following topics: diagnostic results, risk factor reductions, impressions, risks and benefits of treatment options and importance of treatment compliance .       SMOKING COUNSELING:    [unfilled]    Dictated using Dragon dictation

## 2020-06-11 RX ORDER — PROPAFENONE HYDROCHLORIDE 150 MG/1
TABLET, COATED ORAL
Qty: 90 TABLET | Refills: 3 | Status: SHIPPED | OUTPATIENT
Start: 2020-06-11 | End: 2021-02-24

## 2020-06-11 RX ORDER — PROPAFENONE HYDROCHLORIDE 150 MG/1
150 TABLET, COATED ORAL 3 TIMES DAILY
Qty: 90 TABLET | Refills: 3 | Status: SHIPPED | OUTPATIENT
Start: 2020-06-11 | End: 2020-06-11

## 2020-06-23 ENCOUNTER — CLINICAL SUPPORT NO REQUIREMENTS (OUTPATIENT)
Dept: CARDIOLOGY | Facility: CLINIC | Age: 75
End: 2020-06-23

## 2020-06-23 DIAGNOSIS — Z95.818 STATUS POST PLACEMENT OF IMPLANTABLE LOOP RECORDER: Primary | ICD-10-CM

## 2020-06-23 PROCEDURE — 93298 REM INTERROG DEV EVAL SCRMS: CPT | Performed by: INTERNAL MEDICINE

## 2020-06-23 PROCEDURE — G2066 INTER DEVC REMOTE 30D: HCPCS | Performed by: INTERNAL MEDICINE

## 2021-02-24 RX ORDER — PROPAFENONE HYDROCHLORIDE 150 MG/1
TABLET, COATED ORAL
Qty: 90 TABLET | Refills: 1 | Status: SHIPPED | OUTPATIENT
Start: 2021-02-24 | End: 2021-06-08

## 2021-06-08 RX ORDER — PROPAFENONE HYDROCHLORIDE 150 MG/1
TABLET, COATED ORAL
Qty: 60 TABLET | Refills: 0 | Status: SHIPPED | OUTPATIENT
Start: 2021-06-08 | End: 2022-02-07 | Stop reason: HOSPADM

## 2021-06-17 ENCOUNTER — HOSPITAL ENCOUNTER (EMERGENCY)
Facility: HOSPITAL | Age: 76
Discharge: LEFT BEFORE DISCHARGE | End: 2021-06-17

## 2021-06-17 ENCOUNTER — APPOINTMENT (OUTPATIENT)
Dept: CT IMAGING | Facility: HOSPITAL | Age: 76
End: 2021-06-17

## 2021-06-17 VITALS
SYSTOLIC BLOOD PRESSURE: 129 MMHG | HEIGHT: 60 IN | OXYGEN SATURATION: 97 % | DIASTOLIC BLOOD PRESSURE: 62 MMHG | RESPIRATION RATE: 18 BRPM | HEART RATE: 67 BPM | TEMPERATURE: 96.9 F | BODY MASS INDEX: 18.94 KG/M2

## 2021-06-17 PROCEDURE — 70450 CT HEAD/BRAIN W/O DYE: CPT

## 2021-06-17 PROCEDURE — 99211 OFF/OP EST MAY X REQ PHY/QHP: CPT

## 2021-06-17 PROCEDURE — 72125 CT NECK SPINE W/O DYE: CPT

## 2021-06-17 NOTE — ED NOTES
This RN called for room placement. Pt did not answer. This RN called cell phone and pt reports she left to go home and follow up with PCP.        Julianna Monteiro, RN  06/17/21 0430
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
## 2022-01-13 ENCOUNTER — APPOINTMENT (OUTPATIENT)
Dept: GENERAL RADIOLOGY | Facility: HOSPITAL | Age: 77
End: 2022-01-13

## 2022-01-13 ENCOUNTER — HOSPITAL ENCOUNTER (INPATIENT)
Facility: HOSPITAL | Age: 77
LOS: 8 days | Discharge: HOME | End: 2022-01-21
Attending: EMERGENCY MEDICINE | Admitting: HOSPITALIST

## 2022-01-13 DIAGNOSIS — J44.1 COPD EXACERBATION: Primary | ICD-10-CM

## 2022-01-13 DIAGNOSIS — E87.6 HYPOKALEMIA: ICD-10-CM

## 2022-01-13 DIAGNOSIS — R06.02 SHORTNESS OF BREATH: ICD-10-CM

## 2022-01-13 LAB
ALBUMIN SERPL-MCNC: 4.1 G/DL (ref 3.5–5.2)
ALBUMIN/GLOB SERPL: 2.3 G/DL
ALP SERPL-CCNC: 112 U/L (ref 39–117)
ALT SERPL W P-5'-P-CCNC: 7 U/L (ref 1–33)
ANION GAP SERPL CALCULATED.3IONS-SCNC: 9.9 MMOL/L (ref 5–15)
ARTERIAL PATENCY WRIST A: ABNORMAL
AST SERPL-CCNC: 12 U/L (ref 1–32)
ATMOSPHERIC PRESS: 746.5 MMHG
BASE EXCESS BLDA CALC-SCNC: 10.6 MMOL/L (ref 0–2)
BASOPHILS # BLD AUTO: 0.12 10*3/MM3 (ref 0–0.2)
BASOPHILS NFR BLD AUTO: 0.9 % (ref 0–1.5)
BDY SITE: ABNORMAL
BILIRUB SERPL-MCNC: 0.4 MG/DL (ref 0–1.2)
BUN SERPL-MCNC: 12 MG/DL (ref 8–23)
BUN/CREAT SERPL: 13 (ref 7–25)
CALCIUM SPEC-SCNC: 9 MG/DL (ref 8.6–10.5)
CHLORIDE SERPL-SCNC: 97 MMOL/L (ref 98–107)
CO2 SERPL-SCNC: 34.1 MMOL/L (ref 22–29)
CREAT SERPL-MCNC: 0.92 MG/DL (ref 0.57–1)
D-LACTATE SERPL-SCNC: 1.5 MMOL/L (ref 0.5–2)
DEPRECATED RDW RBC AUTO: 41.5 FL (ref 37–54)
EOSINOPHIL # BLD AUTO: 0.17 10*3/MM3 (ref 0–0.4)
EOSINOPHIL NFR BLD AUTO: 1.3 % (ref 0.3–6.2)
ERYTHROCYTE [DISTWIDTH] IN BLOOD BY AUTOMATED COUNT: 12.8 % (ref 12.3–15.4)
GAS FLOW AIRWAY: 2.5 LPM
GFR SERPL CREATININE-BSD FRML MDRD: 59 ML/MIN/1.73
GLOBULIN UR ELPH-MCNC: 1.8 GM/DL
GLUCOSE BLDC GLUCOMTR-MCNC: 121 MG/DL (ref 70–130)
GLUCOSE SERPL-MCNC: 97 MG/DL (ref 65–99)
HCO3 BLDA-SCNC: 35.6 MMOL/L (ref 22–28)
HCT VFR BLD AUTO: 37.2 % (ref 34–46.6)
HGB BLD-MCNC: 12.8 G/DL (ref 12–15.9)
HOLD SPECIMEN: NORMAL
HOLD SPECIMEN: NORMAL
IMM GRANULOCYTES # BLD AUTO: 0.07 10*3/MM3 (ref 0–0.05)
IMM GRANULOCYTES NFR BLD AUTO: 0.5 % (ref 0–0.5)
LYMPHOCYTES # BLD AUTO: 1.65 10*3/MM3 (ref 0.7–3.1)
LYMPHOCYTES NFR BLD AUTO: 12.2 % (ref 19.6–45.3)
MAGNESIUM SERPL-MCNC: 1.6 MG/DL (ref 1.6–2.4)
MCH RBC QN AUTO: 31 PG (ref 26.6–33)
MCHC RBC AUTO-ENTMCNC: 34.4 G/DL (ref 31.5–35.7)
MCV RBC AUTO: 90.1 FL (ref 79–97)
MODALITY: ABNORMAL
MONOCYTES # BLD AUTO: 1.44 10*3/MM3 (ref 0.1–0.9)
MONOCYTES NFR BLD AUTO: 10.7 % (ref 5–12)
NEUTROPHILS NFR BLD AUTO: 10.04 10*3/MM3 (ref 1.7–7)
NEUTROPHILS NFR BLD AUTO: 74.4 % (ref 42.7–76)
NRBC BLD AUTO-RTO: 0 /100 WBC (ref 0–0.2)
NT-PROBNP SERPL-MCNC: 585 PG/ML (ref 0–1800)
PCO2 BLDA: 47.9 MM HG (ref 35–45)
PH BLDA: 7.48 PH UNITS (ref 7.35–7.45)
PLATELET # BLD AUTO: 232 10*3/MM3 (ref 140–450)
PMV BLD AUTO: 12.4 FL (ref 6–12)
PO2 BLDA: 84.4 MM HG (ref 80–100)
POTASSIUM SERPL-SCNC: 2.6 MMOL/L (ref 3.5–5.2)
PROCALCITONIN SERPL-MCNC: 0.05 NG/ML (ref 0–0.25)
PROT SERPL-MCNC: 5.9 G/DL (ref 6–8.5)
QT INTERVAL: 373 MS
QT INTERVAL: 409 MS
RBC # BLD AUTO: 4.13 10*6/MM3 (ref 3.77–5.28)
SAO2 % BLDCOA: 96.9 % (ref 92–99)
SARS-COV-2 RNA RESP QL NAA+PROBE: NOT DETECTED
SODIUM SERPL-SCNC: 141 MMOL/L (ref 136–145)
TROPONIN T SERPL-MCNC: 0.01 NG/ML (ref 0–0.03)
WBC NRBC COR # BLD: 13.49 10*3/MM3 (ref 3.4–10.8)
WHOLE BLOOD HOLD SPECIMEN: NORMAL
WHOLE BLOOD HOLD SPECIMEN: NORMAL

## 2022-01-13 PROCEDURE — 94799 UNLISTED PULMONARY SVC/PX: CPT

## 2022-01-13 PROCEDURE — U0003 INFECTIOUS AGENT DETECTION BY NUCLEIC ACID (DNA OR RNA); SEVERE ACUTE RESPIRATORY SYNDROME CORONAVIRUS 2 (SARS-COV-2) (CORONAVIRUS DISEASE [COVID-19]), AMPLIFIED PROBE TECHNIQUE, MAKING USE OF HIGH THROUGHPUT TECHNOLOGIES AS DESCRIBED BY CMS-2020-01-R: HCPCS | Performed by: NURSE PRACTITIONER

## 2022-01-13 PROCEDURE — 71045 X-RAY EXAM CHEST 1 VIEW: CPT

## 2022-01-13 PROCEDURE — 0 POTASSIUM CHLORIDE 10 MEQ/100ML SOLUTION: Performed by: NURSE PRACTITIONER

## 2022-01-13 PROCEDURE — 25010000002 METHYLPREDNISOLONE PER 125 MG: Performed by: NURSE PRACTITIONER

## 2022-01-13 PROCEDURE — 93005 ELECTROCARDIOGRAM TRACING: CPT | Performed by: EMERGENCY MEDICINE

## 2022-01-13 PROCEDURE — 36600 WITHDRAWAL OF ARTERIAL BLOOD: CPT

## 2022-01-13 PROCEDURE — 94660 CPAP INITIATION&MGMT: CPT

## 2022-01-13 PROCEDURE — 99232 SBSQ HOSP IP/OBS MODERATE 35: CPT | Performed by: INTERNAL MEDICINE

## 2022-01-13 PROCEDURE — 87040 BLOOD CULTURE FOR BACTERIA: CPT | Performed by: NURSE PRACTITIONER

## 2022-01-13 PROCEDURE — 83880 ASSAY OF NATRIURETIC PEPTIDE: CPT | Performed by: EMERGENCY MEDICINE

## 2022-01-13 PROCEDURE — 99285 EMERGENCY DEPT VISIT HI MDM: CPT

## 2022-01-13 PROCEDURE — 85025 COMPLETE CBC W/AUTO DIFF WBC: CPT | Performed by: EMERGENCY MEDICINE

## 2022-01-13 PROCEDURE — 82803 BLOOD GASES ANY COMBINATION: CPT

## 2022-01-13 PROCEDURE — 94761 N-INVAS EAR/PLS OXIMETRY MLT: CPT

## 2022-01-13 PROCEDURE — 25010000002 LORAZEPAM PER 2 MG: Performed by: NURSE PRACTITIONER

## 2022-01-13 PROCEDURE — 93010 ELECTROCARDIOGRAM REPORT: CPT | Performed by: INTERNAL MEDICINE

## 2022-01-13 PROCEDURE — 94640 AIRWAY INHALATION TREATMENT: CPT

## 2022-01-13 PROCEDURE — 83735 ASSAY OF MAGNESIUM: CPT | Performed by: NURSE PRACTITIONER

## 2022-01-13 PROCEDURE — 84145 PROCALCITONIN (PCT): CPT | Performed by: NURSE PRACTITIONER

## 2022-01-13 PROCEDURE — 82962 GLUCOSE BLOOD TEST: CPT

## 2022-01-13 PROCEDURE — 25010000002 METHYLPREDNISOLONE PER 125 MG: Performed by: HOSPITALIST

## 2022-01-13 PROCEDURE — 83605 ASSAY OF LACTIC ACID: CPT | Performed by: NURSE PRACTITIONER

## 2022-01-13 PROCEDURE — 84484 ASSAY OF TROPONIN QUANT: CPT | Performed by: EMERGENCY MEDICINE

## 2022-01-13 PROCEDURE — 80053 COMPREHEN METABOLIC PANEL: CPT | Performed by: EMERGENCY MEDICINE

## 2022-01-13 RX ORDER — IPRATROPIUM BROMIDE AND ALBUTEROL SULFATE 2.5; .5 MG/3ML; MG/3ML
3 SOLUTION RESPIRATORY (INHALATION)
Status: DISCONTINUED | OUTPATIENT
Start: 2022-01-13 | End: 2022-01-21 | Stop reason: HOSPADM

## 2022-01-13 RX ORDER — METHYLPREDNISOLONE SODIUM SUCCINATE 125 MG/2ML
125 INJECTION, POWDER, LYOPHILIZED, FOR SOLUTION INTRAMUSCULAR; INTRAVENOUS EVERY 6 HOURS
Status: DISCONTINUED | OUTPATIENT
Start: 2022-01-13 | End: 2022-01-14

## 2022-01-13 RX ORDER — ROSUVASTATIN CALCIUM 5 MG/1
5 TABLET, COATED ORAL DAILY
Status: DISCONTINUED | OUTPATIENT
Start: 2022-01-13 | End: 2022-01-21 | Stop reason: HOSPADM

## 2022-01-13 RX ORDER — POTASSIUM CHLORIDE 7.45 MG/ML
10 INJECTION INTRAVENOUS ONCE
Status: COMPLETED | OUTPATIENT
Start: 2022-01-13 | End: 2022-01-13

## 2022-01-13 RX ORDER — ASPIRIN 81 MG/1
81 TABLET ORAL DAILY
Status: DISCONTINUED | OUTPATIENT
Start: 2022-01-13 | End: 2022-01-21 | Stop reason: HOSPADM

## 2022-01-13 RX ORDER — CLOPIDOGREL BISULFATE 75 MG/1
75 TABLET ORAL DAILY
COMMUNITY
End: 2022-11-10 | Stop reason: ALTCHOICE

## 2022-01-13 RX ORDER — GUAIFENESIN 600 MG/1
600 TABLET, EXTENDED RELEASE ORAL EVERY 12 HOURS SCHEDULED
Status: DISCONTINUED | OUTPATIENT
Start: 2022-01-13 | End: 2022-01-21 | Stop reason: HOSPADM

## 2022-01-13 RX ORDER — SPIRONOLACTONE 50 MG/1
50 TABLET, FILM COATED ORAL DAILY
Status: DISCONTINUED | OUTPATIENT
Start: 2022-01-13 | End: 2022-01-16

## 2022-01-13 RX ORDER — BUMETANIDE 0.5 MG/1
0.5 TABLET ORAL DAILY
COMMUNITY
End: 2022-08-20 | Stop reason: HOSPADM

## 2022-01-13 RX ORDER — BUMETANIDE 1 MG/1
1 TABLET ORAL DAILY
Status: DISCONTINUED | OUTPATIENT
Start: 2022-01-13 | End: 2022-01-13

## 2022-01-13 RX ORDER — BUMETANIDE 0.5 MG/1
0.5 TABLET ORAL DAILY
Status: DISCONTINUED | OUTPATIENT
Start: 2022-01-13 | End: 2022-01-13

## 2022-01-13 RX ORDER — LORAZEPAM 2 MG/ML
0.5 INJECTION INTRAMUSCULAR EVERY 6 HOURS PRN
Status: DISCONTINUED | OUTPATIENT
Start: 2022-01-13 | End: 2022-01-21

## 2022-01-13 RX ORDER — POTASSIUM CHLORIDE 750 MG/1
20 TABLET, FILM COATED, EXTENDED RELEASE ORAL
Status: DISCONTINUED | OUTPATIENT
Start: 2022-01-13 | End: 2022-01-14

## 2022-01-13 RX ORDER — SODIUM CHLORIDE 0.9 % (FLUSH) 0.9 %
10 SYRINGE (ML) INJECTION AS NEEDED
Status: DISCONTINUED | OUTPATIENT
Start: 2022-01-13 | End: 2022-01-21

## 2022-01-13 RX ORDER — CLOPIDOGREL BISULFATE 75 MG/1
75 TABLET ORAL DAILY
Status: DISCONTINUED | OUTPATIENT
Start: 2022-01-13 | End: 2022-01-21 | Stop reason: HOSPADM

## 2022-01-13 RX ORDER — BUDESONIDE AND FORMOTEROL FUMARATE DIHYDRATE 160; 4.5 UG/1; UG/1
2 AEROSOL RESPIRATORY (INHALATION)
Status: DISCONTINUED | OUTPATIENT
Start: 2022-01-13 | End: 2022-01-21 | Stop reason: HOSPADM

## 2022-01-13 RX ORDER — DOXYCYCLINE HYCLATE 50 MG/1
324 CAPSULE, GELATIN COATED ORAL EVERY OTHER DAY
COMMUNITY
Start: 2022-05-17

## 2022-01-13 RX ORDER — PROPAFENONE HYDROCHLORIDE 150 MG/1
150 TABLET, COATED ORAL EVERY 8 HOURS SCHEDULED
Status: DISCONTINUED | OUTPATIENT
Start: 2022-01-13 | End: 2022-01-21 | Stop reason: HOSPADM

## 2022-01-13 RX ORDER — POTASSIUM CHLORIDE 7.45 MG/ML
10 INJECTION INTRAVENOUS ONCE
Status: DISCONTINUED | OUTPATIENT
Start: 2022-01-13 | End: 2022-01-13

## 2022-01-13 RX ORDER — SODIUM CHLORIDE 0.9 % (FLUSH) 0.9 %
10 SYRINGE (ML) INJECTION AS NEEDED
Status: DISCONTINUED | OUTPATIENT
Start: 2022-01-13 | End: 2022-01-21 | Stop reason: HOSPADM

## 2022-01-13 RX ORDER — IPRATROPIUM BROMIDE AND ALBUTEROL SULFATE 2.5; .5 MG/3ML; MG/3ML
3 SOLUTION RESPIRATORY (INHALATION) ONCE
Status: COMPLETED | OUTPATIENT
Start: 2022-01-13 | End: 2022-01-13

## 2022-01-13 RX ORDER — SPIRONOLACTONE 25 MG/1
50 TABLET ORAL DAILY
COMMUNITY
End: 2022-01-21 | Stop reason: HOSPADM

## 2022-01-13 RX ORDER — BUMETANIDE 1 MG/1
1 TABLET ORAL 2 TIMES DAILY
Status: DISCONTINUED | OUTPATIENT
Start: 2022-01-13 | End: 2022-01-17

## 2022-01-13 RX ORDER — ROSUVASTATIN CALCIUM 5 MG/1
5 TABLET, COATED ORAL DAILY
COMMUNITY

## 2022-01-13 RX ORDER — IPRATROPIUM BROMIDE AND ALBUTEROL SULFATE 2.5; .5 MG/3ML; MG/3ML
3 SOLUTION RESPIRATORY (INHALATION) 2 TIMES DAILY
COMMUNITY

## 2022-01-13 RX ORDER — POTASSIUM CHLORIDE 750 MG/1
10 TABLET, FILM COATED, EXTENDED RELEASE ORAL DAILY
Status: DISCONTINUED | OUTPATIENT
Start: 2022-01-13 | End: 2022-01-13

## 2022-01-13 RX ORDER — METHYLPREDNISOLONE SODIUM SUCCINATE 125 MG/2ML
125 INJECTION, POWDER, LYOPHILIZED, FOR SOLUTION INTRAMUSCULAR; INTRAVENOUS ONCE
Status: COMPLETED | OUTPATIENT
Start: 2022-01-13 | End: 2022-01-13

## 2022-01-13 RX ORDER — URSODIOL 300 MG/1
300 CAPSULE ORAL 3 TIMES DAILY
Status: DISCONTINUED | OUTPATIENT
Start: 2022-01-13 | End: 2022-01-21 | Stop reason: HOSPADM

## 2022-01-13 RX ORDER — LORAZEPAM 2 MG/ML
1 INJECTION INTRAMUSCULAR ONCE
Status: COMPLETED | OUTPATIENT
Start: 2022-01-13 | End: 2022-01-13

## 2022-01-13 RX ADMIN — LORAZEPAM 1 MG: 2 INJECTION INTRAMUSCULAR; INTRAVENOUS at 13:20

## 2022-01-13 RX ADMIN — POTASSIUM CHLORIDE 10 MEQ: 7.46 INJECTION, SOLUTION INTRAVENOUS at 13:48

## 2022-01-13 RX ADMIN — METHYLPREDNISOLONE SODIUM SUCCINATE 125 MG: 125 INJECTION, POWDER, FOR SOLUTION INTRAMUSCULAR; INTRAVENOUS at 12:27

## 2022-01-13 RX ADMIN — IPRATROPIUM BROMIDE AND ALBUTEROL SULFATE 3 ML: .5; 3 SOLUTION RESPIRATORY (INHALATION) at 12:44

## 2022-01-13 RX ADMIN — BUMETANIDE 1 MG: 1 TABLET ORAL at 22:21

## 2022-01-13 RX ADMIN — PROPAFENONE HYDROCHLORIDE 150 MG: 150 TABLET, COATED ORAL at 22:21

## 2022-01-13 RX ADMIN — GUAIFENESIN 600 MG: 600 TABLET, EXTENDED RELEASE ORAL at 22:32

## 2022-01-13 RX ADMIN — URSODIOL 300 MG: 300 CAPSULE ORAL at 22:21

## 2022-01-13 RX ADMIN — METHYLPREDNISOLONE SODIUM SUCCINATE 125 MG: 125 INJECTION, POWDER, FOR SOLUTION INTRAMUSCULAR; INTRAVENOUS at 22:20

## 2022-01-13 RX ADMIN — IPRATROPIUM BROMIDE AND ALBUTEROL SULFATE 3 ML: .5; 3 SOLUTION RESPIRATORY (INHALATION) at 21:58

## 2022-01-13 NOTE — ED NOTES
Nursing report ED to floor  Celia Baird  77 y.o.  female    HPI :   Chief Complaint   Patient presents with   • Shortness of Breath       Admitting doctor:   Eulogio Mcdaniel MD    Admitting diagnosis:   The primary encounter diagnosis was COPD exacerbation (HCC). Diagnoses of Shortness of breath and Hypokalemia were also pertinent to this visit.    Code status:   Current Code Status     Date Active Code Status Order ID Comments User Context       Prior    Advance Care Planning Activity          Allergies:   Morphine and related, Levaquin [levofloxacin], and Sulfa antibiotics    Intake and Output    Intake/Output Summary (Last 24 hours) at 1/13/2022 1603  Last data filed at 1/13/2022 1514  Gross per 24 hour   Intake 100 ml   Output --   Net 100 ml       Weight:       01/13/22  1221   Weight: 36.3 kg (80 lb)       Most recent vitals:   Vitals:    01/13/22 1244 01/13/22 1325 01/13/22 1326 01/13/22 1556   BP:   106/73 116/62   Pulse: 79 80 79 75   Resp: 28 20  22   Temp:       TempSrc:       SpO2: 96% 96% 95% 98%   Weight:       Height:           Active LDAs/IV Access:   Lines, Drains & Airways     Active LDAs     Name Placement date Placement time Site Days    Peripheral IV 01/13/22 1220 Left Forearm 01/13/22  1220  Forearm  less than 1                Labs (abnormal labs have a star):   Labs Reviewed   COMPREHENSIVE METABOLIC PANEL - Abnormal; Notable for the following components:       Result Value    Potassium 2.6 (*)     Chloride 97 (*)     CO2 34.1 (*)     Total Protein 5.9 (*)     eGFR Non  Amer 59 (*)     All other components within normal limits    Narrative:     GFR Normal >60  Chronic Kidney Disease <60  Kidney Failure <15     CBC WITH AUTO DIFFERENTIAL - Abnormal; Notable for the following components:    WBC 13.49 (*)     MPV 12.4 (*)     Lymphocyte % 12.2 (*)     Neutrophils, Absolute 10.04 (*)     Monocytes, Absolute 1.44 (*)     Immature Grans, Absolute 0.07 (*)     All other components within normal  limits   BLOOD GAS, ARTERIAL - Abnormal; Notable for the following components:    pH, Arterial 7.480 (*)     pCO2, Arterial 47.9 (*)     HCO3, Arterial 35.6 (*)     Base Excess, Arterial 10.6 (*)     All other components within normal limits   COVID-19,[facility] IN-HOUSE CEPHEID/GABINO, NP SWAB IN TRANSPORT MEDIA 8-12 HR TAT - Normal    Narrative:     Fact sheet for providers: https://www.fda.gov/media/282787/download     Fact sheet for patients: https://www.fda.gov/media/287097/download   BNP (IN-HOUSE) - Normal    Narrative:     Among patients with dyspnea, NT-proBNP is highly sensitive for the detection of acute congestive heart failure. In addition NT-proBNP of <300 pg/ml effectively rules out acute congestive heart failure with 99% negative predictive value.    Results may be falsely decreased if patient taking Biotin.     TROPONIN (IN-HOUSE) - Normal    Narrative:     Troponin T Reference Range:  <= 0.03 ng/mL-   Negative for AMI  >0.03 ng/mL-     Abnormal for myocardial necrosis.  Clinicians would have to utilize clinical acumen, EKG, Troponin and serial changes to determine if it is an Acute Myocardial Infarction or myocardial injury due to an underlying chronic condition.       Results may be falsely decreased if patient taking Biotin.     PROCALCITONIN - Normal    Narrative:     As a Marker for Sepsis (Non-Neonates):     1. <0.5 ng/mL represents a low risk of severe sepsis and/or septic shock.  2. >2 ng/mL represents a high risk of severe sepsis and/or septic shock.    As a Marker for Lower Respiratory Tract Infections that require antibiotic therapy:  PCT on Admission     Antibiotic Therapy             6-12 Hrs later  >0.5                          Strongly Recommended            >0.25 - <0.5             Recommended  0.1 - 0.25                  Discouraged                       Remeasure/reassess PCT  <0.1                         Strongly Discouraged         Remeasure/reassess PCT      As 28 day mortality risk  "marker: \"Change in Procalcitonin Result\" (>80% or <=80%) if Day 0 (or Day 1) and Day 4 values are available. Refer to http://[email or web address] [employer]pct-calculator.com/    Change in PCT <=80 %   A decrease of PCT levels below or equal to 80% defines a positive change in PCT test result representing a higher risk for 28-day all-cause mortality of patients diagnosed with severe sepsis or septic shock.    Change in PCT >80 %   A decrease of PCT levels of more than 80% defines a negative change in PCT result representing a lower risk for 28-day all-cause mortality of patients diagnosed with severe sepsis or septic shock.               LACTIC ACID, PLASMA - Normal   MAGNESIUM - Normal   COVID PRE-OP / PRE-PROCEDURE SCREENING ORDER (NO ISOLATION)    Narrative:     The following orders were created for panel order COVID PRE-OP / PRE-PROCEDURE SCREENING ORDER (NO ISOLATION) - Swab, Nasopharynx.  Procedure                               Abnormality         Status                     ---------                               -----------         ------                     COVID-19,[facility]-HOU[facility]...[[id number]]  Normal              Final result                 Please view results for these tests on the individual orders.   BLOOD CULTURE   BLOOD CULTURE   RAINBOW DRAW    Narrative:     The following orders were created for panel order [place] Switch Draw.  Procedure                               Abnormality         Status                     ---------                               -----------         ------                     Green Top (Gel)[[id number]]                                  Final result               Lavender Top[[id number]]                                     Final result               Gold Top - SST[[id number]]                                   Final result               Light Blue Top[[id number]]                                   Final result                 Please view results for these tests on the individual orders.   BLOOD GAS, " ARTERIAL   CBC AND DIFFERENTIAL    Narrative:     The following orders were created for panel order CBC & Differential.  Procedure                               Abnormality         Status                     ---------                               -----------         ------                     CBC Auto Differential[[id number]]        Abnormal            Final result                 Please view results for these tests on the individual orders.   GREEN TOP   LAVENDER TOP   GOLD TOP - SST   LIGHT BLUE TOP       EKG:   ECG 12 Lead   Preliminary Result   HEART RATE= 81  bpm   RR Interval= 744  ms   OK Interval= 71  ms   P Horizontal Axis= 219  deg   P Front Axis= 219  deg   QRSD Interval= 96  ms   QT Interval= 373  ms   QRS Axis= 105  deg   T Wave Axis= 201  deg   - ABNORMAL ECG -   Sinus or ectopic atrial rhythm   Ventricular trigeminy   Short OK interval   Right axis deviation   Repol abnrm suggests ischemia, diffuse leads   Electronically Signed By:    Date and Time of Study: 2022-01-13 13:56:54      ECG 12 Lead   Preliminary Result   HEART RATE= 80  bpm   RR Interval= 752  ms   OK Interval= 204  ms   P Horizontal Axis=   deg   P Front Axis= 103  deg   QRSD Interval= 99  ms   QT Interval= 409  ms   QRS Axis= 124  deg   T Wave Axis= 157  deg   - ABNORMAL ECG -   Sinus rhythm   Multiform ventricular premature complexes   Probable lateral infarct, age indeterminate   Electronically Signed By:    Date and Time of Study: 2022-01-13 12:53:05          Meds given in ED:   Medications   sodium chloride 0.9 % flush 10 mL (has no administration in time range)   sodium chloride 0.9 % flush 10 mL (has no administration in time range)   ipratropium-albuterol (DUO-NEB) nebulizer solution 3 mL (3 mL Nebulization Given 1/13/22 1244)   methylPREDNISolone sodium succinate (SOLU-Medrol) injection 125 mg (125 mg Intravenous Given 1/13/22 1227)   LORazepam (ATIVAN) injection 1 mg (1 mg Intravenous Given 1/13/22 1320)   potassium chloride 10  mEq in 100 mL IVPB (0 mEq Intravenous Stopped 1/13/22 5556)       Imaging results:  XR Chest 1 View    Result Date: 1/13/2022  No focal pulmonary consolidation. Follow-up as clinical indications persist.  This report was finalized on 1/13/2022 12:27 PM by Dr. Andrew Abdi M.D.        Ambulatory status:   Ad josh    Social issues:   Social History     Socioeconomic History   • Marital status:    Tobacco Use   • Smoking status: Current Some Day Smoker     Packs/day: 0.50     Years: 59.00     Pack years: 29.50     Types: Cigarettes   • Smokeless tobacco: Never Used   Substance and Sexual Activity   • Alcohol use: No   • Drug use: No   • Sexual activity: Defer     Birth control/protection: Post-menopausal, Surgical       NIH Stroke Scale:        Nursing report ED to floor:       Alivia Qureshi RN  01/13/22 1558

## 2022-01-13 NOTE — ED NOTES
Updated family on phone.  Pt was very anxious about getting on bipap, given ativan, pt now resting quietly.      Tessa Leo, RN  01/13/22 5791

## 2022-01-13 NOTE — ED PROVIDER NOTES
EMERGENCY DEPARTMENT ENCOUNTER    Room Number:  05/05  Date of encounter:  1/13/2022  PCP: Slick Gomez MD  Historian: Patient      PPE    Patient was placed in face mask in first look. Patient was wearing facemask when I entered the room and throughout our encounter. I wore full protective equipment throughout this patient encounter including a CAPR face mask, eye shield, gown and gloves. Hand hygiene was performed before donning protective equipment and after removal when leaving the room.        HPI:  Chief Complaint: As of breath  A complete HPI/ROS/PMH/PSH/SH/FH are unobtainable due to: Nothing    Context: Celia Baird is a 77 y.o. female who arrives to the ED via private vehicle.  Patient presents with c/o constant, worsening ongoing shortness of breath over the past week.  Patient states that she is always short of breath with her COPD however the last 3 to 4 days that she has needed to use her home oxygen.  She states she is getting up out of the chair and taking a few steps makes her very short of breath.   Patient also complains of productive cough.  Patient denies fever, chills, chest pain, nausea, vomiting, dysuria, leg swelling or any other symptoms.  Patient states that nothing makes the symptoms better and exertion worsens symptoms.          PAST MEDICAL HISTORY  Active Ambulatory Problems     Diagnosis Date Noted   • COPD exacerbation ([facility]) 02/26/2017   • Subarachnoid hemorrhage ([facility]) 03/26/2017   • Multiple skin tears 03/30/2017   • Closed nondisplaced fracture of left clavicle 03/30/2017   • Paroxysmal atrial fibrillation ([facility]) 02/19/2018   • Tobacco abuse 02/19/2018   • Status post placement of implantable loop recorder 02/19/2018   • SOB (shortness of breath) 02/18/2019   • COPD with acute exacerbation ([facility]) 03/20/2020     Resolved Ambulatory Problems     Diagnosis Date Noted   • Chronic atrial fibrillation ([facility]) 03/30/2017     Past Medical History:   Diagnosis Date   • Atrial fibrillation  (HCC)    • COPD (chronic obstructive pulmonary disease) (HCC)    • History of frequent urinary tract infections    • Irregular heart beat    • Kidney stones    • PBC (primary biliary cirrhosis)    • PBC (primary biliary cirrhosis)          PAST SURGICAL HISTORY  Past Surgical History:   Procedure Laterality Date   • CARDIAC ELECTROPHYSIOLOGY PROCEDURE N/A 3/30/2017    Procedure:    LINQ;  Surgeon: Ailyn Solorio MD;  Location: [facility] INVASIVE LOCATION;  Service:    • CHOLECYSTECTOMY     • TUBAL ABDOMINAL LIGATION           FAMILY HISTORY  Family History   Problem Relation Age of Onset   • Heart disease Father    • Heart attack Father    • Heart disease Brother    • Stroke Mother          SOCIAL HISTORY  Social History     Socioeconomic History   • Marital status:    Tobacco Use   • Smoking status: Current Some Day Smoker     Packs/day: 0.50     Years: 59.00     Pack years: 29.50     Types: Cigarettes   • Smokeless tobacco: Never Used   Substance and Sexual Activity   • Alcohol use: No   • Drug use: No   • Sexual activity: Defer     Birth control/protection: Post-menopausal, Surgical         ALLERGIES  Morphine and related, Levaquin [levofloxacin], and Sulfa antibiotics        REVIEW OF SYSTEMS  Review of Systems     All systems reviewed and negative except for those discussed in HPI.        PHYSICAL EXAM    ED Triage Vitals   Temp Heart Rate Resp BP SpO2   01/13/22 1145 01/13/22 1143 01/13/22 1143 01/13/22 1154 01/13/22 1143   97.9 °F (36.6 °C) 73 (!) 30 119/57 98 %         Physical Exam  GENERAL: Chronically ill-appearing, nontoxic appearing, moderately distressed  HENT: normocephalic, atraumatic  EYES: no scleral icterus, PERRL  CV: regular rhythm, regular rate, no murmur  RESPIRATORY: Tachypneic, coarse breath sounds throughout  ABDOMEN: soft, nontender  MUSCULOSKELETAL: no deformity, no lower extremity edema or calf tenderness bilaterally  NEURO: alert, moves all extremities, follows commands,  mental status normal/baseline  SKIN: warm, dry, no rash   Psych: Appropriate mood and affect  Nursing notes and vital signs reviewed      LAB RESULTS  Recent Results (from the past 24 hour(s))   Comprehensive Metabolic Panel    Collection Time: 01/13/22 12:19 PM    Specimen: Blood   Result Value Ref Range    Glucose 97 65 - 99 mg/dL    BUN 12 8 - 23 mg/dL    Creatinine 0.92 0.57 - 1.00 mg/dL    Sodium 141 136 - 145 mmol/L    Potassium 2.6 (C) 3.5 - 5.2 mmol/L    Chloride 97 (L) 98 - 107 mmol/L    CO2 34.1 (H) 22.0 - 29.0 mmol/L    Calcium 9.0 8.6 - 10.5 mg/dL    Total Protein 5.9 (L) 6.0 - 8.5 g/dL    Albumin 4.10 3.50 - 5.20 g/dL    ALT (SGPT) 7 1 - 33 U/L    AST (SGOT) 12 1 - 32 U/L    Alkaline Phosphatase 112 39 - 117 U/L    Total Bilirubin 0.4 0.0 - 1.2 mg/dL    eGFR Non African Amer 59 (L) >60 mL/min/1.73    Globulin 1.8 gm/dL    A/G Ratio 2.3 g/dL    BUN/Creatinine Ratio 13.0 7.0 - 25.0    Anion Gap 9.9 5.0 - 15.0 mmol/L   BNP    Collection Time: 01/13/22 12:19 PM    Specimen: Blood   Result Value Ref Range    proBNP 585.0 0.0-1,800.0 pg/mL   Troponin    Collection Time: 01/13/22 12:19 PM    Specimen: Blood   Result Value Ref Range    Troponin T 0.013 0.000 - 0.030 ng/mL   Green Top (Gel)    Collection Time: 01/13/22 12:19 PM   Result Value Ref Range    Extra Tube Hold for add-ons.    Lavender Top    Collection Time: 01/13/22 12:19 PM   Result Value Ref Range    Extra Tube hold for add-on    Gold Top - SST    Collection Time: 01/13/22 12:19 PM   Result Value Ref Range    Extra Tube Hold for add-ons.    Light Blue Top    Collection Time: 01/13/22 12:19 PM   Result Value Ref Range    Extra Tube hold for add-on    CBC Auto Differential    Collection Time: 01/13/22 12:19 PM    Specimen: Blood   Result Value Ref Range    WBC 13.49 (H) 3.40 - 10.80 10*3/mm3    RBC 4.13 3.77 - 5.28 10*6/mm3    Hemoglobin 12.8 12.0 - 15.9 g/dL    Hematocrit 37.2 34.0 - 46.6 %    MCV 90.1 79.0 - 97.0 fL    MCH 31.0 26.6 - 33.0 pg     MCHC 34.4 31.5 - 35.7 g/dL    RDW 12.8 12.3 - 15.4 %    RDW-SD 41.5 37.0 - 54.0 fl    MPV 12.4 (H) 6.0 - 12.0 fL    Platelets 232 140 - 450 10*3/mm3    Neutrophil % 74.4 42.7 - 76.0 %    Lymphocyte % 12.2 (L) 19.6 - 45.3 %    Monocyte % 10.7 5.0 - 12.0 %    Eosinophil % 1.3 0.3 - 6.2 %    Basophil % 0.9 0.0 - 1.5 %    Immature Grans % 0.5 0.0 - 0.5 %    Neutrophils, Absolute 10.04 (H) 1.70 - 7.00 10*3/mm3    Lymphocytes, Absolute 1.65 0.70 - 3.10 10*3/mm3    Monocytes, Absolute 1.44 (H) 0.10 - 0.90 10*3/mm3    Eosinophils, Absolute 0.17 0.00 - 0.40 10*3/mm3    Basophils, Absolute 0.12 0.00 - 0.20 10*3/mm3    Immature Grans, Absolute 0.07 (H) 0.00 - 0.05 10*3/mm3    nRBC 0.0 0.0 - 0.2 /100 WBC   Procalcitonin    Collection Time: 01/13/22 12:19 PM    Specimen: Blood   Result Value Ref Range    Procalcitonin 0.05 0.00 - 0.25 ng/mL   Lactic Acid, Plasma    Collection Time: 01/13/22 12:19 PM    Specimen: Blood   Result Value Ref Range    Lactate 1.5 0.5 - 2.0 mmol/L   COVID-19, DARBY IN-HOUSE CEPHEID/GABINO NP SWAB IN TRANSPORT MEDIA 8-12 HR TAT - Swab, Nasopharynx    Collection Time: 01/13/22 12:30 PM    Specimen: Nasopharynx; Swab   Result Value Ref Range    COVID19 Not Detected Not Detected - Ref. Range   ECG 12 Lead    Collection Time: 01/13/22 12:53 PM   Result Value Ref Range    QT Interval 409 ms   Blood Gas, Arterial -    Collection Time: 01/13/22  1:09 PM    Specimen: Arterial Blood   Result Value Ref Range    Site Arterial: right brachial     Ronak's Test N/A     pH, Arterial 7.480 (H) 7.350 - 7.450 pH units    pCO2, Arterial 47.9 (H) 35.0 - 45.0 mm Hg    pO2, Arterial 84.4 80.0 - 100.0 mm Hg    HCO3, Arterial 35.6 (H) 22.0 - 28.0 mmol/L    Base Excess, Arterial 10.6 (H) 0.0 - 2.0 mmol/L    O2 Saturation Calculated 96.9 92.0 - 99.0 %    Barometric Pressure for Blood Gas 746.5 mmHg    Modality Cannula     Flow Rate 2.5 lpm   ECG 12 Lead    Collection Time: 01/13/22  1:56 PM   Result Value Ref Range    QT Interval  373 ms       Ordered the above labs and independently reviewed the results.      RADIOLOGY  XR Chest 1 View    Result Date: 1/13/2022  XR CHEST 1 VW-  HISTORY: Female who is 77 years-old,  short of breath  TECHNIQUE: Frontal view of the chest  COMPARISON: 03/20/2020  FINDINGS: The heart size is normal. Aorta is calcified. Pulmonary vasculature is unremarkable. No focal pulmonary consolidation, pleural effusion, or pneumothorax. Old granulomatous disease is seen. No acute osseous process.      No focal pulmonary consolidation. Follow-up as clinical indications persist.  This report was finalized on 1/13/2022 12:27 PM by Dr. Andrew Abdi M.D.        I ordered the above noted radiological studies and viewed the images on the PACS system.       EKG      Independently viewed by me and interpreted by Dr Moreno         MEDICAL RECORD REVIEW  Medical records reviewed in [facility], patient's last admission was in 2020 for COPD exacerbation at that time.      PROCEDURES    Procedures        DIFFERENTIAL DIAGNOSIS  Differential Diagnosis for Dyspnea include but are not limited to the following:  -Asthma  - COPD exacerbation  -Pneumonia  -PE  - Acute Respiratory Distress  - Pneumothorax  - CHF  - MI  - Anemia  - Hyperventilation  - Pleural Effusion  - Sepsis          PROGRESS, DATA ANALYSIS, CONSULTS, AND MEDICAL DECISION MAKING        ED Course as of 01/13/22 1503   Thu Jan 13, 2022   1220 Discussed pertinent information from history and physical exam with patient.  Discussed differential diagnosis and plan for ED evaluation/work-up and treatment including labs, blood cultures, chest x-ray to evaluate for COPD versus pneumonia.  Patient will also be given a DuoNeb treatment and 125 mg of Solu-Medrol.  All questions answered.  Patient is agreeable with this plan.  Discussed with patient that if she is not feeling better after the breathing treatment she may need to go on BiPAP.  She states she has had that in the past and does  not like it but is willing to do it if we need to.   [MS]   1221 Reviewed pt's history and workup with Dr. Moreno.  After a bedside evaluation, they agree with the plan of care.       [MS]   1310 WBC(!): 13.49 [MS]   1315 Patient has had breathing treatment, she is still tachypneic with work of her breathing.  Discussed with respiratory therapist, we will start her on BiPAP.  I did order a milligram of Ativan to help with patient's anxiety toward BiPAP. [MS]   1318 pH, Arterial(!): 7.480 [MS]   1318 pCO2, Arterial(!): 47.9 [MS]   1318 Potassium(!!): 2.6 [MS]   1330 IV potassium ordered for potassium of 2.6. [MS]   1354 Consult Note    Discussed care with Dr Mcdaniel  Reviewed patient's history, exam, results and need for admission secondary to COPD exacerbation, dyspnea, hypokalemia  Dr. Mcdaniel accepts the patient to be admitted to inpatient telemetry bed.     [MS]      ED Course User Index  [MS] Vicki Alejandre, BRAYAN     ADMISSION    Discussed treatment plan and reason for admission with pt/family and admitting physician.  Pt/family voiced understanding of the plan for admission for further testing/treatment as needed.      DIAGNOSIS  Final diagnoses:   COPD exacerbation (HCC)   Shortness of breath   Hypokalemia           MEDICATIONS GIVEN IN ED    Medications   sodium chloride 0.9 % flush 10 mL (has no administration in time range)   sodium chloride 0.9 % flush 10 mL (has no administration in time range)   ipratropium-albuterol (DUO-NEB) nebulizer solution 3 mL (3 mL Nebulization Given 1/13/22 1244)   methylPREDNISolone sodium succinate (SOLU-Medrol) injection 125 mg (125 mg Intravenous Given 1/13/22 1227)   LORazepam (ATIVAN) injection 1 mg (1 mg Intravenous Given 1/13/22 1320)   potassium chloride 10 mEq in 100 mL IVPB (10 mEq Intravenous New Bag 1/13/22 1348)           COURSE & MEDICAL DECISION MAKING  Any/All labs and Any/All Imaging studies that were ordered were reviewed and are noted above.  Results were  reviewed/discussed with the patient and they were also made aware of online access.    Pt also made aware that some labs, such as cultures, will not be resulted during ER visit and followup with PMD is necessary.        Vicki Alejandre, APRN  01/13/22 1504

## 2022-01-13 NOTE — ED TRIAGE NOTES
Patient to er from home with family at side. Reported increasing in soa and work of breathing over the last few days. Patient has home oxygen in place. Reported patient normally wears 2l/m nc but had to increase it to 4l/m nc. Patient reported any movement or activity causes her to worse with her soa. Patient has mask on in triage along with staff.

## 2022-01-13 NOTE — ED PROVIDER NOTES
"Pt presents to the ED c/o  increasing shortness of air for the past 7 days.  Patient has a history of COPD and has been feeling short of breath for the week.  She has gotten worse over the past 3 to 4 days.  She been using her home nebulizer treatments with minimal relief and now has dyspnea on exertion.  She does have a cough productive of \"lime\" colored sputum.  She denies fever, chills or diarrhea.  Patient states she has been immunized against SARS-CoV-2 and has had the booster.     On exam,   Patient is in moderate respiratory distress.  She is tachypneic and speaking in short sentences.  Her heart is regular rate and rhythm without murmur.  Her lungs reveal tachypnea with mild to moderate respiratory distress.  She has decreased breath sounds with scattered wheezes and rhonchi.  Her abdomen is NABS, soft, nontender nondistended.  Legs reveal no edema or calf tenderness.     Plan: I agree with plan of breathing treatments and steroids.  If patient does not improve quickly, patient may need to be placed on BiPAP.    EKG          EKG time: 1356  Rhythm/Rate: Sinus rhythm, rate 81  P waves and NE: PVCs  QRS, axis: Right axis deviation  ST and T waves: Nonspecific T wave changes  Significant baseline artifact    Interpreted Contemporaneously by me, independently viewed  unchanged compared to prior 03/24/20    Patient was placed in face mask in first look. Patient was wearing facemask when I entered the room and throughout our encounter. I wore full protective equipment throughout this patient encounter including a N95 face mask, eye shield, gown and gloves. Hand hygiene was performed before donning protective equipment and after removal when leaving the room.       Attestation:    The HEATHER and I have discussed this patient's history, physical exam, and treatment plan.  I have reviewed the documentation and personally had a face to face interaction with the patient. I affirm the documentation and agree with the " treatment and plan.  The attached note describes my personal findings.      I provided a substantive portion of the care of the patient.  I personally performed the physical exam in its entirety, and below are my findings.                     Binu Moreno MD  01/13/22 2029

## 2022-01-13 NOTE — CONSULTS
[facility]  Cardiology Consult Note    Patient Identification:  Name: Celia Baird  Age: 77 y.o.  Sex: female  :  1945  MRN: [id number]             Requesting Physician:  Admjovana     Reason for Consultation / Chief Complaint: atrial fibrillation      History of Present Illness:   This is a 77 year old female, who is new to our service. She has a history of COPD, congestive heart failure , hypertension, HDL, and tobacco abuse. She presented to [facility] ER with history of shortness of breath worsening over one week. She comes in consult for atrial fibrillation with watchman's device.    CXR showed no focal pulmonary consolidation. K+2.6, mag 1.6, trop 0.013, and bnp 585.     She underwent a watchman's procedure . Previous cardiac cath with stent to circ.. Previous echo showed EF 50-55%, will obtain these records.      Comorbid cardiac risk factors: hdl, htn, cad, age, tobacco abuse    Past Medical History:  Past Medical History:   Diagnosis Date    Atrial fibrillation (HCC)     COPD (chronic obstructive pulmonary disease) (HCC)     History of frequent urinary tract infections     Irregular heart beat     Kidney stones     PBC (primary biliary cirrhosis)     PBC (primary biliary cirrhosis)      Past Surgical History:  Past Surgical History:   Procedure Laterality Date    CARDIAC ELECTROPHYSIOLOGY PROCEDURE N/A 3/30/2017    Procedure:    LINQ;  Surgeon: Ailyn Solorio MD;  Location: [facility] CATH INVASIVE LOCATION;  Service:     CHOLECYSTECTOMY      TUBAL ABDOMINAL LIGATION        Allergies:  Allergies   Allergen Reactions    Morphine And Related Nausea And Vomiting    Levaquin [Levofloxacin]     Sulfa Antibiotics      Home Meds:  (Not in a hospital admission)    Current Meds:    Scheduled    Medication Ordered Dose/Rate, Route, Frequency Last Action   aspirin EC tablet 81 mg 81 mg, PO, Daily Ordered   budesonide-formoterol (SYMBICORT) 160-4.5 MCG/ACT inhaler 2 puff 2 puff, IN, BID - RT  Ordered   bumetanide (BUMEX) tablet 1 mg 1 mg, PO, BID Ordered   clopidogrel (PLAVIX) tablet 75 mg 75 mg, PO, Daily Ordered   guaiFENesin (MUCINEX) 12 hr tablet 600 mg 600 mg, PO, Q12H Ordered   ipratropium-albuterol (DUO-NEB) nebulizer solution 3 mL 3 mL, NEBULIZATION, 4x Daily - RT Ordered   methylPREDNISolone sodium succinate (SOLU-Medrol) injection 125 mg 125 mg, IV, Q6H Ordered   potassium chloride (K-DUR,KLOR-CON) ER tablet 20 mEq 20 mEq, PO, TID With Meals Ordered   propafenone (RYTHMOL) tablet 150 mg 150 mg, PO, Q8H Ordered   rosuvastatin (CRESTOR) tablet 5 mg 5 mg, PO, Daily Ordered   spironolactone (ALDACTONE) tablet 50 mg 50 mg, PO, Daily Ordered   ursodiol (ACTIGALL) capsule 300 mg 300 mg, PO, TID Ordered     PRN    Medication Ordered Dose/Rate, Route, Frequency Last Action   LORazepam (ATIVAN) injection 0.5 mg 0.5 mg, IV, Q6H PRN Ordered   sodium chloride 0.9 % flush 10 mL (And Linked Group #1) 10 mL, IV, PRN Ordered   sodium chloride 0.9 % flush 10 mL 10 mL, IV, PRN Ordered       Social History:   Social History     Tobacco Use    Smoking status: Current Some Day Smoker     Packs/day: 0.50     Years: 59.00     Pack years: 29.50     Types: Cigarettes    Smokeless tobacco: Never Used   Substance Use Topics    Alcohol use: No      Family History:  Family History   Problem Relation Age of Onset    Heart disease Father     Heart attack Father     Heart disease Brother     Stroke Mother         Review of Systems   Review of Systems  Constitutional: No wt loss, fever   Gastrointestinal: No nausea , abdominal pain  Behavioral/Psych: No insomnia or anxiety   Cardiovascular ----positive for palpitations. All other systems reviewed and are negative            Constitutional:  Temp:  [97.9 °F (36.6 °C)] 97.9 °F (36.6 °C)  Heart Rate:  [73-86] 81  Resp:  [20-30] 20  BP: (106-119)/(57-87) 116/62    Physical Exam             Physical Exam  BP 96/59 (BP Location: Right arm, Patient Position: Lying)   Pulse (!) 128    "Temp 98.2 °F (36.8 °C) (Oral)   Resp 18   Ht 152.4 cm (60\")   Wt 32.6 kg (71 lb 12.8 oz)   SpO2 93%   BMI 14.02 kg/m²     General appearance: No acute changes   Eyes: Sclerae conjunctivae normal, pupils reactive   HENT: Atraumatic; oropharynx clear with moist mucous membranes and no mucosal ulcerations;  Neck: Trachea midline; NECK, supple, no thyromegaly or lymphadenopathy   Lungs: Normal size and shape, normal breath sounds, equal distribution of air, no rales and rhonchi   CV: S1-S2 regular, no murmurs, no rub, no gallop   Abdomen: Soft, nontender; no masses , no abnormal abdominal sounds   Extremities: No deformity , normal color , no peripheral edema   Skin: Normal temperature, turgor and texture; no rash, ulcers  Psych: Appropriate affect, alert and oriented to person, place and time                   Cardiographics  ECG:                  Echocardiogram:   2019  Interpretation Summary    Left atrial cavity size is borderline dilated.  Calculated EF = 66%  There is no evidence of pericardial effusion  Interpretation Summary       Findings consistent with a normal ECG stress test.  Left ventricular ejection fraction is normal (Calculated EF = 70%).  Myocardial perfusion imaging indicates a normal myocardial perfusion study with no evidence of ischemia.  Impressions are consistent with a low risk study.     Asymptomatic for chest pain. ECG is negative for ischemia.   Ectopy: Rare PAC at baseline, none with exercise, occasional PVC in recovery  HR and BP response : Appropriate for Beta-blocker therapy  Pharmacologic study due to inability to tolerate increasing speed and grade of treadmill due to Pulmonary, mobility  Issues and Beta-blocker therapy.  Participated in Low Level exercise and tolerance is poor.     Conclusion       Successful Linq implantation         Imaging  Chest X-ray:   IMPRESSION:  No focal pulmonary consolidation. Follow-up as clinical  indications persist.     This report was finalized on " 1/13/2022 12:27 PM by Dr. Andrew Abdi M.D.       Lab Review   Results from last 7 days   Lab Units 01/13/22  1219   TROPONIN T ng/mL 0.013     Results from last 7 days   Lab Units 01/13/22  1219   MAGNESIUM mg/dL 1.6     Results from last 7 days   Lab Units 01/13/22  1219   SODIUM mmol/L 141   POTASSIUM mmol/L 2.6*   BUN mg/dL 12   CREATININE mg/dL 0.92   CALCIUM mg/dL 9.0        Results from last 7 days   Lab Units 01/13/22  1219   WBC 10*3/mm3 13.49*   HEMOGLOBIN g/dL 12.8   HEMATOCRIT % 37.2   PLATELETS 10*3/mm3 232         The following medical decision was discussed in detail with Dr. Solorio    Assessment:  Acute on chronic hypoxic respiratory failure  Paroxysmal atrial fibrillation: with watchman's device  Chronic diastolic CHF  Hyperlipidemia  Hypertension  Tobacco abuse  GERD  Acute exacerbation of COPD    Recommendations / Plan:   This is a 77 year old female, who is current with our service. She has a history of tobacco abuse, COPD, and diastolic chf. She comes in consult for paroxymal atrial fibrillation. She underwent a cardiac cath at [facility] last year with a stent to the [facility]. We will obtain these records. Most recent remote check showed no events. K+ 2.6, being replaced. Further recommendations once echo has been read.      Labs/tests ordered for am: have loop remotely checked, ecg, trop, bmp, and mag     Ailyn Solorio MD    1/13/2022, 17:14 EST      EMR Dragon/Transcription:   Dictated utilizing Dragon dictation

## 2022-01-13 NOTE — PROGRESS NOTES
Clinical Pharmacy Services: Medication History    Celia Baird is a 77 y.o. female presenting to [facility] for   Chief Complaint   Patient presents with   • Shortness of Breath       She  has a past medical history of Atrial fibrillation (HCC), COPD (chronic obstructive pulmonary disease) (HCC), History of frequent urinary tract infections, Irregular heart beat, Kidney stones, PBC (primary biliary cirrhosis), and PBC (primary biliary cirrhosis).    Allergies as of 01/13/2022 - Reviewed 01/13/2022   Allergen Reaction Noted   • Morphine and related Nausea And Vomiting 04/12/2017   • Levaquin [levofloxacin]  02/26/2017   • Sulfa antibiotics  02/26/2017       Medication information was obtained from: Patient/Son/Pharmacy  Pharmacy and Phone Number:   [id number] - [place], [place] - [address] - [phone]  - [phone]   [address] [facility] [address]  Phone: [phone] Fax: [phone]    [facility] [place]  [address] [address]  Phone: [phone] Fax: [phone]        Prior to Admission Medications     Prescriptions Last Dose Informant Patient Reported? Taking?    aspirin 81 MG EC tablet  Family Member Yes Yes    Take 81 mg by mouth Daily.    bumetanide (BUMEX) 0.5 MG tablet  Pharmacy Yes Yes    Take 0.5 mg by mouth Daily.    clopidogrel (PLAVIX) 75 MG tablet  Child Yes Yes    Take 75 mg by mouth Daily.    furosemide (LASIX) 20 MG tablet  Child Yes Yes    Take 10 mg by mouth Daily As Needed.    ipratropium-albuterol (DUO-NEB) 0.5-2.5 mg/3 ml nebulizer  Child Yes Yes    Take 3 mL by nebulization 2 (Two) Times a Day.    propafenone (RYTHMOL) 150 MG tablet  Child No Yes    TAKE 1/2 (ONE-HALF) TABLET BY MOUTH EVERY 6 HOURS    Patient taking differently:  Take 75 mg by mouth Every 6 (Six) Hours.    rosuvastatin (CRESTOR) 5 MG tablet  Pharmacy Yes Yes    Take 5 mg by mouth Daily.    spironolactone (ALDACTONE) 25 MG tablet  Pharmacy Yes Yes    Take  50 mg by mouth Daily.    ursodiol (ACTIGALL) 300 MG capsule  Pharmacy Yes Yes    Take 300 mg by mouth 3 (Three) Times a Day.            Medication notes: Patient's son states the Patient was recently put on prednisone 10 mg tablets and states he thinks he saw her take one today, however according to the pharmcy the Patient should have already finished her prednisone.Unable to confirm with the Patient at this time.      This medication list is complete to the best of my knowledge as of 1/13/2022    Please call if questions.    Luciano Christie  Medication History Technician  [phone]'  1/13/2022 14:23 EST

## 2022-01-13 NOTE — H&P
History and physical    Primary care physician  Dr. Gomez    Chief complaint  Shortness of breath    History of present illness  77-year old white female with history of chronic hypoxic respiratory failure COPD chronic atrial fibrillation congestive heart failure hypertension hyperlipidemia who continue to smoke and lives by herself to the emergency room with increased shortness of breath for last 1 week.  Patient denies any fever chills but does have chronic productive cough.  Patient denies chest pain abdominal pain nausea vomiting diarrhea.  Patient work-up in ER revealed acute on chronic hypoxic respiratory failure with acute exacerbation of COPD admitted for management.  Patient also found to have severe hypokalemia.    PAST MEDICAL HISTORY  • Atrial fibrillation (HCC)     • COPD (chronic obstructive pulmonary disease) (HCC)     • History of frequent urinary tract infections     • Irregular heart beat     • Kidney stones     • PBC (primary biliary cirrhosis)     • PBC (primary biliary cirrhosis)        PAST SURGICAL HISTORY              Procedure Laterality Date   • CARDIAC ELECTROPHYSIOLOGY PROCEDURE N/A 3/30/2017     Procedure:    LINQ;  Surgeon: Ailyn Solorio MD;  Location: [facility] INVASIVE LOCATION;  Service:    • CHOLECYSTECTOMY       • TUBAL ABDOMINAL LIGATION             FAMILY HISTORY           Problem Relation Age of Onset   • Heart disease Father     • Heart attack Father     • Heart disease Brother     • Stroke Mother        SOCIAL HISTORY                 Socioeconomic History   • Marital status:    Tobacco Use   • Smoking status: Current Some Day Smoker       Packs/day: 0.50       Years: 59.00       Pack years: 29.50       Types: Cigarettes   • Smokeless tobacco: Never Used   Substance and Sexual Activity   • Alcohol use: No   • Drug use: No   • Sexual activity: Defer       Birth control/protection: Post-menopausal, Surgical         ALLERGIES  Morphine and related, Levaquin  "[levofloxacin], and Sulfa antibiotics  Home medications reviewed     REVIEW OF SYSTEMS  All systems reviewed and negative except for those discussed in HPI.      PHYSICAL EXAM  Blood pressure 116/62, pulse 75, temperature 97.9 °F (36.6 °C), temperature source Tympanic, resp. rate 22, height 152.4 cm (60\"), weight 36.3 kg (80 lb), SpO2 98 %.    GENERAL: Chronically ill-appearing, nontoxic appearing, moderately distressed  HENT: normocephalic, atraumatic  EYES: no scleral icterus, PERRL  CV: regular rhythm, regular rate, no murmur  RESPIRATORY: Tachypneic, coarse breath sounds throughout  ABDOMEN: soft, nontender nondistended bowel sounds positive  MUSCULOSKELETAL: no deformity, no lower extremity edema or calf tenderness bilaterally  NEURO: alert, moves all extremities, follows commands, mental status normal/baseline  SKIN: warm, dry, no rash   Psych: Appropriate mood and affect    LAB RESULTS  Lab Results (last 24 hours)     Procedure Component Value Units Date/Time    Magnesium [[id number]]  (Normal) Collected: 01/13/22 1219    Specimen: Blood Updated: 01/13/22 1544     Magnesium 1.6 mg/dL     Blood Culture - Blood, Arm, Left [[id number]] Collected: 01/13/22 1347    Specimen: Blood from Arm, Left Updated: 01/13/22 1406    COVID PRE-OP / PRE-PROCEDURE SCREENING ORDER (NO ISOLATION) - Swab, Nasopharynx [[id number]]  (Normal) Collected: 01/13/22 1230    Specimen: Swab from Nasopharynx Updated: 01/13/22 1346    Narrative:      The following orders were created for panel order COVID PRE-OP / PRE-PROCEDURE SCREENING ORDER (NO ISOLATION) - Swab, Nasopharynx.  Procedure                               Abnormality         Status                     ---------                               -----------         ------                     COVID-19, [place] IN-HOUSE...[[id number]]  Normal              Final result                 Please view results for these tests on the individual orders.    COVID-19,[facility] [place] IN-HOUSE CEPHEID/GABINO NP " SWAB IN TRANSPORT MEDIA 8-12 HR TAT - Swab, Nasopharynx [[id number]]  (Normal) Collected: 01/13/22 1230    Specimen: Swab from Nasopharynx Updated: 01/13/22 1346     COVID19 Not Detected    Narrative:      Fact sheet for providers: https://www.fda.gov/media/648252/download     Fact sheet for patients: https://www.fda.gov/media/445608/download    [place] Draw [[id number]] Collected: 01/13/22 1219    Specimen: Blood Updated: 01/13/22 1330    Narrative:      The following orders were created for panel order [place] Draw.  Procedure                               Abnormality         Status                     ---------                               -----------         ------                     Green Top (Gel)[[id number]]                                  Final result               Lavender Top[[id number]]                                     Final result               Gold Top - SST[[id number]]                                   Final result               Light Blue Top[[id number]]                                   Final result                 Please view results for these tests on the individual orders.    Light Blue Top [[id number]] Collected: 01/13/22 1219    Specimen: Blood Updated: 01/13/22 1330     Extra Tube hold for add-on     Comment: Auto resulted       Green Top (Gel) [[id number]] Collected: 01/13/22 1219    Specimen: Blood Updated: 01/13/22 1330     Extra Tube Hold for add-ons.     Comment: Auto resulted.       Lavender Top [[id number]] Collected: 01/13/22 1219    Specimen: Blood Updated: 01/13/22 1330     Extra Tube hold for add-on     Comment: Auto resulted       Gold Top - SST [[id number]] Collected: 01/13/22 1219    Specimen: Blood Updated: 01/13/22 1330     Extra Tube Hold for add-ons.     Comment: Auto resulted.       Comprehensive Metabolic Panel [[id number]]  (Abnormal) Collected: 01/13/22 1219    Specimen: Blood Updated: 01/13/22 1317     Glucose 97 mg/dL      BUN 12 mg/dL      Creatinine 0.92 mg/dL      Sodium  "141 mmol/L      Potassium 2.6 mmol/L      Chloride 97 mmol/L      CO2 34.1 mmol/L      Calcium 9.0 mg/dL      Total Protein 5.9 g/dL      Albumin 4.10 g/dL      ALT (SGPT) 7 U/L      AST (SGOT) 12 U/L      Alkaline Phosphatase 112 U/L      Total Bilirubin 0.4 mg/dL      eGFR Non African Amer 59 mL/min/1.73      Globulin 1.8 gm/dL      A/G Ratio 2.3 g/dL      BUN/Creatinine Ratio 13.0     Anion Gap 9.9 mmol/L     Narrative:      GFR Normal >60  Chronic Kidney Disease <60  Kidney Failure <15      Blood Gas, Arterial - [[id number]]  (Abnormal) Collected: 01/13/22 1309    Specimen: Arterial Blood Updated: 01/13/22 1312     Site Arterial: right brachial     Ronak's Test N/A     pH, Arterial 7.480 pH units      pCO2, Arterial 47.9 mm Hg      pO2, Arterial 84.4 mm Hg      HCO3, Arterial 35.6 mmol/L      Base Excess, Arterial 10.6 mmol/L      O2 Saturation Calculated 96.9 %      Barometric Pressure for Blood Gas 746.5 mmHg      Comment: Meter: [id number] : [id number] Cecelia Carolann        Modality Cannula     Flow Rate 2.5 lpm     Procalcitonin [[id number]]  (Normal) Collected: 01/13/22 1219    Specimen: Blood Updated: 01/13/22 1310     Procalcitonin 0.05 ng/mL     Narrative:      As a Marker for Sepsis (Non-Neonates):     1. <0.5 ng/mL represents a low risk of severe sepsis and/or septic shock.  2. >2 ng/mL represents a high risk of severe sepsis and/or septic shock.    As a Marker for Lower Respiratory Tract Infections that require antibiotic therapy:  PCT on Admission     Antibiotic Therapy             6-12 Hrs later  >0.5                          Strongly Recommended            >0.25 - <0.5             Recommended  0.1 - 0.25                  Discouraged                       Remeasure/reassess PCT  <0.1                         Strongly Discouraged         Remeasure/reassess PCT      As 28 day mortality risk marker: \"Change in Procalcitonin Result\" (>80% or <=80%) if Day 0 (or Day 1) and Day 4 values are " available. Refer to http://www.[facility]-pct-calculator.com/    Change in PCT <=80 %   A decrease of PCT levels below or equal to 80% defines a positive change in PCT test result representing a higher risk for 28-day all-cause mortality of patients diagnosed with severe sepsis or septic shock.    Change in PCT >80 %   A decrease of PCT levels of more than 80% defines a negative change in PCT result representing a lower risk for 28-day all-cause mortality of patients diagnosed with severe sepsis or septic shock.                Troponin [[id number]]  (Normal) Collected: 01/13/22 1219    Specimen: Blood Updated: 01/13/22 1303     Troponin T 0.013 ng/mL     Narrative:      Troponin T Reference Range:  <= 0.03 ng/mL-   Negative for AMI  >0.03 ng/mL-     Abnormal for myocardial necrosis.  Clinicians would have to utilize clinical acumen, EKG, Troponin and serial changes to determine if it is an Acute Myocardial Infarction or myocardial injury due to an underlying chronic condition.       Results may be falsely decreased if patient taking Biotin.      Lactic Acid, Plasma [[id number]]  (Normal) Collected: 01/13/22 1219    Specimen: Blood Updated: 01/13/22 1302     Lactate 1.5 mmol/L     BNP [[id number]]  (Normal) Collected: 01/13/22 1219    Specimen: Blood Updated: 01/13/22 1301     proBNP 585.0 pg/mL     Narrative:      Among patients with dyspnea, NT-proBNP is highly sensitive for the detection of acute congestive heart failure. In addition NT-proBNP of <300 pg/ml effectively rules out acute congestive heart failure with 99% negative predictive value.    Results may be falsely decreased if patient taking Biotin.      CBC & Differential [[id number]]  (Abnormal) Collected: 01/13/22 1219    Specimen: Blood Updated: 01/13/22 1237    Narrative:      The following orders were created for panel order CBC & Differential.  Procedure                               Abnormality         Status                     ---------                                -----------         ------                     CBC Auto Differential[[id number]]        Abnormal            Final result                 Please view results for these tests on the individual orders.    CBC Auto Differential [[id number]]  (Abnormal) Collected: 01/13/22 1219    Specimen: Blood Updated: 01/13/22 1237     WBC 13.49 10*3/mm3      RBC 4.13 10*6/mm3      Hemoglobin 12.8 g/dL      Hematocrit 37.2 %      MCV 90.1 fL      MCH 31.0 pg      MCHC 34.4 g/dL      RDW 12.8 %      RDW-SD 41.5 fl      MPV 12.4 fL      Platelets 232 10*3/mm3      Neutrophil % 74.4 %      Lymphocyte % 12.2 %      Monocyte % 10.7 %      Eosinophil % 1.3 %      Basophil % 0.9 %      Immature Grans % 0.5 %      Neutrophils, Absolute 10.04 10*3/mm3      Lymphocytes, Absolute 1.65 10*3/mm3      Monocytes, Absolute 1.44 10*3/mm3      Eosinophils, Absolute 0.17 10*3/mm3      Basophils, Absolute 0.12 10*3/mm3      Immature Grans, Absolute 0.07 10*3/mm3      nRBC 0.0 /100 WBC     Blood Culture - Blood, Arm, Left [[id number]] Collected: 01/13/22 1219    Specimen: Blood from Arm, Left Updated: 01/13/22 1232        Imaging Results (Last 24 Hours)     Procedure Component Value Units Date/Time    XR Chest 1 View [[id number]] Collected: 01/13/22 1224     Updated: 01/13/22 1230    Narrative:      XR CHEST 1 VW-     HISTORY: Female who is 77 years-old,  short of breath     TECHNIQUE: Frontal view of the chest     COMPARISON: 03/20/2020     FINDINGS: The heart size is normal. Aorta is calcified. Pulmonary  vasculature is unremarkable. No focal pulmonary consolidation, pleural  effusion, or pneumothorax. Old granulomatous disease is seen. No acute  osseous process.       Impression:      No focal pulmonary consolidation. Follow-up as clinical  indications persist.     This report was finalized on 1/13/2022 12:27 PM by Dr. Andrew Abdi M.D.                ECG 12 Lead  Component   Ref Range & Units 1/13/22 1356 1/13/22 1253   QT Interval   ms  373 P  409 P              HEART RATE= 81  bpm  RR Interval= 744  ms  WI Interval= 71  ms  P Horizontal Axis= 219  deg  P Front Axis= 219  deg  QRSD Interval= 96  ms  QT Interval= 373  ms  QRS Axis= 105  deg  T Wave Axis= 201  deg  - ABNORMAL ECG -  Sinus or ectopic atrial rhythm  Ventricular trigeminy  Short WI interval  Right axis deviation  Repol abnrm suggests ischemia, diffuse leads             Current Facility-Administered Medications:   •  aspirin EC tablet 81 mg, 81 mg, Oral, Daily, Eulogio Mcdaniel MD  •  budesonide-formoterol (SYMBICORT) 160-4.5 MCG/ACT inhaler 2 puff, 2 puff, Inhalation, BID - RT, Eulogio Mcdaniel MD  •  bumetanide (BUMEX) tablet 1 mg, 1 mg, Oral, BID, Eulogio Mcdaniel MD  •  clopidogrel (PLAVIX) tablet 75 mg, 75 mg, Oral, Daily, Eulogio Mcdaniel MD  •  guaiFENesin (MUCINEX) 12 hr tablet 600 mg, 600 mg, Oral, Q12H, Eulogio Mcdaniel MD  •  ipratropium-albuterol (DUO-NEB) nebulizer solution 3 mL, 3 mL, Nebulization, 4x Daily - RT, Eulogio Mcdaniel MD  •  LORazepam (ATIVAN) injection 0.5 mg, 0.5 mg, Intravenous, Q6H PRN, Eulogio Mcdaniel MD  •  methylPREDNISolone sodium succinate (SOLU-Medrol) injection 125 mg, 125 mg, Intravenous, Q6H, Eulogio Mcdaniel MD  •  potassium chloride (K-DUR,KLOR-CON) ER tablet 20 mEq, 20 mEq, Oral, TID With Meals, Eulogio Mcdaniel MD  •  propafenone (RYTHMOL) tablet 150 mg, 150 mg, Oral, Q8H, Eulogio Mcdaniel MD  •  rosuvastatin (CRESTOR) tablet 5 mg, 5 mg, Oral, Daily, Eulogio Mcdaniel MD  •  sodium chloride 0.9 % flush 10 mL, 10 mL, Intravenous, PRN, Binu Moreno MD  •  [COMPLETED] Insert peripheral IV, , , Once **AND** sodium chloride 0.9 % flush 10 mL, 10 mL, Intravenous, PRN, Vicki Alejandre, BRAYAN  •  spironolactone (ALDACTONE) tablet 50 mg, 50 mg, Oral, Daily, Eulogio Mcdaniel MD  •  ursodiol (ACTIGALL) capsule 300 mg, 300 mg, Oral, TID, Eulogio Mcdaniel MD    Current Outpatient Medications:   •  aspirin 81 MG EC tablet, Take 81 mg by mouth Daily., Disp: , Rfl:   •  bumetanide  (BUMEX) 0.5 MG tablet, Take 0.5 mg by mouth Daily., Disp: , Rfl:   •  clopidogrel (PLAVIX) 75 MG tablet, Take 75 mg by mouth Daily., Disp: , Rfl:   •  furosemide (LASIX) 20 MG tablet, Take 10 mg by mouth Daily As Needed., Disp: , Rfl:   •  ipratropium-albuterol (DUO-NEB) 0.5-2.5 mg/3 ml nebulizer, Take 3 mL by nebulization 2 (Two) Times a Day., Disp: , Rfl:   •  propafenone (RYTHMOL) 150 MG tablet, TAKE 1/2 (ONE-HALF) TABLET BY MOUTH EVERY 6 HOURS (Patient taking differently: Take 75 mg by mouth Every 6 (Six) Hours.), Disp: 60 tablet, Rfl: 0  •  rosuvastatin (CRESTOR) 5 MG tablet, Take 5 mg by mouth Daily., Disp: , Rfl:   •  spironolactone (ALDACTONE) 25 MG tablet, Take 50 mg by mouth Daily., Disp: , Rfl:   •  ursodiol (ACTIGALL) 300 MG capsule, Take 300 mg by mouth 3 (Three) Times a Day., Disp: , Rfl:      ASSESSMENT  Acute on chronic hypoxic respiratory failure  Acute exacerbation of COPD  Chronic diastolic CHF  Paroxysmal atrial fibrillation  Hypertension  Hyperlipidemia  Ongoing tobacco abuse  Gastroesophageal reflux disease    PLAN  Admit  NIPPV  IV steroids  Anticoagulation  Replace potassium and check magnesium level  Nebulizer treatment every 4 hours  Pulmonary hygiene  Adjust home medications  Stress ulcer DVT prophylaxis  Pulmonary consult  Cardiology to follow patient  DNR  Discussed with family and nursing staff  Follow closely and further recommendation according to hospital course    JONNY PEOPLES MD

## 2022-01-14 ENCOUNTER — APPOINTMENT (OUTPATIENT)
Dept: CARDIOLOGY | Facility: HOSPITAL | Age: 77
End: 2022-01-14

## 2022-01-14 LAB
ALBUMIN SERPL-MCNC: 3.2 G/DL (ref 3.5–5.2)
ALBUMIN/GLOB SERPL: 1.5 G/DL
ALP SERPL-CCNC: 98 U/L (ref 39–117)
ALT SERPL W P-5'-P-CCNC: 7 U/L (ref 1–33)
ANION GAP SERPL CALCULATED.3IONS-SCNC: 11.3 MMOL/L (ref 5–15)
AORTIC DIMENSIONLESS INDEX: 0.8 (DI)
AST SERPL-CCNC: 11 U/L (ref 1–32)
BASOPHILS # BLD AUTO: 0.01 10*3/MM3 (ref 0–0.2)
BASOPHILS NFR BLD AUTO: 0.1 % (ref 0–1.5)
BH CV ECHO MEAS - ACS: 2.3 CM
BH CV ECHO MEAS - AO MAX PG (FULL): 5.7 MMHG
BH CV ECHO MEAS - AO MAX PG: 9.2 MMHG
BH CV ECHO MEAS - AO MEAN PG (FULL): 2 MMHG
BH CV ECHO MEAS - AO MEAN PG: 3.9 MMHG
BH CV ECHO MEAS - AO ROOT AREA (BSA CORRECTED): 2.5
BH CV ECHO MEAS - AO ROOT AREA: 8.2 CM^2
BH CV ECHO MEAS - AO ROOT DIAM: 3.2 CM
BH CV ECHO MEAS - AO V2 MAX: 152 CM/SEC
BH CV ECHO MEAS - AO V2 MEAN: 88.3 CM/SEC
BH CV ECHO MEAS - AO V2 VTI: 24.5 CM
BH CV ECHO MEAS - AVA(I,A): 2.2 CM^2
BH CV ECHO MEAS - AVA(I,D): 2.2 CM^2
BH CV ECHO MEAS - AVA(V,A): 1.7 CM^2
BH CV ECHO MEAS - AVA(V,D): 1.7 CM^2
BH CV ECHO MEAS - BSA(HAYCOCK): 1.2 M^2
BH CV ECHO MEAS - BSA: 1.3 M^2
BH CV ECHO MEAS - BZI_BMI: 15.6 KILOGRAMS/M^2
BH CV ECHO MEAS - BZI_METRIC_HEIGHT: 152.4 CM
BH CV ECHO MEAS - BZI_METRIC_WEIGHT: 36.3 KG
BH CV ECHO MEAS - EDV(CUBED): 96.8 ML
BH CV ECHO MEAS - EDV(MOD-SP2): 50 ML
BH CV ECHO MEAS - EDV(MOD-SP4): 52 ML
BH CV ECHO MEAS - EDV(TEICH): 96.9 ML
BH CV ECHO MEAS - EF(CUBED): 66.6 %
BH CV ECHO MEAS - EF(MOD-BP): 61.9 %
BH CV ECHO MEAS - EF(MOD-SP2): 62 %
BH CV ECHO MEAS - EF(MOD-SP4): 59.6 %
BH CV ECHO MEAS - EF(TEICH): 58.2 %
BH CV ECHO MEAS - ESV(CUBED): 32.3 ML
BH CV ECHO MEAS - ESV(MOD-SP2): 19 ML
BH CV ECHO MEAS - ESV(MOD-SP4): 21 ML
BH CV ECHO MEAS - ESV(TEICH): 40.5 ML
BH CV ECHO MEAS - FS: 30.6 %
BH CV ECHO MEAS - IVS/LVPW: 1.1
BH CV ECHO MEAS - IVSD: 0.76 CM
BH CV ECHO MEAS - LAT PEAK E' VEL: 6.4 CM/SEC
BH CV ECHO MEAS - LV DIASTOLIC VOL/BSA (35-75): 41.1 ML/M^2
BH CV ECHO MEAS - LV MASS(C)D: 103.2 GRAMS
BH CV ECHO MEAS - LV MASS(C)DI: 81.6 GRAMS/M^2
BH CV ECHO MEAS - LV MAX PG: 3.6 MMHG
BH CV ECHO MEAS - LV MEAN PG: 1.9 MMHG
BH CV ECHO MEAS - LV SYSTOLIC VOL/BSA (12-30): 16.6 ML/M^2
BH CV ECHO MEAS - LV V1 MAX: 94.3 CM/SEC
BH CV ECHO MEAS - LV V1 MEAN: 64 CM/SEC
BH CV ECHO MEAS - LV V1 VTI: 19.1 CM
BH CV ECHO MEAS - LVIDD: 4.6 CM
BH CV ECHO MEAS - LVIDS: 3.2 CM
BH CV ECHO MEAS - LVLD AP2: 6.3 CM
BH CV ECHO MEAS - LVLD AP4: 7.3 CM
BH CV ECHO MEAS - LVLS AP2: 4.7 CM
BH CV ECHO MEAS - LVLS AP4: 5.3 CM
BH CV ECHO MEAS - LVOT AREA (M): 2.8 CM^2
BH CV ECHO MEAS - LVOT AREA: 2.8 CM^2
BH CV ECHO MEAS - LVOT DIAM: 1.9 CM
BH CV ECHO MEAS - LVPWD: 0.68 CM
BH CV ECHO MEAS - MED PEAK E' VEL: 5.3 CM/SEC
BH CV ECHO MEAS - MV A DUR: 0.12 SEC
BH CV ECHO MEAS - MV A MAX VEL: 60.8 CM/SEC
BH CV ECHO MEAS - MV DEC SLOPE: 539.7 CM/SEC^2
BH CV ECHO MEAS - MV DEC TIME: 227 SEC
BH CV ECHO MEAS - MV E MAX VEL: 75 CM/SEC
BH CV ECHO MEAS - MV E/A: 1.2
BH CV ECHO MEAS - MV MAX PG: 3.6 MMHG
BH CV ECHO MEAS - MV MEAN PG: 1.8 MMHG
BH CV ECHO MEAS - MV P1/2T MAX VEL: 101.8 CM/SEC
BH CV ECHO MEAS - MV P1/2T: 55.2 MSEC
BH CV ECHO MEAS - MV V2 MAX: 95 CM/SEC
BH CV ECHO MEAS - MV V2 MEAN: 62.2 CM/SEC
BH CV ECHO MEAS - MV V2 VTI: 18.9 CM
BH CV ECHO MEAS - MVA P1/2T LCG: 2.2 CM^2
BH CV ECHO MEAS - MVA(P1/2T): 4 CM^2
BH CV ECHO MEAS - MVA(VTI): 2.8 CM^2
BH CV ECHO MEAS - PA ACC TIME: 0.09 SEC
BH CV ECHO MEAS - PA MAX PG (FULL): 1.6 MMHG
BH CV ECHO MEAS - PA MAX PG: 3.9 MMHG
BH CV ECHO MEAS - PA PR(ACCEL): 38.6 MMHG
BH CV ECHO MEAS - PA V2 MAX: 98.4 CM/SEC
BH CV ECHO MEAS - PVA(V,A): 2 CM^2
BH CV ECHO MEAS - PVA(V,D): 2 CM^2
BH CV ECHO MEAS - QP/QS: 0.8
BH CV ECHO MEAS - RAP SYSTOLE: 15 MMHG
BH CV ECHO MEAS - RV MAX PG: 2.2 MMHG
BH CV ECHO MEAS - RV MEAN PG: 1.2 MMHG
BH CV ECHO MEAS - RV V1 MAX: 75 CM/SEC
BH CV ECHO MEAS - RV V1 MEAN: 51.8 CM/SEC
BH CV ECHO MEAS - RV V1 VTI: 16.6 CM
BH CV ECHO MEAS - RVOT AREA: 2.6 CM^2
BH CV ECHO MEAS - RVOT DIAM: 1.8 CM
BH CV ECHO MEAS - RVSP: 37.8 MMHG
BH CV ECHO MEAS - SI(AO): 157.8 ML/M^2
BH CV ECHO MEAS - SI(CUBED): 51 ML/M^2
BH CV ECHO MEAS - SI(LVOT): 42 ML/M^2
BH CV ECHO MEAS - SI(MOD-SP2): 24.5 ML/M^2
BH CV ECHO MEAS - SI(MOD-SP4): 24.5 ML/M^2
BH CV ECHO MEAS - SI(TEICH): 44.6 ML/M^2
BH CV ECHO MEAS - SV(AO): 199.6 ML
BH CV ECHO MEAS - SV(CUBED): 64.5 ML
BH CV ECHO MEAS - SV(LVOT): 53.2 ML
BH CV ECHO MEAS - SV(MOD-SP2): 31 ML
BH CV ECHO MEAS - SV(MOD-SP4): 31 ML
BH CV ECHO MEAS - SV(RVOT): 42.6 ML
BH CV ECHO MEAS - SV(TEICH): 56.4 ML
BH CV ECHO MEAS - TAPSE (>1.6): 1.7 CM
BH CV ECHO MEAS - TR MAX VEL: 238.9 CM/SEC
BH CV ECHO MEASUREMENTS AVERAGE E/E' RATIO: 12.82
BH CV VAS BP RIGHT ARM: NORMAL MMHG
BH CV XLRA - RV BASE: 2.8 CM
BH CV XLRA - RV LENGTH: 5.5 CM
BH CV XLRA - RV MID: 3.1 CM
BH CV XLRA - TDI S': 12.5 CM/SEC
BILIRUB SERPL-MCNC: 0.4 MG/DL (ref 0–1.2)
BUN SERPL-MCNC: 19 MG/DL (ref 8–23)
BUN/CREAT SERPL: 18.3 (ref 7–25)
CALCIUM SPEC-SCNC: 8.9 MG/DL (ref 8.6–10.5)
CHLORIDE SERPL-SCNC: 92 MMOL/L (ref 98–107)
CHOLEST SERPL-MCNC: 141 MG/DL (ref 0–200)
CO2 SERPL-SCNC: 32.7 MMOL/L (ref 22–29)
CREAT SERPL-MCNC: 1.04 MG/DL (ref 0.57–1)
DEPRECATED RDW RBC AUTO: 39.4 FL (ref 37–54)
EOSINOPHIL # BLD AUTO: 0 10*3/MM3 (ref 0–0.4)
EOSINOPHIL NFR BLD AUTO: 0 % (ref 0.3–6.2)
ERYTHROCYTE [DISTWIDTH] IN BLOOD BY AUTOMATED COUNT: 12.3 % (ref 12.3–15.4)
GFR SERPL CREATININE-BSD FRML MDRD: 51 ML/MIN/1.73
GLOBULIN UR ELPH-MCNC: 2.1 GM/DL
GLUCOSE SERPL-MCNC: 206 MG/DL (ref 65–99)
HBA1C MFR BLD: 5.5 % (ref 4.8–5.6)
HCT VFR BLD AUTO: 35.1 % (ref 34–46.6)
HDLC SERPL-MCNC: 70 MG/DL (ref 40–60)
HGB BLD-MCNC: 12.1 G/DL (ref 12–15.9)
IMM GRANULOCYTES # BLD AUTO: 0.03 10*3/MM3 (ref 0–0.05)
IMM GRANULOCYTES NFR BLD AUTO: 0.4 % (ref 0–0.5)
LDLC SERPL CALC-MCNC: 58 MG/DL (ref 0–100)
LDLC/HDLC SERPL: 0.84 {RATIO}
LEFT ATRIUM VOLUME INDEX: 30 ML/M2
LYMPHOCYTES # BLD AUTO: 0.82 10*3/MM3 (ref 0.7–3.1)
LYMPHOCYTES NFR BLD AUTO: 12.1 % (ref 19.6–45.3)
MAGNESIUM SERPL-MCNC: 1.6 MG/DL (ref 1.6–2.4)
MAXIMAL PREDICTED HEART RATE: 143 BPM
MCH RBC QN AUTO: 30.4 PG (ref 26.6–33)
MCHC RBC AUTO-ENTMCNC: 34.5 G/DL (ref 31.5–35.7)
MCV RBC AUTO: 88.2 FL (ref 79–97)
MONOCYTES # BLD AUTO: 0.4 10*3/MM3 (ref 0.1–0.9)
MONOCYTES NFR BLD AUTO: 5.9 % (ref 5–12)
NEUTROPHILS NFR BLD AUTO: 5.52 10*3/MM3 (ref 1.7–7)
NEUTROPHILS NFR BLD AUTO: 81.5 % (ref 42.7–76)
NRBC BLD AUTO-RTO: 0 /100 WBC (ref 0–0.2)
PLATELET # BLD AUTO: 201 10*3/MM3 (ref 140–450)
PMV BLD AUTO: 12.5 FL (ref 6–12)
POTASSIUM SERPL-SCNC: 2.9 MMOL/L (ref 3.5–5.2)
PROT SERPL-MCNC: 5.3 G/DL (ref 6–8.5)
QT INTERVAL: 362 MS
RBC # BLD AUTO: 3.98 10*6/MM3 (ref 3.77–5.28)
SINUS: 2.8 CM
SODIUM SERPL-SCNC: 136 MMOL/L (ref 136–145)
STRESS TARGET HR: 122 BPM
T4 FREE SERPL-MCNC: 1.82 NG/DL (ref 0.93–1.7)
TRIGL SERPL-MCNC: 62 MG/DL (ref 0–150)
TROPONIN T SERPL-MCNC: <0.01 NG/ML (ref 0–0.03)
TSH SERPL DL<=0.05 MIU/L-ACNC: 0.7 UIU/ML (ref 0.27–4.2)
VLDLC SERPL-MCNC: 13 MG/DL (ref 5–40)
WBC NRBC COR # BLD: 6.78 10*3/MM3 (ref 3.4–10.8)

## 2022-01-14 PROCEDURE — 94799 UNLISTED PULMONARY SVC/PX: CPT

## 2022-01-14 PROCEDURE — 84439 ASSAY OF FREE THYROXINE: CPT | Performed by: HOSPITALIST

## 2022-01-14 PROCEDURE — 25010000002 METHYLPREDNISOLONE PER 40 MG: Performed by: HOSPITALIST

## 2022-01-14 PROCEDURE — 80053 COMPREHEN METABOLIC PANEL: CPT | Performed by: HOSPITALIST

## 2022-01-14 PROCEDURE — 93005 ELECTROCARDIOGRAM TRACING: CPT | Performed by: NURSE PRACTITIONER

## 2022-01-14 PROCEDURE — 85025 COMPLETE CBC W/AUTO DIFF WBC: CPT | Performed by: HOSPITALIST

## 2022-01-14 PROCEDURE — 83735 ASSAY OF MAGNESIUM: CPT | Performed by: HOSPITALIST

## 2022-01-14 PROCEDURE — 93306 TTE W/DOPPLER COMPLETE: CPT

## 2022-01-14 PROCEDURE — 93306 TTE W/DOPPLER COMPLETE: CPT | Performed by: INTERNAL MEDICINE

## 2022-01-14 PROCEDURE — 84443 ASSAY THYROID STIM HORMONE: CPT | Performed by: HOSPITALIST

## 2022-01-14 PROCEDURE — 80061 LIPID PANEL: CPT | Performed by: HOSPITALIST

## 2022-01-14 PROCEDURE — 25010000002 METHYLPREDNISOLONE PER 125 MG: Performed by: HOSPITALIST

## 2022-01-14 PROCEDURE — 83036 HEMOGLOBIN GLYCOSYLATED A1C: CPT | Performed by: HOSPITALIST

## 2022-01-14 PROCEDURE — 99232 SBSQ HOSP IP/OBS MODERATE 35: CPT | Performed by: INTERNAL MEDICINE

## 2022-01-14 PROCEDURE — 84132 ASSAY OF SERUM POTASSIUM: CPT | Performed by: HOSPITALIST

## 2022-01-14 PROCEDURE — 94761 N-INVAS EAR/PLS OXIMETRY MLT: CPT

## 2022-01-14 PROCEDURE — 36415 COLL VENOUS BLD VENIPUNCTURE: CPT | Performed by: HOSPITALIST

## 2022-01-14 PROCEDURE — 84484 ASSAY OF TROPONIN QUANT: CPT | Performed by: NURSE PRACTITIONER

## 2022-01-14 PROCEDURE — 94660 CPAP INITIATION&MGMT: CPT

## 2022-01-14 RX ORDER — METHYLPREDNISOLONE SODIUM SUCCINATE 40 MG/ML
40 INJECTION, POWDER, LYOPHILIZED, FOR SOLUTION INTRAMUSCULAR; INTRAVENOUS EVERY 12 HOURS
Status: DISCONTINUED | OUTPATIENT
Start: 2022-01-14 | End: 2022-01-15

## 2022-01-14 RX ORDER — AZITHROMYCIN 250 MG/1
250 TABLET, FILM COATED ORAL
Status: COMPLETED | OUTPATIENT
Start: 2022-01-14 | End: 2022-01-18

## 2022-01-14 RX ORDER — POTASSIUM CHLORIDE 750 MG/1
30 TABLET, FILM COATED, EXTENDED RELEASE ORAL
Status: DISCONTINUED | OUTPATIENT
Start: 2022-01-14 | End: 2022-01-16

## 2022-01-14 RX ORDER — POTASSIUM CHLORIDE 1.5 G/1.77G
40 POWDER, FOR SOLUTION ORAL AS NEEDED
Status: DISCONTINUED | OUTPATIENT
Start: 2022-01-14 | End: 2022-01-15

## 2022-01-14 RX ORDER — POTASSIUM CHLORIDE 750 MG/1
40 TABLET, FILM COATED, EXTENDED RELEASE ORAL AS NEEDED
Status: DISCONTINUED | OUTPATIENT
Start: 2022-01-14 | End: 2022-01-15

## 2022-01-14 RX ORDER — AZITHROMYCIN 250 MG/1
250 TABLET, FILM COATED ORAL ONCE
Status: COMPLETED | OUTPATIENT
Start: 2022-01-14 | End: 2022-01-14

## 2022-01-14 RX ADMIN — IPRATROPIUM BROMIDE AND ALBUTEROL SULFATE 3 ML: .5; 3 SOLUTION RESPIRATORY (INHALATION) at 20:42

## 2022-01-14 RX ADMIN — METHYLPREDNISOLONE SODIUM SUCCINATE 40 MG: 40 INJECTION, POWDER, FOR SOLUTION INTRAMUSCULAR; INTRAVENOUS at 20:55

## 2022-01-14 RX ADMIN — PROPAFENONE HYDROCHLORIDE 150 MG: 150 TABLET, COATED ORAL at 05:17

## 2022-01-14 RX ADMIN — URSODIOL 300 MG: 300 CAPSULE ORAL at 09:28

## 2022-01-14 RX ADMIN — POTASSIUM CHLORIDE 40 MEQ: 750 TABLET, EXTENDED RELEASE ORAL at 13:28

## 2022-01-14 RX ADMIN — CLOPIDOGREL 75 MG: 75 TABLET, FILM COATED ORAL at 09:28

## 2022-01-14 RX ADMIN — ASPIRIN 81 MG: 81 TABLET, COATED ORAL at 09:28

## 2022-01-14 RX ADMIN — BUDESONIDE AND FORMOTEROL FUMARATE DIHYDRATE 2 PUFF: 160; 4.5 AEROSOL RESPIRATORY (INHALATION) at 07:44

## 2022-01-14 RX ADMIN — IPRATROPIUM BROMIDE AND ALBUTEROL SULFATE 3 ML: .5; 3 SOLUTION RESPIRATORY (INHALATION) at 15:12

## 2022-01-14 RX ADMIN — ROSUVASTATIN CALCIUM 5 MG: 5 TABLET, FILM COATED ORAL at 09:28

## 2022-01-14 RX ADMIN — POTASSIUM CHLORIDE 30 MEQ: 750 TABLET, EXTENDED RELEASE ORAL at 17:41

## 2022-01-14 RX ADMIN — METHYLPREDNISOLONE SODIUM SUCCINATE 125 MG: 125 INJECTION, POWDER, FOR SOLUTION INTRAMUSCULAR; INTRAVENOUS at 09:28

## 2022-01-14 RX ADMIN — METHYLPREDNISOLONE SODIUM SUCCINATE 125 MG: 125 INJECTION, POWDER, FOR SOLUTION INTRAMUSCULAR; INTRAVENOUS at 04:38

## 2022-01-14 RX ADMIN — BUMETANIDE 1 MG: 1 TABLET ORAL at 09:28

## 2022-01-14 RX ADMIN — AZITHROMYCIN DIHYDRATE 250 MG: 250 TABLET, FILM COATED ORAL at 17:41

## 2022-01-14 RX ADMIN — BUDESONIDE AND FORMOTEROL FUMARATE DIHYDRATE 2 PUFF: 160; 4.5 AEROSOL RESPIRATORY (INHALATION) at 20:41

## 2022-01-14 RX ADMIN — BUMETANIDE 1 MG: 1 TABLET ORAL at 20:29

## 2022-01-14 RX ADMIN — POTASSIUM CHLORIDE 20 MEQ: 750 TABLET, EXTENDED RELEASE ORAL at 09:28

## 2022-01-14 RX ADMIN — URSODIOL 300 MG: 300 CAPSULE ORAL at 20:29

## 2022-01-14 RX ADMIN — SPIRONOLACTONE 50 MG: 50 TABLET, FILM COATED ORAL at 09:28

## 2022-01-14 RX ADMIN — GUAIFENESIN 600 MG: 600 TABLET, EXTENDED RELEASE ORAL at 09:28

## 2022-01-14 RX ADMIN — GUAIFENESIN 600 MG: 600 TABLET, EXTENDED RELEASE ORAL at 20:29

## 2022-01-14 RX ADMIN — PROPAFENONE HYDROCHLORIDE 150 MG: 150 TABLET, COATED ORAL at 13:28

## 2022-01-14 RX ADMIN — PROPAFENONE HYDROCHLORIDE 150 MG: 150 TABLET, COATED ORAL at 21:00

## 2022-01-14 RX ADMIN — URSODIOL 300 MG: 300 CAPSULE ORAL at 17:41

## 2022-01-14 NOTE — PLAN OF CARE
Goal Outcome Evaluation:  Plan of Care Reviewed With: patient           Outcome Summary: Discussed with RN. Pt passed a nursing swallow screen and is tolerating a regular diet. Chest xray was negative. ST to sign off at this time. Please reconsult if needed. Thank you.

## 2022-01-14 NOTE — PROGRESS NOTES
"Daily progress note    Chief complaint  Doing much better  Awake and alert  No respiratory distress  Wants to eat  Still with cough     History of present illness  77-year old white female with history of chronic hypoxic respiratory failure COPD chronic atrial fibrillation congestive heart failure hypertension hyperlipidemia who continue to smoke and lives by herself to the emergency room with increased shortness of breath for last 1 week.  Patient denies any fever chills but does have chronic productive cough.  Patient denies chest pain abdominal pain nausea vomiting diarrhea.  Patient work-up in ER revealed acute on chronic hypoxic respiratory failure with acute exacerbation of COPD admitted for management.  Patient also found to have severe hypokalemia.     REVIEW OF SYSTEMS  All systems reviewed and negative except for those discussed in HPI.      PHYSICAL EXAM  Blood pressure 101/45, pulse 89, temperature 97.7 °F (36.5 °C), temperature source Axillary, resp. rate 22, height 152.4 cm (60\"), weight 36.3 kg (80 lb), SpO2 95 %.    GENERAL: Chronically ill-appearing, nontoxic appearing, moderately distressed  HENT: normocephalic, atraumatic  EYES: no scleral icterus, PERRL  CV: regular rhythm, regular rate, no murmur  RESPIRATORY: Tachypneic, coarse breath sounds throughout  ABDOMEN: soft, nontender nondistended bowel sounds positive  MUSCULOSKELETAL: no deformity, no lower extremity edema or calf tenderness bilaterally  NEURO: alert, moves all extremities, follows commands, mental status normal/baseline  SKIN: warm, dry, no rash   Psych: Appropriate mood and affect    LAB RESULTS  Lab Results (last 24 hours)     Procedure Component Value Units Date/Time    Blood Culture - Blood, Arm, Left [[id number]]  (Normal) Collected: 01/13/22 1219    Specimen: Blood from Arm, Left Updated: 01/14/22 1245     Blood Culture No growth at 24 hours    Comprehensive Metabolic Panel [[id number]]  (Abnormal) Collected: 01/14/22 0845    " Specimen: Blood Updated: 01/14/22 1002     Glucose 206 mg/dL      BUN 19 mg/dL      Creatinine 1.04 mg/dL      Sodium 136 mmol/L      Potassium 2.9 mmol/L      Chloride 92 mmol/L      CO2 32.7 mmol/L      Calcium 8.9 mg/dL      Total Protein 5.3 g/dL      Albumin 3.20 g/dL      ALT (SGPT) 7 U/L      AST (SGOT) 11 U/L      Alkaline Phosphatase 98 U/L      Total Bilirubin 0.4 mg/dL      eGFR Non African Amer 51 mL/min/1.73      Globulin 2.1 gm/dL      A/G Ratio 1.5 g/dL      BUN/Creatinine Ratio 18.3     Anion Gap 11.3 mmol/L     Narrative:      GFR Normal >60  Chronic Kidney Disease <60  Kidney Failure <15      Lipid Panel [[id number]]  (Abnormal) Collected: 01/14/22 0845    Specimen: Blood Updated: 01/14/22 0959     Total Cholesterol 141 mg/dL      Triglycerides 62 mg/dL      HDL Cholesterol 70 mg/dL      LDL Cholesterol  58 mg/dL      VLDL Cholesterol 13 mg/dL      LDL/HDL Ratio 0.84    Narrative:      Cholesterol Reference Ranges  (U.S. Department of Health and Human Services ATP III Classifications)    Desirable          <200 mg/dL  Borderline High    200-239 mg/dL  High Risk          >240 mg/dL      Triglyceride Reference Ranges  (U.S. Department of Health and Human Services ATP III Classifications)    Normal           <150 mg/dL  Borderline High  150-199 mg/dL  High             200-499 mg/dL  Very High        >500 mg/dL    HDL Reference Ranges  (U.S. Department of Health and Human Services ATP III Classifcations)    Low     <40 mg/dl (major risk factor for CHD)  High    >60 mg/dl ('negative' risk factor for CHD)        LDL Reference Ranges  (U.S. Department of Health and Human Services ATP III Classifcations)    Optimal          <100 mg/dL  Near Optimal     100-129 mg/dL  Borderline High  130-159 mg/dL  High             160-189 mg/dL  Very High        >189 mg/dL    Troponin [[id number]]  (Normal) Collected: 01/14/22 0845    Specimen: Blood Updated: 01/14/22 0959     Troponin T <0.010 ng/mL     Narrative:       Troponin T Reference Range:  <= 0.03 ng/mL-   Negative for AMI  >0.03 ng/mL-     Abnormal for myocardial necrosis.  Clinicians would have to utilize clinical acumen, EKG, Troponin and serial changes to determine if it is an Acute Myocardial Infarction or myocardial injury due to an underlying chronic condition.       Results may be falsely decreased if patient taking Biotin.      Magnesium [[id number]]  (Normal) Collected: 01/14/22 0845    Specimen: Blood Updated: 01/14/22 0959     Magnesium 1.6 mg/dL     TSH [[id number]]  (Normal) Collected: 01/14/22 0845    Specimen: Blood Updated: 01/14/22 0959     TSH 0.704 uIU/mL     Hemoglobin A1c [[id number]]  (Normal) Collected: 01/14/22 0845    Specimen: Blood Updated: 01/14/22 0935     Hemoglobin A1C 5.50 %     Narrative:      Hemoglobin A1C Ranges:    Increased Risk for Diabetes  5.7% to 6.4%  Diabetes                     >= 6.5%  Diabetic Goal                < 7.0%    CBC & Differential [[id number]]  (Abnormal) Collected: 01/14/22 0845    Specimen: Blood Updated: 01/14/22 0919    Narrative:      The following orders were created for panel order CBC & Differential.  Procedure                               Abnormality         Status                     ---------                               -----------         ------                     CBC Auto Differential[[id number]]        Abnormal            Final result                 Please view results for these tests on the individual orders.    CBC Auto Differential [[id number]]  (Abnormal) Collected: 01/14/22 0845    Specimen: Blood Updated: 01/14/22 0919     WBC 6.78 10*3/mm3      RBC 3.98 10*6/mm3      Hemoglobin 12.1 g/dL      Hematocrit 35.1 %      MCV 88.2 fL      MCH 30.4 pg      MCHC 34.5 g/dL      RDW 12.3 %      RDW-SD 39.4 fl      MPV 12.5 fL      Platelets 201 10*3/mm3      Neutrophil % 81.5 %      Lymphocyte % 12.1 %      Monocyte % 5.9 %      Eosinophil % 0.0 %      Basophil % 0.1 %      Immature Grans % 0.4 %       Neutrophils, Absolute 5.52 10*3/mm3      Lymphocytes, Absolute 0.82 10*3/mm3      Monocytes, Absolute 0.40 10*3/mm3      Eosinophils, Absolute 0.00 10*3/mm3      Basophils, Absolute 0.01 10*3/mm3      Immature Grans, Absolute 0.03 10*3/mm3      nRBC 0.0 /100 WBC     POC Glucose Once [[id number]]  (Normal) Collected: 01/13/22 1831    Specimen: Blood Updated: 01/13/22 1937     Glucose 121 mg/dL      Comment: Meter: [id number] : [id number] Ida Lexi MACK       Magnesium [[id number]]  (Normal) Collected: 01/13/22 1219    Specimen: Blood Updated: 01/13/22 1544     Magnesium 1.6 mg/dL     Blood Culture - Blood, Arm, Left [[id number]] Collected: 01/13/22 1347    Specimen: Blood from Arm, Left Updated: 01/13/22 1406    COVID PRE-OP / PRE-PROCEDURE SCREENING ORDER (NO ISOLATION) - Swab, Nasopharynx [[id number]]  (Normal) Collected: 01/13/22 1230    Specimen: Swab from Nasopharynx Updated: 01/13/22 1346    Narrative:      The following orders were created for panel order COVID PRE-OP / PRE-PROCEDURE SCREENING ORDER (NO ISOLATION) - Swab, Nasopharynx.  Procedure                               Abnormality         Status                     ---------                               -----------         ------                     COVID-19,[facility] IN-HOUSE...[[id number]]  Normal              Final result                 Please view results for these tests on the individual orders.    COVID-19,[facility]-HOU[facility] CEPHEID/GABINO NP SWAB IN TRANSPORT MEDIA 8-12 HR TAT - Swab, Nasopharynx [[id number]]  (Normal) Collected: 01/13/22 1230    Specimen: Swab from Nasopharynx Updated: 01/13/22 1346     COVID19 Not Detected    Narrative:      Fact sheet for providers: https://www.fda.gov/media/640938/download     Fact sheet for patients: https://www.fda.gov/media/424781/download    [facility] Draw [[id number]] Collected: 01/13/22 1219    Specimen: Blood Updated: 01/13/22 1994    Narrative:      The following orders were created for panel order [facility]  Draw.  Procedure                               Abnormality         Status                     ---------                               -----------         ------                     Green Top (Gel)[[id number]]                                  Final result               Lavender Top[[id number]]                                     Final result               Gold Top - SST[[id number]]                                   Final result               Light Blue Top[[id number]]                                   Final result                 Please view results for these tests on the individual orders.    Light Blue Top [[id number]] Collected: 01/13/22 1219    Specimen: Blood Updated: 01/13/22 1330     Extra Tube hold for add-on     Comment: Auto resulted       Green Top (Gel) [[id number]] Collected: 01/13/22 1219    Specimen: Blood Updated: 01/13/22 1330     Extra Tube Hold for add-ons.     Comment: Auto resulted.       Lavender Top [[id number]] Collected: 01/13/22 1219    Specimen: Blood Updated: 01/13/22 1330     Extra Tube hold for add-on     Comment: Auto resulted       Gold Top - SST [[id number]] Collected: 01/13/22 1219    Specimen: Blood Updated: 01/13/22 1330     Extra Tube Hold for add-ons.     Comment: Auto resulted.       Comprehensive Metabolic Panel [[id number]]  (Abnormal) Collected: 01/13/22 1219    Specimen: Blood Updated: 01/13/22 1317     Glucose 97 mg/dL      BUN 12 mg/dL      Creatinine 0.92 mg/dL      Sodium 141 mmol/L      Potassium 2.6 mmol/L      Chloride 97 mmol/L      CO2 34.1 mmol/L      Calcium 9.0 mg/dL      Total Protein 5.9 g/dL      Albumin 4.10 g/dL      ALT (SGPT) 7 U/L      AST (SGOT) 12 U/L      Alkaline Phosphatase 112 U/L      Total Bilirubin 0.4 mg/dL      eGFR Non African Amer 59 mL/min/1.73      Globulin 1.8 gm/dL      A/G Ratio 2.3 g/dL      BUN/Creatinine Ratio 13.0     Anion Gap 9.9 mmol/L     Narrative:      GFR Normal >60  Chronic Kidney Disease <60  Kidney Failure <15      Blood Gas,  "Arterial - [[id number]]  (Abnormal) Collected: 01/13/22 1309    Specimen: Arterial Blood Updated: 01/13/22 1312     Site Arterial: right brachial     Ronak's Test N/A     pH, Arterial 7.480 pH units      pCO2, Arterial 47.9 mm Hg      pO2, Arterial 84.4 mm Hg      HCO3, Arterial 35.6 mmol/L      Base Excess, Arterial 10.6 mmol/L      O2 Saturation Calculated 96.9 %      Barometric Pressure for Blood Gas 746.5 mmHg      Comment: Meter: [id number] : [id number] Cecelia Quinoneshel        Modality Cannula     Flow Rate 2.5 lpm     Procalcitonin [[id number]]  (Normal) Collected: 01/13/22 1219    Specimen: Blood Updated: 01/13/22 1310     Procalcitonin 0.05 ng/mL     Narrative:      As a Marker for Sepsis (Non-Neonates):     1. <0.5 ng/mL represents a low risk of severe sepsis and/or septic shock.  2. >2 ng/mL represents a high risk of severe sepsis and/or septic shock.    As a Marker for Lower Respiratory Tract Infections that require antibiotic therapy:  PCT on Admission     Antibiotic Therapy             6-12 Hrs later  >0.5                          Strongly Recommended            >0.25 - <0.5             Recommended  0.1 - 0.25                  Discouraged                       Remeasure/reassess PCT  <0.1                         Strongly Discouraged         Remeasure/reassess PCT      As 28 day mortality risk marker: \"Change in Procalcitonin Result\" (>80% or <=80%) if Day 0 (or Day 1) and Day 4 values are available. Refer to http://www.[facility]-pct-calculator.com/    Change in PCT <=80 %   A decrease of PCT levels below or equal to 80% defines a positive change in PCT test result representing a higher risk for 28-day all-cause mortality of patients diagnosed with severe sepsis or septic shock.    Change in PCT >80 %   A decrease of PCT levels of more than 80% defines a negative change in PCT result representing a lower risk for 28-day all-cause mortality of patients diagnosed with severe sepsis or septic " shock.                Troponin [[id number]]  (Normal) Collected: 01/13/22 1219    Specimen: Blood Updated: 01/13/22 1303     Troponin T 0.013 ng/mL     Narrative:      Troponin T Reference Range:  <= 0.03 ng/mL-   Negative for AMI  >0.03 ng/mL-     Abnormal for myocardial necrosis.  Clinicians would have to utilize clinical acumen, EKG, Troponin and serial changes to determine if it is an Acute Myocardial Infarction or myocardial injury due to an underlying chronic condition.       Results may be falsely decreased if patient taking Biotin.      Lactic Acid, Plasma [[id number]]  (Normal) Collected: 01/13/22 1219    Specimen: Blood Updated: 01/13/22 1302     Lactate 1.5 mmol/L     BNP [[id number]]  (Normal) Collected: 01/13/22 1219    Specimen: Blood Updated: 01/13/22 1301     proBNP 585.0 pg/mL     Narrative:      Among patients with dyspnea, NT-proBNP is highly sensitive for the detection of acute congestive heart failure. In addition NT-proBNP of <300 pg/ml effectively rules out acute congestive heart failure with 99% negative predictive value.    Results may be falsely decreased if patient taking Biotin.          Imaging Results (Last 24 Hours)     ** No results found for the last 24 hours. **             ECG 12 Lead  Component   Ref Range & Units 1/13/22 1356 1/13/22 1253   QT Interval   ms 373 P  409 P              HEART RATE= 81  bpm  RR Interval= 744  ms  FL Interval= 71  ms  P Horizontal Axis= 219  deg  P Front Axis= 219  deg  QRSD Interval= 96  ms  QT Interval= 373  ms  QRS Axis= 105  deg  T Wave Axis= 201  deg  - ABNORMAL ECG -  Sinus or ectopic atrial rhythm  Ventricular trigeminy  Short FL interval  Right axis deviation  Repol abnrm suggests ischemia, diffuse leads             Current Facility-Administered Medications:   •  aspirin EC tablet 81 mg, 81 mg, Oral, Daily, Eulogio Mcdaniel MD, 81 mg at 01/14/22 0928  •  budesonide-formoterol (SYMBICORT) 160-4.5 MCG/ACT inhaler 2 puff, 2 puff, Inhalation, BID - RT,  Eulogio Mcdaniel MD, 2 puff at 01/14/22 0744  •  bumetanide (BUMEX) tablet 1 mg, 1 mg, Oral, BID, Eulogio Mcdaniel MD, 1 mg at 01/14/22 0928  •  clopidogrel (PLAVIX) tablet 75 mg, 75 mg, Oral, Daily, Eulogio Mcdaniel MD, 75 mg at 01/14/22 0928  •  guaiFENesin (MUCINEX) 12 hr tablet 600 mg, 600 mg, Oral, Q12H, Eulogio Mcdaniel MD, 600 mg at 01/14/22 0928  •  ipratropium-albuterol (DUO-NEB) nebulizer solution 3 mL, 3 mL, Nebulization, 4x Daily - RT, Eulogio Mcdaniel MD, 3 mL at 01/13/22 2158  •  LORazepam (ATIVAN) injection 0.5 mg, 0.5 mg, Intravenous, Q6H PRN, Eulogio Mcdaniel MD  •  methylPREDNISolone sodium succinate (SOLU-Medrol) injection 40 mg, 40 mg, Intravenous, Q12H, Eulogio Mcdaniel MD  •  potassium chloride (K-DUR,KLOR-CON) ER tablet 20 mEq, 20 mEq, Oral, TID With Meals, Eulogio Mcdaniel MD, 20 mEq at 01/14/22 0928  •  potassium chloride (K-DUR,KLOR-CON) ER tablet 40 mEq, 40 mEq, Oral, PRN, Eulogio Mcdaniel MD  •  potassium chloride (KLOR-CON) packet 40 mEq, 40 mEq, Oral, PRN, Eulogio Mcdaniel MD  •  propafenone (RYTHMOL) tablet 150 mg, 150 mg, Oral, Q8H, Eulogio Mcdaniel MD, 150 mg at 01/14/22 0517  •  rosuvastatin (CRESTOR) tablet 5 mg, 5 mg, Oral, Daily, Eulogio Mcdaniel MD, 5 mg at 01/14/22 0928  •  sodium chloride 0.9 % flush 10 mL, 10 mL, Intravenous, PRN, iBnu Moreno MD  •  [COMPLETED] Insert peripheral IV, , , Once **AND** sodium chloride 0.9 % flush 10 mL, 10 mL, Intravenous, PRN, Vicki Alejandre APRN  •  spironolactone (ALDACTONE) tablet 50 mg, 50 mg, Oral, Daily, Eulogio Mcdaniel MD, 50 mg at 01/14/22 0928  •  ursodiol (ACTIGALL) capsule 300 mg, 300 mg, Oral, TID, Eulogio Mcdaniel MD, 300 mg at 01/14/22 0928     ASSESSMENT  Acute on chronic hypoxic respiratory failure  Acute exacerbation of COPD  Chronic diastolic CHF  Paroxysmal atrial fibrillation  Hypertension  Hyperlipidemia  Ongoing tobacco abuse  Gastroesophageal reflux disease    PLAN  CPM  NIPPV  IV steroids and taper  Anticoagulation  Replace potassium   Nebulizer  treatment every 4 hours  Pulmonary hygiene  Adjust home medications  Stress ulcer DVT prophylaxis  Pulmonary consult pending  Cardiology to follow patient  DNR  PT/OT  Discussed with family and nursing staff  Follow closely and further recommendation according to hospital course    JONNY PEOPLES MD

## 2022-01-14 NOTE — CASE MANAGEMENT/SOCIAL WORK
Continued Stay Note  [facility]     Patient Name: Celia Baird  MRN: [id number]  Today's Date: 1/14/2022    Admit Date: 1/13/2022     Discharge Plan     Row Name 01/14/22 0841       Plan    Plan Plan home .   MELISSA Williamson RN    Patient/Family in Agreement with Plan yes    Plan Comments FACE SHEET VERIFIED/ IM LETTER SIGNED.  Spoke with pt at bedside.  Pt's PCP is Dr. Slick Gomez.   Pt's next of kin is her son ( Eduardo Baird [phone]) .   Pt lives alone in a single story patio home.   Pt is independent with ADLs.  Pt is on home O2 provided by [facility].  Pt has a BSC, grab bar, nebulizer, and rollator for home use.  Pt gets her prescriptions at [facility] ([address]).  Pt denies any issues affording medications.  Pt is not current with .   Pt has not been in SNF.   Pt denies any discharge needs and states her plan is to return home.  Pt states her son will assist her if needed.   Plan home.  MELISSA Williamson RN               Discharge Codes    No documentation.                     Meena Williamson, RN

## 2022-01-14 NOTE — CONSULTS
CONSULT NOTE    Patient Identification:  Celia Baird  77 y.o.  female  1945  [id number]            Requesting physician: Dr Eulogio Blanton Reason for Consultation:  Cough graham, copd ae      History of Present Illness:  Patient is a 77-year-old with a previous medical history of COPD fev 1  47% paroxysmal atrial fibrillation subarachnoid hemorrhage who presented the emergency room with increasing shortness of breath.  She had symptoms onset for at least 4 to 5 days prior to her admission they have been getting worse.  She denied any significant fever chills loss of taste or smell diarrhea or nauseousness.  She has no chest pain no pleuritic chest pain.  No lower extremity swelling.  Symptoms are constant progressive and severe.  Worse with exertion better with rest however did not resolve.      Review of Systems:  CONSTITUTIONAL:  Denies fevers or chills  EYE:  No new vision changes  EAR:  No change in hearing  CARDIAC:  No chest pain  PULMONARY:  +cough +shortness of breath  GI:  No diarrhea, hematemesis or hematochezia,  RENAL:  No dysuria or urinary frequency  MUSCULOSKELETAL:  No musculoskeletal complaints  ENDOCRINE:  No heat or cold intolerance  INTEGUMENTARY: No skin rashes  NEUROLOGICAL:  No dizziness or confusion.  No seizure activity  PSYCHIATRIC:  No new anxiety or depression  12 system review of systems performed and all else negative    Past Medical History:   Diagnosis Date   • Atrial fibrillation (HCC)    • COPD (chronic obstructive pulmonary disease) (HCC)    • History of frequent urinary tract infections    • Irregular heart beat    • Kidney stones    • PBC (primary biliary cirrhosis)    • PBC (primary biliary cirrhosis)        Past Surgical History:   Procedure Laterality Date   • CARDIAC ELECTROPHYSIOLOGY PROCEDURE N/A 3/30/2017    Procedure:    LINQ;  Surgeon: Ailyn Solorio MD;  Location: [facility] INVASIVE LOCATION;  Service:    • CHOLECYSTECTOMY     • TUBAL ABDOMINAL  "LIGATION          Medications Prior to Admission   Medication Sig Dispense Refill Last Dose   • aspirin 81 MG EC tablet Take 81 mg by mouth Daily.      • bumetanide (BUMEX) 0.5 MG tablet Take 0.5 mg by mouth Daily.      • clopidogrel (PLAVIX) 75 MG tablet Take 75 mg by mouth Daily.      • ferrous gluconate (FERGON) 324 MG tablet Take 324 mg by mouth Daily With Breakfast.      • ipratropium-albuterol (DUO-NEB) 0.5-2.5 mg/3 ml nebulizer Take 3 mL by nebulization 2 (Two) Times a Day.      • propafenone (RYTHMOL) 150 MG tablet TAKE 1/2 (ONE-HALF) TABLET BY MOUTH EVERY 6 HOURS (Patient taking differently: Take 75 mg by mouth Every 6 (Six) Hours.) 60 tablet 0    • rosuvastatin (CRESTOR) 5 MG tablet Take 5 mg by mouth Daily.      • spironolactone (ALDACTONE) 25 MG tablet Take 50 mg by mouth Daily.      • ursodiol (ACTIGALL) 300 MG capsule Take 300 mg by mouth 3 (Three) Times a Day.      • vitamin E 100 UNIT capsule Take 100 Units by mouth Daily.          Allergies   Allergen Reactions   • Morphine And Related Nausea And Vomiting   • Levaquin [Levofloxacin]    • Sulfa Antibiotics        Social History     Socioeconomic History   • Marital status:    Tobacco Use   • Smoking status: Current Some Day Smoker     Packs/day: 0.50     Years: 59.00     Pack years: 29.50     Types: Cigarettes   • Smokeless tobacco: Never Used   Substance and Sexual Activity   • Alcohol use: No   • Drug use: No   • Sexual activity: Defer     Birth control/protection: Post-menopausal, Surgical       Family History   Problem Relation Age of Onset   • Heart disease Father    • Heart attack Father    • Heart disease Brother    • Stroke Mother        Physical Exam:  BP 92/51 (BP Location: Left arm, Patient Position: Lying)   Pulse 89   Temp 97.6 °F (36.4 °C) (Oral)   Resp 20   Ht 152.4 cm (60\")   Wt 36.3 kg (80 lb)   SpO2 95%   BMI 15.62 kg/m²   Body mass index is 15.62 kg/m².   General appearance: ill appearing 76yo frail, conversant "   Eyes: anicteric sclerae, moist conjunctivae; no lid-lag;    HENT: Atraumatic; oropharynx clear with moist mucous membranes and no mucosal ulcerations; normal hard and soft palate  Neck: Trachea midline; FROM, supple, no thyromegaly or lymphadenopathy  Lungs: rhonchi wheeze increased effort mild, erik, tight, slight  intercostal retractions  CV: RRR, no rub  Abdomen: Soft, non-tender; no masses or HSM  Extremities: No peripheral edema   Skin: warm dry no diffuse visible rash  Psych: Appropriate affect, alert and oriented  Neuro cns 2-12 grossly intact moves all ext    LABS:  Results from last 7 days   Lab Units 01/14/22  0845 01/13/22  1219   WBC 10*3/mm3 6.78 13.49*   HEMOGLOBIN g/dL 12.1 12.8   PLATELETS 10*3/mm3 201 232     Results from last 7 days   Lab Units 01/14/22  0845 01/13/22  1219   SODIUM mmol/L 136 141   POTASSIUM mmol/L 2.9* 2.6*   CHLORIDE mmol/L 92* 97*   CO2 mmol/L 32.7* 34.1*   BUN mg/dL 19 12   CREATININE mg/dL 1.04* 0.92   GLUCOSE mg/dL 206* 97   CALCIUM mg/dL 8.9 9.0   MAGNESIUM mg/dL 1.6 1.6   Estimated Creatinine Clearance: 26 mL/min (A) (by C-G formula based on SCr of 1.04 mg/dL (H)).    Imaging: I personally visualized the images of scans/x-rays performed within last 3 days.  Imaging Results (Most Recent)     Procedure Component Value Units Date/Time    XR Chest 1 View [[id number]] Collected: 01/13/22 1224     Updated: 01/13/22 1230    Narrative:      XR CHEST 1 VW-     HISTORY: Female who is 77 years-old,  short of breath     TECHNIQUE: Frontal view of the chest     COMPARISON: 03/20/2020     FINDINGS: The heart size is normal. Aorta is calcified. Pulmonary  vasculature is unremarkable. No focal pulmonary consolidation, pleural  effusion, or pneumothorax. Old granulomatous disease is seen. No acute  osseous process.       Impression:      No focal pulmonary consolidation. Follow-up as clinical  indications persist.     This report was finalized on 1/13/2022 12:27 PM by Dr. Morales  GERMAN Abdi M.D.             Assessment / Recommendations:  COPD with ae (FEV1 about 47%)  Patient Active Problem List   Diagnosis   • COPD exacerbation (HCC)   • Subarachnoid hemorrhage (HCC)   • Multiple skin tears   • Closed nondisplaced fracture of left clavicle   • Paroxysmal atrial fibrillation (HCC)   • Tobacco abuse   • Status post placement of implantable loop recorder   • SOB (shortness of breath)   • COPD with acute exacerbation (HCC)     Send full rvp  opep  IS  Solumedrol 40 bid  duonebs scheduled  Azithromycin for ae copd guidelines  Smoking cessation counseling  procal in am    Consult called to [facility] [phone] seen at 1430      Loyd Christie MD  [facility]  01/14/22  1506 EST

## 2022-01-14 NOTE — PROGRESS NOTES
"Adult Nutrition  Assessment/PES    Patient Name:  Celia Baird  YOB: 1945  MRN: [id number]  Admit Date:  1/13/2022    Assessment Date:  1/14/2022    Comments:  Nutrition assessment triggered by BMI < 19.  Admitted with SOB, hypokalemia, COPD exacerbation.  DNR.    Patient reports good appetite.  She reports eating at least 2 meals/day, tries to eat 3 meals/day.  She reports weight stable for at least past 6-7 months.  Does not like Boost/Ensure and does not want oral nutrition supplementation as she says it will make her eat less.      MSA completed for severe malnutrition.    Encouraged PO intake.    RD to continue to follow.     Reason for Assessment     Row Name 01/14/22 1515          Reason for Assessment    Reason For Assessment identified at risk by screening criteria     Diagnosis pulmonary disease; cardiac disease; substance use/abuse; renal disease  chronic respiratory failure, COPD, Afib, CHF, HTN, HLD, tobacco abuse, kidney stones, primary biliary cirrhosis; adm with SOB, hypoK, COPD     Identified At Risk by Screening Criteria BMI                Nutrition/Diet History     Row Name 01/14/22 1517          Nutrition/Diet History    Typical Food/Fluid Intake no PO intake available; pt reports good haven     Meal/Snack Patterns eats at least 2 meals/day, sometimes 3     Supplemental Drinks/Foods/Additives does not like Boost/Ensure, does not want ONS because she says then she won't eat enough                Anthropometrics     Row Name 01/14/22 1518 01/14/22 1151       Anthropometrics    Height 152.4 cm (60\") 152.4 cm (60\")    Weight -- 36.3 kg (80 lb)       Admit Weight    Admit Weight --  80# 1/14 --       Ideal Body Weight (IBW)    Ideal Body Weight (IBW) (kg) 45.86 45.86    % Ideal Body Weight -- 79.13    % of Ideal Body Weight Assessment 70-79%: moderate deficit  79.1% --       Usual Body Weight (UBW)    Usual Body Weight 35.8 kg (79 lb)  pt reports 77#, 79# --    Weight Loss Time Frame " "reports weight stable for at least x 6-7 months --       Body Mass Index (BMI)    BMI (kg/m2) -- 15.66    BMI Assessment BMI less than 16: protein-energy malnutrition grade III  15.59 --               Labs/Tests/Procedures/Meds     Row Name 01/14/22 1520          Labs/Procedures/Meds    Lab Results Reviewed reviewed, pertinent     Lab Results Comments Gluc, K, Creat, GFR, Alb, HDL            Diagnostic Tests/Procedures    Diagnostic Test/Procedure Reviewed reviewed, pertinent            Medications    Pertinent Medications Reviewed reviewed, pertinent     Pertinent Medications Comments zithromax, bumex, solu-medrol, KCl, crestor                Physical Findings     Row Name 01/14/22 1522          Physical Findings    Overall Physical Appearance underweight; on oxygen therapy; generalized wasting; loss of subcutaneous fat; loss of muscle mass     Skin --  B=20, intact                Estimated/Assessed Needs     Row Name 01/14/22 1523 01/14/22 1518       Calculation Measurements    Weight Used For Calculations 36.3 kg (80 lb 0.4 oz) --    Height -- 152.4 cm (60\")       Estimated/Assessed Needs       KCAL/KG    KCAL/KG 35 Kcal/Kg (kcal); 40 Kcal/Kg (kcal) --    35 Kcal/Kg (kcal) 1270.5 --    40 Kcal/Kg (kcal) 1452 --       Protein Requirements    Weight Used For Protein Calculations 36.3 kg (80 lb 0.4 oz) --    Est Protein Requirement Amount (gms/kg) 1.5 gm protein --    Estimated Protein Requirements (gms/day) 54.45 --       Fluid Requirements    Fluid Requirements (mL/day) 1300 --    Row Name 01/14/22 1151          Calculation Measurements    Height 152.4 cm (60\")                Nutrition Prescription Ordered     Row Name 01/14/22 1523          Nutrition Prescription PO    Current PO Diet Regular                Evaluation of Received Nutrient/Fluid Intake     Row Name 01/14/22 1523          PO Evaluation    Number of Days PO Intake Evaluated Insufficient Data                Malnutrition Severity Assessment     Row " Name 01/14/22 1524          Malnutrition Severity Assessment    Malnutrition Type Chronic Disease - Related Malnutrition            Insufficient Energy Intake     Insufficient Energy Intake Findings Moderate     Insufficient Energy Intake  <75% of est. energy requirement for > or equal to 3 months            Muscle Loss    Loss of Muscle Mass Findings Severe     Sabianism Region Severe - deep hollowing/scooping, lack of muscle to touch, facial bones well defined     Clavicle Bone Region Severe - protruding prominent bone     Dorsal Hand Region Severe - prominent depression            Fat Loss    Subcutaneous Fat Loss Findings Severe     Orbital Region  Severe - pronounced hollowness/depression, dark circles, loose saggy skin     Upper Arm Region Severe - mostly skin, very little space between folds, fingers touch            Criteria Met (Must meet criteria for severity in at least 2 of these categories: M Wasting, Fat Loss, Fluid, Secondary Signs, Wt. Status, Intake)    Patient meets criteria for  Severe Malnutrition               Problem/Interventions:   Problem 1     Row Name 01/14/22 1529          Nutrition Diagnoses Problem 1    Problem 1 Malnutrition     Etiology (related to) Medical Diagnosis; Factors Affecting Nutrition     Pulmonary/Critical Care COPD     Appetite Fair; Good     Signs/Symptoms (evidenced by) Report/Observation     Reported/Observed By Patient                Intervention Goal     Row Name 01/14/22 1534          Intervention Goal    General Maintain nutrition; Disease management/therapy; Meet nutritional needs for age/condition     PO Establish PO; Tolerate PO; PO intake (%)     PO Intake % 75 %     Weight Appropriate weight gain                Nutrition Intervention     Row Name 01/14/22 1534          Nutrition Intervention    RD/Tech Action Follow Tx progress; Care plan reviewd; Encourage intake; Supplement offered/refused                Education/Evaluation     Row Name 01/14/22 1532           Education    Education Will Instruct as appropriate            Monitor/Evaluation    Monitor Per protocol; PO intake; Pertinent labs; Weight; Skin status; Symptoms                 Electronically signed by:  Shantell Au RD  01/14/22 15:35 EST

## 2022-01-14 NOTE — CASE MANAGEMENT/SOCIAL WORK
Discharge Planning Assessment  [facility]     Patient Name: Celia Baird  MRN: [id number]  Today's Date: 1/14/2022    Admit Date: 1/13/2022     Discharge Needs Assessment     Row Name 01/14/22 0839       Living Environment    Lives With alone    Current Living Arrangements home/apartment/condo    Primary Care Provided by self    Provides Primary Care For no one    Family Caregiver if Needed child(yesenia), adult    Family Caregiver Names Son  ( Eduardo Baird [phone])    Quality of Family Relationships helpful; involved; supportive    Able to Return to Prior Arrangements yes    Living Arrangement Comments Pt lives alone in a single story patio home.       Resource/Environmental Concerns    Resource/Environmental Concerns none    Transportation Concerns car, none       Transition Planning    Patient/Family Anticipates Transition to home    Patient/Family Anticipated Services at Transition none    Transportation Anticipated family or friend will provide       Discharge Needs Assessment    Readmission Within the Last 30 Days no previous admission in last 30 days    Equipment Currently Used at Home commode; grab bar; nebulizer; oxygen; rollator    Concerns to be Addressed no discharge needs identified; denies needs/concerns at this time    Anticipated Changes Related to Illness none    Equipment Needed After Discharge commode; grab bar, tub/shower; nebulizer; oxygen; rollator               Discharge Plan     Row Name 01/14/22 0841       Plan    Plan Plan home .   MELISSA Williamson RN    Patient/Family in Agreement with Plan yes    Plan Comments FACE SHEET VERIFIED/ IM LETTER SIGNED.  Spoke with pt at bedside.  Pt's PCP is Dr. Slick Gomez.   Pt's next of kin is her son ( Eduardo Baird [phone]) .   Pt lives alone in a single story patio home.   Pt is independent with ADLs.  Pt is on home O2 provided by [facility].  Pt has a BSC, grab bar, nebulizer, and rollator for home use.  Pt gets her prescriptions at [facility]  ([address]  Pt denies any issues affording medications.  Pt is not current with HH.   Pt has not been in SNF.   Pt denies any discharge needs and states her plan is to return home.  Pt states her son will assist her if needed.   Plan home.  MELISSA Williamson RN              Continued Care and Services - Admitted Since 1/13/2022    Coordination has not been started for this encounter.          Demographic Summary     Row Name 01/14/22 0839       General Information    Admission Type inpatient    Arrived From emergency department    Required Notices Provided Important Message from Medicare    Referral Source admission list    Reason for Consult discharge planning    Preferred Language English     Used During This Interaction no               Functional Status     Row Name 01/14/22 0839       Functional Status    Usual Activity Tolerance moderate    Current Activity Tolerance fair       Functional Status, IADL    Medications independent    Meal Preparation independent    Housekeeping independent    Shopping assistive person       Mental Status    General Appearance WDL WDL               Psychosocial    No documentation.                Abuse/Neglect    No documentation.                Legal    No documentation.                Substance Abuse    No documentation.                Patient Forms    No documentation.                   Meena Williamson, RN

## 2022-01-14 NOTE — ED NOTES
Patient wore surgical mask and I wore N95 mask for entire encounter, and I wore gloves, gown and eye protection as well. Hand hygiene was performed before and after encounter.        Alivia Qureshi RN  01/13/22 1921

## 2022-01-14 NOTE — PLAN OF CARE
Goal Outcome Evaluation:      Pt. Alert, oriented x3, confused at times, Pt. New admission, v/s stable, Her diet NPO, Disphagia screen test done , resultls came out Pass. All evening medicine given, Pt. Able to swallow pill with water, frequently Pt. Asked to drink some water because her mouth dry due to bi-pap, kept bi-pap due increased PCO2, 02 sats kept over 96to 97% with bi-pap, no voiced c/o pain, Pt. Continent , using bed pan, offered bed pan x2 at this shift, v/s stable except heart rates up and down, no s/s acute distress noted until this time, will continue to monitor

## 2022-01-15 PROBLEM — E43 SEVERE MALNUTRITION (HCC): Status: ACTIVE | Noted: 2022-01-15

## 2022-01-15 LAB
ANION GAP SERPL CALCULATED.3IONS-SCNC: 9.1 MMOL/L (ref 5–15)
BASOPHILS # BLD AUTO: 0.02 10*3/MM3 (ref 0–0.2)
BASOPHILS NFR BLD AUTO: 0.2 % (ref 0–1.5)
BUN SERPL-MCNC: 24 MG/DL (ref 8–23)
BUN/CREAT SERPL: 25 (ref 7–25)
CALCIUM SPEC-SCNC: 8.8 MG/DL (ref 8.6–10.5)
CHLORIDE SERPL-SCNC: 98 MMOL/L (ref 98–107)
CO2 SERPL-SCNC: 31.9 MMOL/L (ref 22–29)
CREAT SERPL-MCNC: 0.96 MG/DL (ref 0.57–1)
DEPRECATED RDW RBC AUTO: 40.5 FL (ref 37–54)
EOSINOPHIL # BLD AUTO: 0 10*3/MM3 (ref 0–0.4)
EOSINOPHIL NFR BLD AUTO: 0 % (ref 0.3–6.2)
ERYTHROCYTE [DISTWIDTH] IN BLOOD BY AUTOMATED COUNT: 12.5 % (ref 12.3–15.4)
GFR SERPL CREATININE-BSD FRML MDRD: 56 ML/MIN/1.73
GLUCOSE SERPL-MCNC: 147 MG/DL (ref 65–99)
HCT VFR BLD AUTO: 35 % (ref 34–46.6)
HGB BLD-MCNC: 12 G/DL (ref 12–15.9)
IMM GRANULOCYTES # BLD AUTO: 0.1 10*3/MM3 (ref 0–0.05)
IMM GRANULOCYTES NFR BLD AUTO: 0.8 % (ref 0–0.5)
LYMPHOCYTES # BLD AUTO: 0.81 10*3/MM3 (ref 0.7–3.1)
LYMPHOCYTES NFR BLD AUTO: 6.6 % (ref 19.6–45.3)
MAGNESIUM SERPL-MCNC: 1.8 MG/DL (ref 1.6–2.4)
MCH RBC QN AUTO: 30.5 PG (ref 26.6–33)
MCHC RBC AUTO-ENTMCNC: 34.3 G/DL (ref 31.5–35.7)
MCV RBC AUTO: 88.8 FL (ref 79–97)
MONOCYTES # BLD AUTO: 1.11 10*3/MM3 (ref 0.1–0.9)
MONOCYTES NFR BLD AUTO: 9.1 % (ref 5–12)
NEUTROPHILS NFR BLD AUTO: 10.22 10*3/MM3 (ref 1.7–7)
NEUTROPHILS NFR BLD AUTO: 83.3 % (ref 42.7–76)
NRBC BLD AUTO-RTO: 0 /100 WBC (ref 0–0.2)
PLATELET # BLD AUTO: 221 10*3/MM3 (ref 140–450)
PMV BLD AUTO: 12.4 FL (ref 6–12)
POTASSIUM SERPL-SCNC: 4 MMOL/L (ref 3.5–5.2)
POTASSIUM SERPL-SCNC: 4.2 MMOL/L (ref 3.5–5.2)
PROCALCITONIN SERPL-MCNC: 0.06 NG/ML (ref 0–0.25)
RBC # BLD AUTO: 3.94 10*6/MM3 (ref 3.77–5.28)
SODIUM SERPL-SCNC: 139 MMOL/L (ref 136–145)
WBC NRBC COR # BLD: 12.26 10*3/MM3 (ref 3.4–10.8)

## 2022-01-15 PROCEDURE — 94761 N-INVAS EAR/PLS OXIMETRY MLT: CPT

## 2022-01-15 PROCEDURE — 97161 PT EVAL LOW COMPLEX 20 MIN: CPT

## 2022-01-15 PROCEDURE — 94799 UNLISTED PULMONARY SVC/PX: CPT

## 2022-01-15 PROCEDURE — 25010000002 METHYLPREDNISOLONE PER 40 MG: Performed by: HOSPITALIST

## 2022-01-15 PROCEDURE — 80048 BASIC METABOLIC PNL TOTAL CA: CPT | Performed by: HOSPITALIST

## 2022-01-15 PROCEDURE — 97166 OT EVAL MOD COMPLEX 45 MIN: CPT

## 2022-01-15 PROCEDURE — 85025 COMPLETE CBC W/AUTO DIFF WBC: CPT | Performed by: HOSPITALIST

## 2022-01-15 PROCEDURE — 84145 PROCALCITONIN (PCT): CPT | Performed by: INTERNAL MEDICINE

## 2022-01-15 PROCEDURE — 94660 CPAP INITIATION&MGMT: CPT

## 2022-01-15 PROCEDURE — 97535 SELF CARE MNGMENT TRAINING: CPT

## 2022-01-15 PROCEDURE — 63710000001 PREDNISONE PER 1 MG: Performed by: INTERNAL MEDICINE

## 2022-01-15 PROCEDURE — 99232 SBSQ HOSP IP/OBS MODERATE 35: CPT | Performed by: INTERNAL MEDICINE

## 2022-01-15 PROCEDURE — 94664 DEMO&/EVAL PT USE INHALER: CPT

## 2022-01-15 PROCEDURE — 83735 ASSAY OF MAGNESIUM: CPT | Performed by: NURSE PRACTITIONER

## 2022-01-15 RX ORDER — ECHINACEA PURPUREA EXTRACT 125 MG
1 TABLET ORAL AS NEEDED
Status: DISCONTINUED | OUTPATIENT
Start: 2022-01-15 | End: 2022-01-21

## 2022-01-15 RX ORDER — METHYLPREDNISOLONE SODIUM SUCCINATE 40 MG/ML
20 INJECTION, POWDER, LYOPHILIZED, FOR SOLUTION INTRAMUSCULAR; INTRAVENOUS EVERY 12 HOURS
Status: DISCONTINUED | OUTPATIENT
Start: 2022-01-15 | End: 2022-01-15

## 2022-01-15 RX ORDER — PREDNISONE 20 MG/1
20 TABLET ORAL 2 TIMES DAILY WITH MEALS
Status: DISCONTINUED | OUTPATIENT
Start: 2022-01-15 | End: 2022-01-16

## 2022-01-15 RX ADMIN — BUDESONIDE AND FORMOTEROL FUMARATE DIHYDRATE 2 PUFF: 160; 4.5 AEROSOL RESPIRATORY (INHALATION) at 08:05

## 2022-01-15 RX ADMIN — PROPAFENONE HYDROCHLORIDE 150 MG: 150 TABLET, COATED ORAL at 14:11

## 2022-01-15 RX ADMIN — GUAIFENESIN 600 MG: 600 TABLET, EXTENDED RELEASE ORAL at 09:30

## 2022-01-15 RX ADMIN — BUMETANIDE 1 MG: 1 TABLET ORAL at 20:25

## 2022-01-15 RX ADMIN — AZITHROMYCIN DIHYDRATE 250 MG: 250 TABLET, FILM COATED ORAL at 09:30

## 2022-01-15 RX ADMIN — URSODIOL 300 MG: 300 CAPSULE ORAL at 20:26

## 2022-01-15 RX ADMIN — URSODIOL 300 MG: 300 CAPSULE ORAL at 09:30

## 2022-01-15 RX ADMIN — SALINE NASAL SPRAY 1 SPRAY: 1.5 SOLUTION NASAL at 18:35

## 2022-01-15 RX ADMIN — SPIRONOLACTONE 50 MG: 50 TABLET, FILM COATED ORAL at 09:30

## 2022-01-15 RX ADMIN — IPRATROPIUM BROMIDE AND ALBUTEROL SULFATE 3 ML: .5; 3 SOLUTION RESPIRATORY (INHALATION) at 08:03

## 2022-01-15 RX ADMIN — IPRATROPIUM BROMIDE AND ALBUTEROL SULFATE 3 ML: .5; 3 SOLUTION RESPIRATORY (INHALATION) at 11:24

## 2022-01-15 RX ADMIN — PROPAFENONE HYDROCHLORIDE 150 MG: 150 TABLET, COATED ORAL at 20:26

## 2022-01-15 RX ADMIN — ROSUVASTATIN CALCIUM 5 MG: 5 TABLET, FILM COATED ORAL at 09:30

## 2022-01-15 RX ADMIN — BUMETANIDE 1 MG: 1 TABLET ORAL at 09:30

## 2022-01-15 RX ADMIN — PREDNISONE 20 MG: 20 TABLET ORAL at 18:35

## 2022-01-15 RX ADMIN — CLOPIDOGREL 75 MG: 75 TABLET, FILM COATED ORAL at 09:30

## 2022-01-15 RX ADMIN — POTASSIUM CHLORIDE 30 MEQ: 750 TABLET, EXTENDED RELEASE ORAL at 17:08

## 2022-01-15 RX ADMIN — POTASSIUM CHLORIDE 30 MEQ: 750 TABLET, EXTENDED RELEASE ORAL at 09:00

## 2022-01-15 RX ADMIN — ASPIRIN 81 MG: 81 TABLET, COATED ORAL at 09:30

## 2022-01-15 RX ADMIN — PROPAFENONE HYDROCHLORIDE 150 MG: 150 TABLET, COATED ORAL at 05:37

## 2022-01-15 RX ADMIN — GUAIFENESIN 600 MG: 600 TABLET, EXTENDED RELEASE ORAL at 20:26

## 2022-01-15 RX ADMIN — IPRATROPIUM BROMIDE AND ALBUTEROL SULFATE 3 ML: .5; 3 SOLUTION RESPIRATORY (INHALATION) at 15:36

## 2022-01-15 RX ADMIN — BUDESONIDE AND FORMOTEROL FUMARATE DIHYDRATE 2 PUFF: 160; 4.5 AEROSOL RESPIRATORY (INHALATION) at 20:36

## 2022-01-15 RX ADMIN — POTASSIUM CHLORIDE 30 MEQ: 750 TABLET, EXTENDED RELEASE ORAL at 11:59

## 2022-01-15 RX ADMIN — URSODIOL 300 MG: 300 CAPSULE ORAL at 16:17

## 2022-01-15 RX ADMIN — METHYLPREDNISOLONE SODIUM SUCCINATE 40 MG: 40 INJECTION, POWDER, FOR SOLUTION INTRAMUSCULAR; INTRAVENOUS at 09:30

## 2022-01-15 NOTE — PROGRESS NOTES
PROGRESS NOTE  Patient Name: Celia Baird  Age/Sex: 77 y.o. female  : 1945  MRN: [id number]    Date of Admission: 2022  Date of Encounter Visit: 01/15/22   LOS: 2 days   Patient Care Team:  Slick Gomez MD as PCP - General (Internal Medicine)    Chief Complaint: COPD exacerbation    Hospital course: Patient is feeling better, she has oxygen at home but she does not use it all the time, she is currently down to 2 L/min with sats in the high 90s and she was turned down to 1 L/min.  She is complaining of the easy bruising, she is on steroids chronically, she is on aspirin and Plavix which is likely the cause.  No hematoma however.  She is feeling better from the respiratory standpoint still wheezing and coughing but not as bad as yesterday, able to talk in full sentences.  No more fever or chills, no altered mental status, did not require BiPAP since yesterday morning    REVIEW OF SYSTEMS:   CONSTITUTIONAL: no fever or chills  CARDIOVASCULAR: No chest pain, chest pressure or chest discomfort. No palpitations or edema.   RESPIRATORY: Improving shortness of breath with less dyspnea, still oxygen dependent.   GASTROINTESTINAL: No anorexia, nausea, vomiting or diarrhea. No abdominal pain or blood.   HEMATOLOGIC: No bleeding with multiple areas of bruising over upper extremities and upper part of the chest and the legs, no hematoma.     Ventilator/Non-Invasive Ventilation Settings (From admission, onward)             Start     Ordered    22 1313  NIPPV (CPAP or BIPAP)  Until Discontinued        Question:  Type:  Answer:  BIPAP    22 1312                  Vital Signs  Temp:  [98.2 °F (36.8 °C)-98.6 °F (37 °C)] 98.3 °F (36.8 °C)  Heart Rate:  [75-88] 88  Resp:  [16-20] 20  BP: (105-110)/(51-63) 107/51  SpO2:  [93 %-98 %] 93 %  on  Flow (L/min):  [2-2.5] 2 Device (Oxygen Therapy): humidified; nasal cannula    Intake/Output Summary (Last 24 hours) at 1/15/2022 1650  Last data filed at 1/15/2022  "0435  Gross per 24 hour   Intake --   Output 600 ml   Net -600 ml     Flowsheet Rows      First Filed Value   Admission Height 152.4 cm (60\") Documented at 01/13/2022 1221   Admission Weight 36.3 kg (80 lb) Documented at 01/13/2022 1221        Body mass index is 15.62 kg/m².      01/13/22  1221 01/14/22  1151   Weight: 36.3 kg (80 lb) 36.3 kg (80 lb)       Physical Exam:  GEN:  No acute distress, alert, cooperative, well developed, cachectic, on nasal cannula oxygen  EYES:   Sclerae clear. No icterus. PERRL. Normal EOM  ENT:   External ears/nose normal, no oral lesions, no thrush, mucous membranes moist  NECK:  Supple, midline trachea, no JVD  LUNGS: Normal chest on inspection, some expiratory wheezes but no crackles, reduced breath sounds. Respirations regular, even and unlabored.   CV:  Regular rhythm and rate. Normal S1/S2. No murmurs, gallops, or rubs noted.  ABD:  Soft, nontender and nondistended. Normal bowel sounds. No guarding  EXT:  Moves all extremities well. No cyanosis. No redness. No edema.   Skin: Dry, intact, multiple ecchymotic lesions chronic    Results Review:    Results From Last 14 Days   Lab Units 01/14/22  0845 01/13/22  1219   LACTATE mmol/L  --  1.5   TRIGLYCERIDES mg/dL 62  --      Results from last 7 days   Lab Units 01/15/22  0506 01/14/22  2316 01/14/22  0845 01/13/22  1219   SODIUM mmol/L 139  --  136 141   POTASSIUM mmol/L 4.2 4.0 2.9* 2.6*   CHLORIDE mmol/L 98  --  92* 97*   CO2 mmol/L 31.9*  --  32.7* 34.1*   BUN mg/dL 24*  --  19 12   CREATININE mg/dL 0.96  --  1.04* 0.92   CALCIUM mg/dL 8.8  --  8.9 9.0   AST (SGOT) U/L  --   --  11 12   ALT (SGPT) U/L  --   --  7 7   ANION GAP mmol/L 9.1  --  11.3 9.9   ALBUMIN g/dL  --   --  3.20* 4.10     Results from last 7 days   Lab Units 01/14/22  0845 01/13/22  1219   TROPONIN T ng/mL <0.010 0.013     Results from last 7 days   Lab Units 01/14/22  0845   TSH uIU/mL 0.704     Results from last 7 days   Lab Units 01/13/22  1219   PROBNP pg/mL " 585.0     Results from last 7 days   Lab Units 01/15/22  0506 01/14/22  0845 01/13/22  1219   WBC 10*3/mm3 12.26* 6.78 13.49*   HEMOGLOBIN g/dL 12.0 12.1 12.8   HEMATOCRIT % 35.0 35.1 37.2   PLATELETS 10*3/mm3 221 201 232   MCV fL 88.8 88.2 90.1   NEUTROPHIL % % 83.3* 81.5* 74.4   LYMPHOCYTE % % 6.6* 12.1* 12.2*   MONOCYTES % % 9.1 5.9 10.7   EOSINOPHIL % % 0.0* 0.0* 1.3   BASOPHIL % % 0.2 0.1 0.9   IMM GRAN % % 0.8* 0.4 0.5         Results from last 7 days   Lab Units 01/15/22  0506 01/14/22  0845 01/13/22  1219   MAGNESIUM mg/dL 1.8 1.6 1.6     Results from last 7 days   Lab Units 01/14/22  0845   CHOLESTEROL mg/dL 141   TRIGLYCERIDES mg/dL 62   HDL CHOL mg/dL 70*     Results from last 7 days   Lab Units 01/13/22  1309   PH, ARTERIAL pH units 7.480*   PCO2, ARTERIAL mm Hg 47.9*   PO2 ART mm Hg 84.4   HCO3 ART mmol/L 35.6*     Results from last 7 days   Lab Units 01/14/22  0845   HEMOGLOBIN A1C % 5.50     Glucose   Date/Time Value Ref Range Status   01/13/2022 1831 121 70 - 130 mg/dL Final     Comment:     Meter: [id number] : [id number] Ida MACK     Results from last 7 days   Lab Units 01/15/22  0506 01/13/22  1219   PROCALCITONIN ng/mL 0.06 0.05   LACTATE mmol/L  --  1.5     Results from last 7 days   Lab Units 01/13/22  1347 01/13/22  1219   BLOODCX  No growth at 2 days No growth at 2 days         Results from last 7 days   Lab Units 01/13/22  1230   COVID19  Not Detected               Imaging:   Imaging Results (All)     Procedure Component Value Units Date/Time    XR Chest 1 View [[id number]] Collected: 01/13/22 1224     Updated: 01/13/22 1230    Narrative:      XR CHEST 1 VW-     HISTORY: Female who is 77 years-old,  short of breath     TECHNIQUE: Frontal view of the chest     COMPARISON: 03/20/2020     FINDINGS: The heart size is normal. Aorta is calcified. Pulmonary  vasculature is unremarkable. No focal pulmonary consolidation, pleural  effusion, or pneumothorax. Old granulomatous disease is seen.  No acute  osseous process.       Impression:      No focal pulmonary consolidation. Follow-up as clinical  indications persist.     This report was finalized on 1/13/2022 12:27 PM by Dr. Andrew Abdi M.D.             I reviewed the patient's new clinical results.  I personally viewed and interpreted the patient's imaging results:        Medication Review:   aspirin, 81 mg, Oral, Daily  azithromycin, 250 mg, Oral, Q24H  budesonide-formoterol, 2 puff, Inhalation, BID - RT  bumetanide, 1 mg, Oral, BID  clopidogrel, 75 mg, Oral, Daily  guaiFENesin, 600 mg, Oral, Q12H  ipratropium-albuterol, 3 mL, Nebulization, 4x Daily - RT  methylPREDNISolone sodium succinate, 20 mg, Intravenous, Q12H  potassium chloride, 30 mEq, Oral, TID With Meals  propafenone, 150 mg, Oral, Q8H  rosuvastatin, 5 mg, Oral, Daily  spironolactone, 50 mg, Oral, Daily  ursodiol, 300 mg, Oral, TID             ASSESSMENT:   Acute exacerbation of COPD  Subarachnoid hemorrhage  Multiple skin tears  Closed nondisplaced fracture of the left clavicle  Paroxysmal atrial fibrillation  Tobacco abuse  Status postplacement of implantable loop recorder      PLAN:  Patient is currently on steroids IV, he is on Zithromax, and smoke cessation and counseling was provided.  Will transition to p.o. prednisone, she is on chronic steroid at home at 10 mg daily dose so we cannot discontinue the steroids abruptly  Anticoagulation per primary team  Continue with the bronchodilator  Advised to abstain from smoking  Wean oxygen as tolerated keeping sats in the low 90s      Discussed with Patient and with family at the bedside  Disposition: Per primary team hopefully home in a day or 2    Brock Orosco MD  01/15/22  16:50 EST          Dictated utilizing Dragon dictation

## 2022-01-15 NOTE — THERAPY EVALUATION
Patient Name: Celia Baird  : 1945    MRN: [id number]                              Today's Date: 1/15/2022       Admit Date: 2022    Visit Dx:     ICD-10-CM ICD-9-CM   1. COPD exacerbation (HCC)  J44.1 491.21   2. Shortness of breath  R06.02 786.05   3. Hypokalemia  E87.6 276.8     Patient Active Problem List   Diagnosis   • COPD exacerbation (HCC)   • Subarachnoid hemorrhage (HCC)   • Multiple skin tears   • Closed nondisplaced fracture of left clavicle   • Paroxysmal atrial fibrillation (HCC)   • Tobacco abuse   • Status post placement of implantable loop recorder   • SOB (shortness of breath)   • COPD with acute exacerbation (HCC)     Past Medical History:   Diagnosis Date   • Atrial fibrillation (HCC)    • COPD (chronic obstructive pulmonary disease) (HCC)    • History of frequent urinary tract infections    • Irregular heart beat    • Kidney stones    • PBC (primary biliary cirrhosis)    • PBC (primary biliary cirrhosis)      Past Surgical History:   Procedure Laterality Date   • CARDIAC ELECTROPHYSIOLOGY PROCEDURE N/A 3/30/2017    Procedure:    LINQ;  Surgeon: Ailyn Solorio MD;  Location: [facility] INVAS[facility] LOC[facility];  Service:    • CHOLECYSTECTOMY     • TUBAL ABDOMINAL LIGATION        General Information     Row Name 01/15/22 1405          OT Time and Intention    Document Type evaluation  -MW     Mode of Treatment occupational therapy; individual therapy  -MW     Row Name 01/15/22 1405          General Information    Patient Profile Reviewed yes  -MW     Prior Level of Function independent:; ADL's; home management; all household mobility; driving  -MW     Existing Precautions/Restrictions fall; oxygen therapy device and L/min  -MW     Barriers to Rehab none identified  -MW     Row Name 01/15/22 1405          Living Environment    Lives With alone  -MW     Row Name 01/15/22 1405          Home Main Entrance    Number of Stairs, Main Entrance none  -MW     Row Name 01/15/22 1405           Cognition    Orientation Status (Cognition) oriented x 4  -MW     Row Name 01/15/22 1405          Safety Issues, Functional Mobility    Impairments Affecting Function (Mobility) balance; endurance/activity tolerance; shortness of breath; strength  -     Comment, Safety Issues/Impairments (Mobility) gait belt and non skid socks donned  -           User Key  (r) = Recorded By, (t) = Taken By, (c) = Cosigned By    Initials Name Provider Type     Nata Cuevas OT Occupational Therapist                 Mobility/ADL's     Row Name 01/15/22 1405          Bed Mobility    Bed Mobility supine-sit; sit-supine  -MW     Supine-Sit [place] (Bed Mobility) standby assist  -     Sit-Supine [place] (Bed Mobility) standby assist  -     Assistive Device (Bed Mobility) head of bed elevated  -     Comment (Bed Mobility) no vc's for sequencing  -     Row Name 01/15/22 1405          Transfers    Transfers sit-stand transfer; toilet transfer  -     Comment (Transfers) x2 STS with Rwx, SBA-CGA  -     Sit-Stand [place] (Transfers) contact guard; standby assist  -     [place] Level (Toilet Transfer) standby assist; contact guard  -     Assistive Device (Toilet Transfer) commode, bedside with drop arms; walker, front-wheeled; grab bars/safety frame  -     Row Name 01/15/22 1405          Sit-Stand Transfer    Assistive Device (Sit-Stand Transfers) walker, front-wheeled  -     Row Name 01/15/22 1405          Toilet Transfer    Type (Toilet Transfer) stand-sit; sit-stand  -     Row Name 01/15/22 1405          Functional Mobility    Functional Mobility- Ind. Level contact guard assist  -     Functional Mobility- Device rolling walker  -     Functional Mobility- Comment pt demo ability to complete func mob to BR commode, using Rwx with SBA-CGA, increased time on commode to void and recover 2/2 SOA  -     Row Name 01/15/22 1405          Activities of Daily Living    BADL  Assessment/Intervention lower body dressing; grooming; toileting  -[facility] Name 01/15/22 1405          Lower Body Dressing Assessment/Training    [place] Level (Lower Body Dressing) lower body dressing skills; don; doff; pants/bottoms; contact guard assist  -     Position (Lower Body Dressing) supported standing; supported sitting  -MW     Row Name 01/15/22 1405          Grooming Assessment/Training    [place] Level (Grooming) grooming skills; wash face, hands; standby assist  -     Position (Grooming) sink side; supported standing  -MW     Row Name 01/15/22 1405          Toileting Assessment/Training    [place] Level (Toileting) adjust/manage clothing; toileting skills; change pad/brief; perform perineal hygiene; set up; standby assist  -     Assistive Devices (Toileting) commode; grab bar/safety frame  -     Position (Toileting) supported standing; supported sitting  -           User Key  (r) = Recorded By, (t) = Taken By, (c) = Cosigned By    Initials Name Provider Type    Nata Wallace OT Occupational Therapist               Obj/Interventions     Row Name 01/15/22 1407          Sensory Assessment (Somatosensory)    Sensory Assessment (Somatosensory) UE sensation intact  -MW     Row Name 01/15/22 1407          Vision Assessment/Intervention    Visual Impairment/Limitations WFL  -MW     Row Name 01/15/22 1407          Range of Motion Comprehensive    General Range of Motion no range of motion deficits identified  -MW     Row Name 01/15/22 1407          Strength Comprehensive (MMT)    General Manual Muscle Testing (MMT) Assessment upper extremity strength deficits identified  -     Comment, General Manual Muscle Testing (MMT) Assessment BUE 4-/5 grossly, functionally assessed  -MW     Row Name 01/15/22 1407          Motor Skills    Motor Skills functional endurance  -     Functional Endurance fair, pt on 2L with stats above 90% following exertion  -MW     Row Name 01/15/22  1407          Balance    Balance Assessment sitting static balance; sit to stand dynamic balance; sitting dynamic balance; standing static balance; standing dynamic balance  -     Static Sitting Balance WFL; sitting, edge of bed  -MW     Dynamic Sitting Balance WFL; sitting, edge of bed  -MW     Sit to Stand Dynamic Balance WFL  -MW     Static Standing Balance WFL; supported; standing  -MW     Dynamic Standing Balance mild impairment; supported; standing  -MW     Balance Interventions sitting; standing; sit to stand; supported; dynamic; static; minimal challenge; occupation based/functional task  -     Comment, Balance CGA using Rwx for standing balance throughout ADL routine  -           User Key  (r) = Recorded By, (t) = Taken By, (c) = Cosigned By    Initials Name Provider Type    MW Nata Cuevas OT Occupational Therapist               Goals/Plan     Row Name 01/15/22 1411          Transfer Goal 1 (OT)    Activity/Assistive Device (Transfer Goal 1, OT) transfers, all; sit-to-stand/stand-to-sit; bed-to-chair/chair-to-bed; toilet  -     [place] Level/Cues Needed (Transfer Goal 1, OT) modified independence  -MW     Time Frame (Transfer Goal 1, OT) short term goal (STG); 2 weeks  -MW     Progress/Outcome (Transfer Goal 1, OT) goal ongoing  -     Row Name 01/15/22 1411          Dressing Goal 1 (OT)    Activity/Device (Dressing Goal 1, OT) dressing skills, all  -MW     [place]/Cues Needed (Dressing Goal 1, OT) modified independence  -MW     Time Frame (Dressing Goal 1, OT) short term goal (STG); 2 weeks  -     Progress/Outcome (Dressing Goal 1, OT) goal ongoing  -     Row Name 01/15/22 1411          Toileting Goal 1 (OT)    Activity/Device (Toileting Goal 1, OT) toileting skills, all  -MW     [place] Level/Cues Needed (Toileting Goal 1, OT) modified independence  -MW     Time Frame (Toileting Goal 1, OT) short term goal (STG); 2 weeks  -     Progress/Outcome (Toileting Goal 1, OT)  goal ongoing  -MW     Row Name 01/15/22 1411          Grooming Goal 1 (OT)    Activity/Device (Grooming Goal 1, OT) grooming skills, all  -MW     [place] (Grooming Goal 1, OT) independent  -MW     Time Frame (Grooming Goal 1, OT) short term goal (STG); 2 weeks  -MW     Progress/Outcome (Grooming Goal 1, OT) goal ongoing  -MW     Row Name 01/15/22 1411          Therapy Assessment/Plan (OT)    Planned Therapy Interventions (OT) activity tolerance training; occupation/activity based interventions; functional balance retraining; BADL retraining; neuromuscular control/coordination retraining; patient/caregiver education/training; ROM/therapeutic exercise; strengthening exercise; transfer/mobility retraining  -MW           User Key  (r) = Recorded By, (t) = Taken By, (c) = Cosigned By    Initials Name Provider Type    Nata Wallace OT Occupational Therapist               Clinical Impression     Row Name 01/15/22 1408          Pain Assessment    Additional Documentation Pain Scale: Numbers Pre/Post-Treatment (Group)  -MW     Row Name 01/15/22 1408          Pain Scale: Numbers Pre/Post-Treatment    Pretreatment Pain Rating 0/10 - no pain  -MW     Posttreatment Pain Rating 0/10 - no pain  -MW     Row Name 01/15/22 1408          Plan of Care Review    Plan of Care Reviewed With patient; son  -MW     Progress no change  -MW     Outcome Summary Pt is a 78 yo female admitted 1/13/22 with chronic bronchitis. PMH includes but not limited to: COPD, a-fib, CHF, HTN, HLD. Pt A&Ox4 and reports she lives alone in house with 0 CICI, reports use of rollator at PLOF, (I) with ADLs/various IADLs, drives, and reports no falls. Pt's son present and able to assist if needed upon return home. Pt seen today for OT eval, A&Ox4, completing bed mob with SBA, func mob with Rwx at [facility], LBD/toileting with SBA, and g/h at sink side with SBA. Pt on 2L above 90% spO2 throughout session and follwoing ADL activity. Pt to benefit from skilled  OT to address poor activity tolerance, decreased strength and overall weakness impacting ADL routine. D/c rec home with assist if needed.  -     Row Name 01/15/22 1408          Therapy Assessment/Plan (OT)    Rehab Potential (OT) good, to achieve stated therapy goals  -     Criteria for Skilled Therapeutic Interventions Met (OT) yes; meets criteria; skilled treatment is necessary  -MW     Therapy Frequency (OT) 5 times/wk  -     Row Name 01/15/22 1408          Therapy Plan Review/Discharge Plan (OT)    Anticipated Discharge Disposition (OT) home; home with assist  -     Row Name 01/15/22 1408          Vital Signs    Pre SpO2 (%) 95  -MW     O2 Delivery Pre Treatment nasal cannula  -MW     Intra SpO2 (%) 91  -MW     O2 Delivery Intra Treatment nasal cannula  -MW     Post SpO2 (%) 95  -MW     O2 Delivery Post Treatment nasal cannula  -MW     Pre Patient Position Supine  -MW     Intra Patient Position Standing  -MW     Post Patient Position Supine  -MW     Row Name 01/15/22 1408          Positioning and Restraints    Pre-Treatment Position in bed  -MW     Post Treatment Position bed  -MW     In Bed notified nsg; call light within reach; encouraged to call for assist; supine; exit alarm on; with family/caregiver; side rails up x2  -MW           User Key  (r) = Recorded By, (t) = Taken By, (c) = Cosigned By    Initials Name Provider Type    Nata Wallace, OT Occupational Therapist               Outcome Measures     Row Name 01/15/22 1411          How much help from another is currently needed...    Putting on and taking off regular lower body clothing? 3  -MW     Bathing (including washing, rinsing, and drying) 3  -MW     Toileting (which includes using toilet bed pan or urinal) 3  -MW     Putting on and taking off regular upper body clothing 3  -MW     Taking care of personal grooming (such as brushing teeth) 3  -MW     Eating meals 4  -MW     AM-PAC 6 Clicks Score (OT) 19  -MW     Row Name 01/15/22 1202           How much help from another person do you currently need...    Turning from your back to your side while in flat bed without using bedrails? 4  -SR     Moving from lying on back to sitting on the side of a flat bed without bedrails? 4  -SR     Moving to and from a bed to a chair (including a wheelchair)? 3  -SR     Standing up from a chair using your arms (e.g., wheelchair, bedside chair)? 3  -SR     Climbing 3-5 steps with a railing? 3  -SR     To walk in hospital room? 3  -SR     AM-PAC 6 Clicks Score (PT) 20  -SR     Row Name 01/15/22 1411          Modified [place] Scale    Modified [place] Scale 4 - Moderately severe disability.  Unable to walk without assistance, and unable to attend to own bodily needs without assistance.  -     Row Name 01/15/22 1411 01/15/22 1202       Functional Assessment    Outcome Measure Options AM-PAC 6 Clicks Daily Activity (OT); Modified [place]  - AM-PAC 6 Clicks Basic Mobility (PT)  -SR          User Key  (r) = Recorded By, (t) = Taken By, (c) = Cosigned By    Initials Name Provider Type    SR Jami Puente Physical Therapist    Nata Wallace OT Occupational Therapist                Occupational Therapy Education                 Title: PT OT SLP Therapies (Done)     Topic: Occupational Therapy (Done)     Point: ADL training (Done)     Description:   Instruct learner(s) on proper safety adaptation and remediation techniques during self care or transfers.   Instruct in proper use of assistive devices.              Learning Progress Summary           Patient Acceptance, E, VU by  at 1/15/2022 1412    Comment: role of OT, d/c rec, therapy goals, POC   Family Acceptance, E, VU by  at 1/15/2022 1412    Comment: role of OT, d/c rec, therapy goals, POC                   Point: Precautions (Done)     Description:   Instruct learner(s) on prescribed precautions during self-care and functional transfers.              Learning Progress Summary           Patient Acceptance,  E, VU by  at 1/15/2022 1412    Comment: role of OT, d/c rec, therapy goals, POC   Family Acceptance, E, VU by  at 1/15/2022 1412    Comment: role of OT, d/c rec, therapy goals, POC                   Point: Body mechanics (Done)     Description:   Instruct learner(s) on proper positioning and spine alignment during self-care, functional mobility activities and/or exercises.              Learning Progress Summary           Patient Acceptance, E, VU by  at 1/15/2022 1412    Comment: role of OT, d/c rec, therapy goals, POC   Family Acceptance, E, VU by  at 1/15/2022 1412    Comment: role of OT, d/c rec, therapy goals, POC                               User Key     Initials Effective Dates Name Provider Type Discipline     08/20/21 -  Nata Cuevas OT Occupational Therapist OT              OT Recommendation and Plan  Planned Therapy Interventions (OT): activity tolerance training, occupation/activity based interventions, functional balance retraining, BADL retraining, neuromuscular control/coordination retraining, patient/caregiver education/training, ROM/therapeutic exercise, strengthening exercise, transfer/mobility retraining  Therapy Frequency (OT): 5 times/wk  Plan of Care Review  Plan of Care Reviewed With: patient, son  Progress: no change  Outcome Summary: Pt is a 76 yo female admitted 1/13/22 with chronic bronchitis. PMH includes but not limited to: COPD, a-fib, CHF, HTN, HLD. Pt A&Ox4 and reports she lives alone in house with 0 CICI, reports use of rollator at PLOF, (I) with ADLs/various IADLs, drives, and reports no falls. Pt's son present and able to assist if needed upon return home. Pt seen today for OT eval, A&Ox4, completing bed mob with SBA, func mob with Rwx at [facility], LBD/toileting with SBA, and g/h at sink side with SBA. Pt on 2L above 90% spO2 throughout session and follwoing ADL activity. Pt to benefit from skilled OT to address poor activity tolerance, decreased strength and overall  weakness impacting ADL routine. D/c rec home with assist if needed.     Time Calculation:    Time Calculation- OT     Row Name 01/15/22 1412             Time Calculation- OT    OT Start Time 1250  -MW      OT Stop Time 1305  -MW      OT Time Calculation (min) 15 min  -MW      Total Timed Code Minutes- OT 8 minute(s)  -MW      OT Received On 01/15/22  -MW      OT - Next Appointment 01/17/22  -MW      OT Goal Re-Cert Due Date 01/29/22  -MW              Timed Charges    51194 - OT Self Care/Mgmt Minutes 8  -MW              Untimed Charges    OT Eval/Re-eval Minutes 7  -MW              Total Minutes    Timed Charges Total Minutes 8  -MW      Untimed Charges Total Minutes 7  -MW       Total Minutes 15  -MW            User Key  (r) = Recorded By, (t) = Taken By, (c) = Cosigned By    Initials Name Provider Type     Nata Cuevas OT Occupational Therapist              Therapy Charges for Today     Code Description Service Date Service Provider Modifiers Qty    20986923435 HC OT SELF CARE/MGMT/TRAIN EA 15 MIN 1/15/2022 Nata Cuevas OT GO 1    09116560588 HC OT EVAL MOD COMPLEXITY 2 1/15/2022 Nata Cuevas OT GO 1               Nata Cuevas OT  1/15/2022

## 2022-01-15 NOTE — PROGRESS NOTES
"Daily progress note    Chief complaint  Doing same  No new complaints except persistent cough  Denies chest pain shortness of breath palpitation    History of present illness  77-year old white female with history of chronic hypoxic respiratory failure COPD chronic atrial fibrillation congestive heart failure hypertension hyperlipidemia who continue to smoke and lives by herself to the emergency room with increased shortness of breath for last 1 week.  Patient denies any fever chills but does have chronic productive cough.  Patient denies chest pain abdominal pain nausea vomiting diarrhea.  Patient work-up in ER revealed acute on chronic hypoxic respiratory failure with acute exacerbation of COPD admitted for management.  Patient also found to have severe hypokalemia.     REVIEW OF SYSTEMS  Unremarkable except generalized weakness     PHYSICAL EXAM  Blood pressure 107/51, pulse 88, temperature 98.3 °F (36.8 °C), temperature source Oral, resp. rate 20, height 152.4 cm (60\"), weight 36.3 kg (80 lb), SpO2 93 %.    GENERAL: Chronically ill-appearing, nontoxic appearing, moderately distressed  HENT: normocephalic, atraumatic  EYES: no scleral icterus, PERRL  CV: regular rhythm, regular rate, no murmur  RESPIRATORY: Tachypneic, coarse breath sounds throughout  ABDOMEN: soft, nontender nondistended bowel sounds positive  MUSCULOSKELETAL: no deformity, no lower extremity edema or calf tenderness bilaterally  NEURO: alert, moves all extremities, follows commands, mental status normal/baseline  SKIN: warm, dry, no rash   Psych: Appropriate mood and affect    LAB RESULTS  Lab Results (last 24 hours)     Procedure Component Value Units Date/Time    Blood Culture - Blood, Arm, Left [[id number]]  (Normal) Collected: 01/13/22 1347    Specimen: Blood from Arm, Left Updated: 01/15/22 1415     Blood Culture No growth at 2 days    Blood Culture - Blood, Arm, Left [[id number]]  (Normal) Collected: 01/13/22 1219    Specimen: Blood from " "Arm, Left Updated: 01/15/22 1245     Blood Culture No growth at 2 days    Basic Metabolic Panel [[id number]]  (Abnormal) Collected: 01/15/22 0506    Specimen: Blood Updated: 01/15/22 0709     Glucose 147 mg/dL      BUN 24 mg/dL      Creatinine 0.96 mg/dL      Sodium 139 mmol/L      Potassium 4.2 mmol/L      Comment: Slight hemolysis detected by analyzer. Results may be affected.        Chloride 98 mmol/L      CO2 31.9 mmol/L      Calcium 8.8 mg/dL      eGFR Non African Amer 56 mL/min/1.73      BUN/Creatinine Ratio 25.0     Anion Gap 9.1 mmol/L     Narrative:      GFR Normal >60  Chronic Kidney Disease <60  Kidney Failure <15      Procalcitonin [[id number]]  (Normal) Collected: 01/15/22 0506    Specimen: Blood Updated: 01/15/22 0653     Procalcitonin 0.06 ng/mL     Narrative:      As a Marker for Sepsis (Non-Neonates):     1. <0.5 ng/mL represents a low risk of severe sepsis and/or septic shock.  2. >2 ng/mL represents a high risk of severe sepsis and/or septic shock.    As a Marker for Lower Respiratory Tract Infections that require antibiotic therapy:  PCT on Admission     Antibiotic Therapy             6-12 Hrs later  >0.5                          Strongly Recommended            >0.25 - <0.5             Recommended  0.1 - 0.25                  Discouraged                       Remeasure/reassess PCT  <0.1                         Strongly Discouraged         Remeasure/reassess PCT      As 28 day mortality risk marker: \"Change in Procalcitonin Result\" (>80% or <=80%) if Day 0 (or Day 1) and Day 4 values are available. Refer to http://www.[employer]-pct-calculator.com/    Change in PCT <=80 %   A decrease of PCT levels below or equal to 80% defines a positive change in PCT test result representing a higher risk for 28-day all-cause mortality of patients diagnosed with severe sepsis or septic shock.    Change in PCT >80 %   A decrease of PCT levels of more than 80% defines a negative change in PCT result representing a " lower risk for 28-day all-cause mortality of patients diagnosed with severe sepsis or septic shock.                Magnesium [[id number]]  (Normal) Collected: 01/15/22 0506    Specimen: Blood Updated: 01/15/22 0641     Magnesium 1.8 mg/dL     CBC & Differential [[id number]]  (Abnormal) Collected: 01/15/22 0506    Specimen: Blood Updated: 01/15/22 0616    Narrative:      The following orders were created for panel order CBC & Differential.  Procedure                               Abnormality         Status                     ---------                               -----------         ------                     CBC Auto Differential[[id number]]        Abnormal            Final result                 Please view results for these tests on the individual orders.    CBC Auto Differential [[id number]]  (Abnormal) Collected: 01/15/22 0506    Specimen: Blood Updated: 01/15/22 0616     WBC 12.26 10*3/mm3      RBC 3.94 10*6/mm3      Hemoglobin 12.0 g/dL      Hematocrit 35.0 %      MCV 88.8 fL      MCH 30.5 pg      MCHC 34.3 g/dL      RDW 12.5 %      RDW-SD 40.5 fl      MPV 12.4 fL      Platelets 221 10*3/mm3      Neutrophil % 83.3 %      Lymphocyte % 6.6 %      Monocyte % 9.1 %      Eosinophil % 0.0 %      Basophil % 0.2 %      Immature Grans % 0.8 %      Neutrophils, Absolute 10.22 10*3/mm3      Lymphocytes, Absolute 0.81 10*3/mm3      Monocytes, Absolute 1.11 10*3/mm3      Eosinophils, Absolute 0.00 10*3/mm3      Basophils, Absolute 0.02 10*3/mm3      Immature Grans, Absolute 0.10 10*3/mm3      nRBC 0.0 /100 WBC     Potassium [[id number]]  (Normal) Collected: 01/14/22 2316    Specimen: Blood Updated: 01/15/22 0032     Potassium 4.0 mmol/L     T4, Free [[id number]]  (Abnormal) Collected: 01/14/22 1516    Specimen: Blood Updated: 01/14/22 1647     Free T4 1.82 ng/dL     Narrative:      Results may be falsely increased if patient taking Biotin.          Imaging Results (Last 24 Hours)     ** No results found for the last 24 hours.  **             ECG 12 Lead  Component   Ref Range & Units 1/13/22 1356 1/13/22 1253   QT Interval   ms 373 P  409 P              HEART RATE= 81  bpm  RR Interval= 744  ms  WV Interval= 71  ms  P Horizontal Axis= 219  deg  P Front Axis= 219  deg  QRSD Interval= 96  ms  QT Interval= 373  ms  QRS Axis= 105  deg  T Wave Axis= 201  deg  - ABNORMAL ECG -  Sinus or ectopic atrial rhythm  Ventricular trigeminy  Short WV interval  Right axis deviation  Repol abnrm suggests ischemia, diffuse leads             Current Facility-Administered Medications:   •  aspirin EC tablet 81 mg, 81 mg, Oral, Daily, Eulogio Mcdaniel MD, 81 mg at 01/15/22 0930  •  azithromycin (ZITHROMAX) tablet 250 mg, 250 mg, Oral, Q24H, Loyd Christie MD, 250 mg at 01/15/22 0930  •  budesonide-formoterol (SYMBICORT) 160-4.5 MCG/ACT inhaler 2 puff, 2 puff, Inhalation, BID - RT, Eulogio Mcdaniel MD, 2 puff at 01/15/22 0805  •  bumetanide (BUMEX) tablet 1 mg, 1 mg, Oral, BID, Eulogio Mcdaniel MD, 1 mg at 01/15/22 0930  •  clopidogrel (PLAVIX) tablet 75 mg, 75 mg, Oral, Daily, Eulogio Mcdaniel MD, 75 mg at 01/15/22 0930  •  guaiFENesin (MUCINEX) 12 hr tablet 600 mg, 600 mg, Oral, Q12H, Eulogio Mcdaniel MD, 600 mg at 01/15/22 0930  •  ipratropium-albuterol (DUO-NEB) nebulizer solution 3 mL, 3 mL, Nebulization, 4x Daily - RT, Eulogio Mcdaniel MD, 3 mL at 01/15/22 1536  •  LORazepam (ATIVAN) injection 0.5 mg, 0.5 mg, Intravenous, Q6H PRN, Eulogio Mcdaniel MD  •  methylPREDNISolone sodium succinate (SOLU-Medrol) injection 40 mg, 40 mg, Intravenous, Q12H, Eulogio Mcdaniel MD, 40 mg at 01/15/22 0930  •  potassium chloride (K-DUR,KLOR-CON) ER tablet 30 mEq, 30 mEq, Oral, TID With Meals, Eulogio Mcdaniel MD, 30 mEq at 01/15/22 1159  •  potassium chloride (K-DUR,KLOR-CON) ER tablet 40 mEq, 40 mEq, Oral, PRN, Eulogio Mcdaniel MD, 40 mEq at 01/14/22 1328  •  potassium chloride (KLOR-CON) packet 40 mEq, 40 mEq, Oral, PRN, Eulogio Mcdaniel MD  •  propafenone (RYTHMOL) tablet 150 mg, 150 mg, Oral,  Q8H, Jonny Mcdaniel MD, 150 mg at 01/15/22 1411  •  rosuvastatin (CRESTOR) tablet 5 mg, 5 mg, Oral, Daily, Jonny Mcdaniel MD, 5 mg at 01/15/22 0930  •  sodium chloride 0.9 % flush 10 mL, 10 mL, Intravenous, PRN, Binu Moreno MD  •  [COMPLETED] Insert peripheral IV, , , Once **AND** sodium chloride 0.9 % flush 10 mL, 10 mL, Intravenous, PRN, Vicki Alejandre APRN  •  sodium chloride nasal spray 1 spray, 1 spray, Each Nare, PRN, Jonny Mcdaniel MD  •  spironolactone (ALDACTONE) tablet 50 mg, 50 mg, Oral, Daily, Jonny Mcdaniel MD, 50 mg at 01/15/22 0930  •  ursodiol (ACTIGALL) capsule 300 mg, 300 mg, Oral, TID, Jonny Mcdaniel MD, 300 mg at 01/15/22 0930     ASSESSMENT  Acute on chronic hypoxic respiratory failure  Acute exacerbation of COPD  Chronic diastolic CHF  Paroxysmal atrial fibrillation  Hypertension  Hyperlipidemia  Ongoing tobacco abuse  Gastroesophageal reflux disease    PLAN  CPM  NIPPV  Steroids and taper  Zithromax  Anticoagulation  Replace potassium   Nebulizer treatment every 4 hours  Pulmonary hygiene  Smoking cessation guidelines  Adjust home medications  Stress ulcer DVT prophylaxis  Pulmonary consult pending  Cardiology to follow patient  DNR  PT/OT  Discussed with family and nursing staff  Follow closely and further recommendation according to hospital course    JONNY MCDANIEL MD

## 2022-01-15 NOTE — PROGRESS NOTES
[facility]  Cardiology Progress Note    Patient Identification:  Name: Celia Baird  Age: 77 y.o.  Sex: female  :  1945  MRN: [id number]                 Follow Up / Chief Complaint: follow up for atrial fibrillation    Interval History: low k+, being replaced       Subjective:  No chest pains, shortness of breath better    Objective:    Past Medical History:  Past Medical History:   Diagnosis Date   • Atrial fibrillation (HCC)    • COPD (chronic obstructive pulmonary disease) (HCC)    • History of frequent urinary tract infections    • Irregular heart beat    • Kidney stones    • PBC (primary biliary cirrhosis)    • PBC (primary biliary cirrhosis)      Past Surgical History:  Past Surgical History:   Procedure Laterality Date   • CARDIAC ELECTROPHYSIOLOGY PROCEDURE N/A 3/30/2017    Procedure:    LINQ;  Surgeon: Ailyn Solorio MD;  Location: [facility] INVASIVE LOCATION;  Service:    • CHOLECYSTECTOMY     • TUBAL ABDOMINAL LIGATION          Social History:   Social History     Tobacco Use   • Smoking status: Current Some Day Smoker     Packs/day: 0.50     Years: 59.00     Pack years: 29.50     Types: Cigarettes   • Smokeless tobacco: Never Used   Substance Use Topics   • Alcohol use: No      Family History:  Family History   Problem Relation Age of Onset   • Heart disease Father    • Heart attack Father    • Heart disease Brother    • Stroke Mother           Allergies:  Allergies   Allergen Reactions   • Morphine And Related Nausea And Vomiting   • Levaquin [Levofloxacin]    • Sulfa Antibiotics      Scheduled Meds:  aspirin, 81 mg, Daily  azithromycin, 250 mg, Q24H  budesonide-formoterol, 2 puff, BID - RT  bumetanide, 1 mg, BID  clopidogrel, 75 mg, Daily  guaiFENesin, 600 mg, Q12H  ipratropium-albuterol, 3 mL, 4x Daily - RT  methylPREDNISolone sodium succinate, 40 mg, Q12H  potassium chloride, 30 mEq, TID With Meals  propafenone, 150 mg, Q8H  rosuvastatin, 5 mg, Daily  spironolactone,  "50 mg, Daily  ursodiol, 300 mg, TID            INTAKE AND OUTPUT:    Intake/Output Summary (Last 24 hours) at 1/15/2022 1349  Last data filed at 1/15/2022 0435  Gross per 24 hour   Intake --   Output 600 ml   Net -600 ml       ROS  Constitutional: Awake and alert, no fever. No nosebleeds  Abdomen           no abdominal pain   Cardiac              no chest pain  Pulmonary          no shortness of breath      /51 (BP Location: Right arm, Patient Position: Lying)   Pulse 76   Temp 98.3 °F (36.8 °C) (Oral)   Resp 16   Ht 152.4 cm (60\")   Wt 36.3 kg (80 lb)   SpO2 94%   BMI 15.62 kg/m²   General appearance: No acute changes   Neck: Trachea midline; NECK, supple, no thyromegaly or lymphadenopathy   Lungs: Normal size and shape, normal breath sounds, equal distribution of air, no rales or rhonchi   CV: S1-S2 regular, no murmurs, no rub, no gallop   Abdomen: Soft, nontender; no masses , no abnormal abdominal sounds   Extremities: No deformity , normal color , no peripheral edema   Skin: Normal temperature, turgor and texture; no rash, ulcers                Cardiographics  Telemetry:    SR      ECG:         Echocardiogram:   1/14/22  Interpretation Summary    · Calculated left ventricular EF = 61.9% Estimated left ventricular EF was in agreement with the calculated left ventricular EF.  · Left ventricular diastolic function was normal.  · There is no evidence of pericardial effusion. .  Interpretation Summary       · Findings consistent with a normal ECG stress test.  · Left ventricular ejection fraction is normal (Calculated EF = 70%).  · Myocardial perfusion imaging indicates a normal myocardial perfusion study with no evidence of ischemia.  · Impressions are consistent with a low risk study.     Asymptomatic for chest pain. ECG is negative for ischemia.   Ectopy: Rare PAC at baseline, none with exercise, occasional PVC in recovery  HR and BP response : Appropriate for Beta-blocker therapy  Pharmacologic study " "due to inability to tolerate increasing speed and grade of treadmill due to Pulmonary, mobility  Issues and Beta-blocker therapy.  Participated in Low Level exercise and tolerance is poor.         Imaging  Chest X-ray:   IMPRESSION:  No focal pulmonary consolidation. Follow-up as clinical  indications persist.     This report was finalized on 1/13/2022 12:27 PM by Dr. Andrew Abdi M.D.  Lab Review   Results from last 7 days   Lab Units 01/14/22  0845 01/13/22  1219   TROPONIN T ng/mL <0.010 0.013     Results from last 7 days   Lab Units 01/15/22  0506   MAGNESIUM mg/dL 1.8     Results from last 7 days   Lab Units 01/15/22  0506   SODIUM mmol/L 139   POTASSIUM mmol/L 4.2   BUN mg/dL 24*   CREATININE mg/dL 0.96   CALCIUM mg/dL 8.8        Results from last 7 days   Lab Units 01/15/22  0506 01/14/22  0845 01/13/22  1219   WBC 10*3/mm3 12.26* 6.78 13.49*   HEMOGLOBIN g/dL 12.0 12.1 12.8   HEMATOCRIT % 35.0 35.1 37.2   PLATELETS 10*3/mm3 221 201 232           The following medical decision was discussed in detail with Dr. Solorio    Assessment:  Acute on chronic hypoxic respiratory failure  Paroxysmal atrial fibrillation: with watchman's device  Chronic diastolic CHF  Hyperlipidemia  Hypertension  Tobacco abuse  GERD  Acute exacerbation of COPD  History of loop implant (battery no longer working)   Hypokalemia: K+ low, being replaced    Plan:  Blood pressure and HR stable. No acute changes noted on ECG. Echo revealed EF 61.9%, LV diastolic function was normal. Unable to add ACE/ARB due to low blood pressure.Continue asa, bumex, plavix, Rythmol, aldactone, and crestor.  At this time, we will medical manage. We may consider ischemic work up in the outpatient setting.     bmp and mag  No change in cardiac status  Ailyn Solorio MD      )1/15/2022       EMR Dragon/Transcription:   \"Dictated utilizing Dragon dictation\".     "

## 2022-01-15 NOTE — PLAN OF CARE
Goal Outcome Evaluation:      Pt. Alert, oriented x4, cont. B&B, used bedside commode with 1 person supervised, no voiced c/o pain, with  dressing to right elbow intact, v/s stable, o2 sats running 94% to 95% with 2l/m of oxygen per n/c, no s/s acute distress noted

## 2022-01-15 NOTE — THERAPY EVALUATION
Patient Name: Celia Baird  : 1945    MRN: [id number]                              Today's Date: 1/15/2022       Admit Date: 2022    Visit Dx:     ICD-10-CM ICD-9-CM   1. COPD exacerbation (HCC)  J44.1 491.21   2. Shortness of breath  R06.02 786.05   3. Hypokalemia  E87.6 276.8     Patient Active Problem List   Diagnosis   • COPD exacerbation (HCC)   • Subarachnoid hemorrhage (HCC)   • Multiple skin tears   • Closed nondisplaced fracture of left clavicle   • Paroxysmal atrial fibrillation (HCC)   • Tobacco abuse   • Status post placement of implantable loop recorder   • SOB (shortness of breath)   • COPD with acute exacerbation (HCC)     Past Medical History:   Diagnosis Date   • Atrial fibrillation (HCC)    • COPD (chronic obstructive pulmonary disease) (HCC)    • History of frequent urinary tract infections    • Irregular heart beat    • Kidney stones    • PBC (primary biliary cirrhosis)    • PBC (primary biliary cirrhosis)      Past Surgical History:   Procedure Laterality Date   • CARDIAC ELECTROPHYSIOLOGY PROCEDURE N/A 3/30/2017    Procedure:    LINQ;  Surgeon: Ailyn Solorio MD;  Location: [facility] INVASIVE LOCATION;  Service:    • CHOLECYSTECTOMY     • TUBAL ABDOMINAL LIGATION        General Information     Row Name 01/15/22 1156          Physical Therapy Time and Intention    Document Type evaluation  -SR     Mode of Treatment physical therapy; individual therapy  -SR     Row Name 01/15/22 1156          General Information    Patient Profile Reviewed yes  -SR     Prior Level of Function independent:; all household mobility; community mobility; driving; shopping  uses rollator when fatigued  -SR     Existing Precautions/Restrictions fall; oxygen therapy device and L/min  2L  -SR     Barriers to Rehab none identified  -SR     Row Name 01/15/22 1156          Living Environment    Lives With alone  -SR     Row Name 01/15/22 1156          Home Main Entrance    Number of Stairs, Main  Entrance none  -SR     Row Name 01/15/22 1156          Cognition    Orientation Status (Cognition) oriented x 4  -SR     Row Name 01/15/22 1156          Safety Issues, Functional Mobility    Impairments Affecting Function (Mobility) balance; endurance/activity tolerance; shortness of breath; strength  -SR     Comment, Safety Issues/Impairments (Mobility) gait belt and non slip socks for safety  -SR           User Key  (r) = Recorded By, (t) = Taken By, (c) = Cosigned By    Initials Name Provider Type    SR Jami Puente Physical Therapist               Mobility     Row Name 01/15/22 1157          Bed Mobility    Bed Mobility supine-sit; sit-supine  -SR     Supine-Sit [place] (Bed Mobility) standby assist  -SR     Sit-Supine [place] (Bed Mobility) standby assist  -SR     Assistive Device (Bed Mobility) head of bed elevated  -SR     Row Name 01/15/22 1157          Transfers    Comment (Transfers) 2 x sit <> stand with RW CGA/SBA. Pt able to don brief from standing position with CGA  -SR     Row Name 01/15/22 1157          Sit-Stand Transfer    Sit-Stand [place] (Transfers) contact guard; standby assist  -SR     Assistive Device (Sit-Stand Transfers) walker, front-wheeled  -SR     Row Name 01/15/22 1157          Gait/Stairs (Locomotion)    [place] Level (Gait) contact guard; 1 person assist  -SR     Assistive Device (Gait) walker, front-wheeled  -SR     Distance in Feet (Gait) 80ft  -SR     Deviations/Abnormal Patterns (Gait) festinating/shuffling; base of support, narrow  -SR     Bilateral Gait Deviations forward flexed posture  -SR     Comment (Gait/Stairs) Pt ambulated 80ft with RW CGA with cues to keep RW within ETTA during turns, no LOB, distance limited by activity tolerance, O2 desaturation to 91% after bout  -SR           User Key  (r) = Recorded By, (t) = Taken By, (c) = Cosigned By    Initials Name Provider Type    Jami Lipscomb Physical Therapist               Obj/Interventions     Row  Name 01/15/22 1158          Range of Motion Comprehensive    General Range of Motion no range of motion deficits identified  -SR     Row Name 01/15/22 1158          Strength Comprehensive (MMT)    General Manual Muscle Testing (MMT) Assessment lower extremity strength deficits identified  -SR     Comment, General Manual Muscle Testing (MMT) Assessment BLE grossly 3+/5 tested functionally  -SR     Row Name 01/15/22 1158          Balance    Balance Assessment sitting static balance; sitting dynamic balance; standing static balance; standing dynamic balance  -SR     Static Sitting Balance WFL; sitting, edge of bed  -SR     Dynamic Sitting Balance WFL; sitting, edge of bed  -SR     Static Standing Balance WFL; supported  -SR     Dynamic Standing Balance mild impairment; supported  -SR     Comment, Balance CGA with RW for standing balance  -SR     Row Name 01/15/22 1158          Sensory Assessment (Somatosensory)    Sensory Assessment (Somatosensory) sensation intact  -SR           User Key  (r) = Recorded By, (t) = Taken By, (c) = Cosigned By    Initials Name Provider Type    SR Jami Puente Physical Therapist               Goals/Plan     Row Name 01/15/22 1202          Bed Mobility Goal 1 (PT)    Activity/Assistive Device (Bed Mobility Goal 1, PT) bed mobility activities, all  -SR     [place] Level/Cues Needed (Bed Mobility Goal 1, PT) independent  -SR     Time Frame (Bed Mobility Goal 1, PT) 1 week  -SR     Row Name 01/15/22 1202          Transfer Goal 1 (PT)    Activity/Assistive Device (Transfer Goal 1, PT) transfers, all  -SR     [place] Level/Cues Needed (Transfer Goal 1, PT) independent  -SR     Time Frame (Transfer Goal 1, PT) 1 week  -SR     Row Name 01/15/22 1202          Gait Training Goal 1 (PT)    Activity/Assistive Device (Gait Training Goal 1, PT) gait (walking locomotion)  -SR     [place] Level (Gait Training Goal 1, PT) standby assist  -SR     Distance (Gait Training Goal 1, PT) 150ft   -SR     Time Frame (Gait Training Goal 1, PT) 1 week  -SR           User Key  (r) = Recorded By, (t) = Taken By, (c) = Cosigned By    Initials Name Provider Type    SR Jami Puente Physical Therapist               Clinical Impression     Row Name 01/15/22 1154          Pain    Additional Documentation Pain Scale: Numbers Pre/Post-Treatment (Group)  -SR     Row Name 01/15/22 1156          Pain Scale: Numbers Pre/Post-Treatment    Pretreatment Pain Rating 0/10 - no pain  -SR     Posttreatment Pain Rating 0/10 - no pain  -SR     Row Name 01/15/22 1151          Plan of Care Review    Plan of Care Reviewed With patient  -SR     Progress no change  -SR     Outcome Summary Pt is a 78 yo female admitted 1/13/22 with chronic bronchitis. PMH includes but not limited to: COPD, a-fib, CHF, HTN, HLD. Pt A&Ox4 and reports she lives alone in house with 0 CICI, was independent with mobility, driving, and going to the grocery store prior to admission, occasionally would use a rollator for mobility. Pt on 2L O2 with O2 saturation 91-96% throughout session. Pt currently transfers and ambulates 80ft with RW with CGA demonstrating impaired strength and activity tolerance. Pt could benefit from skilled PT to address deficits. PT recommends  PT to facilitate progress  -SR     Row Name 01/15/22 2846          Therapy Assessment/Plan (PT)    Patient/Family Therapy Goals Statement (PT) go home  -SR     Rehab Potential (PT) good, to achieve stated therapy goals  -SR     Criteria for Skilled Interventions Met (PT) yes; meets criteria  -SR     Predicted Duration of Therapy Intervention (PT) 1 week  -SR     Row Name 01/15/22 1153          Vital Signs    Pre SpO2 (%) 96  -SR     O2 Delivery Pre Treatment nasal cannula  -SR     Intra SpO2 (%) 91  -SR     O2 Delivery Intra Treatment nasal cannula  -SR     Post SpO2 (%) 95  -SR     O2 Delivery Post Treatment nasal cannula  -SR     Pre Patient Position Supine  -SR     Intra Patient Position Standing   -SR     Post Patient Position Sitting  -SR     Row Name 01/15/22 1159          Positioning and Restraints    Pre-Treatment Position in bed  -SR     Post Treatment Position bed  -SR     In Bed fowlers; call light within reach; encouraged to call for assist; notified nsg; side rails up x3  -SR           User Key  (r) = Recorded By, (t) = Taken By, (c) = Cosigned By    Initials Name Provider Type    Jami Lipscomb Physical Therapist               Outcome Measures     Row Name 01/15/22 1202          How much help from another person do you currently need...    Turning from your back to your side while in flat bed without using bedrails? 4  -SR     Moving from lying on back to sitting on the side of a flat bed without bedrails? 4  -SR     Moving to and from a bed to a chair (including a wheelchair)? 3  -SR     Standing up from a chair using your arms (e.g., wheelchair, bedside chair)? 3  -SR     Climbing 3-5 steps with a railing? 3  -SR     To walk in hospital room? 3  -SR     AM-PAC 6 Clicks Score (PT) 20  -SR     Row Name 01/15/22 1202          Functional Assessment    Outcome Measure Options AM-PAC 6 Clicks Basic Mobility (PT)  -SR           User Key  (r) = Recorded By, (t) = Taken By, (c) = Cosigned By    Initials Name Provider Type    Jami Lipscomb Physical Therapist                             Physical Therapy Education                 Title: PT OT SLP Therapies (Done)     Topic: Physical Therapy (Done)     Point: Mobility training (Done)     Learning Progress Summary           Patient Acceptance, E, VU by SR at 1/15/2022 1203                   Point: Home exercise program (Done)     Learning Progress Summary           Patient Acceptance, E, VU by SR at 1/15/2022 1203                   Point: Body mechanics (Done)     Learning Progress Summary           Patient Acceptance, E, VU by SR at 1/15/2022 1203                   Point: Precautions (Done)     Learning Progress Summary           Patient Acceptance, E,  VU by SR at 1/15/2022 1203                               User Key     Initials Effective Dates Name Provider Type Discipline    SR 02/08/21 -  Jami Puente Physical Therapist PT              PT Recommendation and Plan  Planned Therapy Interventions (PT): balance training, bed mobility training, gait training, home exercise program, manual therapy techniques, neuromuscular re-education, patient/family education, postural re-education, ROM (range of motion), stair training, strengthening, stretching, transfer training  Plan of Care Reviewed With: patient  Progress: no change  Outcome Summary: Pt is a 76 yo female admitted 1/13/22 with chronic bronchitis. PMH includes but not limited to: COPD, a-fib, CHF, HTN, HLD. Pt A&Ox4 and reports she lives alone in house with 0 CICI, was independent with mobility, driving, and going to the grocery store prior to admission, occasionally would use a rollator for mobility. Pt on 2L O2 with O2 saturation 91-96% throughout session. Pt currently transfers and ambulates 80ft with RW with CGA demonstrating impaired strength and activity tolerance. Pt could benefit from skilled PT to address deficits. PT recommends  PT to facilitate progress     Time Calculation:    PT Charges     Row Name 01/15/22 1203             Time Calculation    Start Time 1138  -SR      Stop Time 1154  -SR      Time Calculation (min) 16 min  -      PT Received On 01/15/22  -      PT - Next Appointment 01/17/22  -      PT Goal Re-Cert Due Date 01/22/22  -              Time Calculation- PT    Total Timed Code Minutes- PT 16 minute(s)  -SR            User Key  (r) = Recorded By, (t) = Taken By, (c) = Cosigned By    Initials Name Provider Type    SR Jami Puente Physical Therapist              Therapy Charges for Today     Code Description Service Date Service Provider Modifiers Qty    [id number] HC PT EVAL LOW COMPLEXITY 2 1/15/2022 Jami Puente GP 1          PT G-Codes  Outcome Measure Options: AM-PAC 6  Clicks Basic Mobility (PT)  -[facility] 6 Clicks Score (PT): 20    Jami Puente  1/15/2022

## 2022-01-15 NOTE — PLAN OF CARE
Goal Outcome Evaluation:              Outcome Summary: Pt AxoX4, on O2 @ 2lpm, no signs of resp distress, no complaints of any pain, saline locked, seen by PT today and ambulated in hallway no report of SOB , needs attended, will continue to monitor.Seen by Cardio today, no additional work up needed.

## 2022-01-15 NOTE — PLAN OF CARE
Goal Outcome Evaluation:  Plan of Care Reviewed With: patient        Progress: no change  Outcome Summary: Pt is a 76 yo female admitted 1/13/22 with chronic bronchitis. PMH includes but not limited to: COPD, a-fib, CHF, HTN, HLD. Pt A&Ox4 and reports she lives alone in house with 0 CICI, was independent with mobility, driving, and going to the grocery store prior to admission, occasionally would use a rollator for mobility. Pt on 2L O2 with O2 saturation 91-96% throughout session. Pt currently transfers and ambulates 80ft with RW with CGA demonstrating impaired strength and activity tolerance. Pt could benefit from skilled PT to address deficits. PT recommends  PT to facilitate progress    Patient was wearing a face mask during this therapy encounter. Therapist used appropriate personal protective equipment including eye protection, mask, and gloves.  Mask used was standard procedure mask. Appropriate PPE was worn during the entire therapy session. Hand hygiene was completed before and after therapy session. Patient is not in enhanced droplet precautions.

## 2022-01-15 NOTE — PLAN OF CARE
Goal Outcome Evaluation:  Plan of Care Reviewed With: patient, son        Progress: no change  Outcome Summary: Pt is a 78 yo female admitted 1/13/22 with chronic bronchitis. PMH includes but not limited to: COPD, a-fib, CHF, HTN, HLD. Pt A&Ox4 and reports she lives alone in house with 0 CICI, reports use of rollator at PLOF, (I) with ADLs/various IADLs, drives, and reports no falls. Pt's son present and able to assist if needed upon return home. Pt seen today for OT eval, A&Ox4, completing bed mob with SBA, func mob with Rwx at [facility], LBD/toileting with SBA, and g/h at sink side with SBA. Pt on 2L above 90% spO2 throughout session and follwoing ADL activity. Pt to benefit from skilled OT to address poor activity tolerance, decreased strength and overall weakness impacting ADL routine. D/c rec home with assist if needed.    Therapist in mask, gloves, glasses, and hand hygiene performed.

## 2022-01-16 LAB
ANION GAP SERPL CALCULATED.3IONS-SCNC: 8.7 MMOL/L (ref 5–15)
BASOPHILS # BLD AUTO: 0.02 10*3/MM3 (ref 0–0.2)
BASOPHILS NFR BLD AUTO: 0.1 % (ref 0–1.5)
BUN SERPL-MCNC: 32 MG/DL (ref 8–23)
BUN/CREAT SERPL: 29.4 (ref 7–25)
CALCIUM SPEC-SCNC: 9.3 MG/DL (ref 8.6–10.5)
CHLORIDE SERPL-SCNC: 98 MMOL/L (ref 98–107)
CO2 SERPL-SCNC: 30.3 MMOL/L (ref 22–29)
CREAT SERPL-MCNC: 1.09 MG/DL (ref 0.57–1)
DEPRECATED RDW RBC AUTO: 42.3 FL (ref 37–54)
EOSINOPHIL # BLD AUTO: 0 10*3/MM3 (ref 0–0.4)
EOSINOPHIL NFR BLD AUTO: 0 % (ref 0.3–6.2)
ERYTHROCYTE [DISTWIDTH] IN BLOOD BY AUTOMATED COUNT: 12.6 % (ref 12.3–15.4)
GFR SERPL CREATININE-BSD FRML MDRD: 49 ML/MIN/1.73
GLUCOSE BLDC GLUCOMTR-MCNC: 162 MG/DL (ref 70–130)
GLUCOSE SERPL-MCNC: 128 MG/DL (ref 65–99)
HCT VFR BLD AUTO: 37.7 % (ref 34–46.6)
HGB BLD-MCNC: 12.5 G/DL (ref 12–15.9)
IMM GRANULOCYTES # BLD AUTO: 0.11 10*3/MM3 (ref 0–0.05)
IMM GRANULOCYTES NFR BLD AUTO: 0.7 % (ref 0–0.5)
LYMPHOCYTES # BLD AUTO: 1.28 10*3/MM3 (ref 0.7–3.1)
LYMPHOCYTES NFR BLD AUTO: 8.6 % (ref 19.6–45.3)
MCH RBC QN AUTO: 30.3 PG (ref 26.6–33)
MCHC RBC AUTO-ENTMCNC: 33.2 G/DL (ref 31.5–35.7)
MCV RBC AUTO: 91.5 FL (ref 79–97)
MONOCYTES # BLD AUTO: 1.96 10*3/MM3 (ref 0.1–0.9)
MONOCYTES NFR BLD AUTO: 13.1 % (ref 5–12)
NEUTROPHILS NFR BLD AUTO: 11.58 10*3/MM3 (ref 1.7–7)
NEUTROPHILS NFR BLD AUTO: 77.5 % (ref 42.7–76)
NRBC BLD AUTO-RTO: 0 /100 WBC (ref 0–0.2)
PLATELET # BLD AUTO: 229 10*3/MM3 (ref 140–450)
PMV BLD AUTO: 12.5 FL (ref 6–12)
POTASSIUM SERPL-SCNC: 5.8 MMOL/L (ref 3.5–5.2)
RBC # BLD AUTO: 4.12 10*6/MM3 (ref 3.77–5.28)
SODIUM SERPL-SCNC: 137 MMOL/L (ref 136–145)
WBC NRBC COR # BLD: 14.95 10*3/MM3 (ref 3.4–10.8)

## 2022-01-16 PROCEDURE — 85025 COMPLETE CBC W/AUTO DIFF WBC: CPT | Performed by: HOSPITALIST

## 2022-01-16 PROCEDURE — 90791 PSYCH DIAGNOSTIC EVALUATION: CPT | Performed by: SOCIAL WORKER

## 2022-01-16 PROCEDURE — 63710000001 PREDNISONE PER 1 MG: Performed by: INTERNAL MEDICINE

## 2022-01-16 PROCEDURE — 25010000002 ONDANSETRON PER 1 MG: Performed by: HOSPITALIST

## 2022-01-16 PROCEDURE — 94799 UNLISTED PULMONARY SVC/PX: CPT

## 2022-01-16 PROCEDURE — 99232 SBSQ HOSP IP/OBS MODERATE 35: CPT | Performed by: NURSE PRACTITIONER

## 2022-01-16 PROCEDURE — 82962 GLUCOSE BLOOD TEST: CPT

## 2022-01-16 PROCEDURE — 80048 BASIC METABOLIC PNL TOTAL CA: CPT | Performed by: HOSPITALIST

## 2022-01-16 RX ORDER — PREDNISONE 20 MG/1
20 TABLET ORAL
Status: DISCONTINUED | OUTPATIENT
Start: 2022-01-17 | End: 2022-01-17

## 2022-01-16 RX ORDER — ONDANSETRON 2 MG/ML
4 INJECTION INTRAMUSCULAR; INTRAVENOUS EVERY 4 HOURS PRN
Status: DISCONTINUED | OUTPATIENT
Start: 2022-01-16 | End: 2022-01-21

## 2022-01-16 RX ADMIN — URSODIOL 300 MG: 300 CAPSULE ORAL at 08:29

## 2022-01-16 RX ADMIN — SERTRALINE HYDROCHLORIDE 50 MG: 50 TABLET, FILM COATED ORAL at 17:55

## 2022-01-16 RX ADMIN — CLOPIDOGREL 75 MG: 75 TABLET, FILM COATED ORAL at 08:29

## 2022-01-16 RX ADMIN — ASPIRIN 81 MG: 81 TABLET, COATED ORAL at 08:29

## 2022-01-16 RX ADMIN — GUAIFENESIN 600 MG: 600 TABLET, EXTENDED RELEASE ORAL at 20:22

## 2022-01-16 RX ADMIN — BUDESONIDE AND FORMOTEROL FUMARATE DIHYDRATE 2 PUFF: 160; 4.5 AEROSOL RESPIRATORY (INHALATION) at 20:50

## 2022-01-16 RX ADMIN — BUMETANIDE 1 MG: 1 TABLET ORAL at 08:29

## 2022-01-16 RX ADMIN — PROPAFENONE HYDROCHLORIDE 150 MG: 150 TABLET, COATED ORAL at 20:22

## 2022-01-16 RX ADMIN — GUAIFENESIN 600 MG: 600 TABLET, EXTENDED RELEASE ORAL at 08:29

## 2022-01-16 RX ADMIN — SPIRONOLACTONE 50 MG: 50 TABLET, FILM COATED ORAL at 08:29

## 2022-01-16 RX ADMIN — URSODIOL 300 MG: 300 CAPSULE ORAL at 20:22

## 2022-01-16 RX ADMIN — POTASSIUM CHLORIDE 30 MEQ: 750 TABLET, EXTENDED RELEASE ORAL at 08:29

## 2022-01-16 RX ADMIN — PROPAFENONE HYDROCHLORIDE 150 MG: 150 TABLET, COATED ORAL at 12:38

## 2022-01-16 RX ADMIN — ROSUVASTATIN CALCIUM 5 MG: 5 TABLET, FILM COATED ORAL at 08:29

## 2022-01-16 RX ADMIN — BUDESONIDE AND FORMOTEROL FUMARATE DIHYDRATE 2 PUFF: 160; 4.5 AEROSOL RESPIRATORY (INHALATION) at 09:07

## 2022-01-16 RX ADMIN — IPRATROPIUM BROMIDE AND ALBUTEROL SULFATE 3 ML: .5; 3 SOLUTION RESPIRATORY (INHALATION) at 12:08

## 2022-01-16 RX ADMIN — PREDNISONE 20 MG: 20 TABLET ORAL at 08:29

## 2022-01-16 RX ADMIN — IPRATROPIUM BROMIDE AND ALBUTEROL SULFATE 3 ML: .5; 3 SOLUTION RESPIRATORY (INHALATION) at 09:09

## 2022-01-16 RX ADMIN — IPRATROPIUM BROMIDE AND ALBUTEROL SULFATE 3 ML: .5; 3 SOLUTION RESPIRATORY (INHALATION) at 16:07

## 2022-01-16 RX ADMIN — ONDANSETRON 4 MG: 2 INJECTION INTRAMUSCULAR; INTRAVENOUS at 23:17

## 2022-01-16 RX ADMIN — AZITHROMYCIN DIHYDRATE 250 MG: 250 TABLET, FILM COATED ORAL at 08:29

## 2022-01-16 RX ADMIN — PROPAFENONE HYDROCHLORIDE 150 MG: 150 TABLET, COATED ORAL at 05:55

## 2022-01-16 RX ADMIN — URSODIOL 300 MG: 300 CAPSULE ORAL at 17:02

## 2022-01-16 RX ADMIN — BUMETANIDE 1 MG: 1 TABLET ORAL at 20:22

## 2022-01-16 NOTE — PROGRESS NOTES
"CC: a fib     Interval History: c/o SOB on exertion. Still wheezing.       Vital Signs  Temp:  [97.8 °F (36.6 °C)-98.3 °F (36.8 °C)] 97.9 °F (36.6 °C)  Heart Rate:  [74-88] 74  Resp:  [16-20] 18  BP: (102-118)/(51-60) 102/60    Intake/Output Summary (Last 24 hours) at 1/16/2022 1036  Last data filed at 1/15/2022 2020  Gross per 24 hour   Intake 60 ml   Output --   Net 60 ml     Flowsheet Rows      First Filed Value   Admission Height 152.4 cm (60\") Documented at 01/13/2022 1221   Admission Weight 36.3 kg (80 lb) Documented at 01/13/2022 1221          PHYSICAL EXAM:  General: No acute distress  Resp:NL Rate, unlabored, rhonchi, wheezing, coarse  CV:NL rate and rhythm, NL PMI, Nl S1 and S2, no Murmur, no gallop, no rub, No JVD. Normal pedal pulses  ABD:Nl sounds, no masses or tenderness, nondistended, no guarding or rebound  Neuro: alert,cooperative and oriented  Extr: No edema or cyanosis, moves all extremities      Results Review:    Results from last 7 days   Lab Units 01/16/22  0602   SODIUM mmol/L 137   POTASSIUM mmol/L 5.8*   CHLORIDE mmol/L 98   CO2 mmol/L 30.3*   BUN mg/dL 32*   CREATININE mg/dL 1.09*   GLUCOSE mg/dL 128*   CALCIUM mg/dL 9.3     Results from last 7 days   Lab Units 01/14/22  0845 01/13/22  1219   TROPONIN T ng/mL <0.010 0.013     Results from last 7 days   Lab Units 01/16/22  0602   WBC 10*3/mm3 14.95*   HEMOGLOBIN g/dL 12.5   HEMATOCRIT % 37.7   PLATELETS 10*3/mm3 229         Results from last 7 days   Lab Units 01/14/22  0845   CHOLESTEROL mg/dL 141     Results from last 7 days   Lab Units 01/15/22  0506   MAGNESIUM mg/dL 1.8     Results from last 7 days   Lab Units 01/14/22  0845   CHOLESTEROL mg/dL 141   TRIGLYCERIDES mg/dL 62   HDL CHOL mg/dL 70*   LDL CHOL mg/dL 58     I reviewed the patient's new clinical results.  I personally viewed and interpreted the patient's EKG/Telemetry data-normal sinus rhythm        Medication Review:   Meds reviewed     "     Assessment/Plan    Assessment:  Acute on chronic hypoxic respiratory failure  Paroxysmal atrial fibrillation: with watchman's device  Chronic diastolic CHF, preserved EF   Hyperlipidemia  Hypertension- on the low side  Tobacco abuse  GERD  Acute exacerbation of COPD- pulmonary following   History of loop implant (battery no longer working)   Hypokalemia: K+ was low but is now too high - will DC any further scheduled potassium replacement   Plan:  Unable to add ACE/ARB due to low blood pressure.Continue asa, bumex, plavix, Rythmol, aldactone, and crestor.  At this time, continue to manage medically. Will consider ischemic work up in the outpatient setting.   -K is now too high. I stopped additional K replacement will need to follow closely with aldactone       BRAYAN Duong  01/16/22  10:36 EST

## 2022-01-16 NOTE — PROGRESS NOTES
"Daily progress note    Chief complaint  Doing same  No new complaints   Look depressed  Wants to go home  Denies chest pain shortness of breath palpitation    History of present illness  77-year old white female with history of chronic hypoxic respiratory failure COPD chronic atrial fibrillation congestive heart failure hypertension hyperlipidemia who continue to smoke and lives by herself to the emergency room with increased shortness of breath for last 1 week.  Patient denies any fever chills but does have chronic productive cough.  Patient denies chest pain abdominal pain nausea vomiting diarrhea.  Patient work-up in ER revealed acute on chronic hypoxic respiratory failure with acute exacerbation of COPD admitted for management.  Patient also found to have severe hypokalemia.     REVIEW OF SYSTEMS  Unremarkable except generalized weakness     PHYSICAL EXAM  Blood pressure 111/66, pulse 92, temperature 97.6 °F (36.4 °C), temperature source Oral, resp. rate 18, height 152.4 cm (60\"), weight 36.3 kg (80 lb), SpO2 96 %.    GENERAL: Chronically ill-appearing, nontoxic appearing, moderately distressed  HENT: normocephalic, atraumatic  EYES: no scleral icterus, PERRL  CV: regular rhythm, regular rate, no murmur  RESPIRATORY: Tachypneic, coarse breath sounds throughout  ABDOMEN: soft, nontender nondistended bowel sounds positive  MUSCULOSKELETAL: no deformity, no lower extremity edema or calf tenderness bilaterally  NEURO: alert, moves all extremities, follows commands, mental status normal/baseline  SKIN: warm, dry, no rash   Psych: Appropriate mood and affect    LAB RESULTS  Lab Results (last 24 hours)     Procedure Component Value Units Date/Time    Blood Culture - Blood, Arm, Left [[id number]]  (Normal) Collected: 01/13/22 1347    Specimen: Blood from Arm, Left Updated: 01/16/22 1415     Blood Culture No growth at 3 days    Blood Culture - Blood, Arm, Left [[id number]]  (Normal) Collected: 01/13/22 1219    Specimen: " Blood from Arm, Left Updated: 01/16/22 1245     Blood Culture No growth at 3 days    Basic Metabolic Panel [[id number]]  (Abnormal) Collected: 01/16/22 0602    Specimen: Blood Updated: 01/16/22 0744     Glucose 128 mg/dL      BUN 32 mg/dL      Creatinine 1.09 mg/dL      Sodium 137 mmol/L      Potassium 5.8 mmol/L      Chloride 98 mmol/L      CO2 30.3 mmol/L      Calcium 9.3 mg/dL      eGFR Non African Amer 49 mL/min/1.73      BUN/Creatinine Ratio 29.4     Anion Gap 8.7 mmol/L     Narrative:      GFR Normal >60  Chronic Kidney Disease <60  Kidney Failure <15      CBC & Differential [[id number]]  (Abnormal) Collected: 01/16/22 0602    Specimen: Blood Updated: 01/16/22 0622    Narrative:      The following orders were created for panel order CBC & Differential.  Procedure                               Abnormality         Status                     ---------                               -----------         ------                     CBC Auto Differential[[id number]]        Abnormal            Final result                 Please view results for these tests on the individual orders.    CBC Auto Differential [[id number]]  (Abnormal) Collected: 01/16/22 0602    Specimen: Blood Updated: 01/16/22 0622     WBC 14.95 10*3/mm3      RBC 4.12 10*6/mm3      Hemoglobin 12.5 g/dL      Hematocrit 37.7 %      MCV 91.5 fL      MCH 30.3 pg      MCHC 33.2 g/dL      RDW 12.6 %      RDW-SD 42.3 fl      MPV 12.5 fL      Platelets 229 10*3/mm3      Neutrophil % 77.5 %      Lymphocyte % 8.6 %      Monocyte % 13.1 %      Eosinophil % 0.0 %      Basophil % 0.1 %      Immature Grans % 0.7 %      Neutrophils, Absolute 11.58 10*3/mm3      Lymphocytes, Absolute 1.28 10*3/mm3      Monocytes, Absolute 1.96 10*3/mm3      Eosinophils, Absolute 0.00 10*3/mm3      Basophils, Absolute 0.02 10*3/mm3      Immature Grans, Absolute 0.11 10*3/mm3      nRBC 0.0 /100 WBC         Imaging Results (Last 24 Hours)     ** No results found for the last 24 hours. **              ECG 12 Lead  Component   Ref Range & Units 1/13/22 1356 1/13/22 1253   QT Interval   ms 373 P  409 P              HEART RATE= 81  bpm  RR Interval= 744  ms  MA Interval= 71  ms  P Horizontal Axis= 219  deg  P Front Axis= 219  deg  QRSD Interval= 96  ms  QT Interval= 373  ms  QRS Axis= 105  deg  T Wave Axis= 201  deg  - ABNORMAL ECG -  Sinus or ectopic atrial rhythm  Ventricular trigeminy  Short MA interval  Right axis deviation  Repol abnrm suggests ischemia, diffuse leads             Current Facility-Administered Medications:   •  aspirin EC tablet 81 mg, 81 mg, Oral, Daily, Eulogio Mcdaniel MD, 81 mg at 01/16/22 0829  •  azithromycin (ZITHROMAX) tablet 250 mg, 250 mg, Oral, Q24H, Loyd Christie MD, 250 mg at 01/16/22 0829  •  budesonide-formoterol (SYMBICORT) 160-4.5 MCG/ACT inhaler 2 puff, 2 puff, Inhalation, BID - RT, Eulogio Mcdaniel MD, 2 puff at 01/16/22 0907  •  bumetanide (BUMEX) tablet 1 mg, 1 mg, Oral, BID, Eulogio Mcdaniel MD, 1 mg at 01/16/22 0829  •  clopidogrel (PLAVIX) tablet 75 mg, 75 mg, Oral, Daily, Eulogio Mcdaniel MD, 75 mg at 01/16/22 0829  •  guaiFENesin (MUCINEX) 12 hr tablet 600 mg, 600 mg, Oral, Q12H, Euolgio Mcdaniel MD, 600 mg at 01/16/22 0829  •  ipratropium-albuterol (DUO-NEB) nebulizer solution 3 mL, 3 mL, Nebulization, 4x Daily - RT, Eulogio Mcdaniel MD, 3 mL at 01/16/22 1208  •  LORazepam (ATIVAN) injection 0.5 mg, 0.5 mg, Intravenous, Q6H PRN, Eulogio Mcdaniel MD  •  predniSONE (DELTASONE) tablet 20 mg, 20 mg, Oral, BID With Meals, Brock Orosco MD, 20 mg at 01/16/22 0829  •  propafenone (RYTHMOL) tablet 150 mg, 150 mg, Oral, Q8H, Eulogio Mcdaniel MD, 150 mg at 01/16/22 1238  •  rosuvastatin (CRESTOR) tablet 5 mg, 5 mg, Oral, Daily, Eulogio Mcdaniel MD, 5 mg at 01/16/22 0829  •  sodium chloride 0.9 % flush 10 mL, 10 mL, Intravenous, PRN, Binu Moreno MD  •  [COMPLETED] Insert peripheral IV, , , Once **AND** sodium chloride 0.9 % flush 10 mL, 10 mL, Intravenous, PRN, Hill  BRAYAN Pond  •  sodium chloride nasal spray 1 spray, 1 spray, Each Nare, PRN, Jonny Mcdaniel MD, 1 spray at 01/15/22 1835  •  spironolactone (ALDACTONE) tablet 50 mg, 50 mg, Oral, Daily, Jonny Mcdaniel MD, 50 mg at 01/16/22 0829  •  ursodiol (ACTIGALL) capsule 300 mg, 300 mg, Oral, TID, Jonny Mcdaniel MD, 300 mg at 01/16/22 0829     ASSESSMENT  Acute on chronic hypoxic respiratory failure  Acute exacerbation of COPD  Chronic diastolic CHF  Hyperkalemia  Paroxysmal atrial fibrillation  Hypertension  Hyperlipidemia  Ongoing tobacco abuse  Gastroesophageal reflux disease    PLAN  CPM  NIPPV  Steroids and taper  Zithromax  Anticoagulation  Start antidepressant  Stop Aldactone and potassium replacement  Psychiatry to follow patient  Nebulizer treatment every 4 hours  Pulmonary hygiene  Smoking cessation guidelines  Adjust home medications  Stress ulcer DVT prophylaxis  Pulmonary consult pending  Cardiology to follow patient  DNR  PT/OT  Discussed with family and nursing staff  Discharge planning    JONNY MCDANIEL MD

## 2022-01-16 NOTE — PROGRESS NOTES
"  PROGRESS NOTE  Patient Name: Celia Baird  Age/Sex: 77 y.o. female  : 1945  MRN: [id number]    Date of Admission: 2022  Date of Encounter Visit: 22   LOS: 3 days   Patient Care Team:  Slick Gomez MD as PCP - General (Internal Medicine)    Chief Complaint: COPD exacerbation    Hospital course: Patient is feeling better, continues to slowly improve, currently on oral steroid and on oral Zithromax.    REVIEW OF SYSTEMS:   CONSTITUTIONAL: no fever or chills  CARDIOVASCULAR: No chest pain, chest pressure or chest discomfort. No palpitations or edema.   RESPIRATORY: Improving shortness of breath with less dyspnea, still oxygen dependent.   GASTROINTESTINAL: No anorexia, nausea, vomiting or diarrhea. No abdominal pain or blood.   HEMATOLOGIC: No bleeding with multiple areas of bruising over upper extremities and upper part of the chest and the legs, no hematoma.     Ventilator/Non-Invasive Ventilation Settings (From admission, onward)             Start     Ordered    22 1313  NIPPV (CPAP or BIPAP)  Until Discontinued        Question:  Type:  Answer:  BIPAP    22 1312                  Vital Signs  Temp:  [97.6 °F (36.4 °C)-97.9 °F (36.6 °C)] 97.6 °F (36.4 °C)  Heart Rate:  [74-92] 83  Resp:  [18-20] 18  BP: (102-118)/(54-66) 111/66  SpO2:  [94 %-97 %] 96 %  on  Flow (L/min):  [0.5-2] 1 Device (Oxygen Therapy): nasal cannula    Intake/Output Summary (Last 24 hours) at 2022 1703  Last data filed at 1/15/2022 2020  Gross per 24 hour   Intake 60 ml   Output --   Net 60 ml     Flowsheet Rows      First Filed Value   Admission Height 152.4 cm (60\") Documented at 2022 1221   Admission Weight 36.3 kg (80 lb) Documented at 2022 1221        Body mass index is 15.62 kg/m².      22  1221 22  1151   Weight: 36.3 kg (80 lb) 36.3 kg (80 lb)       Physical Exam:  GEN:  No acute distress, alert, cooperative, well developed, cachectic, on nasal cannula oxygen  EYES:   " Sclerae clear. No icterus. PERRL. Normal EOM  ENT:   External ears/nose normal, no oral lesions, no thrush, mucous membranes moist  NECK:  Supple, midline trachea, no JVD  LUNGS: Normal chest on inspection, some expiratory wheezes but no crackles, reduced breath sounds however improved compared to yesterday. Respirations regular, even and unlabored.   CV:  Regular rhythm and rate. Normal S1/S2. No murmurs, gallops, or rubs noted.  ABD:  Soft, nontender and nondistended. Normal bowel sounds. No guarding  EXT:  Moves all extremities well. No cyanosis. No redness. No edema.   Skin: Dry, intact, multiple ecchymotic lesions chronic    Results Review:    Results From Last 14 Days   Lab Units 01/14/22  0845 01/13/22  1219   LACTATE mmol/L  --  1.5   TRIGLYCERIDES mg/dL 62  --      Results from last 7 days   Lab Units 01/16/22  0602 01/15/22  0506 01/14/22  2316 01/14/22  0845 01/13/22  1219   SODIUM mmol/L 137 139  --  136 141   POTASSIUM mmol/L 5.8* 4.2 4.0 2.9* 2.6*   CHLORIDE mmol/L 98 98  --  92* 97*   CO2 mmol/L 30.3* 31.9*  --  32.7* 34.1*   BUN mg/dL 32* 24*  --  19 12   CREATININE mg/dL 1.09* 0.96  --  1.04* 0.92   CALCIUM mg/dL 9.3 8.8  --  8.9 9.0   AST (SGOT) U/L  --   --   --  11 12   ALT (SGPT) U/L  --   --   --  7 7   ANION GAP mmol/L 8.7 9.1  --  11.3 9.9   ALBUMIN g/dL  --   --   --  3.20* 4.10     Results from last 7 days   Lab Units 01/14/22  0845 01/13/22  1219   TROPONIN T ng/mL <0.010 0.013     Results from last 7 days   Lab Units 01/14/22  0845   TSH uIU/mL 0.704     Results from last 7 days   Lab Units 01/13/22  1219   PROBNP pg/mL 585.0     Results from last 7 days   Lab Units 01/16/22  0602 01/15/22  0506 01/14/22  0845 01/13/22  1219   WBC 10*3/mm3 14.95* 12.26* 6.78 13.49*   HEMOGLOBIN g/dL 12.5 12.0 12.1 12.8   HEMATOCRIT % 37.7 35.0 35.1 37.2   PLATELETS 10*3/mm3 229 221 201 232   MCV fL 91.5 88.8 88.2 90.1   NEUTROPHIL % % 77.5* 83.3* 81.5* 74.4   LYMPHOCYTE % % 8.6* 6.6* 12.1* 12.2*    MONOCYTES % % 13.1* 9.1 5.9 10.7   EOSINOPHIL % % 0.0* 0.0* 0.0* 1.3   BASOPHIL % % 0.1 0.2 0.1 0.9   IMM GRAN % % 0.7* 0.8* 0.4 0.5         Results from last 7 days   Lab Units 01/15/22  0506 01/14/22  0845 01/13/22  1219   MAGNESIUM mg/dL 1.8 1.6 1.6     Results from last 7 days   Lab Units 01/14/22  0845   CHOLESTEROL mg/dL 141   TRIGLYCERIDES mg/dL 62   HDL CHOL mg/dL 70*     Results from last 7 days   Lab Units 01/13/22  1309   PH, ARTERIAL pH units 7.480*   PCO2, ARTERIAL mm Hg 47.9*   PO2 ART mm Hg 84.4   HCO3 ART mmol/L 35.6*     Results from last 7 days   Lab Units 01/14/22  0845   HEMOGLOBIN A1C % 5.50     Glucose   Date/Time Value Ref Range Status   01/13/2022 1831 121 70 - 130 mg/dL Final     Comment:     Meter: [id number] : [id number] Prieto Lexi FREDERICK     Results from last 7 days   Lab Units 01/15/22  0506 01/13/22  1219   PROCALCITONIN ng/mL 0.06 0.05   LACTATE mmol/L  --  1.5     Results from last 7 days   Lab Units 01/13/22  1347 01/13/22  1219   BLOODCX  No growth at 3 days No growth at 3 days         Results from last 7 days   Lab Units 01/13/22  1230   COVID19  Not Detected               Imaging:   Imaging Results (All)     Procedure Component Value Units Date/Time    XR Chest 1 View [[id number]] Collected: 01/13/22 1224     Updated: 01/13/22 1230    Narrative:      XR CHEST 1 VW-     HISTORY: Female who is 77 years-old,  short of breath     TECHNIQUE: Frontal view of the chest     COMPARISON: 03/20/2020     FINDINGS: The heart size is normal. Aorta is calcified. Pulmonary  vasculature is unremarkable. No focal pulmonary consolidation, pleural  effusion, or pneumothorax. Old granulomatous disease is seen. No acute  osseous process.       Impression:      No focal pulmonary consolidation. Follow-up as clinical  indications persist.     This report was finalized on 1/13/2022 12:27 PM by Dr. Andrew Abdi M.D.             I reviewed the patient's new clinical results.  I personally viewed and  interpreted the patient's imaging results:        Medication Review:   aspirin, 81 mg, Oral, Daily  azithromycin, 250 mg, Oral, Q24H  budesonide-formoterol, 2 puff, Inhalation, BID - RT  bumetanide, 1 mg, Oral, BID  clopidogrel, 75 mg, Oral, Daily  guaiFENesin, 600 mg, Oral, Q12H  ipratropium-albuterol, 3 mL, Nebulization, 4x Daily - RT  [START ON 1/17/2022] predniSONE, 20 mg, Oral, Daily With Breakfast  propafenone, 150 mg, Oral, Q8H  rosuvastatin, 5 mg, Oral, Daily  sertraline, 50 mg, Oral, Daily  ursodiol, 300 mg, Oral, TID             ASSESSMENT:   Acute exacerbation of COPD  Subarachnoid hemorrhage  Multiple skin tears  Closed nondisplaced fracture of the left clavicle  Paroxysmal atrial fibrillation  Tobacco abuse  Status postplacement of implantable loop recorder      PLAN:  Patient is currently oral steroid, he is on   She is down to 1 L and I did turn her down to 0.50 on her oxygen and she already have oxygen at home  Strongly advised to abstain from smoking  Hopefully cleared for discharge tomorrow  Defer anticoagulation to primary team  Patient is requesting to follow-up with us as an outpatient, in that case she needs to be seen in 2 months with a full PFT  Discharge instruction to go home on the budesonide/formoterol twice daily with the DuoNeb every 6 hours as needed      Discussed with Patient and with family at the bedside  Disposition: Per primary team hopefully home in a day or 2    Brock Orosco MD  01/16/22  17:03 EST          Dictated utilizing Dragon dictation

## 2022-01-16 NOTE — PLAN OF CARE
Problem: Adult Inpatient Plan of Care  Goal: Plan of Care Review  Outcome: Ongoing, Progressing  Flowsheets (Taken 1/16/2022 1458)  Progress: improving  Plan of Care Reviewed With: patient  Outcome Summary: Patient has been pleasant and cooperative during shift. No complaints of pain or nausea. SOA on exertion. Patient has been on room air to 2L O2. Potassium high so scheduled KCl stopped. Access has seen patient for depression and Violeta plans to start her on low dose anti depressant. Patient does not want rehab and is inbetween her son helping her at home and asking for home health. AOx4, assist x1. Son at bedside. Will continue to monitor and assist patient as needed.  Goal: Patient-Specific Goal (Individualized)  Outcome: Ongoing, Progressing  Goal: Absence of Hospital-Acquired Illness or Injury  Outcome: Ongoing, Progressing  Intervention: Identify and Manage Fall Risk  Recent Flowsheet Documentation  Taken 1/16/2022 1415 by Guanako Cheek RN  Safety Promotion/Fall Prevention:   assistive device/personal items within reach   fall prevention program maintained   nonskid shoes/slippers when out of bed   safety round/check completed  Taken 1/16/2022 1225 by Guanako Cheek RN  Safety Promotion/Fall Prevention:   assistive device/personal items within reach   fall prevention program maintained   nonskid shoes/slippers when out of bed   safety round/check completed  Taken 1/16/2022 0953 by Guanako Cheek RN  Safety Promotion/Fall Prevention:   assistive device/personal items within reach   fall prevention program maintained   nonskid shoes/slippers when out of bed   safety round/check completed  Taken 1/16/2022 0831 by Guanako Cheek RN  Safety Promotion/Fall Prevention:   assistive device/personal items within reach   fall prevention program maintained   nonskid shoes/slippers when out of bed   safety round/check completed  Intervention: Prevent Skin Injury  Recent Flowsheet Documentation  Taken  1/16/2022 1415 by Guanako Cheek RN  Body Position: supine  Taken 1/16/2022 1225 by Guanako Cheek RN  Body Position: supine  Taken 1/16/2022 0953 by Guankao Cheek RN  Body Position: supine  Taken 1/16/2022 0831 by Guanako Cheek RN  Body Position: supine  Goal: Optimal Comfort and Wellbeing  Outcome: Ongoing, Progressing  Goal: Readiness for Transition of Care  Outcome: Ongoing, Progressing     Problem: COPD Comorbidity  Goal: Maintenance of COPD Symptom Control  Outcome: Ongoing, Progressing     Problem: Hypertension Comorbidity  Goal: Blood Pressure in Desired Range  Outcome: Ongoing, Progressing     Problem: Obstructive Sleep Apnea Risk or Actual (Comorbidity Management)  Goal: Unobstructed Breathing During Sleep  Outcome: Ongoing, Progressing     Problem: Malnutrition  Goal: Improved Nutritional Intake  Outcome: Ongoing, Progressing     Problem: Fall Injury Risk  Goal: Absence of Fall and Fall-Related Injury  Outcome: Ongoing, Progressing  Intervention: Promote Injury-Free Environment  Recent Flowsheet Documentation  Taken 1/16/2022 1415 by Guanako Cheek RN  Safety Promotion/Fall Prevention:   assistive device/personal items within reach   fall prevention program maintained   nonskid shoes/slippers when out of bed   safety round/check completed  Taken 1/16/2022 1225 by Guanako Cheek RN  Safety Promotion/Fall Prevention:   assistive device/personal items within reach   fall prevention program maintained   nonskid shoes/slippers when out of bed   safety round/check completed  Taken 1/16/2022 0953 by Guanako Cheek RN  Safety Promotion/Fall Prevention:   assistive device/personal items within reach   fall prevention program maintained   nonskid shoes/slippers when out of bed   safety round/check completed  Taken 1/16/2022 0831 by Guanako Cheek RN  Safety Promotion/Fall Prevention:   assistive device/personal items within reach   fall prevention program maintained   nonskid  shoes/slippers when out of bed   safety round/check completed     Problem: Adjustment to Illness COPD (Chronic Obstructive Pulmonary Disease)  Goal: Optimal Chronic Illness Coping  Outcome: Ongoing, Progressing     Problem: Respiratory Compromise COPD (Chronic Obstructive Pulmonary Disease)  Goal: Effective Oxygenation and Ventilation  Outcome: Ongoing, Progressing  Intervention: Promote Airway Secretion Clearance  Recent Flowsheet Documentation  Taken 1/16/2022 1415 by Guanako Cheek RN  Activity Management: activity adjusted per tolerance  Taken 1/16/2022 1225 by Guanako Cheek RN  Activity Management: activity adjusted per tolerance  Taken 1/16/2022 0953 by Guanako Cheek RN  Activity Management: activity adjusted per tolerance  Taken 1/16/2022 0831 by Guanako Cheek RN  Activity Management: activity adjusted per tolerance  Intervention: Optimize Oxygenation and Ventilation  Recent Flowsheet Documentation  Taken 1/16/2022 1415 by Guanako Cheek RN  Head of Bed (HOB): HOB at 30-45 degrees  Taken 1/16/2022 1225 by Guanako Cheek RN  Head of Bed (HOB): HOB at 45 degrees  Taken 1/16/2022 0953 by Guanako Cheek RN  Head of Bed (HOB): HOB at 30 degrees  Taken 1/16/2022 0831 by Guanako Cheek RN  Head of Bed (HOB): HOB at 45 degrees   Goal Outcome Evaluation:  Plan of Care Reviewed With: patient        Progress: improving  Outcome Summary: Patient has been pleasant and cooperative during shift. No complaints of pain or nausea. SOA on exertion. Patient has been on room air to 2L O2. Potassium high so scheduled KCl stopped. [facility] patient for depression and Violeta plans to start her on low dose anti depressant. Patient does not want rehab and is inbetween her son helping her at home and asking for home health. AOx4, assist x1. Son at bedside. Will continue to monitor and assist patient as needed.

## 2022-01-16 NOTE — CONSULTS
"Access center evaluated 77-year-old female for depression.  Patient's depression is focused on the grief of the loss of her 59-year-old son of a sudden heart attack in February 2021 and her 's death 4 months later of COPD.  Patient's other son was in room with patient's permission.  Patient was tearful when talking about her losses.  Patient feels like she has not really had a chance to grieve the loss of her son due to being focused on her  and then the loss of her .  Patient rates her current anxiety is a \"5\" but states it can be much worse at times.  Patient rates her current depressed mood as a \"8\".  Patient denies any previous inpatient or outpatient psychiatric treatment.  Patient states she has been eating and has actually gained weight since being hospitalized approximately a year ago when she lost a lot of weight.  Patient states she gets about 6 to 7 hours of sleep but that it is broken up.  Patient states she is eating \"okay\".  Patient's son confirmed that patient is eating.  Patient states she has days where she does not get up out of bed but that she is beginning to have more days where she is able to get up and function.  Access talked with patient about depression and how unresolved grief can create a clinical depression.  Patient states she would be open to starting a low-dose of an antidepressant medication.  Patient stated she would also be willing to talk with either a grief counseling support group or a grief counselor.    Patient lives alone in a patio home.  Patient has one remaining adult son who is able to check in on patient often.  Patient also states she has a friend who lives across the street that checks in on her daily.  Patient states she typically does her own cooking but on the days where she is not functioning well she does not cook.  Patient states she has not been active in her [employer] [facility] and has not been able to talk with her  about her grief.  " Access recommends patient see a hospital .  Patient states she smokes cigarettes but does not drink alcohol.  Patient states her family and her doctors have talked to her about not smoking as she struggles with COPD.  Patient denies any suicidal thoughts or feelings and denies any history of attempts.  Patient is not on any psychiatric meds.  Access recommends either the hospitalist or psychiatrist talk with patient about starting an antidepressant medication.  Access will follow with resources for patient to deal with her grief.

## 2022-01-16 NOTE — PROGRESS NOTES
Access center took patient and her son resources for grief counseling to hospice as well as an outpatient counselor.  Patient states they used hospice for her  and she has some paperwork at home she will look at also.

## 2022-01-16 NOTE — PLAN OF CARE
Goal Outcome Evaluation:  Plan of Care Reviewed With: patient        Progress: improving  Outcome Summary: Patient  resting  at this  time.  No  complaints  of  pan  voiced.   Continue  oral  antyibiotics  and tolerating  well..  Oxygen  now  decreased  to  .5l/m.    SR on  the  monitor.  Up  to  BSC  with  one  assist.  Nursing  will  continue to monitor.

## 2022-01-17 LAB
ANION GAP SERPL CALCULATED.3IONS-SCNC: 13.1 MMOL/L (ref 5–15)
BASOPHILS # BLD AUTO: 0.04 10*3/MM3 (ref 0–0.2)
BASOPHILS NFR BLD AUTO: 0.2 % (ref 0–1.5)
BUN SERPL-MCNC: 54 MG/DL (ref 8–23)
BUN/CREAT SERPL: 39.4 (ref 7–25)
CALCIUM SPEC-SCNC: 9.4 MG/DL (ref 8.6–10.5)
CHLORIDE SERPL-SCNC: 94 MMOL/L (ref 98–107)
CO2 SERPL-SCNC: 29.9 MMOL/L (ref 22–29)
CREAT SERPL-MCNC: 1.37 MG/DL (ref 0.57–1)
DEPRECATED RDW RBC AUTO: 41.4 FL (ref 37–54)
EOSINOPHIL # BLD AUTO: 0.06 10*3/MM3 (ref 0–0.4)
EOSINOPHIL NFR BLD AUTO: 0.3 % (ref 0.3–6.2)
ERYTHROCYTE [DISTWIDTH] IN BLOOD BY AUTOMATED COUNT: 12.6 % (ref 12.3–15.4)
GFR SERPL CREATININE-BSD FRML MDRD: 37 ML/MIN/1.73
GLUCOSE BLDC GLUCOMTR-MCNC: 118 MG/DL (ref 70–130)
GLUCOSE SERPL-MCNC: 105 MG/DL (ref 65–99)
HCT VFR BLD AUTO: 38.2 % (ref 34–46.6)
HGB BLD-MCNC: 12.9 G/DL (ref 12–15.9)
IMM GRANULOCYTES # BLD AUTO: 0.15 10*3/MM3 (ref 0–0.05)
IMM GRANULOCYTES NFR BLD AUTO: 0.7 % (ref 0–0.5)
LYMPHOCYTES # BLD AUTO: 2.6 10*3/MM3 (ref 0.7–3.1)
LYMPHOCYTES NFR BLD AUTO: 12.8 % (ref 19.6–45.3)
MCH RBC QN AUTO: 30.5 PG (ref 26.6–33)
MCHC RBC AUTO-ENTMCNC: 33.8 G/DL (ref 31.5–35.7)
MCV RBC AUTO: 90.3 FL (ref 79–97)
MONOCYTES # BLD AUTO: 2.87 10*3/MM3 (ref 0.1–0.9)
MONOCYTES NFR BLD AUTO: 14.1 % (ref 5–12)
NEUTROPHILS NFR BLD AUTO: 14.64 10*3/MM3 (ref 1.7–7)
NEUTROPHILS NFR BLD AUTO: 71.9 % (ref 42.7–76)
NRBC BLD AUTO-RTO: 0 /100 WBC (ref 0–0.2)
PLATELET # BLD AUTO: 234 10*3/MM3 (ref 140–450)
PMV BLD AUTO: 12.5 FL (ref 6–12)
POTASSIUM SERPL-SCNC: 5.5 MMOL/L (ref 3.5–5.2)
RBC # BLD AUTO: 4.23 10*6/MM3 (ref 3.77–5.28)
SODIUM SERPL-SCNC: 137 MMOL/L (ref 136–145)
WBC NRBC COR # BLD: 20.36 10*3/MM3 (ref 3.4–10.8)

## 2022-01-17 PROCEDURE — 63710000001 PREDNISONE PER 1 MG: Performed by: HOSPITALIST

## 2022-01-17 PROCEDURE — 25010000002 LORAZEPAM PER 2 MG: Performed by: HOSPITALIST

## 2022-01-17 PROCEDURE — 80048 BASIC METABOLIC PNL TOTAL CA: CPT | Performed by: HOSPITALIST

## 2022-01-17 PROCEDURE — 94799 UNLISTED PULMONARY SVC/PX: CPT

## 2022-01-17 PROCEDURE — 85025 COMPLETE CBC W/AUTO DIFF WBC: CPT | Performed by: HOSPITALIST

## 2022-01-17 PROCEDURE — 94640 AIRWAY INHALATION TREATMENT: CPT

## 2022-01-17 PROCEDURE — 97110 THERAPEUTIC EXERCISES: CPT

## 2022-01-17 PROCEDURE — 25010000002 DIGOXIN PER 500 MCG

## 2022-01-17 PROCEDURE — 97535 SELF CARE MNGMENT TRAINING: CPT

## 2022-01-17 PROCEDURE — 94761 N-INVAS EAR/PLS OXIMETRY MLT: CPT

## 2022-01-17 PROCEDURE — 25010000002 ONDANSETRON PER 1 MG: Performed by: HOSPITALIST

## 2022-01-17 PROCEDURE — 82962 GLUCOSE BLOOD TEST: CPT

## 2022-01-17 PROCEDURE — 80299 QUANTITATIVE ASSAY DRUG: CPT | Performed by: HOSPITALIST

## 2022-01-17 PROCEDURE — 99232 SBSQ HOSP IP/OBS MODERATE 35: CPT | Performed by: NURSE PRACTITIONER

## 2022-01-17 RX ORDER — DIGOXIN 0.25 MG/ML
125 INJECTION INTRAMUSCULAR; INTRAVENOUS
Status: DISCONTINUED | OUTPATIENT
Start: 2022-01-18 | End: 2022-01-21

## 2022-01-17 RX ORDER — PREDNISONE 10 MG/1
10 TABLET ORAL
Status: DISCONTINUED | OUTPATIENT
Start: 2022-01-18 | End: 2022-01-20

## 2022-01-17 RX ORDER — MIRTAZAPINE 15 MG/1
7.5 TABLET, FILM COATED ORAL NIGHTLY
Status: DISCONTINUED | OUTPATIENT
Start: 2022-01-17 | End: 2022-01-21 | Stop reason: HOSPADM

## 2022-01-17 RX ORDER — SODIUM CHLORIDE 9 MG/ML
75 INJECTION, SOLUTION INTRAVENOUS CONTINUOUS
Status: DISCONTINUED | OUTPATIENT
Start: 2022-01-17 | End: 2022-01-19

## 2022-01-17 RX ORDER — DIGOXIN 0.25 MG/ML
250 INJECTION INTRAMUSCULAR; INTRAVENOUS ONCE
Status: COMPLETED | OUTPATIENT
Start: 2022-01-17 | End: 2022-01-17

## 2022-01-17 RX ORDER — FAMOTIDINE 20 MG/1
20 TABLET, FILM COATED ORAL 2 TIMES DAILY
Status: DISCONTINUED | OUTPATIENT
Start: 2022-01-17 | End: 2022-01-19

## 2022-01-17 RX ADMIN — SODIUM CHLORIDE 75 ML/HR: 9 INJECTION, SOLUTION INTRAVENOUS at 15:36

## 2022-01-17 RX ADMIN — PROPAFENONE HYDROCHLORIDE 150 MG: 150 TABLET, COATED ORAL at 05:38

## 2022-01-17 RX ADMIN — PROPAFENONE HYDROCHLORIDE 150 MG: 150 TABLET, COATED ORAL at 15:36

## 2022-01-17 RX ADMIN — IPRATROPIUM BROMIDE AND ALBUTEROL SULFATE 3 ML: .5; 3 SOLUTION RESPIRATORY (INHALATION) at 07:04

## 2022-01-17 RX ADMIN — SERTRALINE HYDROCHLORIDE 50 MG: 50 TABLET, FILM COATED ORAL at 10:08

## 2022-01-17 RX ADMIN — GUAIFENESIN 600 MG: 600 TABLET, EXTENDED RELEASE ORAL at 20:50

## 2022-01-17 RX ADMIN — PREDNISONE 20 MG: 20 TABLET ORAL at 10:07

## 2022-01-17 RX ADMIN — CLOPIDOGREL 75 MG: 75 TABLET, FILM COATED ORAL at 10:07

## 2022-01-17 RX ADMIN — LORAZEPAM 0.5 MG: 2 INJECTION INTRAMUSCULAR; INTRAVENOUS at 05:38

## 2022-01-17 RX ADMIN — MIRTAZAPINE 7.5 MG: 15 TABLET, FILM COATED ORAL at 20:50

## 2022-01-17 RX ADMIN — DIGOXIN 250 MCG: 250 INJECTION, SOLUTION INTRAMUSCULAR; INTRAVENOUS; PARENTERAL at 15:36

## 2022-01-17 RX ADMIN — IPRATROPIUM BROMIDE AND ALBUTEROL SULFATE 3 ML: .5; 3 SOLUTION RESPIRATORY (INHALATION) at 11:12

## 2022-01-17 RX ADMIN — ASPIRIN 81 MG: 81 TABLET, COATED ORAL at 10:08

## 2022-01-17 RX ADMIN — ONDANSETRON 4 MG: 2 INJECTION INTRAMUSCULAR; INTRAVENOUS at 05:38

## 2022-01-17 RX ADMIN — URSODIOL 300 MG: 300 CAPSULE ORAL at 15:36

## 2022-01-17 RX ADMIN — AZITHROMYCIN DIHYDRATE 250 MG: 250 TABLET, FILM COATED ORAL at 10:08

## 2022-01-17 RX ADMIN — BUMETANIDE 1 MG: 1 TABLET ORAL at 10:07

## 2022-01-17 RX ADMIN — BUDESONIDE AND FORMOTEROL FUMARATE DIHYDRATE 2 PUFF: 160; 4.5 AEROSOL RESPIRATORY (INHALATION) at 20:19

## 2022-01-17 RX ADMIN — FAMOTIDINE 20 MG: 20 TABLET, FILM COATED ORAL at 20:50

## 2022-01-17 RX ADMIN — ONDANSETRON 4 MG: 2 INJECTION INTRAMUSCULAR; INTRAVENOUS at 10:16

## 2022-01-17 RX ADMIN — URSODIOL 300 MG: 300 CAPSULE ORAL at 10:07

## 2022-01-17 RX ADMIN — PROPAFENONE HYDROCHLORIDE 150 MG: 150 TABLET, COATED ORAL at 20:50

## 2022-01-17 RX ADMIN — GUAIFENESIN 600 MG: 600 TABLET, EXTENDED RELEASE ORAL at 10:07

## 2022-01-17 RX ADMIN — URSODIOL 300 MG: 300 CAPSULE ORAL at 20:50

## 2022-01-17 RX ADMIN — DIGOXIN 250 MCG: 250 INJECTION, SOLUTION INTRAMUSCULAR; INTRAVENOUS; PARENTERAL at 12:49

## 2022-01-17 RX ADMIN — ROSUVASTATIN CALCIUM 5 MG: 5 TABLET, FILM COATED ORAL at 10:07

## 2022-01-17 RX ADMIN — BUDESONIDE AND FORMOTEROL FUMARATE DIHYDRATE 2 PUFF: 160; 4.5 AEROSOL RESPIRATORY (INHALATION) at 09:09

## 2022-01-17 NOTE — DISCHARGE PLACEMENT REQUEST
"Albina Baird (77 y.o. Female)             Date of Birth Social Security Number Address Home Phone MRN    1945  [id number] [id number] [phone] [id number]    Yazidi Marital Status             Presybeterian        Admission Date Admission Type Admitting Provider Attending Provider Department, Room/Bed    1/13/22 Emergency Eulogio Mcdaniel MD Ahmed, Aftab, MD [address], [address]    Discharge Date Discharge Disposition Discharge Destination                         Attending Provider: Eulogio Mcdaniel MD    Allergies: Morphine And Related, Levaquin [Levofloxacin], Sulfa Antibiotics    Isolation: None   Infection: None   Code Status: No CPR   Advance Care Planning Activity    Ht: 152.4 cm (60\")   Wt: 32.6 kg (71 lb 12.8 oz)    Admission Cmt: None   Principal Problem: None                Active Insurance as of 1/13/2022     Primary Coverage     Payor Plan Insurance Group Employer/Plan Group    ANTHEM MEDICARE REPLACEMENT ANTHEM MEDICARE ADVANTAGE [id number]     Payor Plan Address Payor Plan Phone Number Payor Plan Fax Number Effective Dates    [address] [phone]  1/1/2016 - None Entered    [facility] [id number]       Subscriber Name Subscriber Birth Date Member ID       ALBINA BAIRD 1945 [id number]           Secondary Coverage     Payor Plan Insurance Group Employer/Plan Group    AUTO MISC AUTO      Coverage Address Coverage Phone Number Coverage Fax Number Effective Dates    [address] [facility] [phone]  6/16/2021 - None Entered    [place] [id number]       Subscriber Name Subscriber Birth Date Member ID       ALBINA BAIRD 1945 [id number]                 Emergency Contacts      (Rel.) Home Phone Work Phone Mobile Phone    Eduardo Baird (Son) [phone] -- [phone]              "

## 2022-01-17 NOTE — PROGRESS NOTES
[facility]  Cardiology Progress Note    Patient Identification:  Name: Celia Baird  Age: 77 y.o.  Sex: female  :  1945  MRN: [id number]                 Follow Up / Chief Complaint: follow up for atrial fibrillation    Interval History: She went to A. Fib/flutter this morning with heart rate increase.  She is going to receive 2 doses of digoxin.       Subjective: Shortness of breath has improved.  She denies any chest pain.       Objective:    Past Medical History:  Past Medical History:   Diagnosis Date   • Atrial fibrillation (HCC)    • COPD (chronic obstructive pulmonary disease) (HCC)    • History of frequent urinary tract infections    • Irregular heart beat    • Kidney stones    • PBC (primary biliary cirrhosis)    • PBC (primary biliary cirrhosis)      Past Surgical History:  Past Surgical History:   Procedure Laterality Date   • CARDIAC ELECTROPHYSIOLOGY PROCEDURE N/A 3/30/2017    Procedure:    LINQ;  Surgeon: Ailyn Solorio MD;  Location: [facility] INVASIVE LOCATION;  Service:    • CHOLECYSTECTOMY     • TUBAL ABDOMINAL LIGATION          Social History:   Social History     Tobacco Use   • Smoking status: Current Some Day Smoker     Packs/day: 0.50     Years: 59.00     Pack years: 29.50     Types: Cigarettes   • Smokeless tobacco: Never Used   Substance Use Topics   • Alcohol use: No      Family History:  Family History   Problem Relation Age of Onset   • Heart disease Father    • Heart attack Father    • Heart disease Brother    • Stroke Mother           Allergies:  Allergies   Allergen Reactions   • Morphine And Related Nausea And Vomiting   • Levaquin [Levofloxacin]    • Sulfa Antibiotics      Scheduled Meds:  aspirin, 81 mg, Daily  azithromycin, 250 mg, Q24H  budesonide-formoterol, 2 puff, BID - RT  bumetanide, 1 mg, BID  clopidogrel, 75 mg, Daily  [START ON 2022] digoxin, 125 mcg, Daily  digoxin, 250 mcg, Once  digoxin, 250 mcg, Once  guaiFENesin, 600 mg,  "Q12H  ipratropium-albuterol, 3 mL, 4x Daily - RT  mirtazapine, 7.5 mg, Nightly  predniSONE, 20 mg, Daily With Breakfast  propafenone, 150 mg, Q8H  rosuvastatin, 5 mg, Daily  ursodiol, 300 mg, TID            INTAKE AND OUTPUT:    Intake/Output Summary (Last 24 hours) at 1/17/2022 1239  Last data filed at 1/16/2022 2024  Gross per 24 hour   Intake 60 ml   Output --   Net 60 ml       ROS  Constitutional: Awake and alert, no fever.  No nosebleeds  Abdomen           no abdominal pain   Cardiac              no chest pain  Pulmonary           shortness of breath improved      /56 (BP Location: Left arm, Patient Position: Lying)   Pulse 110   Temp 98.1 °F (36.7 °C) (Oral)   Resp 18   Ht 152.4 cm (60\")   Wt 32.6 kg (71 lb 12.8 oz)   SpO2 94%   BMI 14.02 kg/m²      Physical Exam:  General:  Appears in no acute distress, resting in bed  Eyes: EOM normal no conjunctival drainage  HEENT:  No JVD. Thyroid not visibly enlarged. No mucosal pallor or cyanosis  Respiratory: Respirations regular and unlabored at rest. BBS with good air entry in all fields. No crackles, rubs or wheezes auscultated  Cardiovascular: S1S2 irregularly irregular rate and rhythm. No murmur, rub or gallop. No carotid bruits. DP/PT pulses 2+    . No pretibial pitting edema  Gastrointestinal: Abdomen soft, flat, non tender. Bowel sounds present. No hepatosplenomegaly. No ascites   Skin: Skin warm and dry to touch. No rashes    Neuro: AAO x3 CN II-XII grossly intact  Psych: Mood and affect normal, pleasant and cooperative          Cardiographics  Telemetry:      SR      ECG:         Echocardiogram:   1/14/22  Interpretation Summary    · Calculated left ventricular EF = 61.9% Estimated left ventricular EF was in agreement with the calculated left ventricular EF.  · Left ventricular diastolic function was normal.  · There is no evidence of pericardial effusion. .  Interpretation Summary       · Findings consistent with a normal ECG stress " test.  · Left ventricular ejection fraction is normal (Calculated EF = 70%).  · Myocardial perfusion imaging indicates a normal myocardial perfusion study with no evidence of ischemia.  · Impressions are consistent with a low risk study.     Asymptomatic for chest pain. ECG is negative for ischemia.   Ectopy: Rare PAC at baseline, none with exercise, occasional PVC in recovery  HR and BP response : Appropriate for Beta-blocker therapy  Pharmacologic study due to inability to tolerate increasing speed and grade of treadmill due to Pulmonary, mobility  Issues and Beta-blocker therapy.  Participated in Low Level exercise and tolerance is poor.         Imaging  Chest X-ray:   IMPRESSION:  No focal pulmonary consolidation. Follow-up as clinical  indications persist.     This report was finalized on 1/13/2022 12:27 PM by Dr. Andrew Abdi M.D.  Lab Review   Results from last 7 days   Lab Units 01/14/22  0845 01/13/22  1219   TROPONIN T ng/mL <0.010 0.013     Results from last 7 days   Lab Units 01/15/22  0506   MAGNESIUM mg/dL 1.8     Results from last 7 days   Lab Units 01/17/22  0835   SODIUM mmol/L 137   POTASSIUM mmol/L 5.5*   BUN mg/dL 54*   CREATININE mg/dL 1.37*   CALCIUM mg/dL 9.4        Results from last 7 days   Lab Units 01/17/22  0835 01/16/22  0602 01/15/22  0506   WBC 10*3/mm3 20.36* 14.95* 12.26*   HEMOGLOBIN g/dL 12.9 12.5 12.0   HEMATOCRIT % 38.2 37.7 35.0   PLATELETS 10*3/mm3 234 229 221           The following medical decision was discussed in detail with Dr. Solorio    Assessment:  Acute on chronic hypoxic respiratory failure  Paroxysmal atrial fibrillation: with watchman's device  Chronic diastolic CHF  Hyperlipidemia  Hypertension  Tobacco abuse  GERD  Acute exacerbation of COPD  History of loop implant (battery no longer working)   Hypokalemia: Resolved  Hyperkalemia potassium has been stopped, continue to monitor.    Plan:  Blood pressure and heart rate are stable.  Monitor.  Her echo  "revealed EF 61.9%, LV function was normal.  No ACE or ARB due to hypotension.  Continue aspirin, Bumex, Plavix, Rythmol, Aldactone and Crestor.  May consider ischemic work-up in the outpatient setting.    MARJORIE and mag Kathe Lock, APRN  )1/17/2022       EMR Dragon/Transcription:   \"Dictated utilizing Dragon dictation\".     "

## 2022-01-17 NOTE — PLAN OF CARE
Goal Outcome Evaluation:  Plan of Care Reviewed With: patient        Progress: improving  Outcome Summary: Patient  resting at this  time.  Complained  of  nausea,  no  emesis.  Zofran  x 2  slightly  ineffective.  Medicated  once  for  aniety  with  good  effect.  Patient  up  to  bedside  commode  couple  of  times. Complained  of  increased  SOA  with  activity, oxygen  increased  to  1l/m.  SR  on the  monitor.  Possible  discharge  today.  Nursing  will  continue to monitor.

## 2022-01-17 NOTE — SIGNIFICANT NOTE
01/17/22 1614   OTHER   Discipline physical therapist   Rehab Time/Intention   Session Not Performed other (see comments)  (nursing asked to hold off for PT rightnow, pt feeling poorly with inc HR, will follow up tomorrow)   Recommendation   PT - Next Appointment 01/18/22

## 2022-01-17 NOTE — CONSULTS
IDENTIFYING INFORMATION: The patient is a 77-year-old white female admitted on 2022 with a complaint of shortness of air.  She is seen by psychiatry related to depression.    CHIEF COMPLAINT: None given    INFORMANT: Patient and chart    RELIABILITY: Good    HISTORY OF PRESENT ILLNESS: The patient is a 77-year-old white female admitted on 2022 with shortness of air.  She has a history of atrial fibrillation and COPD and frequent urinary tract infections.  The patient is seen by psychiatry related to complaints of depressed mood.  She lost her  last April and her 59-year-old son  suddenly from a heart attack more recently.  The patient reports that because of this she has been exhibiting sad mood and some reduction in sleep and appetite.  She denies any suicidal or homicidal ideation.  She denies prior psychiatric treatment.  She was recently started on Zoloft 50 mg daily by Dr. Mcdaniel but is today complaining of significant nausea possibly related to initiation of this medication.    PAST PSYCHIATRIC HISTORY: None reported    PAST MEDICAL HISTORY: Significant for atrial fibrillation, COPD, frequent urinary tract infections, primary biliary cirrhosis    MEDICATIONS:   Current Facility-Administered Medications   Medication Dose Route Frequency Provider Last Rate Last Admin   • aspirin EC tablet 81 mg  81 mg Oral Daily Eulogio Mcdaniel MD   81 mg at 22 1008   • azithromycin (ZITHROMAX) tablet 250 mg  250 mg Oral Q24H Loyd Christie MD   250 mg at 22 1008   • budesonide-formoterol (SYMBICORT) 160-4.5 MCG/ACT inhaler 2 puff  2 puff Inhalation BID - RT Eulogio Mcdaniel MD   2 puff at 22 0909   • bumetanide (BUMEX) tablet 1 mg  1 mg Oral BID Eulogio Mcdaniel MD   1 mg at 22 1007   • clopidogrel (PLAVIX) tablet 75 mg  75 mg Oral Daily Eulogio Mcdaniel MD   75 mg at 22 1007   • [START ON 2022] digoxin (LANOXIN) injection 125 mcg  125 mcg Intravenous Daily Darlene Shen,  BRAYAN       • digoxin (LANOXIN) injection 250 mcg  250 mcg Intravenous Once Darlene Shen APRN       • digoxin (LANOXIN) injection 250 mcg  250 mcg Intravenous Once Darlene Shen APRN       • guaiFENesin (MUCINEX) 12 hr tablet 600 mg  600 mg Oral Q12H Eulogio Mcdaniel MD   600 mg at 01/17/22 1007   • ipratropium-albuterol (DUO-NEB) nebulizer solution 3 mL  3 mL Nebulization 4x Daily - RT Eulogio Mcdaniel MD   3 mL at 01/17/22 1112   • LORazepam (ATIVAN) injection 0.5 mg  0.5 mg Intravenous Q6H PRN Eulogio Mcdaniel MD   0.5 mg at 01/17/22 0538   • mirtazapine (REMERON) tablet 7.5 mg  7.5 mg Oral Nightly Fabian Lagos III, MD       • ondansetron (ZOFRAN) injection 4 mg  4 mg Intravenous Q4H PRN Eulogio Mcdaniel MD   4 mg at 01/17/22 1016   • predniSONE (DELTASONE) tablet 20 mg  20 mg Oral Daily With Breakfast Eulogio Mcdaniel MD   20 mg at 01/17/22 1007   • propafenone (RYTHMOL) tablet 150 mg  150 mg Oral Q8H Eulogio Mcdaniel MD   150 mg at 01/17/22 0538   • rosuvastatin (CRESTOR) tablet 5 mg  5 mg Oral Daily Eulogio Mcdaniel MD   5 mg at 01/17/22 1007   • sodium chloride 0.9 % flush 10 mL  10 mL Intravenous PRN Binu Moreno MD       • sodium chloride 0.9 % flush 10 mL  10 mL Intravenous PRN Vicki Alejandre APRN       • sodium chloride nasal spray 1 spray  1 spray Each Nare PRN Eulogio Mcdaniel MD   1 spray at 01/15/22 1835   • ursodiol (ACTIGALL) capsule 300 mg  300 mg Oral TID Eulogio Mcdaniel MD   300 mg at 01/17/22 1007         ALLERGIES: Morphine, Levaquin, sulfa    FAMILY HISTORY: Noncontributory    SOCIAL HISTORY: Patient currently lives alone independently.  There is no current reported use of alcohol or street drugs.    MENTAL STATUS EXAM: The patient is a thin elderly white female appearing her stated age.  She has no apparent physical distress at the time examination.  She is awake alert and oriented spheres.  Her mood is mildly dysphoric her affect is blunted.  Speech is relevant and coherent.   There are no gross deficits in memory or cognition noted.  Intelligence is judged to be in the average range based on fund of knowledge, the patient is cooperative throughout the interview.  She is currently reporting no suicidal or homicidal ideation or psychotic features.  Her judgment and insight appear to be intact.    ASSETS/LIABILITIES: To be assessed/advancing health issues    DIAGNOSTIC IMPRESSION: Major depressive disorder single episode moderate, medical problems described previously    PLAN: It is likely that the patient is having an untoward GI response to initiation of Zoloft.  She is wishing to make a medication change and given her complaints of reduction in sleep and appetite, I will discontinue Zoloft and start her on a very conservative nighttime dose of Remeron 7.5 mg.  I will continue to follow with you and I am in full agreement with the spiritual care consult, having encouraged the patient to take advantage of this opportunity to deal with her grief issues.

## 2022-01-17 NOTE — PROGRESS NOTES
[facility] did follow-up on patient.  Patient was sleeping soundly but son was in room.  Son stated patient has not been feeling well all day and has been sleeping.  Psychiatrist saw patient today.  [facility] reviewed patient's notes from the psychiatrist and will continue to follow.

## 2022-01-17 NOTE — THERAPY TREATMENT NOTE
Patient Name: Celia Baird  : 1945    MRN: [id number]                              Today's Date: 2022       Admit Date: 2022    Visit Dx:     ICD-10-CM ICD-9-CM   1. COPD exacerbation (HCC)  J44.1 491.21   2. Shortness of breath  R06.02 786.05   3. Hypokalemia  E87.6 276.8     Patient Active Problem List   Diagnosis   • COPD exacerbation (HCC)   • Subarachnoid hemorrhage (HCC)   • Multiple skin tears   • Closed nondisplaced fracture of left clavicle   • Paroxysmal atrial fibrillation (HCC)   • Tobacco abuse   • Status post placement of implantable loop recorder   • SOB (shortness of breath)   • COPD with acute exacerbation (HCC)   • Severe malnutrition (CMS/HCC)     Past Medical History:   Diagnosis Date   • Atrial fibrillation (HCC)    • COPD (chronic obstructive pulmonary disease) (HCC)    • History of frequent urinary tract infections    • Irregular heart beat    • Kidney stones    • PBC (primary biliary cirrhosis)    • PBC (primary biliary cirrhosis)      Past Surgical History:   Procedure Laterality Date   • CARDIAC ELECTROPHYSIOLOGY PROCEDURE N/A 3/30/2017    Procedure:    LINQ;  Surgeon: Ailyn Solorio MD;  Location: [facility] INVASIVE LOCATION;  Service:    • CHOLECYSTECTOMY     • TUBAL ABDOMINAL LIGATION        General Information     Row Name 22 1027          OT Time and Intention    Document Type therapy note (daily note)  -CW     Mode of Treatment occupational therapy  -CW     Row Name 22 1027          General Information    Patient Profile Reviewed yes  -CW     Existing Precautions/Restrictions oxygen therapy device and L/min; fall  -CW     Row Name 22 1027          Cognition    Orientation Status (Cognition) oriented to; person; place  -CW     Row Name 22 1027          Safety Issues, Functional Mobility    Safety Issues Affecting Function (Mobility) safety precaution awareness  -CW           User Key  (r) = Recorded By, (t) = Taken By, (c) = Cosigned  By    Initials Name Provider Type    CW Ileana Martin OTR Occupational Therapist                 Mobility/ADL's     Row Name 01/17/22 1029          Bed Mobility    Supine-Sit [place] (Bed Mobility) standby assist  -CW     Sit-Supine [place] (Bed Mobility) standby assist  -CW     Assistive Device (Bed Mobility) bed rails; head of bed elevated  -CW     Row Name 01/17/22 1029          Transfers    Transfers toilet transfer  -CW     Sit-Stand [place] (Transfers) contact guard; verbal cues  -CW     [place] Level (Toilet Transfer) contact guard  -CW     Assistive Device (Toilet Transfer) commode, 3-in-1  -CW     Row Name 01/17/22 1029          Toilet Transfer    Type (Toilet Transfer) stand pivot/stand step  -CW     Row Name 01/17/22 1029          Activities of Daily Living    BADL Assessment/Intervention lower body dressing; grooming  -CW     Row Name 01/17/22 1029          Lower Body Dressing Assessment/Training    [place] Level (Lower Body Dressing) lower body dressing skills; doff; don; socks; standby assist; verbal cues  -CW     Position (Lower Body Dressing) edge of bed sitting  -CW     Row Name 01/17/22 1029          Grooming Assessment/Training    [place] Level (Grooming) grooming skills; oral care regimen; set up; standby assist  -CW     Position (Grooming) edge of bed sitting  -CW           User Key  (r) = Recorded By, (t) = Taken By, (c) = Cosigned By    Initials Name Provider Type    CW Ileana Martin OTR Occupational Therapist               Obj/Interventions     Row Name 01/17/22 1032          Shoulder (Therapeutic Exercise)    Shoulder (Therapeutic Exercise) AROM (active range of motion)  -     Shoulder AROM (Therapeutic Exercise) bilateral; flexion; extension; horizontal aBduction/aDduction; 2 sets; 10 repetitions; sitting  -CW     Row Name 01/17/22 1032          Elbow/Forearm (Therapeutic Exercise)    Elbow/Forearm (Therapeutic Exercise) AROM (active range of motion)  -CW      Elbow/Forearm AROM (Therapeutic Exercise) bilateral; flexion; extension; sitting; supination; pronation; 2 sets; 10 repetitions  -     Row Name 01/17/22 1032          Wrist (Therapeutic Exercise)    Wrist (Therapeutic Exercise) AROM (active range of motion)  -CW     Wrist AROM (Therapeutic Exercise) bilateral; flexion; extension; 10 repetitions; 2 sets  -CW     Row Name 01/17/22 1032          Hand (Therapeutic Exercise)    Hand (Therapeutic Exercise) AROM (active range of motion)  -     Hand AROM/AAROM (Therapeutic Exercise) bilateral; AROM (active range of motion); finger flexion; finger extension; 10 repetitions; 2 sets  -CW     Row Name 01/17/22 1032          Motor Skills    Motor Skills functional endurance  -CW     Functional Endurance Fair-  -     Row Name 01/17/22 1032          Therapeutic Exercise    Therapeutic Exercise shoulder; wrist; elbow/forearm; hand  -CW           User Key  (r) = Recorded By, (t) = Taken By, (c) = Cosigned By    Initials Name Provider Type     Ileana Martin OTR Occupational Therapist               Goals/Plan    No documentation.                Clinical Impression     Row Name 01/17/22 1034          Pain Scale: Numbers Pre/Post-Treatment    Pretreatment Pain Rating 0/10 - no pain  -CW     Posttreatment Pain Rating 0/10 - no pain  -     Row Name 01/17/22 1034          Plan of Care Review    Outcome Summary OT-Pt. reports no pain at present. Agreed to particpate with tx.  Functional endurance fair-. Required rest breaks during tx and UE therap. ex. seated EOB. O2 sats decreased to 87 after standing then recovered >90. BSC transfer CGA. Grooming/oral hygiene (denture care)  SBA after set up. Plan to cont. OT to maximize indep and functional endurance for self care/safety.  -     Row Name 01/17/22 1034          Vital Signs    O2 Delivery Pre Treatment supplemental O2  -CW     O2 Delivery Intra Treatment supplemental O2  -CW     O2 Delivery Post Treatment supplemental O2   -CW     Row Name 01/17/22 1034          Positioning and Restraints    Pre-Treatment Position in bed  -CW     Post Treatment Position bed  -CW     In Bed supine; call light within reach; encouraged to call for assist; exit alarm on  -CW           User Key  (r) = Recorded By, (t) = Taken By, (c) = Cosigned By    Initials Name Provider Type    Ileana Quiroz OTR Occupational Therapist               Outcome Measures     Row Name 01/17/22 1039          How much help from another is currently needed...    Putting on and taking off regular lower body clothing? 3  -CW     Bathing (including washing, rinsing, and drying) 3  -CW     Toileting (which includes using toilet bed pan or urinal) 3  -CW     Putting on and taking off regular upper body clothing 3  -CW     Taking care of personal grooming (such as brushing teeth) 3  -CW     Eating meals 4  -CW     AM-PAC 6 Clicks Score (OT) 19  -CW           User Key  (r) = Recorded By, (t) = Taken By, (c) = Cosigned By    Initials Name Provider Type    Ileana Quiroz OTR Occupational Therapist                Occupational Therapy Education                 Title: PT OT SLP Therapies (Done)     Topic: Occupational Therapy (Done)     Point: ADL training (Done)     Description:   Instruct learner(s) on proper safety adaptation and remediation techniques during self care or transfers.   Instruct in proper use of assistive devices.              Learning Progress Summary           Patient Acceptance, E, VU by VIC at 1/17/2022 1040    Acceptance, E, VU by MARIO at 1/15/2022 1412    Comment: role of OT, d/c rec, therapy goals, POC   Family Acceptance, E, VU by MARIO at 1/15/2022 1412    Comment: role of OT, d/c rec, therapy goals, POC                   Point: Precautions (Done)     Description:   Instruct learner(s) on prescribed precautions during self-care and functional transfers.              Learning Progress Summary           Patient Acceptance, E, VU by CW at 1/17/2022 1040     Acceptance, E, VU by MW at 1/15/2022 1412    Comment: role of OT, d/c rec, therapy goals, POC   Family Acceptance, E, VU by MW at 1/15/2022 1412    Comment: role of OT, d/c rec, therapy goals, POC                   Point: Body mechanics (Done)     Description:   Instruct learner(s) on proper positioning and spine alignment during self-care, functional mobility activities and/or exercises.              Learning Progress Summary           Patient Acceptance, E, VU by  at 1/17/2022 1040    Acceptance, E, VU by MW at 1/15/2022 1412    Comment: role of OT, d/c rec, therapy goals, POC   Family Acceptance, E, VU by MW at 1/15/2022 1412    Comment: role of OT, d/c rec, therapy goals, POC                               User Key     Initials Effective Dates Name Provider Type Discipline     06/16/21 -  Ileana Martin OTR Occupational Therapist OT     08/20/21 -  Nata Cuevas OT Occupational Therapist OT              OT Recommendation and Plan     Plan of Care Review  Outcome Summary: OT-Pt. reports no pain at present. Agreed to particpate with tx.  Functional endurance fair-. Required rest breaks during tx and UE therap. ex. seated EOB. O2 sats decreased to 87 after standing then recovered >90. BSC transfer CGA. Grooming/oral hygiene (denture care)  SBA after set up. Plan to cont. OT to maximize indep and functional endurance for self care/safety.     Time Calculation:    Time Calculation- OT     Row Name 01/17/22 1042 01/17/22 1041          Time Calculation- OT    OT Start Time -- 0930  -CW     OT Stop Time -- 0955  -CW     OT Time Calculation (min) -- 25 min  -CW     Total Timed Code Minutes- OT -- 25 minute(s)  -CW     OT - Next Appointment 01/19/22  -CW --            Timed Charges    63732 - OT Therapeutic Exercise Minutes -- 10  -CW     12108 - OT Self Care/Mgmt Minutes -- 15  -CW            Total Minutes    Timed Charges Total Minutes -- 25  -CW      Total Minutes -- 25  -CW           User Key  (r) = Recorded  By, (t) = Taken By, (c) = Cosigned By    Initials Name Provider Type     Ileana Martin OTR Occupational Therapist              Therapy Charges for Today     Code Description Service Date Service Provider Modifiers Qty    [id number] HC OT THER PROC EA 15 MIN 1/17/2022 Ileana Martin OTR GO 1    [id number]  OT SELF CARE/MGMT/TRAIN EA 15 MIN 1/17/2022 Ileana Martin OTR GO 1               RYAN Jamil  1/17/2022

## 2022-01-17 NOTE — PROGRESS NOTES
"  PROGRESS NOTE  Patient Name: Celia Baird  Age/Sex: 77 y.o. female  : 1945  MRN: [id number]    Date of Admission: 2022  Date of Encounter Visit: 22   LOS: 4 days   Patient Care Team:  Slick Gomez MD as PCP - General (Internal Medicine)    Chief Complaint: COPD exacerbation    Hospital course: Patient is mainly complaining of her GI symptoms right now with nausea and vomiting since yesterday evening.  Not much complaint from the respiratory standpoint.    REVIEW OF SYSTEMS:   CONSTITUTIONAL: no fever or chills  CARDIOVASCULAR: No chest pain, chest pressure or chest discomfort. No palpitations or edema.   RESPIRATORY: Improving shortness of breath with less dyspnea, still oxygen dependent.   GASTROINTESTINAL: Nausea and vomiting, new problem since last night.   HEMATOLOGIC: No bleeding with multiple areas of bruising over upper extremities and upper part of the chest and the legs, no hematoma.     Ventilator/Non-Invasive Ventilation Settings (From admission, onward)             Start     Ordered    22 1313  NIPPV (CPAP or BIPAP)  Until Discontinued        Question:  Type:  Answer:  BIPAP    22 1312                  Vital Signs  Temp:  [97.8 °F (36.6 °C)-98.2 °F (36.8 °C)] 98.2 °F (36.8 °C)  Heart Rate:  [] 128  Resp:  [18] 18  BP: ()/(56-85) 96/59  SpO2:  [93 %-96 %] 93 %  on  Flow (L/min):  [0.5-1] 1 Device (Oxygen Therapy): nasal cannula    Intake/Output Summary (Last 24 hours) at 2022 1354  Last data filed at 2022  Gross per 24 hour   Intake 60 ml   Output --   Net 60 ml     Flowsheet Rows      First Filed Value   Admission Height 152.4 cm (60\") Documented at 2022 1221   Admission Weight 36.3 kg (80 lb) Documented at 2022 1221        Body mass index is 14.02 kg/m².      22  1221 22  1151 22  0328   Weight: 36.3 kg (80 lb) 36.3 kg (80 lb) 32.6 kg (71 lb 12.8 oz)       Physical Exam:  GEN:  No acute distress, alert, " cooperative, well developed, cachectic, on nasal cannula oxygen  EYES:   Sclerae clear. No icterus. PERRL. Normal EOM  ENT:   External ears/nose normal, no oral lesions, no thrush, mucous membranes moist  NECK:  Supple, midline trachea, no JVD  LUNGS: Normal chest on inspection, some expiratory wheezes but no crackles, reduced breath sounds however improved compared to yesterday. Respirations regular, even and unlabored.   CV:  Regular rhythm and rate. Normal S1/S2. No murmurs, gallops, or rubs noted.  ABD:  Soft, nontender and nondistended. Normal bowel sounds. No guarding  EXT:  Moves all extremities well. No cyanosis. No redness. No edema.   Skin: Dry, intact, multiple ecchymotic lesions chronic    Results Review:    Results From Last 14 Days   Lab Units 01/14/22  0845 01/13/22  1219   LACTATE mmol/L  --  1.5   TRIGLYCERIDES mg/dL 62  --      Results from last 7 days   Lab Units 01/17/22  0835 01/16/22  0602 01/15/22  0506 01/14/22  2316 01/14/22  0845 01/13/22  1219   SODIUM mmol/L 137 137 139  --  136 141   POTASSIUM mmol/L 5.5* 5.8* 4.2 4.0 2.9* 2.6*   CHLORIDE mmol/L 94* 98 98  --  92* 97*   CO2 mmol/L 29.9* 30.3* 31.9*  --  32.7* 34.1*   BUN mg/dL 54* 32* 24*  --  19 12   CREATININE mg/dL 1.37* 1.09* 0.96  --  1.04* 0.92   CALCIUM mg/dL 9.4 9.3 8.8  --  8.9 9.0   AST (SGOT) U/L  --   --   --   --  11 12   ALT (SGPT) U/L  --   --   --   --  7 7   ANION GAP mmol/L 13.1 8.7 9.1  --  11.3 9.9   ALBUMIN g/dL  --   --   --   --  3.20* 4.10     Results from last 7 days   Lab Units 01/14/22  0845 01/13/22  1219   TROPONIN T ng/mL <0.010 0.013     Results from last 7 days   Lab Units 01/14/22  0845   TSH uIU/mL 0.704     Results from last 7 days   Lab Units 01/13/22  1219   PROBNP pg/mL 585.0     Results from last 7 days   Lab Units 01/17/22  0835 01/16/22  0602 01/15/22  0506 01/14/22  0845 01/13/22  1219   WBC 10*3/mm3 20.36* 14.95* 12.26* 6.78 13.49*   HEMOGLOBIN g/dL 12.9 12.5 12.0 12.1 12.8   HEMATOCRIT % 38.2  37.7 35.0 35.1 37.2   PLATELETS 10*3/mm3 234 229 221 201 232   MCV fL 90.3 91.5 88.8 88.2 90.1   NEUTROPHIL % % 71.9 77.5* 83.3* 81.5* 74.4   LYMPHOCYTE % % 12.8* 8.6* 6.6* 12.1* 12.2*   MONOCYTES % % 14.1* 13.1* 9.1 5.9 10.7   EOSINOPHIL % % 0.3 0.0* 0.0* 0.0* 1.3   BASOPHIL % % 0.2 0.1 0.2 0.1 0.9   IMM GRAN % % 0.7* 0.7* 0.8* 0.4 0.5         Results from last 7 days   Lab Units 01/15/22  0506 01/14/22  0845 01/13/22  1219   MAGNESIUM mg/dL 1.8 1.6 1.6     Results from last 7 days   Lab Units 01/14/22  0845   CHOLESTEROL mg/dL 141   TRIGLYCERIDES mg/dL 62   HDL CHOL mg/dL 70*     Results from last 7 days   Lab Units 01/13/22  1309   PH, ARTERIAL pH units 7.480*   PCO2, ARTERIAL mm Hg 47.9*   PO2 ART mm Hg 84.4   HCO3 ART mmol/L 35.6*     Results from last 7 days   Lab Units 01/14/22  0845   HEMOGLOBIN A1C % 5.50     Glucose   Date/Time Value Ref Range Status   01/17/2022 0546 118 70 - 130 mg/dL Final     Comment:     Meter: [id number] : [id number] Pa Wade STACIE   01/16/2022 2022 162 (H) 70 - 130 mg/dL Final     Comment:     Meter: [id number] : [id number] Pa [employer] STACIE     Results from last 7 days   Lab Units 01/15/22  0506 01/13/22  1219   PROCALCITONIN ng/mL 0.06 0.05   LACTATE mmol/L  --  1.5     Results from last 7 days   Lab Units 01/13/22  1347 01/13/22  1219   BLOODCX  No growth at 3 days No growth at 4 days         Results from last 7 days   Lab Units 01/13/22  1230   COVID19  Not Detected               Imaging:   Imaging Results (All)           I reviewed the patient's new clinical results.  I personally viewed and interpreted the patient's imaging results: No new films to review        Medication Review:   aspirin, 81 mg, Oral, Daily  azithromycin, 250 mg, Oral, Q24H  budesonide-formoterol, 2 puff, Inhalation, BID - RT  bumetanide, 1 mg, Oral, BID  clopidogrel, 75 mg, Oral, Daily  [START ON 1/18/2022] digoxin, 125 mcg, Intravenous, Daily  digoxin, 250 mcg, Intravenous,  Once  guaiFENesin, 600 mg, Oral, Q12H  ipratropium-albuterol, 3 mL, Nebulization, 4x Daily - RT  mirtazapine, 7.5 mg, Oral, Nightly  predniSONE, 20 mg, Oral, Daily With Breakfast  propafenone, 150 mg, Oral, Q8H  rosuvastatin, 5 mg, Oral, Daily  ursodiol, 300 mg, Oral, TID             ASSESSMENT:   1. Acute exacerbation of COPD  2. Subarachnoid hemorrhage  3. Multiple skin tears  4. Closed nondisplaced fracture of the left clavicle  5. Paroxysmal atrial fibrillation  6. Tobacco abuse  7. Status postplacement of implantable loop recorder  8. Nausea and vomiting      PLAN:  Patient is currently oral steroid, patient has been on chronic steroids so we need to wean down to her home regimen of 10 mg daily  Oxygen requirement is minimal at 4 L/min  Patient is cleared for discharge home from the pulmonary standpoint and follow-up as an outpatient, I will slowly taper the prednisone down to the 10 mg daily dose and then follow-up on her in 1 month with a PFT and consider further adjustment.  Discharge home on budesonide/formoterol twice daily with DuoNeb every 6 hours as needed  Per cardiology the plan is to continue the ischemia work-up as an outpatient  Patient is cleared for discharge home from my standpoint, please see above for recommendations, we will follow-up.  As far as the GI symptoms, the Zithromax can increase prokinetic activity and cause some upset stomach but she has been on it for several days and this side effect usually shows up early on.  Patient is on steroid and is at a risk for having ulcer disease but we have been tapering the dose down gradually and we will continue to do so today.  Will defer to the internal medicine team for further management of her GI issues, respiratory wise the patient is continuing to improve    Summary of recommendations:  · Wean steroid    Discussed with Patient   Disposition: Per primary team hopefully home in a day or 2    Brock Orosco MD  01/17/22  13:54  EST          Dictated utilizing Dragon dictation

## 2022-01-17 NOTE — PROGRESS NOTES
"Daily progress note    Chief complaint  Doing same  No new complaints except nausea  Wants to go home and refusing subacute rehab at this time  Denies chest pain shortness of breath palpitation    History of present illness  77-year old white female with history of chronic hypoxic respiratory failure COPD chronic atrial fibrillation congestive heart failure hypertension hyperlipidemia who continue to smoke and lives by herself to the emergency room with increased shortness of breath for last 1 week.  Patient denies any fever chills but does have chronic productive cough.  Patient denies chest pain abdominal pain nausea vomiting diarrhea.  Patient work-up in ER revealed acute on chronic hypoxic respiratory failure with acute exacerbation of COPD admitted for management.  Patient also found to have severe hypokalemia.     REVIEW OF SYSTEMS  Unremarkable except generalized weakness     PHYSICAL EXAM  Blood pressure 96/59, pulse (!) 128, temperature 98.2 °F (36.8 °C), temperature source Oral, resp. rate 18, height 152.4 cm (60\"), weight 32.6 kg (71 lb 12.8 oz), SpO2 93 %.    GENERAL: Chronically ill-appearing, nontoxic appearing, moderately distressed  HENT: normocephalic, atraumatic  EYES: no scleral icterus, PERRL  CV: regular rhythm, regular rate, no murmur  RESPIRATORY: Tachypneic, coarse breath sounds throughout  ABDOMEN: soft, nontender nondistended bowel sounds positive  MUSCULOSKELETAL: no deformity, no lower extremity edema or calf tenderness bilaterally  NEURO: alert, moves all extremities, follows commands, mental status normal/baseline  SKIN: warm, dry, no rash   Psych: Appropriate mood and affect    LAB RESULTS  Lab Results (last 24 hours)     Procedure Component Value Units Date/Time    Blood Culture - Blood, Arm, Left [[id number]]  (Normal) Collected: 01/13/22 1219    Specimen: Blood from Arm, Left Updated: 01/17/22 1245     Blood Culture No growth at 4 days    Basic Metabolic Panel [[id number]]  (Abnormal) " Collected: 01/17/22 0835    Specimen: Blood Updated: 01/17/22 1010     Glucose 105 mg/dL      BUN 54 mg/dL      Creatinine 1.37 mg/dL      Sodium 137 mmol/L      Potassium 5.5 mmol/L      Comment: Slight hemolysis detected by analyzer. Results may be affected.        Chloride 94 mmol/L      CO2 29.9 mmol/L      Calcium 9.4 mg/dL      eGFR Non African Amer 37 mL/min/1.73      BUN/Creatinine Ratio 39.4     Anion Gap 13.1 mmol/L     Narrative:      GFR Normal >60  Chronic Kidney Disease <60  Kidney Failure <15      CBC & Differential [[id number]]  (Abnormal) Collected: 01/17/22 0835    Specimen: Blood Updated: 01/17/22 0929    Narrative:      The following orders were created for panel order CBC & Differential.  Procedure                               Abnormality         Status                     ---------                               -----------         ------                     CBC Auto Differential[[id number]]        Abnormal            Final result                 Please view results for these tests on the individual orders.    CBC Auto Differential [[id number]]  (Abnormal) Collected: 01/17/22 0835    Specimen: Blood Updated: 01/17/22 0929     WBC 20.36 10*3/mm3      RBC 4.23 10*6/mm3      Hemoglobin 12.9 g/dL      Hematocrit 38.2 %      MCV 90.3 fL      MCH 30.5 pg      MCHC 33.8 g/dL      RDW 12.6 %      RDW-SD 41.4 fl      MPV 12.5 fL      Platelets 234 10*3/mm3      Neutrophil % 71.9 %      Lymphocyte % 12.8 %      Monocyte % 14.1 %      Eosinophil % 0.3 %      Basophil % 0.2 %      Immature Grans % 0.7 %      Neutrophils, Absolute 14.64 10*3/mm3      Lymphocytes, Absolute 2.60 10*3/mm3      Monocytes, Absolute 2.87 10*3/mm3      Eosinophils, Absolute 0.06 10*3/mm3      Basophils, Absolute 0.04 10*3/mm3      Immature Grans, Absolute 0.15 10*3/mm3      nRBC 0.0 /100 WBC     POC Glucose Once [[id number]]  (Normal) Collected: 01/17/22 0546    Specimen: Blood Updated: 01/17/22 0559     Glucose 118 mg/dL       Comment: Meter: [id number] : [id number] Pa Wade STACIE       POC Glucose Once [[id number]]  (Abnormal) Collected: 01/16/22 2022    Specimen: Blood Updated: 01/16/22 2036     Glucose 162 mg/dL      Comment: Meter: [id number] : [id number] Pa Wade STACIE       Blood Culture - Blood, Arm, Left [[id number]]  (Normal) Collected: 01/13/22 1347    Specimen: Blood from Arm, Left Updated: 01/16/22 1415     Blood Culture No growth at 3 days        Imaging Results (Last 24 Hours)     ** No results found for the last 24 hours. **             ECG 12 Lead  Component   Ref Range & Units 1/13/22 1356 1/13/22 1253   QT Interval   ms 373 P  409 P              HEART RATE= 81  bpm  RR Interval= 744  ms  NE Interval= 71  ms  P Horizontal Axis= 219  deg  P Front Axis= 219  deg  QRSD Interval= 96  ms  QT Interval= 373  ms  QRS Axis= 105  deg  T Wave Axis= 201  deg  - ABNORMAL ECG -  Sinus or ectopic atrial rhythm  Ventricular trigeminy  Short NE interval  Right axis deviation  Repol abnrm suggests ischemia, diffuse leads             Current Facility-Administered Medications:   •  aspirin EC tablet 81 mg, 81 mg, Oral, Daily, Eulogio Mcdaniel MD, 81 mg at 01/17/22 1008  •  azithromycin (ZITHROMAX) tablet 250 mg, 250 mg, Oral, Q24H, Loyd Christie MD, 250 mg at 01/17/22 1008  •  budesonide-formoterol (SYMBICORT) 160-4.5 MCG/ACT inhaler 2 puff, 2 puff, Inhalation, BID - RT, Eulogio Mcdaniel MD, 2 puff at 01/17/22 0909  •  bumetanide (BUMEX) tablet 1 mg, 1 mg, Oral, BID, Eulogio Mcdaniel MD, 1 mg at 01/17/22 1007  •  clopidogrel (PLAVIX) tablet 75 mg, 75 mg, Oral, Daily, Eulogio Mcdaniel MD, 75 mg at 01/17/22 1007  •  [START ON 1/18/2022] digoxin (LANOXIN) injection 125 mcg, 125 mcg, Intravenous, Daily, Peevey, Darlene L, APRN  •  digoxin (LANOXIN) injection 250 mcg, 250 mcg, Intravenous, Once, Darlene Shen, APRN  •  guaiFENesin (MUCINEX) 12 hr tablet 600 mg, 600 mg, Oral, Q12H, Eulogio Mcdaniel MD, 600 mg at 01/17/22 1007  •   ipratropium-albuterol (DUO-NEB) nebulizer solution 3 mL, 3 mL, Nebulization, 4x Daily - RT, Eulogio Mcdaniel MD, 3 mL at 01/17/22 1112  •  LORazepam (ATIVAN) injection 0.5 mg, 0.5 mg, Intravenous, Q6H PRN, Eulogio Mcdaniel MD, 0.5 mg at 01/17/22 0538  •  mirtazapine (REMERON) tablet 7.5 mg, 7.5 mg, Oral, Nightly, Fabian Lagos III, MD  •  ondansetron (ZOFRAN) injection 4 mg, 4 mg, Intravenous, Q4H PRN, Eulogio Mcdaniel MD, 4 mg at 01/17/22 1016  •  predniSONE (DELTASONE) tablet 20 mg, 20 mg, Oral, Daily With Breakfast, Eulogio Mcdaniel MD, 20 mg at 01/17/22 1007  •  propafenone (RYTHMOL) tablet 150 mg, 150 mg, Oral, Q8H, Eulogio Mcdaniel MD, 150 mg at 01/17/22 0538  •  rosuvastatin (CRESTOR) tablet 5 mg, 5 mg, Oral, Daily, Eulogio Mcdaniel MD, 5 mg at 01/17/22 1007  •  sodium chloride 0.9 % flush 10 mL, 10 mL, Intravenous, PRN, Binu Moreno MD  •  [COMPLETED] Insert peripheral IV, , , Once **AND** sodium chloride 0.9 % flush 10 mL, 10 mL, Intravenous, PRN, Vicki Alejandre APRN  •  sodium chloride nasal spray 1 spray, 1 spray, Each Nare, PRN, Eulogio Mcdaniel MD, 1 spray at 01/15/22 1835  •  ursodiol (ACTIGALL) capsule 300 mg, 300 mg, Oral, TID, Eulogio Mcdaniel MD, 300 mg at 01/17/22 1007     ASSESSMENT  Acute on chronic hypoxic respiratory failure  Acute exacerbation of COPD  Chronic diastolic CHF  Acute kidney injury in hyperkalemia   Paroxysmal atrial fibrillation  Hypertension  Hyperlipidemia  Leukocytosis secondary to steroid  Ongoing tobacco abuse  Gastroesophageal reflux disease    PLAN  CPM  NIPPV  Steroids and taper  Zithromax  Anticoagulation  IVF  Check digoxin level  Change Protonix to Pepcid  Hold Bumex and Aldactone  Nephrology consult  Nebulizer treatment every 4 hours  Pulmonary hygiene  Smoking cessation guidelines  Adjust home medications  Stress ulcer DVT prophylaxis  Pulmonary and cardiology to follow patient  DNR  PT/OT  Discussed with family and nursing staff  Home versus subacute rehab once  more stable    JONNY PEOPLES MD

## 2022-01-17 NOTE — CASE MANAGEMENT/SOCIAL WORK
Continued Stay Note  [facility]     Patient Name: Celia Baird  MRN: [id number]  Today's Date: 1/17/2022    Admit Date: 1/13/2022     Discharge Plan     Row Name 01/17/22 1224       Plan    Plan Plan home or skilled care if needed.  MELISSA Williamson RN    Patient/Family in Agreement with Plan yes    Plan Comments Spoke with pt at bedside.  Pt is agreeable to skilled care.  Pt's first choice for skilled care is [facility].   My  ( [phone]) called to follow.   Plan home or skilled care if needed.   MELISSA Williamson RN               Discharge Codes    No documentation.               Expected Discharge Date and Time     Expected Discharge Date Expected Discharge Time    Jan 18, 2022             Meena Williamson, RN

## 2022-01-17 NOTE — PLAN OF CARE
Goal Outcome Evaluation:  Plan of Care Reviewed With: patient           Outcome Summary: Patient alert and oriented x4. C/o nausea and anxiety and medicated PRN. Afib on the monitor. High rate, Digoxin given x2. MD aware. Other VSS. Nursing will continue to monitor.

## 2022-01-17 NOTE — PLAN OF CARE
Goal Outcome Evaluation:              Outcome Summary: OT-Pt. reports no pain at present. Agreed to particpate with tx.  Functional endurance fair-. Required rest breaks during tx and UE therap. ex. seated EOB. O2 sats decreased to 87 after standing then recovered >90. BSC transfer CGA. Grooming/oral hygiene (denture care)  SBA after set up. Plan to cont. OT to maximize indep and functional endurance for self care/safety.    Patient was not wearing a face mask during this therapy encounter. Therapist used appropriate personal protective equipment including mask, gloves, and eye protection.  Mask used was standard procedure mask. Appropriate PPE was worn during the entire therapy session. Hand hygiene was completed before and after therapy session. Patient is not in enhanced droplet precautions.

## 2022-01-18 PROBLEM — D72.829 LEUKOCYTOSIS: Status: ACTIVE | Noted: 2022-01-18

## 2022-01-18 LAB
ANION GAP SERPL CALCULATED.3IONS-SCNC: 12.2 MMOL/L (ref 5–15)
BACTERIA SPEC AEROBE CULT: NORMAL
BACTERIA SPEC AEROBE CULT: NORMAL
BASOPHILS # BLD AUTO: 0.03 10*3/MM3 (ref 0–0.2)
BASOPHILS NFR BLD AUTO: 0.1 % (ref 0–1.5)
BUN SERPL-MCNC: 53 MG/DL (ref 8–23)
BUN/CREAT SERPL: 40.8 (ref 7–25)
CALCIUM SPEC-SCNC: 9.2 MG/DL (ref 8.6–10.5)
CHLORIDE SERPL-SCNC: 97 MMOL/L (ref 98–107)
CO2 SERPL-SCNC: 29.8 MMOL/L (ref 22–29)
CREAT SERPL-MCNC: 1.3 MG/DL (ref 0.57–1)
CREAT UR-MCNC: 61.9 MG/DL
DEPRECATED RDW RBC AUTO: 40.1 FL (ref 37–54)
EOSINOPHIL # BLD AUTO: 0.1 10*3/MM3 (ref 0–0.4)
EOSINOPHIL NFR BLD AUTO: 0.5 % (ref 0.3–6.2)
ERYTHROCYTE [DISTWIDTH] IN BLOOD BY AUTOMATED COUNT: 12.3 % (ref 12.3–15.4)
GFR SERPL CREATININE-BSD FRML MDRD: 40 ML/MIN/1.73
GLUCOSE SERPL-MCNC: 103 MG/DL (ref 65–99)
HCT VFR BLD AUTO: 36.5 % (ref 34–46.6)
HGB BLD-MCNC: 12.2 G/DL (ref 12–15.9)
IMM GRANULOCYTES # BLD AUTO: 0.18 10*3/MM3 (ref 0–0.05)
IMM GRANULOCYTES NFR BLD AUTO: 0.9 % (ref 0–0.5)
LYMPHOCYTES # BLD AUTO: 2.23 10*3/MM3 (ref 0.7–3.1)
LYMPHOCYTES NFR BLD AUTO: 10.8 % (ref 19.6–45.3)
MAGNESIUM SERPL-MCNC: 2.2 MG/DL (ref 1.6–2.4)
MCH RBC QN AUTO: 29.9 PG (ref 26.6–33)
MCHC RBC AUTO-ENTMCNC: 33.4 G/DL (ref 31.5–35.7)
MCV RBC AUTO: 89.5 FL (ref 79–97)
MONOCYTES # BLD AUTO: 2.44 10*3/MM3 (ref 0.1–0.9)
MONOCYTES NFR BLD AUTO: 11.9 % (ref 5–12)
NEUTROPHILS NFR BLD AUTO: 15.59 10*3/MM3 (ref 1.7–7)
NEUTROPHILS NFR BLD AUTO: 75.8 % (ref 42.7–76)
NRBC BLD AUTO-RTO: 0 /100 WBC (ref 0–0.2)
PLATELET # BLD AUTO: 212 10*3/MM3 (ref 140–450)
PMV BLD AUTO: 12.6 FL (ref 6–12)
POTASSIUM SERPL-SCNC: 4.5 MMOL/L (ref 3.5–5.2)
PROT ?TM UR-MCNC: 6 MG/DL
RBC # BLD AUTO: 4.08 10*6/MM3 (ref 3.77–5.28)
SODIUM SERPL-SCNC: 139 MMOL/L (ref 136–145)
SODIUM UR-SCNC: 30 MMOL/L
WBC NRBC COR # BLD: 20.57 10*3/MM3 (ref 3.4–10.8)

## 2022-01-18 PROCEDURE — 25010000002 DIGOXIN PER 500 MCG

## 2022-01-18 PROCEDURE — 63710000001 PREDNISONE PER 5 MG: Performed by: HOSPITALIST

## 2022-01-18 PROCEDURE — 84300 ASSAY OF URINE SODIUM: CPT | Performed by: INTERNAL MEDICINE

## 2022-01-18 PROCEDURE — 94799 UNLISTED PULMONARY SVC/PX: CPT

## 2022-01-18 PROCEDURE — 84156 ASSAY OF PROTEIN URINE: CPT | Performed by: INTERNAL MEDICINE

## 2022-01-18 PROCEDURE — 94761 N-INVAS EAR/PLS OXIMETRY MLT: CPT

## 2022-01-18 PROCEDURE — 82570 ASSAY OF URINE CREATININE: CPT | Performed by: INTERNAL MEDICINE

## 2022-01-18 PROCEDURE — 93010 ELECTROCARDIOGRAM REPORT: CPT | Performed by: INTERNAL MEDICINE

## 2022-01-18 PROCEDURE — 80048 BASIC METABOLIC PNL TOTAL CA: CPT | Performed by: HOSPITALIST

## 2022-01-18 PROCEDURE — 94660 CPAP INITIATION&MGMT: CPT

## 2022-01-18 PROCEDURE — 83735 ASSAY OF MAGNESIUM: CPT | Performed by: HOSPITALIST

## 2022-01-18 PROCEDURE — 85025 COMPLETE CBC W/AUTO DIFF WBC: CPT | Performed by: HOSPITALIST

## 2022-01-18 PROCEDURE — 93005 ELECTROCARDIOGRAM TRACING: CPT | Performed by: HOSPITALIST

## 2022-01-18 PROCEDURE — 99231 SBSQ HOSP IP/OBS SF/LOW 25: CPT | Performed by: INTERNAL MEDICINE

## 2022-01-18 PROCEDURE — 97110 THERAPEUTIC EXERCISES: CPT

## 2022-01-18 RX ADMIN — SODIUM CHLORIDE, PRESERVATIVE FREE 10 ML: 5 INJECTION INTRAVENOUS at 08:21

## 2022-01-18 RX ADMIN — BUDESONIDE AND FORMOTEROL FUMARATE DIHYDRATE 2 PUFF: 160; 4.5 AEROSOL RESPIRATORY (INHALATION) at 23:30

## 2022-01-18 RX ADMIN — FAMOTIDINE 20 MG: 20 TABLET, FILM COATED ORAL at 20:46

## 2022-01-18 RX ADMIN — ASPIRIN 81 MG: 81 TABLET, COATED ORAL at 08:21

## 2022-01-18 RX ADMIN — BUDESONIDE AND FORMOTEROL FUMARATE DIHYDRATE 2 PUFF: 160; 4.5 AEROSOL RESPIRATORY (INHALATION) at 09:22

## 2022-01-18 RX ADMIN — IPRATROPIUM BROMIDE AND ALBUTEROL SULFATE 3 ML: .5; 3 SOLUTION RESPIRATORY (INHALATION) at 11:29

## 2022-01-18 RX ADMIN — IPRATROPIUM BROMIDE AND ALBUTEROL SULFATE 3 ML: .5; 3 SOLUTION RESPIRATORY (INHALATION) at 06:56

## 2022-01-18 RX ADMIN — GUAIFENESIN 600 MG: 600 TABLET, EXTENDED RELEASE ORAL at 08:21

## 2022-01-18 RX ADMIN — URSODIOL 300 MG: 300 CAPSULE ORAL at 16:09

## 2022-01-18 RX ADMIN — PROPAFENONE HYDROCHLORIDE 150 MG: 150 TABLET, COATED ORAL at 22:02

## 2022-01-18 RX ADMIN — MIRTAZAPINE 7.5 MG: 15 TABLET, FILM COATED ORAL at 20:46

## 2022-01-18 RX ADMIN — GUAIFENESIN 600 MG: 600 TABLET, EXTENDED RELEASE ORAL at 20:46

## 2022-01-18 RX ADMIN — ROSUVASTATIN CALCIUM 5 MG: 5 TABLET, FILM COATED ORAL at 08:21

## 2022-01-18 RX ADMIN — URSODIOL 300 MG: 300 CAPSULE ORAL at 20:46

## 2022-01-18 RX ADMIN — CLOPIDOGREL 75 MG: 75 TABLET, FILM COATED ORAL at 08:21

## 2022-01-18 RX ADMIN — PREDNISONE 10 MG: 10 TABLET ORAL at 08:21

## 2022-01-18 RX ADMIN — DIGOXIN 125 MCG: 0.25 INJECTION INTRAMUSCULAR; INTRAVENOUS at 12:35

## 2022-01-18 RX ADMIN — IPRATROPIUM BROMIDE AND ALBUTEROL SULFATE 3 ML: .5; 3 SOLUTION RESPIRATORY (INHALATION) at 15:19

## 2022-01-18 RX ADMIN — FAMOTIDINE 20 MG: 20 TABLET, FILM COATED ORAL at 08:21

## 2022-01-18 RX ADMIN — SODIUM CHLORIDE 75 ML/HR: 9 INJECTION, SOLUTION INTRAVENOUS at 13:20

## 2022-01-18 RX ADMIN — AZITHROMYCIN DIHYDRATE 250 MG: 250 TABLET, FILM COATED ORAL at 08:21

## 2022-01-18 RX ADMIN — PROPAFENONE HYDROCHLORIDE 150 MG: 150 TABLET, COATED ORAL at 07:00

## 2022-01-18 RX ADMIN — PROPAFENONE HYDROCHLORIDE 150 MG: 150 TABLET, COATED ORAL at 14:36

## 2022-01-18 RX ADMIN — URSODIOL 300 MG: 300 CAPSULE ORAL at 08:21

## 2022-01-18 RX ADMIN — IPRATROPIUM BROMIDE AND ALBUTEROL SULFATE 3 ML: .5; 3 SOLUTION RESPIRATORY (INHALATION) at 20:32

## 2022-01-18 NOTE — PLAN OF CARE
Goal Outcome Evaluation:  Plan of Care Reviewed With: patient        Progress: no change  Outcome Summary: Patient  sleeping  off  and  on  this  shift.   heart  rate  decreased  to  the  40'  at  some  poin,  EKG  showed  Bigeminy aand  Trigeminy.  No  complaints  of   chest  pain or  discomfort voiced.    IV  fluid  infusing  at  75ml/hr.  Patient  converted  back  to  Afib at 545  this  am.   Remain  on  02  at  1l/m.    Nursing  will  continue  to monitor.

## 2022-01-18 NOTE — PLAN OF CARE
Goal Outcome Evaluation:  Plan of Care Reviewed With: patient           Outcome Summary: pt on 1L o2 in bed, able to walk 80 ft with rwx with cga/sba, pt fatigues quickly with SOA and low activity tolerance, pt O2 sats 87% after activity but quickly ret to 97% with seated rest. PT will cont to progress as tolerated    Patient was intermittently wearing a face mask during this therapy encounter. Therapist used appropriate personal protective equipment including eye protection, mask, and gloves.  Mask used was standard procedure mask. Appropriate PPE was worn during the entire therapy session. Hand hygiene was completed before and after therapy session. Patient is not in enhanced droplet precautions.

## 2022-01-18 NOTE — PROGRESS NOTES
"The patient is sleeping soundly today and does not awaken for interview.  Charting indicates that she slept \"off and on\" last evening and is no longer complaining of any nausea with initiation of mirtazapine.  "

## 2022-01-18 NOTE — PROGRESS NOTES
"  PROGRESS NOTE  Patient Name: Celia Baird  Age/Sex: 77 y.o. female  : 1945  MRN: [id number]    Date of Admission: 2022  Date of Encounter Visit: 22   LOS: 5 days   Patient Care Team:  Slick Gomez MD as PCP - General (Internal Medicine)    Chief Complaint: COPD exacerbation    Hospital course: Patient is doing better from the GI standpoint with no more abdominal pain    REVIEW OF SYSTEMS:   CONSTITUTIONAL: no fever or chills  CARDIOVASCULAR: No chest pain, chest pressure or chest discomfort. No palpitations or edema.   RESPIRATORY: Improving shortness of breath with less dyspnea, still oxygen dependent.   GASTROINTESTINAL: No more abdominal pain or discomfort  HEMATOLOGIC: No bleeding with multiple areas of bruising over upper extremities and upper part of the chest and the legs, no hematoma.     Ventilator/Non-Invasive Ventilation Settings (From admission, onward)             Start     Ordered    22 1313  NIPPV (CPAP or BIPAP)  Until Discontinued        Question:  Type:  Answer:  BIPAP    22 1312                  Vital Signs  Temp:  [97.5 °F (36.4 °C)-98.7 °F (37.1 °C)] 97.9 °F (36.6 °C)  Heart Rate:  [] 86  Resp:  [16-18] 16  BP: (100-115)/(45-69) 102/53  SpO2:  [93 %-97 %] 96 %  on  Flow (L/min):  [1] 1 Device (Oxygen Therapy): nasal cannula    Intake/Output Summary (Last 24 hours) at 2022 1420  Last data filed at 2022 1320  Gross per 24 hour   Intake 1100 ml   Output --   Net 1100 ml     Flowsheet Rows      First Filed Value   Admission Height 152.4 cm (60\") Documented at 2022 1221   Admission Weight 36.3 kg (80 lb) Documented at 2022 1221        Body mass index is 13.73 kg/m².      22  1151 22  0328 22  0403   Weight: 36.3 kg (80 lb) 32.6 kg (71 lb 12.8 oz) 31.9 kg (70 lb 4.8 oz)       Physical Exam:  GEN:  No acute distress, alert, cooperative, well developed, cachectic, on nasal cannula oxygen  LUNGS: Normal chest on " inspection, no expiratory wheezes with reduced breath sounds yet improved with no more prolongation of expiratory phase, respirations regular, even and unlabored.       Results Review:    Results From Last 14 Days   Lab Units 01/14/22  0845 01/13/22  1219   LACTATE mmol/L  --  1.5   TRIGLYCERIDES mg/dL 62  --      Results from last 7 days   Lab Units 01/18/22  0842 01/17/22  0835 01/16/22  0602 01/15/22  0506 01/14/22  2316 01/14/22  0845 01/13/22  1219   SODIUM mmol/L 139 137 137 139  --  136 141   POTASSIUM mmol/L 4.5 5.5* 5.8* 4.2 4.0 2.9* 2.6*   CHLORIDE mmol/L 97* 94* 98 98  --  92* 97*   CO2 mmol/L 29.8* 29.9* 30.3* 31.9*  --  32.7* 34.1*   BUN mg/dL 53* 54* 32* 24*  --  19 12   CREATININE mg/dL 1.30* 1.37* 1.09* 0.96  --  1.04* 0.92   CALCIUM mg/dL 9.2 9.4 9.3 8.8  --  8.9 9.0   AST (SGOT) U/L  --   --   --   --   --  11 12   ALT (SGPT) U/L  --   --   --   --   --  7 7   ANION GAP mmol/L 12.2 13.1 8.7 9.1  --  11.3 9.9   ALBUMIN g/dL  --   --   --   --   --  3.20* 4.10     Results from last 7 days   Lab Units 01/14/22  0845 01/13/22  1219   TROPONIN T ng/mL <0.010 0.013     Results from last 7 days   Lab Units 01/14/22  0845   TSH uIU/mL 0.704     Results from last 7 days   Lab Units 01/13/22  1219   PROBNP pg/mL 585.0     Results from last 7 days   Lab Units 01/18/22  0842 01/17/22  0835 01/16/22  0602 01/15/22  0506 01/14/22  0845 01/13/22  1219   WBC 10*3/mm3 20.57* 20.36* 14.95* 12.26* 6.78 13.49*   HEMOGLOBIN g/dL 12.2 12.9 12.5 12.0 12.1 12.8   HEMATOCRIT % 36.5 38.2 37.7 35.0 35.1 37.2   PLATELETS 10*3/mm3 212 234 229 221 201 232   MCV fL 89.5 90.3 91.5 88.8 88.2 90.1   NEUTROPHIL % % 75.8 71.9 77.5* 83.3* 81.5* 74.4   LYMPHOCYTE % % 10.8* 12.8* 8.6* 6.6* 12.1* 12.2*   MONOCYTES % % 11.9 14.1* 13.1* 9.1 5.9 10.7   EOSINOPHIL % % 0.5 0.3 0.0* 0.0* 0.0* 1.3   BASOPHIL % % 0.1 0.2 0.1 0.2 0.1 0.9   IMM GRAN % % 0.9* 0.7* 0.7* 0.8* 0.4 0.5         Results from last 7 days   Lab Units 01/18/22  0842  01/15/22  0506 01/14/22  0845 01/13/22  1219   MAGNESIUM mg/dL 2.2 1.8 1.6 1.6     Results from last 7 days   Lab Units 01/14/22  0845   CHOLESTEROL mg/dL 141   TRIGLYCERIDES mg/dL 62   HDL CHOL mg/dL 70*     Results from last 7 days   Lab Units 01/13/22  1309   PH, ARTERIAL pH units 7.480*   PCO2, ARTERIAL mm Hg 47.9*   PO2 ART mm Hg 84.4   HCO3 ART mmol/L 35.6*     Results from last 7 days   Lab Units 01/14/22  0845   HEMOGLOBIN A1C % 5.50     Glucose   Date/Time Value Ref Range Status   01/17/2022 0546 118 70 - 130 mg/dL Final     Comment:     Meter: [id number] : [id number] Pa Wade STACIE   01/16/2022 2022 162 (H) 70 - 130 mg/dL Final     Comment:     Meter: [id number] : [id number] Pa Wade STACIE     Results from last 7 days   Lab Units 01/15/22  0506 01/13/22  1219   PROCALCITONIN ng/mL 0.06 0.05   LACTATE mmol/L  --  1.5     Results from last 7 days   Lab Units 01/13/22  1347 01/13/22  1219   BLOODCX  No growth at 5 days No growth at 5 days         Results from last 7 days   Lab Units 01/13/22  1230   COVID19  Not Detected               Imaging:   Imaging Results (All)           I reviewed the patient's new clinical results.  I personally viewed and interpreted the patient's imaging results: No new films to review        Medication Review:   aspirin, 81 mg, Oral, Daily  budesonide-formoterol, 2 puff, Inhalation, BID - RT  clopidogrel, 75 mg, Oral, Daily  digoxin, 125 mcg, Intravenous, Daily  famotidine, 20 mg, Oral, BID  guaiFENesin, 600 mg, Oral, Q12H  ipratropium-albuterol, 3 mL, Nebulization, 4x Daily - RT  mirtazapine, 7.5 mg, Oral, Nightly  predniSONE, 10 mg, Oral, Daily With Breakfast  propafenone, 150 mg, Oral, Q8H  rosuvastatin, 5 mg, Oral, Daily  ursodiol, 300 mg, Oral, TID        sodium chloride, 75 mL/hr, Last Rate: 75 mL/hr (01/18/22 1320)        ASSESSMENT:   1. Acute exacerbation of COPD  2. Subarachnoid hemorrhage  3. Multiple skin tears  4. Closed nondisplaced fracture of the  left clavicle  5. Paroxysmal atrial fibrillation  6. Tobacco abuse  7. Status postplacement of implantable loop recorder  8. Nausea and vomiting      PLAN:  Patient is doing better from the GI standpoint  She continues to slowly improve from the respiratory standpoint  She is down to the 10 mg prednisone which is her home regimen and needs to be continued as such  She is done with her course of Zithromax  She is down to only minimal amount of oxygen on her nasal cannula  She is cleared to discharge home  Patient would like to follow-up with us and she is to see him in 2 months with a PFT     · Cleared for discharge, we will follow-up  · Follow-up with us with a PFT in 2 months  · Call us with any question or concern    Discussed with Patient   Disposition:  Home    Brock Orosco MD  01/18/22  14:20 EST          Dictated utilizing Dragon dictation

## 2022-01-18 NOTE — PLAN OF CARE
Goal Outcome Evaluation:  Plan of Care Reviewed With: patient, son           Outcome Summary: Denies pain or SOA. Slept most of the shift. 02 1L NC. Telemetry SR w/ PVC's

## 2022-01-18 NOTE — PROGRESS NOTES
"Daily progress note    Chief complaint  Doing same  No new complaints   Denies chest pain shortness of breath palpitation    History of present illness  77-year old white female with history of chronic hypoxic respiratory failure COPD chronic atrial fibrillation congestive heart failure hypertension hyperlipidemia who continue to smoke and lives by herself to the emergency room with increased shortness of breath for last 1 week.  Patient denies any fever chills but does have chronic productive cough.  Patient denies chest pain abdominal pain nausea vomiting diarrhea.  Patient work-up in ER revealed acute on chronic hypoxic respiratory failure with acute exacerbation of COPD admitted for management.  Patient also found to have severe hypokalemia.     REVIEW OF SYSTEMS  Unremarkable except generalized weakness     PHYSICAL EXAM  Blood pressure 102/53, pulse 86, temperature 97.9 °F (36.6 °C), temperature source Oral, resp. rate 16, height 152.4 cm (60\"), weight 31.9 kg (70 lb 4.8 oz), SpO2 96 %.    GENERAL: Chronically ill-appearing, nontoxic appearing, moderately distressed  HENT: normocephalic, atraumatic  EYES: no scleral icterus, PERRL  CV: regular rhythm, regular rate, no murmur  RESPIRATORY: Tachypneic, coarse breath sounds throughout  ABDOMEN: soft, nontender nondistended bowel sounds positive  MUSCULOSKELETAL: no deformity, no lower extremity edema or calf tenderness bilaterally  NEURO: alert, moves all extremities, follows commands, mental status normal/baseline  SKIN: warm, dry, no rash   Psych: Appropriate mood and affect    LAB RESULTS  Lab Results (last 24 hours)     Procedure Component Value Units Date/Time    Blood Culture - Blood, Arm, Left [[id number]]  (Normal) Collected: 01/13/22 1219    Specimen: Blood from Arm, Left Updated: 01/18/22 1245     Blood Culture No growth at 5 days    Basic Metabolic Panel [[id number]]  (Abnormal) Collected: 01/18/22 0842    Specimen: Blood Updated: 01/18/22 0948     " Glucose 103 mg/dL      BUN 53 mg/dL      Creatinine 1.30 mg/dL      Sodium 139 mmol/L      Potassium 4.5 mmol/L      Chloride 97 mmol/L      CO2 29.8 mmol/L      Calcium 9.2 mg/dL      eGFR Non African Amer 40 mL/min/1.73      BUN/Creatinine Ratio 40.8     Anion Gap 12.2 mmol/L     Narrative:      GFR Normal >60  Chronic Kidney Disease <60  Kidney Failure <15      Magnesium [[id number]]  (Normal) Collected: 01/18/22 0842    Specimen: Blood Updated: 01/18/22 0948     Magnesium 2.2 mg/dL     CBC & Differential [[id number]]  (Abnormal) Collected: 01/18/22 0842    Specimen: Blood Updated: 01/18/22 0911    Narrative:      The following orders were created for panel order CBC & Differential.  Procedure                               Abnormality         Status                     ---------                               -----------         ------                     CBC Auto Differential[[id number]]        Abnormal            Final result                 Please view results for these tests on the individual orders.    CBC Auto Differential [[id number]]  (Abnormal) Collected: 01/18/22 0842    Specimen: Blood Updated: 01/18/22 0911     WBC 20.57 10*3/mm3      RBC 4.08 10*6/mm3      Hemoglobin 12.2 g/dL      Hematocrit 36.5 %      MCV 89.5 fL      MCH 29.9 pg      MCHC 33.4 g/dL      RDW 12.3 %      RDW-SD 40.1 fl      MPV 12.6 fL      Platelets 212 10*3/mm3      Neutrophil % 75.8 %      Lymphocyte % 10.8 %      Monocyte % 11.9 %      Eosinophil % 0.5 %      Basophil % 0.1 %      Immature Grans % 0.9 %      Neutrophils, Absolute 15.59 10*3/mm3      Lymphocytes, Absolute 2.23 10*3/mm3      Monocytes, Absolute 2.44 10*3/mm3      Eosinophils, Absolute 0.10 10*3/mm3      Basophils, Absolute 0.03 10*3/mm3      Immature Grans, Absolute 0.18 10*3/mm3      nRBC 0.0 /100 WBC     Digitoxin Level [[id number]] Collected: 01/17/22 2155    Specimen: Blood Updated: 01/17/22 2204    Blood Culture - Blood, Arm, Left [[id number]]  (Normal) Collected:  01/13/22 1347    Specimen: Blood from Arm, Left Updated: 01/17/22 1415     Blood Culture No growth at 4 days        Imaging Results (Last 24 Hours)     ** No results found for the last 24 hours. **             ECG 12 Lead  Component   Ref Range & Units 1/13/22 1356 1/13/22 1253   QT Interval   ms 373 P  409 P              HEART RATE= 81  bpm  RR Interval= 744  ms  MN Interval= 71  ms  P Horizontal Axis= 219  deg  P Front Axis= 219  deg  QRSD Interval= 96  ms  QT Interval= 373  ms  QRS Axis= 105  deg  T Wave Axis= 201  deg  - ABNORMAL ECG -  Sinus or ectopic atrial rhythm  Ventricular trigeminy  Short MN interval  Right axis deviation  Repol abnrm suggests ischemia, diffuse leads             Current Facility-Administered Medications:   •  aspirin EC tablet 81 mg, 81 mg, Oral, Daily, Eulogio Mcdaniel MD, 81 mg at 01/18/22 0821  •  budesonide-formoterol (SYMBICORT) 160-4.5 MCG/ACT inhaler 2 puff, 2 puff, Inhalation, BID - RT, Eulogio Mcdaniel MD, 2 puff at 01/18/22 0922  •  clopidogrel (PLAVIX) tablet 75 mg, 75 mg, Oral, Daily, Eulogio Mcdaniel MD, 75 mg at 01/18/22 0821  •  digoxin (LANOXIN) injection 125 mcg, 125 mcg, Intravenous, Daily, Darlene Shen, APRN, 125 mcg at 01/18/22 1235  •  famotidine (PEPCID) tablet 20 mg, 20 mg, Oral, BID, Eulogio Mcdaniel MD, 20 mg at 01/18/22 0821  •  guaiFENesin (MUCINEX) 12 hr tablet 600 mg, 600 mg, Oral, Q12H, Eulogio Mcdaniel MD, 600 mg at 01/18/22 0821  •  ipratropium-albuterol (DUO-NEB) nebulizer solution 3 mL, 3 mL, Nebulization, 4x Daily - RT, Eulogio Mcdaniel MD, 3 mL at 01/18/22 1129  •  LORazepam (ATIVAN) injection 0.5 mg, 0.5 mg, Intravenous, Q6H PRN, Eulogio Mcdaniel MD, 0.5 mg at 01/17/22 0538  •  mirtazapine (REMERON) tablet 7.5 mg, 7.5 mg, Oral, Nightly, Fabian Lagos III, MD, 7.5 mg at 01/17/22 2050  •  ondansetron (ZOFRAN) injection 4 mg, 4 mg, Intravenous, Q4H PRN, Eulogio Mcdaniel MD, 4 mg at 01/17/22 1016  •  predniSONE (DELTASONE) tablet 10 mg, 10 mg, Oral, Daily With  Breakfast, Jonny Mcdaniel MD, 10 mg at 01/18/22 0821  •  propafenone (RYTHMOL) tablet 150 mg, 150 mg, Oral, Q8H, Jonny Mcdaniel MD, 150 mg at 01/18/22 0700  •  rosuvastatin (CRESTOR) tablet 5 mg, 5 mg, Oral, Daily, Jonny Mcdaniel MD, 5 mg at 01/18/22 0821  •  sodium chloride 0.9 % flush 10 mL, 10 mL, Intravenous, PRN, Binu Moreno MD  •  [COMPLETED] Insert peripheral IV, , , Once **AND** sodium chloride 0.9 % flush 10 mL, 10 mL, Intravenous, PRN, Vicki Alejandre APRN, 10 mL at 01/18/22 0821  •  sodium chloride 0.9 % infusion, 75 mL/hr, Intravenous, Continuous, Jonny Mcdaniel MD, Last Rate: 75 mL/hr at 01/18/22 1320, 75 mL/hr at 01/18/22 1320  •  sodium chloride nasal spray 1 spray, 1 spray, Each Nare, PRN, Jonny Mcdaniel MD, 1 spray at 01/15/22 1835  •  ursodiol (ACTIGALL) capsule 300 mg, 300 mg, Oral, TID, Jonny Mcdaniel MD, 300 mg at 01/18/22 0821     ASSESSMENT  Acute on chronic hypoxic respiratory failure  Acute exacerbation of COPD  Chronic diastolic CHF  Acute kidney injury in hyperkalemia   Paroxysmal atrial fibrillation  Hypertension  Hyperlipidemia  Leukocytosis secondary to steroid  Ongoing tobacco abuse  Gastroesophageal reflux disease    PLAN  CPM  NIPPV  Steroids and taper  Zithromax  Anticoagulation  IVF  Change Protonix to Pepcid  Continue hold Bumex and Aldactone  Nephrology consult appreciated  Nebulizer treatment every 4 hours  Pulmonary hygiene  Smoking cessation guidelines  Adjust home medications  Stress ulcer DVT prophylaxis  Pulmonary and cardiology to follow patient  DNR  PT/OT  Discussed with family and nursing staff  Home versus subacute rehab once more stable    JONNY MCDANIEL MD

## 2022-01-18 NOTE — THERAPY TREATMENT NOTE
Patient Name: Celia Baird  : 1945    MRN: [id number]                              Today's Date: 2022       Admit Date: 2022    Visit Dx:     ICD-10-CM ICD-9-CM   1. COPD exacerbation (HCC)  J44.1 491.21   2. Shortness of breath  R06.02 786.05   3. Hypokalemia  E87.6 276.8     Patient Active Problem List   Diagnosis   • COPD exacerbation (HCC)   • Subarachnoid hemorrhage (HCC)   • Multiple skin tears   • Closed nondisplaced fracture of left clavicle   • Paroxysmal atrial fibrillation (HCC)   • Tobacco abuse   • Status post placement of implantable loop recorder   • SOB (shortness of breath)   • COPD with acute exacerbation (HCC)   • Severe malnutrition (CMS/HCC)   • Leukocytosis     Past Medical History:   Diagnosis Date   • Atrial fibrillation (HCC)    • COPD (chronic obstructive pulmonary disease) (HCC)    • History of frequent urinary tract infections    • Irregular heart beat    • Kidney stones    • PBC (primary biliary cirrhosis)    • PBC (primary biliary cirrhosis)      Past Surgical History:   Procedure Laterality Date   • CARDIAC ELECTROPHYSIOLOGY PROCEDURE N/A 3/30/2017    Procedure:    LINQ;  Surgeon: Ailyn Solorio MD;  Location: [facility] INVASIVE LOCATION;  Service:    • CHOLECYSTECTOMY     • TUBAL ABDOMINAL LIGATION        General Information     Row Name 22 0933          Physical Therapy Time and Intention    Document Type therapy note (daily note)  -PC     Mode of Treatment physical therapy  -PC     Row Name 22 0933          General Information    Existing Precautions/Restrictions oxygen therapy device and L/min; fall  -PC     Row Name 22 0933          Cognition    Orientation Status (Cognition) oriented to; person; place; situation  -PC     Row Name 22 0933          Safety Issues, Functional Mobility    Impairments Affecting Function (Mobility) endurance/activity tolerance; strength  -PC           User Key  (r) = Recorded By, (t) = Taken By, (c) =  Cosigned By    Initials Name Provider Type    PC Jahaira Waller, PT Physical Therapist               Mobility     Row Name 01/18/22 0934          Bed Mobility    Supine-Sit [place] (Bed Mobility) standby assist  -PC     Sit-Supine [place] (Bed Mobility) standby assist  -PC     Assistive Device (Bed Mobility) bed rails; head of bed elevated  -PC     Row Name 01/18/22 0934          Sit-Stand Transfer    Sit-Stand [place] (Transfers) supervision  -PC     Assistive Device (Sit-Stand Transfers) walker, front-wheeled  -PC     Row Name 01/18/22 0934          Gait/Stairs (Locomotion)    [place] Level (Gait) contact guard; supervision  -PC     Assistive Device (Gait) walker, front-wheeled  -PC     Distance in Feet (Gait) 80 ft  -PC     Deviations/Abnormal Patterns (Gait) jass decreased; gait speed decreased; stride length decreased  -PC     Bilateral Gait Deviations forward flexed posture  -PC     Comment (Gait/Stairs) distance limited by SOA, fatigue, O2 sats 87% when returned to bed  -           User Key  (r) = Recorded By, (t) = Taken By, (c) = Cosigned By    Initials Name Provider Type    PC Jahaira Waller, PT Physical Therapist               Obj/Interventions    No documentation.                Goals/Plan    No documentation.                Clinical Impression     Row Name 01/18/22 0937          Pain Scale: Numbers Pre/Post-Treatment    Pretreatment Pain Rating 0/10 - no pain  -PC     Row Name 01/18/22 0937          Plan of Care Review    Plan of Care Reviewed With patient  -PC     Outcome Summary pt on 1L o2 in bed, able to walk 80 ft with rwx with cga/sba, pt fatigues quickly with SOA and low activity tolerance, pt O2 sats 87% after activity but quickly ret to 97% with seated rest. PT will cont to progress as tolerated  -     Row Name 01/18/22 0937          Vital Signs    Pre SpO2 (%) 94  -PC     O2 Delivery Pre Treatment supplemental O2  -PC     Intra SpO2 (%) 87  -PC     O2 Delivery  Intra Treatment supplemental O2  -PC     Post SpO2 (%) 97  -PC     O2 Delivery Post Treatment supplemental O2  -PC     Row Name 01/18/22 0937          Positioning and Restraints    Pre-Treatment Position in bed  -PC     Post Treatment Position bed  -PC     In Bed supine; call light within reach; encouraged to call for assist; exit alarm on  -PC           User Key  (r) = Recorded By, (t) = Taken By, (c) = Cosigned By    Initials Name Provider Type    Jahaira Cadena PT Physical Therapist               Outcome Measures    No documentation.                              Physical Therapy Education                 Title: PT OT SLP Therapies (Done)     Topic: Physical Therapy (Done)     Point: Mobility training (Done)     Learning Progress Summary           Patient Acceptance, E,D, DU by PC at 1/18/2022 0941    Acceptance, E, VU by AA at 1/17/2022 1703    Acceptance, E, VU by CW at 1/17/2022 1040    Acceptance, E,TB, VU by CS at 1/15/2022 1319    Acceptance, E, VU by SR at 1/15/2022 1203                   Point: Home exercise program (Done)     Learning Progress Summary           Patient Acceptance, E,D, DU by PC at 1/18/2022 0941    Acceptance, E, VU by AA at 1/17/2022 1703    Acceptance, E, VU by CW at 1/17/2022 1040    Acceptance, E,TB, VU by CS at 1/15/2022 1319    Acceptance, E, VU by SR at 1/15/2022 1203                   Point: Body mechanics (Done)     Learning Progress Summary           Patient Acceptance, E,D, DU by PC at 1/18/2022 0941    Acceptance, E, VU by AA at 1/17/2022 1703    Acceptance, E, VU by CW at 1/17/2022 1040    Acceptance, E,TB, VU by CS at 1/15/2022 1319    Acceptance, E, VU by SR at 1/15/2022 1203                   Point: Precautions (Done)     Learning Progress Summary           Patient Acceptance, E,D, DU by PC at 1/18/2022 0941    Acceptance, E, VU by AA at 1/17/2022 1703    Acceptance, E, VU by CW at 1/17/2022 1040    Acceptance, E,TB, VU by CS at 1/15/2022 1319    Acceptance, E, VU by  SR at 1/15/2022 1203                               User Key     Initials Effective Dates Name Provider Type Discipline    CW 06/16/21 -  Ileana Martin OTR Occupational Therapist OT    PC 06/16/21 -  Jahaira Waller, PT Physical Therapist PT    AA 06/16/21 -  Faye Graves, RN Registered Nurse Nurse    CS 02/02/21 -  Ileana Hernandez RN Registered Nurse Nurse    SR 02/08/21 -  Jami Puente Physical Therapist PT              PT Recommendation and Plan     Plan of Care Reviewed With: patient  Outcome Summary: pt on 1L o2 in bed, able to walk 80 ft with rwx with cga/sba, pt fatigues quickly with SOA and low activity tolerance, pt O2 sats 87% after activity but quickly ret to 97% with seated rest. PT will cont to progress as tolerated     Time Calculation:    PT Charges     Row Name 01/18/22 0941             Time Calculation    Start Time 0908  -PC      Stop Time 0924  -PC      Time Calculation (min) 16 min  -PC      PT Received On 01/18/22  -PC      PT - Next Appointment 01/19/22  -PC            User Key  (r) = Recorded By, (t) = Taken By, (c) = Cosigned By    Initials Name Provider Type    PC Jahaira Waller, PT Physical Therapist              Therapy Charges for Today     Code Description Service Date Service Provider Modifiers Qty    56599553291 HC PT THER PROC EA 15 MIN 1/18/2022 Jahaira Waller, PT GP 1          PT G-Codes  Outcome Measure Options: AM-PAC 6 Clicks Daily Activity (OT), Modified Grady  AM-PAC 6 Clicks Score (PT): 20  AM-PAC 6 Clicks Score (OT): 19  Modified [place]ady Scale: 4 - Moderately severe disability.  Unable to walk without assistance, and unable to attend to own bodily needs without assistance.    Jahaira Waller, PT  1/18/2022

## 2022-01-18 NOTE — CONSULTS
[facility]                                                                                             Nephrology Initial Consult Note    Patient Identification:  Name: Celia Baird MRN: [id number]  Age: 77 y.o. : 1945  Sex: female  Date:2022    Requesting Physician: As per consult order.  Reason for Consultation: FALLON with hyperkalemia  Information from:patient/ family/ chart      History of Present Illness: This is a 77 y.o. year old female with no prior known history of chronic kidney disease, creatinine has been fairly stable for last several years and baseline creatinine appears to be around 0.8-0.9 which is the level she was at at the time of admission on .  Medical history is significant for chronic hypoxemic respiratory failure, COPD A. fib CHF and hypertension, continues to smoke and came to the ER with increasing shortness of breath, productive cough but no chest pain fevers chills.  She denied any abdominal pain nausea vomiting or diarrhea states good oral intake.  Not on NSAIDs at home has been treated for COPD and CHF.  Had been on oral Bumex along with oral antibiotics.  Potassium was low earlier this admission and she had received scheduled oral and a one-time dose of IV potassium.  Currently on oral prednisone and was on Aldactone 50 mg daily.  Labs yesterday showed new FALLON with hyperkalemia.  Potassium, spironolactone and Bumex were held and she is currently on IV fluids.  She states improvement in her symptoms since admission, just some residual dyspnea, O2 sats acceptable on 1 L.  No recent IV contrast or hypotension noted      The following medical history and medications personally reviewed by me:    Problem List:   Patient Active Problem List    Diagnosis    • Severe malnutrition (CMS/[facility]) [E43]    • COPD with acute exacerbation ([facility]) [J44.1]    • SOB (shortness of  breath) [R06.02]    • Paroxysmal atrial fibrillation (HCC) [I48.0]    • Tobacco abuse [Z72.0]    • Status post placement of implantable loop recorder [Z95.818]    • Multiple skin tears [T14.8XXA]    • Closed nondisplaced fracture of left clavicle [S42.002A]    • Subarachnoid hemorrhage (HCC) [I60.9]    • COPD exacerbation (HCC) [J44.1]        Past Medical History:  Past Medical History:   Diagnosis Date   • Atrial fibrillation (HCC)    • COPD (chronic obstructive pulmonary disease) (HCC)    • History of frequent urinary tract infections    • Irregular heart beat    • Kidney stones    • PBC (primary biliary cirrhosis)    • PBC (primary biliary cirrhosis)        Past Surgical History:  Past Surgical History:   Procedure Laterality Date   • CARDIAC ELECTROPHYSIOLOGY PROCEDURE N/A 3/30/2017    Procedure:    LINQ;  Surgeon: Ailyn Solorio MD;  Location: [facility] INVASIVE LOCATION;  Service:    • CHOLECYSTECTOMY     • TUBAL ABDOMINAL LIGATION          Home Meds:   Medications Prior to Admission   Medication Sig Dispense Refill Last Dose   • aspirin 81 MG EC tablet Take 81 mg by mouth Daily.      • bumetanide (BUMEX) 0.5 MG tablet Take 0.5 mg by mouth Daily.      • clopidogrel (PLAVIX) 75 MG tablet Take 75 mg by mouth Daily.      • ferrous gluconate (FERGON) 324 MG tablet Take 324 mg by mouth Daily With Breakfast.      • ipratropium-albuterol (DUO-NEB) 0.5-2.5 mg/3 ml nebulizer Take 3 mL by nebulization 2 (Two) Times a Day.      • propafenone (RYTHMOL) 150 MG tablet TAKE 1/2 (ONE-HALF) TABLET BY MOUTH EVERY 6 HOURS (Patient taking differently: Take 75 mg by mouth Every 6 (Six) Hours.) 60 tablet 0    • rosuvastatin (CRESTOR) 5 MG tablet Take 5 mg by mouth Daily.      • spironolactone (ALDACTONE) 25 MG tablet Take 50 mg by mouth Daily.      • ursodiol (ACTIGALL) 300 MG capsule Take 300 mg by mouth 3 (Three) Times a Day.      • vitamin E 100 UNIT capsule Take 100 Units by mouth Daily.          Current Meds:    Current Facility-Administered Medications   Medication Dose Route Frequency Provider Last Rate Last Admin   • aspirin EC tablet 81 mg  81 mg Oral Daily Eulogio Mcdaniel MD   81 mg at 01/18/22 0821   • budesonide-formoterol (SYMBICORT) 160-4.5 MCG/ACT inhaler 2 puff  2 puff Inhalation BID - RT Eulogio Mcdaniel MD   2 puff at 01/17/22 2019   • clopidogrel (PLAVIX) tablet 75 mg  75 mg Oral Daily Eulogio Mcdaniel MD   75 mg at 01/18/22 0821   • digoxin (LANOXIN) injection 125 mcg  125 mcg Intravenous Daily Darlene Shen, BRAYAN       • famotidine (PEPCID) tablet 20 mg  20 mg Oral BID Eulogio Mcdaniel MD   20 mg at 01/18/22 0821   • guaiFENesin (MUCINEX) 12 hr tablet 600 mg  600 mg Oral Q12H Eulogio Mcdaniel MD   600 mg at 01/18/22 0821   • ipratropium-albuterol (DUO-NEB) nebulizer solution 3 mL  3 mL Nebulization 4x Daily - RT Eulogio Mcdaniel MD   3 mL at 01/18/22 0656   • LORazepam (ATIVAN) injection 0.5 mg  0.5 mg Intravenous Q6H PRN Eulogio Mcdaniel MD   0.5 mg at 01/17/22 0538   • mirtazapine (REMERON) tablet 7.5 mg  7.5 mg Oral Nightly Fabian Lagos III, MD   7.5 mg at 01/17/22 2050   • ondansetron (ZOFRAN) injection 4 mg  4 mg Intravenous Q4H PRN Eulogio Mcdaniel MD   4 mg at 01/17/22 1016   • predniSONE (DELTASONE) tablet 10 mg  10 mg Oral Daily With Breakfast Eulogio Mcdaniel MD   10 mg at 01/18/22 0821   • propafenone (RYTHMOL) tablet 150 mg  150 mg Oral Q8H Eulogio Mcdaniel MD   150 mg at 01/18/22 0700   • rosuvastatin (CRESTOR) tablet 5 mg  5 mg Oral Daily Eulogio Mcdaniel MD   5 mg at 01/18/22 0821   • sodium chloride 0.9 % flush 10 mL  10 mL Intravenous PRN Binu Moreno MD       • sodium chloride 0.9 % flush 10 mL  10 mL Intravenous PRN Vicki Alejandre, BRAYAN   10 mL at 01/18/22 0821   • sodium chloride 0.9 % infusion  75 mL/hr Intravenous Continuous Eulogio Mcdaniel MD 75 mL/hr at 01/17/22 1536 75 mL/hr at 01/17/22 1536   • sodium chloride nasal spray 1 spray  1 spray Each Nare PRN Eulogio Mcdaniel MD   1 spray at  01/15/22 1835   • ursodiol (ACTIGALL) capsule 300 mg  300 mg Oral TID Eulogio Mcdaniel MD   300 mg at 22 0821       Allergies:  Allergies   Allergen Reactions   • Morphine And Related Nausea And Vomiting   • Levaquin [Levofloxacin]    • Sulfa Antibiotics        Social History:   Social History     Socioeconomic History   • Marital status:    Tobacco Use   • Smoking status: Current Some Day Smoker     Packs/day: 0.50     Years: 59.00     Pack years: 29.50     Types: Cigarettes   • Smokeless tobacco: Never Used   Substance and Sexual Activity   • Alcohol use: No   • Drug use: No   • Sexual activity: Defer     Birth control/protection: Post-menopausal, Surgical        Family History:  Family History   Problem Relation Age of Onset   • Heart disease Father    • Heart attack Father    • Heart disease Brother    • Stroke Mother         Review of Systems: as per HPI, in addition:    General:      Complains of weakness / fatigue,                       No fevers / chills                       no weight loss  HEENT:       no dysphagia / odynophagia  Neck:           normal range of motion, no swelling  Respiratory: no cough / congestion                      No shortness of air                       No wheezing  CV:              No chest pain                       No palpitations  Abdomen/GI: no nausea / vomiting                      No diarrhea / constipation                      No abdominal pain  :             no dysuria / urinary frequency                       No urgency, normal output  Endocrine:   no polyuria / polydipsia,                      No heat or cold intolerance  Skin:           no rashes or skin breakdown   Vascular:   No edema                     No claudication  Psych:        no depression/ anxiety  Neuro:        no focal weakness, no seizures  Musculoskeletal: no joint pain or deformities      Physical Exam:  Vitals:   Temp (24hrs), Av.2 °F (36.8 °C), Min:97.5 °F (36.4 °C), Max:98.7 °F (37.1  "°C)    /60 (BP Location: Left arm, Patient Position: Lying)   Pulse 99   Temp 97.5 °F (36.4 °C) (Oral)   Resp 16   Ht 152.4 cm (60\")   Wt 31.9 kg (70 lb 4.8 oz)   SpO2 93%   BMI 13.73 kg/m²   Intake/Output:   No intake or output data in the 24 hours ending 01/18/22 0841     Wt Readings from Last 1 Encounters:   01/18/22 0403 31.9 kg (70 lb 4.8 oz)   01/17/22 0328 32.6 kg (71 lb 12.8 oz)   01/14/22 1151 36.3 kg (80 lb)   01/13/22 1221 36.3 kg (80 lb)       Exam:    General Appearance:  Awake, alert, oriented x3, no acute distress  Chronically ill-appearing   Head and Face:  Normocephalic, atraumatic, mucus membranes moist, oropharynx clear   Eyes:  No icterus, pupils equal round and reactive to light, extraocular movements intact    ENMT: Moist mucosa, tongue symmetric    Neck: Supple  no jugular venous distention  no thyromegaly   Pulmonary:  Respiratory effort: Normal  Auscultation of lungs: Clear bilaterally  Few scattered wheezes  No rhonchi  Good air movement, good expansion   Chest wall:  No tenderness or deformity   Cardiovascular:  Auscultation of the heart: Normal rhythm, no murmurs  Trace edema of bilateral lower extremities   Abdomen:  Abdomen: soft, non-tender, normal bowel sounds all four quadrants, no masses   Liver and spleen: no hepatosplenomegaly   Musculoskeletal: Digits and nails: normal  Normal range of motion  No joint swelling or gross deformities    Skin: Skin inspection: color normal, no visible rashes or lesions  Skin palpation: texture, turgor normal, no palpable lesions   Lymphatic:  no cervical lymphadenopathy    Psychiatric: Judgement and insight: normal  Orientation to person place and time: normal  Mood and affect: normal       DATA:  Radiology and Labs:  The following labs independently reviewed by me, additional AM labs ordered  Old records independently reviewed showing baseline creatinine as described above, 0.8-0.9  The following radiologic studies independently viewed " by me, findings admission chest x-ray showed chronic changes only, nothing focal  Interval notes, chart personally reviewed by me.  I have reviewed and summarized old records as detailed above  Plan of care discussed with patient and nurse at bedside  New problems include hyperkalemia, FALLON, leukocytosis       Risk/ complexity of medical care/ medical decision making: High, new acute kidney injury  Chronic illness with severe exacerbation or progression      Labs:   Recent Results (from the past 24 hour(s))   ECG 12 Lead    Collection Time: 01/18/22  1:39 AM   Result Value Ref Range    QT Interval 314 ms       Radiology:  Imaging Results (Last 24 Hours)     ** No results found for the last 24 hours. **               ASSESSMENT:   Acute kidney injury, suspect from some volume contraction.  Looks euvolemic to dry on exam today  CHF, had been on oral diuretics  Hyperkalemia from spironolactone and supplemental potassium.  Potassium was low at the time of admission  COPD exacerbation  Acute hypoxemic respiratory failure  Malnutrition  Worsening leukocytosis, on oral steroids      PLAN:   Agree with discontinuation of Bumex, spironolactone and oral potassium  Suspect she is intravascularly dry  Currently on IV fluids.  We will keep on normal saline for now at current rate  Await a.m. labs.  Patient noted to be a difficult stick which could be secondary to volume depletion  We will hold off on a renal ultrasound unless renal function does not improve with conservative measures, low suspicion for obstruction  Will check urine studies    Continue to monitor electrolytes and volume closely, avoid IV contrast and nephrotoxic medications     I appreciate the consult request, please call if any questions      Prabhjot Topete M.D  [facility]  Office phone number: [phone]  Answering service phone number: [phone]        1/18/2022        Dictation via Dragon dictation software

## 2022-01-18 NOTE — PROGRESS NOTES
[facility]  Cardiology Progress Note    Patient Identification:  Name: Celia Baird  Age: 77 y.o.  Sex: female  :  1945  MRN: [id number]                 Follow Up / Chief Complaint: follow up for atrial fibrillation    Interval History: She is sinus rhythm on the monitor.  She received 2 doses of digoxin yesterday.  Blood pressure and heart rate stable.       Subjective: Shortness of breath has improved.  She denies any chest pain or palpitations.    Objective:    Past Medical History:  Past Medical History:   Diagnosis Date    Atrial fibrillation (HCC)     COPD (chronic obstructive pulmonary disease) (HCC)     History of frequent urinary tract infections     Irregular heart beat     Kidney stones     PBC (primary biliary cirrhosis)     PBC (primary biliary cirrhosis)      Past Surgical History:  Past Surgical History:   Procedure Laterality Date    CARDIAC ELECTROPHYSIOLOGY PROCEDURE N/A 3/30/2017    Procedure:    LINQ;  Surgeon: Ailyn Solorio MD;  Location: [facility] INVASIVE LOCATION;  Service:     CHOLECYSTECTOMY      TUBAL ABDOMINAL LIGATION          Social History:   Social History     Tobacco Use    Smoking status: Current Some Day Smoker     Packs/day: 0.50     Years: 59.00     Pack years: 29.50     Types: Cigarettes    Smokeless tobacco: Never Used   Substance Use Topics    Alcohol use: No      Family History:  Family History   Problem Relation Age of Onset    Heart disease Father     Heart attack Father     Heart disease Brother     Stroke Mother           Allergies:  Allergies   Allergen Reactions    Morphine And Related Nausea And Vomiting    Levaquin [Levofloxacin]     Sulfa Antibiotics      Scheduled Meds:  aspirin, 81 mg, Daily  budesonide-formoterol, 2 puff, BID - RT  clopidogrel, 75 mg, Daily  digoxin, 125 mcg, Daily  famotidine, 20 mg, BID  guaiFENesin, 600 mg, Q12H  ipratropium-albuterol, 3 mL, 4x Daily - RT  mirtazapine, 7.5 mg, Nightly  predniSONE, 10 mg, Daily  "With Breakfast  propafenone, 150 mg, Q8H  rosuvastatin, 5 mg, Daily  ursodiol, 300 mg, TID            INTAKE AND OUTPUT:    Intake/Output Summary (Last 24 hours) at 1/18/2022 1355  Last data filed at 1/18/2022 1320  Gross per 24 hour   Intake 1100 ml   Output --   Net 1100 ml     ROS  Constitutional: Awake and alert, no fever. No nosebleeds  Abdomen           no abdominal pain   Cardiac              no chest pain  Pulmonary          no shortness of breath      /53 (BP Location: Left arm, Patient Position: Lying)   Pulse 84   Temp 97.9 °F (36.6 °C) (Oral)   Resp 18   Ht 152.4 cm (60\")   Wt 31.9 kg (70 lb 4.8 oz)   SpO2 94%   BMI 13.73 kg/m²   General appearance: No acute changes   Neck: Trachea midline; NECK, supple, no thyromegaly or lymphadenopathy   Lungs: Normal size and shape, normal breath sounds, equal distribution of air, no rales or rhonchi   CV: S1-S2 regular, no murmurs, no rub, no gallop   Abdomen: Soft, nontender; no masses , no abnormal abdominal sounds   Extremities: No deformity , normal color , no peripheral edema   Skin: Normal temperature, turgor and texture; no rash, ulcers          Cardiographics  Telemetry: SR         SR      ECG:         Echocardiogram:   1/14/22  Interpretation Summary    Calculated left ventricular EF = 61.9% Estimated left ventricular EF was in agreement with the calculated left ventricular EF.  Left ventricular diastolic function was normal.  There is no evidence of pericardial effusion. .  Interpretation Summary       Findings consistent with a normal ECG stress test.  Left ventricular ejection fraction is normal (Calculated EF = 70%).  Myocardial perfusion imaging indicates a normal myocardial perfusion study with no evidence of ischemia.  Impressions are consistent with a low risk study.     Asymptomatic for chest pain. ECG is negative for ischemia.   Ectopy: Rare PAC at baseline, none with exercise, occasional PVC in recovery  HR and BP response : " "Appropriate for Beta-blocker therapy  Pharmacologic study due to inability to tolerate increasing speed and grade of treadmill due to Pulmonary, mobility  Issues and Beta-blocker therapy.  Participated in Low Level exercise and tolerance is poor.         Imaging  Chest X-ray:   IMPRESSION:  No focal pulmonary consolidation. Follow-up as clinical  indications persist.     This report was finalized on 1/13/2022 12:27 PM by Dr. Andrew Abdi M.D.  Lab Review   Results from last 7 days   Lab Units 01/14/22  0845 01/13/22  1219   TROPONIN T ng/mL <0.010 0.013     Results from last 7 days   Lab Units 01/18/22  0842   MAGNESIUM mg/dL 2.2     Results from last 7 days   Lab Units 01/18/22  0842   SODIUM mmol/L 139   POTASSIUM mmol/L 4.5   BUN mg/dL 53*   CREATININE mg/dL 1.30*   CALCIUM mg/dL 9.2        Results from last 7 days   Lab Units 01/18/22  0842 01/17/22  0835 01/16/22  0602   WBC 10*3/mm3 20.57* 20.36* 14.95*   HEMOGLOBIN g/dL 12.2 12.9 12.5   HEMATOCRIT % 36.5 38.2 37.7   PLATELETS 10*3/mm3 212 234 229           The following medical decision was discussed in detail with Dr. Solorio    Assessment:  Acute on chronic hypoxic respiratory failure  Paroxysmal atrial fibrillation: with watchman's device  Chronic diastolic CHF  Hyperlipidemia  Hypertension  Tobacco abuse  GERD  Acute exacerbation of COPD  History of loop implant (battery no longer working)   Hypokalemia: Resolved  Hyperkalemia potassium has been stopped, continue to monitor.    Plan:  Blood pressure and heart rate are stable.  Her echo revealed EF 61.9% with a normal LV function.  No ACE or ARB due to hypotension.  She is in sinus rhythm on the monitor without any events noted overnight.  Agree with discontinuing Bumex, aldactone, and K+. Continue aspirin, Plavix, Rythmol, and Crestor.  Consider ischemic work-up in the outpatient setting.       Ailyn Solorio MD  )1/18/2022       EMR Dragon/Transcription:   \"Dictated utilizing Dragon " "dictation\".     "

## 2022-01-19 LAB
ALBUMIN SERPL-MCNC: 2.8 G/DL (ref 3.5–5.2)
ANION GAP SERPL CALCULATED.3IONS-SCNC: 5.9 MMOL/L (ref 5–15)
BASOPHILS # BLD AUTO: 0.02 10*3/MM3 (ref 0–0.2)
BASOPHILS NFR BLD AUTO: 0.1 % (ref 0–1.5)
BUN SERPL-MCNC: 28 MG/DL (ref 8–23)
BUN/CREAT SERPL: 30.8 (ref 7–25)
CALCIUM SPEC-SCNC: 8.6 MG/DL (ref 8.6–10.5)
CHLORIDE SERPL-SCNC: 104 MMOL/L (ref 98–107)
CO2 SERPL-SCNC: 27.1 MMOL/L (ref 22–29)
CREAT SERPL-MCNC: 0.91 MG/DL (ref 0.57–1)
DEPRECATED RDW RBC AUTO: 41.4 FL (ref 37–54)
EOSINOPHIL # BLD AUTO: 0.11 10*3/MM3 (ref 0–0.4)
EOSINOPHIL NFR BLD AUTO: 0.8 % (ref 0.3–6.2)
ERYTHROCYTE [DISTWIDTH] IN BLOOD BY AUTOMATED COUNT: 12.3 % (ref 12.3–15.4)
GFR SERPL CREATININE-BSD FRML MDRD: 60 ML/MIN/1.73
GLUCOSE SERPL-MCNC: 87 MG/DL (ref 65–99)
HCT VFR BLD AUTO: 31.1 % (ref 34–46.6)
HGB BLD-MCNC: 10.4 G/DL (ref 12–15.9)
IMM GRANULOCYTES # BLD AUTO: 0.14 10*3/MM3 (ref 0–0.05)
IMM GRANULOCYTES NFR BLD AUTO: 1 % (ref 0–0.5)
LYMPHOCYTES # BLD AUTO: 1.82 10*3/MM3 (ref 0.7–3.1)
LYMPHOCYTES NFR BLD AUTO: 12.5 % (ref 19.6–45.3)
MCH RBC QN AUTO: 31 PG (ref 26.6–33)
MCHC RBC AUTO-ENTMCNC: 33.4 G/DL (ref 31.5–35.7)
MCV RBC AUTO: 92.6 FL (ref 79–97)
MONOCYTES # BLD AUTO: 1.69 10*3/MM3 (ref 0.1–0.9)
MONOCYTES NFR BLD AUTO: 11.6 % (ref 5–12)
NEUTROPHILS NFR BLD AUTO: 10.75 10*3/MM3 (ref 1.7–7)
NEUTROPHILS NFR BLD AUTO: 74 % (ref 42.7–76)
NRBC BLD AUTO-RTO: 0 /100 WBC (ref 0–0.2)
PHOSPHATE SERPL-MCNC: 2.9 MG/DL (ref 2.5–4.5)
PLATELET # BLD AUTO: 172 10*3/MM3 (ref 140–450)
PMV BLD AUTO: 12.5 FL (ref 6–12)
POTASSIUM SERPL-SCNC: 4.4 MMOL/L (ref 3.5–5.2)
RBC # BLD AUTO: 3.36 10*6/MM3 (ref 3.77–5.28)
SODIUM SERPL-SCNC: 137 MMOL/L (ref 136–145)
WBC NRBC COR # BLD: 14.53 10*3/MM3 (ref 3.4–10.8)

## 2022-01-19 PROCEDURE — 63710000001 PREDNISONE PER 5 MG: Performed by: HOSPITALIST

## 2022-01-19 PROCEDURE — 85025 COMPLETE CBC W/AUTO DIFF WBC: CPT | Performed by: HOSPITALIST

## 2022-01-19 PROCEDURE — 97110 THERAPEUTIC EXERCISES: CPT | Performed by: OCCUPATIONAL THERAPIST

## 2022-01-19 PROCEDURE — 25010000002 DIGOXIN PER 500 MCG

## 2022-01-19 PROCEDURE — 97110 THERAPEUTIC EXERCISES: CPT

## 2022-01-19 PROCEDURE — 80069 RENAL FUNCTION PANEL: CPT | Performed by: INTERNAL MEDICINE

## 2022-01-19 PROCEDURE — 94799 UNLISTED PULMONARY SVC/PX: CPT

## 2022-01-19 PROCEDURE — 99232 SBSQ HOSP IP/OBS MODERATE 35: CPT

## 2022-01-19 RX ORDER — PANTOPRAZOLE SODIUM 40 MG/1
40 TABLET, DELAYED RELEASE ORAL
Status: DISCONTINUED | OUTPATIENT
Start: 2022-01-19 | End: 2022-01-21 | Stop reason: HOSPADM

## 2022-01-19 RX ORDER — ALUMINA, MAGNESIA, AND SIMETHICONE 2400; 2400; 240 MG/30ML; MG/30ML; MG/30ML
15 SUSPENSION ORAL EVERY 6 HOURS PRN
Status: DISCONTINUED | OUTPATIENT
Start: 2022-01-19 | End: 2022-01-21

## 2022-01-19 RX ORDER — SUCRALFATE 1 G/1
1 TABLET ORAL
Status: DISCONTINUED | OUTPATIENT
Start: 2022-01-19 | End: 2022-01-21 | Stop reason: HOSPADM

## 2022-01-19 RX ORDER — PANTOPRAZOLE SODIUM 40 MG/1
40 TABLET, DELAYED RELEASE ORAL
Status: DISCONTINUED | OUTPATIENT
Start: 2022-01-20 | End: 2022-01-19

## 2022-01-19 RX ADMIN — MIRTAZAPINE 7.5 MG: 15 TABLET, FILM COATED ORAL at 20:36

## 2022-01-19 RX ADMIN — URSODIOL 300 MG: 300 CAPSULE ORAL at 20:36

## 2022-01-19 RX ADMIN — URSODIOL 300 MG: 300 CAPSULE ORAL at 09:04

## 2022-01-19 RX ADMIN — BUDESONIDE AND FORMOTEROL FUMARATE DIHYDRATE 2 PUFF: 160; 4.5 AEROSOL RESPIRATORY (INHALATION) at 09:01

## 2022-01-19 RX ADMIN — URSODIOL 300 MG: 300 CAPSULE ORAL at 15:02

## 2022-01-19 RX ADMIN — FAMOTIDINE 20 MG: 20 TABLET, FILM COATED ORAL at 09:04

## 2022-01-19 RX ADMIN — PANTOPRAZOLE SODIUM 40 MG: 40 TABLET, DELAYED RELEASE ORAL at 16:45

## 2022-01-19 RX ADMIN — PROPAFENONE HYDROCHLORIDE 150 MG: 150 TABLET, COATED ORAL at 05:48

## 2022-01-19 RX ADMIN — BUDESONIDE AND FORMOTEROL FUMARATE DIHYDRATE 2 PUFF: 160; 4.5 AEROSOL RESPIRATORY (INHALATION) at 20:16

## 2022-01-19 RX ADMIN — IPRATROPIUM BROMIDE AND ALBUTEROL SULFATE 3 ML: .5; 3 SOLUTION RESPIRATORY (INHALATION) at 12:02

## 2022-01-19 RX ADMIN — PROPAFENONE HYDROCHLORIDE 150 MG: 150 TABLET, COATED ORAL at 15:00

## 2022-01-19 RX ADMIN — CLOPIDOGREL 75 MG: 75 TABLET, FILM COATED ORAL at 09:04

## 2022-01-19 RX ADMIN — GUAIFENESIN 600 MG: 600 TABLET, EXTENDED RELEASE ORAL at 09:04

## 2022-01-19 RX ADMIN — ASPIRIN 81 MG: 81 TABLET, COATED ORAL at 09:04

## 2022-01-19 RX ADMIN — SODIUM CHLORIDE 75 ML/HR: 9 INJECTION, SOLUTION INTRAVENOUS at 03:11

## 2022-01-19 RX ADMIN — DIGOXIN 125 MCG: 0.25 INJECTION INTRAMUSCULAR; INTRAVENOUS at 12:09

## 2022-01-19 RX ADMIN — PREDNISONE 10 MG: 10 TABLET ORAL at 09:04

## 2022-01-19 RX ADMIN — ROSUVASTATIN CALCIUM 5 MG: 5 TABLET, FILM COATED ORAL at 09:04

## 2022-01-19 RX ADMIN — SUCRALFATE 1 G: 1 TABLET ORAL at 20:36

## 2022-01-19 RX ADMIN — IPRATROPIUM BROMIDE AND ALBUTEROL SULFATE 3 ML: .5; 3 SOLUTION RESPIRATORY (INHALATION) at 15:37

## 2022-01-19 NOTE — PLAN OF CARE
Goal Outcome Evaluation:         Pt. Alert, oriented x4, no voiced c/o pain, Pt. With infusion of 0.9% sodium chloride @75ml/hr per IV access of right arm, Pt. Seems to be weakness, encouraged to eat snacks some, Pt. Not much eating, v/s stable, no short of air noted, 02 sats 95% with o2 inhal. @3l/m per n/c, no s/s acute distress noted until this time

## 2022-01-19 NOTE — PROGRESS NOTES
" LOS: 6 days   Patient Care Team:  Slick Gomez MD as PCP - General (Internal Medicine)    Chief Complaint: FALLON    Subjective     History of Present Illness  This is a 77 y.o. year old female with no prior known history of chronic kidney disease, creatinine has been fairly stable for last several years and baseline creatinine appears to be around 0.8-0.9 which is the level she was at at the time of admission on 1/13.  Medical history is significant for chronic hypoxemic respiratory failure, COPD A. fib CHF and hypertension, continues to smoke and came to the ER with increasing shortness of breath, productive cough but no chest pain fevers chills.  She denied any abdominal pain nausea vomiting or diarrhea states good oral intake.  Not on NSAIDs at home has been treated for COPD and CHF.  Had been on oral Bumex along with oral antibiotics.  Potassium was low earlier this admission and she had received scheduled oral and a one-time dose of IV potassium.  Currently on oral prednisone and was on Aldactone 50 mg daily.  Labs yesterday showed new FALLON with hyperkalemia.  Potassium, spironolactone and Bumex were held and she is currently on IV fluids.  She states improvement in her symptoms since admission, just some residual dyspnea, O2 sats acceptable on 1 L.  No recent IV contrast or hypotension noted     Subjective  Patient feeling ok today. Still unable to eat. Reports having bad GERD. Would like to have mylanta. States pepcid does not help     History taken from: patient chart    Objective     Vital Sign Min/Max for last 24 hours  Temp  Min: 97.4 °F (36.3 °C)  Max: 98.6 °F (37 °C)   BP  Min: 86/42  Max: 107/51   Pulse  Min: 61  Max: 89   Resp  Min: 16  Max: 20   SpO2  Min: 94 %  Max: 100 %   Flow (L/min)  Min: 1  Max: 1   Weight  Min: 32.9 kg (72 lb 8.5 oz)  Max: 32.9 kg (72 lb 8.5 oz)     Flowsheet Rows      First Filed Value   Admission Height 152.4 cm (60\") Documented at 01/13/2022 1221   Admission Weight 36.3 " kg (80 lb) Documented at 01/13/2022 1221          I/O this shift:  In: 120 [P.O.:120]  Out: -   I/O last 3 completed shifts:  In: 1100 [P.O.:120; I.V.:980]  Out: 750 [Urine:750]    Objective  Physical Examination:   General appearance - alert, NAD, chronically ill. Thin/malnurished  Mental status - alert, oriented to person, place, and time  Eyes - pupils equal and reactive, extraocular eye movements intact  Mouth - mucous membranes moist, pharynx normal without lesions  Chest - clear to auscultation, no wheezes, rales or rhonchi, symmetric air entry  Heart - normal rate, regular rhythm, normal S1, S2, no murmurs, rubs, clicks or gallops  Abdomen - soft, nontender, nondistended, no masses or organomegaly  Neurological - alert, oriented, normal speech, no focal findings or movement disorder noted  Extremities - peripheral pulses normal, no pedal edema, no clubbing or cyanosis  Skin - normal coloration and turgor, no rashes, no suspicious skin lesions noted  Results Review:     I reviewed the patient's new clinical results.    WBC WBC   Date Value Ref Range Status   01/19/2022 14.53 (H) 3.40 - 10.80 10*3/mm3 Final   01/18/2022 20.57 (H) 3.40 - 10.80 10*3/mm3 Final   01/17/2022 20.36 (H) 3.40 - 10.80 10*3/mm3 Final      HGB Hemoglobin   Date Value Ref Range Status   01/19/2022 10.4 (L) 12.0 - 15.9 g/dL Final   01/18/2022 12.2 12.0 - 15.9 g/dL Final   01/17/2022 12.9 12.0 - 15.9 g/dL Final      HCT Hematocrit   Date Value Ref Range Status   01/19/2022 31.1 (L) 34.0 - 46.6 % Final   01/18/2022 36.5 34.0 - 46.6 % Final   01/17/2022 38.2 34.0 - 46.6 % Final      Platlets No results found for: LABPLAT   MCV MCV   Date Value Ref Range Status   01/19/2022 92.6 79.0 - 97.0 fL Final   01/18/2022 89.5 79.0 - 97.0 fL Final   01/17/2022 90.3 79.0 - 97.0 fL Final          Sodium Sodium   Date Value Ref Range Status   01/19/2022 137 136 - 145 mmol/L Final   01/18/2022 139 136 - 145 mmol/L Final   01/17/2022 137 136 - 145 mmol/L  Final      Potassium Potassium   Date Value Ref Range Status   01/19/2022 4.4 3.5 - 5.2 mmol/L Final   01/18/2022 4.5 3.5 - 5.2 mmol/L Final   01/17/2022 5.5 (H) 3.5 - 5.2 mmol/L Final     Comment:     Slight hemolysis detected by analyzer. Results may be affected.      Chloride Chloride   Date Value Ref Range Status   01/19/2022 104 98 - 107 mmol/L Final   01/18/2022 97 (L) 98 - 107 mmol/L Final   01/17/2022 94 (L) 98 - 107 mmol/L Final      CO2 CO2   Date Value Ref Range Status   01/19/2022 27.1 22.0 - 29.0 mmol/L Final   01/18/2022 29.8 (H) 22.0 - 29.0 mmol/L Final   01/17/2022 29.9 (H) 22.0 - 29.0 mmol/L Final      BUN BUN   Date Value Ref Range Status   01/19/2022 28 (H) 8 - 23 mg/dL Final   01/18/2022 53 (H) 8 - 23 mg/dL Final   01/17/2022 54 (H) 8 - 23 mg/dL Final      Creatinine Creatinine   Date Value Ref Range Status   01/19/2022 0.91 0.57 - 1.00 mg/dL Final   01/18/2022 1.30 (H) 0.57 - 1.00 mg/dL Final   01/17/2022 1.37 (H) 0.57 - 1.00 mg/dL Final      Calcium Calcium   Date Value Ref Range Status   01/19/2022 8.6 8.6 - 10.5 mg/dL Final   01/18/2022 9.2 8.6 - 10.5 mg/dL Final   01/17/2022 9.4 8.6 - 10.5 mg/dL Final      PO4 No results found for: CAPO4   Albumin Albumin   Date Value Ref Range Status   01/19/2022 2.80 (L) 3.50 - 5.20 g/dL Final      Magnesium Magnesium   Date Value Ref Range Status   01/18/2022 2.2 1.6 - 2.4 mg/dL Final      Uric Acid No results found for: URICACID     Medication Review:     Current Facility-Administered Medications:   •  aluminum-magnesium hydroxide-simethicone (MAALOX MAX) 400-400-40 MG/5ML suspension 15 mL, 15 mL, Oral, Q6H PRN, Vikki Hobbs MD  •  aspirin EC tablet 81 mg, 81 mg, Oral, Daily, Eulogio Mcdaniel MD, 81 mg at 01/19/22 0904  •  budesonide-formoterol (SYMBICORT) 160-4.5 MCG/ACT inhaler 2 puff, 2 puff, Inhalation, BID - RT, Eulogio Mcdaniel MD, 2 puff at 01/19/22 0901  •  clopidogrel (PLAVIX) tablet 75 mg, 75 mg, Oral, Daily, Eulogio Mcdaniel MD, 75 mg at 01/19/22  0904  •  digoxin (LANOXIN) injection 125 mcg, 125 mcg, Intravenous, Daily, Darlene Shen APRN, 125 mcg at 01/19/22 1209  •  famotidine (PEPCID) tablet 20 mg, 20 mg, Oral, BID, Eulogio Mcdaniel MD, 20 mg at 01/19/22 0904  •  guaiFENesin (MUCINEX) 12 hr tablet 600 mg, 600 mg, Oral, Q12H, Eulogio Mcdnaiel MD, 600 mg at 01/19/22 0904  •  ipratropium-albuterol (DUO-NEB) nebulizer solution 3 mL, 3 mL, Nebulization, 4x Daily - RT, Eulogio Mcdaniel MD, 3 mL at 01/19/22 1537  •  LORazepam (ATIVAN) injection 0.5 mg, 0.5 mg, Intravenous, Q6H PRN, Eulogio Mcdaniel MD, 0.5 mg at 01/17/22 0538  •  mirtazapine (REMERON) tablet 7.5 mg, 7.5 mg, Oral, Nightly, Fabian Lagos III, MD, 7.5 mg at 01/18/22 2046  •  ondansetron (ZOFRAN) injection 4 mg, 4 mg, Intravenous, Q4H PRN, Eulogio Mdcaniel MD, 4 mg at 01/17/22 1016  •  predniSONE (DELTASONE) tablet 10 mg, 10 mg, Oral, Daily With Breakfast, Eulogio Mcdaniel MD, 10 mg at 01/19/22 0904  •  propafenone (RYTHMOL) tablet 150 mg, 150 mg, Oral, Q8H, Eulogio Mcdaniel MD, 150 mg at 01/19/22 1500  •  rosuvastatin (CRESTOR) tablet 5 mg, 5 mg, Oral, Daily, Eulogio Mcdaniel MD, 5 mg at 01/19/22 0904  •  sodium chloride 0.9 % flush 10 mL, 10 mL, Intravenous, PRN, Binu Moreno MD  •  [COMPLETED] Insert peripheral IV, , , Once **AND** sodium chloride 0.9 % flush 10 mL, 10 mL, Intravenous, PRN, Vicki Alejandre APRN, 10 mL at 01/18/22 0821  •  sodium chloride 0.9 % infusion, 75 mL/hr, Intravenous, Continuous, Eulogio Mcdaniel MD, Last Rate: 75 mL/hr at 01/19/22 0311, 75 mL/hr at 01/19/22 0311  •  sodium chloride nasal spray 1 spray, 1 spray, Each Nare, PRN, Eulogio Mcdaniel MD, 1 spray at 01/15/22 1835  •  ursodiol (ACTIGALL) capsule 300 mg, 300 mg, Oral, TID, Eulogio Mcdaniel MD, 300 mg at 01/19/22 1502    Assessment/Plan       COPD exacerbation (HCC)    Severe malnutrition (CMS/HCC)    Leukocytosis      Assessment & Plan  1. FALLON  2. CHF  3. Hyperkalemia   4. COPD exacerbation   5. Malnutrition   6.  CKD2  7. Leukocytosis     Renal function seems to be back to baseline   K at goal   Her renal function is likely worse than it seems given her poor muscle mass  Start maalox PRN  DC IVF  Check labs in am   Will follow.     Vikki Hobbs MD  01/19/22  12:02 EST

## 2022-01-19 NOTE — PLAN OF CARE
Goal Outcome Evaluation:  Plan of Care Reviewed With: patient           Outcome Summary: Pt is AOx4. 2L chronic 02 NC.Pulmonolgy s/o. Stefanie wants to perform ischemic work up outpatient. Pt has no c/o pain or s/s of distress. Will CTM.

## 2022-01-19 NOTE — PLAN OF CARE
Goal Outcome Evaluation:  Plan of Care Reviewed With: patient        Progress: improving  Outcome Summary: pt has shown good overall progress with mobility and is now walking with supervision with rwx 100 ft, pt is appropriate to return home when ready for discharge, she does fatigue easily but less short of breath with activity this date    Patient was intermittently wearing a face mask during this therapy encounter. Therapist used appropriate personal protective equipment including eye protection, mask, and gloves.  Mask used was standard procedure mask. Appropriate PPE was worn during the entire therapy session. Hand hygiene was completed before and after therapy session. Patient is not in enhanced droplet precautions.

## 2022-01-19 NOTE — THERAPY TREATMENT NOTE
Patient Name: Celia Baird  : 1945    MRN: [id number]                              Today's Date: 2022       Admit Date: 2022    Visit Dx:     ICD-10-CM ICD-9-CM   1. COPD exacerbation (HCC)  J44.1 491.21   2. Shortness of breath  R06.02 786.05   3. Hypokalemia  E87.6 276.8     Patient Active Problem List   Diagnosis   • COPD exacerbation (HCC)   • Subarachnoid hemorrhage (HCC)   • Multiple skin tears   • Closed nondisplaced fracture of left clavicle   • Paroxysmal atrial fibrillation (HCC)   • Tobacco abuse   • Status post placement of implantable loop recorder   • SOB (shortness of breath)   • COPD with acute exacerbation (HCC)   • Severe malnutrition (CMS/HCC)   • Leukocytosis     Past Medical History:   Diagnosis Date   • Atrial fibrillation (HCC)    • COPD (chronic obstructive pulmonary disease) (HCC)    • History of frequent urinary tract infections    • Irregular heart beat    • Kidney stones    • PBC (primary biliary cirrhosis)    • PBC (primary biliary cirrhosis)      Past Surgical History:   Procedure Laterality Date   • CARDIAC ELECTROPHYSIOLOGY PROCEDURE N/A 3/30/2017    Procedure:    LINQ;  Surgeon: Ailyn Solorio MD;  Location: [facility] INVASIVE LOCATION;  Service:    • CHOLECYSTECTOMY     • TUBAL ABDOMINAL LIGATION        General Information     Row Name 22 1434          Physical Therapy Time and Intention    Document Type therapy note (daily note)  -PC     Row Name 22 1434          General Information    Existing Precautions/Restrictions fall  -PC     Row Name 22 1434          Cognition    Orientation Status (Cognition) oriented x 4  -PC           User Key  (r) = Recorded By, (t) = Taken By, (c) = Cosigned By    Initials Name Provider Type    PC Jahaira Waller PT Physical Therapist               Mobility     Row Name 22 1434          Bed Mobility    Supine-Sit [place] (Bed Mobility) standby assist  -PC     Sit-Supine [place] (Bed  Mobility) standby assist  -PC     Assistive Device (Bed Mobility) bed rails; head of bed elevated  -PC     Row Name 01/19/22 1434          Sit-Stand Transfer    Sit-Stand [place] (Transfers) supervision  -PC     Assistive Device (Sit-Stand Transfers) walker, front-wheeled  -PC     Row Name 01/19/22 1434          Gait/Stairs (Locomotion)    [place] Level (Gait) supervision  -PC     Assistive Device (Gait) walker, front-wheeled  -PC     Distance in Feet (Gait) 100 ft  -PC     Deviations/Abnormal Patterns (Gait) jass decreased; gait speed decreased; stride length decreased  -PC     Bilateral Gait Deviations forward flexed posture  -PC           User Key  (r) = Recorded By, (t) = Taken By, (c) = Cosigned By    Initials Name Provider Type    Jahaira Cadena, PT Physical Therapist               Obj/Interventions    No documentation.                Goals/Plan    No documentation.                Clinical Impression     Row Name 01/19/22 1435          Pain Scale: Numbers Pre/Post-Treatment    Pretreatment Pain Rating 0/10 - no pain  -PC     Row Name 01/19/22 1436          Plan of Care Review    Plan of Care Reviewed With patient  -PC     Progress improving  -PC     Outcome Summary pt has shown good overall progress with mobility and is now walking with supervision with rwx 100 ft, pt is appropriate to return home when ready for discharge, she does fatigue easily but less short of breath with activity this date  -     Row Name 01/19/22 1435          Vital Signs    Pre SpO2 (%) 99  -PC     O2 Delivery Pre Treatment supplemental O2  -PC     Intra SpO2 (%) 93  -PC     O2 Delivery Intra Treatment room air  -PC     Post SpO2 (%) 96  -PC     O2 Delivery Post Treatment supplemental O2  -PC     Row Name 01/19/22 1436          Positioning and Restraints    Pre-Treatment Position in bed  -PC     Post Treatment Position bed  -PC     In Bed supine; call light within reach; encouraged to call for assist; exit alarm on   -PC           User Key  (r) = Recorded By, (t) = Taken By, (c) = Cosigned By    Initials Name Provider Type    PC Jahaira Waller, PT Physical Therapist               Outcome Measures     Row Name 01/19/22 0902          How much help from another person do you currently need...    Turning from your back to your side while in flat bed without using bedrails? 4  -PM     Moving from lying on back to sitting on the side of a flat bed without bedrails? 4  -PM     Moving to and from a bed to a chair (including a wheelchair)? 4  -PM     Standing up from a chair using your arms (e.g., wheelchair, bedside chair)? 4  -PM     Climbing 3-5 steps with a railing? 3  -PM     To walk in hospital room? 3  -PM     AM-PAC 6 Clicks Score (PT) 22  -PM           User Key  (r) = Recorded By, (t) = Taken By, (c) = Cosigned By    Initials Name Provider Type    PM Jonathon Segovia, RN Registered Nurse                             Physical Therapy Education                 Title: PT OT SLP Therapies (Done)     Topic: Physical Therapy (Done)     Point: Mobility training (Done)     Learning Progress Summary           Patient Acceptance, D,E, DU by PC at 1/19/2022 1439    Acceptance, E,D, DU by PC at 1/18/2022 0941    Acceptance, E, VU by AA at 1/17/2022 1703    Acceptance, E, VU by CW at 1/17/2022 1040    Acceptance, E,TB, VU by CS at 1/15/2022 1319    Acceptance, E, VU by SR at 1/15/2022 1203                   Point: Home exercise program (Done)     Learning Progress Summary           Patient Acceptance, E,D, DU by PC at 1/18/2022 0941    Acceptance, E, VU by AA at 1/17/2022 1703    Acceptance, E, VU by CW at 1/17/2022 1040    Acceptance, E,TB, VU by CS at 1/15/2022 1319    Acceptance, E, VU by SR at 1/15/2022 1203                   Point: Body mechanics (Done)     Learning Progress Summary           Patient Acceptance, D,E, DU by PC at 1/19/2022 1439    Acceptance, E,D, DU by PC at 1/18/2022 0941    Acceptance, E, VU by AA at 1/17/2022 4786     Acceptance, E, VU by CW at 1/17/2022 1040    Acceptance, E,TB, VU by CS at 1/15/2022 1319    Acceptance, E, VU by SR at 1/15/2022 1203                   Point: Precautions (Done)     Learning Progress Summary           Patient Acceptance, D,E, DU by PC at 1/19/2022 1439    Acceptance, E,D, DU by PC at 1/18/2022 0941    Acceptance, E, VU by AA at 1/17/2022 1703    Acceptance, E, VU by CW at 1/17/2022 1040    Acceptance, E,TB, VU by CS at 1/15/2022 1319    Acceptance, E, VU by SR at 1/15/2022 1203                               User Key     Initials Effective Dates Name Provider Type Discipline    CW 06/16/21 -  Ileana Martin OTR Occupational Therapist OT    PC 06/16/21 -  Jahaira Waller, PT Physical Therapist PT    AA 06/16/21 -  Faye Graves, RN Registered Nurse Nurse    CS 02/02/21 -  Ileana Hernandez, RN Registered Nurse Nurse    SR 02/08/21 -  Jami Puente Physical Therapist PT              PT Recommendation and Plan     Plan of Care Reviewed With: patient  Progress: improving  Outcome Summary: pt has shown good overall progress with mobility and is now walking with supervision with rwx 100 ft, pt is appropriate to return home when ready for discharge, she does fatigue easily but less short of breath with activity this date     Time Calculation:    PT Charges     Row Name 01/19/22 1439             Time Calculation    Start Time 1315  -PC      Stop Time 1327  -PC      Time Calculation (min) 12 min  -PC      PT Received On 01/19/22  -PC      PT - Next Appointment 01/20/22  -PC            User Key  (r) = Recorded By, (t) = Taken By, (c) = Cosigned By    Initials Name Provider Type    PC Jahaira Waller, PT Physical Therapist              Therapy Charges for Today     Code Description Service Date Service Provider Modifiers Qty    86610652930 HC PT THER PROC EA 15 MIN 1/18/2022 Jahaira Waller G, PT GP 1    04130025680 HC PT THER PROC EA 15 MIN 1/19/2022 Jahaira Waller G, PT GP 1          PT G-Codes  Outcome  Measure Options: AM-PAC 6 Clicks Daily Activity (OT), Modified [place]  AM-PAC 6 Clicks Score (PT): 22  AM-PAC 6 Clicks Score (OT): 19  Modified [place] Scale: 4 - Moderately severe disability.  Unable to walk without assistance, and unable to attend to own bodily needs without assistance.    Jahaira Waller, PT  1/19/2022

## 2022-01-19 NOTE — CASE MANAGEMENT/SOCIAL WORK
Continued Stay Note  [facility]     Patient Name: Celia Baird  MRN: [id number]  Today's Date: 1/19/2022    Admit Date: 1/13/2022     Discharge Plan     Row Name 01/19/22 1338       Plan    Plan Plan home with [facility] with Nursing and PT   MELISSA Williamson, RN    Patient/Family in Agreement with Plan yes    Plan Comments My  ( [phone]) called regarding bed availability at [facility].   Per My pt's 6 click is 20.  My does not think [place] would approve for SNF.    Discussed HH with pt for therapy in the home. Pt's choice for HH is [facility].  [facility] ([phone]) called to follow.  Plan home with [facility].  MELISSA Williamson, RN               Discharge Codes    No documentation.               Expected Discharge Date and Time     Expected Discharge Date Expected Discharge Time    Jan 20, 2022             Meena Williamson, RN

## 2022-01-19 NOTE — PLAN OF CARE
Goal Outcome Evaluation:  Plan of Care Reviewed With: patient        Progress: improving  Outcome Summary: pt completed bed mobility w SBA, sat EOB SBA, completed AROM 10x2 w rest breaks sitting EOB SBA. pt progressing in therapy but fatigues easily. cont OT to incr ADL, strength, balance and tsf.  OT wore all PPE, washed hands before/after. Pt not wearing a mask.

## 2022-01-19 NOTE — CONSULTS
Patient was resting but acknowledged the visit and said she would request a [facility] consult if she wanted to talk more. She was appreciative of the visit.

## 2022-01-19 NOTE — PROGRESS NOTES
The patient is in brighter spirits today and reports that her nausea has resolved with initiation of mirtazapine.  I explained to the patient that it could be 3-1/2 to 4 weeks before she feels much in the way of therapeutic benefit from this medication.  I will sign off.

## 2022-01-19 NOTE — PROGRESS NOTES
[facility]  Cardiology Progress Note    Patient Identification:  Name: Celia Baird  Age: 77 y.o.  Sex: female  :  1945  MRN: [id number]                 Follow Up / Chief Complaint: follow up for atrial fibrillation    Interval History: BP soft, heart rate stable.  Bigeminy PVCs intermittently noted on the monitor.       Subjective: No chest pain, shortness of breath is improved.    Objective:    Past Medical History:  Past Medical History:   Diagnosis Date   • Atrial fibrillation (HCC)    • COPD (chronic obstructive pulmonary disease) (HCC)    • History of frequent urinary tract infections    • Irregular heart beat    • Kidney stones    • PBC (primary biliary cirrhosis)    • PBC (primary biliary cirrhosis)      Past Surgical History:  Past Surgical History:   Procedure Laterality Date   • CARDIAC ELECTROPHYSIOLOGY PROCEDURE N/A 3/30/2017    Procedure:    LINQ;  Surgeon: Ailyn Solorio MD;  Location: [facility] INVASIVE LOCATION;  Service:    • CHOLECYSTECTOMY     • TUBAL ABDOMINAL LIGATION          Social History:   Social History     Tobacco Use   • Smoking status: Current Some Day Smoker     Packs/day: 0.50     Years: 59.00     Pack years: 29.50     Types: Cigarettes   • Smokeless tobacco: Never Used   Substance Use Topics   • Alcohol use: No      Family History:  Family History   Problem Relation Age of Onset   • Heart disease Father    • Heart attack Father    • Heart disease Brother    • Stroke Mother           Allergies:  Allergies   Allergen Reactions   • Morphine And Related Nausea And Vomiting   • Levaquin [Levofloxacin]    • Sulfa Antibiotics      Scheduled Meds:  aspirin, 81 mg, Daily  budesonide-formoterol, 2 puff, BID - RT  clopidogrel, 75 mg, Daily  digoxin, 125 mcg, Daily  famotidine, 20 mg, BID  guaiFENesin, 600 mg, Q12H  ipratropium-albuterol, 3 mL, 4x Daily - RT  mirtazapine, 7.5 mg, Nightly  predniSONE, 10 mg, Daily With Breakfast  propafenone, 150 mg,  "Q8H  rosuvastatin, 5 mg, Daily  ursodiol, 300 mg, TID            INTAKE AND OUTPUT:    Intake/Output Summary (Last 24 hours) at 1/19/2022 1535  Last data filed at 1/19/2022 1456  Gross per 24 hour   Intake 240 ml   Output 650 ml   Net -410 ml     ROS  Constitutional: Awake and alert, no fever.  No nosebleeds  Abdomen           no abdominal pain   Cardiac              no chest pain  Pulmonary          no shortness of breath      /53 (BP Location: Right arm, Patient Position: Lying)   Pulse 76   Temp 98.6 °F (37 °C) (Oral)   Resp 18   Ht 152.4 cm (60\")   Wt 32.9 kg (72 lb 8.5 oz)   SpO2 99%   BMI 14.17 kg/m²     Physical Exam:  General:  Appears frail, chronically ill in no acute distress  HEENT:  No JVD. Thyroid not visibly enlarged. No mucosal pallor or cyanosis  Respiratory: Respirations regular and unlabored at rest. BBS with good air entry in all fields. No crackles, rubs or wheezes auscultated  Cardiovascular: S1S2 Regular rate and rhythm. No murmur, rub or gallop. No carotid bruits. DP/PT pulses 2+   . No pretibial pitting edema  Gastrointestinal: Abdomen soft, flat, non tender. Bowel sounds present. No hepatosplenomegaly. No ascites  Musculoskeletal: MCCRACKEN x4. No abnormal movements  Extremities: No digital clubbing or cyanosis  Skin: Color pink. Skin warm and dry to touch. No rashes    Neuro: AAO x3 CN II-XII grossly intact        Cardiographics  Telemetry: SR           SR      ECG:         Echocardiogram:   1/14/22  Interpretation Summary    · Calculated left ventricular EF = 61.9% Estimated left ventricular EF was in agreement with the calculated left ventricular EF.  · Left ventricular diastolic function was normal.  · There is no evidence of pericardial effusion. .  Interpretation Summary       · Findings consistent with a normal ECG stress test.  · Left ventricular ejection fraction is normal (Calculated EF = 70%).  · Myocardial perfusion imaging indicates a normal myocardial perfusion study " with no evidence of ischemia.  · Impressions are consistent with a low risk study.     Asymptomatic for chest pain. ECG is negative for ischemia.   Ectopy: Rare PAC at baseline, none with exercise, occasional PVC in recovery  HR and BP response : Appropriate for Beta-blocker therapy  Pharmacologic study due to inability to tolerate increasing speed and grade of treadmill due to Pulmonary, mobility  Issues and Beta-blocker therapy.  Participated in Low Level exercise and tolerance is poor.         Imaging  Chest X-ray:   IMPRESSION:  No focal pulmonary consolidation. Follow-up as clinical  indications persist.     This report was finalized on 1/13/2022 12:27 PM by Dr. Andrew Abdi M.D.  Lab Review   Results from last 7 days   Lab Units 01/14/22  0845 01/13/22  1219   TROPONIN T ng/mL <0.010 0.013     Results from last 7 days   Lab Units 01/18/22  0842   MAGNESIUM mg/dL 2.2     Results from last 7 days   Lab Units 01/19/22  0617   SODIUM mmol/L 137   POTASSIUM mmol/L 4.4   BUN mg/dL 28*   CREATININE mg/dL 0.91   CALCIUM mg/dL 8.6        Results from last 7 days   Lab Units 01/19/22  0617 01/18/22  0842 01/17/22  0835   WBC 10*3/mm3 14.53* 20.57* 20.36*   HEMOGLOBIN g/dL 10.4* 12.2 12.9   HEMATOCRIT % 31.1* 36.5 38.2   PLATELETS 10*3/mm3 172 212 234           The following medical decision was discussed in detail with Dr. Solorio    Assessment:  Acute on chronic hypoxic respiratory failure  Paroxysmal atrial fibrillation: with watchman's device  Chronic diastolic CHF  Hyperlipidemia  Hypertension  Tobacco abuse  GERD  Acute exacerbation of COPD  History of loop implant (battery no longer working)   Hypokalemia: Resolved  Hyperkalemia potassium has been stopped, continue to monitor.    Plan:    BP soft unable to add ACE or ARB.  Sinus rhythm, monitor reviewed PVCs noted.  Echo EF 61.9% normal LV function full report as above.  Continue Plavix, aspirin, Rythmol, rosuvastatin.  We will plan for ischemic  "work-up in the outpatient setting       Darlene Shen, APRN  )1/19/2022       EMR Dragon/Transcription:   \"Dictated utilizing Dragon dictation\".     "

## 2022-01-19 NOTE — PROGRESS NOTES
"Adult Nutrition  Assessment/PES    Patient Name:  Celia Baird  YOB: 1945  MRN: [id number]  Admit Date:  1/13/2022    Assessment Date:  1/19/2022    Comments:  Nutrition follow up.  BP and HR stable.  Psych following.  No longer having abdominal pain.  Not eating much.  25-50% at meals per chart PO data.    Patient reports not able to eat much in here due to medications and reflux.  She is now willing to drink some Boost Plus - adding BID.    RD to continue to follow.     Reason for Assessment     Row Name 01/19/22 1541          Reason for Assessment    Reason For Assessment follow-up protocol                Nutrition/Diet History     Row Name 01/19/22 1541          Nutrition/Diet History    Typical Food/Fluid Intake 25-50% at meals, pt reports not able to eat much here d/t meds, now agreeable to Boost                Anthropometrics     Row Name 01/19/22 1542          Anthropometrics    Height 152.4 cm (60\")            Admit Weight    Admit Weight --  72# 1/19            Ideal Body Weight (IBW)    Ideal Body Weight (IBW) (kg) 45.86            Body Mass Index (BMI)    BMI Assessment BMI less than 16: protein-energy malnutrition grade III  14.13                Labs/Tests/Procedures/Meds     Row Name 01/19/22 1542          Labs/Procedures/Meds    Lab Results Reviewed reviewed, pertinent     Lab Results Comments Gluc, BUN, GFR, Alb, WBC, Hgb, Hct            Diagnostic Tests/Procedures    Diagnostic Test/Procedure Reviewed reviewed, pertinent            Medications    Pertinent Medications Reviewed reviewed, pertinent     Pertinent Medications Comments pepcid, remeron, prednisone crestor, IVFs                Physical Findings     Row Name 01/19/22 1543          Physical Findings    Overall Physical Appearance underweight; on oxygen therapy; generalized wasting; loss of subcutaneous fat; loss of muscle mass     Skin other (see comments)  B=20, bruised                Estimated/Assessed Needs     Row Name " "01/19/22 1542          Calculation Measurements    Height 152.4 cm (60\")                Nutrition Prescription Ordered     Row Name 01/19/22 1544          Nutrition Prescription PO    Current PO Diet Regular                Evaluation of Received Nutrient/Fluid Intake     Row Name 01/19/22 1544          PO Evaluation    Number of Meals 3     % PO Intake 25-50               Problem/Interventions:     Intervention Goal     Row Name 01/19/22 1545          Intervention Goal    General Maintain nutrition; Disease management/therapy; Meet nutritional needs for age/condition     PO Increase intake; PO intake (%)     PO Intake % 75 %     Weight Appropriate weight gain                Nutrition Intervention     Row Name 01/19/22 1545          Nutrition Intervention    RD/Tech Action Follow Tx progress; Care plan reviewd; Recommend/ordered     Recommended/Ordered Supplement                Nutrition Prescription     Row Name 01/19/22 1545          Nutrition Prescription PO    PO Prescription Begin/change supplement     Supplement Boost Plus     Supplement Frequency 2 times a day     New PO Prescription Ordered? Yes                Education/Evaluation     Row Name 01/19/22 1545          Education    Education Will Instruct as appropriate            Monitor/Evaluation    Monitor Per protocol; PO intake; Supplement intake; Pertinent labs; Weight; Symptoms                 Electronically signed by:  Shantell Au RD  01/19/22 15:46 EST  "

## 2022-01-20 LAB
ALBUMIN SERPL-MCNC: 2.7 G/DL (ref 3.5–5.2)
ANION GAP SERPL CALCULATED.3IONS-SCNC: 3.1 MMOL/L (ref 5–15)
BASOPHILS # BLD AUTO: 0.02 10*3/MM3 (ref 0–0.2)
BASOPHILS NFR BLD AUTO: 0.2 % (ref 0–1.5)
BUN SERPL-MCNC: 19 MG/DL (ref 8–23)
BUN/CREAT SERPL: 28.8 (ref 7–25)
CALCIUM SPEC-SCNC: 8.5 MG/DL (ref 8.6–10.5)
CHLORIDE SERPL-SCNC: 106 MMOL/L (ref 98–107)
CO2 SERPL-SCNC: 29.9 MMOL/L (ref 22–29)
CREAT SERPL-MCNC: 0.66 MG/DL (ref 0.57–1)
DEPRECATED RDW RBC AUTO: 41.3 FL (ref 37–54)
EOSINOPHIL # BLD AUTO: 0.15 10*3/MM3 (ref 0–0.4)
EOSINOPHIL NFR BLD AUTO: 1.2 % (ref 0.3–6.2)
ERYTHROCYTE [DISTWIDTH] IN BLOOD BY AUTOMATED COUNT: 12.3 % (ref 12.3–15.4)
GFR SERPL CREATININE-BSD FRML MDRD: 87 ML/MIN/1.73
GLUCOSE SERPL-MCNC: 83 MG/DL (ref 65–99)
HCT VFR BLD AUTO: 27.9 % (ref 34–46.6)
HGB BLD-MCNC: 9.3 G/DL (ref 12–15.9)
IMM GRANULOCYTES # BLD AUTO: 0.1 10*3/MM3 (ref 0–0.05)
IMM GRANULOCYTES NFR BLD AUTO: 0.8 % (ref 0–0.5)
LYMPHOCYTES # BLD AUTO: 2.06 10*3/MM3 (ref 0.7–3.1)
LYMPHOCYTES NFR BLD AUTO: 16.5 % (ref 19.6–45.3)
MAGNESIUM SERPL-MCNC: 1.8 MG/DL (ref 1.6–2.4)
MCH RBC QN AUTO: 30.7 PG (ref 26.6–33)
MCHC RBC AUTO-ENTMCNC: 33.3 G/DL (ref 31.5–35.7)
MCV RBC AUTO: 92.1 FL (ref 79–97)
MONOCYTES # BLD AUTO: 1.47 10*3/MM3 (ref 0.1–0.9)
MONOCYTES NFR BLD AUTO: 11.8 % (ref 5–12)
NEUTROPHILS NFR BLD AUTO: 69.5 % (ref 42.7–76)
NEUTROPHILS NFR BLD AUTO: 8.7 10*3/MM3 (ref 1.7–7)
NRBC BLD AUTO-RTO: 0 /100 WBC (ref 0–0.2)
PHOSPHATE SERPL-MCNC: 1.9 MG/DL (ref 2.5–4.5)
PHOSPHATE SERPL-MCNC: 2.2 MG/DL (ref 2.5–4.5)
PLATELET # BLD AUTO: 184 10*3/MM3 (ref 140–450)
PMV BLD AUTO: 12.7 FL (ref 6–12)
POTASSIUM SERPL-SCNC: 3.7 MMOL/L (ref 3.5–5.2)
RBC # BLD AUTO: 3.03 10*6/MM3 (ref 3.77–5.28)
SODIUM SERPL-SCNC: 139 MMOL/L (ref 136–145)
WBC NRBC COR # BLD: 12.5 10*3/MM3 (ref 3.4–10.8)

## 2022-01-20 PROCEDURE — 84100 ASSAY OF PHOSPHORUS: CPT | Performed by: HOSPITALIST

## 2022-01-20 PROCEDURE — 94761 N-INVAS EAR/PLS OXIMETRY MLT: CPT

## 2022-01-20 PROCEDURE — 94799 UNLISTED PULMONARY SVC/PX: CPT

## 2022-01-20 PROCEDURE — 83735 ASSAY OF MAGNESIUM: CPT | Performed by: NURSE PRACTITIONER

## 2022-01-20 PROCEDURE — 63710000001 PREDNISONE PER 5 MG: Performed by: HOSPITALIST

## 2022-01-20 PROCEDURE — 80069 RENAL FUNCTION PANEL: CPT | Performed by: INTERNAL MEDICINE

## 2022-01-20 PROCEDURE — 97110 THERAPEUTIC EXERCISES: CPT

## 2022-01-20 PROCEDURE — 25010000002 DIGOXIN PER 500 MCG

## 2022-01-20 PROCEDURE — 85025 COMPLETE CBC W/AUTO DIFF WBC: CPT | Performed by: HOSPITALIST

## 2022-01-20 RX ORDER — PREDNISONE 10 MG/1
5 TABLET ORAL
Status: DISCONTINUED | OUTPATIENT
Start: 2022-01-21 | End: 2022-01-21 | Stop reason: HOSPADM

## 2022-01-20 RX ADMIN — BUDESONIDE AND FORMOTEROL FUMARATE DIHYDRATE 2 PUFF: 160; 4.5 AEROSOL RESPIRATORY (INHALATION) at 19:44

## 2022-01-20 RX ADMIN — IPRATROPIUM BROMIDE AND ALBUTEROL SULFATE 3 ML: .5; 3 SOLUTION RESPIRATORY (INHALATION) at 15:24

## 2022-01-20 RX ADMIN — PANTOPRAZOLE SODIUM 40 MG: 40 TABLET, DELAYED RELEASE ORAL at 07:07

## 2022-01-20 RX ADMIN — SUCRALFATE 1 G: 1 TABLET ORAL at 17:35

## 2022-01-20 RX ADMIN — PROPAFENONE HYDROCHLORIDE 150 MG: 150 TABLET, COATED ORAL at 15:07

## 2022-01-20 RX ADMIN — Medication 2 PACKET: at 12:14

## 2022-01-20 RX ADMIN — SUCRALFATE 1 G: 1 TABLET ORAL at 12:12

## 2022-01-20 RX ADMIN — DIGOXIN 125 MCG: 0.25 INJECTION INTRAMUSCULAR; INTRAVENOUS at 12:13

## 2022-01-20 RX ADMIN — PANTOPRAZOLE SODIUM 40 MG: 40 TABLET, DELAYED RELEASE ORAL at 17:35

## 2022-01-20 RX ADMIN — URSODIOL 300 MG: 300 CAPSULE ORAL at 17:35

## 2022-01-20 RX ADMIN — PREDNISONE 10 MG: 10 TABLET ORAL at 09:17

## 2022-01-20 RX ADMIN — GUAIFENESIN 600 MG: 600 TABLET, EXTENDED RELEASE ORAL at 21:59

## 2022-01-20 RX ADMIN — MIRTAZAPINE 7.5 MG: 15 TABLET, FILM COATED ORAL at 21:59

## 2022-01-20 RX ADMIN — GUAIFENESIN 600 MG: 600 TABLET, EXTENDED RELEASE ORAL at 09:18

## 2022-01-20 RX ADMIN — URSODIOL 300 MG: 300 CAPSULE ORAL at 21:59

## 2022-01-20 RX ADMIN — URSODIOL 300 MG: 300 CAPSULE ORAL at 09:17

## 2022-01-20 RX ADMIN — SUCRALFATE 1 G: 1 TABLET ORAL at 07:07

## 2022-01-20 RX ADMIN — PROPAFENONE HYDROCHLORIDE 150 MG: 150 TABLET, COATED ORAL at 21:59

## 2022-01-20 RX ADMIN — ROSUVASTATIN CALCIUM 5 MG: 5 TABLET, FILM COATED ORAL at 09:17

## 2022-01-20 RX ADMIN — SUCRALFATE 1 G: 1 TABLET ORAL at 21:59

## 2022-01-20 RX ADMIN — BUDESONIDE AND FORMOTEROL FUMARATE DIHYDRATE 2 PUFF: 160; 4.5 AEROSOL RESPIRATORY (INHALATION) at 07:22

## 2022-01-20 RX ADMIN — CLOPIDOGREL 75 MG: 75 TABLET, FILM COATED ORAL at 09:18

## 2022-01-20 RX ADMIN — ASPIRIN 81 MG: 81 TABLET, COATED ORAL at 09:17

## 2022-01-20 RX ADMIN — IPRATROPIUM BROMIDE AND ALBUTEROL SULFATE 3 ML: .5; 3 SOLUTION RESPIRATORY (INHALATION) at 11:05

## 2022-01-20 NOTE — PROGRESS NOTES
[facility]  Cardiology Progress Note    Patient Identification:  Name: Celia Baird  Age: 77 y.o.  Sex: female  :  1945  MRN: [id number]                 Follow Up / Chief Complaint: follow up for atrial fibrillation    Interval History: Atrial flutter on the monitor with rate controlled.  History of  watchman's procedure.         Subjective: Denies chest pain and shortness of breath has improved.      Objective:    Past Medical History:  Past Medical History:   Diagnosis Date   • Atrial fibrillation (HCC)    • COPD (chronic obstructive pulmonary disease) (HCC)    • History of frequent urinary tract infections    • Irregular heart beat    • Kidney stones    • PBC (primary biliary cirrhosis)    • PBC (primary biliary cirrhosis)      Past Surgical History:  Past Surgical History:   Procedure Laterality Date   • CARDIAC ELECTROPHYSIOLOGY PROCEDURE N/A 3/30/2017    Procedure:    LINQ;  Surgeon: Ailyn Solorio MD;  Location: [facility] INVASIVE LOCATION;  Service:    • CHOLECYSTECTOMY     • TUBAL ABDOMINAL LIGATION          Social History:   Social History     Tobacco Use   • Smoking status: Current Some Day Smoker     Packs/day: 0.50     Years: 59.00     Pack years: 29.50     Types: Cigarettes   • Smokeless tobacco: Never Used   Substance Use Topics   • Alcohol use: No      Family History:  Family History   Problem Relation Age of Onset   • Heart disease Father    • Heart attack Father    • Heart disease Brother    • Stroke Mother           Allergies:  Allergies   Allergen Reactions   • Morphine And Related Nausea And Vomiting   • Levaquin [Levofloxacin]    • Sulfa Antibiotics      Scheduled Meds:  aspirin, 81 mg, Daily  budesonide-formoterol, 2 puff, BID - RT  clopidogrel, 75 mg, Daily  digoxin, 125 mcg, Daily  guaiFENesin, 600 mg, Q12H  ipratropium-albuterol, 3 mL, 4x Daily - RT  mirtazapine, 7.5 mg, Nightly  pantoprazole, 40 mg, BID AC  predniSONE, 10 mg, Daily With  "Breakfast  propafenone, 150 mg, Q8H  rosuvastatin, 5 mg, Daily  sucralfate, 1 g, 4x Daily AC & at Bedtime  ursodiol, 300 mg, TID            INTAKE AND OUTPUT:    Intake/Output Summary (Last 24 hours) at 1/20/2022 1105  Last data filed at 1/19/2022 2036  Gross per 24 hour   Intake 240 ml   Output --   Net 240 ml     ROS  Constitutional: Awake and alert, no fever.  No nosebleeds  Abdomen          no abdominal pain   Cardiac              no chest pain  Pulmonary          no shortness of breath      BP 94/63   Pulse 59   Temp 97.9 °F (36.6 °C) (Oral)   Resp 18   Ht 152.4 cm (60\")   Wt 34.1 kg (75 lb 3.2 oz)   SpO2 100%   BMI 14.69 kg/m²       Physical Exam:  General:  Appears in no acute distress, resting in bed  Eyes: Normal no conjunctival drainage  HEENT:  No JVD. Thyroid not visibly enlarged. No mucosal pallor or cyanosis  Respiratory: Respirations regular and unlabored at rest. BBS with good air entry in all fields. No crackles, rubs or wheezes auscultated  Cardiovascular: S1S2 irregularly irregular rhythm. No murmur, rub or gallop. No carotid bruits. DP/PT pulses  +2   . No pretibial pitting edema  Gastrointestinal: Abdomen soft, flat, non tender. Bowel sounds present. No hepatosplenomegaly. No ascites   Skin:  . Skin warm and dry to touch. No rashes    Neuro: AAO x3 CN II-XII grossly intact  Psych: Mood and affect normal, pleasant and cooperative                Cardiographics  Telemetry:   aflutter      SR           SR      ECG:         Echocardiogram:   1/14/22  Interpretation Summary    · Calculated left ventricular EF = 61.9% Estimated left ventricular EF was in agreement with the calculated left ventricular EF.  · Left ventricular diastolic function was normal.  · There is no evidence of pericardial effusion. .  Interpretation Summary       · Findings consistent with a normal ECG stress test.  · Left ventricular ejection fraction is normal (Calculated EF = 70%).  · Myocardial perfusion imaging " indicates a normal myocardial perfusion study with no evidence of ischemia.  · Impressions are consistent with a low risk study.     Asymptomatic for chest pain. ECG is negative for ischemia.   Ectopy: Rare PAC at baseline, none with exercise, occasional PVC in recovery  HR and BP response : Appropriate for Beta-blocker therapy  Pharmacologic study due to inability to tolerate increasing speed and grade of treadmill due to Pulmonary, mobility  Issues and Beta-blocker therapy.  Participated in Low Level exercise and tolerance is poor.         Imaging  Chest X-ray:   IMPRESSION:  No focal pulmonary consolidation. Follow-up as clinical  indications persist.     This report was finalized on 1/13/2022 12:27 PM by Dr. Andrew Abdi M.D.  Lab Review   Results from last 7 days   Lab Units 01/14/22  0845 01/13/22  1219   TROPONIN T ng/mL <0.010 0.013     Results from last 7 days   Lab Units 01/18/22  0842   MAGNESIUM mg/dL 2.2     Results from last 7 days   Lab Units 01/20/22  0533   SODIUM mmol/L 139   POTASSIUM mmol/L 3.7   BUN mg/dL 19   CREATININE mg/dL 0.66   CALCIUM mg/dL 8.5*        Results from last 7 days   Lab Units 01/20/22  0533 01/19/22  0617 01/18/22  0842   WBC 10*3/mm3 12.50* 14.53* 20.57*   HEMOGLOBIN g/dL 9.3* 10.4* 12.2   HEMATOCRIT % 27.9* 31.1* 36.5   PLATELETS 10*3/mm3 184 172 212           The following medical decision was discussed in detail with Dr. Solorio    Assessment:  Acute on chronic hypoxic respiratory failure  Paroxysmal atrial fibrillation: with watchman's device  Chronic diastolic CHF  Hyperlipidemia  Hypertension  Tobacco abuse  GERD  Acute exacerbation of COPD  History of loop implant (battery no longer working)   Hypokalemia: Resolved  Hyperkalemia potassium has been stopped, continue to monitor.    Plan:    No ACE or ARB due to hypotension.  Blood pressure remains soft today.  Atrial flutter on the monitor, HR controlled.  History of watchman's procedure.  Echo revealed EF  "61.9% with normal LV function, full report as above.  Continue Plavix, aspirin, Rythmol and rosuvastatin.  Plan for ischemic work-up in the outpatient setting.      Mag today, bmp and mag in jake Lock, APRN  )1/20/2022       EMR Dragon/Transcription:   \"Dictated utilizing Dragon dictation\".     "

## 2022-01-20 NOTE — PLAN OF CARE
Goal Outcome Evaluation:  Plan of Care Reviewed With: patient           Outcome Summary: Pt agreed to amb, pt amb 65ft this session and req 2 rest breaks due to fatigue and SOA; she states she will be fine at home, son to assist, but did say he works in daytime; pt will need O2 next session during amb as she decr SATS this visit; educ offered on home safety , use of rwx currently needed and will need assist when up at home; declines SNU this date    Patient was wearing a face mask during this therapy encounter. Therapist used appropriate personal protective equipment including eye protection, mask, and gloves.  Mask used was standard procedure mask. Appropriate PPE was worn during the entire therapy session. Hand hygiene was completed before and after therapy session. Patient is not in enhanced droplet precautions.

## 2022-01-20 NOTE — PLAN OF CARE
Goal Outcome Evaluation:           Progress: no change  Outcome Summary: Patient alert and oriented. Patient on 1 liter of oxygen. Patient denies pain. No s/s of acute distress. Monitoring heart rate. Will continue to monitor.

## 2022-01-20 NOTE — PLAN OF CARE
Goal Outcome Evaluation:         Pt is Alert and Oriented. Pt medicated per orders. RN held dose of rythmol due to low BP. Systolic BP less than 100. RN checked chart and noticed this medication has lowered patients blood pressure after both administrations. Pt rested well. Pt on 2.5 L nasal cannula throughout shift. Pt vital signs are within normal limits at this time.

## 2022-01-20 NOTE — PROGRESS NOTES
"Daily progress note    Chief complaint  Doing better  No new complaints   No more nausea vomiting  Denies chest pain shortness of breath palpitation    History of present illness  77-year old white female with history of chronic hypoxic respiratory failure COPD chronic atrial fibrillation congestive heart failure hypertension hyperlipidemia who continue to smoke and lives by herself to the emergency room with increased shortness of breath for last 1 week.  Patient denies any fever chills but does have chronic productive cough.  Patient denies chest pain abdominal pain nausea vomiting diarrhea.  Patient work-up in ER revealed acute on chronic hypoxic respiratory failure with acute exacerbation of COPD admitted for management.  Patient also found to have severe hypokalemia.     REVIEW OF SYSTEMS  Unremarkable except generalized weakness     PHYSICAL EXAM  Blood pressure 105/53, pulse 72, temperature 98.3 °F (36.8 °C), temperature source Oral, resp. rate 18, height 152.4 cm (60\"), weight 34.1 kg (75 lb 3.2 oz), SpO2 94 %.    GENERAL: Chronically ill-appearing, nontoxic appearing, moderately distressed  HENT: normocephalic, atraumatic  EYES: no scleral icterus, PERRL  CV: regular rhythm, regular rate, no murmur  RESPIRATORY: Tachypneic, coarse breath sounds throughout  ABDOMEN: soft, nontender nondistended bowel sounds positive  MUSCULOSKELETAL: no deformity, no lower extremity edema or calf tenderness bilaterally  NEURO: alert, moves all extremities, follows commands, mental status normal/baseline  SKIN: warm, dry, no rash   Psych: Appropriate mood and affect    LAB RESULTS  Lab Results (last 24 hours)     Procedure Component Value Units Date/Time    Renal Function Panel [[id number]]  (Abnormal) Collected: 01/20/22 0533    Specimen: Blood Updated: 01/20/22 0925     Glucose 83 mg/dL      BUN 19 mg/dL      Creatinine 0.66 mg/dL      Sodium 139 mmol/L      Potassium 3.7 mmol/L      Chloride 106 mmol/L      CO2 29.9 mmol/L  "     Calcium 8.5 mg/dL      Albumin 2.70 g/dL      Phosphorus 1.9 mg/dL      Anion Gap 3.1 mmol/L      BUN/Creatinine Ratio 28.8     eGFR Non African Amer 87 mL/min/1.73     Narrative:      GFR Normal >60  Chronic Kidney Disease <60  Kidney Failure <15      CBC & Differential [[id number]]  (Abnormal) Collected: 01/20/22 0533    Specimen: Blood Updated: 01/20/22 0745    Narrative:      The following orders were created for panel order CBC & Differential.  Procedure                               Abnormality         Status                     ---------                               -----------         ------                     CBC Auto Differential[[id number]]        Abnormal            Final result                 Please view results for these tests on the individual orders.    CBC Auto Differential [[id number]]  (Abnormal) Collected: 01/20/22 0533    Specimen: Blood Updated: 01/20/22 0745     WBC 12.50 10*3/mm3      RBC 3.03 10*6/mm3      Hemoglobin 9.3 g/dL      Hematocrit 27.9 %      MCV 92.1 fL      MCH 30.7 pg      MCHC 33.3 g/dL      RDW 12.3 %      RDW-SD 41.3 fl      MPV 12.7 fL      Platelets 184 10*3/mm3      Neutrophil % 69.5 %      Lymphocyte % 16.5 %      Monocyte % 11.8 %      Eosinophil % 1.2 %      Basophil % 0.2 %      Immature Grans % 0.8 %      Neutrophils, Absolute 8.70 10*3/mm3      Lymphocytes, Absolute 2.06 10*3/mm3      Monocytes, Absolute 1.47 10*3/mm3      Eosinophils, Absolute 0.15 10*3/mm3      Basophils, Absolute 0.02 10*3/mm3      Immature Grans, Absolute 0.10 10*3/mm3      nRBC 0.0 /100 WBC         Imaging Results (Last 24 Hours)     ** No results found for the last 24 hours. **             ECG 12 Lead  Component   Ref Range & Units 1/13/22 1356 1/13/22 1253   QT Interval   ms 373 P  409 P              HEART RATE= 81  bpm  RR Interval= 744  ms  AZ Interval= 71  ms  P Horizontal Axis= 219  deg  P Front Axis= 219  deg  QRSD Interval= 96  ms  QT Interval= 373  ms  QRS Axis= 105  deg  T Wave  Axis= 201  deg  - ABNORMAL ECG -  Sinus or ectopic atrial rhythm  Ventricular trigeminy  Short MN interval  Right axis deviation  Repol abnrm suggests ischemia, diffuse leads             Current Facility-Administered Medications:   •  aluminum-magnesium hydroxide-simethicone (MAALOX MAX) 400-400-40 MG/5ML suspension 15 mL, 15 mL, Oral, Q6H PRN, Vikki Hobbs MD  •  aspirin EC tablet 81 mg, 81 mg, Oral, Daily, Eulogio Mcdaniel MD, 81 mg at 01/20/22 0917  •  budesonide-formoterol (SYMBICORT) 160-4.5 MCG/ACT inhaler 2 puff, 2 puff, Inhalation, BID - RT, Eulogio Mcdaniel MD, 2 puff at 01/20/22 0722  •  clopidogrel (PLAVIX) tablet 75 mg, 75 mg, Oral, Daily, Eulogio Mcdaniel MD, 75 mg at 01/20/22 0918  •  digoxin (LANOXIN) injection 125 mcg, 125 mcg, Intravenous, Daily, Darlene Shen APRN, 125 mcg at 01/20/22 1213  •  guaiFENesin (MUCINEX) 12 hr tablet 600 mg, 600 mg, Oral, Q12H, Eulogio Mcdaniel MD, 600 mg at 01/20/22 0918  •  ipratropium-albuterol (DUO-NEB) nebulizer solution 3 mL, 3 mL, Nebulization, 4x Daily - RT, Eulogio Mcdaniel MD, 3 mL at 01/20/22 1105  •  LORazepam (ATIVAN) injection 0.5 mg, 0.5 mg, Intravenous, Q6H PRN, Eulogio Mcdaniel MD, 0.5 mg at 01/17/22 0538  •  mirtazapine (REMERON) tablet 7.5 mg, 7.5 mg, Oral, Nightly, Fabian Lagos III, MD, 7.5 mg at 01/19/22 2036  •  ondansetron (ZOFRAN) injection 4 mg, 4 mg, Intravenous, Q4H PRN, Eulogio Mcdaniel MD, 4 mg at 01/17/22 1016  •  pantoprazole (PROTONIX) EC tablet 40 mg, 40 mg, Oral, BID AC, Eulogio Mcdaniel MD, 40 mg at 01/20/22 0707  •  potassium & sodium phosphates (PHOS-NAK) 280-160-250 MG packet - for Phosphorus less than 1.25 mg/dL, 2 packet, Oral, Q6H PRN **OR** potassium & sodium phosphates (PHOS-NAK) 280-160-250 MG packet - for Phosphorus 1.25 - 2.5 mg/dL, 2 packet, Oral, Q6H PRN, Eulogio Mcdaniel MD, 2 packet at 01/20/22 1214  •  predniSONE (DELTASONE) tablet 10 mg, 10 mg, Oral, Daily With Breakfast, Eulogio Mcdaniel MD, 10 mg at 01/20/22 0917  •   propafenone (RYTHMOL) tablet 150 mg, 150 mg, Oral, Q8H, Jonny Mcdaniel MD, 150 mg at 01/20/22 1507  •  rosuvastatin (CRESTOR) tablet 5 mg, 5 mg, Oral, Daily, Jonny Mcdaniel MD, 5 mg at 01/20/22 0917  •  sodium chloride 0.9 % flush 10 mL, 10 mL, Intravenous, PRN, Binu Moreno MD  •  [COMPLETED] Insert peripheral IV, , , Once **AND** sodium chloride 0.9 % flush 10 mL, 10 mL, Intravenous, PRN, Vicki Alejandre APRN, 10 mL at 01/18/22 0821  •  sodium chloride nasal spray 1 spray, 1 spray, Each Nare, PRN, Jonny Mcdaniel MD, 1 spray at 01/15/22 1835  •  sucralfate (CARAFATE) tablet 1 g, 1 g, Oral, 4x Daily AC & at Bedtime, Jonny Mcdaniel MD, 1 g at 01/20/22 1212  •  ursodiol (ACTIGALL) capsule 300 mg, 300 mg, Oral, TID, Jonny Mcdaniel MD, 300 mg at 01/20/22 0917     ASSESSMENT  Acute on chronic hypoxic respiratory failure  Acute exacerbation of COPD  Chronic diastolic CHF  Acute kidney injury in hyperkalemia   Paroxysmal atrial fibrillation  Hypertension  Hyperlipidemia  Leukocytosis secondary to steroid  Ongoing tobacco abuse  Gastroesophageal reflux disease    PLAN  CPM  NIPPV  Steroids and taper  Anticoagulation  Replace phosphorus and restart diuretics in a.m.  Nephrology consult appreciated  Nebulizer treatment every 4 hours  Pulmonary hygiene  Smoking cessation guidelines  Adjust home medications  Stress ulcer DVT prophylaxis  Pulmonary and cardiology to follow patient  DNR  PT/OT  Discussed with family and nursing staff  Subacute rehab once bed available    JONNY MCDANIEL MD

## 2022-01-20 NOTE — PROGRESS NOTES
LOS: 7 days     Chief Complaint/ Reason for encounter: Acute kidney injury with hyperkalemia  Chief Complaint   Patient presents with   • Shortness of Breath         Subjective   Resting, no new complaints  She has been awake alert oriented, BP maintaining low 100s denying any dyspnea or chest pain  Good appetite no nausea or vomiting reported      Medical history reviewed:  History of Present Illness    Subjective    History taken from: Patient and chart    Vital Signs  Temp:  [97.8 °F (36.6 °C)-98.3 °F (36.8 °C)] 98.3 °F (36.8 °C)  Heart Rate:  [] 88  Resp:  [18-19] 18  BP: ()/(45-66) 106/66       Wt Readings from Last 1 Encounters:   01/20/22 0450 34.1 kg (75 lb 3.2 oz)   01/19/22 0548 32.9 kg (72 lb 8.5 oz)   01/18/22 0403 31.9 kg (70 lb 4.8 oz)   01/17/22 0328 32.6 kg (71 lb 12.8 oz)   01/14/22 1151 36.3 kg (80 lb)   01/13/22 1221 36.3 kg (80 lb)       Objective    Objective:  General Appearance:  Comfortable, chronically ill-appearing, in no acute distress and not in pain.  Awake, alert, oriented  HEENT: Mucous membranes moist, no injury, oropharynx clear  Lungs:  Normal effort and normal respiratory rate.  Breath sounds clear to auscultation.  No  respiratory distress.  No rales, decreased breath sounds or rhonchi.    Heart: Normal rate.  Regular rhythm.  S1 normal.  No murmur.   Abdomen: Abdomen is soft.  Bowel sounds are normal, no abdominal tenderness.  There is no rebound or guarding  Extremities: Normal range of motion. Trace edema of bilateral lower extremities, distal pulses intact  Neurological: No focal motor or sensory deficits, pupils reactive  Skin:  Warm and dry.  No rash or cyanosis.       Results Review:    Intake/Output:     Intake/Output Summary (Last 24 hours) at 1/20/2022 1445  Last data filed at 1/19/2022 2036  Gross per 24 hour   Intake 240 ml   Output --   Net 240 ml         DATA:  Radiology and Labs:  The following labs independently reviewed by me. Additional labs  ordered for tomorrow a.m.  Interval notes, chart personally reviewed by me.   Old records independently reviewed showing normal baseline creatinine  The following radiologic studies independently viewed by me, findings chest x-ray no focal acute disease  New problems include worsening anemia, hypophosphatemia  Discussed with patient    Risk/ complexity of medical care/ medical decision making moderate    Labs:   Recent Results (from the past 24 hour(s))   Renal Function Panel    Collection Time: 01/20/22  5:33 AM    Specimen: Blood   Result Value Ref Range    Glucose 83 65 - 99 mg/dL    BUN 19 8 - 23 mg/dL    Creatinine 0.66 0.57 - 1.00 mg/dL    Sodium 139 136 - 145 mmol/L    Potassium 3.7 3.5 - 5.2 mmol/L    Chloride 106 98 - 107 mmol/L    CO2 29.9 (H) 22.0 - 29.0 mmol/L    Calcium 8.5 (L) 8.6 - 10.5 mg/dL    Albumin 2.70 (L) 3.50 - 5.20 g/dL    Phosphorus 1.9 (C) 2.5 - 4.5 mg/dL    Anion Gap 3.1 (L) 5.0 - 15.0 mmol/L    BUN/Creatinine Ratio 28.8 (H) 7.0 - 25.0    eGFR Non African Amer 87 >60 mL/min/1.73   CBC Auto Differential    Collection Time: 01/20/22  5:33 AM    Specimen: Blood   Result Value Ref Range    WBC 12.50 (H) 3.40 - 10.80 10*3/mm3    RBC 3.03 (L) 3.77 - 5.28 10*6/mm3    Hemoglobin 9.3 (L) 12.0 - 15.9 g/dL    Hematocrit 27.9 (L) 34.0 - 46.6 %    MCV 92.1 79.0 - 97.0 fL    MCH 30.7 26.6 - 33.0 pg    MCHC 33.3 31.5 - 35.7 g/dL    RDW 12.3 12.3 - 15.4 %    RDW-SD 41.3 37.0 - 54.0 fl    MPV 12.7 (H) 6.0 - 12.0 fL    Platelets 184 140 - 450 10*3/mm3    Neutrophil % 69.5 42.7 - 76.0 %    Lymphocyte % 16.5 (L) 19.6 - 45.3 %    Monocyte % 11.8 5.0 - 12.0 %    Eosinophil % 1.2 0.3 - 6.2 %    Basophil % 0.2 0.0 - 1.5 %    Immature Grans % 0.8 (H) 0.0 - 0.5 %    Neutrophils, Absolute 8.70 (H) 1.70 - 7.00 10*3/mm3    Lymphocytes, Absolute 2.06 0.70 - 3.10 10*3/mm3    Monocytes, Absolute 1.47 (H) 0.10 - 0.90 10*3/mm3    Eosinophils, Absolute 0.15 0.00 - 0.40 10*3/mm3    Basophils, Absolute 0.02 0.00 - 0.20  10*3/mm3    Immature Grans, Absolute 0.10 (H) 0.00 - 0.05 10*3/mm3    nRBC 0.0 0.0 - 0.2 /100 WBC       Radiology:  Imaging Results (Last 24 Hours)     ** No results found for the last 24 hours. **             Medications have been reviewed:  Current Facility-Administered Medications   Medication Dose Route Frequency Provider Last Rate Last Admin   • aluminum-magnesium hydroxide-simethicone (MAALOX MAX) 400-400-40 MG/5ML suspension 15 mL  15 mL Oral Q6H PRN Vikki Hobbs MD       • aspirin EC tablet 81 mg  81 mg Oral Daily Eulogio Mcdaniel MD   81 mg at 01/20/22 0917   • budesonide-formoterol (SYMBICORT) 160-4.5 MCG/ACT inhaler 2 puff  2 puff Inhalation BID - RT Eulogio Mcdaniel MD   2 puff at 01/20/22 0722   • clopidogrel (PLAVIX) tablet 75 mg  75 mg Oral Daily Eulogio Mcdaniel MD   75 mg at 01/20/22 0918   • digoxin (LANOXIN) injection 125 mcg  125 mcg Intravenous Daily Darlene Shen APRN   125 mcg at 01/20/22 1213   • guaiFENesin (MUCINEX) 12 hr tablet 600 mg  600 mg Oral Q12H Eulogio Mcdaniel MD   600 mg at 01/20/22 0918   • ipratropium-albuterol (DUO-NEB) nebulizer solution 3 mL  3 mL Nebulization 4x Daily - RT Eulogio Mcdaniel MD   3 mL at 01/20/22 1105   • LORazepam (ATIVAN) injection 0.5 mg  0.5 mg Intravenous Q6H PRN Eulogio Mcdaniel MD   0.5 mg at 01/17/22 0538   • mirtazapine (REMERON) tablet 7.5 mg  7.5 mg Oral Nightly Fabian Lagos III, MD   7.5 mg at 01/19/22 2036   • ondansetron (ZOFRAN) injection 4 mg  4 mg Intravenous Q4H PRN Eulogio Mcdaniel MD   4 mg at 01/17/22 1016   • pantoprazole (PROTONIX) EC tablet 40 mg  40 mg Oral BID AC Eulogio Mcdaniel MD   40 mg at 01/20/22 0707   • potassium & sodium phosphates (PHOS-NAK) 280-160-250 MG packet - for Phosphorus less than 1.25 mg/dL  2 packet Oral Q6H PRN Eulogio Mcdaniel MD        Or   • potassium & sodium phosphates (PHOS-NAK) 280-160-250 MG packet - for Phosphorus 1.25 - 2.5 mg/dL  2 packet Oral Q6H PRN Eulogio Mcdaniel MD   2 packet at 01/20/22 1214   •  predniSONE (DELTASONE) tablet 10 mg  10 mg Oral Daily With Breakfast Eulogio Mcdaniel MD   10 mg at 01/20/22 0917   • propafenone (RYTHMOL) tablet 150 mg  150 mg Oral Q8H Eulogio Mcdaniel MD   150 mg at 01/19/22 1500   • rosuvastatin (CRESTOR) tablet 5 mg  5 mg Oral Daily Eulogio Mcdaniel MD   5 mg at 01/20/22 0917   • sodium chloride 0.9 % flush 10 mL  10 mL Intravenous PRN Binu Moreno MD       • sodium chloride 0.9 % flush 10 mL  10 mL Intravenous PRN Vicki Alejandre APRN   10 mL at 01/18/22 0821   • sodium chloride nasal spray 1 spray  1 spray Each Nare PRN Eulogio Mcdaniel MD   1 spray at 01/15/22 1835   • sucralfate (CARAFATE) tablet 1 g  1 g Oral 4x Daily AC & at Bedtime Eulogio Mcdaniel MD   1 g at 01/20/22 1212   • ursodiol (ACTIGALL) capsule 300 mg  300 mg Oral TID Eulogio Mcdaniel MD   300 mg at 01/20/22 0917       ASSESSMENT:  Acute kidney injury, secondary to volume contraction.  Looks euvolemic   CHF, had been on oral diuretics, off now  Hyperkalemia from spironolactone and supplemental potassium. Potassium stabilizing  COPD exacerbation, improved  Acute hypoxemic respiratory failure  Malnutrition  leukocytosis, on oral steroids, improved  New, hypophosphatemia replaced per oral protocol  Worsening anemia        PLAN:   Her renal function has improved, stable today and near baseline  She looks euvolemic on exam  Could consider restarting low-dose oral diuretics next 1 to 2 days  Urine studies unremarkable. Follow-up a.m. labs. Slow progress     Continue to monitor electrolytes and volume closely    Prabhjot Topete MD   [facility]   Office phone number: [phone]  Answering service phone number: [phone]    01/20/22  14:45 EST    Dictation performed using Dragon dictation software

## 2022-01-20 NOTE — THERAPY TREATMENT NOTE
Patient Name: Celia Baird  : 1945    MRN: [id number]                              Today's Date: 2022       Admit Date: 2022    Visit Dx:     ICD-10-CM ICD-9-CM   1. COPD exacerbation (HCC)  J44.1 491.21   2. Shortness of breath  R06.02 786.05   3. Hypokalemia  E87.6 276.8     Patient Active Problem List   Diagnosis   • COPD exacerbation (HCC)   • Subarachnoid hemorrhage (HCC)   • Multiple skin tears   • Closed nondisplaced fracture of left clavicle   • Paroxysmal atrial fibrillation (HCC)   • Tobacco abuse   • Status post placement of implantable loop recorder   • SOB (shortness of breath)   • COPD with acute exacerbation (HCC)   • Severe malnutrition (CMS/HCC)   • Leukocytosis     Past Medical History:   Diagnosis Date   • Atrial fibrillation (HCC)    • COPD (chronic obstructive pulmonary disease) (HCC)    • History of frequent urinary tract infections    • Irregular heart beat    • Kidney stones    • PBC (primary biliary cirrhosis)    • PBC (primary biliary cirrhosis)      Past Surgical History:   Procedure Laterality Date   • CARDIAC ELECTROPHYSIOLOGY PROCEDURE N/A 3/30/2017    Procedure:    LINQ;  Surgeon: Ailyn Solorio MD;  Location: [facility] INVASIVE LOCATION;  Service:    • CHOLECYSTECTOMY     • TUBAL ABDOMINAL LIGATION        General Information     Row Name 22          Physical Therapy Time and Intention    Document Type therapy note (daily note)  -     Mode of Treatment individual therapy; physical therapy  -     Row Name 22          General Information    Patient Profile Reviewed yes  -     Existing Precautions/Restrictions fall  -     Row Name 22          Living Environment    Lives With alone  states son is going to stay w/her but maybe just at night-he still works  -     Row Name 22          Cognition    Orientation Status (Cognition) oriented x 4  but loses train of thought easily  -     Row Name 22        "   Safety Issues, Functional Mobility    Impairments Affecting Function (Mobility) balance; endurance/activity tolerance; shortness of breath; strength  -           User Key  (r) = Recorded By, (t) = Taken By, (c) = Cosigned By    Initials Name Provider Type    Briana Woodruff PTA Physical Therapy Assistant               Mobility     Row Name 01/20/22 1820          Bed Mobility    Supine-Sit [place] (Bed Mobility) contact guard  -     Sit-Supine [place] (Bed Mobility) standby assist  -     Assistive Device (Bed Mobility) bed rails; head of bed elevated  -     Comment (Bed Mobility) pt educ on not having rail and elevated HOB at home  -     Row Name 01/20/22 1820          Bed-Chair Transfer    Bed-Chair [place] (Transfers) --  declined chair, states \"I do not do recliners\"  -     Row Name 01/20/22 1820          Sit-Stand Transfer    Sit-Stand [place] (Transfers) contact guard; verbal cues  initial post lean  -     Assistive Device (Sit-Stand Transfers) walker, front-wheeled  -     Row Name 01/20/22 1820          Gait/Stairs (Locomotion)    [place]well Level (Gait) contact guard; verbal cues  -     Assistive Device (Gait) walker, front-wheeled  -     Distance in Feet (Gait) 65ft, incr fatigue, SOA; 2 rest breaks  -     Deviations/Abnormal Patterns (Gait) jass decreased; base of support, narrow; stride length decreased  -     Bilateral Gait Deviations forward flexed posture  -     Comment (Gait/Stairs) Pt agreed to amb but declined O2, was at 98% O2 EOB on RA, after amb 85%-educ w/pt on needing O2 next time she amb  -           User Key  (r) = Recorded By, (t) = Taken By, (c) = Cosigned By    Initials Name Provider Type    Briana Woodruff PTA Physical Therapy Assistant               Obj/Interventions     Row Name 01/20/22 1823          Motor Skills    Therapeutic Exercise --  LAQs x10 reps , APs x10 reps both perf EOB prior to amb  -     Row Name 01/20/22 " 1823          Balance    Comment, Balance CGA , c/o weakness  -           User Key  (r) = Recorded By, (t) = Taken By, (c) = Cosigned By    Initials Name Provider Type    Briana Woodruff PTA Physical Therapy Assistant               Goals/Plan    No documentation.                Clinical Impression     Row Name 01/20/22 1825          Pain Scale: Numbers Pre/Post-Treatment    Pretreatment Pain Rating 0/10 - no pain  -     Posttreatment Pain Rating 0/10 - no pain  -     Row Name 01/20/22 1825          Plan of Care Review    Plan of Care Reviewed With patient  -     Outcome Summary Pt agreed to amb, pt amb 65ft this session and req 2 rest breaks due to fatigue and SOA; she states she will be fine at home, son to assist, but did say he works in daytime; pt will need O2 next session during amb as she decr SATS this visit; educ offered on home safety , use of rwx currently needed and will need assist when up at home; declines SNU this date  -     Row Name 01/20/22 1825          Therapy Assessment/Plan (PT)    Rehab Potential (PT) good, to achieve stated therapy goals  -     Criteria for Skilled Interventions Met (PT) yes  -     Row Name 01/20/22 1825          Vital Signs    Pre SpO2 (%) 98  -     O2 Delivery Pre Treatment supplemental O2  -     Intra SpO2 (%) 85  -     O2 Delivery Intra Treatment room air  -     Post SpO2 (%) 92  -     O2 Delivery Post Treatment supplemental O2  -     Row Name 01/20/22 1825          Positioning and Restraints    Pre-Treatment Position in bed  -     Post Treatment Position bed  -     In Bed fowlers; call light within reach; encouraged to call for assist; exit alarm on  -           User Key  (r) = Recorded By, (t) = Taken By, (c) = Cosigned By    Initials Name Provider Type    Briana Woodruff PTA Physical Therapy Assistant               Outcome Measures     Row Name 01/20/22 1829 01/20/22 0920       How much help from another person do you currently  need...    Turning from your back to your side while in flat bed without using bedrails? 4  -JM 4  -DM    Moving from lying on back to sitting on the side of a flat bed without bedrails? 3  -JM 4  -DM    Moving to and from a bed to a chair (including a wheelchair)? 3  -JM 4  -DM    Standing up from a chair using your arms (e.g., wheelchair, bedside chair)? 3  -MADI 4  -DM    Climbing 3-5 steps with a railing? 1  -MADI 3  -DM    To walk in hospital room? 3  -JM 3  -DM    AM-PAC 6 Clicks Score (PT) 17  -JM 22  -DM          User Key  (r) = Recorded By, (t) = Taken By, (c) = Cosigned By    Initials Name Provider Type    Charito Covington, RN Registered Nurse    Briana Woodruff PTA Physical Therapy Assistant                             Physical Therapy Education                 Title: PT OT SLP Therapies (Done)     Topic: Physical Therapy (Done)     Point: Mobility training (Done)     Learning Progress Summary           Patient Acceptance, E,TB,D, VU,NR by MADI at 1/20/2022 1829    Comment: pt thinks she is more indep than she actually is this date; educ offered on safety      Show all documentation for this point (7)                 Point: Home exercise program (Done)     Learning Progress Summary           Patient Acceptance, E,TB,D, VU,NR by MADI at 1/20/2022 1829    Comment: pt thinks she is more indep than she actually is this date; educ offered on safety      Show all documentation for this point (6)                 Point: Body mechanics (Done)     Learning Progress Summary           Patient Acceptance, E,TB,D, VU,NR by MADI at 1/20/2022 1829    Comment: pt thinks she is more indep than she actually is this date; educ offered on safety      Show all documentation for this point (7)                 Point: Precautions (Done)     Learning Progress Summary           Patient Acceptance, E,TB,D, VU,NR by MADI at 1/20/2022 1829    Comment: pt thinks she is more indep than she actually is this date; educ offered on safety       Show all documentation for this point (7)                             User Key     Initials Effective Dates Name Provider Type [facility] 03/07/18 -  Briana Flowers PTA Physical Therapy Assistant PT              PT Recommendation and Plan     Plan of Care Reviewed With: patient  Outcome Summary: Pt agreed to amb, pt amb 65ft this session and req 2 rest breaks due to fatigue and SOA; she states she will be fine at home, son to assist, but did say he works in daytime; pt will need O2 next session during amb as she decr SATS this visit; educ offered on home safety , use of rwx currently needed and will need assist when up at home; declines SNU this date     Time Calculation:    PT Charges     Row Name 01/20/22 1816             Time Calculation    Start Time 1016  -      Stop Time 1041  -      Time Calculation (min) 25 min  -      PT Received On 01/20/22  -MADI      PT - Next Appointment 01/21/22  -MADI            User Key  (r) = Recorded By, (t) = Taken By, (c) = Cosigned By    Initials Name Provider Type     Briana Flowers PTA Physical Therapy Assistant              Therapy Charges for Today     Code Description Service Date Service Provider Modifiers Qty    [id number] HC PT THER PROC EA 15 MIN 1/20/2022 Briana Flowers PTA GP 2          PT G-Codes  Outcome Measure Options: AM-PAC 6 Clicks Daily Activity (OT), Modified Damaris  AM-PAC 6 Clicks Score (PT): 17  AM-PAC 6 Clicks Score (OT): 19  Modified Gulliver Scale: 4 - Moderately severe disability.  Unable to walk without assistance, and unable to attend to own bodily needs without assistance.    Briana Flowers PTA  1/20/2022

## 2022-01-21 ENCOUNTER — TRANSCRIBE ORDERS (OUTPATIENT)
Dept: HOME HEALTH SERVICES | Facility: HOME HEALTHCARE | Age: 77
End: 2022-01-21

## 2022-01-21 ENCOUNTER — HOME HEALTH ADMISSION (OUTPATIENT)
Dept: HOME HEALTH SERVICES | Facility: HOME HEALTHCARE | Age: 77
End: 2022-01-21

## 2022-01-21 VITALS
HEART RATE: 96 BPM | SYSTOLIC BLOOD PRESSURE: 113 MMHG | DIASTOLIC BLOOD PRESSURE: 52 MMHG | HEIGHT: 60 IN | WEIGHT: 78.04 LBS | BODY MASS INDEX: 15.32 KG/M2 | OXYGEN SATURATION: 97 % | RESPIRATION RATE: 20 BRPM | TEMPERATURE: 97.8 F

## 2022-01-21 DIAGNOSIS — I50.9 CONGESTIVE HEART FAILURE, UNSPECIFIED HF CHRONICITY, UNSPECIFIED HEART FAILURE TYPE: ICD-10-CM

## 2022-01-21 DIAGNOSIS — J44.1 COPD EXACERBATION: Primary | ICD-10-CM

## 2022-01-21 LAB
ANION GAP SERPL CALCULATED.3IONS-SCNC: 5.1 MMOL/L (ref 5–15)
BASOPHILS # BLD AUTO: 0.01 10*3/MM3 (ref 0–0.2)
BASOPHILS NFR BLD AUTO: 0.1 % (ref 0–1.5)
BUN SERPL-MCNC: 26 MG/DL (ref 8–23)
BUN/CREAT SERPL: 35.6 (ref 7–25)
CALCIUM SPEC-SCNC: 8.6 MG/DL (ref 8.6–10.5)
CHLORIDE SERPL-SCNC: 105 MMOL/L (ref 98–107)
CO2 SERPL-SCNC: 29.9 MMOL/L (ref 22–29)
CREAT SERPL-MCNC: 0.73 MG/DL (ref 0.57–1)
DEPRECATED RDW RBC AUTO: 41.1 FL (ref 37–54)
DIGITOXIN SERPL-MCNC: <5 NG/ML (ref 10–25)
EOSINOPHIL # BLD AUTO: 0.22 10*3/MM3 (ref 0–0.4)
EOSINOPHIL NFR BLD AUTO: 1.5 % (ref 0.3–6.2)
ERYTHROCYTE [DISTWIDTH] IN BLOOD BY AUTOMATED COUNT: 12.6 % (ref 12.3–15.4)
GFR SERPL CREATININE-BSD FRML MDRD: 77 ML/MIN/1.73
GLUCOSE SERPL-MCNC: 112 MG/DL (ref 65–99)
HCT VFR BLD AUTO: 25.7 % (ref 34–46.6)
HGB BLD-MCNC: 8.5 G/DL (ref 12–15.9)
IMM GRANULOCYTES # BLD AUTO: 0.17 10*3/MM3 (ref 0–0.05)
IMM GRANULOCYTES NFR BLD AUTO: 1.2 % (ref 0–0.5)
LYMPHOCYTES # BLD AUTO: 1.96 10*3/MM3 (ref 0.7–3.1)
LYMPHOCYTES NFR BLD AUTO: 13.5 % (ref 19.6–45.3)
MAGNESIUM SERPL-MCNC: 1.9 MG/DL (ref 1.6–2.4)
MCH RBC QN AUTO: 29.9 PG (ref 26.6–33)
MCHC RBC AUTO-ENTMCNC: 33.1 G/DL (ref 31.5–35.7)
MCV RBC AUTO: 90.5 FL (ref 79–97)
MONOCYTES # BLD AUTO: 1.96 10*3/MM3 (ref 0.1–0.9)
MONOCYTES NFR BLD AUTO: 13.5 % (ref 5–12)
NEUTROPHILS NFR BLD AUTO: 10.22 10*3/MM3 (ref 1.7–7)
NEUTROPHILS NFR BLD AUTO: 70.2 % (ref 42.7–76)
NRBC BLD AUTO-RTO: 0 /100 WBC (ref 0–0.2)
PHOSPHATE SERPL-MCNC: 2.4 MG/DL (ref 2.5–4.5)
PLATELET # BLD AUTO: 199 10*3/MM3 (ref 140–450)
PMV BLD AUTO: 12.5 FL (ref 6–12)
POTASSIUM SERPL-SCNC: 4.1 MMOL/L (ref 3.5–5.2)
RBC # BLD AUTO: 2.84 10*6/MM3 (ref 3.77–5.28)
SODIUM SERPL-SCNC: 140 MMOL/L (ref 136–145)
WBC NRBC COR # BLD: 14.54 10*3/MM3 (ref 3.4–10.8)

## 2022-01-21 PROCEDURE — 84100 ASSAY OF PHOSPHORUS: CPT | Performed by: HOSPITALIST

## 2022-01-21 PROCEDURE — 94660 CPAP INITIATION&MGMT: CPT

## 2022-01-21 PROCEDURE — 94799 UNLISTED PULMONARY SVC/PX: CPT

## 2022-01-21 PROCEDURE — 85025 COMPLETE CBC W/AUTO DIFF WBC: CPT | Performed by: HOSPITALIST

## 2022-01-21 PROCEDURE — 80048 BASIC METABOLIC PNL TOTAL CA: CPT | Performed by: NURSE PRACTITIONER

## 2022-01-21 PROCEDURE — 99232 SBSQ HOSP IP/OBS MODERATE 35: CPT

## 2022-01-21 PROCEDURE — 83735 ASSAY OF MAGNESIUM: CPT | Performed by: NURSE PRACTITIONER

## 2022-01-21 PROCEDURE — 94761 N-INVAS EAR/PLS OXIMETRY MLT: CPT

## 2022-01-21 PROCEDURE — 63710000001 PREDNISONE PER 5 MG: Performed by: HOSPITALIST

## 2022-01-21 PROCEDURE — 97110 THERAPEUTIC EXERCISES: CPT

## 2022-01-21 RX ORDER — SUCRALFATE 1 G/1
1 TABLET ORAL
Qty: 120 TABLET | Refills: 0 | Status: SHIPPED | OUTPATIENT
Start: 2022-01-21 | End: 2022-02-20

## 2022-01-21 RX ORDER — SPIRONOLACTONE 25 MG/1
25 TABLET ORAL DAILY
Qty: 30 TABLET | Refills: 0 | Status: SHIPPED | OUTPATIENT
Start: 2022-01-21 | End: 2022-05-10 | Stop reason: HOSPADM

## 2022-01-21 RX ORDER — PANTOPRAZOLE SODIUM 40 MG/1
40 TABLET, DELAYED RELEASE ORAL
Qty: 60 TABLET | Refills: 0 | Status: SHIPPED | OUTPATIENT
Start: 2022-01-21 | End: 2022-02-20

## 2022-01-21 RX ORDER — GUAIFENESIN 600 MG/1
600 TABLET, EXTENDED RELEASE ORAL EVERY 12 HOURS SCHEDULED
Qty: 60 TABLET | Refills: 0 | Status: SHIPPED | OUTPATIENT
Start: 2022-01-21 | End: 2022-02-20

## 2022-01-21 RX ORDER — DIGOXIN 125 MCG
125 TABLET ORAL
Qty: 30 TABLET | Refills: 0 | Status: SHIPPED | OUTPATIENT
Start: 2022-01-21 | End: 2022-02-07 | Stop reason: HOSPADM

## 2022-01-21 RX ORDER — PREDNISONE 1 MG/1
5 TABLET ORAL
Qty: 30 TABLET | Refills: 0 | Status: SHIPPED | OUTPATIENT
Start: 2022-01-22 | End: 2022-02-07 | Stop reason: HOSPADM

## 2022-01-21 RX ORDER — MIRTAZAPINE 7.5 MG/1
7.5 TABLET, FILM COATED ORAL NIGHTLY
Qty: 30 TABLET | Refills: 0 | Status: SHIPPED | OUTPATIENT
Start: 2022-01-21 | End: 2022-11-10 | Stop reason: ALTCHOICE

## 2022-01-21 RX ORDER — SPIRONOLACTONE 25 MG/1
25 TABLET ORAL DAILY
Status: DISCONTINUED | OUTPATIENT
Start: 2022-01-21 | End: 2022-01-21 | Stop reason: HOSPADM

## 2022-01-21 RX ORDER — DIGOXIN 125 MCG
125 TABLET ORAL
Status: DISCONTINUED | OUTPATIENT
Start: 2022-01-21 | End: 2022-01-21 | Stop reason: HOSPADM

## 2022-01-21 RX ORDER — BUMETANIDE 0.5 MG/1
0.5 TABLET ORAL DAILY
Status: DISCONTINUED | OUTPATIENT
Start: 2022-01-21 | End: 2022-01-21 | Stop reason: HOSPADM

## 2022-01-21 RX ADMIN — PANTOPRAZOLE SODIUM 40 MG: 40 TABLET, DELAYED RELEASE ORAL at 08:41

## 2022-01-21 RX ADMIN — ROSUVASTATIN CALCIUM 5 MG: 5 TABLET, FILM COATED ORAL at 08:42

## 2022-01-21 RX ADMIN — BUMETANIDE 0.5 MG: 0.5 TABLET ORAL at 13:24

## 2022-01-21 RX ADMIN — SUCRALFATE 1 G: 1 TABLET ORAL at 12:19

## 2022-01-21 RX ADMIN — IPRATROPIUM BROMIDE AND ALBUTEROL SULFATE 3 ML: .5; 3 SOLUTION RESPIRATORY (INHALATION) at 11:07

## 2022-01-21 RX ADMIN — SUCRALFATE 1 G: 1 TABLET ORAL at 08:41

## 2022-01-21 RX ADMIN — Medication 2 PACKET: at 06:29

## 2022-01-21 RX ADMIN — URSODIOL 300 MG: 300 CAPSULE ORAL at 08:42

## 2022-01-21 RX ADMIN — PROPAFENONE HYDROCHLORIDE 150 MG: 150 TABLET, COATED ORAL at 15:00

## 2022-01-21 RX ADMIN — ASPIRIN 81 MG: 81 TABLET, COATED ORAL at 08:41

## 2022-01-21 RX ADMIN — DIGOXIN 125 MCG: 125 TABLET ORAL at 13:24

## 2022-01-21 RX ADMIN — CLOPIDOGREL 75 MG: 75 TABLET, FILM COATED ORAL at 08:41

## 2022-01-21 RX ADMIN — IPRATROPIUM BROMIDE AND ALBUTEROL SULFATE 3 ML: .5; 3 SOLUTION RESPIRATORY (INHALATION) at 07:11

## 2022-01-21 RX ADMIN — PROPAFENONE HYDROCHLORIDE 150 MG: 150 TABLET, COATED ORAL at 05:43

## 2022-01-21 RX ADMIN — PREDNISONE 5 MG: 10 TABLET ORAL at 08:41

## 2022-01-21 RX ADMIN — SPIRONOLACTONE 25 MG: 25 TABLET ORAL at 13:25

## 2022-01-21 RX ADMIN — BUDESONIDE AND FORMOTEROL FUMARATE DIHYDRATE 2 PUFF: 160; 4.5 AEROSOL RESPIRATORY (INHALATION) at 07:12

## 2022-01-21 RX ADMIN — GUAIFENESIN 600 MG: 600 TABLET, EXTENDED RELEASE ORAL at 08:42

## 2022-01-21 NOTE — PLAN OF CARE
Goal Outcome Evaluation:  Plan of Care Reviewed With: patient        Progress: improving  Outcome Summary: Patient   resting  at this  time.  Denies  pain.        C/o  SOA  with  exertion.  SR  on the  monitor.  Oxygen  increased  to  2l/m  this  am  due  to  increased  SOA     with  activity.    Nursing    will  comtinmue  to monitor.

## 2022-01-21 NOTE — PROGRESS NOTES
LOS: 8 days     Chief Complaint/ Reason for encounter: Acute kidney injury with hyperkalemia  Chief Complaint   Patient presents with   • Shortness of Breath         Subjective   Resting, no new complaints  She has been awake alert oriented, BP maintaining low 100s denying any dyspnea or chest pain  Good appetite no nausea or vomiting reported    1/21 doing well no new complaints  Eating and drinking well  Appetite improved, BP still low normal      Medical history reviewed:  History of Present Illness    Subjective    History taken from: Patient and chart    Vital Signs  Temp:  [97.8 °F (36.6 °C)-98.3 °F (36.8 °C)] 97.8 °F (36.6 °C)  Heart Rate:  [] 76  Resp:  [16-20] 20  BP: ()/(53-66) 97/53       Wt Readings from Last 1 Encounters:   01/21/22 0359 35.4 kg (78 lb 0.7 oz)   01/20/22 0450 34.1 kg (75 lb 3.2 oz)   01/19/22 0548 32.9 kg (72 lb 8.5 oz)   01/18/22 0403 31.9 kg (70 lb 4.8 oz)   01/17/22 0328 32.6 kg (71 lb 12.8 oz)   01/14/22 1151 36.3 kg (80 lb)   01/13/22 1221 36.3 kg (80 lb)       Objective    Objective:  General Appearance:  Comfortable, chronically ill-appearing, in no acute distress and not in pain.  Awake, alert, oriented  HEENT: Mucous membranes moist, no injury, oropharynx clear  Lungs:  Normal effort and normal respiratory rate.  Breath sounds clear to auscultation.  No  respiratory distress.  No rales, decreased breath sounds or rhonchi.    Heart: Normal rate.  Regular rhythm.  S1 normal.  No murmur.   Abdomen: Abdomen is soft.  Bowel sounds are normal, no abdominal tenderness.  There is no rebound or guarding  Extremities: Normal range of motion. Trace edema of bilateral lower extremities, distal pulses intact  Neurological: No focal motor or sensory deficits, pupils reactive  Skin:  Warm and dry.  No rash or cyanosis.       Results Review:    Intake/Output:     Intake/Output Summary (Last 24 hours) at 1/21/2022 1110  Last data filed at 1/21/2022 0546  Gross per 24 hour    Intake 120 ml   Output 425 ml   Net -305 ml         DATA:  Radiology and Labs:  The following labs independently reviewed by me. Additional labs ordered for tomorrow a.m.  Interval notes, chart personally reviewed by me.   Old records independently reviewed showing normal baseline creatinine  The following radiologic studies independently viewed by me, findings chest x-ray no focal acute disease  Discussed with patient    Risk/ complexity of medical care/ medical decision making moderate    Labs:   Recent Results (from the past 24 hour(s))   Magnesium    Collection Time: 01/20/22  6:59 PM    Specimen: Blood   Result Value Ref Range    Magnesium 1.8 1.6 - 2.4 mg/dL   Phosphorus    Collection Time: 01/20/22  6:59 PM    Specimen: Blood   Result Value Ref Range    Phosphorus 2.2 (L) 2.5 - 4.5 mg/dL   CBC Auto Differential    Collection Time: 01/21/22 10:01 AM    Specimen: Blood   Result Value Ref Range    WBC 14.54 (H) 3.40 - 10.80 10*3/mm3    RBC 2.84 (L) 3.77 - 5.28 10*6/mm3    Hemoglobin 8.5 (L) 12.0 - 15.9 g/dL    Hematocrit 25.7 (L) 34.0 - 46.6 %    MCV 90.5 79.0 - 97.0 fL    MCH 29.9 26.6 - 33.0 pg    MCHC 33.1 31.5 - 35.7 g/dL    RDW 12.6 12.3 - 15.4 %    RDW-SD 41.1 37.0 - 54.0 fl    MPV 12.5 (H) 6.0 - 12.0 fL    Platelets 199 140 - 450 10*3/mm3    Neutrophil % 70.2 42.7 - 76.0 %    Lymphocyte % 13.5 (L) 19.6 - 45.3 %    Monocyte % 13.5 (H) 5.0 - 12.0 %    Eosinophil % 1.5 0.3 - 6.2 %    Basophil % 0.1 0.0 - 1.5 %    Immature Grans % 1.2 (H) 0.0 - 0.5 %    Neutrophils, Absolute 10.22 (H) 1.70 - 7.00 10*3/mm3    Lymphocytes, Absolute 1.96 0.70 - 3.10 10*3/mm3    Monocytes, Absolute 1.96 (H) 0.10 - 0.90 10*3/mm3    Eosinophils, Absolute 0.22 0.00 - 0.40 10*3/mm3    Basophils, Absolute 0.01 0.00 - 0.20 10*3/mm3    Immature Grans, Absolute 0.17 (H) 0.00 - 0.05 10*3/mm3    nRBC 0.0 0.0 - 0.2 /100 WBC       Radiology:  Imaging Results (Last 24 Hours)     ** No results found for the last 24 hours. **              Medications have been reviewed:  Current Facility-Administered Medications   Medication Dose Route Frequency Provider Last Rate Last Admin   • aluminum-magnesium hydroxide-simethicone (MAALOX MAX) 400-400-40 MG/5ML suspension 15 mL  15 mL Oral Q6H PRN Vikki Hobbs MD       • aspirin EC tablet 81 mg  81 mg Oral Daily Eulogio Mcdaniel MD   81 mg at 01/21/22 0841   • budesonide-formoterol (SYMBICORT) 160-4.5 MCG/ACT inhaler 2 puff  2 puff Inhalation BID - RT Eulogio Mcdaniel MD   2 puff at 01/21/22 0712   • bumetanide (BUMEX) tablet 0.5 mg  0.5 mg Oral Daily Prabhjot Topete MD       • clopidogrel (PLAVIX) tablet 75 mg  75 mg Oral Daily Eulogio Mcdaniel MD   75 mg at 01/21/22 0841   • digoxin (LANOXIN) injection 125 mcg  125 mcg Intravenous Daily Darlene Shen APRN   125 mcg at 01/20/22 1213   • guaiFENesin (MUCINEX) 12 hr tablet 600 mg  600 mg Oral Q12H Eulogio Mcdaniel MD   600 mg at 01/21/22 0842   • ipratropium-albuterol (DUO-NEB) nebulizer solution 3 mL  3 mL Nebulization 4x Daily - RT Eulogio Mcdaniel MD   3 mL at 01/21/22 1107   • LORazepam (ATIVAN) injection 0.5 mg  0.5 mg Intravenous Q6H PRN Eulogio Mcdaniel MD   0.5 mg at 01/17/22 0538   • mirtazapine (REMERON) tablet 7.5 mg  7.5 mg Oral Nightly Fabian Lagos III, MD   7.5 mg at 01/20/22 2159   • ondansetron (ZOFRAN) injection 4 mg  4 mg Intravenous Q4H PRN Eulogio Mcdaniel MD   4 mg at 01/17/22 1016   • pantoprazole (PROTONIX) EC tablet 40 mg  40 mg Oral BID AC Eulogio Mcdaniel MD   40 mg at 01/21/22 0841   • potassium & sodium phosphates (PHOS-NAK) 280-160-250 MG packet - for Phosphorus less than 1.25 mg/dL  2 packet Oral Q6H PRN Eulogio Mcdaniel MD        Or   • potassium & sodium phosphates (PHOS-NAK) 280-160-250 MG packet - for Phosphorus 1.25 - 2.5 mg/dL  2 packet Oral Q6H PRN Eulogio Mcdaniel MD   2 packet at 01/21/22 0629   • predniSONE (DELTASONE) tablet 5 mg  5 mg Oral Daily With Breakfast Eulogio Mcdaniel MD   5 mg at 01/21/22 0841   •  propafenone (RYTHMOL) tablet 150 mg  150 mg Oral Q8H Eulogio Mcdaniel MD   150 mg at 01/21/22 0543   • rosuvastatin (CRESTOR) tablet 5 mg  5 mg Oral Daily Eulogio Mcdaniel MD   5 mg at 01/21/22 0842   • sodium chloride 0.9 % flush 10 mL  10 mL Intravenous PRN Binu Moreno MD       • sodium chloride 0.9 % flush 10 mL  10 mL Intravenous PRN Vicki Alejandre APRN   10 mL at 01/18/22 0821   • sodium chloride nasal spray 1 spray  1 spray Each Nare PRN Eulogio Mcdaniel MD   1 spray at 01/15/22 1835   • spironolactone (ALDACTONE) tablet 25 mg  25 mg Oral Daily Prabhjot Topete MD       • sucralfate (CARAFATE) tablet 1 g  1 g Oral 4x Daily AC & at Bedtime Eulogio Mcdaniel MD   1 g at 01/21/22 0841   • ursodiol (ACTIGALL) capsule 300 mg  300 mg Oral TID Eulogio Mcdaniel MD   300 mg at 01/21/22 0842       ASSESSMENT:  Acute kidney injury, secondary to volume contraction.  Largely resolved  CHF, currently off diuretics  Hyperkalemia from spironolactone and supplemental potassium. Potassium stabilizing  COPD exacerbation, improved  Acute hypoxemic respiratory failure, multifactorial, improved  Malnutrition  leukocytosis, on oral steroids, improved  hypophosphatemia replaced per oral protocol  Worsening anemia        PLAN:   No new chemistries today but renal function has been steadily improving, near baseline  Replace phosphorus orally per protocol  Will restart low-dose oral Bumex and spironolactone today and monitor  She looks euvolemic on exam  Urine studies unremarkable. Follow-up a.m. labs. Slow progress  Discharge planning     Continue to monitor electrolytes and volume closely    Prabhjot Topete MD   [facility]   Office phone number: [phone]  Answering service phone number: [phone]    01/21/22  11:10 EST    Dictation performed using Dragon dictation software

## 2022-01-21 NOTE — DISCHARGE SUMMARY
Discharge summary    Date of admission 1/13/2022  Date of discharge 1/21/2022    Final diagnosis  Acute on chronic hypoxic respiratory failure  Acute exacerbation of COPD  Chronic diastolic CHF  Acute kidney injury resolved  Paroxysmal atrial fibrillation  Hypertension  Hyperlipidemia  Ongoing tobacco abuse  Gastroesophageal reflux disease    Discharge medications    Current Facility-Administered Medications:   •  aspirin EC tablet 81 mg, 81 mg, Oral, Daily, Eulogio Mcdaniel MD, 81 mg at 01/21/22 0841  •  budesonide-formoterol (SYMBICORT) 160-4.5 MCG/ACT inhaler 2 puff, 2 puff, Inhalation, BID - RT, Eulogio Mcdaniel MD, 2 puff at 01/21/22 0712  •  bumetanide (BUMEX) tablet 0.5 mg, 0.5 mg, Oral, Daily, Prabhjot Topete MD  •  clopidogrel (PLAVIX) tablet 75 mg, 75 mg, Oral, Daily, Eulogio Mcdaniel MD, 75 mg at 01/21/22 0841  •  digoxin (LANOXIN) tablet 125 mcg, 125 mcg, Oral, Daily, Darlene Shen APRN  •  guaiFENesin (MUCINEX) 12 hr tablet 600 mg, 600 mg, Oral, Q12H, Eulogio Mcdaniel MD, 600 mg at 01/21/22 0842  •  ipratropium-albuterol (DUO-NEB) nebulizer solution 3 mL, 3 mL, Nebulization, 4x Daily - RT, Eulogio Mcdaniel MD, 3 mL at 01/21/22 1107  •  mirtazapine (REMERON) tablet 7.5 mg, 7.5 mg, Oral, Nightly, Fabian Lagos III, MD, 7.5 mg at 01/20/22 2159  •  pantoprazole (PROTONIX) EC tablet 40 mg, 40 mg, Oral, BID AC, Eulogio Mcdaniel MD, 40 mg at 01/21/22 0841  •  predniSONE (DELTASONE) tablet 5 mg, 5 mg, Oral, Daily With Breakfast, Eulogio Mcdaniel MD, 5 mg at 01/21/22 0841  •  propafenone (RYTHMOL) tablet 150 mg, 150 mg, Oral, Q8H, Eulogio Mcdaniel MD, 150 mg at 01/21/22 0543  •  rosuvastatin (CRESTOR) tablet 5 mg, 5 mg, Oral, Daily, Eulogio Mcdaniel MD, 5 mg at 01/21/22 0842  •  [COMPLETED] Insert peripheral IV, , , Once **AND** sodium chloride 0.9 % flush 10 mL, 10 mL, Intravenous, PRN, Vicki Alejandre, APRN, 10 mL at 01/18/22 0821  •  spironolactone (ALDACTONE) tablet 25 mg, 25 mg, Oral, Daily, Yoselyn  Prabhjot James MD  •  sucralfate (CARAFATE) tablet 1 g, 1 g, Oral, 4x Daily AC & at Bedtime, Jonny Mcdaniel MD, 1 g at 01/21/22 1219  •  ursodiol (ACTIGALL) capsule 300 mg, 300 mg, Oral, TID, Jonny Mcdaniel MD, 300 mg at 01/21/22 0842     Consults obtained  Pulmonary  Cardiology  Nephrology  Psychiatry  Spiritual care     Procedures  2D echo showed ejection fraction 62%    Hospital course  77-year-old female with chronic hypoxic respiratory failure COPD on home oxygen also have atrial fibrillation congestive heart failure hypertension hyperlipidemia who continue to smoke and lives by herself admit to emergency room shortness of breath.  Patient work-up revealed acute on chronic hypoxic respiratory failure with acute exacerbation of COPD admit for management.  Patient required BiPAP IV steroids and remain on anticoagulation followed by pulmonary.  Patient then developed atrial fibrillation and her heart medication adjusted by cardiology.  Patient advised to quit smoking and her steroids taper down to 5 mg daily which she will continue at home.  Patient is doing well and refusing to go to subacute rehab but agreeable for home health which is arranged.  Patient also advised to quit smoking as she is on home oxygen.  I have discussed with son who is also continued support to quit smoking.  Patient cleared for discharge but prior to discharge she developed acute kidney injury and her diuretics were held and restarted on half dose.  Patient remained DNR throughout hospital course    Discharge diet regular    Activity per PT OT    Medication as above    Follow-up with primary doctor in 1 week and follow-up with cardiology pulmonary and nephrology per the instruction and take medication as directed    JONNY MCDANIEL MD

## 2022-01-21 NOTE — PLAN OF CARE
Goal Outcome Evaluation:  Plan of Care Reviewed With: patient        Progress: improving  Outcome Summary: Pt agreed to PT session, incr amb dist amb w/O2 2L and 97% SATS throughout rx; plans home w/son to assist    Patient was wearing a face mask during this therapy encounter. Therapist used appropriate personal protective equipment including eye protection, mask, and gloves.  Mask used was standard procedure mask. Appropriate PPE was worn during the entire therapy session. Hand hygiene was completed before and after therapy session. Patient is not in enhanced droplet precautions.

## 2022-01-21 NOTE — PROGRESS NOTES
[facility] following for home care needs.  Spoke with patient and she is feeling agreeable to home Pt and does not feel need for anything in addition at this time.  Order to be transcribed.  All info on facesheet is correct.  Use home number first please.  Patient has home O2 through [facility] she states.  Diamond Holt RN

## 2022-01-21 NOTE — PROGRESS NOTES
"Daily progress note    Chief complaint  Doing better  No new complaints   Wants to go home    History of present illness  77-year old white female with history of chronic hypoxic respiratory failure COPD chronic atrial fibrillation congestive heart failure hypertension hyperlipidemia who continue to smoke and lives by herself to the emergency room with increased shortness of breath for last 1 week.  Patient denies any fever chills but does have chronic productive cough.  Patient denies chest pain abdominal pain nausea vomiting diarrhea.  Patient work-up in ER revealed acute on chronic hypoxic respiratory failure with acute exacerbation of COPD admitted for management.  Patient also found to have severe hypokalemia.     REVIEW OF SYSTEMS  Unremarkable except generalized weakness     PHYSICAL EXAM  Blood pressure 97/53, pulse 76, temperature 97.8 °F (36.6 °C), temperature source Oral, resp. rate 20, height 152.4 cm (60\"), weight 35.4 kg (78 lb 0.7 oz), SpO2 97 %.    GENERAL: Chronically ill-appearing, nontoxic appearing, moderately distressed  HENT: normocephalic, atraumatic  EYES: no scleral icterus, PERRL  CV: regular rhythm, regular rate, no murmur  RESPIRATORY: Tachypneic, coarse breath sounds throughout  ABDOMEN: soft, nontender nondistended bowel sounds positive  MUSCULOSKELETAL: no deformity, no lower extremity edema or calf tenderness bilaterally  NEURO: alert, moves all extremities, follows commands, mental status normal/baseline  SKIN: warm, dry, no rash   Psych: Appropriate mood and affect    LAB RESULTS  Lab Results (last 24 hours)     Procedure Component Value Units Date/Time    Basic Metabolic Panel [[id number]] Collected: 01/21/22 1158    Specimen: Blood Updated: 01/21/22 1225    Phosphorus [[id number]] Collected: 01/21/22 1158    Specimen: Blood Updated: 01/21/22 1225    Magnesium [[id number]]  (Normal) Collected: 01/21/22 1001    Specimen: Blood Updated: 01/21/22 1151     Magnesium 1.9 mg/dL     CBC & " Differential [[id number]]  (Abnormal) Collected: 01/21/22 1001    Specimen: Blood Updated: 01/21/22 1109    Narrative:      The following orders were created for panel order CBC & Differential.  Procedure                               Abnormality         Status                     ---------                               -----------         ------                     CBC Auto Differential[[id number]]        Abnormal            Final result                 Please view results for these tests on the individual orders.    CBC Auto Differential [[id number]]  (Abnormal) Collected: 01/21/22 1001    Specimen: Blood Updated: 01/21/22 1109     WBC 14.54 10*3/mm3      RBC 2.84 10*6/mm3      Hemoglobin 8.5 g/dL      Hematocrit 25.7 %      MCV 90.5 fL      MCH 29.9 pg      MCHC 33.1 g/dL      RDW 12.6 %      RDW-SD 41.1 fl      MPV 12.5 fL      Platelets 199 10*3/mm3      Neutrophil % 70.2 %      Lymphocyte % 13.5 %      Monocyte % 13.5 %      Eosinophil % 1.5 %      Basophil % 0.1 %      Immature Grans % 1.2 %      Neutrophils, Absolute 10.22 10*3/mm3      Lymphocytes, Absolute 1.96 10*3/mm3      Monocytes, Absolute 1.96 10*3/mm3      Eosinophils, Absolute 0.22 10*3/mm3      Basophils, Absolute 0.01 10*3/mm3      Immature Grans, Absolute 0.17 10*3/mm3      nRBC 0.0 /100 WBC     Phosphorus [[id number]]  (Abnormal) Collected: 01/20/22 1859    Specimen: Blood Updated: 01/20/22 2033     Phosphorus 2.2 mg/dL     Magnesium [[id number]]  (Normal) Collected: 01/20/22 1859    Specimen: Blood Updated: 01/20/22 2033     Magnesium 1.8 mg/dL         Imaging Results (Last 24 Hours)     ** No results found for the last 24 hours. **             ECG 12 Lead  Component   Ref Range & Units 1/13/22 1356 1/13/22 1253   QT Interval   ms 373 P  409 P              HEART RATE= 81  bpm  RR Interval= 744  ms  OR Interval= 71  ms  P Horizontal Axis= 219  deg  P Front Axis= 219  deg  QRSD Interval= 96  ms  QT Interval= 373  ms  QRS Axis= 105  deg  T Wave Axis=  201  deg  - ABNORMAL ECG -  Sinus or ectopic atrial rhythm  Ventricular trigeminy  Short NE interval  Right axis deviation  Repol abnrm suggests ischemia, diffuse leads             Current Facility-Administered Medications:   •  aluminum-magnesium hydroxide-simethicone (MAALOX MAX) 400-400-40 MG/5ML suspension 15 mL, 15 mL, Oral, Q6H PRN, Vikki Hobbs MD  •  aspirin EC tablet 81 mg, 81 mg, Oral, Daily, Eulogio Mcdaniel MD, 81 mg at 01/21/22 0841  •  budesonide-formoterol (SYMBICORT) 160-4.5 MCG/ACT inhaler 2 puff, 2 puff, Inhalation, BID - RT, Eulogio Mcdaniel MD, 2 puff at 01/21/22 0712  •  bumetanide (BUMEX) tablet 0.5 mg, 0.5 mg, Oral, Daily, Prabhjot Topete MD  •  clopidogrel (PLAVIX) tablet 75 mg, 75 mg, Oral, Daily, Eulogio Mcdaniel MD, 75 mg at 01/21/22 0841  •  digoxin (LANOXIN) tablet 125 mcg, 125 mcg, Oral, Daily, Darlene Shen APRN  •  guaiFENesin (MUCINEX) 12 hr tablet 600 mg, 600 mg, Oral, Q12H, Eulogio Mcdaniel MD, 600 mg at 01/21/22 0842  •  ipratropium-albuterol (DUO-NEB) nebulizer solution 3 mL, 3 mL, Nebulization, 4x Daily - RT, Eulogio Mcdaniel MD, 3 mL at 01/21/22 1107  •  LORazepam (ATIVAN) injection 0.5 mg, 0.5 mg, Intravenous, Q6H PRN, Eulogio Mcdaniel MD, 0.5 mg at 01/17/22 0528  •  mirtazapine (REMERON) tablet 7.5 mg, 7.5 mg, Oral, Nightly, Fabian Lagos III, MD, 7.5 mg at 01/20/22 2156  •  ondansetron (ZOFRAN) injection 4 mg, 4 mg, Intravenous, Q4H PRN, Eulogio Mcdaniel MD, 4 mg at 01/17/22 1016  •  pantoprazole (PROTONIX) EC tablet 40 mg, 40 mg, Oral, BID AC, Eulogio Mcdaniel MD, 40 mg at 01/21/22 0841  •  potassium & sodium phosphates (PHOS-NAK) 280-160-250 MG packet - for Phosphorus less than 1.25 mg/dL, 2 packet, Oral, Q6H PRN **OR** potassium & sodium phosphates (PHOS-NAK) 280-160-250 MG packet - for Phosphorus 1.25 - 2.5 mg/dL, 2 packet, Oral, Q6H PRN, Eulogio Mcdaniel MD, 2 packet at 01/21/22 0629  •  predniSONE (DELTASONE) tablet 5 mg, 5 mg, Oral, Daily With Breakfast, Eulogio Mcdaniel,  MD, 5 mg at 01/21/22 0841  •  propafenone (RYTHMOL) tablet 150 mg, 150 mg, Oral, Q8H, Jonny Peoples MD, 150 mg at 01/21/22 0543  •  rosuvastatin (CRESTOR) tablet 5 mg, 5 mg, Oral, Daily, Jonny Peoples MD, 5 mg at 01/21/22 0842  •  sodium chloride 0.9 % flush 10 mL, 10 mL, Intravenous, PRN, Binu Moreno MD  •  [COMPLETED] Insert peripheral IV, , , Once **AND** sodium chloride 0.9 % flush 10 mL, 10 mL, Intravenous, PRN, Vicki Alejandre, APRN, 10 mL at 01/18/22 0821  •  sodium chloride nasal spray 1 spray, 1 spray, Each Nare, PRN, Jonny Peoples MD, 1 spray at 01/15/22 1835  •  spironolactone (ALDACTONE) tablet 25 mg, 25 mg, Oral, Daily, Prabhjot Topete MD  •  sucralfate (CARAFATE) tablet 1 g, 1 g, Oral, 4x Daily AC & at Bedtime, Jonny Peoples MD, 1 g at 01/21/22 1219  •  ursodiol (ACTIGALL) capsule 300 mg, 300 mg, Oral, TID, Jonny Peoples MD, 300 mg at 01/21/22 0842     ASSESSMENT  Acute on chronic hypoxic respiratory failure  Acute exacerbation of COPD  Chronic diastolic CHF  Acute kidney injury in hyperkalemia   Paroxysmal atrial fibrillation  Hypertension  Hyperlipidemia  Leukocytosis secondary to steroid  Ongoing tobacco abuse  Gastroesophageal reflux disease    PLAN  Discharge home with family support and home health if okay with all  Discharge summary dictated    JONNY PEOPLES MD

## 2022-01-21 NOTE — PLAN OF CARE
Goal Outcome Evaluation:     Patient alert and oriented. Went over discharge instructions with son and the patient. Educated patient on the importance of quitting smoking. Educated patient on follow up appointments. Patient being discharged home with home health.

## 2022-01-21 NOTE — CASE MANAGEMENT/SOCIAL WORK
Case Management Discharge Note      Final Note: Pt discharged home with [facility].  MELISSA Williamson RN         Selected Continued Care - Admitted Since 1/13/2022     Destination    No services have been selected for the patient.              Durable Medical Equipment    No services have been selected for the patient.              Dialysis/Infusion    No services have been selected for the patient.              Home Medical Care    No services have been selected for the patient.              Therapy    No services have been selected for the patient.              Community Resources    No services have been selected for the patient.              Community & DME    No services have been selected for the patient.                  Transportation Services  Private: Car    Final Discharge Disposition Code: 06 - home with home health care

## 2022-01-21 NOTE — PROGRESS NOTES
[facility]  Cardiology Progress Note    Patient Identification:  Name: Celia Baird  Age: 77 y.o.  Sex: female  :  1945  MRN: [id number]                 Follow Up / Chief Complaint: follow up for atrial fibrillation    Interval History: She is converted back to sinus rhythm.  BP remains soft.         Subjective: No chest pain    Objective:    Past Medical History:  Past Medical History:   Diagnosis Date   • Atrial fibrillation (HCC)    • COPD (chronic obstructive pulmonary disease) (HCC)    • History of frequent urinary tract infections    • Irregular heart beat    • Kidney stones    • PBC (primary biliary cirrhosis)    • PBC (primary biliary cirrhosis)      Past Surgical History:  Past Surgical History:   Procedure Laterality Date   • CARDIAC ELECTROPHYSIOLOGY PROCEDURE N/A 3/30/2017    Procedure:    LINQ;  Surgeon: Ailyn Solorio MD;  Location: [facility] INVASIVE LOCATION;  Service:    • CHOLECYSTECTOMY     • TUBAL ABDOMINAL LIGATION          Social History:   Social History     Tobacco Use   • Smoking status: Current Some Day Smoker     Packs/day: 0.50     Years: 59.00     Pack years: 29.50     Types: Cigarettes   • Smokeless tobacco: Never Used   Substance Use Topics   • Alcohol use: No      Family History:  Family History   Problem Relation Age of Onset   • Heart disease Father    • Heart attack Father    • Heart disease Brother    • Stroke Mother           Allergies:  Allergies   Allergen Reactions   • Morphine And Related Nausea And Vomiting   • Levaquin [Levofloxacin]    • Sulfa Antibiotics      Scheduled Meds:  aspirin, 81 mg, Daily  budesonide-formoterol, 2 puff, BID - RT  bumetanide, 0.5 mg, Daily  clopidogrel, 75 mg, Daily  digoxin, 125 mcg, Daily  guaiFENesin, 600 mg, Q12H  ipratropium-albuterol, 3 mL, 4x Daily - RT  mirtazapine, 7.5 mg, Nightly  pantoprazole, 40 mg, BID AC  predniSONE, 5 mg, Daily With Breakfast  propafenone, 150 mg, Q8H  rosuvastatin, 5 mg,  "Daily  spironolactone, 25 mg, Daily  sucralfate, 1 g, 4x Daily AC & at Bedtime  ursodiol, 300 mg, TID            INTAKE AND OUTPUT:    Intake/Output Summary (Last 24 hours) at 1/21/2022 1152  Last data filed at 1/21/2022 0546  Gross per 24 hour   Intake 120 ml   Output 425 ml   Net -305 ml     ROS  Constitutional: Awake and alert, no fever.  No nosebleeds  Abdomen           no abdominal pain   Cardiac              no chest pain  Pulmonary          no shortness of breath      BP 97/53 (BP Location: Left arm, Patient Position: Lying)   Pulse 76   Temp 97.8 °F (36.6 °C) (Oral)   Resp 20   Ht 152.4 cm (60\")   Wt 35.4 kg (78 lb 0.7 oz)   SpO2 97%   BMI 15.24 kg/m²       Physical Exam:  General:  Appears chronically ill  Eyes: EOM normal no dental drainage  HEENT:  No JVD. Thyroid not visibly enlarged. No mucosal pallor or cyanosis  Respiratory: Respirations regular and unlabored at rest. BBS with good air entry in all fields. No crackles, rubs or wheezes auscultated  Cardiovascular: S1S2 Regular rate and rhythm, PVCs. No murmur, rub or gallop. No carotid bruits. DP/PT pulses   2+. No pretibial pitting edema  Gastrointestinal: Abdomen soft, flat, non tender. Bowel sounds present. No hepatosplenomegaly. No ascites  Musculoskeletal: MCCRACKEN x4. No abnormal movements  Neuro: AAO x3 CN II-XII grossly intact  Psych: Mood and affect normal, pleasant and cooperative            Cardiographics  Telemetry:   SR    aflutter      SR           SR      ECG:         Echocardiogram:   1/14/22  Interpretation Summary    · Calculated left ventricular EF = 61.9% Estimated left ventricular EF was in agreement with the calculated left ventricular EF.  · Left ventricular diastolic function was normal.  · There is no evidence of pericardial effusion. .  Interpretation Summary       · Findings consistent with a normal ECG stress test.  · Left ventricular ejection fraction is normal (Calculated EF = 70%).  · Myocardial perfusion imaging " indicates a normal myocardial perfusion study with no evidence of ischemia.  · Impressions are consistent with a low risk study.     Asymptomatic for chest pain. ECG is negative for ischemia.   Ectopy: Rare PAC at baseline, none with exercise, occasional PVC in recovery  HR and BP response : Appropriate for Beta-blocker therapy  Pharmacologic study due to inability to tolerate increasing speed and grade of treadmill due to Pulmonary, mobility  Issues and Beta-blocker therapy.  Participated in Low Level exercise and tolerance is poor.         Imaging  Chest X-ray:   IMPRESSION:  No focal pulmonary consolidation. Follow-up as clinical  indications persist.     This report was finalized on 1/13/2022 12:27 PM by Dr. Andrew Abdi M.D.  Lab Review       Results from last 7 days   Lab Units 01/21/22  1001   MAGNESIUM mg/dL 1.9     Results from last 7 days   Lab Units 01/20/22  0533   SODIUM mmol/L 139   POTASSIUM mmol/L 3.7   BUN mg/dL 19   CREATININE mg/dL 0.66   CALCIUM mg/dL 8.5*        Results from last 7 days   Lab Units 01/21/22  1001 01/20/22  0533 01/19/22  0617   WBC 10*3/mm3 14.54* 12.50* 14.53*   HEMOGLOBIN g/dL 8.5* 9.3* 10.4*   HEMATOCRIT % 25.7* 27.9* 31.1*   PLATELETS 10*3/mm3 199 184 172           The following medical decision was discussed in detail with Dr. Solorio    Assessment:  Acute on chronic hypoxic respiratory failure  Paroxysmal atrial fibrillation: with watchman's device  Chronic diastolic CHF  Hyperlipidemia  Hypertension  Tobacco abuse  GERD  Acute exacerbation of COPD  History of loop implant (battery no longer working)   Hypokalemia: Resolved  Hyperkalemia: Potassium has been stopped, continue to monitor.  Spironolactone restarted by nephrology    Plan:    She converted back to sinus rhythm overnight.  BP soft but stable.  Continue Plavix, aspirin, Rythmol, digoxin, rosuvastatin.  Diuretics per nephrology, note Bumex and spironolactone resumed today.    Follow up with Darlene  "Hermelinda SCHMIDT on February 11 @ 9am      Darlene Shen, BRAYAN  )1/21/2022       EMR Dragon/Transcription:   \"Dictated utilizing Dragon dictation\".     "

## 2022-01-21 NOTE — CASE MANAGEMENT/SOCIAL WORK
Continued Stay Note  [facility]     Patient Name: Celia Baird  MRN: [id number]  Today's Date: 1/21/2022    Admit Date: 1/13/2022     Discharge Plan     Row Name 01/21/22 1322       Plan    Plan Plan home with [facility].  MELISSA Williamson RN    Patient/Family in Agreement with Plan yes    Plan Comments Spoke to pt at bedside.  [facility] has been following pt.   Plan home with [facility] and PT.  MELISSA Williamson RN               Discharge Codes    No documentation.               Expected Discharge Date and Time     Expected Discharge Date Expected Discharge Time    Jan 21, 2022             Meena Williamson, RN

## 2022-01-22 ENCOUNTER — READMISSION MANAGEMENT (OUTPATIENT)
Dept: CALL CENTER | Facility: HOSPITAL | Age: 77
End: 2022-01-22

## 2022-01-22 ENCOUNTER — HOME CARE VISIT (OUTPATIENT)
Dept: HOME HEALTH SERVICES | Facility: HOME HEALTHCARE | Age: 77
End: 2022-01-22

## 2022-01-22 VITALS
OXYGEN SATURATION: 98 % | SYSTOLIC BLOOD PRESSURE: 110 MMHG | HEART RATE: 72 BPM | RESPIRATION RATE: 18 BRPM | DIASTOLIC BLOOD PRESSURE: 58 MMHG | TEMPERATURE: 98.2 F

## 2022-01-22 PROCEDURE — G0151 HHCP-SERV OF PT,EA 15 MIN: HCPCS

## 2022-01-22 NOTE — CASE COMMUNICATION
Patient is 1w1, 2w3.  She was hospitalized with COPD.  Discharged 22.  Prior patient was walking with no device and independently living at home.  [place] home with no steps.   and a son  last year.  Other son assists as needed.  Uses oxygen 1.5 L prn.  Constantly at present.  Upon arrival patient's BP was low.  Son stated her HR was in the 40s last night.  Also stated that her vitals were like this in the hospital.  All over the place.  Was upset that they discharged her like this.  I did the evaluation getting her to transfer and walk and patient's BP increased to 110/50. Patient and son both not interested in going to ER.  He was staying with her this weekend and would monitor her.    She needs to build up her endurance and strength.  Has appointment with primary care MD next week and I recommended they try to move up appointment with cardiologi .  Son was in agreement.

## 2022-01-22 NOTE — OUTREACH NOTE
Prep Survey      Responses   [facility] patient discharged from? [place]   Is LACE score < 7 ? No   Emergency Room discharge w/ pulse ox? No   Eligibility Readm Mgmt   Discharge diagnosis Acute on chronic hypoxic respiratory failure, Chronic diastolic CHF   Does the patient have one of the following disease processes/diagnoses(primary or secondary)? COPD/Pneumonia   Does the patient have Home health ordered? Yes   What is the Home health agency?  [facility]   Is there a DME ordered? No   Medication alerts for this patient Digoxin    Prep survey completed? Yes          Zahraa Chaudhari RN

## 2022-01-22 NOTE — HOME HEALTH
Physical Therapy Evaluation   Diagnosis: COPD  Surgical Procedure and date:  n/a  Pertinent Medical History:  see epic    Prior level of function:   Ambulation: independent with no device, only using rollator for couple of weeks prior to hospitalization   Activities of daily living: independent   Instrumental activities of daily living: independent   Driving: drives   Other/ Hobbies/Work:    Edema:   none noted  Skin bruised and scrated leg in sleep which bled into bed sheets.  Son addressing.   Home/ social environment:  lives alone in home with no steps, son assisting as needed  Goal for Therapy: get stronger  Assessment:  Patient's BP very low upon arrival.  Son states HR was into the 40's last night.  Neither patient or son are interested in going back to the ER.  Patient had just gotten up and not taken medications.  Had patient eat and be evaluated with walking and transfers.  BP up to 110/50 after moving around.  Son staying with patient and will continue to monitor her.  He states her vitals were doing the same thing in the hospital.    Plan for next visit:initiate HEP

## 2022-01-24 ENCOUNTER — HOME CARE VISIT (OUTPATIENT)
Dept: HOME HEALTH SERVICES | Facility: HOME HEALTHCARE | Age: 77
End: 2022-01-24

## 2022-01-24 LAB — QT INTERVAL: 314 MS

## 2022-01-24 NOTE — CASE COMMUNICATION
Patient missed a PT visit from [facility] on 1/24/2022    Reason: spoke to pt's family member and he stated pt slipped yesterday, is bruised, and unable to do PT today. Offered to go assess pt, and he declined, stating we should check back later in the week, possibly Thursday.       For your records only.   As per home health protocol, MD must be notified of missed/cancelled visits; therefore the prescribed frequency was not met.

## 2022-01-25 ENCOUNTER — READMISSION MANAGEMENT (OUTPATIENT)
Dept: CALL CENTER | Facility: HOSPITAL | Age: 77
End: 2022-01-25

## 2022-01-25 NOTE — OUTREACH NOTE
COPD/PN Week 1 Survey      Responses   [facility] patient discharged from? [place]   Does the patient have one of the following disease processes/diagnoses(primary or secondary)? COPD/Pneumonia   Was the primary reason for admission: COPD exacerbation   Week 1 attempt successful? Yes   Call start time 1530   Call end time 1537   Discharge diagnosis Acute on chronic hypoxic respiratory failure, Chronic diastolic CHF   Person spoke with today (if not patient) and relationship katie Clark   Meds reviewed with patient/caregiver? Yes   Is the patient having any side effects they believe may be caused by any medication additions or changes? No   Does the patient have all medications ordered at discharge? Yes   Is the patient taking all medications as directed (includes completed medication regime)? Yes   Does the patient have a primary care provider?  Yes   Does the patient have an appointment with their PCP or specialist within 7 days of discharge? Yes   Comments regarding PCP 1/28/22   Has the patient kept scheduled appointments due by today? N/A   What is the Home health agency?  [facility]    Has home health visited the patient within 72 hours of discharge? Yes   Pulse Ox monitoring Intermittent   Pulse Ox device source Patient   O2 Sat comments 95-98% 1.5 LO2   O2 Sat: education provided Sat levels,  Monitoring frequency,  When to seek care   O2 Sat education comments sats remaining below 90% call 911/go to ED   Psychosocial issues? No   What is the patient's perception of their health status since discharge? Improving   Nursing Interventions Nurse provided patient education   If the patient is a current smoker, are they able to teach back resources for cessation? [id number]   Is the patient/caregiver able to teach back the hierarchy of who to call/visit for symptoms/problems? PCP, Specialist, Home health nurse, Urgent Care, ED, 911 Yes   Is the patient able to teach back COPD zones? Yes   Nursing interventions  Education provided on various zones   Patient reports what zone on this call? Green Zone   Green Zone Reports doing well,  Sleeping well   Green Zone interventions: Take daily medications,  Use oxygen as prescribed,  Do not smoke   Week 1 call completed? Yes   Wrap up additional comments Eduardo, son, states pt is doing better,  O2 sats remain 95% on 1.5LO2. Eduardo inquired about if pt should remain on oxygen. Eduardo advised to check pt's O2 sats when sleeping then Call PCP office to report O2 sats, and then ask if PCP wants pt to remain on oxygen. Eduardo verbalized understanding.          Mahogany Valente RN

## 2022-01-26 ENCOUNTER — HOME CARE VISIT (OUTPATIENT)
Dept: HOME HEALTH SERVICES | Facility: HOME HEALTHCARE | Age: 77
End: 2022-01-26

## 2022-01-26 ENCOUNTER — APPOINTMENT (OUTPATIENT)
Dept: GENERAL RADIOLOGY | Facility: HOSPITAL | Age: 77
End: 2022-01-26

## 2022-01-26 ENCOUNTER — HOSPITAL ENCOUNTER (INPATIENT)
Facility: HOSPITAL | Age: 77
LOS: 12 days | Discharge: HOME HEALTH | End: 2022-02-07
Attending: EMERGENCY MEDICINE | Admitting: HOSPITALIST

## 2022-01-26 VITALS
RESPIRATION RATE: 18 BRPM | HEART RATE: 79 BPM | SYSTOLIC BLOOD PRESSURE: 86 MMHG | DIASTOLIC BLOOD PRESSURE: 40 MMHG | OXYGEN SATURATION: 92 %

## 2022-01-26 DIAGNOSIS — E87.6 HYPOKALEMIA: ICD-10-CM

## 2022-01-26 DIAGNOSIS — I95.1 ORTHOSTASIS: ICD-10-CM

## 2022-01-26 DIAGNOSIS — J44.1 COPD EXACERBATION: ICD-10-CM

## 2022-01-26 DIAGNOSIS — D64.9 ANEMIA REQUIRING TRANSFUSIONS: ICD-10-CM

## 2022-01-26 DIAGNOSIS — D64.9 ANEMIA: ICD-10-CM

## 2022-01-26 DIAGNOSIS — K92.2 GASTROINTESTINAL HEMORRHAGE, UNSPECIFIED GASTROINTESTINAL HEMORRHAGE TYPE: Primary | ICD-10-CM

## 2022-01-26 LAB
ABO GROUP BLD: NORMAL
ALBUMIN SERPL-MCNC: 3 G/DL (ref 3.5–5.2)
ALBUMIN/GLOB SERPL: 1.3 G/DL
ALP SERPL-CCNC: 122 U/L (ref 39–117)
ALT SERPL W P-5'-P-CCNC: 14 U/L (ref 1–33)
ANION GAP SERPL CALCULATED.3IONS-SCNC: 5.4 MMOL/L (ref 5–15)
APTT PPP: 30.4 SECONDS (ref 22.7–35.4)
AST SERPL-CCNC: 15 U/L (ref 1–32)
BASOPHILS # BLD AUTO: 0.04 10*3/MM3 (ref 0–0.2)
BASOPHILS NFR BLD AUTO: 0.3 % (ref 0–1.5)
BILIRUB SERPL-MCNC: 0.4 MG/DL (ref 0–1.2)
BILIRUB UR QL STRIP: NEGATIVE
BLD GP AB SCN SERPL QL: NEGATIVE
BUN SERPL-MCNC: 24 MG/DL (ref 8–23)
BUN/CREAT SERPL: 27.3 (ref 7–25)
CALCIUM SPEC-SCNC: 9 MG/DL (ref 8.6–10.5)
CHLORIDE SERPL-SCNC: 95 MMOL/L (ref 98–107)
CLARITY UR: CLEAR
CO2 SERPL-SCNC: 35.6 MMOL/L (ref 22–29)
COLOR UR: YELLOW
CREAT SERPL-MCNC: 0.88 MG/DL (ref 0.57–1)
DEPRECATED RDW RBC AUTO: 43.1 FL (ref 37–54)
DIGOXIN SERPL-MCNC: 2 NG/ML (ref 0.6–1.2)
EOSINOPHIL # BLD AUTO: 0.29 10*3/MM3 (ref 0–0.4)
EOSINOPHIL NFR BLD AUTO: 1.9 % (ref 0.3–6.2)
ERYTHROCYTE [DISTWIDTH] IN BLOOD BY AUTOMATED COUNT: 13 % (ref 12.3–15.4)
GFR SERPL CREATININE-BSD FRML MDRD: 62 ML/MIN/1.73
GLOBULIN UR ELPH-MCNC: 2.3 GM/DL
GLUCOSE SERPL-MCNC: 103 MG/DL (ref 65–99)
GLUCOSE UR STRIP-MCNC: NEGATIVE MG/DL
HCT VFR BLD AUTO: 19.2 % (ref 34–46.6)
HGB BLD-MCNC: 6.4 G/DL (ref 12–15.9)
HGB UR QL STRIP.AUTO: NEGATIVE
IMM GRANULOCYTES # BLD AUTO: 0.11 10*3/MM3 (ref 0–0.05)
IMM GRANULOCYTES NFR BLD AUTO: 0.7 % (ref 0–0.5)
INR PPP: 0.96 (ref 0.9–1.1)
KETONES UR QL STRIP: NEGATIVE
LEUKOCYTE ESTERASE UR QL STRIP.AUTO: NEGATIVE
LYMPHOCYTES # BLD AUTO: 1.67 10*3/MM3 (ref 0.7–3.1)
LYMPHOCYTES NFR BLD AUTO: 11.2 % (ref 19.6–45.3)
MAGNESIUM SERPL-MCNC: 1.6 MG/DL (ref 1.6–2.4)
MCH RBC QN AUTO: 30.8 PG (ref 26.6–33)
MCHC RBC AUTO-ENTMCNC: 33.3 G/DL (ref 31.5–35.7)
MCV RBC AUTO: 92.3 FL (ref 79–97)
MONOCYTES # BLD AUTO: 1.97 10*3/MM3 (ref 0.1–0.9)
MONOCYTES NFR BLD AUTO: 13.2 % (ref 5–12)
NEUTROPHILS NFR BLD AUTO: 10.81 10*3/MM3 (ref 1.7–7)
NEUTROPHILS NFR BLD AUTO: 72.7 % (ref 42.7–76)
NITRITE UR QL STRIP: NEGATIVE
NRBC BLD AUTO-RTO: 0 /100 WBC (ref 0–0.2)
PH UR STRIP.AUTO: 6.5 [PH] (ref 5–8)
PLATELET # BLD AUTO: 211 10*3/MM3 (ref 140–450)
PMV BLD AUTO: 11.8 FL (ref 6–12)
POTASSIUM SERPL-SCNC: 3 MMOL/L (ref 3.5–5.2)
PROCALCITONIN SERPL-MCNC: 0.16 NG/ML (ref 0–0.25)
PROT SERPL-MCNC: 5.3 G/DL (ref 6–8.5)
PROT UR QL STRIP: NEGATIVE
PROTHROMBIN TIME: 12.6 SECONDS (ref 11.7–14.2)
QT INTERVAL: 446 MS
RBC # BLD AUTO: 2.08 10*6/MM3 (ref 3.77–5.28)
RH BLD: POSITIVE
SARS-COV-2 RNA RESP QL NAA+PROBE: NOT DETECTED
SODIUM SERPL-SCNC: 136 MMOL/L (ref 136–145)
SP GR UR STRIP: 1.01 (ref 1–1.03)
T&S EXPIRATION DATE: NORMAL
TROPONIN T SERPL-MCNC: <0.01 NG/ML (ref 0–0.03)
UROBILINOGEN UR QL STRIP: NORMAL
WBC NRBC COR # BLD: 14.89 10*3/MM3 (ref 3.4–10.8)

## 2022-01-26 PROCEDURE — 85610 PROTHROMBIN TIME: CPT | Performed by: EMERGENCY MEDICINE

## 2022-01-26 PROCEDURE — 80162 ASSAY OF DIGOXIN TOTAL: CPT | Performed by: EMERGENCY MEDICINE

## 2022-01-26 PROCEDURE — 83735 ASSAY OF MAGNESIUM: CPT | Performed by: EMERGENCY MEDICINE

## 2022-01-26 PROCEDURE — 81003 URINALYSIS AUTO W/O SCOPE: CPT | Performed by: EMERGENCY MEDICINE

## 2022-01-26 PROCEDURE — 86901 BLOOD TYPING SEROLOGIC RH(D): CPT

## 2022-01-26 PROCEDURE — 86900 BLOOD TYPING SEROLOGIC ABO: CPT

## 2022-01-26 PROCEDURE — 86850 RBC ANTIBODY SCREEN: CPT | Performed by: EMERGENCY MEDICINE

## 2022-01-26 PROCEDURE — 99284 EMERGENCY DEPT VISIT MOD MDM: CPT

## 2022-01-26 PROCEDURE — P9016 RBC LEUKOCYTES REDUCED: HCPCS

## 2022-01-26 PROCEDURE — 86923 COMPATIBILITY TEST ELECTRIC: CPT

## 2022-01-26 PROCEDURE — 86901 BLOOD TYPING SEROLOGIC RH(D): CPT | Performed by: EMERGENCY MEDICINE

## 2022-01-26 PROCEDURE — 25010000002 MAGNESIUM SULFATE 2 GM/50ML SOLUTION: Performed by: EMERGENCY MEDICINE

## 2022-01-26 PROCEDURE — 84484 ASSAY OF TROPONIN QUANT: CPT | Performed by: EMERGENCY MEDICINE

## 2022-01-26 PROCEDURE — 93005 ELECTROCARDIOGRAM TRACING: CPT | Performed by: EMERGENCY MEDICINE

## 2022-01-26 PROCEDURE — 85025 COMPLETE CBC W/AUTO DIFF WBC: CPT | Performed by: EMERGENCY MEDICINE

## 2022-01-26 PROCEDURE — G0151 HHCP-SERV OF PT,EA 15 MIN: HCPCS

## 2022-01-26 PROCEDURE — 71045 X-RAY EXAM CHEST 1 VIEW: CPT

## 2022-01-26 PROCEDURE — U0003 INFECTIOUS AGENT DETECTION BY NUCLEIC ACID (DNA OR RNA); SEVERE ACUTE RESPIRATORY SYNDROME CORONAVIRUS 2 (SARS-COV-2) (CORONAVIRUS DISEASE [COVID-19]), AMPLIFIED PROBE TECHNIQUE, MAKING USE OF HIGH THROUGHPUT TECHNOLOGIES AS DESCRIBED BY CMS-2020-01-R: HCPCS | Performed by: EMERGENCY MEDICINE

## 2022-01-26 PROCEDURE — 86900 BLOOD TYPING SEROLOGIC ABO: CPT | Performed by: EMERGENCY MEDICINE

## 2022-01-26 PROCEDURE — 80053 COMPREHEN METABOLIC PANEL: CPT | Performed by: EMERGENCY MEDICINE

## 2022-01-26 PROCEDURE — 85730 THROMBOPLASTIN TIME PARTIAL: CPT | Performed by: EMERGENCY MEDICINE

## 2022-01-26 PROCEDURE — 93010 ELECTROCARDIOGRAM REPORT: CPT | Performed by: INTERNAL MEDICINE

## 2022-01-26 PROCEDURE — 84145 PROCALCITONIN (PCT): CPT | Performed by: EMERGENCY MEDICINE

## 2022-01-26 PROCEDURE — 36430 TRANSFUSION BLD/BLD COMPNT: CPT

## 2022-01-26 RX ORDER — PANTOPRAZOLE SODIUM 40 MG/10ML
80 INJECTION, POWDER, LYOPHILIZED, FOR SOLUTION INTRAVENOUS ONCE
Status: COMPLETED | OUTPATIENT
Start: 2022-01-26 | End: 2022-01-26

## 2022-01-26 RX ORDER — MAGNESIUM SULFATE HEPTAHYDRATE 40 MG/ML
2 INJECTION, SOLUTION INTRAVENOUS ONCE
Status: COMPLETED | OUTPATIENT
Start: 2022-01-26 | End: 2022-01-26

## 2022-01-26 RX ORDER — POTASSIUM CHLORIDE 750 MG/1
40 TABLET, FILM COATED, EXTENDED RELEASE ORAL
Status: DISPENSED | OUTPATIENT
Start: 2022-01-26 | End: 2022-01-26

## 2022-01-26 RX ADMIN — MAGNESIUM SULFATE HEPTAHYDRATE 2 G: 40 INJECTION, SOLUTION INTRAVENOUS at 18:37

## 2022-01-26 RX ADMIN — POTASSIUM CHLORIDE 40 MEQ: 750 TABLET, EXTENDED RELEASE ORAL at 18:38

## 2022-01-26 RX ADMIN — SODIUM CHLORIDE 500 ML: 9 INJECTION, SOLUTION INTRAVENOUS at 17:10

## 2022-01-26 RX ADMIN — PANTOPRAZOLE SODIUM 80 MG: 40 INJECTION, POWDER, FOR SOLUTION INTRAVENOUS at 19:36

## 2022-01-26 RX ADMIN — PANTOPRAZOLE SODIUM 8 MG/HR: 40 INJECTION, POWDER, FOR SOLUTION INTRAVENOUS at 19:40

## 2022-01-26 NOTE — HOME HEALTH
Pt presented in bed. She reports she was sick to her stomach Sunday, reached for garbage can and rolled out of bed. She says her back is sore, but it has been aching off and on from being in bed. Poor skin turgor noted. Pt drinks about 5 cokes per day. She has had 2 donuts today. She has bruises throughout her body, she reports from falling or even light touch. BP very low today. See PT note for vitals. Called cardiologist and moved her appt to Friday. Called Dr Gomez's office and he advised she go to ED due to low BP. She did not tolerate sitting on EOB or BSC due to light headedness. She reports she has not had a BM in a week. She also has pain in R glut region. She may benefit from SNF placement for further care following this ED visit. Will leave a voicemail for intake to follow.

## 2022-01-26 NOTE — CASE COMMUNICATION
Please route to Dr Gomez's office.      Pt presented in bed on oxygen and son present.  Son reports she fell Sunday and has been very weak, difficulty transferring to commode, etc.  Today she has only eaten a couple donuts and drank coke (She drinks about 5 cokes per day).  She has bruises throughout her body, she reports from falling or even light touch.  BP very low today.  See PT note for vitals.  Called cardiologist and moved her a ppt to Friday. Called Dr Gomez's office and he advised she go to ED due to low BP.  She did not tolerate sitting on EOB or BSC due to light headedness. She reports she has not had a BM in a week. She also has pain in R glut region. She may benefit from SNF placement for further care following this ED visit. Will leave a voicemail for Intake to follow.

## 2022-01-27 ENCOUNTER — PREP FOR SURGERY (OUTPATIENT)
Dept: OTHER | Facility: HOSPITAL | Age: 77
End: 2022-01-27

## 2022-01-27 ENCOUNTER — HOME CARE VISIT (OUTPATIENT)
Dept: HOME HEALTH SERVICES | Facility: HOME HEALTHCARE | Age: 77
End: 2022-01-27

## 2022-01-27 ENCOUNTER — READMISSION MANAGEMENT (OUTPATIENT)
Dept: CALL CENTER | Facility: HOSPITAL | Age: 77
End: 2022-01-27

## 2022-01-27 DIAGNOSIS — D64.9 ANEMIA: Primary | ICD-10-CM

## 2022-01-27 DIAGNOSIS — D50.8 OTHER IRON DEFICIENCY ANEMIA: Primary | ICD-10-CM

## 2022-01-27 LAB
ANION GAP SERPL CALCULATED.3IONS-SCNC: 9 MMOL/L (ref 5–15)
BASOPHILS # BLD AUTO: 0.05 10*3/MM3 (ref 0–0.2)
BASOPHILS NFR BLD AUTO: 0.4 % (ref 0–1.5)
BH BB BLOOD EXPIRATION DATE: NORMAL
BH BB BLOOD EXPIRATION DATE: NORMAL
BH BB BLOOD TYPE BARCODE: 5100
BH BB BLOOD TYPE BARCODE: 5100
BH BB DISPENSE STATUS: NORMAL
BH BB DISPENSE STATUS: NORMAL
BH BB PRODUCT CODE: NORMAL
BH BB PRODUCT CODE: NORMAL
BH BB UNIT NUMBER: NORMAL
BH BB UNIT NUMBER: NORMAL
BUN SERPL-MCNC: 20 MG/DL (ref 8–23)
BUN/CREAT SERPL: 27 (ref 7–25)
CALCIUM SPEC-SCNC: 8.4 MG/DL (ref 8.6–10.5)
CHLORIDE SERPL-SCNC: 100 MMOL/L (ref 98–107)
CO2 SERPL-SCNC: 26 MMOL/L (ref 22–29)
CREAT SERPL-MCNC: 0.74 MG/DL (ref 0.57–1)
CROSSMATCH INTERPRETATION: NORMAL
CROSSMATCH INTERPRETATION: NORMAL
DEPRECATED RDW RBC AUTO: 41.3 FL (ref 37–54)
EOSINOPHIL # BLD AUTO: 0.36 10*3/MM3 (ref 0–0.4)
EOSINOPHIL NFR BLD AUTO: 2.9 % (ref 0.3–6.2)
ERYTHROCYTE [DISTWIDTH] IN BLOOD BY AUTOMATED COUNT: 13.5 % (ref 12.3–15.4)
GFR SERPL CREATININE-BSD FRML MDRD: 76 ML/MIN/1.73
GLUCOSE SERPL-MCNC: 110 MG/DL (ref 65–99)
HCT VFR BLD AUTO: 29.5 % (ref 34–46.6)
HCT VFR BLD AUTO: 31.5 % (ref 34–46.6)
HGB BLD-MCNC: 10.3 G/DL (ref 12–15.9)
HGB BLD-MCNC: 11.1 G/DL (ref 12–15.9)
IMM GRANULOCYTES # BLD AUTO: 0.07 10*3/MM3 (ref 0–0.05)
IMM GRANULOCYTES NFR BLD AUTO: 0.6 % (ref 0–0.5)
LYMPHOCYTES # BLD AUTO: 1.53 10*3/MM3 (ref 0.7–3.1)
LYMPHOCYTES NFR BLD AUTO: 12.5 % (ref 19.6–45.3)
MCH RBC QN AUTO: 30.3 PG (ref 26.6–33)
MCHC RBC AUTO-ENTMCNC: 34.9 G/DL (ref 31.5–35.7)
MCV RBC AUTO: 86.8 FL (ref 79–97)
MONOCYTES # BLD AUTO: 1.63 10*3/MM3 (ref 0.1–0.9)
MONOCYTES NFR BLD AUTO: 13.3 % (ref 5–12)
NEUTROPHILS NFR BLD AUTO: 70.3 % (ref 42.7–76)
NEUTROPHILS NFR BLD AUTO: 8.6 10*3/MM3 (ref 1.7–7)
NRBC BLD AUTO-RTO: 0 /100 WBC (ref 0–0.2)
PLATELET # BLD AUTO: 185 10*3/MM3 (ref 140–450)
PMV BLD AUTO: 11.7 FL (ref 6–12)
POTASSIUM SERPL-SCNC: 4.1 MMOL/L (ref 3.5–5.2)
RBC # BLD AUTO: 3.4 10*6/MM3 (ref 3.77–5.28)
SODIUM SERPL-SCNC: 135 MMOL/L (ref 136–145)
UNIT  ABO: NORMAL
UNIT  ABO: NORMAL
UNIT  RH: NORMAL
UNIT  RH: NORMAL
WBC NRBC COR # BLD: 12.24 10*3/MM3 (ref 3.4–10.8)

## 2022-01-27 PROCEDURE — 85025 COMPLETE CBC W/AUTO DIFF WBC: CPT | Performed by: HOSPITALIST

## 2022-01-27 PROCEDURE — 94640 AIRWAY INHALATION TREATMENT: CPT

## 2022-01-27 PROCEDURE — P9016 RBC LEUKOCYTES REDUCED: HCPCS

## 2022-01-27 PROCEDURE — 99222 1ST HOSP IP/OBS MODERATE 55: CPT | Performed by: INTERNAL MEDICINE

## 2022-01-27 PROCEDURE — 94761 N-INVAS EAR/PLS OXIMETRY MLT: CPT

## 2022-01-27 PROCEDURE — 85014 HEMATOCRIT: CPT | Performed by: HOSPITALIST

## 2022-01-27 PROCEDURE — 99221 1ST HOSP IP/OBS SF/LOW 40: CPT | Performed by: INTERNAL MEDICINE

## 2022-01-27 PROCEDURE — 85018 HEMOGLOBIN: CPT | Performed by: HOSPITALIST

## 2022-01-27 PROCEDURE — 36430 TRANSFUSION BLD/BLD COMPNT: CPT

## 2022-01-27 PROCEDURE — 80048 BASIC METABOLIC PNL TOTAL CA: CPT | Performed by: HOSPITALIST

## 2022-01-27 PROCEDURE — 94760 N-INVAS EAR/PLS OXIMETRY 1: CPT

## 2022-01-27 PROCEDURE — 86900 BLOOD TYPING SEROLOGIC ABO: CPT

## 2022-01-27 PROCEDURE — 94799 UNLISTED PULMONARY SVC/PX: CPT

## 2022-01-27 RX ORDER — PROPAFENONE HYDROCHLORIDE 150 MG/1
150 TABLET, COATED ORAL EVERY 8 HOURS SCHEDULED
Status: DISCONTINUED | OUTPATIENT
Start: 2022-01-27 | End: 2022-02-07 | Stop reason: HOSPADM

## 2022-01-27 RX ORDER — MAGNESIUM SULFATE HEPTAHYDRATE 40 MG/ML
2 INJECTION, SOLUTION INTRAVENOUS ONCE
Status: DISCONTINUED | OUTPATIENT
Start: 2022-01-27 | End: 2022-01-27

## 2022-01-27 RX ORDER — PANTOPRAZOLE SODIUM 40 MG/10ML
80 INJECTION, POWDER, LYOPHILIZED, FOR SOLUTION INTRAVENOUS ONCE
Status: DISCONTINUED | OUTPATIENT
Start: 2022-01-27 | End: 2022-01-27

## 2022-01-27 RX ORDER — SUCRALFATE 1 G/1
1 TABLET ORAL
Status: DISCONTINUED | OUTPATIENT
Start: 2022-01-27 | End: 2022-02-07 | Stop reason: HOSPADM

## 2022-01-27 RX ORDER — IPRATROPIUM BROMIDE AND ALBUTEROL SULFATE 2.5; .5 MG/3ML; MG/3ML
3 SOLUTION RESPIRATORY (INHALATION) EVERY 4 HOURS PRN
Status: DISCONTINUED | OUTPATIENT
Start: 2022-01-27 | End: 2022-02-01

## 2022-01-27 RX ORDER — ONDANSETRON 2 MG/ML
4 INJECTION INTRAMUSCULAR; INTRAVENOUS EVERY 4 HOURS PRN
Status: DISCONTINUED | OUTPATIENT
Start: 2022-01-27 | End: 2022-02-07 | Stop reason: HOSPADM

## 2022-01-27 RX ORDER — POLYETHYLENE GLYCOL 3350 17 G/17G
1 POWDER, FOR SOLUTION ORAL ONCE
Status: COMPLETED | OUTPATIENT
Start: 2022-01-27 | End: 2022-01-27

## 2022-01-27 RX ORDER — ACETAMINOPHEN 325 MG/1
650 TABLET ORAL EVERY 6 HOURS PRN
Status: DISCONTINUED | OUTPATIENT
Start: 2022-01-27 | End: 2022-02-07 | Stop reason: HOSPADM

## 2022-01-27 RX ORDER — ROSUVASTATIN CALCIUM 5 MG/1
5 TABLET, COATED ORAL DAILY
Status: DISCONTINUED | OUTPATIENT
Start: 2022-01-27 | End: 2022-01-27

## 2022-01-27 RX ORDER — FERROUS SULFATE 325(65) MG
325 TABLET ORAL
Status: DISCONTINUED | OUTPATIENT
Start: 2022-01-27 | End: 2022-01-27

## 2022-01-27 RX ORDER — DIGOXIN 125 MCG
125 TABLET ORAL
Status: DISCONTINUED | OUTPATIENT
Start: 2022-01-27 | End: 2022-01-27

## 2022-01-27 RX ORDER — URSODIOL 300 MG/1
300 CAPSULE ORAL 3 TIMES DAILY
Status: DISCONTINUED | OUTPATIENT
Start: 2022-01-27 | End: 2022-02-07 | Stop reason: HOSPADM

## 2022-01-27 RX ORDER — MIRTAZAPINE 15 MG/1
7.5 TABLET, FILM COATED ORAL NIGHTLY
Status: DISCONTINUED | OUTPATIENT
Start: 2022-01-27 | End: 2022-02-07 | Stop reason: HOSPADM

## 2022-01-27 RX ORDER — BUMETANIDE 0.5 MG/1
0.5 TABLET ORAL DAILY
Status: DISCONTINUED | OUTPATIENT
Start: 2022-01-27 | End: 2022-02-07 | Stop reason: HOSPADM

## 2022-01-27 RX ORDER — POTASSIUM CHLORIDE 750 MG/1
40 TABLET, FILM COATED, EXTENDED RELEASE ORAL ONCE
Status: DISCONTINUED | OUTPATIENT
Start: 2022-01-27 | End: 2022-01-27

## 2022-01-27 RX ORDER — GUAIFENESIN 600 MG/1
600 TABLET, EXTENDED RELEASE ORAL EVERY 12 HOURS SCHEDULED
Status: DISCONTINUED | OUTPATIENT
Start: 2022-01-27 | End: 2022-02-07 | Stop reason: HOSPADM

## 2022-01-27 RX ORDER — ROSUVASTATIN CALCIUM 5 MG/1
5 TABLET, COATED ORAL NIGHTLY
Status: DISCONTINUED | OUTPATIENT
Start: 2022-01-27 | End: 2022-02-07 | Stop reason: HOSPADM

## 2022-01-27 RX ORDER — IPRATROPIUM BROMIDE AND ALBUTEROL SULFATE 2.5; .5 MG/3ML; MG/3ML
3 SOLUTION RESPIRATORY (INHALATION) 2 TIMES DAILY
Status: DISCONTINUED | OUTPATIENT
Start: 2022-01-27 | End: 2022-01-27

## 2022-01-27 RX ORDER — SPIRONOLACTONE 25 MG/1
25 TABLET ORAL DAILY
Status: DISCONTINUED | OUTPATIENT
Start: 2022-01-27 | End: 2022-02-07 | Stop reason: HOSPADM

## 2022-01-27 RX ORDER — BUDESONIDE AND FORMOTEROL FUMARATE DIHYDRATE 160; 4.5 UG/1; UG/1
2 AEROSOL RESPIRATORY (INHALATION)
Status: DISCONTINUED | OUTPATIENT
Start: 2022-01-27 | End: 2022-02-01

## 2022-01-27 RX ORDER — POTASSIUM CHLORIDE 750 MG/1
40 TABLET, FILM COATED, EXTENDED RELEASE ORAL ONCE
Status: COMPLETED | OUTPATIENT
Start: 2022-01-27 | End: 2022-01-27

## 2022-01-27 RX ADMIN — PANTOPRAZOLE SODIUM 8 MG/HR: 40 INJECTION, POWDER, FOR SOLUTION INTRAVENOUS at 22:23

## 2022-01-27 RX ADMIN — GUAIFENESIN 600 MG: 600 TABLET, EXTENDED RELEASE ORAL at 15:24

## 2022-01-27 RX ADMIN — SUCRALFATE 1 G: 1 TABLET ORAL at 20:18

## 2022-01-27 RX ADMIN — SPIRONOLACTONE 25 MG: 25 TABLET ORAL at 15:24

## 2022-01-27 RX ADMIN — PROPAFENONE HYDROCHLORIDE 150 MG: 150 TABLET, COATED ORAL at 20:18

## 2022-01-27 RX ADMIN — IPRATROPIUM BROMIDE AND ALBUTEROL SULFATE 3 ML: 2.5; .5 SOLUTION RESPIRATORY (INHALATION) at 16:09

## 2022-01-27 RX ADMIN — GUAIFENESIN 600 MG: 600 TABLET, EXTENDED RELEASE ORAL at 20:18

## 2022-01-27 RX ADMIN — BUMETANIDE 0.5 MG: 0.5 TABLET ORAL at 15:24

## 2022-01-27 RX ADMIN — URSODIOL 300 MG: 300 CAPSULE ORAL at 15:24

## 2022-01-27 RX ADMIN — SUCRALFATE 1 G: 1 TABLET ORAL at 17:45

## 2022-01-27 RX ADMIN — PANTOPRAZOLE SODIUM 8 MG/HR: 40 INJECTION, POWDER, FOR SOLUTION INTRAVENOUS at 05:29

## 2022-01-27 RX ADMIN — PANTOPRAZOLE SODIUM 8 MG/HR: 40 INJECTION, POWDER, FOR SOLUTION INTRAVENOUS at 12:03

## 2022-01-27 RX ADMIN — POLYETHYLENE GLYCOL 3350 1 BOTTLE: 17 POWDER, FOR SOLUTION ORAL at 18:27

## 2022-01-27 RX ADMIN — MIRTAZAPINE 7.5 MG: 15 TABLET, FILM COATED ORAL at 20:18

## 2022-01-27 RX ADMIN — PROPAFENONE HYDROCHLORIDE 150 MG: 150 TABLET, COATED ORAL at 15:24

## 2022-01-27 RX ADMIN — ROSUVASTATIN CALCIUM 5 MG: 5 TABLET, FILM COATED ORAL at 20:18

## 2022-01-27 RX ADMIN — PANTOPRAZOLE SODIUM 8 MG/HR: 40 INJECTION, POWDER, FOR SOLUTION INTRAVENOUS at 16:35

## 2022-01-27 RX ADMIN — PANTOPRAZOLE SODIUM 8 MG/HR: 40 INJECTION, POWDER, FOR SOLUTION INTRAVENOUS at 00:09

## 2022-01-27 RX ADMIN — URSODIOL 300 MG: 300 CAPSULE ORAL at 20:18

## 2022-01-27 RX ADMIN — POTASSIUM CHLORIDE 40 MEQ: 750 TABLET, EXTENDED RELEASE ORAL at 00:13

## 2022-01-27 NOTE — OUTREACH NOTE
General Surgery Week 3 Survey      Responses   [facility] patient discharged from? [place]   Does the patient have one of the following disease processes/diagnoses(primary or secondary)? COPD/Pneumonia   Revoke Readmitted          Clarisa Solano RN

## 2022-01-28 ENCOUNTER — ANESTHESIA EVENT (OUTPATIENT)
Dept: GASTROENTEROLOGY | Facility: HOSPITAL | Age: 77
End: 2022-01-28

## 2022-01-28 ENCOUNTER — ANESTHESIA (OUTPATIENT)
Dept: GASTROENTEROLOGY | Facility: HOSPITAL | Age: 77
End: 2022-01-28

## 2022-01-28 LAB
ALBUMIN SERPL-MCNC: 2.4 G/DL (ref 3.5–5.2)
ALBUMIN/GLOB SERPL: 1 G/DL
ALP SERPL-CCNC: 100 U/L (ref 39–117)
ALT SERPL W P-5'-P-CCNC: 10 U/L (ref 1–33)
ANION GAP SERPL CALCULATED.3IONS-SCNC: 9.3 MMOL/L (ref 5–15)
AST SERPL-CCNC: 16 U/L (ref 1–32)
BASOPHILS # BLD AUTO: 0.06 10*3/MM3 (ref 0–0.2)
BASOPHILS NFR BLD AUTO: 0.5 % (ref 0–1.5)
BILIRUB SERPL-MCNC: 0.6 MG/DL (ref 0–1.2)
BUN SERPL-MCNC: 14 MG/DL (ref 8–23)
BUN/CREAT SERPL: 21.2 (ref 7–25)
CALCIUM SPEC-SCNC: 8.6 MG/DL (ref 8.6–10.5)
CHLORIDE SERPL-SCNC: 97 MMOL/L (ref 98–107)
CO2 SERPL-SCNC: 27.7 MMOL/L (ref 22–29)
CREAT SERPL-MCNC: 0.66 MG/DL (ref 0.57–1)
DEPRECATED RDW RBC AUTO: 44.9 FL (ref 37–54)
DIGOXIN SERPL-MCNC: 1.1 NG/ML (ref 0.6–1.2)
EOSINOPHIL # BLD AUTO: 0.29 10*3/MM3 (ref 0–0.4)
EOSINOPHIL NFR BLD AUTO: 2.3 % (ref 0.3–6.2)
ERYTHROCYTE [DISTWIDTH] IN BLOOD BY AUTOMATED COUNT: 13.5 % (ref 12.3–15.4)
GFR SERPL CREATININE-BSD FRML MDRD: 87 ML/MIN/1.73
GLOBULIN UR ELPH-MCNC: 2.4 GM/DL
GLUCOSE SERPL-MCNC: 114 MG/DL (ref 65–99)
HCT VFR BLD AUTO: 29.4 % (ref 34–46.6)
HCT VFR BLD AUTO: 30.5 % (ref 34–46.6)
HCT VFR BLD AUTO: 30.8 % (ref 34–46.6)
HCT VFR BLD AUTO: 31.2 % (ref 34–46.6)
HGB BLD-MCNC: 10.3 G/DL (ref 12–15.9)
HGB BLD-MCNC: 10.3 G/DL (ref 12–15.9)
HGB BLD-MCNC: 10.6 G/DL (ref 12–15.9)
HGB BLD-MCNC: 10.8 G/DL (ref 12–15.9)
IMM GRANULOCYTES # BLD AUTO: 0.12 10*3/MM3 (ref 0–0.05)
IMM GRANULOCYTES NFR BLD AUTO: 0.9 % (ref 0–0.5)
LYMPHOCYTES # BLD AUTO: 1.61 10*3/MM3 (ref 0.7–3.1)
LYMPHOCYTES NFR BLD AUTO: 12.7 % (ref 19.6–45.3)
MCH RBC QN AUTO: 30.6 PG (ref 26.6–33)
MCHC RBC AUTO-ENTMCNC: 33.8 G/DL (ref 31.5–35.7)
MCV RBC AUTO: 90.5 FL (ref 79–97)
MONOCYTES # BLD AUTO: 1.66 10*3/MM3 (ref 0.1–0.9)
MONOCYTES NFR BLD AUTO: 13.1 % (ref 5–12)
NEUTROPHILS NFR BLD AUTO: 70.5 % (ref 42.7–76)
NEUTROPHILS NFR BLD AUTO: 8.94 10*3/MM3 (ref 1.7–7)
NRBC BLD AUTO-RTO: 0 /100 WBC (ref 0–0.2)
NT-PROBNP SERPL-MCNC: 874 PG/ML (ref 0–1800)
PLATELET # BLD AUTO: 191 10*3/MM3 (ref 140–450)
PMV BLD AUTO: 11.8 FL (ref 6–12)
POTASSIUM SERPL-SCNC: 3.8 MMOL/L (ref 3.5–5.2)
PROT SERPL-MCNC: 4.8 G/DL (ref 6–8.5)
RBC # BLD AUTO: 3.37 10*6/MM3 (ref 3.77–5.28)
SODIUM SERPL-SCNC: 134 MMOL/L (ref 136–145)
WBC NRBC COR # BLD: 12.68 10*3/MM3 (ref 3.4–10.8)

## 2022-01-28 PROCEDURE — 85025 COMPLETE CBC W/AUTO DIFF WBC: CPT | Performed by: HOSPITALIST

## 2022-01-28 PROCEDURE — 80162 ASSAY OF DIGOXIN TOTAL: CPT | Performed by: HOSPITALIST

## 2022-01-28 PROCEDURE — 94640 AIRWAY INHALATION TREATMENT: CPT

## 2022-01-28 PROCEDURE — 43239 EGD BIOPSY SINGLE/MULTIPLE: CPT | Performed by: INTERNAL MEDICINE

## 2022-01-28 PROCEDURE — 94664 DEMO&/EVAL PT USE INHALER: CPT

## 2022-01-28 PROCEDURE — 85014 HEMATOCRIT: CPT | Performed by: HOSPITALIST

## 2022-01-28 PROCEDURE — 94799 UNLISTED PULMONARY SVC/PX: CPT

## 2022-01-28 PROCEDURE — 88305 TISSUE EXAM BY PATHOLOGIST: CPT | Performed by: INTERNAL MEDICINE

## 2022-01-28 PROCEDURE — 0DD98ZX EXTRACTION OF DUODENUM, VIA NATURAL OR ARTIFICIAL OPENING ENDOSCOPIC, DIAGNOSTIC: ICD-10-PCS | Performed by: INTERNAL MEDICINE

## 2022-01-28 PROCEDURE — 85018 HEMOGLOBIN: CPT | Performed by: HOSPITALIST

## 2022-01-28 PROCEDURE — 0DBM8ZX EXCISION OF DESCENDING COLON, VIA NATURAL OR ARTIFICIAL OPENING ENDOSCOPIC, DIAGNOSTIC: ICD-10-PCS | Performed by: INTERNAL MEDICINE

## 2022-01-28 PROCEDURE — 80053 COMPREHEN METABOLIC PANEL: CPT | Performed by: HOSPITALIST

## 2022-01-28 PROCEDURE — 85014 HEMATOCRIT: CPT | Performed by: INTERNAL MEDICINE

## 2022-01-28 PROCEDURE — 83880 ASSAY OF NATRIURETIC PEPTIDE: CPT | Performed by: HOSPITALIST

## 2022-01-28 PROCEDURE — 99232 SBSQ HOSP IP/OBS MODERATE 35: CPT | Performed by: INTERNAL MEDICINE

## 2022-01-28 PROCEDURE — 94761 N-INVAS EAR/PLS OXIMETRY MLT: CPT

## 2022-01-28 PROCEDURE — 85018 HEMOGLOBIN: CPT | Performed by: INTERNAL MEDICINE

## 2022-01-28 PROCEDURE — 0DBK8ZX EXCISION OF ASCENDING COLON, VIA NATURAL OR ARTIFICIAL OPENING ENDOSCOPIC, DIAGNOSTIC: ICD-10-PCS | Performed by: INTERNAL MEDICINE

## 2022-01-28 PROCEDURE — 25010000002 PROPOFOL 10 MG/ML EMULSION: Performed by: ANESTHESIOLOGY

## 2022-01-28 PROCEDURE — 45385 COLONOSCOPY W/LESION REMOVAL: CPT | Performed by: INTERNAL MEDICINE

## 2022-01-28 DEVICE — DEV CLIP ENDO RESOLUTION360 CONTRL ROT 235CM: Type: IMPLANTABLE DEVICE | Site: ASCENDING COLON | Status: FUNCTIONAL

## 2022-01-28 RX ORDER — SODIUM CHLORIDE 9 MG/ML
30 INJECTION, SOLUTION INTRAVENOUS CONTINUOUS PRN
Status: DISCONTINUED | OUTPATIENT
Start: 2022-01-28 | End: 2022-02-03

## 2022-01-28 RX ORDER — PROPOFOL 10 MG/ML
VIAL (ML) INTRAVENOUS AS NEEDED
Status: DISCONTINUED | OUTPATIENT
Start: 2022-01-28 | End: 2022-01-28 | Stop reason: SURG

## 2022-01-28 RX ORDER — LIDOCAINE HYDROCHLORIDE 20 MG/ML
INJECTION, SOLUTION INFILTRATION; PERINEURAL AS NEEDED
Status: DISCONTINUED | OUTPATIENT
Start: 2022-01-28 | End: 2022-01-28 | Stop reason: SURG

## 2022-01-28 RX ORDER — PROMETHAZINE HYDROCHLORIDE 25 MG/1
25 SUPPOSITORY RECTAL ONCE AS NEEDED
Status: DISCONTINUED | OUTPATIENT
Start: 2022-01-28 | End: 2022-01-28 | Stop reason: HOSPADM

## 2022-01-28 RX ORDER — PROMETHAZINE HYDROCHLORIDE 25 MG/1
25 TABLET ORAL ONCE AS NEEDED
Status: DISCONTINUED | OUTPATIENT
Start: 2022-01-28 | End: 2022-01-28 | Stop reason: HOSPADM

## 2022-01-28 RX ADMIN — BUDESONIDE AND FORMOTEROL FUMARATE DIHYDRATE 2 PUFF: 160; 4.5 AEROSOL RESPIRATORY (INHALATION) at 20:00

## 2022-01-28 RX ADMIN — PANTOPRAZOLE SODIUM 8 MG/HR: 40 INJECTION, POWDER, FOR SOLUTION INTRAVENOUS at 18:48

## 2022-01-28 RX ADMIN — URSODIOL 300 MG: 300 CAPSULE ORAL at 21:26

## 2022-01-28 RX ADMIN — SUCRALFATE 1 G: 1 TABLET ORAL at 21:26

## 2022-01-28 RX ADMIN — PANTOPRAZOLE SODIUM 8 MG/HR: 40 INJECTION, POWDER, FOR SOLUTION INTRAVENOUS at 12:58

## 2022-01-28 RX ADMIN — GUAIFENESIN 600 MG: 600 TABLET, EXTENDED RELEASE ORAL at 14:15

## 2022-01-28 RX ADMIN — LIDOCAINE HYDROCHLORIDE 50 MG: 20 INJECTION, SOLUTION INFILTRATION; PERINEURAL at 10:18

## 2022-01-28 RX ADMIN — PROPAFENONE HYDROCHLORIDE 150 MG: 150 TABLET, COATED ORAL at 14:15

## 2022-01-28 RX ADMIN — SPIRONOLACTONE 25 MG: 25 TABLET ORAL at 14:14

## 2022-01-28 RX ADMIN — URSODIOL 300 MG: 300 CAPSULE ORAL at 14:14

## 2022-01-28 RX ADMIN — PROPOFOL 60 MG: 10 INJECTION, EMULSION INTRAVENOUS at 10:18

## 2022-01-28 RX ADMIN — GUAIFENESIN 600 MG: 600 TABLET, EXTENDED RELEASE ORAL at 21:26

## 2022-01-28 RX ADMIN — IPRATROPIUM BROMIDE AND ALBUTEROL SULFATE 3 ML: 2.5; .5 SOLUTION RESPIRATORY (INHALATION) at 08:00

## 2022-01-28 RX ADMIN — PROPOFOL 200 MCG/KG/MIN: 10 INJECTION, EMULSION INTRAVENOUS at 10:23

## 2022-01-28 RX ADMIN — PROPAFENONE HYDROCHLORIDE 150 MG: 150 TABLET, COATED ORAL at 05:43

## 2022-01-28 RX ADMIN — MIRTAZAPINE 7.5 MG: 15 TABLET, FILM COATED ORAL at 21:26

## 2022-01-28 RX ADMIN — SUCRALFATE 1 G: 1 TABLET ORAL at 14:14

## 2022-01-28 RX ADMIN — SODIUM CHLORIDE 30 ML/HR: 9 INJECTION, SOLUTION INTRAVENOUS at 09:14

## 2022-01-28 RX ADMIN — ROSUVASTATIN CALCIUM 5 MG: 5 TABLET, FILM COATED ORAL at 21:26

## 2022-01-28 RX ADMIN — SUCRALFATE 1 G: 1 TABLET ORAL at 18:48

## 2022-01-28 RX ADMIN — TIOTROPIUM BROMIDE INHALATION SPRAY 2 PUFF: 3.12 SPRAY, METERED RESPIRATORY (INHALATION) at 12:10

## 2022-01-28 RX ADMIN — BUMETANIDE 0.5 MG: 0.5 TABLET ORAL at 14:14

## 2022-01-28 RX ADMIN — PANTOPRAZOLE SODIUM 8 MG/HR: 40 INJECTION, POWDER, FOR SOLUTION INTRAVENOUS at 03:14

## 2022-01-28 RX ADMIN — PROPAFENONE HYDROCHLORIDE 150 MG: 150 TABLET, COATED ORAL at 21:26

## 2022-01-28 RX ADMIN — BUDESONIDE AND FORMOTEROL FUMARATE DIHYDRATE 2 PUFF: 160; 4.5 AEROSOL RESPIRATORY (INHALATION) at 12:11

## 2022-01-28 NOTE — ANESTHESIA PREPROCEDURE EVALUATION
Anesthesia Evaluation                  Airway   Mallampati: II  TM distance: >3 FB  Neck ROM: full  No difficulty expected  Dental - normal exam     Pulmonary    (+) a smoker Current, COPD, home oxygen, shortness of breath,   Cardiovascular - normal exam    (+) dysrhythmias Atrial Fib,       Neuro/Psych    ROS Comment: SAH  GI/Hepatic/Renal/Endo    (+)  GI bleeding , hepatitis, liver disease, renal disease stones,     Musculoskeletal     Abdominal   (+) scaphoid   Substance History      OB/GYN          Other                      Anesthesia Plan    ASA 3     MAC     intravenous induction     Anesthetic plan, all risks, benefits, and alternatives have been provided, discussed and informed consent has been obtained with: patient.        CODE STATUS:    Medical Intervention Limits: NO intubation (DNI); NO cardioversion  Code Status (Patient has no pulse and is not breathing): No CPR (Do Not Attempt to Resuscitate)  Medical Interventions (Patient has pulse or is breathing): Limited Support

## 2022-01-28 NOTE — ANESTHESIA POSTPROCEDURE EVALUATION
Patient: Celia Baird    Procedure Summary     Date: 01/28/22 Room / Location:  [facility]    Anesthesia Start: 1014 Anesthesia Stop: 1104    Procedures:       COLONOSCOPY into cecum with polypectomy, clip placement (N/A )      ESOPHAGOGASTRODUODENOSCOPY with biopsy (N/A Esophagus) Diagnosis:       Anemia      (Anemia [D64.9])    Surgeons: Constantine Kaye MD Provider: Aleksandar Mills MD    Anesthesia Type: MAC ASA Status: 3          Anesthesia Type: MAC    Vitals  Vitals Value Taken Time   /62 01/28/22 1111   Temp     Pulse 69 01/28/22 1111   Resp 16 01/28/22 1111   SpO2 100 % 01/28/22 1111           Post Anesthesia Care and Evaluation    Patient location during evaluation: bedside  Pain management: adequate  Airway patency: patent  Anesthetic complications: No anesthetic complications    Cardiovascular status: acceptable  Respiratory status: acceptable  Hydration status: acceptable

## 2022-01-29 LAB
ANION GAP SERPL CALCULATED.3IONS-SCNC: 9.1 MMOL/L (ref 5–15)
BUN SERPL-MCNC: 11 MG/DL (ref 8–23)
BUN/CREAT SERPL: 16.2 (ref 7–25)
CALCIUM SPEC-SCNC: 8.5 MG/DL (ref 8.6–10.5)
CHLORIDE SERPL-SCNC: 100 MMOL/L (ref 98–107)
CO2 SERPL-SCNC: 27.9 MMOL/L (ref 22–29)
CREAT SERPL-MCNC: 0.68 MG/DL (ref 0.57–1)
DEPRECATED RDW RBC AUTO: 45.1 FL (ref 37–54)
ERYTHROCYTE [DISTWIDTH] IN BLOOD BY AUTOMATED COUNT: 13.9 % (ref 12.3–15.4)
GFR SERPL CREATININE-BSD FRML MDRD: 84 ML/MIN/1.73
GLUCOSE SERPL-MCNC: 92 MG/DL (ref 65–99)
HCT VFR BLD AUTO: 29.2 % (ref 34–46.6)
HCT VFR BLD AUTO: 30.2 % (ref 34–46.6)
HCT VFR BLD AUTO: 30.2 % (ref 34–46.6)
HCT VFR BLD AUTO: 30.3 % (ref 34–46.6)
HCT VFR BLD AUTO: 31.7 % (ref 34–46.6)
HGB BLD-MCNC: 10.1 G/DL (ref 12–15.9)
HGB BLD-MCNC: 10.2 G/DL (ref 12–15.9)
HGB BLD-MCNC: 10.8 G/DL (ref 12–15.9)
MAGNESIUM SERPL-MCNC: 1.7 MG/DL (ref 1.6–2.4)
MCH RBC QN AUTO: 30.1 PG (ref 26.6–33)
MCHC RBC AUTO-ENTMCNC: 33.4 G/DL (ref 31.5–35.7)
MCV RBC AUTO: 90.1 FL (ref 79–97)
PLATELET # BLD AUTO: 218 10*3/MM3 (ref 140–450)
PMV BLD AUTO: 10.9 FL (ref 6–12)
POTASSIUM SERPL-SCNC: 3.6 MMOL/L (ref 3.5–5.2)
RBC # BLD AUTO: 3.35 10*6/MM3 (ref 3.77–5.28)
SODIUM SERPL-SCNC: 137 MMOL/L (ref 136–145)
WBC NRBC COR # BLD: 9.77 10*3/MM3 (ref 3.4–10.8)

## 2022-01-29 PROCEDURE — 97162 PT EVAL MOD COMPLEX 30 MIN: CPT

## 2022-01-29 PROCEDURE — 80048 BASIC METABOLIC PNL TOTAL CA: CPT | Performed by: INTERNAL MEDICINE

## 2022-01-29 PROCEDURE — 85014 HEMATOCRIT: CPT | Performed by: INTERNAL MEDICINE

## 2022-01-29 PROCEDURE — 97530 THERAPEUTIC ACTIVITIES: CPT

## 2022-01-29 PROCEDURE — 85027 COMPLETE CBC AUTOMATED: CPT | Performed by: INTERNAL MEDICINE

## 2022-01-29 PROCEDURE — 85018 HEMOGLOBIN: CPT | Performed by: INTERNAL MEDICINE

## 2022-01-29 PROCEDURE — 94799 UNLISTED PULMONARY SVC/PX: CPT

## 2022-01-29 PROCEDURE — 83735 ASSAY OF MAGNESIUM: CPT | Performed by: INTERNAL MEDICINE

## 2022-01-29 PROCEDURE — 99231 SBSQ HOSP IP/OBS SF/LOW 25: CPT | Performed by: INTERNAL MEDICINE

## 2022-01-29 PROCEDURE — 94761 N-INVAS EAR/PLS OXIMETRY MLT: CPT

## 2022-01-29 RX ADMIN — BUDESONIDE AND FORMOTEROL FUMARATE DIHYDRATE 2 PUFF: 160; 4.5 AEROSOL RESPIRATORY (INHALATION) at 07:46

## 2022-01-29 RX ADMIN — IPRATROPIUM BROMIDE AND ALBUTEROL SULFATE 3 ML: 2.5; .5 SOLUTION RESPIRATORY (INHALATION) at 07:44

## 2022-01-29 RX ADMIN — BUDESONIDE AND FORMOTEROL FUMARATE DIHYDRATE 2 PUFF: 160; 4.5 AEROSOL RESPIRATORY (INHALATION) at 19:15

## 2022-01-29 RX ADMIN — PROPAFENONE HYDROCHLORIDE 150 MG: 150 TABLET, COATED ORAL at 16:09

## 2022-01-29 RX ADMIN — SUCRALFATE 1 G: 1 TABLET ORAL at 12:10

## 2022-01-29 RX ADMIN — MIRTAZAPINE 7.5 MG: 15 TABLET, FILM COATED ORAL at 20:48

## 2022-01-29 RX ADMIN — PANTOPRAZOLE SODIUM 8 MG/HR: 40 INJECTION, POWDER, FOR SOLUTION INTRAVENOUS at 01:00

## 2022-01-29 RX ADMIN — PANTOPRAZOLE SODIUM 8 MG/HR: 40 INJECTION, POWDER, FOR SOLUTION INTRAVENOUS at 10:10

## 2022-01-29 RX ADMIN — SUCRALFATE 1 G: 1 TABLET ORAL at 16:44

## 2022-01-29 RX ADMIN — PROPAFENONE HYDROCHLORIDE 150 MG: 150 TABLET, COATED ORAL at 23:20

## 2022-01-29 RX ADMIN — PANTOPRAZOLE SODIUM 8 MG/HR: 40 INJECTION, POWDER, FOR SOLUTION INTRAVENOUS at 16:09

## 2022-01-29 RX ADMIN — ACETAMINOPHEN 650 MG: 325 TABLET, FILM COATED ORAL at 23:20

## 2022-01-29 RX ADMIN — SUCRALFATE 1 G: 1 TABLET ORAL at 20:48

## 2022-01-29 RX ADMIN — BUMETANIDE 0.5 MG: 0.5 TABLET ORAL at 08:51

## 2022-01-29 RX ADMIN — SUCRALFATE 1 G: 1 TABLET ORAL at 07:13

## 2022-01-29 RX ADMIN — URSODIOL 300 MG: 300 CAPSULE ORAL at 16:09

## 2022-01-29 RX ADMIN — PANTOPRAZOLE SODIUM 8 MG/HR: 40 INJECTION, POWDER, FOR SOLUTION INTRAVENOUS at 20:45

## 2022-01-29 RX ADMIN — PANTOPRAZOLE SODIUM 8 MG/HR: 40 INJECTION, POWDER, FOR SOLUTION INTRAVENOUS at 04:57

## 2022-01-29 RX ADMIN — ROSUVASTATIN CALCIUM 5 MG: 5 TABLET, FILM COATED ORAL at 20:48

## 2022-01-29 RX ADMIN — PROPAFENONE HYDROCHLORIDE 150 MG: 150 TABLET, COATED ORAL at 07:13

## 2022-01-29 RX ADMIN — URSODIOL 300 MG: 300 CAPSULE ORAL at 08:51

## 2022-01-29 RX ADMIN — URSODIOL 300 MG: 300 CAPSULE ORAL at 20:48

## 2022-01-29 RX ADMIN — SPIRONOLACTONE 25 MG: 25 TABLET ORAL at 08:51

## 2022-01-30 LAB
ANION GAP SERPL CALCULATED.3IONS-SCNC: 7.6 MMOL/L (ref 5–15)
BUN SERPL-MCNC: 14 MG/DL (ref 8–23)
BUN/CREAT SERPL: 14.9 (ref 7–25)
CALCIUM SPEC-SCNC: 8.6 MG/DL (ref 8.6–10.5)
CHLORIDE SERPL-SCNC: 103 MMOL/L (ref 98–107)
CO2 SERPL-SCNC: 28.4 MMOL/L (ref 22–29)
CREAT SERPL-MCNC: 0.94 MG/DL (ref 0.57–1)
DEPRECATED RDW RBC AUTO: 44.6 FL (ref 37–54)
ERYTHROCYTE [DISTWIDTH] IN BLOOD BY AUTOMATED COUNT: 13.4 % (ref 12.3–15.4)
FERRITIN SERPL-MCNC: 85 NG/ML (ref 13–150)
GFR SERPL CREATININE-BSD FRML MDRD: 58 ML/MIN/1.73
GLUCOSE SERPL-MCNC: 94 MG/DL (ref 65–99)
HCT VFR BLD AUTO: 27.3 % (ref 34–46.6)
HCT VFR BLD AUTO: 28.5 % (ref 34–46.6)
HCT VFR BLD AUTO: 29.2 % (ref 34–46.6)
HGB BLD-MCNC: 9.3 G/DL (ref 12–15.9)
HGB BLD-MCNC: 9.7 G/DL (ref 12–15.9)
HGB BLD-MCNC: 9.8 G/DL (ref 12–15.9)
IRON 24H UR-MRATE: 25 MCG/DL (ref 37–145)
IRON SATN MFR SERPL: 9 % (ref 20–50)
MAGNESIUM SERPL-MCNC: 1.7 MG/DL (ref 1.6–2.4)
MCH RBC QN AUTO: 30.3 PG (ref 26.6–33)
MCHC RBC AUTO-ENTMCNC: 33.6 G/DL (ref 31.5–35.7)
MCV RBC AUTO: 90.4 FL (ref 79–97)
PLATELET # BLD AUTO: 222 10*3/MM3 (ref 140–450)
PMV BLD AUTO: 10.9 FL (ref 6–12)
POTASSIUM SERPL-SCNC: 3.6 MMOL/L (ref 3.5–5.2)
RBC # BLD AUTO: 3.23 10*6/MM3 (ref 3.77–5.28)
SODIUM SERPL-SCNC: 139 MMOL/L (ref 136–145)
TIBC SERPL-MCNC: 276 MCG/DL (ref 298–536)
TRANSFERRIN SERPL-MCNC: 185 MG/DL (ref 200–360)
WBC NRBC COR # BLD: 9.29 10*3/MM3 (ref 3.4–10.8)

## 2022-01-30 PROCEDURE — 85018 HEMOGLOBIN: CPT | Performed by: INTERNAL MEDICINE

## 2022-01-30 PROCEDURE — 97530 THERAPEUTIC ACTIVITIES: CPT

## 2022-01-30 PROCEDURE — 85027 COMPLETE CBC AUTOMATED: CPT | Performed by: INTERNAL MEDICINE

## 2022-01-30 PROCEDURE — 94761 N-INVAS EAR/PLS OXIMETRY MLT: CPT

## 2022-01-30 PROCEDURE — 80048 BASIC METABOLIC PNL TOTAL CA: CPT | Performed by: INTERNAL MEDICINE

## 2022-01-30 PROCEDURE — 84466 ASSAY OF TRANSFERRIN: CPT | Performed by: INTERNAL MEDICINE

## 2022-01-30 PROCEDURE — 83540 ASSAY OF IRON: CPT | Performed by: INTERNAL MEDICINE

## 2022-01-30 PROCEDURE — 85014 HEMATOCRIT: CPT | Performed by: INTERNAL MEDICINE

## 2022-01-30 PROCEDURE — 25010000002 NA FERRIC GLUC CPLX PER 12.5 MG: Performed by: INTERNAL MEDICINE

## 2022-01-30 PROCEDURE — 82728 ASSAY OF FERRITIN: CPT | Performed by: INTERNAL MEDICINE

## 2022-01-30 PROCEDURE — 94799 UNLISTED PULMONARY SVC/PX: CPT

## 2022-01-30 PROCEDURE — 63710000001 DIPHENHYDRAMINE PER 50 MG: Performed by: INTERNAL MEDICINE

## 2022-01-30 PROCEDURE — 83735 ASSAY OF MAGNESIUM: CPT | Performed by: INTERNAL MEDICINE

## 2022-01-30 RX ORDER — PANTOPRAZOLE SODIUM 40 MG/1
40 TABLET, DELAYED RELEASE ORAL
Status: DISCONTINUED | OUTPATIENT
Start: 2022-01-30 | End: 2022-02-07 | Stop reason: HOSPADM

## 2022-01-30 RX ORDER — ACETAMINOPHEN 325 MG/1
650 TABLET ORAL ONCE
Status: COMPLETED | OUTPATIENT
Start: 2022-01-30 | End: 2022-01-30

## 2022-01-30 RX ORDER — DIPHENHYDRAMINE HCL 25 MG
50 CAPSULE ORAL ONCE
Status: COMPLETED | OUTPATIENT
Start: 2022-01-30 | End: 2022-01-30

## 2022-01-30 RX ADMIN — SUCRALFATE 1 G: 1 TABLET ORAL at 16:22

## 2022-01-30 RX ADMIN — IPRATROPIUM BROMIDE AND ALBUTEROL SULFATE 3 ML: 2.5; .5 SOLUTION RESPIRATORY (INHALATION) at 19:05

## 2022-01-30 RX ADMIN — BUDESONIDE AND FORMOTEROL FUMARATE DIHYDRATE 2 PUFF: 160; 4.5 AEROSOL RESPIRATORY (INHALATION) at 07:26

## 2022-01-30 RX ADMIN — SPIRONOLACTONE 25 MG: 25 TABLET ORAL at 08:49

## 2022-01-30 RX ADMIN — PANTOPRAZOLE SODIUM 40 MG: 40 TABLET, DELAYED RELEASE ORAL at 16:22

## 2022-01-30 RX ADMIN — ROSUVASTATIN CALCIUM 5 MG: 5 TABLET, FILM COATED ORAL at 20:40

## 2022-01-30 RX ADMIN — PROPAFENONE HYDROCHLORIDE 150 MG: 150 TABLET, COATED ORAL at 14:18

## 2022-01-30 RX ADMIN — MIRTAZAPINE 7.5 MG: 15 TABLET, FILM COATED ORAL at 20:39

## 2022-01-30 RX ADMIN — SUCRALFATE 1 G: 1 TABLET ORAL at 20:39

## 2022-01-30 RX ADMIN — TIOTROPIUM BROMIDE INHALATION SPRAY 2 PUFF: 3.12 SPRAY, METERED RESPIRATORY (INHALATION) at 07:25

## 2022-01-30 RX ADMIN — BUMETANIDE 0.5 MG: 0.5 TABLET ORAL at 08:49

## 2022-01-30 RX ADMIN — URSODIOL 300 MG: 300 CAPSULE ORAL at 08:49

## 2022-01-30 RX ADMIN — DIPHENHYDRAMINE HYDROCHLORIDE 50 MG: 25 CAPSULE ORAL at 14:17

## 2022-01-30 RX ADMIN — PANTOPRAZOLE SODIUM 8 MG/HR: 40 INJECTION, POWDER, FOR SOLUTION INTRAVENOUS at 01:39

## 2022-01-30 RX ADMIN — SODIUM CHLORIDE 250 MG: 9 INJECTION, SOLUTION INTRAVENOUS at 14:51

## 2022-01-30 RX ADMIN — GUAIFENESIN 600 MG: 600 TABLET, EXTENDED RELEASE ORAL at 20:40

## 2022-01-30 RX ADMIN — PANTOPRAZOLE SODIUM 8 MG/HR: 40 INJECTION, POWDER, FOR SOLUTION INTRAVENOUS at 06:37

## 2022-01-30 RX ADMIN — PANTOPRAZOLE SODIUM 8 MG/HR: 40 INJECTION, POWDER, FOR SOLUTION INTRAVENOUS at 11:41

## 2022-01-30 RX ADMIN — URSODIOL 300 MG: 300 CAPSULE ORAL at 20:39

## 2022-01-30 RX ADMIN — URSODIOL 300 MG: 300 CAPSULE ORAL at 16:22

## 2022-01-30 RX ADMIN — ACETAMINOPHEN 650 MG: 325 TABLET, FILM COATED ORAL at 14:16

## 2022-01-30 RX ADMIN — PROPAFENONE HYDROCHLORIDE 150 MG: 150 TABLET, COATED ORAL at 06:37

## 2022-01-30 RX ADMIN — SUCRALFATE 1 G: 1 TABLET ORAL at 11:41

## 2022-01-30 RX ADMIN — PROPAFENONE HYDROCHLORIDE 150 MG: 150 TABLET, COATED ORAL at 20:40

## 2022-01-30 RX ADMIN — SUCRALFATE 1 G: 1 TABLET ORAL at 06:37

## 2022-01-31 DIAGNOSIS — D50.0 IRON DEFICIENCY ANEMIA SECONDARY TO BLOOD LOSS (CHRONIC): ICD-10-CM

## 2022-01-31 DIAGNOSIS — D64.9 SYMPTOMATIC ANEMIA: Primary | ICD-10-CM

## 2022-01-31 LAB
HCT VFR BLD AUTO: 28.1 % (ref 34–46.6)
HGB BLD-MCNC: 9.6 G/DL (ref 12–15.9)
LAB AP CASE REPORT: NORMAL
PATH REPORT.FINAL DX SPEC: NORMAL
PATH REPORT.GROSS SPEC: NORMAL

## 2022-01-31 PROCEDURE — 85014 HEMATOCRIT: CPT | Performed by: INTERNAL MEDICINE

## 2022-01-31 PROCEDURE — 94799 UNLISTED PULMONARY SVC/PX: CPT

## 2022-01-31 PROCEDURE — 25010000002 NA FERRIC GLUC CPLX PER 12.5 MG: Performed by: INTERNAL MEDICINE

## 2022-01-31 PROCEDURE — 85018 HEMOGLOBIN: CPT | Performed by: INTERNAL MEDICINE

## 2022-01-31 PROCEDURE — 99232 SBSQ HOSP IP/OBS MODERATE 35: CPT | Performed by: INTERNAL MEDICINE

## 2022-01-31 RX ADMIN — TIOTROPIUM BROMIDE INHALATION SPRAY 2 PUFF: 3.12 SPRAY, METERED RESPIRATORY (INHALATION) at 07:43

## 2022-01-31 RX ADMIN — SUCRALFATE 1 G: 1 TABLET ORAL at 20:50

## 2022-01-31 RX ADMIN — GUAIFENESIN 600 MG: 600 TABLET, EXTENDED RELEASE ORAL at 20:50

## 2022-01-31 RX ADMIN — BUDESONIDE AND FORMOTEROL FUMARATE DIHYDRATE 2 PUFF: 160; 4.5 AEROSOL RESPIRATORY (INHALATION) at 07:44

## 2022-01-31 RX ADMIN — SUCRALFATE 1 G: 1 TABLET ORAL at 06:31

## 2022-01-31 RX ADMIN — ACETAMINOPHEN 650 MG: 325 TABLET, FILM COATED ORAL at 01:55

## 2022-01-31 RX ADMIN — BUDESONIDE AND FORMOTEROL FUMARATE DIHYDRATE 2 PUFF: 160; 4.5 AEROSOL RESPIRATORY (INHALATION) at 21:10

## 2022-01-31 RX ADMIN — PROPAFENONE HYDROCHLORIDE 150 MG: 150 TABLET, COATED ORAL at 20:50

## 2022-01-31 RX ADMIN — SODIUM CHLORIDE 250 MG: 9 INJECTION, SOLUTION INTRAVENOUS at 08:30

## 2022-01-31 RX ADMIN — URSODIOL 300 MG: 300 CAPSULE ORAL at 08:26

## 2022-01-31 RX ADMIN — PANTOPRAZOLE SODIUM 40 MG: 40 TABLET, DELAYED RELEASE ORAL at 06:31

## 2022-01-31 RX ADMIN — URSODIOL 300 MG: 300 CAPSULE ORAL at 20:50

## 2022-01-31 RX ADMIN — ROSUVASTATIN CALCIUM 5 MG: 5 TABLET, FILM COATED ORAL at 20:50

## 2022-01-31 RX ADMIN — BUMETANIDE 0.5 MG: 0.5 TABLET ORAL at 08:26

## 2022-01-31 RX ADMIN — PANTOPRAZOLE SODIUM 40 MG: 40 TABLET, DELAYED RELEASE ORAL at 17:54

## 2022-01-31 RX ADMIN — SPIRONOLACTONE 25 MG: 25 TABLET ORAL at 08:26

## 2022-01-31 RX ADMIN — MIRTAZAPINE 7.5 MG: 15 TABLET, FILM COATED ORAL at 20:50

## 2022-01-31 RX ADMIN — PROPAFENONE HYDROCHLORIDE 150 MG: 150 TABLET, COATED ORAL at 14:39

## 2022-01-31 RX ADMIN — SUCRALFATE 1 G: 1 TABLET ORAL at 12:29

## 2022-01-31 RX ADMIN — ACETAMINOPHEN 650 MG: 325 TABLET, FILM COATED ORAL at 20:55

## 2022-01-31 RX ADMIN — PROPAFENONE HYDROCHLORIDE 150 MG: 150 TABLET, COATED ORAL at 05:28

## 2022-01-31 RX ADMIN — URSODIOL 300 MG: 300 CAPSULE ORAL at 16:32

## 2022-01-31 RX ADMIN — SUCRALFATE 1 G: 1 TABLET ORAL at 17:54

## 2022-02-01 ENCOUNTER — APPOINTMENT (OUTPATIENT)
Dept: GENERAL RADIOLOGY | Facility: HOSPITAL | Age: 77
End: 2022-02-01

## 2022-02-01 LAB
ANION GAP SERPL CALCULATED.3IONS-SCNC: 8.5 MMOL/L (ref 5–15)
BASOPHILS # BLD AUTO: 0.11 10*3/MM3 (ref 0–0.2)
BASOPHILS NFR BLD AUTO: 0.9 % (ref 0–1.5)
BUN SERPL-MCNC: 14 MG/DL (ref 8–23)
BUN/CREAT SERPL: 18.7 (ref 7–25)
CALCIUM SPEC-SCNC: 9 MG/DL (ref 8.6–10.5)
CHLORIDE SERPL-SCNC: 103 MMOL/L (ref 98–107)
CO2 SERPL-SCNC: 26.5 MMOL/L (ref 22–29)
CREAT SERPL-MCNC: 0.75 MG/DL (ref 0.57–1)
DEPRECATED RDW RBC AUTO: 44.8 FL (ref 37–54)
EOSINOPHIL # BLD AUTO: 0.31 10*3/MM3 (ref 0–0.4)
EOSINOPHIL NFR BLD AUTO: 2.6 % (ref 0.3–6.2)
ERYTHROCYTE [DISTWIDTH] IN BLOOD BY AUTOMATED COUNT: 13.7 % (ref 12.3–15.4)
GFR SERPL CREATININE-BSD FRML MDRD: 75 ML/MIN/1.73
GLUCOSE SERPL-MCNC: 86 MG/DL (ref 65–99)
HCT VFR BLD AUTO: 29.7 % (ref 34–46.6)
HGB BLD-MCNC: 10 G/DL (ref 12–15.9)
IMM GRANULOCYTES # BLD AUTO: 0.08 10*3/MM3 (ref 0–0.05)
IMM GRANULOCYTES NFR BLD AUTO: 0.7 % (ref 0–0.5)
LYMPHOCYTES # BLD AUTO: 2.28 10*3/MM3 (ref 0.7–3.1)
LYMPHOCYTES NFR BLD AUTO: 19 % (ref 19.6–45.3)
MCH RBC QN AUTO: 30.5 PG (ref 26.6–33)
MCHC RBC AUTO-ENTMCNC: 33.7 G/DL (ref 31.5–35.7)
MCV RBC AUTO: 90.5 FL (ref 79–97)
MONOCYTES # BLD AUTO: 1.3 10*3/MM3 (ref 0.1–0.9)
MONOCYTES NFR BLD AUTO: 10.8 % (ref 5–12)
NEUTROPHILS NFR BLD AUTO: 66 % (ref 42.7–76)
NEUTROPHILS NFR BLD AUTO: 7.94 10*3/MM3 (ref 1.7–7)
NRBC BLD AUTO-RTO: 0 /100 WBC (ref 0–0.2)
PLATELET # BLD AUTO: 290 10*3/MM3 (ref 140–450)
PMV BLD AUTO: 11.1 FL (ref 6–12)
POTASSIUM SERPL-SCNC: 3.4 MMOL/L (ref 3.5–5.2)
RBC # BLD AUTO: 3.28 10*6/MM3 (ref 3.77–5.28)
SODIUM SERPL-SCNC: 138 MMOL/L (ref 136–145)
WBC NRBC COR # BLD: 12.02 10*3/MM3 (ref 3.4–10.8)

## 2022-02-01 PROCEDURE — 94799 UNLISTED PULMONARY SVC/PX: CPT

## 2022-02-01 PROCEDURE — 97166 OT EVAL MOD COMPLEX 45 MIN: CPT

## 2022-02-01 PROCEDURE — 97530 THERAPEUTIC ACTIVITIES: CPT

## 2022-02-01 PROCEDURE — 25010000002 FUROSEMIDE PER 20 MG: Performed by: HOSPITALIST

## 2022-02-01 PROCEDURE — 71045 X-RAY EXAM CHEST 1 VIEW: CPT

## 2022-02-01 PROCEDURE — 94760 N-INVAS EAR/PLS OXIMETRY 1: CPT

## 2022-02-01 PROCEDURE — 80048 BASIC METABOLIC PNL TOTAL CA: CPT | Performed by: HOSPITALIST

## 2022-02-01 PROCEDURE — 94761 N-INVAS EAR/PLS OXIMETRY MLT: CPT

## 2022-02-01 PROCEDURE — 99232 SBSQ HOSP IP/OBS MODERATE 35: CPT | Performed by: NURSE PRACTITIONER

## 2022-02-01 PROCEDURE — 85025 COMPLETE CBC W/AUTO DIFF WBC: CPT | Performed by: HOSPITALIST

## 2022-02-01 RX ORDER — POTASSIUM CHLORIDE 750 MG/1
20 TABLET, FILM COATED, EXTENDED RELEASE ORAL 2 TIMES DAILY WITH MEALS
Status: DISCONTINUED | OUTPATIENT
Start: 2022-02-01 | End: 2022-02-05

## 2022-02-01 RX ORDER — POTASSIUM CHLORIDE 1.5 G/1.77G
40 POWDER, FOR SOLUTION ORAL AS NEEDED
Status: DISCONTINUED | OUTPATIENT
Start: 2022-02-01 | End: 2022-02-03

## 2022-02-01 RX ORDER — POTASSIUM CHLORIDE 750 MG/1
40 TABLET, FILM COATED, EXTENDED RELEASE ORAL AS NEEDED
Status: DISCONTINUED | OUTPATIENT
Start: 2022-02-01 | End: 2022-02-03

## 2022-02-01 RX ORDER — FUROSEMIDE 10 MG/ML
40 INJECTION INTRAMUSCULAR; INTRAVENOUS ONCE
Status: COMPLETED | OUTPATIENT
Start: 2022-02-01 | End: 2022-02-01

## 2022-02-01 RX ORDER — POTASSIUM CHLORIDE 750 MG/1
10 TABLET, FILM COATED, EXTENDED RELEASE ORAL DAILY
Status: DISCONTINUED | OUTPATIENT
Start: 2022-02-01 | End: 2022-02-01

## 2022-02-01 RX ORDER — BUDESONIDE AND FORMOTEROL FUMARATE DIHYDRATE 160; 4.5 UG/1; UG/1
2 AEROSOL RESPIRATORY (INHALATION)
Status: DISCONTINUED | OUTPATIENT
Start: 2022-02-01 | End: 2022-02-01

## 2022-02-01 RX ORDER — UREA 10 %
3 LOTION (ML) TOPICAL NIGHTLY
Status: DISCONTINUED | OUTPATIENT
Start: 2022-02-01 | End: 2022-02-02

## 2022-02-01 RX ADMIN — GUAIFENESIN 600 MG: 600 TABLET, EXTENDED RELEASE ORAL at 21:34

## 2022-02-01 RX ADMIN — GUAIFENESIN 600 MG: 600 TABLET, EXTENDED RELEASE ORAL at 08:41

## 2022-02-01 RX ADMIN — URSODIOL 300 MG: 300 CAPSULE ORAL at 21:34

## 2022-02-01 RX ADMIN — URSODIOL 300 MG: 300 CAPSULE ORAL at 16:46

## 2022-02-01 RX ADMIN — PROPAFENONE HYDROCHLORIDE 150 MG: 150 TABLET, COATED ORAL at 14:48

## 2022-02-01 RX ADMIN — POTASSIUM CHLORIDE 40 MEQ: 750 TABLET, EXTENDED RELEASE ORAL at 10:25

## 2022-02-01 RX ADMIN — ROSUVASTATIN CALCIUM 5 MG: 5 TABLET, FILM COATED ORAL at 21:34

## 2022-02-01 RX ADMIN — SUCRALFATE 1 G: 1 TABLET ORAL at 21:34

## 2022-02-01 RX ADMIN — URSODIOL 300 MG: 300 CAPSULE ORAL at 08:35

## 2022-02-01 RX ADMIN — SUCRALFATE 1 G: 1 TABLET ORAL at 17:54

## 2022-02-01 RX ADMIN — BUDESONIDE AND FORMOTEROL FUMARATE DIHYDRATE 2 PUFF: 160; 4.5 AEROSOL RESPIRATORY (INHALATION) at 10:47

## 2022-02-01 RX ADMIN — POTASSIUM CHLORIDE 20 MEQ: 10 TABLET, EXTENDED RELEASE ORAL at 16:46

## 2022-02-01 RX ADMIN — PROPAFENONE HYDROCHLORIDE 150 MG: 150 TABLET, COATED ORAL at 21:34

## 2022-02-01 RX ADMIN — IPRATROPIUM BROMIDE AND ALBUTEROL SULFATE 3 ML: 2.5; .5 SOLUTION RESPIRATORY (INHALATION) at 05:14

## 2022-02-01 RX ADMIN — SUCRALFATE 1 G: 1 TABLET ORAL at 11:54

## 2022-02-01 RX ADMIN — TIOTROPIUM BROMIDE INHALATION SPRAY 2 PUFF: 3.12 SPRAY, METERED RESPIRATORY (INHALATION) at 10:48

## 2022-02-01 RX ADMIN — FUROSEMIDE 40 MG: 40 INJECTION, SOLUTION INTRAMUSCULAR; INTRAVENOUS at 09:20

## 2022-02-01 RX ADMIN — PROPAFENONE HYDROCHLORIDE 150 MG: 150 TABLET, COATED ORAL at 06:36

## 2022-02-01 RX ADMIN — MIRTAZAPINE 7.5 MG: 15 TABLET, FILM COATED ORAL at 21:34

## 2022-02-01 RX ADMIN — Medication 3 MG: at 21:34

## 2022-02-01 RX ADMIN — IPRATROPIUM BROMIDE AND ALBUTEROL SULFATE 3 ML: 2.5; .5 SOLUTION RESPIRATORY (INHALATION) at 09:08

## 2022-02-01 RX ADMIN — BUMETANIDE 0.5 MG: 0.5 TABLET ORAL at 08:35

## 2022-02-01 RX ADMIN — SUCRALFATE 1 G: 1 TABLET ORAL at 06:36

## 2022-02-01 RX ADMIN — PANTOPRAZOLE SODIUM 40 MG: 40 TABLET, DELAYED RELEASE ORAL at 06:36

## 2022-02-01 RX ADMIN — SPIRONOLACTONE 25 MG: 25 TABLET ORAL at 08:35

## 2022-02-01 RX ADMIN — PANTOPRAZOLE SODIUM 40 MG: 40 TABLET, DELAYED RELEASE ORAL at 17:54

## 2022-02-01 NOTE — PLAN OF CARE
Goal Outcome Evaluation:  Plan of Care Reviewed With: patient        Progress: improving  Outcome Summary: Patient  resting  at this  time.  Had  Tylenol  per  request complaints  of  right  leg  pain.    Complaints  of  SOA  with  increased  anxiety  noted.  oxygen  increased  to  3l/m.  RT    paged  for  PRN  Rosalind.  Waithing  on placement  after  3rd  dose  of  iron  today.  Nursing  will    continue  to monitor.

## 2022-02-01 NOTE — THERAPY EVALUATION
Patient Name: Celia Baird  : 1945    MRN: [id number]                              Today's Date: 2022       Admit Date: 2022    Visit Dx:     ICD-10-CM ICD-9-CM   1. Gastrointestinal hemorrhage, unspecified gastrointestinal hemorrhage type  K92.2 578.9   2. Anemia requiring transfusions  D64.9 285.9   3. Orthostasis  I95.1 458.0   4. Hypokalemia  E87.6 276.8   5. Anemia  D64.9 285.9     Patient Active Problem List   Diagnosis   • COPD exacerbation (HCC)   • Subarachnoid hemorrhage (HCC)   • Multiple skin tears   • Closed nondisplaced fracture of left clavicle   • Paroxysmal atrial fibrillation (HCC)   • Tobacco abuse   • Status post placement of implantable loop recorder   • SOB (shortness of breath)   • COPD with acute exacerbation (HCC)   • Severe malnutrition (CMS/HCC)   • Leukocytosis   • Gastrointestinal hemorrhage   • Absolute anemia     Past Medical History:   Diagnosis Date   • Atrial fibrillation (HCC)    • COPD (chronic obstructive pulmonary disease) (HCC)    • History of frequent urinary tract infections    • Irregular heart beat    • Kidney stones    • PBC (primary biliary cirrhosis)    • PBC (primary biliary cirrhosis)      Past Surgical History:   Procedure Laterality Date   • CARDIAC ELECTROPHYSIOLOGY PROCEDURE N/A 3/30/2017    Procedure:    LINQ;  Surgeon: Ailyn Solorio MD;  Location: [facility] CATH INVASIVE LOCATION;  Service:    • CHOLECYSTECTOMY     • COLONOSCOPY     • COLONOSCOPY N/A 2022    Procedure: COLONOSCOPY into cecum with polypectomy, clip placement;  Surgeon: Constantine Kaye MD;  Location: [facility] ENDOSCOPY;  Service: Gastroenterology;  Laterality: N/A;  poor prep, diverticulosis, polyp   • ENDOSCOPY N/A 2022    Procedure: ESOPHAGOGASTRODUODENOSCOPY with biopsy;  Surgeon: Constantine Kaye MD;  Location: [facility] ENDOSCOPY;  Service: Gastroenterology;  Laterality: N/A;  normal   • TUBAL ABDOMINAL LIGATION        General Information     Row Name  02/01/22 0932          OT Time and Intention    Document Type evaluation  -     Mode of Treatment individual therapy; occupational therapy  -     Row Name 02/01/22 0932          General Information    Patient Profile Reviewed yes  -     Prior Level of Function independent:; ADL's; all household mobility  rollator for mobility  -     Existing Precautions/Restrictions oxygen therapy device and L/min; fall  -     Barriers to Rehab medically complex  -     Row Name 02/01/22 0932          Cognition    Orientation Status (Cognition) oriented x 3  -     Row Name 02/01/22 0932          Safety Issues, Functional Mobility    Safety Issues Affecting Function (Mobility) insight into deficits/self-awareness  -     Impairments Affecting Function (Mobility) endurance/activity tolerance; shortness of breath; strength  -           User Key  (r) = Recorded By, (t) = Taken By, (c) = Cosigned By    Initials Name Provider Type     Hazel Live OT Occupational Therapist                 Mobility/ADL's     Row Name 02/01/22 0934          Bed Mobility    Bed Mobility supine-sit; sit-supine  -     Supine-Sit [place] (Bed Mobility) supervision; verbal cues  -     Sit-Supine [place] (Bed Mobility) supervision; verbal cues  -     Assistive Device (Bed Mobility) head of bed elevated  -     Comment (Bed Mobility) requests to sit EOB d/t SOB  -     Row Name 02/01/22 0934          Transfers    Comment (Transfers) not tested d/t respiratory status  -           User Key  (r) = Recorded By, (t) = Taken By, (c) = Cosigned By    Initials Name Provider Type     Hazel Live OT Occupational Therapist               Obj/Interventions     Row Name 02/01/22 0934          Strength Comprehensive (MMT)    Comment, General Manual Muscle Testing (MMT) Assessment generalized weakness and deconditioning  -     Row Name 02/01/22 0934          Motor Skills    Motor Skills functional endurance  -     Functional Endurance  poor, significant CROCKER sitting EOB on 4L O2, SpO2 remains in 80s RN present and aware  -     Row Name 02/01/22 0934          Balance    Static Sitting Balance WNL; unsupported; sitting, edge of bed  -JW     Dynamic Sitting Balance WNL; unsupported; sitting, edge of bed  -JW     Balance Interventions sitting  -JW           User Key  (r) = Recorded By, (t) = Taken By, (c) = Cosigned By    Initials Name Provider Type    JW Hazel Live, OT Occupational Therapist               Goals/Plan     Row Name 02/01/22 0943          Transfer Goal 1 (OT)    Activity/Assistive Device (Transfer Goal 1, OT) transfers, all  -JW     [place] Level/Cues Needed (Transfer Goal 1, OT) minimum assist (75% or more patient effort)  -JW     Time Frame (Transfer Goal 1, OT) short term goal (STG); 2 weeks  -JW     Progress/Outcome (Transfer Goal 1, OT) goal ongoing  -     Row Name 02/01/22 0943          Bathing Goal 1 (OT)    Activity/Device (Bathing Goal 1, OT) bathing skills, all; shower chair; grab bar, tub/shower; hand-held shower spray hose  -     [place] Level/Cues Needed (Bathing Goal 1, OT) minimum assist (75% or more patient effort)  -     Time Frame (Bathing Goal 1, OT) short term goal (STG); 2 weeks  -JW     Progress/Outcomes (Bathing Goal 1, OT) goal ongoing  -     Row Name 02/01/22 0943          Dressing Goal 1 (OT)    Activity/Device (Dressing Goal 1, OT) dressing skills, all  -JW     [place]/Cues Needed (Dressing Goal 1, OT) minimum assist (75% or more patient effort)  -JW     Time Frame (Dressing Goal 1, OT) short term goal (STG); 2 weeks  -JW     Progress/Outcome (Dressing Goal 1, OT) goal ongoing  -     Row Name 02/01/22 0943          Grooming Goal 1 (OT)    Activity/Device (Grooming Goal 1, OT) grooming skills, all  -JW     [place] (Grooming Goal 1, OT) set-up required  -     Time Frame (Grooming Goal 1, OT) short term goal (STG); 2 weeks  -JW     Progress/Outcome (Grooming Goal 1, OT) goal  ongoing  -     Row Name 02/01/22 0943          Strength Goal 1 (OT)    Strength Goal 1 (OT) Pt will participate in UB therex to promote strength for ADLs and mobility  -     Time Frame (Strength Goal 1, OT) short term goal (STG); 2 weeks  -     Progress/Outcome (Strength Goal 1, OT) goal ongoing  -     Row Name 02/01/22 0943          Therapy Assessment/Plan (OT)    Planned Therapy Interventions (OT) activity tolerance training; BADL retraining; functional balance retraining; occupation/activity based interventions; patient/caregiver education/training; strengthening exercise; transfer/mobility retraining  -           User Key  (r) = Recorded By, (t) = Taken By, (c) = Cosigned By    Initials Name Provider Type    Hazel Concepcion, UMM Occupational Therapist               Clinical Impression     Row Name 02/01/22 0935          Plan of Care Review    Plan of Care Reviewed With patient  -     Outcome Summary Pt is a 76 y/o female who presents to [facility] with generalized weakness and lightheadedness d/t symptomatic anemia, orthostasis, GIH. Hx of COPD, CHF, Afib, HTN, anemia. Pt lives alone and reports being independent with ADLs and fxl mobility using a rollator, also has grabbars and BSC. She presents today supine in bed on 3L O2, anxious to sit EOB d/t feeling SOB and transitions with SPV. Pt with significant CROCKER depsite education on PLB, SpO2 in low 80s, RN present t/o episode. Sats slowly increase with rest and PLB but pt still with CROCKER and is assisted back to supine position, further activity deferred at this time d/t respiratory status. Pt demo's impaired activity tolerance, endurance, strength, and mobility and will benefit from subacute rehab to address fxl deficits.  -     Row Name 02/01/22 0935          Therapy Assessment/Plan (OT)    Rehab Potential (OT) good, to achieve stated therapy goals  -     Criteria for Skilled Therapeutic Interventions Met (OT) skilled treatment is necessary; yes  -      Therapy Frequency (OT) 3 times/wk  -     Row Name 02/01/22 0935          Therapy Plan Review/Discharge Plan (OT)    Anticipated Discharge Disposition (OT) skilled nursing facility  -     Row Name 02/01/22 0935          Vital Signs    Pre SpO2 (%) 85  -     O2 Delivery Pre Treatment supplemental O2  -JW     Intra SpO2 (%) 81  -JW     O2 Delivery Intra Treatment supplemental O2  -JW     Post SpO2 (%) 92  -JW     O2 Delivery Post Treatment supplemental O2  -JW     Pre Patient Position Supine  -JW     Intra Patient Position Sitting  -JW     Post Patient Position Supine  -JW     Row Name 02/01/22 0935          Positioning and Restraints    Pre-Treatment Position in bed  -JW     Post Treatment Position bed  -JW     In Bed notified nsg; call light within reach; encouraged to call for assist; exit alarm on; fowlers; with nsg  -           User Key  (r) = Recorded By, (t) = Taken By, (c) = Cosigned By    Initials Name Provider Type    Hazel Concepcion OT Occupational Therapist               Outcome Measures     Row Name 02/01/22 0944          How much help from another is currently needed...    Putting on and taking off regular lower body clothing? 2  -JW     Bathing (including washing, rinsing, and drying) 3  -JW     Toileting (which includes using toilet bed pan or urinal) 2  -JW     Putting on and taking off regular upper body clothing 3  -JW     Taking care of personal grooming (such as brushing teeth) 3  -JW     Eating meals 4  -JW     AM-PAC 6 Clicks Score (OT) 17  -     Row Name 02/01/22 0944          Functional Assessment    Outcome Measure Options AM-PAC 6 Clicks Daily Activity (OT)  -           User Key  (r) = Recorded By, (t) = Taken By, (c) = Cosigned By    Initials Name Provider Type    Hazel Concepcion OT Occupational Therapist                Occupational Therapy Education                 Title: PT OT SLP Therapies (In Progress)     Topic: Occupational Therapy (In Progress)     Point: ADL training (Not  Started)     Description:   Instruct learner(s) on proper safety adaptation and remediation techniques during self care or transfers.   Instruct in proper use of assistive devices.              Learner Progress:  Not documented in this visit.          Point: Home exercise program (Not Started)     Description:   Instruct learner(s) on appropriate technique for monitoring, assisting and/or progressing therapeutic exercises/activities.              Learner Progress:  Not documented in this visit.          Point: Precautions (Done)     Description:   Instruct learner(s) on prescribed precautions during self-care and functional transfers.              Learning Progress Summary           Patient Acceptance, E, VU by  at 2/1/2022 0944    Comment: role of OT, plan of care, safety, PLB                   Point: Body mechanics (Done)     Description:   Instruct learner(s) on proper positioning and spine alignment during self-care, functional mobility activities and/or exercises.              Learning Progress Summary           Patient Acceptance, E, VU by ESTHER at 2/1/2022 0944    Comment: role of OT, plan of care, safety, PLB                               User Key     Initials Effective Dates Name Provider Type Discipline     06/10/21 -  Hazel Live OT Occupational Therapist OT              OT Recommendation and Plan  Planned Therapy Interventions (OT): activity tolerance training, BADL retraining, functional balance retraining, occupation/activity based interventions, patient/caregiver education/training, strengthening exercise, transfer/mobility retraining  Therapy Frequency (OT): 3 times/wk  Plan of Care Review  Plan of Care Reviewed With: patient  Outcome Summary: Pt is a 78 y/o female who presents to [facility] with generalized weakness and lightheadedness d/t symptomatic anemia, orthostasis, GIH. Hx of COPD, CHF, Afib, HTN, anemia. Pt lives alone and reports being independent with ADLs and fxl mobility using a rollator,  also has grabbars and BSC. She presents today supine in bed on 3L O2, anxious to sit EOB d/t feeling SOB and transitions with SPV. Pt with significant CROCKER depsite education on PLB, SpO2 in low 80s, RN present t/o episode. Sats slowly increase with rest and PLB but pt still with CROCKER and is assisted back to supine position, further activity deferred at this time d/t respiratory status. Pt demo's impaired activity tolerance, endurance, strength, and mobility and will benefit from subacute rehab to address fxl deficits.     Time Calculation:    Time Calculation- OT     Row Name 02/01/22 0945             Time Calculation- OT    OT Start Time 0832  -JW      OT Stop Time 0850  -JW      OT Time Calculation (min) 18 min  -JW      Total Timed Code Minutes- OT 8 minute(s)  -JW      OT Received On 02/01/22  -      OT - Next Appointment 02/02/22  -      OT Goal Re-Cert Due Date 02/15/22  -              Timed Charges    81684 - OT Therapeutic Activity Minutes 8  -JW              Untimed Charges    OT Eval/Re-eval Minutes 10  -JW              Total Minutes    Timed Charges Total Minutes 8  -JW      Untimed Charges Total Minutes 10  -JW       Total Minutes 18  -JW            User Key  (r) = Recorded By, (t) = Taken By, (c) = Cosigned By    Initials Name Provider Type    Hazel Concepcion OT Occupational Therapist              Therapy Charges for Today     Code Description Service Date Service Provider Modifiers Qty    87679104203  OT THERAPEUTIC ACT EA 15 MIN 2/1/2022 Hazel Live OT GO 1    74138942015 HC OT EVAL MOD COMPLEXITY 2 2/1/2022 Hazel Live OT GO 1               Hazel Live OT  2/1/2022

## 2022-02-01 NOTE — PLAN OF CARE
Goal Outcome Evaluation:  Plan of Care Reviewed With: patient, son           Outcome Summary: Rapid response called this am for decreased 02 sats, SOA with labored breathing. Resolved with prn duoneb, IV Lasix and NRB mask. 02 3-4 L NC with patient c/o mild SOA when awake. Pulmonary consult. K+ replacement. Telemetry SR/SR w/ 1*AVB.

## 2022-02-01 NOTE — PROGRESS NOTES
Dr. ANA Hoffman    [address]    2/1/2022    Patient ID:  Name:  Celia Baird  MRN:  [id number]  1945  77 y.o.  female            CC/Reason for visit: Chronic respiratory failure, COPD    Interval hx: Her condition is worse today.  She had a sudden episode of shortness of breath earlier today.  She did it again when I walked into the room.  However, important to note is when I walked into the room the patient was asleep.  I stood there quietly for about a minute and observed her and she was breathing quietly on 3 L oxygen saturating 99%.  As soon as I woke her up she started hyperventilating and told me she was very short of breath within about 1 minute of the talking to her.  She started grasping her anterior chest telling me she had chest tightness.  She started coughing.  No fever, no hemoptysis, no sputum    ROS: Occasional chest tightness.  Occasional shortness of breath.  No abdominal pain.    I reviewed old medical records.  Past medical history, social history and family history: Unchanged from admission H&P.      Vitals:  Vitals:    02/01/22 1047 02/01/22 1048 02/01/22 1056 02/01/22 1248   BP:    105/50   BP Location:    Right arm   Patient Position:    Lying   Pulse: 82 86 87 85   Resp: 20 22 22 18   Temp:    98.4 °F (36.9 °C)   TempSrc:    Oral   SpO2: 96% 91% 91% 92%   Weight:       Height:               Body mass index is 15.31 kg/m².  No intake or output data in the 24 hours ending 02/01/22 1446    Exam:  GEN:  No distress  Alert, oriented x 3.  Follows all commands, no focal deficits  LUNGS: Barrel chest, distant and diminished breath sounds with a few crackles in the right base , no use of accessory muscles  CV:  Normal S1S2, without murmur, no edema  ABD:  Non tender, no enlarged liver or masses      Scheduled meds:  bumetanide, 0.5 mg, Oral, Daily  guaiFENesin, 600 mg, Oral, Q12H  mirtazapine, 7.5 mg, Oral, Nightly  pantoprazole, 40 mg, Oral, BID AC  propafenone, 150 mg,  Oral, Q8H  rosuvastatin, 5 mg, Oral, Nightly  spironolactone, 25 mg, Oral, Daily  sucralfate, 1 g, Oral, 4x Daily AC & at Bedtime  tiotropium bromide monohydrate, 2 puff, Inhalation, Daily - RT  ursodiol, 300 mg, Oral, TID      IV meds:                      sodium chloride, 30 mL/hr, Last Rate: Stopped (01/28/22 1117)        Data Review:   I reviewed the patient's medications and new clinical results.    COVID19   Date Value Ref Range Status   01/26/2022 Not Detected Not Detected - Ref. Range Final         Lab Results   Component Value Date    CALCIUM 9.0 02/01/2022    PHOS 2.4 (L) 01/21/2022    MG 1.7 01/30/2022    MG 1.7 01/29/2022    MG 1.6 01/26/2022     Results from last 7 days   Lab Units 02/01/22  0443 01/31/22  0601 01/30/22  1149 01/30/22  0545 01/30/22  0545 01/29/22  1153 01/29/22  0626 01/28/22  1546 01/28/22  0545 01/27/22  0909 01/26/22  1852 01/26/22  1720 01/26/22  1621   SODIUM mmol/L 138  --   --   --  139  --  137  --  134*   < >  --   --  136   POTASSIUM mmol/L 3.4*  --   --   --  3.6  --  3.6  --  3.8   < >  --   --  3.0*   CHLORIDE mmol/L 103  --   --   --  103  --  100  --  97*   < >  --   --  95*   CO2 mmol/L 26.5  --   --   --  28.4  --  27.9  --  27.7   < >  --   --  35.6*   BUN mg/dL 14  --   --   --  14  --  11  --  14   < >  --   --  24*   CREATININE mg/dL 0.75  --   --   --  0.94  --  0.68  --  0.66   < >  --   --  0.88   CALCIUM mg/dL 9.0  --   --   --  8.6  --  8.5*  --  8.6   < >  --   --  9.0   BILIRUBIN mg/dL  --   --   --   --   --   --   --   --  0.6  --   --   --  0.4   ALK PHOS U/L  --   --   --   --   --   --   --   --  100  --   --   --  122*   ALT (SGPT) U/L  --   --   --   --   --   --   --   --  10  --   --   --  14   AST (SGOT) U/L  --   --   --   --   --   --   --   --  16  --   --   --  15   GLUCOSE mg/dL 86  --   --   --  94  --  92  --  114*   < >  --   --  103*   WBC 10*3/mm3 12.02*  --   --   --  9.29  --  9.77  --  12.68*   < >  --    < >  --    HEMOGLOBIN g/dL  10.0* 9.6* 9.7*   < > 9.8*   < > 10.1*  10.1*   < > 10.3*   < >  --    < >  --    PLATELETS 10*3/mm3 290  --   --   --  222  --  218  --  191   < >  --    < >  --    INR   --   --   --   --   --   --   --   --   --   --  0.96  --   --    PROBNP pg/mL  --   --   --   --   --   --   --   --  874.0  --   --   --   --    PROCALCITONIN ng/mL  --   --   --   --   --   --   --   --   --   --   --   --  0.16    < > = values in this interval not displayed.               ASSESSMENT:     Absolute anemia    Gastrointestinal hemorrhage  COPD  Chronic respiratory failure  Tobacco abuse, quit recently  Paroxysmal atrial fib        PLAN:  Not sure what is going on with this patient.  While she was asleep I was standing in the room and observed her quietly for 1 minute and there was no problem.  She was saturating 99% on 3 L.  As soon as I woke her up she started hyperventilating and clutching her chest and complaining of chest discomfort and shortness of breath.  Lung exam does not reveal any wheezes.  I will order a CT angiogram of the chest with IV contrast to determine if there is any pulmonary embolism or no pneumonia.  We are trying to avoid scheduled DuoNeb every 6 hours in order not to aggravate her atrial fibrillation.  We will follow along        Luther Hoffman MD  2/1/2022

## 2022-02-01 NOTE — PROGRESS NOTES
[facility]  Cardiology Progress Note    Patient Identification:  Name: Celia Baird  Age: 77 y.o.  Sex: female  :  1945  MRN: [id number]                 Follow Up / Chief Complaint: Follow up for afib    Interval History: Monitor reviewed, sinus rhythm.  Bigeminy last night.  She will need a ZIO at discharge.       Subjective: Denies chest pain, palpitations and +shortness of breath.    Objective:    Past Medical History:  Past Medical History:   Diagnosis Date    Atrial fibrillation (HCC)     COPD (chronic obstructive pulmonary disease) (HCC)     History of frequent urinary tract infections     Irregular heart beat     Kidney stones     PBC (primary biliary cirrhosis)     PBC (primary biliary cirrhosis)      Past Surgical History:  Past Surgical History:   Procedure Laterality Date    CARDIAC ELECTROPHYSIOLOGY PROCEDURE N/A 3/30/2017    Procedure:    LINQ;  Surgeon: Ailyn Solorio MD;  Location: [facility] CATH INVASIVE LOCATION;  Service:     CHOLECYSTECTOMY      COLONOSCOPY      COLONOSCOPY N/A 2022    Procedure: COLONOSCOPY into cecum with polypectomy, clip placement;  Surgeon: Constantine Kaye MD;  Location: [facility] ENDOSCOPY;  Service: Gastroenterology;  Laterality: N/A;  poor prep, diverticulosis, polyp    ENDOSCOPY N/A 2022    Procedure: ESOPHAGOGASTRODUODENOSCOPY with biopsy;  Surgeon: Constantine Kaye MD;  Location: [facility] ENDOSCOPY;  Service: Gastroenterology;  Laterality: N/A;  normal    TUBAL ABDOMINAL LIGATION          Social History:   Social History     Tobacco Use    Smoking status: Current Some Day Smoker     Packs/day: 0.50     Years: 59.00     Pack years: 29.50     Types: Cigarettes    Smokeless tobacco: Never Used   Substance Use Topics    Alcohol use: No      Family History:  Family History   Problem Relation Age of Onset    Heart disease Father     Heart attack Father     Heart disease Brother     Stroke Mother           Allergies:  Allergies  "  Allergen Reactions    Morphine And Related Nausea And Vomiting    Levaquin [Levofloxacin]     Sulfa Antibiotics      Scheduled Meds:  budesonide-formoterol, 2 puff, BID - RT  bumetanide, 0.5 mg, Daily  guaiFENesin, 600 mg, Q12H  mirtazapine, 7.5 mg, Nightly  pantoprazole, 40 mg, BID AC  propafenone, 150 mg, Q8H  rosuvastatin, 5 mg, Nightly  spironolactone, 25 mg, Daily  sucralfate, 1 g, 4x Daily AC & at Bedtime  tiotropium bromide monohydrate, 2 puff, Daily - RT  ursodiol, 300 mg, TID            INTAKE AND OUTPUT:  No intake or output data in the 24 hours ending 02/01/22 0909  ROS  Constitutional: Awake and alert, no fever. No nosebleeds  Abdomen           no abdominal pain   Cardiac              no chest pain  Pulmonary          no shortness of breath      /54 (BP Location: Left arm, Patient Position: Lying)   Pulse 83   Temp 97.9 °F (36.6 °C) (Oral)   Resp 18   Ht 152.4 cm (60\")   Wt 35.6 kg (78 lb 6.4 oz)   SpO2 92%   BMI 15.31 kg/m²   General appearance: No acute changes   Neck: Trachea midline; NECK, supple, no thyromegaly or lymphadenopathy   Lungs: Normal size and shape, normal breath sounds, equal distribution of air, no rales or rhonchi   CV: S1-S2 regular, no murmurs, no rub, no gallop   Abdomen: Soft, nontender; no masses , no abnormal abdominal sounds   Extremities: No deformity , normal color , no peripheral edema   Skin: Normal temperature, turgor and texture; no rash, ulcers            I reviewed the patient's new clinical results, and personally reviewed and interpreted the patient's ECG and telemetry data from the last 24 hours      Cardiographics  Telemetry:            Telemetry:  SR    Echocardiogram:   1/14/2022  Interpretation Summary     Calculated left ventricular EF = 61.9% Estimated left ventricular EF was in agreement with the calculated left ventricular EF.  Left ventricular diastolic function was normal.  There is no evidence of pericardial effusion. .     2019   "   Interpretation Summary        Findings consistent with a normal ECG stress test.  Left ventricular ejection fraction is normal (Calculated EF = 70%).  Myocardial perfusion imaging indicates a normal myocardial perfusion study with no evidence of ischemia.  Impressions are consistent with a low risk study.     Asymptomatic for chest pain. ECG is negative for ischemia.   Ectopy: Rare PAC at baseline, none with exercise, occasional PVC in recovery  HR and BP response : Appropriate for Beta-blocker therapy  Pharmacologic study due to inability to tolerate increasing speed and grade of treadmill due to Pulmonary, mobility  Issues and Beta-blocker therapy.  Participated in Low Level exercise and tolerance is poor.     Loop placement in 2017  Conclusion        Successful Linq implantation           Imaging  Chest X-ray:   1/26/2022  IMPRESSION:  No focal pulmonary consolidation. Mild prominence of  interstitial markings.     This report was finalized on 1/26/2022 4:55 PM by Dr. Andrew Abdi M.D.        Lab Review   Results from last 7 days   Lab Units 01/26/22  1621   TROPONIN T ng/mL <0.010     Results from last 7 days   Lab Units 01/30/22  0545   MAGNESIUM mg/dL 1.7     Results from last 7 days   Lab Units 02/01/22  0443   SODIUM mmol/L 138   POTASSIUM mmol/L 3.4*   BUN mg/dL 14   CREATININE mg/dL 0.75   CALCIUM mg/dL 9.0     Results from last 7 days   Lab Units 02/01/22  0443 01/31/22  0601 01/30/22  1149 01/30/22  0545 01/30/22  0545 01/29/22  1153 01/29/22  0626   WBC 10*3/mm3 12.02*  --   --   --  9.29  --  9.77   HEMOGLOBIN g/dL 10.0* 9.6* 9.7*   < > 9.8*   < > 10.1*  10.1*   HEMATOCRIT % 29.7* 28.1* 28.5*   < > 29.2*   < > 30.2*  30.2*   PLATELETS 10*3/mm3 290  --   --   --  222  --  218    < > = values in this interval not displayed.     Results from last 7 days   Lab Units 01/26/22  1852   INR  0.96   APTT seconds 30.4     The following medical decision was discussed in detail with   "Osmin      Assessment:  1. Gastrointestinal hemorrhage  2. anemia   3. Paroxysmal atrial fibrillation: With watchman's device   4. orthostasis   5. Hypokalemia:3.4 replace   6. Chronic diastolic CHF; no ACE or ARB due to history of hypotension  7. hyperlipidemia: on statin  8. hypertension: stable  9. GERD  10. History of loop implant battery no longer working  11. CAD with stent placement 8/25/21 at [facility]  12. Pause one noted yesterday     Plan:  SR on monitor, bigeminy last night.  Blood pressure and heart rate remained stable.  She will need a ZIO at discharge.  She will possibly need a loop in the outpatient setting.  Rapid called this a.m. for shortness of breath.  She received 40 of Lasix at the time.  We will have her get a chest x-ray.         Replace K+ per protocol. Chest x-ray, bnp, bmp     She will follow up with BRAYAN Dia on March 9th at 11 am at the [address] [facility].      )2/1/2022  BRAYAN Dia      Patient personally interviewed and above subjective findings personally confirmed during a face to face contact with patient today  All findings of physical examination confirmed  All pertinent and performed labs, cardiac procedures ,  radiographs of the last 24 hours personally reviewed  Impression and plans discussed/elaborated and implemented jointly as described above     MD OZZIE Solis/Transcription:   \"Dictated utilizing Dragon dictation\".     "

## 2022-02-01 NOTE — PROGRESS NOTES
"Daily progress note    Chief complaint  Events noted  Decreased O2 sats this morning  Back to baseline  Awake and alert  Denies chest pain palpitation or shortness of breath    History of present illness  77-year white female with very complex past medical history including chronic hypoxic respiratory failure COPD diastolic CHF paroxysmal atrial fibrillation hypertension hyperlipidemia chronic anemia and gastroesophageal reflux disease who was recently discharged from the hospital after work-up on acute on chronic hypoxic respiratory failure presented to [facility] emergency room with generalized weakness not feeling well including dizziness lightheadedness and work-up in ER revealed symptomatic anemia with heme positive stools admit for management.  Patient denies any black stools or blood in the stools.  Patient denies any nausea vomiting.  Patient stated that she has not smokes cigarettes since discharge.  Patient admitted for management started on IV Protonix and received 2 units packed RBC and start feeling better.     REVIEW OF SYSTEMS  All systems reviewed and negative except for those discussed in HPI.      PHYSICAL EXAM  Blood pressure 105/50, pulse 85, temperature 98.4 °F (36.9 °C), temperature source Oral, resp. rate 18, height 152.4 cm (60\"), weight 35.6 kg (78 lb 6.4 oz), SpO2 92 %.    General: No acute distress.  HENT: NCAT, PERRL, Nares patent.  Eyes: no scleral icterus.  Neck: trachea midline, no ROM limitations.  CV: regular rhythm, regular rate.  Respiratory: normal effort, CTAB.  Abdomen: soft, nondistended, NTTP, no rebound tenderness, bowel sounds positive  Musculoskeletal: no deformity.  Neuro: alert, moves all extremities, follows commands.  Skin: warm, dry.     LAB RESULTS  Lab Results (last 24 hours)     Procedure Component Value Units Date/Time    Basic Metabolic Panel [[id number]]  (Abnormal) Collected: 02/01/22 0443    Specimen: Blood Updated: 02/01/22 0556     Glucose 86 mg/dL  "     BUN 14 mg/dL      Creatinine 0.75 mg/dL      Sodium 138 mmol/L      Potassium 3.4 mmol/L      Chloride 103 mmol/L      CO2 26.5 mmol/L      Calcium 9.0 mg/dL      eGFR Non African Amer 75 mL/min/1.73      BUN/Creatinine Ratio 18.7     Anion Gap 8.5 mmol/L     Narrative:      GFR Normal >60  Chronic Kidney Disease <60  Kidney Failure <15      CBC & Differential [[id number]]  (Abnormal) Collected: 02/01/22 0443    Specimen: Blood Updated: 02/01/22 0541    Narrative:      The following orders were created for panel order CBC & Differential.  Procedure                               Abnormality         Status                     ---------                               -----------         ------                     CBC Auto Differential[[id number]]        Abnormal            Final result                 Please view results for these tests on the individual orders.    CBC Auto Differential [[id number]]  (Abnormal) Collected: 02/01/22 0443    Specimen: Blood Updated: 02/01/22 0541     WBC 12.02 10*3/mm3      RBC 3.28 10*6/mm3      Hemoglobin 10.0 g/dL      Hematocrit 29.7 %      MCV 90.5 fL      MCH 30.5 pg      MCHC 33.7 g/dL      RDW 13.7 %      RDW-SD 44.8 fl      MPV 11.1 fL      Platelets 290 10*3/mm3      Neutrophil % 66.0 %      Lymphocyte % 19.0 %      Monocyte % 10.8 %      Eosinophil % 2.6 %      Basophil % 0.9 %      Immature Grans % 0.7 %      Neutrophils, Absolute 7.94 10*3/mm3      Lymphocytes, Absolute 2.28 10*3/mm3      Monocytes, Absolute 1.30 10*3/mm3      Eosinophils, Absolute 0.31 10*3/mm3      Basophils, Absolute 0.11 10*3/mm3      Immature Grans, Absolute 0.08 10*3/mm3      nRBC 0.0 /100 WBC         Imaging Results (Last 24 Hours)     Procedure Component Value Units Date/Time    XR Chest 1 View [[id number]] Collected: 02/01/22 1459     Updated: 02/01/22 1504    Narrative:      XR CHEST 1 VW-     HISTORY:  Hypotension, shortness of air.     COMPARISON:  Chest radiograph 01/26/2022     FINDINGS:    A  single portable view of the chest was obtained. Evaluation is limited  by patient rotation to the left. Within these limitations, the cardiac  silhouette and mediastinal and hilar contours are not significantly  changed. There is calcific aortic atherosclerosis. There are small  bilateral pleural effusions. There is mild bibasilar opacity, left  greater than right, which may reflect adjacent compressive atelectasis  with superimposed aspiration/pneumonia not excluded in the appropriate  clinical setting. There is a left chest implanted cardiac loop recording  device. There is diffuse osseous demineralization. There is multilevel  degenerative disc disease. There are old rib fractures. There are  surgical clips projecting over the right hemiabdomen.     This report was finalized on 2/1/2022 3:01 PM by Dr. Laura Talley M.D.                ECG 12 Lead  Component   Ref Range & Units 1/26/22 1623 1/18/22 0139 1/14/22 0630 1/13/22 1356 1/13/22 1253   QT Interval   ms 446  314  362  373  409              HEART RATE= 74  bpm  RR Interval= 812  ms  ME Interval= 226  ms  P Horizontal Axis= -43  deg  P Front Axis= 58  deg  QRSD Interval= 94  ms  QT Interval= 446  ms  QRS Axis= 24  deg  T Wave Axis= 237  deg  - ABNORMAL ECG -  Sinus rhythm  Prolonged ME interval  Repol abnrm suggests ischemia, diffuse leads  Ventricular bigeminy resolved, !st degree AVB new           Upper and lower endoscopy noted and results discussed with patient     Current Facility-Administered Medications:   •  acetaminophen (TYLENOL) tablet 650 mg, 650 mg, Oral, Q6H PRN, Constantine Kaye MD, 650 mg at 01/31/22 2055  •  budesonide-formoterol (SYMBICORT) 160-4.5 MCG/ACT inhaler 2 puff, 2 puff, Inhalation, BID - RT, Luther Hoffman MD  •  bumetanide (BUMEX) tablet 0.5 mg, 0.5 mg, Oral, Daily, Constantine Kaye MD, 0.5 mg at 02/01/22 0835  •  guaiFENesin (MUCINEX) 12 hr tablet 600 mg, 600 mg, Oral, Q12H, Constantine Kaye MD, 600 mg at 02/01/22  0841  •  mirtazapine (REMERON) tablet 7.5 mg, 7.5 mg, Oral, Nightly, Constantine Kaye MD, 7.5 mg at 01/31/22 2050  •  O2 (OXYGEN), 2 L/min, Inhalation, PRN, Constantine Kaye MD  •  ondansetron (ZOFRAN) injection 4 mg, 4 mg, Intravenous, Q4H PRN, Constantine Kaye MD  •  pantoprazole (PROTONIX) EC tablet 40 mg, 40 mg, Oral, BID AC, Stingl, Pao Kaplan MD, 40 mg at 02/01/22 0636  •  potassium chloride (K-DUR,KLOR-CON) ER tablet 40 mEq, 40 mEq, Oral, PRN, Eulogio Mcdaniel MD, 40 mEq at 02/01/22 1025  •  potassium chloride (KLOR-CON) packet 40 mEq, 40 mEq, Oral, PRN, Eulogio Mcdaniel MD  •  propafenone (RYTHMOL) tablet 150 mg, 150 mg, Oral, Q8H, Constantine Kaye MD, 150 mg at 02/01/22 1448  •  rosuvastatin (CRESTOR) tablet 5 mg, 5 mg, Oral, Nightly, Constantine Kaye MD, 5 mg at 01/31/22 2050  •  sodium chloride 0.9 % infusion, 30 mL/hr, Intravenous, Continuous PRN, Constantine Kaye MD, Stopped at 01/28/22 1117  •  spironolactone (ALDACTONE) tablet 25 mg, 25 mg, Oral, Daily, Constantine Kaye MD, 25 mg at 02/01/22 0835  •  sucralfate (CARAFATE) tablet 1 g, 1 g, Oral, 4x Daily AC & at Bedtime, Constantine Kaye MD, 1 g at 02/01/22 1154  •  tiotropium (SPIRIVA RESPIMAT) 2.5 mcg/act aerosol solution inhaler, 2 puff, Inhalation, Daily - RT, Constantine Kaye MD, 2 puff at 02/01/22 1048  •  ursodiol (ACTIGALL) capsule 300 mg, 300 mg, Oral, TID, Kaye, Constantine J, MD, 300 mg at 02/01/22 0835     ASSESSMENT  Acute on chronic hypoxic respiratory failure  Symptomatic anemia  Heme positive stools  Acute blood loss anemia  Colon polyps  COPD  S/p dig toxicity  Chronic diastolic CHF  Paroxysmal atrial fibrillation  Hypertension  Hyperlipidemia  Gastroesophageal reflux disease    PLAN  CPM  Supplement oxygen  Check CT chest rule out PE  Transfuse as needed  Protonix and Carafate  Continue to hold aspirin and Plavix for 1 week  GI and cardiology consult  Pulmonary to follow patient  Adjust home  medications  Stress ulcer DVT prophylaxis  Supportive care  DNR  PT/OT  Discussed with nursing staff  Hold discharge today    JONNY PEOPLES MD

## 2022-02-01 NOTE — PLAN OF CARE
Goal Outcome Evaluation:  Plan of Care Reviewed With: patient     Outcome Summary: Pt is a 76 y/o female who presents to [facility] with generalized weakness and lightheadedness d/t symptomatic anemia, orthostasis, GIH. Hx of COPD, CHF, Afib, HTN, anemia. Pt lives alone and reports being independent with ADLs and fxl mobility using a rollator, also has grabbars and BSC. She presents today supine in bed on 3L O2, anxious to sit EOB d/t feeling SOB and transitions with SPV. Pt with significant CROCKER depsite education on PLB, SpO2 in low 80s, RN present t/o episode. Sats slowly increase with rest and PLB but pt still with CROCKER and is assisted back to supine position, further activity deferred at this time d/t respiratory status. Pt demo's impaired activity tolerance, endurance, strength, and mobility and will benefit from subacute rehab to address fxl deficits.  Patient was not wearing a face mask during this therapy encounter. Therapist used appropriate personal protective equipment including mask, gloves, and eye protection.  Hand hygiene was completed before and after therapy session.

## 2022-02-01 NOTE — NURSING NOTE
0842 After working with OT, patient sitting on side of bed with shallow mildly labored breathing. Occ. congestive cough. RT called per patient request for a breathing treatment. 02 sats 88-89%. Post lung sounds diminished posterior with crackles right base.     0850 02 sats 83-84% on 4 L NC. Patient breathing shallow, rapid and unlabored. Sitting on side of bed, unable to lie down.  Rapid  response called.     0852 Oxygen changed to 100% NRB mask.

## 2022-02-02 ENCOUNTER — APPOINTMENT (OUTPATIENT)
Dept: CT IMAGING | Facility: HOSPITAL | Age: 77
End: 2022-02-02

## 2022-02-02 DIAGNOSIS — D50.9 IRON DEFICIENCY ANEMIA, UNSPECIFIED IRON DEFICIENCY ANEMIA TYPE: Primary | ICD-10-CM

## 2022-02-02 LAB
ANION GAP SERPL CALCULATED.3IONS-SCNC: 7 MMOL/L (ref 5–15)
BASOPHILS # BLD AUTO: 0.12 10*3/MM3 (ref 0–0.2)
BASOPHILS NFR BLD AUTO: 1 % (ref 0–1.5)
BUN SERPL-MCNC: 16 MG/DL (ref 8–23)
BUN/CREAT SERPL: 18 (ref 7–25)
CALCIUM SPEC-SCNC: 9 MG/DL (ref 8.6–10.5)
CHLORIDE SERPL-SCNC: 104 MMOL/L (ref 98–107)
CO2 SERPL-SCNC: 25 MMOL/L (ref 22–29)
CREAT SERPL-MCNC: 0.89 MG/DL (ref 0.57–1)
DEPRECATED RDW RBC AUTO: 45.6 FL (ref 37–54)
EOSINOPHIL # BLD AUTO: 0.32 10*3/MM3 (ref 0–0.4)
EOSINOPHIL NFR BLD AUTO: 2.7 % (ref 0.3–6.2)
ERYTHROCYTE [DISTWIDTH] IN BLOOD BY AUTOMATED COUNT: 13.5 % (ref 12.3–15.4)
GFR SERPL CREATININE-BSD FRML MDRD: 62 ML/MIN/1.73
GLUCOSE SERPL-MCNC: 93 MG/DL (ref 65–99)
HCT VFR BLD AUTO: 29.9 % (ref 34–46.6)
HGB BLD-MCNC: 9.8 G/DL (ref 12–15.9)
IMM GRANULOCYTES # BLD AUTO: 0.06 10*3/MM3 (ref 0–0.05)
IMM GRANULOCYTES NFR BLD AUTO: 0.5 % (ref 0–0.5)
LYMPHOCYTES # BLD AUTO: 1.84 10*3/MM3 (ref 0.7–3.1)
LYMPHOCYTES NFR BLD AUTO: 15.6 % (ref 19.6–45.3)
MCH RBC QN AUTO: 30.3 PG (ref 26.6–33)
MCHC RBC AUTO-ENTMCNC: 32.8 G/DL (ref 31.5–35.7)
MCV RBC AUTO: 92.6 FL (ref 79–97)
MONOCYTES # BLD AUTO: 1.5 10*3/MM3 (ref 0.1–0.9)
MONOCYTES NFR BLD AUTO: 12.7 % (ref 5–12)
NEUTROPHILS NFR BLD AUTO: 67.5 % (ref 42.7–76)
NEUTROPHILS NFR BLD AUTO: 7.93 10*3/MM3 (ref 1.7–7)
NRBC BLD AUTO-RTO: 0 /100 WBC (ref 0–0.2)
NT-PROBNP SERPL-MCNC: 700 PG/ML (ref 0–1800)
PLATELET # BLD AUTO: 325 10*3/MM3 (ref 140–450)
PMV BLD AUTO: 11.3 FL (ref 6–12)
POTASSIUM SERPL-SCNC: 4 MMOL/L (ref 3.5–5.2)
RBC # BLD AUTO: 3.23 10*6/MM3 (ref 3.77–5.28)
SODIUM SERPL-SCNC: 136 MMOL/L (ref 136–145)
WBC NRBC COR # BLD: 11.77 10*3/MM3 (ref 3.4–10.8)

## 2022-02-02 PROCEDURE — 94799 UNLISTED PULMONARY SVC/PX: CPT

## 2022-02-02 PROCEDURE — 83880 ASSAY OF NATRIURETIC PEPTIDE: CPT | Performed by: NURSE PRACTITIONER

## 2022-02-02 PROCEDURE — 97110 THERAPEUTIC EXERCISES: CPT

## 2022-02-02 PROCEDURE — 97535 SELF CARE MNGMENT TRAINING: CPT | Performed by: OCCUPATIONAL THERAPIST

## 2022-02-02 PROCEDURE — 80048 BASIC METABOLIC PNL TOTAL CA: CPT | Performed by: NURSE PRACTITIONER

## 2022-02-02 PROCEDURE — 85025 COMPLETE CBC W/AUTO DIFF WBC: CPT | Performed by: HOSPITALIST

## 2022-02-02 PROCEDURE — 99232 SBSQ HOSP IP/OBS MODERATE 35: CPT

## 2022-02-02 RX ORDER — SODIUM CHLORIDE 0.9 % (FLUSH) 0.9 %
10 SYRINGE (ML) INJECTION AS NEEDED
Status: CANCELLED | OUTPATIENT
Start: 2022-02-02

## 2022-02-02 RX ORDER — UREA 10 %
3 LOTION (ML) TOPICAL NIGHTLY PRN
Status: DISCONTINUED | OUTPATIENT
Start: 2022-02-02 | End: 2022-02-07 | Stop reason: HOSPADM

## 2022-02-02 RX ORDER — SODIUM CHLORIDE 0.9 % (FLUSH) 0.9 %
20 SYRINGE (ML) INJECTION AS NEEDED
Status: CANCELLED | OUTPATIENT
Start: 2022-02-02

## 2022-02-02 RX ORDER — SODIUM CHLORIDE 0.9 % (FLUSH) 0.9 %
10 SYRINGE (ML) INJECTION EVERY 12 HOURS SCHEDULED
Status: CANCELLED | OUTPATIENT
Start: 2022-02-02

## 2022-02-02 RX ADMIN — ROSUVASTATIN CALCIUM 5 MG: 5 TABLET, FILM COATED ORAL at 21:57

## 2022-02-02 RX ADMIN — SUCRALFATE 1 G: 1 TABLET ORAL at 13:02

## 2022-02-02 RX ADMIN — URSODIOL 300 MG: 300 CAPSULE ORAL at 21:56

## 2022-02-02 RX ADMIN — SUCRALFATE 1 G: 1 TABLET ORAL at 21:57

## 2022-02-02 RX ADMIN — URSODIOL 300 MG: 300 CAPSULE ORAL at 15:11

## 2022-02-02 RX ADMIN — URSODIOL 300 MG: 300 CAPSULE ORAL at 08:54

## 2022-02-02 RX ADMIN — PROPAFENONE HYDROCHLORIDE 150 MG: 150 TABLET, COATED ORAL at 21:57

## 2022-02-02 RX ADMIN — PROPAFENONE HYDROCHLORIDE 150 MG: 150 TABLET, COATED ORAL at 15:11

## 2022-02-02 RX ADMIN — PANTOPRAZOLE SODIUM 40 MG: 40 TABLET, DELAYED RELEASE ORAL at 17:14

## 2022-02-02 RX ADMIN — Medication 3 MG: at 21:56

## 2022-02-02 RX ADMIN — SUCRALFATE 1 G: 1 TABLET ORAL at 17:14

## 2022-02-02 RX ADMIN — BUMETANIDE 0.5 MG: 0.5 TABLET ORAL at 08:55

## 2022-02-02 RX ADMIN — GUAIFENESIN 600 MG: 600 TABLET, EXTENDED RELEASE ORAL at 21:57

## 2022-02-02 RX ADMIN — PANTOPRAZOLE SODIUM 40 MG: 40 TABLET, DELAYED RELEASE ORAL at 06:45

## 2022-02-02 RX ADMIN — PROPAFENONE HYDROCHLORIDE 150 MG: 150 TABLET, COATED ORAL at 06:45

## 2022-02-02 RX ADMIN — POTASSIUM CHLORIDE 20 MEQ: 10 TABLET, EXTENDED RELEASE ORAL at 17:14

## 2022-02-02 RX ADMIN — SPIRONOLACTONE 25 MG: 25 TABLET ORAL at 08:55

## 2022-02-02 RX ADMIN — SUCRALFATE 1 G: 1 TABLET ORAL at 06:45

## 2022-02-02 RX ADMIN — MIRTAZAPINE 7.5 MG: 15 TABLET, FILM COATED ORAL at 21:57

## 2022-02-02 RX ADMIN — POTASSIUM CHLORIDE 20 MEQ: 10 TABLET, EXTENDED RELEASE ORAL at 08:55

## 2022-02-02 NOTE — THERAPY TREATMENT NOTE
Patient Name: Celia Barid  : 1945    MRN: [id number]                              Today's Date: 2022       Admit Date: 2022    Visit Dx:     ICD-10-CM ICD-9-CM   1. Gastrointestinal hemorrhage, unspecified gastrointestinal hemorrhage type  K92.2 578.9   2. Anemia requiring transfusions  D64.9 285.9   3. Orthostasis  I95.1 458.0   4. Hypokalemia  E87.6 276.8   5. Anemia  D64.9 285.9     Patient Active Problem List   Diagnosis   • COPD exacerbation (HCC)   • Subarachnoid hemorrhage (HCC)   • Multiple skin tears   • Closed nondisplaced fracture of left clavicle   • Paroxysmal atrial fibrillation (HCC)   • Tobacco abuse   • Status post placement of implantable loop recorder   • SOB (shortness of breath)   • COPD with acute exacerbation (HCC)   • Severe malnutrition (CMS/HCC)   • Leukocytosis   • Gastrointestinal hemorrhage   • Absolute anemia     Past Medical History:   Diagnosis Date   • Atrial fibrillation (HCC)    • COPD (chronic obstructive pulmonary disease) (HCC)    • History of frequent urinary tract infections    • Irregular heart beat    • Kidney stones    • PBC (primary biliary cirrhosis)    • PBC (primary biliary cirrhosis)      Past Surgical History:   Procedure Laterality Date   • CARDIAC ELECTROPHYSIOLOGY PROCEDURE N/A 3/30/2017    Procedure:    LINQ;  Surgeon: Ailyn Solorio MD;  Location: [facility] CATH INVASIVE LOCATION;  Service:    • CHOLECYSTECTOMY     • COLONOSCOPY     • COLONOSCOPY N/A 2022    Procedure: COLONOSCOPY into cecum with polypectomy, clip placement;  Surgeon: Constantine Kaye MD;  Location: [facility] ENDOSCOPY;  Service: Gastroenterology;  Laterality: N/A;  poor prep, diverticulosis, polyp   • ENDOSCOPY N/A 2022    Procedure: ESOPHAGOGASTRODUODENOSCOPY with biopsy;  Surgeon: Constantine Kaye MD;  Location: [facility] ENDOSCOPY;  Service: Gastroenterology;  Laterality: N/A;  normal   • TUBAL ABDOMINAL LIGATION        General Information     Row Name  02/02/22 1545          Physical Therapy Time and Intention    Document Type therapy note (daily note)  -     Mode of Treatment individual therapy; physical therapy  -[facility] Name 02/02/22 1545          General Information    Patient Profile Reviewed yes  -     Existing Precautions/Restrictions fall; oxygen therapy device and L/min  -[facility] Name 02/02/22 1545          Living Environment    Lives With alone  per pt son works  -[facility] Name 02/02/22 1545          Cognition    Orientation Status (Cognition) oriented x 3  -[facility] Name 02/02/22 1545          Safety Issues, Functional Mobility    Safety Issues Affecting Function (Mobility) awareness of need for assistance; impulsivity; insight into deficits/self-awareness; judgment; positioning of assistive device; problem-solving; safety precaution awareness  -     Impairments Affecting Function (Mobility) balance; endurance/activity tolerance; shortness of breath; strength  -           User Key  (r) = Recorded By, (t) = Taken By, (c) = Cosigned By    Initials Name Provider Type     Briana Flowers PTA Physical Therapy Assistant               Mobility     Row Name 02/02/22 1546          Bed Mobility    Supine-Sit [place] (Bed Mobility) contact guard; minimum assist (75% patient effort)  if rail not used  -     Sit-Supine [place] (Bed Mobility) contact guard; standby assist  -     Assistive Device (Bed Mobility) head of bed elevated; bed rails  -[facility] Name 02/02/22 1546          Sit-Stand Transfer    Sit-Stand [place] (Transfers) contact guard; verbal cues  slightly impulsive, needed to untangle from wires  -     Assistive Device (Sit-Stand Transfers) walker, front-wheeled  -[facility] Name 02/02/22 1546          Gait/Stairs (Locomotion)    [place] Level (Gait) contact guard; verbal cues  -     Assistive Device (Gait) walker, front-wheeled  -     Distance in Feet (Gait) 50ft, standing rest; then another  75ft-fatigued once back in room and had to sit quickly to rest  -     Deviations/Abnormal Patterns (Gait) base of support, narrow; stride length decreased  -     Bilateral Gait Deviations forward flexed posture  -           User Key  (r) = Recorded By, (t) = Taken By, (c) = Cosigned By    Initials Name Provider Type    Briana Wodoruff PTA Physical Therapy Assistant               Obj/Interventions     Row Name 02/02/22 1548          Motor Skills    Therapeutic Exercise --  LAQs x10, seated MIP x5  -           User Key  (r) = Recorded By, (t) = Taken By, (c) = Cosigned By    Initials Name Provider Type    Briana Woodruff PTA Physical Therapy Assistant               Goals/Plan    No documentation.                Clinical Impression     Row Name 02/02/22 1549          Pain Scale: Numbers Pre/Post-Treatment    Pretreatment Pain Rating 0/10 - no pain  -     Posttreatment Pain Rating 0/10 - no pain  -     Row Name 02/02/22 1549          Plan of Care Review    Plan of Care Reviewed With patient  -     Outcome Summary Pt agreed to PT session, pt slightly impulsive this session, w/decr safety awareness for lines and tubes in her path; pt amb 50ft , needed rest break, then amb 75ft w/o rest but was SOA and fatigued once in room, SATS 96% on 2L, recovered w/o complaint of SOA ; plans DC to SNU  -     Row Name 02/02/22 1549          Therapy Assessment/Plan (PT)    Rehab Potential (PT) good, to achieve stated therapy goals  -     Criteria for Skilled Interventions Met (PT) yes  -     Row Name 02/02/22 1549          Vital Signs    Pre SpO2 (%) 94  -     O2 Delivery Pre Treatment supplemental O2  -     O2 Delivery Intra Treatment supplemental O2  -JM     Post SpO2 (%) 96  -JM     O2 Delivery Post Treatment supplemental O2  -     Row Name 02/02/22 1546          Positioning and Restraints    Pre-Treatment Position in bed  -     Post Treatment Position bed  -JM     In Bed fowlers; call light  within reach; encouraged to call for assist; exit alarm on; notified nsg; side rails up x3  -           User Key  (r) = Recorded By, (t) = Taken By, (c) = Cosigned By    Initials Name Provider Type    Briana Woodruff PTA Physical Therapy Assistant               Outcome Measures     Row Name 02/02/22 1554          How much help from another person do you currently need...    Turning from your back to your side while in flat bed without using bedrails? 3  -JM     Moving from lying on back to sitting on the side of a flat bed without bedrails? 3  -JM     Moving to and from a bed to a chair (including a wheelchair)? 3  -JM     Standing up from a chair using your arms (e.g., wheelchair, bedside chair)? 3  -JM     Climbing 3-5 steps with a railing? 2  -JM     To walk in hospital room? 3  -JM     AM-PAC 6 Clicks Score (PT) 17  -     Row Name 02/02/22 1257          Functional Assessment    Outcome Measure Options AM-PAC 6 Clicks Daily Activity (OT)  -SG           User Key  (r) = Recorded By, (t) = Taken By, (c) = Cosigned By    Initials Name Provider Type    Kristine Blanco OTR Occupational Therapist    Briana Woodruff PTA Physical Therapy Assistant                             Physical Therapy Education                 Title: PT OT SLP Therapies (In Progress)     Topic: Physical Therapy (Done)     Point: Mobility training (Done)     Learning Progress Summary           Patient Acceptance, E,TB, VU by MADI at 2/2/2022 1554    Acceptance, E, VU,NR by MEGHNA at 1/30/2022 1234    Acceptance, E, VU,NR by MEGHNA at 1/29/2022 1553                   Point: Home exercise program (Done)     Learning Progress Summary           Patient Acceptance, E,TB, VU by MADI at 2/2/2022 1554                   Point: Body mechanics (Done)     Learning Progress Summary           Patient Acceptance, E,TB, VU by MADI at 2/2/2022 1554    Acceptance, E, VU,NR by MEGHNA at 1/30/2022 1234    Acceptance, E, VU,NR by MEGHNA at 1/29/2022 1553                    Point: Precautions (Done)     Learning Progress Summary           Patient Acceptance, E,TB, VU by MADI at 2/2/2022 1554                               User Key     Initials Effective Dates Name Provider Type Discipline    MADI 03/07/18 -  Briana Flowers PTA Physical Therapy Assistant PT    MEGHNA 08/24/21 -  Summer Liao PT Physical Therapist PT              PT Recommendation and Plan     Plan of Care Reviewed With: patient  Outcome Summary: Pt agreed to PT session, pt slightly impulsive this session, w/decr safety awareness for lines and tubes in her path; pt amb 50ft , needed rest break, then amb 75ft w/o rest but was SOA and fatigued once in room, SATS 96% on 2L, recovered w/o complaint of SOA ; plans DC to SNU     Time Calculation:    PT Charges     Row Name 02/02/22 1555             Time Calculation    Start Time 1121  -      Stop Time 1150  -      Time Calculation (min) 29 min  -MADI      PT Received On 02/02/22  -MADI      PT - Next Appointment 02/03/22  -MADI            User Key  (r) = Recorded By, (t) = Taken By, (c) = Cosigned By    Initials Name Provider Type    Briana Woodruff PTA Physical Therapy Assistant              Therapy Charges for Today     Code Description Service Date Service Provider Modifiers Qty    20247493526 HC PT THER PROC EA 15 MIN 2/2/2022 Briana Flowers PTA GP 2          PT G-Codes  Outcome Measure Options: AM-PAC 6 Clicks Daily Activity (OT)  AM-PAC 6 Clicks Score (PT): 17  AM-PAC 6 Clicks Score (OT): 16    Briana Flowers PTA  2/2/2022

## 2022-02-02 NOTE — PROGRESS NOTES
[facility]  Cardiology Progress Note    Patient Identification:  Name: Celia Baird  Age: 77 y.o.  Sex: female  :  1945  MRN: [id number]                 Follow Up / Chief Complaint: Follow up for afib    Interval History: Monitor reviewed, sinus rhythm.  Bigeminy last night.  She will need a ZIO at discharge.       Subjective: Denies chest pain, palpitations and +shortness of breath.    Objective:    Past Medical History:  Past Medical History:   Diagnosis Date   • Atrial fibrillation (HCC)    • COPD (chronic obstructive pulmonary disease) (HCC)    • History of frequent urinary tract infections    • Irregular heart beat    • Kidney stones    • PBC (primary biliary cirrhosis)    • PBC (primary biliary cirrhosis)      Past Surgical History:  Past Surgical History:   Procedure Laterality Date   • CARDIAC ELECTROPHYSIOLOGY PROCEDURE N/A 3/30/2017    Procedure:    LINQ;  Surgeon: Ailyn Solorio MD;  Location: [facility] CATH INVASIVE LOCATION;  Service:    • CHOLECYSTECTOMY     • COLONOSCOPY     • COLONOSCOPY N/A 2022    Procedure: COLONOSCOPY into cecum with polypectomy, clip placement;  Surgeon: Constantine Kaye MD;  Location: [facility] ENDOSCOPY;  Service: Gastroenterology;  Laterality: N/A;  poor prep, diverticulosis, polyp   • ENDOSCOPY N/A 2022    Procedure: ESOPHAGOGASTRODUODENOSCOPY with biopsy;  Surgeon: Constantine Kaye MD;  Location: [facility] ENDOSCOPY;  Service: Gastroenterology;  Laterality: N/A;  normal   • TUBAL ABDOMINAL LIGATION          Social History:   Social History     Tobacco Use   • Smoking status: Current Some Day Smoker     Packs/day: 0.50     Years: 59.00     Pack years: 29.50     Types: Cigarettes   • Smokeless tobacco: Never Used   Substance Use Topics   • Alcohol use: No      Family History:  Family History   Problem Relation Age of Onset   • Heart disease Father    • Heart attack Father    • Heart disease Brother    • Stroke Mother      "      Allergies:  Allergies   Allergen Reactions   • Morphine And Related Nausea And Vomiting   • Levaquin [Levofloxacin]    • Sulfa Antibiotics      Scheduled Meds:  bumetanide, 0.5 mg, Daily  guaiFENesin, 600 mg, Q12H  melatonin, 3 mg, Nightly  mirtazapine, 7.5 mg, Nightly  pantoprazole, 40 mg, BID AC  potassium chloride, 20 mEq, BID With Meals  propafenone, 150 mg, Q8H  rosuvastatin, 5 mg, Nightly  spironolactone, 25 mg, Daily  sucralfate, 1 g, 4x Daily AC & at Bedtime  tiotropium bromide monohydrate, 2 puff, Daily - RT  ursodiol, 300 mg, TID            INTAKE AND OUTPUT:    Intake/Output Summary (Last 24 hours) at 2/2/2022 1123  Last data filed at 2/2/2022 0253  Gross per 24 hour   Intake 60 ml   Output 350 ml   Net -290 ml     ROS  Constitutional: Awake and alert, no fever. No nosebleeds  Abdomen           no abdominal pain   Cardiac              no chest pain  Pulmonary          no shortness of breath      /53 (BP Location: Right arm)   Pulse 84   Temp 97.5 °F (36.4 °C) (Oral)   Resp 18   Ht 152.4 cm (60\")   Wt 34.6 kg (76 lb 4.8 oz)   SpO2 96%   BMI 14.90 kg/m²   General appearance: No acute changes   Neck: Trachea midline; NECK, supple, no thyromegaly or lymphadenopathy   Lungs: Normal size and shape, normal breath sounds, equal distribution of air, no rales or rhonchi   CV: S1-S2 regular, no murmurs, no rub, no gallop   Abdomen: Soft, nontender; no masses , no abnormal abdominal sounds   Extremities: No deformity , normal color , no peripheral edema   Skin: Normal temperature, turgor and texture; no rash, ulcers            I reviewed the patient's new clinical results, and personally reviewed and interpreted the patient's ECG and telemetry data from the last 24 hours      Cardiographics  Telemetry:            Telemetry:  SR    Echocardiogram:   1/14/2022  Interpretation Summary     · Calculated left ventricular EF = 61.9% Estimated left ventricular EF was in agreement with the calculated left " ventricular EF.  · Left ventricular diastolic function was normal.  · There is no evidence of pericardial effusion. .     2019     Interpretation Summary        · Findings consistent with a normal ECG stress test.  · Left ventricular ejection fraction is normal (Calculated EF = 70%).  · Myocardial perfusion imaging indicates a normal myocardial perfusion study with no evidence of ischemia.  · Impressions are consistent with a low risk study.     Asymptomatic for chest pain. ECG is negative for ischemia.   Ectopy: Rare PAC at baseline, none with exercise, occasional PVC in recovery  HR and BP response : Appropriate for Beta-blocker therapy  Pharmacologic study due to inability to tolerate increasing speed and grade of treadmill due to Pulmonary, mobility  Issues and Beta-blocker therapy.  Participated in Low Level exercise and tolerance is poor.     Loop placement in 2017  Conclusion        · Successful Linq implantation           Imaging  Chest X-ray:   1/26/2022  IMPRESSION:  No focal pulmonary consolidation. Mild prominence of  interstitial markings.     This report was finalized on 1/26/2022 4:55 PM by Dr. Andrew Abdi M.D.        Lab Review   Results from last 7 days   Lab Units 01/26/22  1621   TROPONIN T ng/mL <0.010     Results from last 7 days   Lab Units 01/30/22  0545   MAGNESIUM mg/dL 1.7     Results from last 7 days   Lab Units 02/01/22  0443   SODIUM mmol/L 138   POTASSIUM mmol/L 3.4*   BUN mg/dL 14   CREATININE mg/dL 0.75   CALCIUM mg/dL 9.0     Results from last 7 days   Lab Units 02/01/22  0443 01/31/22  0601 01/30/22  1149 01/30/22  0545 01/30/22  0545 01/29/22  1153 01/29/22  0626   WBC 10*3/mm3 12.02*  --   --   --  9.29  --  9.77   HEMOGLOBIN g/dL 10.0* 9.6* 9.7*   < > 9.8*   < > 10.1*  10.1*   HEMATOCRIT % 29.7* 28.1* 28.5*   < > 29.2*   < > 30.2*  30.2*   PLATELETS 10*3/mm3 290  --   --   --  222  --  218    < > = values in this interval not displayed.     Results from last 7 days  "  Lab Units 01/26/22  1852   INR  0.96   APTT seconds 30.4     The following medical decision was discussed in detail with Dr. Solorio      Assessment:  1. Gastrointestinal hemorrhage  2. anemia   3. Paroxysmal atrial fibrillation: With watchman's device   4. orthostasis   5. Hypokalemia:3.4 replace   6. Chronic diastolic CHF; no ACE or ARB due to history of hypotension  7. hyperlipidemia: on statin  8. hypertension: stable  9. GERD  10. History of loop implant battery no longer working  11. CAD with stent placement 8/25/21 at [facility]  12. Pause one noted yesterday     Plan:    Monitor reviewed, SR with PVCs. BP stable. Still awaiting CT chest. No further episodes of shortness of breath. She will still need zio at discharge, her previous loop battery is end of service. She may need a new loop outpatient depending on zio results.        She will follow up with BRAYAN Dia on March 9th at 11 am at the [address] Office.      )2/2/2022  BRAYAN Romo/Transcription:   \"Dictated utilizing Dragon dictation\".     "

## 2022-02-02 NOTE — PROGRESS NOTES
"Daily progress note    Chief complaint  Doing same  Awake and alert  No specific complaints  Denies chest pain palpitation or shortness of breath    History of present illness  77-year white female with very complex past medical history including chronic hypoxic respiratory failure COPD diastolic CHF paroxysmal atrial fibrillation hypertension hyperlipidemia chronic anemia and gastroesophageal reflux disease who was recently discharged from the hospital after work-up on acute on chronic hypoxic respiratory failure presented to [facility], [place], operated by [facility] emergency room with generalized weakness not feeling well including dizziness lightheadedness and work-up in ER revealed symptomatic anemia with heme positive stools admit for management.  Patient denies any black stools or blood in the stools.  Patient denies any nausea vomiting.  Patient stated that she has not smokes cigarettes since discharge.  Patient admitted for management started on IV Protonix and received 2 units packed RBC and start feeling better.     REVIEW OF SYSTEMS  Unremarkable except generalized weakness     PHYSICAL EXAM  Blood pressure 106/50, pulse 78, temperature 98 °F (36.7 °C), temperature source Oral, resp. rate 18, height 152.4 cm (60\"), weight 34.6 kg (76 lb 4.8 oz), SpO2 97 %.    General: No acute distress.  HENT: NCAT, PERRL, Nares patent.  Eyes: no scleral icterus.  Neck: trachea midline, no ROM limitations.  CV: regular rhythm, regular rate.  Respiratory: normal effort, CTAB.  Abdomen: soft, nondistended, NTTP, no rebound tenderness, bowel sounds positive  Musculoskeletal: no deformity.  Neuro: alert, moves all extremities, follows commands.  Skin: warm, dry.     LAB RESULTS  Lab Results (last 24 hours)     Procedure Component Value Units Date/Time    Basic Metabolic Panel [[id number]]  (Normal) Collected: 02/02/22 1007    Specimen: Blood Updated: 02/02/22 1229     Glucose 93 mg/dL      BUN 16 mg/dL      Creatinine 0.89 mg/dL      Sodium 136 " mmol/L      Potassium 4.0 mmol/L      Chloride 104 mmol/L      CO2 25.0 mmol/L      Calcium 9.0 mg/dL      eGFR Non African Amer 62 mL/min/1.73      BUN/Creatinine Ratio 18.0     Anion Gap 7.0 mmol/L     Narrative:      GFR Normal >60  Chronic Kidney Disease <60  Kidney Failure <15      BNP [[id number]]  (Normal) Collected: 02/02/22 1007    Specimen: Blood Updated: 02/02/22 1215     proBNP 700.0 pg/mL     Narrative:      Among patients with dyspnea, NT-proBNP is highly sensitive for the detection of acute congestive heart failure. In addition NT-proBNP of <300 pg/ml effectively rules out acute congestive heart failure with 99% negative predictive value.    Results may be falsely decreased if patient taking Biotin.      CBC & Differential [[id number]]  (Abnormal) Collected: 02/02/22 1007    Specimen: Blood Updated: 02/02/22 1146    Narrative:      The following orders were created for panel order CBC & Differential.  Procedure                               Abnormality         Status                     ---------                               -----------         ------                     CBC Auto Differential[[id number]]        Abnormal            Final result                 Please view results for these tests on the individual orders.    CBC Auto Differential [[id number]]  (Abnormal) Collected: 02/02/22 1007    Specimen: Blood Updated: 02/02/22 1146     WBC 11.77 10*3/mm3      RBC 3.23 10*6/mm3      Hemoglobin 9.8 g/dL      Hematocrit 29.9 %      MCV 92.6 fL      MCH 30.3 pg      MCHC 32.8 g/dL      RDW 13.5 %      RDW-SD 45.6 fl      MPV 11.3 fL      Platelets 325 10*3/mm3      Neutrophil % 67.5 %      Lymphocyte % 15.6 %      Monocyte % 12.7 %      Eosinophil % 2.7 %      Basophil % 1.0 %      Immature Grans % 0.5 %      Neutrophils, Absolute 7.93 10*3/mm3      Lymphocytes, Absolute 1.84 10*3/mm3      Monocytes, Absolute 1.50 10*3/mm3      Eosinophils, Absolute 0.32 10*3/mm3      Basophils, Absolute 0.12 10*3/mm3       Immature Grans, Absolute 0.06 10*3/mm3      nRBC 0.0 /100 WBC         Imaging Results (Last 24 Hours)     Procedure Component Value Units Date/Time    XR Chest 1 View [[id number]] Collected: 02/01/22 1459     Updated: 02/01/22 1504    Narrative:      XR CHEST 1 VW-     HISTORY:  Hypotension, shortness of air.     COMPARISON:  Chest radiograph 01/26/2022     FINDINGS:    A single portable view of the chest was obtained. Evaluation is limited  by patient rotation to the left. Within these limitations, the cardiac  silhouette and mediastinal and hilar contours are not significantly  changed. There is calcific aortic atherosclerosis. There are small  bilateral pleural effusions. There is mild bibasilar opacity, left  greater than right, which may reflect adjacent compressive atelectasis  with superimposed aspiration/pneumonia not excluded in the appropriate  clinical setting. There is a left chest implanted cardiac loop recording  device. There is diffuse osseous demineralization. There is multilevel  degenerative disc disease. There are old rib fractures. There are  surgical clips projecting over the right hemiabdomen.     This report was finalized on 2/1/2022 3:01 PM by Dr. Laura Talley M.D.                ECG 12 Lead  Component   Ref Range & Units 1/26/22 1623 1/18/22 0139 1/14/22 0630 1/13/22 1356 1/13/22 1253   QT Interval   ms 446  314  362  373  409              HEART RATE= 74  bpm  RR Interval= 812  ms  NM Interval= 226  ms  P Horizontal Axis= -43  deg  P Front Axis= 58  deg  QRSD Interval= 94  ms  QT Interval= 446  ms  QRS Axis= 24  deg  T Wave Axis= 237  deg  - ABNORMAL ECG -  Sinus rhythm  Prolonged NM interval  Repol abnrm suggests ischemia, diffuse leads  Ventricular bigeminy resolved, !st degree AVB new           Upper and lower endoscopy noted and results discussed with patient     Current Facility-Administered Medications:   •  acetaminophen (TYLENOL) tablet 650 mg, 650 mg, Oral, Q6H PRN, Kaye,  Constantine MONTERO MD, 650 mg at 01/31/22 2055  •  bumetanide (BUMEX) tablet 0.5 mg, 0.5 mg, Oral, Daily, Constantine Kaye MD, 0.5 mg at 02/02/22 0855  •  guaiFENesin (MUCINEX) 12 hr tablet 600 mg, 600 mg, Oral, Q12H, Constantine Kaye MD, 600 mg at 02/01/22 2134  •  melatonin tablet 3 mg, 3 mg, Oral, Nightly, Eulogio Mcdaniel MD, 3 mg at 02/01/22 2134  •  mirtazapine (REMERON) tablet 7.5 mg, 7.5 mg, Oral, Nightly, Constantine Kaye MD, 7.5 mg at 02/01/22 2134  •  O2 (OXYGEN), 2 L/min, Inhalation, PRN, Constantine Kaye MD  •  ondansetron (ZOFRAN) injection 4 mg, 4 mg, Intravenous, Q4H PRN, Constantine Kaye MD  •  pantoprazole (PROTONIX) EC tablet 40 mg, 40 mg, Oral, BID AC, Stingl, Pao Kaplan MD, 40 mg at 02/02/22 0645  •  potassium chloride (K-DUR,KLOR-CON) ER tablet 20 mEq, 20 mEq, Oral, BID With Meals, Eulogio Mcdaniel MD, 20 mEq at 02/02/22 0855  •  potassium chloride (K-DUR,KLOR-CON) ER tablet 40 mEq, 40 mEq, Oral, PRN, Eulogio Mcdaniel MD, 40 mEq at 02/01/22 1025  •  potassium chloride (KLOR-CON) packet 40 mEq, 40 mEq, Oral, PRN, Eulogio Mcdaniel MD  •  propafenone (RYTHMOL) tablet 150 mg, 150 mg, Oral, Q8H, Constantine Kaye MD, 150 mg at 02/02/22 0645  •  rosuvastatin (CRESTOR) tablet 5 mg, 5 mg, Oral, Nightly, Constantine Kaye MD, 5 mg at 02/01/22 2134  •  sodium chloride 0.9 % infusion, 30 mL/hr, Intravenous, Continuous PRN, Constantine Kaye MD, Stopped at 01/28/22 1117  •  spironolactone (ALDACTONE) tablet 25 mg, 25 mg, Oral, Daily, Constantine Kaye MD, 25 mg at 02/02/22 0855  •  sucralfate (CARAFATE) tablet 1 g, 1 g, Oral, 4x Daily AC & at Bedtime, Constantine Kaye MD, 1 g at 02/02/22 1302  •  tiotropium (SPIRIVA RESPIMAT) 2.5 mcg/act aerosol solution inhaler, 2 puff, Inhalation, Daily - RT, Constantine Kaye MD, 2 puff at 02/01/22 1048  •  ursodiol (ACTIGALL) capsule 300 mg, 300 mg, Oral, TID, Constantine Kaye MD, 300 mg at 02/02/22 0854     ASSESSMENT  Acute on chronic  hypoxic respiratory failure  Symptomatic anemia  Heme positive stools  Acute blood loss anemia  Colon polyps  COPD  S/p dig toxicity  Chronic diastolic CHF  Paroxysmal atrial fibrillation  Hypertension  Hyperlipidemia  Gastroesophageal reflux disease    PLAN  CPM  Supplement oxygen  Check CT chest pending  Transfuse as needed  Protonix and Carafate  Continue to hold aspirin and Plavix for 1 week  GI and cardiology consult  Pulmonary to follow patient  Adjust home medications  Stress ulcer DVT prophylaxis  Supportive care  DNR  PT/OT  Discussed with nursing staff  Follow closely and further recommendation according to hospital course    JONNY PEOPLES MD

## 2022-02-02 NOTE — PLAN OF CARE
Goal Outcome Evaluation:  Plan of Care Reviewed With: patient           Outcome Summary: Pt agreed to PT session, pt slightly impulsive this session, w/decr safety awareness for lines and tubes in her path; pt amb 50ft , needed rest break, then amb 75ft w/o rest but was SOA and fatigued once in room, SATS 96% on 2L, recovered w/o complaint of SOA ; plans DC to SNU    Patient was wearing a face mask during this therapy encounter. Therapist used appropriate personal protective equipment including eye protection, mask, and gloves.  Mask used was standard procedure mask. Appropriate PPE was worn during the entire therapy session. Hand hygiene was completed before and after therapy session. Patient is not in enhanced droplet precautions.

## 2022-02-02 NOTE — PLAN OF CARE
Goal Outcome Evaluation:  Plan of Care Reviewed With: patient        Progress: no change  Outcome Summary: Patient  resting  at this  time.  No  complaints  of  pain  voiced.  Complained  of  increased  SOA  especially  with  exertion.  Oxygen  increased  top  3l/m.   PRN  Melatonin  given  for insomnia.  New  IV  placed  for  CT  angio   due  to  SOA.  waiting  for CT.   SR  on the  monitor.  Nursing  will continue  to monitor.

## 2022-02-02 NOTE — PROGRESS NOTES
Dr. ANA Hoffman    [address]    2/2/2022    Patient ID:  Name:  Celia Baird  MRN:  [id number]  1945  77 y.o.  female            CC/Reason for visit: Chronic respiratory failure, COPD     Interval hx:  She is doing well.  She was saturating 100% on 2 L of oxygen when I walked into the room and she was awake and alert and calm.  She denies any additional spells of chest pain or shortness of breath.  I turned her oxygen down to half a liter.     ROS: Occasional chest tightness.  Occasional shortness of breath.  No abdominal pain.    Vitals:  Vitals:    02/02/22 0418 02/02/22 0543 02/02/22 0740 02/02/22 1401   BP: 111/46  101/53 106/50   BP Location: Left arm  Right arm Right arm   Patient Position: Lying   Lying   Pulse: 71  84 78   Resp: 18  18 18   Temp: 97.9 °F (36.6 °C)  97.5 °F (36.4 °C) 98 °F (36.7 °C)   TempSrc: Oral  Oral Oral   SpO2: 94%  96% 97%   Weight:  34.6 kg (76 lb 4.8 oz)     Height:               Body mass index is 14.9 kg/m².    Intake/Output Summary (Last 24 hours) at 2/2/2022 1614  Last data filed at 2/2/2022 0253  Gross per 24 hour   Intake 60 ml   Output 350 ml   Net -290 ml       Exam:  GEN:  No distress  Alert, oriented x 3.   LUNGS: Barrel chest, distant and diminished  breath sounds bilat, no use of accessory muscles  CV:  Normal S1S2, without murmur, no edema  ABD:  Non tender, no enlarged liver or masses      Scheduled meds:  bumetanide, 0.5 mg, Oral, Daily  guaiFENesin, 600 mg, Oral, Q12H  mirtazapine, 7.5 mg, Oral, Nightly  pantoprazole, 40 mg, Oral, BID AC  potassium chloride, 20 mEq, Oral, BID With Meals  propafenone, 150 mg, Oral, Q8H  rosuvastatin, 5 mg, Oral, Nightly  spironolactone, 25 mg, Oral, Daily  sucralfate, 1 g, Oral, 4x Daily AC & at Bedtime  tiotropium bromide monohydrate, 2 puff, Inhalation, Daily - RT  ursodiol, 300 mg, Oral, TID      IV meds:                      sodium chloride, 30 mL/hr, Last Rate: Stopped (01/28/22 1117)        Data Review:    I reviewed the patient's medications and new clinical results.    COVID19   Date Value Ref Range Status   01/26/2022 Not Detected Not Detected - Ref. Range Final         Lab Results   Component Value Date    CALCIUM 9.0 02/02/2022    PHOS 2.4 (L) 01/21/2022    MG 1.7 01/30/2022    MG 1.7 01/29/2022    MG 1.6 01/26/2022     Results from last 7 days   Lab Units 02/02/22  1007 02/01/22  0443 01/31/22  0601 01/30/22  1149 01/30/22  0545 01/28/22  1546 01/28/22  0545 01/27/22  0909 01/26/22  1852 01/26/22  1720 01/26/22  1621   SODIUM mmol/L 136 138  --   --  139   < > 134*   < >  --   --  136   POTASSIUM mmol/L 4.0 3.4*  --   --  3.6   < > 3.8   < >  --   --  3.0*   CHLORIDE mmol/L 104 103  --   --  103   < > 97*   < >  --   --  95*   CO2 mmol/L 25.0 26.5  --   --  28.4   < > 27.7   < >  --   --  35.6*   BUN mg/dL 16 14  --   --  14   < > 14   < >  --   --  24*   CREATININE mg/dL 0.89 0.75  --   --  0.94   < > 0.66   < >  --   --  0.88   CALCIUM mg/dL 9.0 9.0  --   --  8.6   < > 8.6   < >  --   --  9.0   BILIRUBIN mg/dL  --   --   --   --   --   --  0.6  --   --   --  0.4   ALK PHOS U/L  --   --   --   --   --   --  100  --   --   --  122*   ALT (SGPT) U/L  --   --   --   --   --   --  10  --   --   --  14   AST (SGOT) U/L  --   --   --   --   --   --  16  --   --   --  15   GLUCOSE mg/dL 93 86  --   --  94   < > 114*   < >  --   --  103*   WBC 10*3/mm3 11.77* 12.02*  --   --  9.29   < > 12.68*   < >  --    < >  --    HEMOGLOBIN g/dL 9.8* 10.0* 9.6*   < > 9.8*   < > 10.3*   < >  --    < >  --    PLATELETS 10*3/mm3 325 290  --   --  222   < > 191   < >  --    < >  --    INR   --   --   --   --   --   --   --   --  0.96  --   --    PROBNP pg/mL 700.0  --   --   --   --   --  874.0  --   --   --   --    PROCALCITONIN ng/mL  --   --   --   --   --   --   --   --   --   --  0.16    < > = values in this interval not displayed.               ASSESSMENT:     Absolute anemia    Gastrointestinal hemorrhage  COPD  Chronic  respiratory failure  Tobacco abuse, quit recently  Paroxysmal atrial fib      PLAN:  Not sure why she had that episode yesterday.  We are waiting for a CT scan of the chest.  It seems like it was simply anxiety.  Nothing new on auscultation today.  She was saturating 100% on 2 L of oxygen when I walked in the room and she was awake and alert, calm and spoke to me without any problems or concerns.  I turned her oxygen down to 0.5 L.  We need to avoid hyperoxia in this patient because it can cause hypercapnia.  Not much else to add from the respiratory standpoint.        Luther Hoffman MD  2/2/2022

## 2022-02-02 NOTE — THERAPY TREATMENT NOTE
Acute Care - Occupational Therapy Treatment Note  [facility]     Patient Name: Celia Baird  : 1945  MRN: [id number]  Today's Date: 2022             Admit Date: 2022       ICD-10-CM ICD-9-CM   1. Gastrointestinal hemorrhage, unspecified gastrointestinal hemorrhage type  K92.2 578.9   2. Anemia requiring transfusions  D64.9 285.9   3. Orthostasis  I95.1 458.0   4. Hypokalemia  E87.6 276.8   5. Anemia  D64.9 285.9     Patient Active Problem List   Diagnosis   • COPD exacerbation (HCC)   • Subarachnoid hemorrhage (HCC)   • Multiple skin tears   • Closed nondisplaced fracture of left clavicle   • Paroxysmal atrial fibrillation (HCC)   • Tobacco abuse   • Status post placement of implantable loop recorder   • SOB (shortness of breath)   • COPD with acute exacerbation (HCC)   • Severe malnutrition (CMS/HCC)   • Leukocytosis   • Gastrointestinal hemorrhage   • Absolute anemia     Past Medical History:   Diagnosis Date   • Atrial fibrillation (HCC)    • COPD (chronic obstructive pulmonary disease) (HCC)    • History of frequent urinary tract infections    • Irregular heart beat    • Kidney stones    • PBC (primary biliary cirrhosis)    • PBC (primary biliary cirrhosis)      Past Surgical History:   Procedure Laterality Date   • CARDIAC ELECTROPHYSIOLOGY PROCEDURE N/A 3/30/2017    Procedure:    LINQ;  Surgeon: Ailyn Solorio MD;  Location: [facility] CATH INVASIVE LOCATION;  Service:    • CHOLECYSTECTOMY     • COLONOSCOPY     • COLONOSCOPY N/A 2022    Procedure: COLONOSCOPY into cecum with polypectomy, clip placement;  Surgeon: Constantine Kaye MD;  Location: [facility] ENDOSCOPY;  Service: Gastroenterology;  Laterality: N/A;  poor prep, diverticulosis, polyp   • ENDOSCOPY N/A 2022    Procedure: ESOPHAGOGASTRODUODENOSCOPY with biopsy;  Surgeon: Constantine Kaye MD;  Location: [facility] ENDOSCOPY;  Service: Gastroenterology;  Laterality: N/A;  normal   • TUBAL ABDOMINAL LIGATION           OT  ASSESSMENT FLOWSHEET (last 12 hours)     OT Evaluation and Treatment     Row Name 02/02/22 1248                   OT Time and Intention    Subjective Information complains of; weakness; fatigue  -SG        Document Type evaluation  -SG        Mode of Treatment individual therapy; occupational therapy  -SG        Patient Effort good  -SG        Symptoms Noted During/After Treatment shortness of breath  -SG                  General Information    General Observations of Patient Pt supine in bed  -SG                  Cognition    Affect/Mental Status (Cognitive) WNL  -SG        Orientation Status (Cognition) oriented x 3  -SG        Follows Commands (Cognition) WNL  -SG        Personal Safety Interventions gait belt; fall prevention program maintained  -SG                  Pain Scale: Numbers Pre/Post-Treatment    Pretreatment Pain Rating 0/10 - no pain  -SG                  Bed Mobility    Supine-Sit [place] (Bed Mobility) supervision; verbal cues  -SG        Sit-Supine [place] (Bed Mobility) supervision; verbal cues  -SG                  Functional Mobility    Functional Mobility- Ind. Level minimum assist (75% patient effort); verbal cues required; nonverbal cues required (demo/gesture)  -SG        Functional Mobility- Device rolling walker  -SG        Functional Mobility- Comment Walks to sink, back to bed  -SG                  Transfer Assessment/Treatment    Transfers sit-stand transfer; stand-sit transfer; toilet transfer  -SG                  Transfers    Sit-Stand [place] (Transfers) contact guard; verbal cues  -SG        Stand-Sit [place] (Transfers) contact guard; verbal cues  -SG        [place] Level (Toilet Transfer) contact guard; verbal cues; nonverbal cues (demo/gesture)  -        Assistive Device (Toilet Transfer) commode, bedside without drop arms  -SG                  Sit-Stand Transfer    Assistive Device (Sit-Stand Transfers) walker, front-wheeled  -SG                   Stand-Sit Transfer    Assistive Device (Stand-Sit Transfers) walker, front-wheeled  -SG                  Toilet Transfer    Type (Toilet Transfer) stand pivot/stand step  -SG                  Safety Issues, Functional Mobility    Impairments Affecting Function (Mobility) endurance/activity tolerance; shortness of breath; strength  -SG                  Activities of Daily Living    BADL Assessment/Intervention bathing; upper body dressing; lower body dressing; grooming; toileting  -SG                  Bathing Assessment/Intervention    [place] Level (Bathing) upper body; set up; lower body; minimum assist (75% patient effort); verbal cues; nonverbal cues (demo/gesture)  -SG        Position (Bathing) sink side; supported sitting; supported standing  -SG                  Upper Body Dressing Assessment/Training    [place] Level (Upper Body Dressing) upper body dressing skills; set up  -SG        Position (Upper Body Dressing) supported sitting  -SG                  Lower Body Dressing Assessment/Training    [place] Level (Lower Body Dressing) doff; don; socks; minimum assist (75% patient effort); verbal cues; nonverbal cues (demo/gesture)  -SG        Position (Lower Body Dressing) edge of bed sitting; supported sitting  -SG                  Grooming Assessment/Training    [place] Level (Grooming) hair care, combing/brushing; oral care regimen; wash face, hands; contact guard assist; verbal cues  -SG        Position (Grooming) sink side; supported sitting  -SG                  Toileting Assessment/Training    [place] Level (Toileting) adjust/manage clothing; change pad/brief; perform perineal hygiene; minimum assist (75% patient effort); verbal cues  -SG        Assistive Devices (Toileting) commode, bedside without drop arms  -SG        Position (Toileting) supported sitting; supported standing  -SG                  BADL Safety/Performance    Impairments, BADL Safety/Performance  endurance/activity tolerance; strength  -SG                  Plan of Care Review    Plan of Care Reviewed With patient  -SG        Outcome Summary Pt does very well today with OT, walks to sink with rwx with min A for ADL tasks. Pt fatigues quickly and requires increased assist for LBD/LBB. Sats >95 during all activity. Pt will continue to benefit from OT to address deficits and continue improving independence. Pt anticipating to d/c to SNF.  -SG                  Positioning and Restraints    Pre-Treatment Position in bed  -SG        Post Treatment Position bed  -SG        In Bed supine; call light within reach; encouraged to call for assist; exit alarm on  -SG              User Key  (r) = Recorded By, (t) = Taken By, (c) = Cosigned By    Initials Name Effective Dates    Kristine Blanco OTR 06/16/21 -                  Occupational Therapy Education                 Title: PT OT SLP Therapies (In Progress)     Topic: Occupational Therapy (In Progress)     Point: ADL training (Not Started)     Description:   Instruct learner(s) on proper safety adaptation and remediation techniques during self care or transfers.   Instruct in proper use of assistive devices.              Learner Progress:  Not documented in this visit.          Point: Home exercise program (Not Started)     Description:   Instruct learner(s) on appropriate technique for monitoring, assisting and/or progressing therapeutic exercises/activities.              Learner Progress:  Not documented in this visit.          Point: Precautions (Done)     Description:   Instruct learner(s) on prescribed precautions during self-care and functional transfers.              Learning Progress Summary           Patient Acceptance, E, VU by ESTHER at 2/1/2022 0944    Comment: role of OT, plan of care, safety, PLB                   Point: Body mechanics (Done)     Description:   Instruct learner(s) on proper positioning and spine alignment during self-care, functional  mobility activities and/or exercises.              Learning Progress Summary           Patient Acceptance, E, VU by ESTHER at 2/1/2022 0944    Comment: role of OT, plan of care, safety, PLB                               User Key     Initials Effective Dates Name Provider Type Discipline    ESTHER 06/10/21 -  Hazel Live OT Occupational Therapist OT                  OT Recommendation and Plan     Plan of Care Review  Plan of Care Reviewed With: patient  Outcome Summary: Pt does very well today with OT, walks to sink with rwx with min A for ADL tasks. Pt fatigues quickly and requires increased assist for LBD/LBB. Sats >95 during all activity. Pt will continue to benefit from OT to address deficits and continue improving independence. Pt anticipating to d/c to SNF.  Plan of Care Reviewed With: patient  Outcome Summary: Pt does very well today with OT, walks to sink with rwx with min A for ADL tasks. Pt fatigues quickly and requires increased assist for LBD/LBB. Sats >95 during all activity. Pt will continue to benefit from OT to address deficits and continue improving independence. Pt anticipating to d/c to SNF.     Outcome Measures     Row Name 02/02/22 1257             How much help from another is currently needed...    Putting on and taking off regular lower body clothing? 2  -SG      Bathing (including washing, rinsing, and drying) 3  -SG      Toileting (which includes using toilet bed pan or urinal) 2  -SG      Putting on and taking off regular upper body clothing 3  -SG      Taking care of personal grooming (such as brushing teeth) 3  -SG      Eating meals 3  -SG      AM-PAC 6 Clicks Score (OT) 16  -SG              Functional Assessment    Outcome Measure Options AM-PAC 6 Clicks Daily Activity (OT)  -SG            User Key  (r) = Recorded By, (t) = Taken By, (c) = Cosigned By    Initials Name Provider Type    Kristine Blanco OTR Occupational Therapist                Time Calculation:    Time Calculation- OT      Row Name 02/02/22 1257             Time Calculation- OT    OT Start Time 1009  -SG      OT Stop Time 1034  -SG      OT Time Calculation (min) 25 min  -SG      Total Timed Code Minutes- OT 25 minute(s)  -SG      OT Received On 02/03/22  -              Timed Charges    50287 - OT Self Care/Mgmt Minutes 25  -SG              Total Minutes    Timed Charges Total Minutes 25  -SG       Total Minutes 25  -SG            User Key  (r) = Recorded By, (t) = Taken By, (c) = Cosigned By    Initials Name Provider Type     Kristine Roldan OTR Occupational Therapist              Therapy Charges for Today     Code Description Service Date Service Provider Modifiers Qty    [id number] HC OT SELF CARE/MGMT/TRAIN EA 15 MIN 2/2/2022 Kristine Roldan OTR GO 2               RYAN Beebe  2/2/2022

## 2022-02-02 NOTE — PLAN OF CARE
Goal Outcome Evaluation:  Plan of Care Reviewed With: patient           Outcome Summary: Pt does very well today with OT, walks to sink with rwx with min A for ADL tasks. Pt fatigues quickly and requires increased assist for LBD/LBB. Sats >95 during all activity. Pt will continue to benefit from OT to address deficits and continue improving independence. Pt anticipating to d/c to SNF.

## 2022-02-03 ENCOUNTER — TELEPHONE (OUTPATIENT)
Dept: GASTROENTEROLOGY | Facility: CLINIC | Age: 77
End: 2022-02-03

## 2022-02-03 ENCOUNTER — APPOINTMENT (OUTPATIENT)
Dept: CT IMAGING | Facility: HOSPITAL | Age: 77
End: 2022-02-03

## 2022-02-03 LAB
ANION GAP SERPL CALCULATED.3IONS-SCNC: 8.8 MMOL/L (ref 5–15)
BASOPHILS # BLD AUTO: 0.09 10*3/MM3 (ref 0–0.2)
BASOPHILS NFR BLD AUTO: 0.8 % (ref 0–1.5)
BUN SERPL-MCNC: 18 MG/DL (ref 8–23)
BUN/CREAT SERPL: 22 (ref 7–25)
CALCIUM SPEC-SCNC: 9 MG/DL (ref 8.6–10.5)
CHLORIDE SERPL-SCNC: 101 MMOL/L (ref 98–107)
CO2 SERPL-SCNC: 28.2 MMOL/L (ref 22–29)
CREAT SERPL-MCNC: 0.82 MG/DL (ref 0.57–1)
DEPRECATED RDW RBC AUTO: 45.7 FL (ref 37–54)
EOSINOPHIL # BLD AUTO: 0.38 10*3/MM3 (ref 0–0.4)
EOSINOPHIL NFR BLD AUTO: 3.4 % (ref 0.3–6.2)
ERYTHROCYTE [DISTWIDTH] IN BLOOD BY AUTOMATED COUNT: 13.6 % (ref 12.3–15.4)
GFR SERPL CREATININE-BSD FRML MDRD: 68 ML/MIN/1.73
GLUCOSE SERPL-MCNC: 89 MG/DL (ref 65–99)
HCT VFR BLD AUTO: 29 % (ref 34–46.6)
HGB BLD-MCNC: 9.5 G/DL (ref 12–15.9)
IMM GRANULOCYTES # BLD AUTO: 0.09 10*3/MM3 (ref 0–0.05)
IMM GRANULOCYTES NFR BLD AUTO: 0.8 % (ref 0–0.5)
LYMPHOCYTES # BLD AUTO: 2.52 10*3/MM3 (ref 0.7–3.1)
LYMPHOCYTES NFR BLD AUTO: 22.5 % (ref 19.6–45.3)
MCH RBC QN AUTO: 30.4 PG (ref 26.6–33)
MCHC RBC AUTO-ENTMCNC: 32.8 G/DL (ref 31.5–35.7)
MCV RBC AUTO: 92.9 FL (ref 79–97)
MONOCYTES # BLD AUTO: 1.69 10*3/MM3 (ref 0.1–0.9)
MONOCYTES NFR BLD AUTO: 15.1 % (ref 5–12)
NEUTROPHILS NFR BLD AUTO: 57.4 % (ref 42.7–76)
NEUTROPHILS NFR BLD AUTO: 6.43 10*3/MM3 (ref 1.7–7)
NRBC BLD AUTO-RTO: 0 /100 WBC (ref 0–0.2)
PLATELET # BLD AUTO: 329 10*3/MM3 (ref 140–450)
PMV BLD AUTO: 11 FL (ref 6–12)
POTASSIUM SERPL-SCNC: 4.8 MMOL/L (ref 3.5–5.2)
RBC # BLD AUTO: 3.12 10*6/MM3 (ref 3.77–5.28)
SODIUM SERPL-SCNC: 138 MMOL/L (ref 136–145)
WBC NRBC COR # BLD: 11.2 10*3/MM3 (ref 3.4–10.8)

## 2022-02-03 PROCEDURE — 80048 BASIC METABOLIC PNL TOTAL CA: CPT | Performed by: HOSPITALIST

## 2022-02-03 PROCEDURE — 99232 SBSQ HOSP IP/OBS MODERATE 35: CPT | Performed by: NURSE PRACTITIONER

## 2022-02-03 PROCEDURE — 0 IOPAMIDOL PER 1 ML: Performed by: HOSPITALIST

## 2022-02-03 PROCEDURE — 71275 CT ANGIOGRAPHY CHEST: CPT

## 2022-02-03 PROCEDURE — 94799 UNLISTED PULMONARY SVC/PX: CPT

## 2022-02-03 PROCEDURE — 85025 COMPLETE CBC W/AUTO DIFF WBC: CPT | Performed by: HOSPITALIST

## 2022-02-03 RX ADMIN — PROPAFENONE HYDROCHLORIDE 150 MG: 150 TABLET, COATED ORAL at 06:37

## 2022-02-03 RX ADMIN — PROPAFENONE HYDROCHLORIDE 150 MG: 150 TABLET, COATED ORAL at 13:54

## 2022-02-03 RX ADMIN — POTASSIUM CHLORIDE 20 MEQ: 10 TABLET, EXTENDED RELEASE ORAL at 08:58

## 2022-02-03 RX ADMIN — ROSUVASTATIN CALCIUM 5 MG: 5 TABLET, FILM COATED ORAL at 20:31

## 2022-02-03 RX ADMIN — PROPAFENONE HYDROCHLORIDE 150 MG: 150 TABLET, COATED ORAL at 20:32

## 2022-02-03 RX ADMIN — SPIRONOLACTONE 25 MG: 25 TABLET ORAL at 08:58

## 2022-02-03 RX ADMIN — MIRTAZAPINE 7.5 MG: 15 TABLET, FILM COATED ORAL at 20:31

## 2022-02-03 RX ADMIN — SUCRALFATE 1 G: 1 TABLET ORAL at 06:37

## 2022-02-03 RX ADMIN — SUCRALFATE 1 G: 1 TABLET ORAL at 20:31

## 2022-02-03 RX ADMIN — PANTOPRAZOLE SODIUM 40 MG: 40 TABLET, DELAYED RELEASE ORAL at 16:46

## 2022-02-03 RX ADMIN — IOPAMIDOL 80 ML: 755 INJECTION, SOLUTION INTRAVENOUS at 12:57

## 2022-02-03 RX ADMIN — Medication 3 MG: at 20:31

## 2022-02-03 RX ADMIN — URSODIOL 300 MG: 300 CAPSULE ORAL at 20:31

## 2022-02-03 RX ADMIN — POTASSIUM CHLORIDE 20 MEQ: 10 TABLET, EXTENDED RELEASE ORAL at 16:46

## 2022-02-03 RX ADMIN — SUCRALFATE 1 G: 1 TABLET ORAL at 16:46

## 2022-02-03 RX ADMIN — GUAIFENESIN 600 MG: 600 TABLET, EXTENDED RELEASE ORAL at 20:32

## 2022-02-03 RX ADMIN — BUMETANIDE 0.5 MG: 0.5 TABLET ORAL at 08:58

## 2022-02-03 RX ADMIN — GUAIFENESIN 600 MG: 600 TABLET, EXTENDED RELEASE ORAL at 08:58

## 2022-02-03 RX ADMIN — URSODIOL 300 MG: 300 CAPSULE ORAL at 16:46

## 2022-02-03 RX ADMIN — URSODIOL 300 MG: 300 CAPSULE ORAL at 08:58

## 2022-02-03 RX ADMIN — PANTOPRAZOLE SODIUM 40 MG: 40 TABLET, DELAYED RELEASE ORAL at 06:37

## 2022-02-03 NOTE — CASE MANAGEMENT/SOCIAL WORK
Continued Stay Note  [facility]     Patient Name: Celia Baird  MRN: [id number]  Today's Date: 2/3/2022    Admit Date: 1/26/2022     Discharge Plan     Row Name 02/03/22 1203       Plan    Plan Plan home with [facility] or [facility] for skilled care.  MELISSA Williamson RN    Plan Comments My  ( [phone]) called regarding bed availability at [facility].   My continues to follow for [facility].   [facility]  ( [phone]) following for [facility].  Plan home with [facility] or [facility] for skilled care.  MELISSA Williamson RN               Discharge Codes    No documentation.               Expected Discharge Date and Time     Expected Discharge Date Expected Discharge Time    Feb 4, 2022             Meena Williamson, RN

## 2022-02-03 NOTE — PROGRESS NOTES
"Daily progress note    Chief complaint  Doing same  No specific complaints  Denies chest pain palpitation or shortness of breath    History of present illness  77-year white female with very complex past medical history including chronic hypoxic respiratory failure COPD diastolic CHF paroxysmal atrial fibrillation hypertension hyperlipidemia chronic anemia and gastroesophageal reflux disease who was recently discharged from the hospital after work-up on acute on chronic hypoxic respiratory failure presented to [facility] emergency room with generalized weakness not feeling well including dizziness lightheadedness and work-up in ER revealed symptomatic anemia with heme positive stools admit for management.  Patient denies any black stools or blood in the stools.  Patient denies any nausea vomiting.  Patient stated that she has not smokes cigarettes since discharge.  Patient admitted for management started on IV Protonix and received 2 units packed RBC and start feeling better.     REVIEW OF SYSTEMS  Unremarkable except generalized weakness     PHYSICAL EXAM  Blood pressure 122/62, pulse 71, temperature 98.1 °F (36.7 °C), temperature source Oral, resp. rate 18, height 152.4 cm (60\"), weight 36.2 kg (79 lb 12.8 oz), SpO2 99 %.    General: No acute distress.  HENT: NCAT, PERRL, Nares patent.  Eyes: no scleral icterus.  Neck: trachea midline, no ROM limitations.  CV: regular rhythm, regular rate.  Respiratory: normal effort, CTAB.  Abdomen: soft, nondistended, NTTP, no rebound tenderness, bowel sounds positive  Musculoskeletal: no deformity.  Neuro: alert, moves all extremities, follows commands.  Skin: warm, dry.     LAB RESULTS  Lab Results (last 24 hours)     Procedure Component Value Units Date/Time    Basic Metabolic Panel [[id number]]  (Normal) Collected: 02/03/22 0443    Specimen: Blood Updated: 02/03/22 0633     Glucose 89 mg/dL      BUN 18 mg/dL      Creatinine 0.82 mg/dL      Sodium 138 mmol/L      " Potassium 4.8 mmol/L      Chloride 101 mmol/L      CO2 28.2 mmol/L      Calcium 9.0 mg/dL      eGFR Non African Amer 68 mL/min/1.73      BUN/Creatinine Ratio 22.0     Anion Gap 8.8 mmol/L     Narrative:      GFR Normal >60  Chronic Kidney Disease <60  Kidney Failure <15      CBC & Differential [[id number]]  (Abnormal) Collected: 02/03/22 0443    Specimen: Blood Updated: 02/03/22 0548    Narrative:      The following orders were created for panel order CBC & Differential.  Procedure                               Abnormality         Status                     ---------                               -----------         ------                     CBC Auto Differential[[id number]]        Abnormal            Final result                 Please view results for these tests on the individual orders.    CBC Auto Differential [[id number]]  (Abnormal) Collected: 02/03/22 0443    Specimen: Blood Updated: 02/03/22 0548     WBC 11.20 10*3/mm3      RBC 3.12 10*6/mm3      Hemoglobin 9.5 g/dL      Hematocrit 29.0 %      MCV 92.9 fL      MCH 30.4 pg      MCHC 32.8 g/dL      RDW 13.6 %      RDW-SD 45.7 fl      MPV 11.0 fL      Platelets 329 10*3/mm3      Neutrophil % 57.4 %      Lymphocyte % 22.5 %      Monocyte % 15.1 %      Eosinophil % 3.4 %      Basophil % 0.8 %      Immature Grans % 0.8 %      Neutrophils, Absolute 6.43 10*3/mm3      Lymphocytes, Absolute 2.52 10*3/mm3      Monocytes, Absolute 1.69 10*3/mm3      Eosinophils, Absolute 0.38 10*3/mm3      Basophils, Absolute 0.09 10*3/mm3      Immature Grans, Absolute 0.09 10*3/mm3      nRBC 0.0 /100 WBC         Imaging Results (Last 24 Hours)     Procedure Component Value Units Date/Time    CT Angiogram Chest [[id number]] Collected: 02/03/22 1308     Updated: 02/03/22 1319    Narrative:      CT ANGIOGRAM CHEST-     CLINICAL HISTORY: Dyspnea     TECHNIQUE: Spiral CT images were obtained through the chest during rapid  IV injection of contrast and were reconstructed in 2 mm thick  axial  slices. Multiple coronal and sagittal and 3-D reconstructions were  produced.     Radiation dose reduction techniques were utilized, including automated  exposure control and exposure modulation based on body size.     COMPARISON: CT scan of the chest without contrast dated 05/29/2018.     FINDINGS: The main pulmonary arteries and their lobar and segmental  branches are well opacified, and demonstrate no intraluminal filling  defects. There is no CT evidence of acute pulmonary thromboembolism. The  thoracic aorta was also well opacified and is unremarkable except for  multiple calcified atherosclerotic plaques. The heart is normal in size.  There is no mediastinal or hilar or axillary lymphadenopathy. Lung  window images demonstrate moderately severe emphysematous changes that  are most prominent in the upper lung zones. There is moderate focal  atelectasis in the inferior aspect of the left lower lobe. No focal  acute airspace infiltrates are identified. There are no discrete lung  masses. There are small bilateral posteriorly layering pleural  effusions. Images through the upper abdomen show no significant  abnormality. Bone window images demonstrate prominent osteopenia.. There  is exaggeration of the normal thoracic kyphosis due to multiple  osteoporotic compression fractures in the thoracic spine.       Impression:      Moderately severe emphysema. Small bilateral pleural  effusions. Moderate focal atelectasis within the left lower lobe. There  is no CT evidence of acute pulmonary thromboembolism embolism.     The patient's nurse was informed that a completed corrected report was  available for review on the electronic medical record system on  02/03/2022 at 1:15 PM.     This report was finalized on 2/3/2022 1:16 PM by Dr. Buzz Hargrove M.D.                ECG 12 Lead  Component   Ref Range & Units 1/26/22 1623 1/18/22 0139 1/14/22 0630 1/13/22 1356 1/13/22 1253   QT Interval   ms 446  314  362  373   409              HEART RATE= 74  bpm  RR Interval= 812  ms  WY Interval= 226  ms  P Horizontal Axis= -43  deg  P Front Axis= 58  deg  QRSD Interval= 94  ms  QT Interval= 446  ms  QRS Axis= 24  deg  T Wave Axis= 237  deg  - ABNORMAL ECG -  Sinus rhythm  Prolonged WY interval  Repol abnrm suggests ischemia, diffuse leads  Ventricular bigeminy resolved, !st degree AVB new           Upper and lower endoscopy noted and results discussed with patient     Current Facility-Administered Medications:   •  acetaminophen (TYLENOL) tablet 650 mg, 650 mg, Oral, Q6H PRN, Constantine Kaye MD, 650 mg at 01/31/22 2055  •  bumetanide (BUMEX) tablet 0.5 mg, 0.5 mg, Oral, Daily, Constantine Kaye MD, 0.5 mg at 02/03/22 0858  •  guaiFENesin (MUCINEX) 12 hr tablet 600 mg, 600 mg, Oral, Q12H, Constantine Kaye MD, 600 mg at 02/03/22 0858  •  melatonin tablet 3 mg, 3 mg, Oral, Nightly PRN, Eulogio Mcdaniel MD, 3 mg at 02/02/22 2156  •  mirtazapine (REMERON) tablet 7.5 mg, 7.5 mg, Oral, Nightly, Constantine Kaye MD, 7.5 mg at 02/02/22 2157  •  O2 (OXYGEN), 2 L/min, Inhalation, PRN, Constantine Kaye MD  •  ondansetron (ZOFRAN) injection 4 mg, 4 mg, Intravenous, Q4H PRN, Constantine Kaye MD  •  pantoprazole (PROTONIX) EC tablet 40 mg, 40 mg, Oral, BID AC, Stingl, Pao Kaplan MD, 40 mg at 02/03/22 0637  •  potassium chloride (K-DUR,KLOR-CON) ER tablet 20 mEq, 20 mEq, Oral, BID With Meals, Eulogio Mcdaniel MD, 20 mEq at 02/03/22 0858  •  potassium chloride (K-DUR,KLOR-CON) ER tablet 40 mEq, 40 mEq, Oral, PRN, Eulogio Mcdaniel MD, 40 mEq at 02/01/22 1025  •  potassium chloride (KLOR-CON) packet 40 mEq, 40 mEq, Oral, PRN, Eulogio Mcdaniel MD  •  propafenone (RYTHMOL) tablet 150 mg, 150 mg, Oral, Q8H, Constantine Kaye MD, 150 mg at 02/03/22 1354  •  rosuvastatin (CRESTOR) tablet 5 mg, 5 mg, Oral, Nightly, Constantine Kaye MD, 5 mg at 02/02/22 1832  •  sodium chloride 0.9 % infusion, 30 mL/hr, Intravenous, Continuous PRN,  Constantine Kaye MD, Stopped at 01/28/22 1117  •  spironolactone (ALDACTONE) tablet 25 mg, 25 mg, Oral, Daily, Constantine Kaye MD, 25 mg at 02/03/22 0858  •  sucralfate (CARAFATE) tablet 1 g, 1 g, Oral, 4x Daily AC & at Bedtime, Constantine Kaye MD, 1 g at 02/03/22 0637  •  tiotropium (SPIRIVA RESPIMAT) 2.5 mcg/act aerosol solution inhaler, 2 puff, Inhalation, Daily - RT, Constantine Kaye MD, 2 puff at 02/01/22 1048  •  ursodiol (ACTIGALL) capsule 300 mg, 300 mg, Oral, TID, Constantine Kaye MD, 300 mg at 02/03/22 0858     ASSESSMENT  Acute on chronic hypoxic respiratory failure  No evidence of PE  Symptomatic anemia  Heme positive stools  Acute blood loss anemia  Colon polyps  COPD  S/p dig toxicity  Chronic diastolic CHF  Paroxysmal atrial fibrillation  Hypertension  Hyperlipidemia  Gastroesophageal reflux disease    PLAN  CPM  Supplement oxygen  Transfuse as needed  Protonix and Carafate  Continue to hold aspirin and Plavix for 1 week  GI and cardiology consult  Pulmonary to follow patient  Adjust home medications  Stress ulcer DVT prophylaxis  Supportive care  DNR  PT/OT  Discussed with nursing staff  Discharge planning    JONNY PEOPLES MD

## 2022-02-03 NOTE — PLAN OF CARE
Goal Outcome Evaluation:  Plan of Care Reviewed With: patient        Progress: improving  Outcome Summary: Patient  resting at this  time.  No  complaints  of  pain.  CT  scan  yet  to  be  completed.  Intermittent  complaints  of  SOA  with  exertion.  Oxygen  increased  to  1l/m.     SR  with  1^ and  BBB on  the  monitor.  Nursing  will  continue  to  monitor.

## 2022-02-03 NOTE — NURSING NOTE
Called  By  CT  Tech   At 1943  That  They  Were  Unable  To  Do  CT  Angio on  This  Patient  Because  They  Were  Unable  To  Flush  The IV  That  She  Came  Down  With.  CT  Tech  Stated  That   They  Had  IV  Therapy   Place  Another IV   But  That  The new  One  infiltrated  And  They  Were  Unable  To do  Scan so  Patient  Will  Be  Coming  Back  To the  Floor  For placement  Of  New IV.   IV  Therapy  Called  And  Notified. When  Patient  Arrived  To  The  Floor,   Said  IV  # 20  Left  Upper  Arm  Was  Working  And flushing  Well.  No  Redness  Or  Any  Occlusion  Noted.  Kiran  From  IV  Therapy   Came  Up  To the  Floor  To  Assess  Patient.  Noticed  IV  Was  Working  Fine  And  Called  CT  Scan.  CT  Tech  Told  Kiran  That  She  Is  Not  Confident  In using  The  Site  Because  They  Attempted  To  Flush  It  Downstairs  And  It  Would  Not  Work.   She  Stated  CT  Angio  Will  Be  Done if  Patient  Gets  New   Site.  Call  Placed  To  Pulmonary  On  Call  As Pulmonary  Was  The  Group  That  Ordered  The  CT.  Received  Order from  Dr Trinidad  To  Place  Midline  For  CT  Angio  If  Unable  To  Obtain  Decent  Site.  Order  Placed  In Epic.

## 2022-02-03 NOTE — TELEPHONE ENCOUNTER
----- Message from Mickey Carrasquillo sent at 2/1/2022 11:04 AM EST -----  Regarding: FW: Please get insurance authorization and schedule capsule  Please call pt to schedule capsule. No PA required by plan.    Thanks  Cissy  ----- Message -----  From: Lisa Peraza MD  Sent: 1/31/2022   4:04 PM EST  To: Mickey Carrasquillo  Subject: Please get insurance authorization and sched#

## 2022-02-03 NOTE — CONSULTS
Iv team consulted to place a midline for CT scan with no blood draws due to unable to get access early this am, no iv RN was available until 0700. Iv RN able to obtain a 20G MARY 1.88in with U/S. Midline is no longer needed for CT due to appropriate access is now available for the scan.

## 2022-02-03 NOTE — PROGRESS NOTES
Dr. ANA Hoffman    [address]    2/3/2022    Patient ID:  Name:  Celia Baird  MRN:  [id number]  1945  77 y.o.  female            CC/Reason for visit: Chronic respiratory failure, COPD     Interval hx:  She is doing well.  No new complaints.  Saturating 96% on 1 L of oxygen.  No chest pain, no shortness of     ROS: No palpitations.  No abdominal pain.    Vitals:  Vitals:    02/03/22 0335 02/03/22 0531 02/03/22 0835 02/03/22 1314   BP: 118/56  108/55 122/62   BP Location: Right arm  Left arm Left arm   Patient Position: Lying  Lying Lying   Pulse: 67   71   Resp: 20  18 18   Temp: 97.5 °F (36.4 °C)  97.8 °F (36.6 °C) 98.1 °F (36.7 °C)   TempSrc: Oral  Oral Oral   SpO2: 97%   99%   Weight:  36.2 kg (79 lb 12.8 oz)     Height:               Body mass index is 15.58 kg/m².  No intake or output data in the 24 hours ending 02/03/22 1432    Exam:  GEN:  No distress  Alert, oriented x 3.   LUNGS: Diminished over the bases but the rest of the lungs are clear breath sounds bilat, no use of accessory muscles  CV:  Normal S1S2, without murmur, no edema  ABD:  Non tender, no enlarged liver or masses      Scheduled meds:  bumetanide, 0.5 mg, Oral, Daily  guaiFENesin, 600 mg, Oral, Q12H  mirtazapine, 7.5 mg, Oral, Nightly  pantoprazole, 40 mg, Oral, BID AC  potassium chloride, 20 mEq, Oral, BID With Meals  propafenone, 150 mg, Oral, Q8H  rosuvastatin, 5 mg, Oral, Nightly  spironolactone, 25 mg, Oral, Daily  sucralfate, 1 g, Oral, 4x Daily AC & at Bedtime  tiotropium bromide monohydrate, 2 puff, Inhalation, Daily - RT  ursodiol, 300 mg, Oral, TID      IV meds:                           Data Review:   I reviewed the patient's medications and new clinical results.    COVID19   Date Value Ref Range Status   01/26/2022 Not Detected Not Detected - Ref. Range Final         Lab Results   Component Value Date    CALCIUM 9.0 02/03/2022    PHOS 2.4 (L) 01/21/2022    MG 1.7 01/30/2022    MG 1.7 01/29/2022    MG 1.6  01/26/2022     Results from last 7 days   Lab Units 02/03/22  0443 02/02/22  1007 02/01/22  0443 01/28/22  1546 01/28/22  0545   SODIUM mmol/L 138 136 138   < > 134*   POTASSIUM mmol/L 4.8 4.0 3.4*   < > 3.8   CHLORIDE mmol/L 101 104 103   < > 97*   CO2 mmol/L 28.2 25.0 26.5   < > 27.7   BUN mg/dL 18 16 14   < > 14   CREATININE mg/dL 0.82 0.89 0.75   < > 0.66   CALCIUM mg/dL 9.0 9.0 9.0   < > 8.6   BILIRUBIN mg/dL  --   --   --   --  0.6   ALK PHOS U/L  --   --   --   --  100   ALT (SGPT) U/L  --   --   --   --  10   AST (SGOT) U/L  --   --   --   --  16   GLUCOSE mg/dL 89 93 86   < > 114*   WBC 10*3/mm3 11.20* 11.77* 12.02*   < > 12.68*   HEMOGLOBIN g/dL 9.5* 9.8* 10.0*   < > 10.3*   PLATELETS 10*3/mm3 329 325 290   < > 191   PROBNP pg/mL  --  700.0  --   --  874.0    < > = values in this interval not displayed.             ASSESSMENT:     Absolute anemia    Gastrointestinal hemorrhage  Small pleural effusions  COPD/emphysema  Chronic respiratory failure  Tobacco abuse, recently quit  Paroxysmal atrial fibrillation        PLAN:  I reviewed the CT scan of the chest.  She has small pleural effusions.  No thoracentesis is indicated for this.  Continue very gentle every other day diuretics.  She has mild atelectasis left lower lobe due to some of the pleural effusion.  She is only requiring 1 L of oxygen, therefore thoracentesis is not indicated.  Proceed with increase mobility with physical therapy.  Her COPD is stable, at baseline.  Simply remain on regular DuoNeb treatments via nebulizer 4 times a day at home.  Can be discharged from the pulmonary standpoint anytime.  Follow-up with Dr. Orosco within 3 to 4 months.        Luther Hoffman MD  2/3/2022

## 2022-02-03 NOTE — PROGRESS NOTES
[facility]  Cardiology Progress Note    Patient Identification:  Name: Celia Baird  Age: 77 y.o.  Sex: female  :  1945  MRN: [id number]                 Follow Up / Chief Complaint: Follow up for afib    Interval History: New IV placed so she can have CTA for PE protocol. Monitor review, SR. No events over night. ZIO at discharge.       Subjective: Improvement with shortness of breath. No chest pain    Objective:    Past Medical History:  Past Medical History:   Diagnosis Date   • Atrial fibrillation (HCC)    • COPD (chronic obstructive pulmonary disease) (HCC)    • History of frequent urinary tract infections    • Irregular heart beat    • Kidney stones    • PBC (primary biliary cirrhosis)    • PBC (primary biliary cirrhosis)      Past Surgical History:  Past Surgical History:   Procedure Laterality Date   • CARDIAC ELECTROPHYSIOLOGY PROCEDURE N/A 3/30/2017    Procedure:    LINQ;  Surgeon: Ailyn Solorio MD;  Location: [facility] CATH INVASIVE LOCATION;  Service:    • CHOLECYSTECTOMY     • COLONOSCOPY     • COLONOSCOPY N/A 2022    Procedure: COLONOSCOPY into cecum with polypectomy, clip placement;  Surgeon: Constantine Kaye MD;  Location: [facility] ENDOSCOPY;  Service: Gastroenterology;  Laterality: N/A;  poor prep, diverticulosis, polyp   • ENDOSCOPY N/A 2022    Procedure: ESOPHAGOGASTRODUODENOSCOPY with biopsy;  Surgeon: Constantine Kaye MD;  Location: [facility] ENDOSCOPY;  Service: Gastroenterology;  Laterality: N/A;  normal   • TUBAL ABDOMINAL LIGATION          Social History:   Social History     Tobacco Use   • Smoking status: Current Some Day Smoker     Packs/day: 0.50     Years: 59.00     Pack years: 29.50     Types: Cigarettes   • Smokeless tobacco: Never Used   Substance Use Topics   • Alcohol use: No      Family History:  Family History   Problem Relation Age of Onset   • Heart disease Father    • Heart attack Father    • Heart disease Brother    • Stroke Mother      "      Allergies:  Allergies   Allergen Reactions   • Morphine And Related Nausea And Vomiting   • Levaquin [Levofloxacin]    • Sulfa Antibiotics      Scheduled Meds:  bumetanide, 0.5 mg, Daily  guaiFENesin, 600 mg, Q12H  mirtazapine, 7.5 mg, Nightly  pantoprazole, 40 mg, BID AC  potassium chloride, 20 mEq, BID With Meals  propafenone, 150 mg, Q8H  rosuvastatin, 5 mg, Nightly  spironolactone, 25 mg, Daily  sucralfate, 1 g, 4x Daily AC & at Bedtime  tiotropium bromide monohydrate, 2 puff, Daily - RT  ursodiol, 300 mg, TID            INTAKE AND OUTPUT:  No intake or output data in the 24 hours ending 02/03/22 0911  ROS  Constitutional: Awake and alert, no fever.  No nosebleeds  Abdomen           no abdominal pain   Cardiac              no chest pain  Pulmonary          no shortness of breath      /55 (BP Location: Left arm, Patient Position: Lying)   Pulse 67   Temp 97.8 °F (36.6 °C) (Oral)   Resp 18   Ht 152.4 cm (60\")   Wt 36.2 kg (79 lb 12.8 oz)   SpO2 97%   BMI 15.58 kg/m²            Physical Exam:  General:  Appears in no acute distress, resting in bed  Eyes: EOM normal no conjunctival drainage  HEENT:  No JVD. Thyroid not visibly enlarged. No mucosal pallor or cyanosis  Respiratory: Respirations regular and unlabored at rest. BBS with good air entry in all fields. No crackles, rubs or +wheezes auscultated  Cardiovascular: S1S2 Regular rate and rhythm. No murmur, rub or gallop. No carotid bruits. DP/PT pulses  2+   . No pretibial pitting edema  Gastrointestinal: Abdomen soft, flat, non tender. Bowel sounds present. No hepatosplenomegaly. No ascites  Skin:   Skin warm and dry to touch. No rashes.  Generalized bruising  Neuro: AAO x3 CN II-XII grossly intact  Psych: Mood and affect normal, pleasant and cooperative          I reviewed the patient's new clinical results, and personally reviewed and interpreted the patient's ECG and telemetry data from the last 24 hours    Cardiographics  Telemetry:      "       Telemetry:  SR    Echocardiogram:   1/14/2022  Interpretation Summary     · Calculated left ventricular EF = 61.9% Estimated left ventricular EF was in agreement with the calculated left ventricular EF.  · Left ventricular diastolic function was normal.  · There is no evidence of pericardial effusion. .     2019     Interpretation Summary        · Findings consistent with a normal ECG stress test.  · Left ventricular ejection fraction is normal (Calculated EF = 70%).  · Myocardial perfusion imaging indicates a normal myocardial perfusion study with no evidence of ischemia.  · Impressions are consistent with a low risk study.     Asymptomatic for chest pain. ECG is negative for ischemia.   Ectopy: Rare PAC at baseline, none with exercise, occasional PVC in recovery  HR and BP response : Appropriate for Beta-blocker therapy  Pharmacologic study due to inability to tolerate increasing speed and grade of treadmill due to Pulmonary, mobility  Issues and Beta-blocker therapy.  Participated in Low Level exercise and tolerance is poor.     Loop placement in 2017  Conclusion        · Successful Linq implantation           Imaging  Chest X-ray:   1/26/2022  IMPRESSION:  No focal pulmonary consolidation. Mild prominence of  interstitial markings.     This report was finalized on 1/26/2022 4:55 PM by Dr. Andrew Abdi M.D.        Lab Review       Results from last 7 days   Lab Units 01/30/22  0545   MAGNESIUM mg/dL 1.7     Results from last 7 days   Lab Units 02/03/22  0443   SODIUM mmol/L 138   POTASSIUM mmol/L 4.8   BUN mg/dL 18   CREATININE mg/dL 0.82   CALCIUM mg/dL 9.0     Results from last 7 days   Lab Units 02/03/22  0443 02/02/22  1007 02/01/22  0443   WBC 10*3/mm3 11.20* 11.77* 12.02*   HEMOGLOBIN g/dL 9.5* 9.8* 10.0*   HEMATOCRIT % 29.0* 29.9* 29.7*   PLATELETS 10*3/mm3 329 325 290         The following medical decision was discussed in detail with Dr. Solorio      Assessment:  1. Gastrointestinal  "hemorrhage  2. anemia   3. Paroxysmal atrial fibrillation: With watchman's device   4. orthostasis   5. Hypokalemia: 4.8 replace   6. Chronic diastolic CHF: No ACE or ARB due to history of hypotension  7. hyperlipidemia: On statin  8. hypertension: stable  9. GERD  10. History of loop implant battery no longer working  11. CAD with stent placement 8/25/21 at [facility]  12. Pause one noted on 1/30/22     Plan:  Sinus rhythm on the monitor with no events overnight.  Blood pressure low normal but stable.  New IV placed for CTA for PE protocol.  Need a ZIO at discharge.  He may need a loop in the outpatient setting.     She will follow up with BRAYAN Dia on March 9th at 11 am at the [address] Office.      )2/3/2022  BRAYAN Dia        EMR Mariella/Transcription:   \"Dictated utilizing Dragon dictation\".     "

## 2022-02-04 LAB
BASOPHILS # BLD AUTO: 0.09 10*3/MM3 (ref 0–0.2)
BASOPHILS NFR BLD AUTO: 0.9 % (ref 0–1.5)
DEPRECATED RDW RBC AUTO: 44.6 FL (ref 37–54)
EOSINOPHIL # BLD AUTO: 0.39 10*3/MM3 (ref 0–0.4)
EOSINOPHIL NFR BLD AUTO: 3.9 % (ref 0.3–6.2)
ERYTHROCYTE [DISTWIDTH] IN BLOOD BY AUTOMATED COUNT: 13.3 % (ref 12.3–15.4)
HCT VFR BLD AUTO: 29.7 % (ref 34–46.6)
HGB BLD-MCNC: 9.9 G/DL (ref 12–15.9)
IMM GRANULOCYTES # BLD AUTO: 0.09 10*3/MM3 (ref 0–0.05)
IMM GRANULOCYTES NFR BLD AUTO: 0.9 % (ref 0–0.5)
LYMPHOCYTES # BLD AUTO: 2.48 10*3/MM3 (ref 0.7–3.1)
LYMPHOCYTES NFR BLD AUTO: 24.7 % (ref 19.6–45.3)
MCH RBC QN AUTO: 30.6 PG (ref 26.6–33)
MCHC RBC AUTO-ENTMCNC: 33.3 G/DL (ref 31.5–35.7)
MCV RBC AUTO: 91.7 FL (ref 79–97)
MONOCYTES # BLD AUTO: 1.5 10*3/MM3 (ref 0.1–0.9)
MONOCYTES NFR BLD AUTO: 14.9 % (ref 5–12)
NEUTROPHILS NFR BLD AUTO: 5.49 10*3/MM3 (ref 1.7–7)
NEUTROPHILS NFR BLD AUTO: 54.7 % (ref 42.7–76)
NRBC BLD AUTO-RTO: 0.1 /100 WBC (ref 0–0.2)
PLATELET # BLD AUTO: 358 10*3/MM3 (ref 140–450)
PMV BLD AUTO: 10.9 FL (ref 6–12)
RBC # BLD AUTO: 3.24 10*6/MM3 (ref 3.77–5.28)
WBC NRBC COR # BLD: 10.04 10*3/MM3 (ref 3.4–10.8)

## 2022-02-04 PROCEDURE — 99232 SBSQ HOSP IP/OBS MODERATE 35: CPT

## 2022-02-04 PROCEDURE — 97110 THERAPEUTIC EXERCISES: CPT

## 2022-02-04 PROCEDURE — 97535 SELF CARE MNGMENT TRAINING: CPT

## 2022-02-04 PROCEDURE — 97116 GAIT TRAINING THERAPY: CPT

## 2022-02-04 PROCEDURE — 85025 COMPLETE CBC W/AUTO DIFF WBC: CPT | Performed by: HOSPITALIST

## 2022-02-04 RX ORDER — ASPIRIN 81 MG/1
81 TABLET ORAL DAILY
Status: DISCONTINUED | OUTPATIENT
Start: 2022-02-04 | End: 2022-02-07 | Stop reason: HOSPADM

## 2022-02-04 RX ORDER — CLOPIDOGREL BISULFATE 75 MG/1
75 TABLET ORAL DAILY
Status: DISCONTINUED | OUTPATIENT
Start: 2022-02-04 | End: 2022-02-07 | Stop reason: HOSPADM

## 2022-02-04 RX ADMIN — MIRTAZAPINE 7.5 MG: 15 TABLET, FILM COATED ORAL at 21:31

## 2022-02-04 RX ADMIN — SUCRALFATE 1 G: 1 TABLET ORAL at 21:30

## 2022-02-04 RX ADMIN — PROPAFENONE HYDROCHLORIDE 150 MG: 150 TABLET, COATED ORAL at 12:37

## 2022-02-04 RX ADMIN — CLOPIDOGREL 75 MG: 75 TABLET, FILM COATED ORAL at 21:36

## 2022-02-04 RX ADMIN — ASPIRIN 81 MG: 81 TABLET, COATED ORAL at 17:58

## 2022-02-04 RX ADMIN — ROSUVASTATIN CALCIUM 5 MG: 5 TABLET, FILM COATED ORAL at 21:35

## 2022-02-04 RX ADMIN — GUAIFENESIN 600 MG: 600 TABLET, EXTENDED RELEASE ORAL at 08:39

## 2022-02-04 RX ADMIN — URSODIOL 300 MG: 300 CAPSULE ORAL at 17:58

## 2022-02-04 RX ADMIN — GUAIFENESIN 600 MG: 600 TABLET, EXTENDED RELEASE ORAL at 21:35

## 2022-02-04 RX ADMIN — PROPAFENONE HYDROCHLORIDE 150 MG: 150 TABLET, COATED ORAL at 08:42

## 2022-02-04 RX ADMIN — PANTOPRAZOLE SODIUM 40 MG: 40 TABLET, DELAYED RELEASE ORAL at 17:58

## 2022-02-04 RX ADMIN — URSODIOL 300 MG: 300 CAPSULE ORAL at 08:39

## 2022-02-04 RX ADMIN — Medication 3 MG: at 21:48

## 2022-02-04 RX ADMIN — SUCRALFATE 1 G: 1 TABLET ORAL at 17:58

## 2022-02-04 RX ADMIN — BUMETANIDE 0.5 MG: 0.5 TABLET ORAL at 08:39

## 2022-02-04 RX ADMIN — POTASSIUM CHLORIDE 20 MEQ: 10 TABLET, EXTENDED RELEASE ORAL at 17:58

## 2022-02-04 RX ADMIN — POTASSIUM CHLORIDE 20 MEQ: 10 TABLET, EXTENDED RELEASE ORAL at 08:39

## 2022-02-04 RX ADMIN — PANTOPRAZOLE SODIUM 40 MG: 40 TABLET, DELAYED RELEASE ORAL at 08:39

## 2022-02-04 RX ADMIN — PROPAFENONE HYDROCHLORIDE 150 MG: 150 TABLET, COATED ORAL at 21:30

## 2022-02-04 RX ADMIN — URSODIOL 300 MG: 300 CAPSULE ORAL at 21:35

## 2022-02-04 RX ADMIN — SUCRALFATE 1 G: 1 TABLET ORAL at 08:39

## 2022-02-04 RX ADMIN — SUCRALFATE 1 G: 1 TABLET ORAL at 12:37

## 2022-02-04 RX ADMIN — SPIRONOLACTONE 25 MG: 25 TABLET ORAL at 08:39

## 2022-02-04 NOTE — THERAPY TREATMENT NOTE
Patient Name: Celia Baird  : 1945    MRN: [id number]                              Today's Date: 2022       Admit Date: 2022    Visit Dx:     ICD-10-CM ICD-9-CM   1. Gastrointestinal hemorrhage, unspecified gastrointestinal hemorrhage type  K92.2 578.9   2. Anemia requiring transfusions  D64.9 285.9   3. Orthostasis  I95.1 458.0   4. Hypokalemia  E87.6 276.8   5. Anemia  D64.9 285.9     Patient Active Problem List   Diagnosis   • COPD exacerbation (HCC)   • Subarachnoid hemorrhage (HCC)   • Multiple skin tears   • Closed nondisplaced fracture of left clavicle   • Paroxysmal atrial fibrillation (HCC)   • Tobacco abuse   • Status post placement of implantable loop recorder   • SOB (shortness of breath)   • COPD with acute exacerbation (HCC)   • Severe malnutrition (CMS/HCC)   • Leukocytosis   • Gastrointestinal hemorrhage   • Absolute anemia     Past Medical History:   Diagnosis Date   • Atrial fibrillation (HCC)    • COPD (chronic obstructive pulmonary disease) (HCC)    • History of frequent urinary tract infections    • Irregular heart beat    • Kidney stones    • PBC (primary biliary cirrhosis)    • PBC (primary biliary cirrhosis)      Past Surgical History:   Procedure Laterality Date   • CARDIAC ELECTROPHYSIOLOGY PROCEDURE N/A 3/30/2017    Procedure:    LINQ;  Surgeon: Ailyn Solorio MD;  Location: [facility] CATH INVASIVE LOCATION;  Service:    • CHOLECYSTECTOMY     • COLONOSCOPY     • COLONOSCOPY N/A 2022    Procedure: COLONOSCOPY into cecum with polypectomy, clip placement;  Surgeon: Constantine Kaye MD;  Location: [facility] ENDOSCOPY;  Service: Gastroenterology;  Laterality: N/A;  poor prep, diverticulosis, polyp   • ENDOSCOPY N/A 2022    Procedure: ESOPHAGOGASTRODUODENOSCOPY with biopsy;  Surgeon: Constantine Kaye MD;  Location: [facility] ENDOSCOPY;  Service: Gastroenterology;  Laterality: N/A;  normal   • TUBAL ABDOMINAL LIGATION        General Information     Row Name  02/04/22 1140          Physical Therapy Time and Intention    Document Type therapy note (daily note)  -EB     Mode of Treatment individual therapy; physical therapy  -EB     Row Name 02/04/22 1140          General Information    Existing Precautions/Restrictions fall; oxygen therapy device and L/min  -EB     Row Name 02/04/22 1140          Cognition    Orientation Status (Cognition) oriented x 3  -EB     Row Name 02/04/22 1140          Safety Issues, Functional Mobility    Safety Issues Affecting Function (Mobility) insight into deficits/self-awareness; awareness of need for assistance; safety precaution awareness  -EB     Impairments Affecting Function (Mobility) balance; endurance/activity tolerance; strength; shortness of breath  -           User Key  (r) = Recorded By, (t) = Taken By, (c) = Cosigned By    Initials Name Provider Type    EB Florence Mortensen PTA Physical Therapy Assistant               Mobility     Row Name 02/04/22 1140          Bed Mobility    Supine-Sit Alsey (Bed Mobility) contact guard  -     Sit-Supine Alsey (Bed Mobility) standby assist  -     Assistive Device (Bed Mobility) bed rails; head of bed elevated  -     Row Name 02/04/22 1140          Sit-Stand Transfer    Sit-Stand Alsey (Transfers) contact guard; nonverbal cues (demo/gesture); verbal cues  -EB     Assistive Device (Sit-Stand Transfers) walker, front-wheeled  -EB     Row Name 02/04/22 1140          Gait/Stairs (Locomotion)    Alsey Level (Gait) contact guard; nonverbal cues (demo/gesture); verbal cues  -     Assistive Device (Gait) walker, front-wheeled  -     Distance in Feet (Gait) 75ftX2, one standing rest break. Felt very fatigued after  -EB     Deviations/Abnormal Patterns (Gait) base of support, narrow; stride length decreased; jass decreased  -EB     Bilateral Gait Deviations forward flexed posture  -EB     Comment (Gait/Stairs) cues for upright posture and increasing step length.  Cues for PLB.  -EB           User Key  (r) = Recorded By, (t) = Taken By, (c) = Cosigned By    Initials Name Provider Type    Florence Marcus PTA Physical Therapy Assistant               Obj/Interventions    No documentation.                Goals/Plan    No documentation.                Clinical Impression     Row Name 02/04/22 1142          Plan of Care Review    Plan of Care Reviewed With patient  -EB     Progress improving  -EB     Outcome Summary Pt tolerated treatment with c/o fatigue. Pt is CGA/SBA with bed mobility and CGA with sit<->stand transfers. pt ambulated 75ftX2 with rwx, CGA. Pt required 1 standing rest break due to fatigue. Pt required cues for upright posture and increasing step length. Pt fatigued after treatment. Will continue to progress pt as tolerated. Pt will continue to benefit from skilled PT to improve strength/balance and endurance.  -     Row Name 02/04/22 1142          Positioning and Restraints    Pre-Treatment Position in bed  -EB     Post Treatment Position bed  -EB     In Bed supine; call light within reach; encouraged to call for assist; exit alarm on  -EB           User Key  (r) = Recorded By, (t) = Taken By, (c) = Cosigned By    Initials Name Provider Type    Florence Marcus PTA Physical Therapy Assistant               Outcome Measures     Row Name 02/04/22 1144          How much help from another person do you currently need...    Turning from your back to your side while in flat bed without using bedrails? 3  -EB     Moving from lying on back to sitting on the side of a flat bed without bedrails? 3  -EB     Moving to and from a bed to a chair (including a wheelchair)? 3  -EB     Standing up from a chair using your arms (e.g., wheelchair, bedside chair)? 3  -EB     Climbing 3-5 steps with a railing? 2  -EB     To walk in hospital room? 3  -EB     AM-PAC 6 Clicks Score (PT) 17  -EB           User Key  (r) = Recorded By, (t) = Taken By, (c) = Cosigned By    Initials Name  Provider Type    Florence Marcus PTA Physical Therapy Assistant                             Physical Therapy Education                 Title: PT OT SLP Therapies (In Progress)     Topic: Physical Therapy (Done)     Point: Mobility training (Done)     Learning Progress Summary           Patient Acceptance, E,D, VU by TEOFILO at 2/4/2022 1144    Acceptance, E,TB, VU by MADI at 2/2/2022 1554    Acceptance, E, VU,NR by MEGHNA at 1/30/2022 1234    Acceptance, E, VU,NR by MEGHNA at 1/29/2022 1553                   Point: Home exercise program (Done)     Learning Progress Summary           Patient Acceptance, E,D, VU by TEOFILO at 2/4/2022 1144    Acceptance, E,TB, VU by MADI at 2/2/2022 1554                   Point: Body mechanics (Done)     Learning Progress Summary           Patient Acceptance, E,D, VU by TEOFILO at 2/4/2022 1144    Acceptance, E,TB, VU by MADI at 2/2/2022 1554    Acceptance, E, VU,NR by MEGHNA at 1/30/2022 1234    Acceptance, E, VU,NR by MEGHNA at 1/29/2022 1553                   Point: Precautions (Done)     Learning Progress Summary           Patient Acceptance, E,D, VU by TEOFILO at 2/4/2022 1144    Acceptance, E,TB, VU by MADI at 2/2/2022 1554                               User Key     Initials Effective Dates Name Provider Type Discipline    MADI 03/07/18 -  Briana Flowers PTA Physical Therapy Assistant PT    TEOFILO 06/16/21 -  Florence Mortensen PTA Physical Therapy Assistant PT    MEGHNA 08/24/21 -  Summer Liao PT Physical Therapist PT              PT Recommendation and Plan     Plan of Care Reviewed With: patient  Progress: improving  Outcome Summary: Pt tolerated treatment with c/o fatigue. Pt is CGA/SBA with bed mobility and CGA with sit<->stand transfers. pt ambulated 75ftX2 with rwx, CGA. Pt required 1 standing rest break due to fatigue. Pt required cues for upright posture and increasing step length. Pt fatigued after treatment. Will continue to progress pt as tolerated. Pt will continue to benefit from skilled PT to improve  strength/balance and endurance.     Time Calculation:    PT Charges     Row Name 02/04/22 1140             Time Calculation    Start Time 0950  -EB      Stop Time 1000  -EB      Time Calculation (min) 10 min  -EB      PT Received On 02/04/22  -EB      PT - Next Appointment 02/05/22  -EB              Time Calculation- PT    Total Timed Code Minutes- PT 10 minute(s)  -EB            User Key  (r) = Recorded By, (t) = Taken By, (c) = Cosigned By    Initials Name Provider Type    EB Florence Mortensen PTA Physical Therapy Assistant              Therapy Charges for Today     Code Description Service Date Service Provider Modifiers Qty    [id number] HC GAIT TRAINING EA 15 MIN 2/4/2022 Florence Mortensen PTA GP 1          PT G-Codes  Outcome Measure Options: AM-PAC 6 Clicks Daily Activity (OT)  AM-PAC 6 Clicks Score (PT): 17  AM-PAC 6 Clicks Score (OT): 17    Florence Mortensen PTA  2/4/2022

## 2022-02-04 NOTE — THERAPY TREATMENT NOTE
Patient Name: Celia Baird  : 1945    MRN: [id number]                              Today's Date: 2022       Admit Date: 2022    Visit Dx:     ICD-10-CM ICD-9-CM   1. Gastrointestinal hemorrhage, unspecified gastrointestinal hemorrhage type  K92.2 578.9   2. Anemia requiring transfusions  D64.9 285.9   3. Orthostasis  I95.1 458.0   4. Hypokalemia  E87.6 276.8   5. Anemia  D64.9 285.9     Patient Active Problem List   Diagnosis   • COPD exacerbation (HCC)   • Subarachnoid hemorrhage (HCC)   • Multiple skin tears   • Closed nondisplaced fracture of left clavicle   • Paroxysmal atrial fibrillation (HCC)   • Tobacco abuse   • Status post placement of implantable loop recorder   • SOB (shortness of breath)   • COPD with acute exacerbation (HCC)   • Severe malnutrition (CMS/HCC)   • Leukocytosis   • Gastrointestinal hemorrhage   • Absolute anemia     Past Medical History:   Diagnosis Date   • Atrial fibrillation (HCC)    • COPD (chronic obstructive pulmonary disease) (HCC)    • History of frequent urinary tract infections    • Irregular heart beat    • Kidney stones    • PBC (primary biliary cirrhosis)    • PBC (primary biliary cirrhosis)      Past Surgical History:   Procedure Laterality Date   • CARDIAC ELECTROPHYSIOLOGY PROCEDURE N/A 3/30/2017    Procedure:    LINQ;  Surgeon: Ailyn Solorio MD;  Location: [facility] CATH INVASIVE LOCATION;  Service:    • CHOLECYSTECTOMY     • COLONOSCOPY     • COLONOSCOPY N/A 2022    Procedure: COLONOSCOPY into cecum with polypectomy, clip placement;  Surgeon: Constantine Kaye MD;  Location: [facility] ENDOSCOPY;  Service: Gastroenterology;  Laterality: N/A;  poor prep, diverticulosis, polyp   • ENDOSCOPY N/A 2022    Procedure: ESOPHAGOGASTRODUODENOSCOPY with biopsy;  Surgeon: Constantine Kaye MD;  Location: [facility] ENDOSCOPY;  Service: Gastroenterology;  Laterality: N/A;  normal   • TUBAL ABDOMINAL LIGATION        General Information     Row Name  02/04/22 0946          OT Time and Intention    Document Type therapy note (daily note)  -     Row Name 02/04/22 0946          General Information    Patient Profile Reviewed yes  -     Existing Precautions/Restrictions fall; oxygen therapy device and L/min  -     Row Name 02/04/22 0946          Cognition    Orientation Status (Cognition) oriented x 3  -     Row Name 02/04/22 0946          Safety Issues, Functional Mobility    Safety Issues Affecting Function (Mobility) insight into deficits/self-awareness; safety precaution awareness; awareness of need for assistance  -           User Key  (r) = Recorded By, (t) = Taken By, (c) = Cosigned By    Initials Name Provider Type    CW Ileana Martin OTR Occupational Therapist                 Mobility/ADL's     Row Name 02/04/22 0947          Bed Mobility    Scooting/Bridging [place] (Bed Mobility) standby assist  -     Supine-Sit [place] (Bed Mobility) contact guard; verbal cues  -     Sit-Supine [place] (Bed Mobility) standby assist  -     Assistive Device (Bed Mobility) head of bed elevated; bed rails  -     Row Name 02/04/22 0947          Transfers    Transfers toilet transfer  -     [place] Level (Toilet Transfer) contact guard; verbal cues  -     Assistive Device (Toilet Transfer) commode, bedside without drop arms  -     Row Name 02/04/22 0947          Toilet Transfer    Type (Toilet Transfer) stand pivot/stand step  -     Row Name 02/04/22 0947          Activities of Daily Living    BADL Assessment/Intervention lower body dressing; grooming  -     Row Name 02/04/22 0947          Lower Body Dressing Assessment/Training    [place] Level (Lower Body Dressing) doff; socks; standby assist; verbal cues; don; pants/bottoms; minimum assist (75% patient effort)  -     Position (Lower Body Dressing) edge of bed sitting; supported sitting  -     Row Name 02/04/22 0947          Grooming Assessment/Training     [place] Level (Grooming) grooming skills; hair care, combing/brushing; set up  -     Position (Grooming) edge of bed sitting  -           User Key  (r) = Recorded By, (t) = Taken By, (c) = Cosigned By    Initials Name Provider Type    Ileana Quiroz OTR Occupational Therapist               Obj/Interventions     Row Name 02/04/22 0950          Shoulder (Therapeutic Exercise)    Shoulder (Therapeutic Exercise) AROM (active range of motion)  -     Shoulder AROM (Therapeutic Exercise) bilateral; flexion; extension; horizontal aBduction/aDduction; 10 repetitions; 2 sets; supine  -     Row Name 02/04/22 0950          Elbow/Forearm (Therapeutic Exercise)    Elbow/Forearm (Therapeutic Exercise) AROM (active range of motion)  -     Elbow/Forearm AROM (Therapeutic Exercise) bilateral; flexion; extension; supination; pronation; supine; 10 repetitions; 2 sets  -     Row Name 02/04/22 0950          Wrist (Therapeutic Exercise)    Wrist (Therapeutic Exercise) AROM (active range of motion)  -     Wrist AROM (Therapeutic Exercise) bilateral; flexion; extension; 10 repetitions; 2 sets  -     Row Name 02/04/22 0950          Hand (Therapeutic Exercise)    Hand (Therapeutic Exercise) AROM (active range of motion)  -     Hand AROM/AAROM (Therapeutic Exercise) AROM (active range of motion); finger flexion; finger extension; 10 repetitions; 2 sets  -     Row Name 02/04/22 0950          Motor Skills    Motor Skills functional endurance  -     Functional Endurance poor  -     Row Name 02/04/22 0950          Therapeutic Exercise    Therapeutic Exercise shoulder; elbow/forearm; wrist; hand  -           User Key  (r) = Recorded By, (t) = Taken By, (c) = Cosigned By    Initials Name Provider Type    Ileana Quiroz OTR Occupational Therapist               Goals/Plan    No documentation.                Clinical Impression     Row Name 02/04/22 0947          Pain Scale: Numbers Pre/Post-Treatment     Pretreatment Pain Rating 0/10 - no pain  -CW     Posttreatment Pain Rating 0/10 - no pain  -CW     Row Name 02/04/22 0952          Plan of Care Review    Plan of Care Reviewed With patient  -CW     Outcome Summary OT-Pt reports no pain at present. Agreed to participate with tx. UE therap. ex completed to increase activity tolerance for self care. Rest breaks required when O2 sats decreased to 77% then >90 after rest and PLB. LE dressing Lloyd, socks SBA. BSC transfer CGA with verbal cues. Pt. has a BSC, wx, shower stool with rails at home. Discussed safety and EC. Anticipate SNF. Plan to cont. OT and progress as tolerated.  -CW     Row Name 02/04/22 0952          Vital Signs    O2 Delivery Pre Treatment supplemental O2  -CW     O2 Delivery Intra Treatment supplemental O2  -CW     O2 Delivery Post Treatment supplemental O2  -CW     Row Name 02/04/22 0952          Positioning and Restraints    Pre-Treatment Position in bed  -CW     Post Treatment Position bed  -CW     In Bed call light within reach; encouraged to call for assist; exit alarm on; supine  -CW           User Key  (r) = Recorded By, (t) = Taken By, (c) = Cosigned By    Initials Name Provider Type    Ileana Quiroz OTR Occupational Therapist               Outcome Measures     Row Name 02/04/22 0958          How much help from another is currently needed...    Putting on and taking off regular lower body clothing? 3  -CW     Bathing (including washing, rinsing, and drying) 3  -CW     Toileting (which includes using toilet bed pan or urinal) 2  -CW     Putting on and taking off regular upper body clothing 3  -CW     Taking care of personal grooming (such as brushing teeth) 3  -CW     Eating meals 3  -CW     AM-PAC 6 Clicks Score (OT) 17  -CW           User Key  (r) = Recorded By, (t) = Taken By, (c) = Cosigned By    Initials Name Provider Type    Ileana Quiroz OTR Occupational Therapist                Occupational Therapy Education                  Title: PT OT SLP Therapies (In Progress)     Topic: Occupational Therapy (In Progress)     Point: ADL training (Done)     Description:   Instruct learner(s) on proper safety adaptation and remediation techniques during self care or transfers.   Instruct in proper use of assistive devices.              Learning Progress Summary           Patient Acceptance, E, VU by  at 2/4/2022 0958                   Point: Home exercise program (Not Started)     Description:   Instruct learner(s) on appropriate technique for monitoring, assisting and/or progressing therapeutic exercises/activities.              Learner Progress:  Not documented in this visit.          Point: Precautions (Done)     Description:   Instruct learner(s) on prescribed precautions during self-care and functional transfers.              Learning Progress Summary           Patient Acceptance, E, VU by  at 2/1/2022 0944    Comment: role of OT, plan of care, safety, PLB                   Point: Body mechanics (Done)     Description:   Instruct learner(s) on proper positioning and spine alignment during self-care, functional mobility activities and/or exercises.              Learning Progress Summary           Patient Acceptance, E, VU by  at 2/1/2022 0944    Comment: role of OT, plan of care, safety, PLB                               User Key     Initials Effective Dates Name Provider Type Discipline     06/16/21 -  Ileana Martin OTR Occupational Therapist OT     06/10/21 -  Hazel Live OT Occupational Therapist OT              OT Recommendation and Plan     Plan of Care Review  Plan of Care Reviewed With: patient  Outcome Summary: OT-Pt reports no pain at present. Agreed to participate with tx. UE therap. ex completed to increase activity tolerance for self care. Rest breaks required when O2 sats decreased to 77% then >90 after rest and PLB. LE dressing Lloyd, socks SBA. BSC transfer CGA with verbal cues. Pt. has a BSC, wx, shower stool with rails  at home. Discussed safety and EC. Anticipate SNF. Plan to cont. OT and progress as tolerated.     Time Calculation:    Time Calculation- OT     Row Name 02/04/22 0959             Time Calculation- OT    OT Start Time 0920  -CW      OT Stop Time 0944  -CW      OT Time Calculation (min) 24 min  -CW      Total Timed Code Minutes- OT 24 minute(s)  -CW      OT - Next Appointment 02/07/22  -CW              Timed Charges    70466 - OT Therapeutic Exercise Minutes 10  -CW      27028 - OT Self Care/Mgmt Minutes 14  -CW              Total Minutes    Timed Charges Total Minutes 24  -CW       Total Minutes 24  -CW            User Key  (r) = Recorded By, (t) = Taken By, (c) = Cosigned By    Initials Name Provider Type    CW Ileana Martin OTR Occupational Therapist              Therapy Charges for Today     Code Description Service Date Service Provider Modifiers Qty    58472741584 HC OT THER PROC EA 15 MIN 2/4/2022 Ileana Martin OTR GO 1    32004520430 HC OT SELF CARE/MGMT/TRAIN EA 15 MIN 2/4/2022 Ileana Martin OTR GO 1               RYAN Jamil  2/4/2022

## 2022-02-04 NOTE — PROGRESS NOTES
"Daily progress note    Chief complaint  Doing better  No specific complaints  Denies chest pain palpitation or shortness of breath    History of present illness  77-year white female with very complex past medical history including chronic hypoxic respiratory failure COPD diastolic CHF paroxysmal atrial fibrillation hypertension hyperlipidemia chronic anemia and gastroesophageal reflux disease who was recently discharged from the hospital after work-up on acute on chronic hypoxic respiratory failure presented to [facility] emergency room with generalized weakness not feeling well including dizziness lightheadedness and work-up in ER revealed symptomatic anemia with heme positive stools admit for management.  Patient denies any black stools or blood in the stools.  Patient denies any nausea vomiting.  Patient stated that she has not smokes cigarettes since discharge.  Patient admitted for management started on IV Protonix and received 2 units packed RBC and start feeling better.     REVIEW OF SYSTEMS  Unremarkable except generalized weakness     PHYSICAL EXAM  Blood pressure 112/67, pulse 70, temperature 98 °F (36.7 °C), temperature source Oral, resp. rate 18, height 152.4 cm (60\"), weight 36.2 kg (79 lb 12.8 oz), SpO2 97 %.    General: No acute distress.  HENT: NCAT, PERRL, Nares patent.  Eyes: no scleral icterus.  Neck: trachea midline, no ROM limitations.  CV: regular rhythm, regular rate.  Respiratory: normal effort, CTAB.  Abdomen: soft, nondistended, NTTP, no rebound tenderness, bowel sounds positive  Musculoskeletal: no deformity.  Neuro: alert, moves all extremities, follows commands.  Skin: warm, dry.     LAB RESULTS  Lab Results (last 24 hours)     Procedure Component Value Units Date/Time    CBC & Differential [[id number]]  (Abnormal) Collected: 02/04/22 0419    Specimen: Blood Updated: 02/04/22 0553    Narrative:      The following orders were created for panel order CBC & " Differential.  Procedure                               Abnormality         Status                     ---------                               -----------         ------                     CBC Auto Differential[[id number]]        Abnormal            Final result                 Please view results for these tests on the individual orders.    CBC Auto Differential [[id number]]  (Abnormal) Collected: 02/04/22 0419    Specimen: Blood Updated: 02/04/22 0553     WBC 10.04 10*3/mm3      RBC 3.24 10*6/mm3      Hemoglobin 9.9 g/dL      Hematocrit 29.7 %      MCV 91.7 fL      MCH 30.6 pg      MCHC 33.3 g/dL      RDW 13.3 %      RDW-SD 44.6 fl      MPV 10.9 fL      Platelets 358 10*3/mm3      Neutrophil % 54.7 %      Lymphocyte % 24.7 %      Monocyte % 14.9 %      Eosinophil % 3.9 %      Basophil % 0.9 %      Immature Grans % 0.9 %      Neutrophils, Absolute 5.49 10*3/mm3      Lymphocytes, Absolute 2.48 10*3/mm3      Monocytes, Absolute 1.50 10*3/mm3      Eosinophils, Absolute 0.39 10*3/mm3      Basophils, Absolute 0.09 10*3/mm3      Immature Grans, Absolute 0.09 10*3/mm3      nRBC 0.1 /100 WBC         Imaging Results (Last 24 Hours)     ** No results found for the last 24 hours. **             ECG 12 Lead  Component   Ref Range & Units 1/26/22 1623 1/18/22 0139 1/14/22 0630 1/13/22 1356 1/13/22 1253   QT Interval   ms 446  314  362  373  409              HEART RATE= 74  bpm  RR Interval= 812  ms  WI Interval= 226  ms  P Horizontal Axis= -43  deg  P Front Axis= 58  deg  QRSD Interval= 94  ms  QT Interval= 446  ms  QRS Axis= 24  deg  T Wave Axis= 237  deg  - ABNORMAL ECG -  Sinus rhythm  Prolonged WI interval  Repol abnrm suggests ischemia, diffuse leads  Ventricular bigeminy resolved, !st degree AVB new           Upper and lower endoscopy noted and results discussed with patient     Current Facility-Administered Medications:   •  acetaminophen (TYLENOL) tablet 650 mg, 650 mg, Oral, Q6H PRN, Constantine Kaye MD, 650 mg at  01/31/22 2055  •  bumetanide (BUMEX) tablet 0.5 mg, 0.5 mg, Oral, Daily, Constantine Kaye MD, 0.5 mg at 02/04/22 0839  •  guaiFENesin (MUCINEX) 12 hr tablet 600 mg, 600 mg, Oral, Q12H, Constantine Kaye MD, 600 mg at 02/04/22 0839  •  melatonin tablet 3 mg, 3 mg, Oral, Nightly PRN, Eulogio Mcdaniel MD, 3 mg at 02/03/22 2031  •  mirtazapine (REMERON) tablet 7.5 mg, 7.5 mg, Oral, Nightly, Constantine Kaye MD, 7.5 mg at 02/03/22 2031  •  O2 (OXYGEN), 2 L/min, Inhalation, PRN, Constantine Kaye MD  •  ondansetron (ZOFRAN) injection 4 mg, 4 mg, Intravenous, Q4H PRN, Constantine Kaye MD  •  pantoprazole (PROTONIX) EC tablet 40 mg, 40 mg, Oral, BID AC, Stingl, Pao Kaplan MD, 40 mg at 02/04/22 0839  •  potassium chloride (K-DUR,KLOR-CON) ER tablet 20 mEq, 20 mEq, Oral, BID With Meals, Eulogio Mcdaniel MD, 20 mEq at 02/04/22 0839  •  propafenone (RYTHMOL) tablet 150 mg, 150 mg, Oral, Q8H, Constantine Kaye MD, 150 mg at 02/04/22 1237  •  rosuvastatin (CRESTOR) tablet 5 mg, 5 mg, Oral, Nightly, Constantine Kaye MD, 5 mg at 02/03/22 2031  •  spironolactone (ALDACTONE) tablet 25 mg, 25 mg, Oral, Daily, Constantine Kaye MD, 25 mg at 02/04/22 0839  •  sucralfate (CARAFATE) tablet 1 g, 1 g, Oral, 4x Daily AC & at Bedtime, Constantine Kaye MD, 1 g at 02/04/22 1237  •  tiotropium (SPIRIVA RESPIMAT) 2.5 mcg/act aerosol solution inhaler, 2 puff, Inhalation, Daily - RT, Constantine Kaye MD, 2 puff at 02/01/22 1048  •  ursodiol (ACTIGALL) capsule 300 mg, 300 mg, Oral, TID, Constantine Kaye MD, 300 mg at 02/04/22 0839     ASSESSMENT  Acute on chronic hypoxic respiratory failure  No evidence of PE  Symptomatic anemia  Heme positive stools  Acute blood loss anemia  Colon polyps  COPD  S/p dig toxicity  Chronic diastolic CHF  Paroxysmal atrial fibrillation  Hypertension  Hyperlipidemia  Gastroesophageal reflux disease    PLAN  CPM  Supplement oxygen  Transfuse as needed  Protonix and Carafate  Resume  aspirin Plavix  GI and cardiology consult  Pulmonary to follow patient  Adjust home medications  Stress ulcer DVT prophylaxis  Supportive care  DNR  PT/OT  Discussed with nursing staff  Subacute rehab once bed available    JONNY PEOPLES MD

## 2022-02-04 NOTE — PLAN OF CARE
Goal Outcome Evaluation:  Plan of Care Reviewed With: patient        Progress: improving  Outcome Summary: Pt tolerated treatment with c/o fatigue. Pt is CGA/SBA with bed mobility and CGA with sit<->stand transfers. pt ambulated 75ftX2 with rwx, CGA. Pt required 1 standing rest break due to fatigue. Pt required cues for upright posture and increasing step length. Pt fatigued after treatment. Will continue to progress pt as tolerated. Pt will continue to benefit from skilled PT to improve strength/balance and endurance.    ..Patient was wearing a face mask during this therapy encounter. Therapist used appropriate personal protective equipment including eye protection, mask, and gloves.  Mask used was standard procedure mask. Appropriate PPE was worn during the entire therapy session. Hand hygiene was completed before and after therapy session. Patient is not in enhanced droplet precautions.

## 2022-02-04 NOTE — PROGRESS NOTES
"Dr. ANA Hoffman    [address]    2/4/2022    Patient ID:  Name:  Celia Baird  MRN:  [id number]  1945  77 y.o.  female            CC/Reason for visit: Chronic respiratory failure, COPD     Interval hx:  She has no new complaints.  When I walked into the room, she was calm and saturating 100% on 2 L of oxygen via regular nasal cannula.  I turned her down to 1 L.     ROS: No palpitations.  No abdominal pain    Vitals:  Vitals:    02/03/22 2329 02/04/22 0802 02/04/22 1238 02/04/22 1244   BP: 130/55 106/78 112/67    BP Location: Left leg Left leg Left leg    Patient Position: Lying Lying Lying    Pulse: 72 75 70    Resp: 18 18 18    Temp: 98.2 °F (36.8 °C) 97.6 °F (36.4 °C) 98 °F (36.7 °C)    TempSrc: Oral Oral Oral    SpO2: 94%  97%    Weight:       Height:    152.4 cm (60\")           Body mass index is 15.58 kg/m².    Intake/Output Summary (Last 24 hours) at 2/4/2022 1549  Last data filed at 2/4/2022 1200  Gross per 24 hour   Intake 120 ml   Output 700 ml   Net -580 ml       Exam:  GEN:  No distress  Alert, oriented x 3.   LUNGS: Barrel chest, distant breath sounds  bilat, no use of accessory muscles  CV:  Normal S1S2, without murmur, no edema  ABD:  Non tender, no enlarged liver or masses      Scheduled meds:  bumetanide, 0.5 mg, Oral, Daily  guaiFENesin, 600 mg, Oral, Q12H  mirtazapine, 7.5 mg, Oral, Nightly  pantoprazole, 40 mg, Oral, BID AC  potassium chloride, 20 mEq, Oral, BID With Meals  propafenone, 150 mg, Oral, Q8H  rosuvastatin, 5 mg, Oral, Nightly  spironolactone, 25 mg, Oral, Daily  sucralfate, 1 g, Oral, 4x Daily AC & at Bedtime  tiotropium bromide monohydrate, 2 puff, Inhalation, Daily - RT  ursodiol, 300 mg, Oral, TID      IV meds:                           Data Review:   I reviewed the patient's medications and new clinical results.    COVID19   Date Value Ref Range Status   01/26/2022 Not Detected Not Detected - Ref. Range Final         Lab Results   Component Value Date    " CALCIUM 9.0 02/03/2022    PHOS 2.4 (L) 01/21/2022    MG 1.7 01/30/2022    MG 1.7 01/29/2022    MG 1.6 01/26/2022     Results from last 7 days   Lab Units 02/04/22  0419 02/03/22  0443 02/02/22  1007 02/01/22  0443 02/01/22 0443   SODIUM mmol/L  --  138 136  --  138   POTASSIUM mmol/L  --  4.8 4.0  --  3.4*   CHLORIDE mmol/L  --  101 104  --  103   CO2 mmol/L  --  28.2 25.0  --  26.5   BUN mg/dL  --  18 16  --  14   CREATININE mg/dL  --  0.82 0.89  --  0.75   CALCIUM mg/dL  --  9.0 9.0  --  9.0   GLUCOSE mg/dL  --  89 93  --  86   WBC 10*3/mm3 10.04 11.20* 11.77*   < > 12.02*   HEMOGLOBIN g/dL 9.9* 9.5* 9.8*   < > 10.0*   PLATELETS 10*3/mm3 358 329 325   < > 290   PROBNP pg/mL  --   --  700.0  --   --     < > = values in this interval not displayed.               ASSESSMENT:     Absolute anemia    Gastrointestinal hemorrhage  Small pleural effusions  COPD/emphysema  Chronic respiratory failure  Tobacco abuse, recently quit  Paroxysmal atrial fibrillation      PLAN:  Once again, patient was saturating 100% on 2 L of oxygen.  We should avoid hyperoxia.  Aim for oxygen saturation between 88% to 92%, 1 L of supplemental oxygen via nasal cannula should be more than enough.  Pleural effusions are small.  Continue gentle diuresis.  Avoid contraction alkalosis.  Atelectasis is small in the left side, should improve as diuretics take effect and as the patient starts participating well with physical therapy.  Patient has severe COPD at baseline.  Continue DuoNeb 4 times a day.  Follow-up with Dr. Orosco within 3 to 4 months.  We are signing off        Luther Hoffman MD  2/4/2022

## 2022-02-04 NOTE — PROGRESS NOTES
"Adult Nutrition  Assessment/PES    Patient Name:  Celia Baird  YOB: 1945  MRN: [id number]  Admit Date:  1/26/2022    Assessment Date:  2/4/2022    Comments:  Nutrition follow up.  Doing well.  Thoracentesis not indicated for pleural effusion.  Needs ZIO at d/c.  Plans for rehab at d/c.    No PO intake available.  Spoke with patient via telephone, se reports great appetite, says she has been \"eating like a horse.\"      RD to continue to follow.     Reason for Assessment     Row Name 02/04/22 1244          Reason for Assessment    Reason For Assessment follow-up protocol                Nutrition/Diet History     Row Name 02/04/22 1244          Nutrition/Diet History    Typical Food/Fluid Intake reports good haven, says she is \"eating like a horse\"                Anthropometrics     Row Name 02/04/22 1244 02/04/22 0639       Anthropometrics    Height 152.4 cm (60\") --    Weight -- --  pt refusing scale at this time. will do later she says       Admit Weight    Admit Weight --  79# 2/3 --       Ideal Body Weight (IBW)    Ideal Body Weight (IBW) (kg) 45.86 --       Body Mass Index (BMI)    BMI Assessment BMI less than 16: protein-energy malnutrition grade III  15.55 --               Labs/Tests/Procedures/Meds     Row Name 02/04/22 1245          Labs/Procedures/Meds    Lab Results Reviewed reviewed, pertinent     Lab Results Comments Hgb, Hct            Diagnostic Tests/Procedures    Diagnostic Test/Procedure Reviewed reviewed, pertinent            Medications    Pertinent Medications Reviewed reviewed, pertinent     Pertinent Medications Comments bumex, remeron, protonix, KCl, crestor, carafate                Physical Findings     Row Name 02/04/22 1251          Physical Findings    Overall Physical Appearance underweight; on oxygen therapy; generalized wasting; loss of subcutaneous fat; loss of muscle mass     Skin other (see comments)  B=19, bruised                Estimated/Assessed Needs     Row Name " "02/04/22 1244          Calculation Measurements    Height 152.4 cm (60\")                Nutrition Prescription Ordered     Row Name 02/04/22 1254          Nutrition Prescription PO    Current PO Diet Regular     Supplement Boost Plus (Ensure Enlive, Ensure Plus)     Supplement Frequency 2 times a day                Evaluation of Received Nutrient/Fluid Intake     Row Name 02/04/22 1254          PO Evaluation    Number of Days PO Intake Evaluated Insufficient Data                     Problem/Interventions:           Intervention Goal     Row Name 02/04/22 1255          Intervention Goal    General Maintain nutrition; Disease management/therapy; Meet nutritional needs for age/condition     PO Tolerate PO; PO intake (%)     PO Intake % 80 %     Weight Appropriate weight gain                Nutrition Intervention     Row Name 02/04/22 1304          Nutrition Intervention    RD/Tech Action Follow Tx progress; Care plan reviewd; Encourage intake     Recommended/Ordered Supplement                  Education/Evaluation     Row Name 02/04/22 1303          Education    Education Will Instruct as appropriate            Monitor/Evaluation    Monitor Per protocol; PO intake; Supplement intake; Pertinent labs; Weight; Skin status; Symptoms                 Electronically signed by:  Shantell Au RD  02/04/22 13:05 EST  "

## 2022-02-04 NOTE — PLAN OF CARE
Goal Outcome Evaluation:               Pt resting in bed quietly. Denies pain. Up with standby assist to bsc. On 1 L NC.

## 2022-02-04 NOTE — PROGRESS NOTES
[facility]  Cardiology Progress Note    Patient Identification:  Name: Celia Baird  Age: 77 y.o.  Sex: female  :  1945  MRN: [id number]                 Follow Up / Chief Complaint: Follow up for afib    Interval History: CTA yesterday for report as below.  No further episodes of shortness of breath.     Subjective: No chest pain no shortness of breath    Objective:    Past Medical History:  Past Medical History:   Diagnosis Date   • Atrial fibrillation (HCC)    • COPD (chronic obstructive pulmonary disease) (HCC)    • History of frequent urinary tract infections    • Irregular heart beat    • Kidney stones    • PBC (primary biliary cirrhosis)    • PBC (primary biliary cirrhosis)      Past Surgical History:  Past Surgical History:   Procedure Laterality Date   • CARDIAC ELECTROPHYSIOLOGY PROCEDURE N/A 3/30/2017    Procedure:    LINQ;  Surgeon: Ailyn Solorio MD;  Location: [facility] CATH INVASIVE LOCATION;  Service:    • CHOLECYSTECTOMY     • COLONOSCOPY     • COLONOSCOPY N/A 2022    Procedure: COLONOSCOPY into cecum with polypectomy, clip placement;  Surgeon: Constantine Kaye MD;  Location: [facility] ENDOSCOPY;  Service: Gastroenterology;  Laterality: N/A;  poor prep, diverticulosis, polyp   • ENDOSCOPY N/A 2022    Procedure: ESOPHAGOGASTRODUODENOSCOPY with biopsy;  Surgeon: Constantine Kaye MD;  Location: [facility] ENDOSCOPY;  Service: Gastroenterology;  Laterality: N/A;  normal   • TUBAL ABDOMINAL LIGATION          Social History:   Social History     Tobacco Use   • Smoking status: Current Some Day Smoker     Packs/day: 0.50     Years: 59.00     Pack years: 29.50     Types: Cigarettes   • Smokeless tobacco: Never Used   Substance Use Topics   • Alcohol use: No      Family History:  Family History   Problem Relation Age of Onset   • Heart disease Father    • Heart attack Father    • Heart disease Brother    • Stroke Mother           Allergies:  Allergies   Allergen  "Reactions   • Morphine And Related Nausea And Vomiting   • Levaquin [Levofloxacin]    • Sulfa Antibiotics      Scheduled Meds:  bumetanide, 0.5 mg, Daily  guaiFENesin, 600 mg, Q12H  mirtazapine, 7.5 mg, Nightly  pantoprazole, 40 mg, BID AC  potassium chloride, 20 mEq, BID With Meals  propafenone, 150 mg, Q8H  rosuvastatin, 5 mg, Nightly  spironolactone, 25 mg, Daily  sucralfate, 1 g, 4x Daily AC & at Bedtime  tiotropium bromide monohydrate, 2 puff, Daily - RT  ursodiol, 300 mg, TID            INTAKE AND OUTPUT:    Intake/Output Summary (Last 24 hours) at 2/4/2022 1547  Last data filed at 2/4/2022 1200  Gross per 24 hour   Intake 120 ml   Output 700 ml   Net -580 ml     ROS  Constitutional: Awake and alert, no fever. No nosebleeds  Abdomen           no abdominal pain   Cardiac              no chest pain  Pulmonary          no shortness of breath      /67 (BP Location: Left leg, Patient Position: Lying)   Pulse 70   Temp 98 °F (36.7 °C) (Oral)   Resp 18   Ht 152.4 cm (60\")   Wt 36.2 kg (79 lb 12.8 oz)   SpO2 97%   BMI 15.58 kg/m²            Physical Exam:  General:  Appears chronically ill in no acute distress, resting in bed  Eyes: EOM normal no conjunctival drainage  HEENT:  No JVD. Thyroid not visibly enlarged. No mucosal pallor or cyanosis  Respiratory: Respirations regular and unlabored at rest. BBS with good air entry in all fields. No crackles, rubs or wheezes auscultated  Cardiovascular: S1S2 Regular rate and rhythm. No murmur, rub or gallop. No carotid bruits. DP/PT pulses  2+   . No pretibial pitting edema  Gastrointestinal: Abdomen soft, flat, non tender. Bowel sounds present. No hepatosplenomegaly. No ascites  Musculoskeletal: MCCRACKEN x4. No abnormal movements  Extremities: No digital clubbing or cyanosis  Skin: Color pink. Skin warm and dry to touch. No rashes    Neuro: AAO x3 CN II-XII grossly intact  Psych: Mood and affect normal, pleasant and cooperative          I reviewed the patient's new " clinical results, and personally reviewed and interpreted the patient's ECG and telemetry data from the last 24 hours        Cardiographics  Telemetry:            Telemetry:  SR    Echocardiogram:   1/14/2022  Interpretation Summary     · Calculated left ventricular EF = 61.9% Estimated left ventricular EF was in agreement with the calculated left ventricular EF.  · Left ventricular diastolic function was normal.  · There is no evidence of pericardial effusion. .     2019     Interpretation Summary        · Findings consistent with a normal ECG stress test.  · Left ventricular ejection fraction is normal (Calculated EF = 70%).  · Myocardial perfusion imaging indicates a normal myocardial perfusion study with no evidence of ischemia.  · Impressions are consistent with a low risk study.     Asymptomatic for chest pain. ECG is negative for ischemia.   Ectopy: Rare PAC at baseline, none with exercise, occasional PVC in recovery  HR and BP response : Appropriate for Beta-blocker therapy  Pharmacologic study due to inability to tolerate increasing speed and grade of treadmill due to Pulmonary, mobility  Issues and Beta-blocker therapy.  Participated in Low Level exercise and tolerance is poor.     Loop placement in 2017  Conclusion        · Successful Linq implantation           Imaging  Chest X-ray:   1/26/2022  IMPRESSION:  No focal pulmonary consolidation. Mild prominence of  interstitial markings.     This report was finalized on 1/26/2022 4:55 PM by Dr. Andrew Abdi M.D.   IMPRESSION:  Moderately severe emphysema. Small bilateral pleural  effusions. Moderate focal atelectasis within the left lower lobe. There  is no CT evidence of acute pulmonary thromboembolism embolism.     The patient's nurse was informed that a completed corrected report was  available for review on the electronic medical record system on  02/03/2022 at 1:15 PM.     This report was finalized on 2/3/2022 1:16 PM by Dr. Buzz Hargrove,  "M.D.       Lab Review       Results from last 7 days   Lab Units 01/30/22  0545   MAGNESIUM mg/dL 1.7     Results from last 7 days   Lab Units 02/03/22  0443   SODIUM mmol/L 138   POTASSIUM mmol/L 4.8   BUN mg/dL 18   CREATININE mg/dL 0.82   CALCIUM mg/dL 9.0     Results from last 7 days   Lab Units 02/04/22  0419 02/03/22  0443 02/02/22  1007   WBC 10*3/mm3 10.04 11.20* 11.77*   HEMOGLOBIN g/dL 9.9* 9.5* 9.8*   HEMATOCRIT % 29.7* 29.0* 29.9*   PLATELETS 10*3/mm3 358 329 325         The following medical decision was discussed in detail with Dr. Solorio      Assessment:  1. Gastrointestinal hemorrhage  2. anemia   3. Paroxysmal atrial fibrillation: With watchman's device   4. orthostasis   5. Hypokalemia: 4.8 replace   6. Chronic diastolic CHF: No ACE or ARB due to history of hypotension  7. hyperlipidemia: On statin  8. hypertension: stable  9. GERD  10. History of loop implant battery no longer working  11. CAD with stent placement 8/25/21 at [facility]  12. Pause one noted on 1/30/22     Plan:    BP and HR stable. Monitor reviewed few PVCs noted. CTA chest yesterday showed moderately severe emphysema, small bilateral pleural effusions.  Pulmonary following did not recommend any thoracentesis at this time.  No evidence of acute pulmonary embolism.  We will have her wear ZIO at discharge however she may need loop recorder but this can be done in the outpatient setting.    She will follow-up with Kathe SCHMIDT on March 9 at 11 AM    She will be seen as needed over the weekend. Please call with any concerns.    )2/4/2022  BRAYAN Romo        EMR Dragon/Transcription:   \"Dictated utilizing Dragon dictation\".     "

## 2022-02-04 NOTE — PLAN OF CARE
Goal Outcome Evaluation:  Plan of Care Reviewed With: patient           Outcome Summary: OT-Pt reports no pain at present. Agreed to participate with tx. UE therap. ex completed to increase activity tolerance for self care. Rest breaks required when O2 sats decreased to 77% then >90 after rest and PLB. LE dressing Lloyd, socks SBA. BSC transfer CGA with verbal cues. Pt. has a BSC, wx, shower stool with rails at home. Discussed safety and EC. Anticipate SNF. Plan to cont. OT and progress as tolerated.    Patient was not wearing a face mask during this therapy encounter. Therapist used appropriate personal protective equipment including mask, gloves, and eye protection.  Mask used was standard procedure mask. Appropriate PPE was worn during the entire therapy session. Hand hygiene was completed before and after therapy session. Patient is not in enhanced droplet precautions.

## 2022-02-05 LAB
ANION GAP SERPL CALCULATED.3IONS-SCNC: 6.6 MMOL/L (ref 5–15)
BASOPHILS # BLD AUTO: 0.12 10*3/MM3 (ref 0–0.2)
BASOPHILS NFR BLD AUTO: 1.3 % (ref 0–1.5)
BUN SERPL-MCNC: 20 MG/DL (ref 8–23)
BUN/CREAT SERPL: 17.7 (ref 7–25)
CALCIUM SPEC-SCNC: 9.6 MG/DL (ref 8.6–10.5)
CHLORIDE SERPL-SCNC: 99 MMOL/L (ref 98–107)
CO2 SERPL-SCNC: 29.4 MMOL/L (ref 22–29)
CREAT SERPL-MCNC: 1.13 MG/DL (ref 0.57–1)
DEPRECATED RDW RBC AUTO: 44.7 FL (ref 37–54)
EOSINOPHIL # BLD AUTO: 0.4 10*3/MM3 (ref 0–0.4)
EOSINOPHIL NFR BLD AUTO: 4.4 % (ref 0.3–6.2)
ERYTHROCYTE [DISTWIDTH] IN BLOOD BY AUTOMATED COUNT: 13.1 % (ref 12.3–15.4)
GFR SERPL CREATININE-BSD FRML MDRD: 47 ML/MIN/1.73
GLUCOSE SERPL-MCNC: 90 MG/DL (ref 65–99)
HCT VFR BLD AUTO: 31.2 % (ref 34–46.6)
HGB BLD-MCNC: 10.3 G/DL (ref 12–15.9)
IMM GRANULOCYTES # BLD AUTO: 0.11 10*3/MM3 (ref 0–0.05)
IMM GRANULOCYTES NFR BLD AUTO: 1.2 % (ref 0–0.5)
LYMPHOCYTES # BLD AUTO: 2.64 10*3/MM3 (ref 0.7–3.1)
LYMPHOCYTES NFR BLD AUTO: 28.8 % (ref 19.6–45.3)
MCH RBC QN AUTO: 30.8 PG (ref 26.6–33)
MCHC RBC AUTO-ENTMCNC: 33 G/DL (ref 31.5–35.7)
MCV RBC AUTO: 93.4 FL (ref 79–97)
MONOCYTES # BLD AUTO: 1.45 10*3/MM3 (ref 0.1–0.9)
MONOCYTES NFR BLD AUTO: 15.8 % (ref 5–12)
NEUTROPHILS NFR BLD AUTO: 4.44 10*3/MM3 (ref 1.7–7)
NEUTROPHILS NFR BLD AUTO: 48.5 % (ref 42.7–76)
NRBC BLD AUTO-RTO: 0.1 /100 WBC (ref 0–0.2)
PLATELET # BLD AUTO: 390 10*3/MM3 (ref 140–450)
PMV BLD AUTO: 10.5 FL (ref 6–12)
POTASSIUM SERPL-SCNC: 4.7 MMOL/L (ref 3.5–5.2)
RBC # BLD AUTO: 3.34 10*6/MM3 (ref 3.77–5.28)
SODIUM SERPL-SCNC: 135 MMOL/L (ref 136–145)
WBC NRBC COR # BLD: 9.16 10*3/MM3 (ref 3.4–10.8)

## 2022-02-05 PROCEDURE — 94799 UNLISTED PULMONARY SVC/PX: CPT

## 2022-02-05 PROCEDURE — 80048 BASIC METABOLIC PNL TOTAL CA: CPT | Performed by: HOSPITALIST

## 2022-02-05 PROCEDURE — 97530 THERAPEUTIC ACTIVITIES: CPT

## 2022-02-05 PROCEDURE — 85025 COMPLETE CBC W/AUTO DIFF WBC: CPT | Performed by: HOSPITALIST

## 2022-02-05 PROCEDURE — 94761 N-INVAS EAR/PLS OXIMETRY MLT: CPT

## 2022-02-05 RX ORDER — POTASSIUM CHLORIDE 750 MG/1
10 TABLET, FILM COATED, EXTENDED RELEASE ORAL 2 TIMES DAILY WITH MEALS
Status: DISCONTINUED | OUTPATIENT
Start: 2022-02-05 | End: 2022-02-07 | Stop reason: HOSPADM

## 2022-02-05 RX ADMIN — SUCRALFATE 1 G: 1 TABLET ORAL at 17:49

## 2022-02-05 RX ADMIN — CLOPIDOGREL 75 MG: 75 TABLET, FILM COATED ORAL at 08:02

## 2022-02-05 RX ADMIN — GUAIFENESIN 600 MG: 600 TABLET, EXTENDED RELEASE ORAL at 20:53

## 2022-02-05 RX ADMIN — POTASSIUM CHLORIDE 10 MEQ: 750 TABLET, EXTENDED RELEASE ORAL at 17:49

## 2022-02-05 RX ADMIN — URSODIOL 300 MG: 300 CAPSULE ORAL at 08:02

## 2022-02-05 RX ADMIN — GUAIFENESIN 600 MG: 600 TABLET, EXTENDED RELEASE ORAL at 08:02

## 2022-02-05 RX ADMIN — ASPIRIN 81 MG: 81 TABLET, COATED ORAL at 08:02

## 2022-02-05 RX ADMIN — SUCRALFATE 1 G: 1 TABLET ORAL at 12:02

## 2022-02-05 RX ADMIN — PROPAFENONE HYDROCHLORIDE 150 MG: 150 TABLET, COATED ORAL at 06:33

## 2022-02-05 RX ADMIN — SUCRALFATE 1 G: 1 TABLET ORAL at 20:53

## 2022-02-05 RX ADMIN — PROPAFENONE HYDROCHLORIDE 150 MG: 150 TABLET, COATED ORAL at 12:02

## 2022-02-05 RX ADMIN — PANTOPRAZOLE SODIUM 40 MG: 40 TABLET, DELAYED RELEASE ORAL at 06:32

## 2022-02-05 RX ADMIN — PROPAFENONE HYDROCHLORIDE 150 MG: 150 TABLET, COATED ORAL at 20:55

## 2022-02-05 RX ADMIN — PANTOPRAZOLE SODIUM 40 MG: 40 TABLET, DELAYED RELEASE ORAL at 17:49

## 2022-02-05 RX ADMIN — SPIRONOLACTONE 25 MG: 25 TABLET ORAL at 08:02

## 2022-02-05 RX ADMIN — POTASSIUM CHLORIDE 20 MEQ: 10 TABLET, EXTENDED RELEASE ORAL at 08:02

## 2022-02-05 RX ADMIN — SUCRALFATE 1 G: 1 TABLET ORAL at 06:32

## 2022-02-05 RX ADMIN — MIRTAZAPINE 7.5 MG: 15 TABLET, FILM COATED ORAL at 20:53

## 2022-02-05 RX ADMIN — Medication 3 MG: at 20:54

## 2022-02-05 RX ADMIN — BUMETANIDE 0.5 MG: 0.5 TABLET ORAL at 08:02

## 2022-02-05 RX ADMIN — URSODIOL 300 MG: 300 CAPSULE ORAL at 17:49

## 2022-02-05 RX ADMIN — ROSUVASTATIN CALCIUM 5 MG: 5 TABLET, FILM COATED ORAL at 20:53

## 2022-02-05 RX ADMIN — URSODIOL 300 MG: 300 CAPSULE ORAL at 20:53

## 2022-02-05 NOTE — PROGRESS NOTES
"Daily progress note    Chief complaint  Doing better  No specific complaints  Denies chest pain palpitation or shortness of breath    History of present illness  77-year white female with very complex past medical history including chronic hypoxic respiratory failure COPD diastolic CHF paroxysmal atrial fibrillation hypertension hyperlipidemia chronic anemia and gastroesophageal reflux disease who was recently discharged from the hospital after work-up on acute on chronic hypoxic respiratory failure presented to [facility] emergency room with generalized weakness not feeling well including dizziness lightheadedness and work-up in ER revealed symptomatic anemia with heme positive stools admit for management.  Patient denies any black stools or blood in the stools.  Patient denies any nausea vomiting.  Patient stated that she has not smokes cigarettes since discharge.  Patient admitted for management started on IV Protonix and received 2 units packed RBC and start feeling better.     REVIEW OF SYSTEMS  Unremarkable except generalized weakness     PHYSICAL EXAM  Blood pressure 101/52, pulse 68, temperature 98.2 °F (36.8 °C), temperature source Oral, resp. rate 18, height 152.4 cm (60\"), weight 34.7 kg (76 lb 6.4 oz), SpO2 93 %.    General: No acute distress.  HENT: NCAT, PERRL, Nares patent.  Eyes: no scleral icterus.  Neck: trachea midline, no ROM limitations.  CV: regular rhythm, regular rate.  Respiratory: normal effort, CTAB.  Abdomen: soft, nondistended, NTTP, no rebound tenderness, bowel sounds positive  Musculoskeletal: no deformity.  Neuro: alert, moves all extremities, follows commands.  Skin: warm, dry.     LAB RESULTS  Lab Results (last 24 hours)     Procedure Component Value Units Date/Time    Basic Metabolic Panel [[id number]]  (Abnormal) Collected: 02/05/22 0641    Specimen: Blood Updated: 02/05/22 0741     Glucose 90 mg/dL      BUN 20 mg/dL      Creatinine 1.13 mg/dL      Sodium 135 mmol/L      " Potassium 4.7 mmol/L      Chloride 99 mmol/L      CO2 29.4 mmol/L      Calcium 9.6 mg/dL      eGFR Non African Amer 47 mL/min/1.73      BUN/Creatinine Ratio 17.7     Anion Gap 6.6 mmol/L     Narrative:      GFR Normal >60  Chronic Kidney Disease <60  Kidney Failure <15      CBC & Differential [[id number]]  (Abnormal) Collected: 02/05/22 0641    Specimen: Blood Updated: 02/05/22 0722    Narrative:      The following orders were created for panel order CBC & Differential.  Procedure                               Abnormality         Status                     ---------                               -----------         ------                     CBC Auto Differential[[id number]]        Abnormal            Final result                 Please view results for these tests on the individual orders.    CBC Auto Differential [[id number]]  (Abnormal) Collected: 02/05/22 0641    Specimen: Blood Updated: 02/05/22 0722     WBC 9.16 10*3/mm3      RBC 3.34 10*6/mm3      Hemoglobin 10.3 g/dL      Hematocrit 31.2 %      MCV 93.4 fL      MCH 30.8 pg      MCHC 33.0 g/dL      RDW 13.1 %      RDW-SD 44.7 fl      MPV 10.5 fL      Platelets 390 10*3/mm3      Neutrophil % 48.5 %      Lymphocyte % 28.8 %      Monocyte % 15.8 %      Eosinophil % 4.4 %      Basophil % 1.3 %      Immature Grans % 1.2 %      Neutrophils, Absolute 4.44 10*3/mm3      Lymphocytes, Absolute 2.64 10*3/mm3      Monocytes, Absolute 1.45 10*3/mm3      Eosinophils, Absolute 0.40 10*3/mm3      Basophils, Absolute 0.12 10*3/mm3      Immature Grans, Absolute 0.11 10*3/mm3      nRBC 0.1 /100 WBC         Imaging Results (Last 24 Hours)     ** No results found for the last 24 hours. **             ECG 12 Lead  Component   Ref Range & Units 1/26/22 1623 1/18/22 0139 1/14/22 0630 1/13/22 1356 1/13/22 1253   QT Interval   ms 446  314  362  373  409              HEART RATE= 74  bpm  RR Interval= 812  ms  NJ Interval= 226  ms  P Horizontal Axis= -43  deg  P Front Axis= 58  deg  QRSD  Interval= 94  ms  QT Interval= 446  ms  QRS Axis= 24  deg  T Wave Axis= 237  deg  - ABNORMAL ECG -  Sinus rhythm  Prolonged ME interval  Repol abnrm suggests ischemia, diffuse leads  Ventricular bigeminy resolved, !st degree AVB new           Upper and lower endoscopy noted and results discussed with patient     Current Facility-Administered Medications:   •  acetaminophen (TYLENOL) tablet 650 mg, 650 mg, Oral, Q6H PRN, Constantine Kaye MD, 650 mg at 01/31/22 2055  •  aspirin EC tablet 81 mg, 81 mg, Oral, Daily, Eulogio Mcdaniel MD, 81 mg at 02/05/22 0802  •  bumetanide (BUMEX) tablet 0.5 mg, 0.5 mg, Oral, Daily, Constantine Kaye MD, 0.5 mg at 02/05/22 0802  •  clopidogrel (PLAVIX) tablet 75 mg, 75 mg, Oral, Daily, Eulogio Mcdaniel MD, 75 mg at 02/05/22 0802  •  guaiFENesin (MUCINEX) 12 hr tablet 600 mg, 600 mg, Oral, Q12H, Constantine Kaye MD, 600 mg at 02/05/22 0802  •  melatonin tablet 3 mg, 3 mg, Oral, Nightly PRN, Eulogio Mcdaniel MD, 3 mg at 02/04/22 2148  •  mirtazapine (REMERON) tablet 7.5 mg, 7.5 mg, Oral, Nightly, Constantine Kaye MD, 7.5 mg at 02/04/22 2131  •  O2 (OXYGEN), 2 L/min, Inhalation, PRN, Constantine Kaye MD  •  ondansetron (ZOFRAN) injection 4 mg, 4 mg, Intravenous, Q4H PRN, Constantine Kaye MD  •  pantoprazole (PROTONIX) EC tablet 40 mg, 40 mg, Oral, BID AC, StinglPao MD, 40 mg at 02/05/22 0632  •  potassium chloride (K-DUR,KLOR-CON) ER tablet 20 mEq, 20 mEq, Oral, BID With Meals, Eulogio Mcdaniel MD, 20 mEq at 02/05/22 0802  •  propafenone (RYTHMOL) tablet 150 mg, 150 mg, Oral, Q8H, Constantine Kaye MD, 150 mg at 02/05/22 1202  •  rosuvastatin (CRESTOR) tablet 5 mg, 5 mg, Oral, Nightly, Constantine Kaye MD, 5 mg at 02/04/22 2135  •  spironolactone (ALDACTONE) tablet 25 mg, 25 mg, Oral, Daily, Constantine Kaye MD, 25 mg at 02/05/22 0802  •  sucralfate (CARAFATE) tablet 1 g, 1 g, Oral, 4x Daily AC & at Bedtime, Constantine Kaye MD, 1 g at 02/05/22  1202  •  tiotropium (SPIRIVA RESPIMAT) 2.5 mcg/act aerosol solution inhaler, 2 puff, Inhalation, Daily - RT, Constantine Kaye MD, 2 puff at 02/01/22 1048  •  ursodiol (ACTIGALL) capsule 300 mg, 300 mg, Oral, TID, Constantine Kaye MD, 300 mg at 02/05/22 0802     ASSESSMENT  Acute on chronic hypoxic respiratory failure  No evidence of PE  Symptomatic anemia  Heme positive stools  Acute blood loss anemia  Colon polyps  COPD  S/p dig toxicity  Chronic diastolic CHF  Paroxysmal atrial fibrillation  Hypertension  Hyperlipidemia  Gastroesophageal reflux disease    PLAN  CPM  Supplement oxygen  Transfuse as needed  Protonix and Carafate  Resume aspirin Plavix  GI and cardiology consult  Pulmonary to follow patient  Adjust home medications  Stress ulcer DVT prophylaxis  Supportive care  DNR  PT/OT  Discussed with nursing staff  Subacute rehab once bed available    JONNY PEOPLES MD

## 2022-02-05 NOTE — PLAN OF CARE
Goal Outcome Evaluation:         Weaned off O2 at shift change 22; pt maintaining in 90's%.  Denies pain, up to BSC with standby assist.  BM this shift.  Reminding to turn Q2 hr and minimal assist to place pillow.  Tearful r/t 1-yr anniversary of son's death  and   in April, pt tired of being in hospital.  Pt feels she needs stronger anti-depressant and anti-anxiety for the time of year when her relatives had  to help her through it.  Pt got rest with melatonin and felt better in AM.        Problem: Adult Inpatient Plan of Care  Goal: Plan of Care Review  Outcome: Ongoing, Progressing  Goal: Optimal Comfort and Wellbeing  Outcome: Ongoing, Progressing  Intervention: Provide Person-Centered Care  Description: Use a family-focused approach to care.  Develop trust and rapport by proactively providing information, encouraging questions, addressing concerns and offering reassurance.  Acknowledge emotional response to hospitalization.  Recognize and utilize personal coping strategies.  Honor spiritual and cultural preferences.  Recent Flowsheet Documentation  Taken 2022 0000 by Susie Willard, RN  Trust Relationship/Rapport:   care explained   choices provided   empathic listening provided   questions encouraged   questions answered   reassurance provided   thoughts/feelings acknowledged  Taken 2022 2000 by Susie Willard, RN  Trust Relationship/Rapport:   care explained   emotional support provided   choices provided   questions encouraged   questions answered   reassurance provided   thoughts/feelings acknowledged  Goal: Readiness for Transition of Care  Outcome: Ongoing, Progressing     Problem: Fall Injury Risk  Goal: Absence of Fall and Fall-Related Injury  Outcome: Ongoing, Progressing  Intervention: Identify and Manage Contributors to Fall Injury Risk  Description: Reassess fall risk frequently and with change in status or transfer to another level of care.  Communicate fall  injury risk to all healthcare team members (e.g., rounds, change of shift/provider, patient transport).  Anticipate needs; perform regular intentional rounding to assess need for position change, pain assessment, personal needs (e.g., toileting) and placement of necessary items.  Provide reorientation, appropriate sensory stimulation and routines with changes in mental status to decrease risk of fall.  Promote use of personal vision and auditory aids (e.g., glasses, hearing aids).  Assess assistance level required for safe and effective care; provide support as needed (e.g., toileting, bathing, mobilization).  Define behavior and activity limits to patient and family.  If fall occurs, assess for and treat injury; determine cause; revise fall injury prevention plan.  Regularly review medication contribution to fall risk; adjust medication administration times to minimize risk of falling.  Consider risk related to polypharmacy and age.  Balance adequate pain management with potential for oversedation.  Recent Flowsheet Documentation  Taken 2/5/2022 0600 by Susie Willard, RN  Medication Review/Management:   medications reviewed   high-risk medications identified  Taken 2/5/2022 0400 by Susie Willard, RN  Medication Review/Management: medications reviewed  Taken 2/5/2022 0200 by Susie Willard, RN  Medication Review/Management: medications reviewed  Taken 2/5/2022 0000 by Susie Willard, RN  Medication Review/Management:   medications reviewed   high-risk medications identified  Self-Care Promotion:   independence encouraged   BADL personal objects within reach   safe use of adaptive equipment encouraged  Taken 2/4/2022 2200 by Susie Willard, RN  Medication Review/Management: medications reviewed  Taken 2/4/2022 2000 by Susie Willard, RN  Medication Review/Management:   medications reviewed   high-risk medications identified  Self-Care Promotion:   BADL personal objects within reach   independence  encouraged   safe use of adaptive equipment encouraged  Intervention: Promote Injury-Free Environment  Description: Provide a safe, barrier-free environment that encourages independent activity.  Keep care area uncluttered and well-lighted.  Determine need for increased observation or auditory alerts (e.g., bed or chair alarm).  Assess equipment and environmental modification needs (e.g., low bed, signage, nonskid footwear, grab bars).  Avoid use of restraints.  Recent Flowsheet Documentation  Taken 2/5/2022 0600 by Susie Willard RN  Safety Promotion/Fall Prevention: safety round/check completed  Taken 2/5/2022 0400 by Susie Willard RN  Safety Promotion/Fall Prevention: safety round/check completed  Taken 2/5/2022 0200 by Susie Willard RN  Safety Promotion/Fall Prevention: safety round/check completed  Taken 2/5/2022 0000 by Susie Willard RN  Safety Promotion/Fall Prevention: safety round/check completed  Taken 2/4/2022 2200 by Susie Willard RN  Safety Promotion/Fall Prevention: safety round/check completed  Taken 2/4/2022 2000 by Susie Willard RN  Safety Promotion/Fall Prevention: safety round/check completed     Problem: Skin Injury Risk Increased  Goal: Skin Health and Integrity  Outcome: Ongoing, Progressing  Intervention: Optimize Skin Protection  Description: Reassess skin injury risk and inspect skin frequently (e.g., scheduled interval, with turning, with change in condition) to provide optimal prevention.  Maintain adequate tissue perfusion (e.g., encourage fluid balance, avoid crossing legs, constrictive clothing or devices) to promote tissue oxygenation.  Maintain head of bed at lowest degree of elevation tolerated, considering medical condition and other restrictions. Limit amount of time head of bed is elevated greater than 30 degrees to prevent friction/shear injury.  Avoid positioning onto an area that remains reddened.  Minimize incontinence and moisture (e.g., toileting  schedule; moisture-wicking pad, diaper or incontinence collection device, skin moisture barrier).  Cleanse skin promptly and gently when soiled utilizing a pH-balanced cleanser.  Relieve and redistribute pressure (e.g., schedule position changes; utilize higher specification foam mattress, chair cushion, constant low-pressure or alternating-pressure support surface; medical device repositioning; protective dressing applicatio  Support increased progressive functional activity (e.g., therapeutic exercise) to decrease risk associated with immobility. Balance rest with activity.  Recent Flowsheet Documentation  Taken 2/5/2022 0000 by Susie Willard RN  Pressure Reduction Techniques:   frequent weight shift encouraged   heels elevated off bed   weight shift assistance provided  Head of Bed (HOB): [facility] elevated  Pressure Reduction Devices:   alternating pressure pump (ADD)   positioning supports utilized   pressure-redistributing mattress utilized  Skin Protection:   adhesive use limited   incontinence pads utilized   tubing/devices free from skin contact   transparent dressing maintained  Taken 2/4/2022 2000 by Susie Willard RN  Pressure Reduction Techniques:   frequent weight shift encouraged   weight shift assistance provided   heels elevated off bed  Head of Bed (HOB): HOB elevated  Pressure Reduction Devices:   alternating pressure pump (ADD)   positioning supports utilized   pressure-redistributing mattress utilized  Skin Protection:   adhesive use limited   tubing/devices free from skin contact   transparent dressing maintained   incontinence pads utilized  Intervention: Promote and Optimize Oral Intake  Description: Assess need and provide assistance with meal set-up and feeding.  Adjust diet or meal schedule based on preferences and tolerance.  Minimize unnecessary dietary restrictions to increase oral intake.  Offer oral supplemental foods or drinks to enhance calorie and protein intake.  Establish bowel  elimination program to increase comfort and appetite.  Provide and encourage oral hygiene to enhance desire to eat.  Consider nutrition support if oral intake remains inadequate.  Recent Flowsheet Documentation  Taken 2/5/2022 0000 by Susie Willard, RN  Oral Nutrition Promotion:   rest periods promoted   safe use of adaptive equipment encouraged  Taken 2/4/2022 2000 by Susie Willard, RN  Oral Nutrition Promotion:   rest periods promoted   safe use of adaptive equipment encouraged

## 2022-02-05 NOTE — PLAN OF CARE
Goal Outcome Evaluation:  Plan of Care Reviewed With: patient, son           Outcome Summary: Pt resting in room on RA since end of shift last night and tolerated well.  No c/o.  VSS, Controlled Afib.   Waiting on [facility] for a room for Rehab. Will cont to monitor.

## 2022-02-05 NOTE — THERAPY TREATMENT NOTE
Patient Name: Celia Baird  : 1945    MRN: [id number]                              Today's Date: 2022       Admit Date: 2022    Visit Dx:     ICD-10-CM ICD-9-CM   1. Gastrointestinal hemorrhage, unspecified gastrointestinal hemorrhage type  K92.2 578.9   2. Anemia requiring transfusions  D64.9 285.9   3. Orthostasis  I95.1 458.0   4. Hypokalemia  E87.6 276.8   5. Anemia  D64.9 285.9     Patient Active Problem List   Diagnosis   • COPD exacerbation (HCC)   • Subarachnoid hemorrhage (HCC)   • Multiple skin tears   • Closed nondisplaced fracture of left clavicle   • Paroxysmal atrial fibrillation (HCC)   • Tobacco abuse   • Status post placement of implantable loop recorder   • SOB (shortness of breath)   • COPD with acute exacerbation (HCC)   • Severe malnutrition (CMS/HCC)   • Leukocytosis   • Gastrointestinal hemorrhage   • Absolute anemia     Past Medical History:   Diagnosis Date   • Atrial fibrillation (HCC)    • COPD (chronic obstructive pulmonary disease) (HCC)    • History of frequent urinary tract infections    • Irregular heart beat    • Kidney stones    • PBC (primary biliary cirrhosis)    • PBC (primary biliary cirrhosis)      Past Surgical History:   Procedure Laterality Date   • CARDIAC ELECTROPHYSIOLOGY PROCEDURE N/A 3/30/2017    Procedure:    LINQ;  Surgeon: Ailyn Solorio MD;  Location: [facility] CATH INVASIVE LOCATION;  Service:    • CHOLECYSTECTOMY     • COLONOSCOPY     • COLONOSCOPY N/A 2022    Procedure: COLONOSCOPY into cecum with polypectomy, clip placement;  Surgeon: Constantine Kaye MD;  Location: [facility] ENDOSCOPY;  Service: Gastroenterology;  Laterality: N/A;  poor prep, diverticulosis, polyp   • ENDOSCOPY N/A 2022    Procedure: ESOPHAGOGASTRODUODENOSCOPY with biopsy;  Surgeon: Constantine Kaye MD;  Location: [facility] ENDOSCOPY;  Service: Gastroenterology;  Laterality: N/A;  normal   • TUBAL ABDOMINAL LIGATION        General Information     Row Name  02/05/22 1437          Physical Therapy Time and Intention    Document Type therapy note (daily note)  -DJ     Mode of Treatment individual therapy; physical therapy  -DJ     Row Name 02/05/22 1437          General Information    Patient Profile Reviewed yes  -DJ     Existing Precautions/Restrictions fall  -DJ     Row Name 02/05/22 1437          Cognition    Orientation Status (Cognition) oriented x 3  -DJ     Row Name 02/05/22 1437          Safety Issues, Functional Mobility    Comment, Safety Issues/Impairments (Mobility) gt belt, nonskid socks  -DJ           User Key  (r) = Recorded By, (t) = Taken By, (c) = Cosigned By    Initials Name Provider Type    Kelin Ortiz, PT Physical Therapist               Mobility     Row Name 02/05/22 1439          Bed Mobility    Bed Mobility supine-sit; sit-supine  -DJ     Supine-Sit [place] (Bed Mobility) standby assist; verbal cues  -DJ     Sit-Supine [place] (Bed Mobility) standby assist  -DJ     Assistive Device (Bed Mobility) bed rails; head of bed elevated  -DJ     Comment (Bed Mobility) moves well  -DJ     Row Name 02/05/22 1439          Transfers    Comment (Transfers) sit/stand from EOB  -DJ     Row Name 02/05/22 1439          Bed-Chair Transfer    Bed-Chair [place] (Transfers) not tested  -DJ     Row Name 02/05/22 1439          Sit-Stand Transfer    Sit-Stand [place] (Transfers) contact guard; verbal cues  -DJ     Assistive Device (Sit-Stand Transfers) walker, front-wheeled  -DJ     Row Name 02/05/22 1439          Gait/Stairs (Locomotion)    [place] Level (Gait) contact guard; verbal cues  -DJ     Assistive Device (Gait) walker, front-wheeled  -DJ     Distance in Feet (Gait) 140' without rest break  -DJ     Deviations/Abnormal Patterns (Gait) festinating/shuffling; stride length decreased  -DJ     Bilateral Gait Deviations forward flexed posture  -DJ     [place] Level (Stairs) not tested  -DJ     Comment (Gait/Stairs) Pt amb 140' without  O2 with CGA, no rest break, fair pace, flexed posture, endurance improving  -DJ           User Key  (r) = Recorded By, (t) = Taken By, (c) = Cosigned By    Initials Name Provider Type    Kelin Ortiz PT Physical Therapist               Obj/Interventions     Row Name 02/05/22 1445          Motor Skills    Therapeutic Exercise other (see comments)  AP, LAQ, seated hip flex  -DJ     Row Name 02/05/22 1445          Balance    Balance Assessment standing static balance; standing dynamic balance  -DJ     Static Standing Balance WFL; supported; standing  -DJ     Dynamic Standing Balance mild impairment; supported; standing  -DJ     Balance Interventions sitting; standing; sit to stand; supported; weight shifting activity  -DJ     Comment, Balance fair with r wx  -DJ           User Key  (r) = Recorded By, (t) = Taken By, (c) = Cosigned By    Initials Name Provider Type    Kelin Ortiz, NEHA Physical Therapist               Goals/Plan    No documentation.                Clinical Impression     Row Name 02/05/22 1446          Pain    Additional Documentation Pain Scale: Numbers Pre/Post-Treatment (Group)  -DJ     Row Name 02/05/22 1446          Pain Scale: Numbers Pre/Post-Treatment    Pretreatment Pain Rating 0/10 - no pain  -DJ     Row Name 02/05/22 1446          Plan of Care Review    Plan of Care Reviewed With patient; son  -DJ     Progress improving  -DJ     Outcome Summary Pt resting in bed in NAD, son present and helpful. Pt req SBA for bed mobility and sit/stand from EOB. Pt on room air today and was able to amb 140' with r wx without O2 req SBA and vc for posture. Pace is fair, posture is flexed, balance is fair with r wx, endurance is improving. Pt anxious for d/c and wants to return home although she lives alone. Cont PT to address functional deficits and progress as tolerated.  -DJ     Row Name 02/05/22 1446          Therapy Assessment/Plan (PT)    Criteria for Skilled Interventions Met (PT) skilled treatment  is necessary  -DJ     Row Name 02/05/22 1446          Vital Signs    O2 Delivery Pre Treatment room air  -DJ     O2 Delivery Intra Treatment room air  -DJ     Post SpO2 (%) 94  -DJ     O2 Delivery Post Treatment room air  -DJ     Pre Patient Position Supine  -DJ     Intra Patient Position Standing  -DJ     Post Patient Position Supine  -DJ     Row Name 02/05/22 1446          Positioning and Restraints    Pre-Treatment Position in bed  -DJ     Post Treatment Position bed  -DJ     In Bed notified nsg; supine; call light within reach; encouraged to call for assist; exit alarm on; with family/caregiver  -DJ           User Key  (r) = Recorded By, (t) = Taken By, (c) = Cosigned By    Initials Name Provider Type    Kelin Ortiz, PT Physical Therapist               Outcome Measures     Row Name 02/05/22 1448 02/05/22 0804       How much help from another person do you currently need...    Turning from your back to your side while in flat bed without using bedrails? 4  -DJ 3  -RW    Moving from lying on back to sitting on the side of a flat bed without bedrails? 3  -DJ 3  -RW    Moving to and from a bed to a chair (including a wheelchair)? 3  -DJ 3  -RW    Standing up from a chair using your arms (e.g., wheelchair, bedside chair)? 3  -DJ 3  -RW    Climbing 3-5 steps with a railing? 2  -DJ 2  -RW    To walk in hospital room? 3  -DJ 3  -RW    AM-PAC 6 Clicks Score (PT) 18  -DJ 17  -RW    Row Name 02/05/22 1448          Functional Assessment    Outcome Measure Options AM-PAC 6 Clicks Basic Mobility (PT)  -DJ           User Key  (r) = Recorded By, (t) = Taken By, (c) = Cosigned By    Initials Name Provider Type    Alexi Velarde, RN Registered Nurse    Kelin Ortiz, PT Physical Therapist                             Physical Therapy Education                 Title: PT OT SLP Therapies (In Progress)     Topic: Physical Therapy (Done)     Point: Mobility training (Done)     Learning Progress Summary           Patient  Acceptance, TB, DU by LIUDMILA at 2/5/2022 1449    Acceptance, E,D, VU by TEOFILO at 2/4/2022 1144    Acceptance, E,TB, VU by MADI at 2/2/2022 1554    Acceptance, E, VU,NR by MEGHNA at 1/30/2022 1234    Acceptance, E, VU,NR by MEGHNA at 1/29/2022 1553   Family Acceptance, TB, DU by LIUDMILA at 2/5/2022 1449                   Point: Home exercise program (Done)     Learning Progress Summary           Patient Acceptance, TB, DU by LIUDMILA at 2/5/2022 1449    Acceptance, E,D, VU by TEOFILO at 2/4/2022 1144    Acceptance, E,TB, VU by MADI at 2/2/2022 1554   Family Acceptance, TB, DU by LIUDMILA at 2/5/2022 1449                   Point: Body mechanics (Done)     Learning Progress Summary           Patient Acceptance, TB, DU by LIUDMILA at 2/5/2022 1449    Acceptance, E,D, VU by TEOFILO at 2/4/2022 1144    Acceptance, E,TB, VU by MADI at 2/2/2022 1554    Acceptance, E, VU,NR by MEGHNA at 1/30/2022 1234    Acceptance, E, VU,NR by MEGHNA at 1/29/2022 1553   Family Acceptance, TB, DU by LIUDMILA at 2/5/2022 1449                   Point: Precautions (Done)     Learning Progress Summary           Patient Acceptance, TB, DU by LIUDMILA at 2/5/2022 1449    Acceptance, E,D, VU by TEOFILO at 2/4/2022 1144    Acceptance, E,TB, VU by MADI at 2/2/2022 1554   Family Acceptance, TB, DU by LIUDMILA at 2/5/2022 1449                               User Key     Initials Effective Dates Name Provider Type Discipline    MADI 03/07/18 -  Briana Flowers PTA Physical Therapy Assistant PT    TEOFILO 06/16/21 -  Florence Mortensen PTA Physical Therapy Assistant PT    LIUDMILA 10/25/19 -  Kelin García, PT Physical Therapist PT    MEGHNA 08/24/21 -  Summer Liao, PT Physical Therapist PT              PT Recommendation and Plan     Plan of Care Reviewed With: patient, son  Progress: improving  Outcome Summary: Pt resting in bed in NAD, son present and helpful. Pt req SBA for bed mobility and sit/stand from EOB. Pt on room air today and was able to amb 140' with r wx without O2 req SBA and vc for posture. Pace is fair, posture is flexed, balance is fair  with r wx, endurance is improving. Pt anxious for d/c and wants to return home although she lives alone. Cont PT to address functional deficits and progress as tolerated.     Time Calculation:    PT Charges     Row Name 02/05/22 1449             Time Calculation    Start Time 1423  -DJ      Stop Time 1440  -DJ      Time Calculation (min) 17 min  -DJ      PT Non-Billable Time (min) 10 min  -DJ      PT Received On 02/05/22  -DJ      PT - Next Appointment 02/07/22  -DJ            User Key  (r) = Recorded By, (t) = Taken By, (c) = Cosigned By    Initials Name Provider Type    Kelin Ortiz PT Physical Therapist              Therapy Charges for Today     Code Description Service Date Service Provider Modifiers Qty    92967784932 HC PT THERAPEUTIC ACT EA 15 MIN 2/5/2022 Kelin García PT GP 1          PT G-Codes  Outcome Measure Options: AM-PAC 6 Clicks Basic Mobility (PT)  AM-PAC 6 Clicks Score (PT): 18  AM-PAC 6 Clicks Score (OT): 17    Kelin García PT  2/5/2022

## 2022-02-05 NOTE — PLAN OF CARE
Goal Outcome Evaluation:  Plan of Care Reviewed With: patient, son        Progress: improving  Outcome Summary: Pt resting in bed in NAD, son present and helpful. Pt req SBA for bed mobility and sit/stand from EOB. Pt on room air today and was able to amb 140' with r wx without O2 req SBA and vc for posture. Pace is fair, posture is flexed, balance is fair with r wx, endurance is improving. Pt anxious for d/c and wants to return home although she lives alone. Cont PT to address functional deficits and progress as tolerated.  Patient was intermittently wearing a face mask during this therapy encounter. Therapist used appropriate personal protective equipment including eye protection, mask, and gloves.  Mask used was standard procedure mask. Appropriate PPE was worn during the entire therapy session. Hand hygiene was completed before and after therapy session. Patient is not in enhanced droplet precautions.

## 2022-02-06 LAB
ANION GAP SERPL CALCULATED.3IONS-SCNC: 10 MMOL/L (ref 5–15)
BUN SERPL-MCNC: 24 MG/DL (ref 8–23)
BUN/CREAT SERPL: 23.3 (ref 7–25)
CALCIUM SPEC-SCNC: 9.2 MG/DL (ref 8.6–10.5)
CHLORIDE SERPL-SCNC: 100 MMOL/L (ref 98–107)
CO2 SERPL-SCNC: 26 MMOL/L (ref 22–29)
CREAT SERPL-MCNC: 1.03 MG/DL (ref 0.57–1)
GFR SERPL CREATININE-BSD FRML MDRD: 52 ML/MIN/1.73
GLUCOSE SERPL-MCNC: 91 MG/DL (ref 65–99)
POTASSIUM SERPL-SCNC: 4.4 MMOL/L (ref 3.5–5.2)
SODIUM SERPL-SCNC: 136 MMOL/L (ref 136–145)

## 2022-02-06 PROCEDURE — 94799 UNLISTED PULMONARY SVC/PX: CPT

## 2022-02-06 PROCEDURE — 94761 N-INVAS EAR/PLS OXIMETRY MLT: CPT

## 2022-02-06 PROCEDURE — 80048 BASIC METABOLIC PNL TOTAL CA: CPT | Performed by: HOSPITALIST

## 2022-02-06 RX ORDER — IPRATROPIUM BROMIDE AND ALBUTEROL SULFATE 2.5; .5 MG/3ML; MG/3ML
3 SOLUTION RESPIRATORY (INHALATION) EVERY 4 HOURS PRN
Status: DISCONTINUED | OUTPATIENT
Start: 2022-02-06 | End: 2022-02-07 | Stop reason: HOSPADM

## 2022-02-06 RX ADMIN — ROSUVASTATIN CALCIUM 5 MG: 5 TABLET, FILM COATED ORAL at 20:50

## 2022-02-06 RX ADMIN — URSODIOL 300 MG: 300 CAPSULE ORAL at 16:58

## 2022-02-06 RX ADMIN — SUCRALFATE 1 G: 1 TABLET ORAL at 20:50

## 2022-02-06 RX ADMIN — SUCRALFATE 1 G: 1 TABLET ORAL at 06:39

## 2022-02-06 RX ADMIN — MIRTAZAPINE 7.5 MG: 15 TABLET, FILM COATED ORAL at 20:50

## 2022-02-06 RX ADMIN — SPIRONOLACTONE 25 MG: 25 TABLET ORAL at 08:27

## 2022-02-06 RX ADMIN — GUAIFENESIN 600 MG: 600 TABLET, EXTENDED RELEASE ORAL at 20:50

## 2022-02-06 RX ADMIN — BUMETANIDE 0.5 MG: 0.5 TABLET ORAL at 08:28

## 2022-02-06 RX ADMIN — PANTOPRAZOLE SODIUM 40 MG: 40 TABLET, DELAYED RELEASE ORAL at 06:39

## 2022-02-06 RX ADMIN — ASPIRIN 81 MG: 81 TABLET, COATED ORAL at 08:28

## 2022-02-06 RX ADMIN — PROPAFENONE HYDROCHLORIDE 150 MG: 150 TABLET, COATED ORAL at 20:51

## 2022-02-06 RX ADMIN — PROPAFENONE HYDROCHLORIDE 150 MG: 150 TABLET, COATED ORAL at 06:39

## 2022-02-06 RX ADMIN — Medication 3 MG: at 20:51

## 2022-02-06 RX ADMIN — POTASSIUM CHLORIDE 10 MEQ: 750 TABLET, EXTENDED RELEASE ORAL at 08:27

## 2022-02-06 RX ADMIN — POTASSIUM CHLORIDE 10 MEQ: 750 TABLET, EXTENDED RELEASE ORAL at 16:59

## 2022-02-06 RX ADMIN — URSODIOL 300 MG: 300 CAPSULE ORAL at 08:27

## 2022-02-06 RX ADMIN — SUCRALFATE 1 G: 1 TABLET ORAL at 13:46

## 2022-02-06 RX ADMIN — SUCRALFATE 1 G: 1 TABLET ORAL at 16:58

## 2022-02-06 RX ADMIN — CLOPIDOGREL 75 MG: 75 TABLET, FILM COATED ORAL at 08:28

## 2022-02-06 RX ADMIN — PANTOPRAZOLE SODIUM 40 MG: 40 TABLET, DELAYED RELEASE ORAL at 16:58

## 2022-02-06 RX ADMIN — URSODIOL 300 MG: 300 CAPSULE ORAL at 20:50

## 2022-02-06 RX ADMIN — PROPAFENONE HYDROCHLORIDE 150 MG: 150 TABLET, COATED ORAL at 13:46

## 2022-02-06 RX ADMIN — GUAIFENESIN 600 MG: 600 TABLET, EXTENDED RELEASE ORAL at 08:27

## 2022-02-06 NOTE — PLAN OF CARE
Goal Outcome Evaluation:         Pt stayed on R/A all shift with no issues.  Pt will ask MD about different breathing tx options as she did not like what was ordered and has been refusing those treatments.  Pt got up to BSC with miminal assist, seems more steady on her feet this shift.  Stayed on NSR throughout shift, no Afib.        Problem: Adult Inpatient Plan of Care  Goal: Plan of Care Review  Outcome: Ongoing, Progressing  Goal: Optimal Comfort and Wellbeing  Outcome: Ongoing, Progressing  Intervention: Provide Person-Centered Care  Description: Use a family-focused approach to care.  Develop trust and rapport by proactively providing information, encouraging questions, addressing concerns and offering reassurance.  Acknowledge emotional response to hospitalization.  Recognize and utilize personal coping strategies.  Honor spiritual and cultural preferences.  Recent Flowsheet Documentation  Taken 2/6/2022 0000 by Susie Willard RN  Trust Relationship/Rapport:   choices provided   questions answered   empathic listening provided   care explained   thoughts/feelings acknowledged   reassurance provided   questions encouraged  Taken 2/5/2022 2000 by Susie Willard RN  Trust Relationship/Rapport:   care explained   choices provided   empathic listening provided   questions answered   reassurance provided   thoughts/feelings acknowledged   questions encouraged  Goal: Readiness for Transition of Care  Outcome: Ongoing, Progressing     Problem: COPD Comorbidity  Goal: Maintenance of COPD Symptom Control  Outcome: Ongoing, Progressing  Intervention: Maintain COPD-Symptom Control  Description: Evaluate adherence to management plan (e.g., medication, trigger avoidance, infection prevention, self-monitoring).  Advocate for continuation of home regimen, including medication, method of delivery, schedule and symptom monitoring.  Anticipate the need for breathing techniques and activity pacing to minimize fatigue and  breathlessness.  Assess for proper use of inhaled medication and delivery technique; assist or reinstruct if needed.  Evaluate effectiveness of coping skills; encourage expression of feelings, expectations and concerns related to disease management and quality of life; reinforce education to enhance health management plan and wellbeing.  Recent Flowsheet Documentation  Taken 2/6/2022 0600 by Susie Willard RN  Medication Review/Management:   medications reviewed   high-risk medications identified  Taken 2/6/2022 0400 by Susie Willard RN  Medication Review/Management:   medications reviewed   high-risk medications identified  Taken 2/6/2022 0200 by Susie Willard RN  Medication Review/Management:   medications reviewed   high-risk medications identified  Taken 2/6/2022 0000 by Susie Willard RN  Medication Review/Management:   medications reviewed   high-risk medications identified  Taken 2/5/2022 2200 by Susie Willard RN  Medication Review/Management:   medications reviewed   high-risk medications identified  Taken 2/5/2022 2000 by Susie Willard RN  Medication Review/Management:   medications reviewed   high-risk medications identified     Problem: Adjustment to Illness (Gastrointestinal Bleeding)  Goal: Optimal Coping with Acute Illness  Outcome: Ongoing, Progressing  Intervention: Optimize Psychosocial Response to Unexpected Illness  Description: Acknowledge, normalize and validate response to gastrointestinal bleeding.  Support expression and identification of feelings, fears and worries; provide reassurance.  Encourage questions; provide simple information to increase knowledge and lessen fear.  Support coping by recognizing current coping strategies and aid in developing new strategies.  Acknowledge, normalize difficulty in managing lifestyle changes/expectations as a result of diagnosis.  Assess and monitor for signs and symptoms of psychological distress and substance use (including  delirium tremens if alcohol withdrawal is a potential risk); provide resources for support.  Recent Flowsheet Documentation  Taken 2/6/2022 0000 by Susie Willard, RN  Supportive Measures:   active listening utilized   decision-making supported   verbalization of feelings encouraged  Taken 2/5/2022 2000 by Susie Willard RN  Supportive Measures:   active listening utilized   decision-making supported   verbalization of feelings encouraged     Problem: Bleeding (Gastrointestinal Bleeding)  Goal: Hemostasis  Outcome: Ongoing, Progressing  Intervention: Manage Gastrointestinal Bleeding  Description: Monitor vital signs and laboratory values (e.g., hemoglobin/hematocrit, platelet count, orthostatic hypotension, body temperature) frequently and trend change over time.  Elevate head of bed to a semi-recumbent position or turn to side during vomiting; keep suction equipment at hand.  Monitor characteristics of bleeding, such as hematemesis, hematochezia, melena; quantify amount when able.  Anticipate need for fluid volume replacement (e.g., intravenous fluid, blood products, crystalloid) based on need to stabilize blood pressure and heart rate or maintain target hemoglobin/hematocrit level.  Provide comfort measures (e.g., oral care, cool cloth; decreased environmental stimuli including light, sound and smell); lessen highly odiferous smell of bloody stools or emesis.  Maintain perineal skin integrity when patient has frequent, bloody stools; cleanse gently and thoroughly; avoid alcohol-containing wipes.  Anticipate and prepare patient for diagnostic and therapeutic procedure, such as upper endoscopy, colonoscopy, nasogastric aspiration, endoscopic hemostasis.  Anticipate use of pharmacologic therapy alone or in combination with endoscopic hemostasis based on cause and source of bleeding; monitor efficacy and manage side effects.  When resuming oral intake, observe for symptoms of nausea, vomiting and recurrent  bleeding; gradually reintroduce foods.  Recent Flowsheet Documentation  Taken 2/6/2022 0600 by Susie Willard RN  Stabilization Measures: legs elevated  Taken 2/6/2022 0400 by Susie Willard RN  Stabilization Measures: legs elevated  Taken 2/6/2022 0200 by Susie Willard RN  Stabilization Measures: legs elevated  Taken 2/6/2022 0000 by Susie Willard RN  Stabilization Measures: legs elevated  Environmental Support:   calm environment promoted   caregiver consistency promoted   rest periods encouraged  Taken 2/5/2022 2200 by Susie Willard RN  Stabilization Measures: legs elevated  Taken 2/5/2022 2000 by Susie Willard RN  Stabilization Measures: legs elevated  Environmental Support:   calm environment promoted   distractions minimized   rest periods encouraged     Problem: Anemia  Goal: Anemia Symptom Improvement  Outcome: Ongoing, Progressing  Intervention: Monitor and Manage Anemia  Description: Assist with determining underlying cause.  Promote rest; assist patient with energy-conserving measures to manage fatigue (e.g., cluster care, balance activity with periods of rest).  Implement strategies to prevent falls related to fatigue, weakness and dizziness (e.g., sit before standing, use of assistive device).  Promote optimal oral intake to support fluid balance and nutrition; monitor intake and output.  Encourage dietary changes; provide supplementation to correct nutrient deficiency (e.g., iron, vitamin B12, folic acid).  Promote optimal hygiene measures; monitor visitors to minimize infection risk; review and update vaccinations.  Maintain bleeding precautions; monitor for evidence of active bleeding, especially when platelet counts are low.  Anticipate the need for advanced therapy (e.g., oxygen, red blood cell stimulating agent, transfusion).  Recent Flowsheet Documentation  Taken 2/6/2022 0000 by Susie Willard RN  Oral Nutrition Promotion:   rest periods promoted   safe use of adaptive  equipment encouraged  Taken 2/5/2022 2000 by Susie Willard, RN  Oral Nutrition Promotion:   rest periods promoted   safe use of adaptive equipment encouraged     Problem: Skin Injury Risk Increased  Goal: Skin Health and Integrity  Outcome: Ongoing, Progressing  Intervention: Optimize Skin Protection  Description: Reassess skin injury risk and inspect skin frequently (e.g., scheduled interval, with turning, with change in condition) to provide optimal prevention.  Maintain adequate tissue perfusion (e.g., encourage fluid balance, avoid crossing legs, constrictive clothing or devices) to promote tissue oxygenation.  Maintain head of bed at lowest degree of elevation tolerated, considering medical condition and other restrictions. Limit amount of time head of bed is elevated greater than 30 degrees to prevent friction/shear injury.  Avoid positioning onto an area that remains reddened.  Minimize incontinence and moisture (e.g., toileting schedule; moisture-wicking pad, diaper or incontinence collection device, skin moisture barrier).  Cleanse skin promptly and gently when soiled utilizing a pH-balanced cleanser.  Relieve and redistribute pressure (e.g., schedule position changes; utilize higher specification foam mattress, chair cushion, constant low-pressure or alternating-pressure support surface; medical device repositioning; protective dressing applicatio  Support increased progressive functional activity (e.g., therapeutic exercise) to decrease risk associated with immobility. Balance rest with activity.  Recent Flowsheet Documentation  Taken 2/6/2022 0000 by Susie Willard, RN  Pressure Reduction Techniques:   frequent weight shift encouraged   heels elevated off bed   weight shift assistance provided  Head of Bed (HOB): HOB at 30-45 degrees  Pressure Reduction Devices:   alternating pressure pump (ADD)   positioning supports utilized   pressure-redistributing mattress utilized  Skin Protection:   adhesive  use limited   incontinence pads utilized   tubing/devices free from skin contact   transparent dressing maintained  Taken 2/5/2022 2000 by Susie Willard, RN  Pressure Reduction Techniques:   frequent weight shift encouraged   heels elevated off bed   weight shift assistance provided  Head of Bed (HOB): HOB elevated  Pressure Reduction Devices:   alternating pressure pump (ADD)   positioning supports utilized   pressure-redistributing mattress utilized  Skin Protection:   adhesive use limited   incontinence pads utilized   transparent dressing maintained   tubing/devices free from skin contact  Intervention: Promote and Optimize Oral Intake  Description: Assess need and provide assistance with meal set-up and feeding.  Adjust diet or meal schedule based on preferences and tolerance.  Minimize unnecessary dietary restrictions to increase oral intake.  Offer oral supplemental foods or drinks to enhance calorie and protein intake.  Establish bowel elimination program to increase comfort and appetite.  Provide and encourage oral hygiene to enhance desire to eat.  Consider nutrition support if oral intake remains inadequate.  Recent Flowsheet Documentation  Taken 2/6/2022 0000 by Susie Willard, RN  Oral Nutrition Promotion:   rest periods promoted   safe use of adaptive equipment encouraged  Taken 2/5/2022 2000 by Susie Willard, RN  Oral Nutrition Promotion:   rest periods promoted   safe use of adaptive equipment encouraged

## 2022-02-06 NOTE — PLAN OF CARE
Goal Outcome Evaluation:  Plan of Care Reviewed With: patient, son           Outcome Summary: Pt has been comfortably resting in room with son at bedside and watching sports.  DuoNebs reordered per pts request.  Waiting on placement.  VSS.  Will cont to monitor.

## 2022-02-06 NOTE — PROGRESS NOTES
"Daily progress note    Chief complaint  Doing better  No specific complaints  Denies chest pain palpitation or shortness of breath    History of present illness  77-year white female with very complex past medical history including chronic hypoxic respiratory failure COPD diastolic CHF paroxysmal atrial fibrillation hypertension hyperlipidemia chronic anemia and gastroesophageal reflux disease who was recently discharged from the hospital after work-up on acute on chronic hypoxic respiratory failure presented to [facility] emergency room with generalized weakness not feeling well including dizziness lightheadedness and work-up in ER revealed symptomatic anemia with heme positive stools admit for management.  Patient denies any black stools or blood in the stools.  Patient denies any nausea vomiting.  Patient stated that she has not smokes cigarettes since discharge.  Patient admitted for management started on IV Protonix and received 2 units packed RBC and start feeling better.     REVIEW OF SYSTEMS  Unremarkable except generalized weakness     PHYSICAL EXAM  Blood pressure 93/48, pulse 73, temperature 97.5 °F (36.4 °C), temperature source Oral, resp. rate 16, height 152.4 cm (60\"), weight 34.7 kg (76 lb 9.6 oz), SpO2 95 %.    General: No acute distress.  HENT: NCAT, PERRL, Nares patent.  Eyes: no scleral icterus.  Neck: trachea midline, no ROM limitations.  CV: regular rhythm, regular rate.  Respiratory: normal effort, CTAB.  Abdomen: soft, nondistended, NTTP, no rebound tenderness, bowel sounds positive  Musculoskeletal: no deformity.  Neuro: alert, moves all extremities, follows commands.  Skin: warm, dry.     LAB RESULTS  Lab Results (last 24 hours)     Procedure Component Value Units Date/Time    Basic Metabolic Panel [[id number]]  (Abnormal) Collected: 02/06/22 0644    Specimen: Blood Updated: 02/06/22 0728     Glucose 91 mg/dL      BUN 24 mg/dL      Creatinine 1.03 mg/dL      Sodium 136 mmol/L      " Potassium 4.4 mmol/L      Chloride 100 mmol/L      CO2 26.0 mmol/L      Calcium 9.2 mg/dL      eGFR Non African Amer 52 mL/min/1.73      BUN/Creatinine Ratio 23.3     Anion Gap 10.0 mmol/L     Narrative:      GFR Normal >60  Chronic Kidney Disease <60  Kidney Failure <15          Imaging Results (Last 24 Hours)     ** No results found for the last 24 hours. **             ECG 12 Lead  Component   Ref Range & Units 1/26/22 1623 1/18/22 0139 1/14/22 0630 1/13/22 1356 1/13/22 1253   QT Interval   ms 446  314  362  373  409              HEART RATE= 74  bpm  RR Interval= 812  ms  SC Interval= 226  ms  P Horizontal Axis= -43  deg  P Front Axis= 58  deg  QRSD Interval= 94  ms  QT Interval= 446  ms  QRS Axis= 24  deg  T Wave Axis= 237  deg  - ABNORMAL ECG -  Sinus rhythm  Prolonged SC interval  Repol abnrm suggests ischemia, diffuse leads  Ventricular bigeminy resolved, !st degree AVB new           Upper and lower endoscopy noted and results discussed with patient     Current Facility-Administered Medications:   •  acetaminophen (TYLENOL) tablet 650 mg, 650 mg, Oral, Q6H PRN, Constantine Kaye MD, 650 mg at 01/31/22 2055  •  aspirin EC tablet 81 mg, 81 mg, Oral, Daily, Eulogio Mcdaniel MD, 81 mg at 02/06/22 0828  •  bumetanide (BUMEX) tablet 0.5 mg, 0.5 mg, Oral, Daily, Constantine Kaye MD, 0.5 mg at 02/06/22 0828  •  clopidogrel (PLAVIX) tablet 75 mg, 75 mg, Oral, Daily, Eulogio Mcdaniel MD, 75 mg at 02/06/22 0828  •  guaiFENesin (MUCINEX) 12 hr tablet 600 mg, 600 mg, Oral, Q12H, Constantine Kaye MD, 600 mg at 02/06/22 0827  •  ipratropium-albuterol (DUO-NEB) nebulizer solution 3 mL, 3 mL, Nebulization, Q4H PRN, Eulogio Mcdaniel MD  •  melatonin tablet 3 mg, 3 mg, Oral, Nightly PRN, Eulogio Mcdaniel MD, 3 mg at 02/05/22 2054  •  mirtazapine (REMERON) tablet 7.5 mg, 7.5 mg, Oral, Nightly, Constantine Kaye MD, 7.5 mg at 02/05/22 2053  •  O2 (OXYGEN), 2 L/min, Inhalation, PRN, Constantine Kaye MD  •  ondansetron  (ZOFRAN) injection 4 mg, 4 mg, Intravenous, Q4H PRN, Constantine Kaye MD  •  pantoprazole (PROTONIX) EC tablet 40 mg, 40 mg, Oral, BID AC, Stingl, Pao Kaplan MD, 40 mg at 02/06/22 0639  •  potassium chloride (K-DUR,KLOR-CON) ER tablet 10 mEq, 10 mEq, Oral, BID With Meals, Jonny Mcdaniel MD, 10 mEq at 02/06/22 0827  •  propafenone (RYTHMOL) tablet 150 mg, 150 mg, Oral, Q8H, Constantine Kaye MD, 150 mg at 02/06/22 1346  •  rosuvastatin (CRESTOR) tablet 5 mg, 5 mg, Oral, Nightly, Constantine Kaye MD, 5 mg at 02/05/22 2053  •  spironolactone (ALDACTONE) tablet 25 mg, 25 mg, Oral, Daily, Constantine Kaye MD, 25 mg at 02/06/22 0827  •  sucralfate (CARAFATE) tablet 1 g, 1 g, Oral, 4x Daily AC & at Bedtime, Constantine Kaye MD, 1 g at 02/06/22 1346  •  tiotropium (SPIRIVA RESPIMAT) 2.5 mcg/act aerosol solution inhaler, 2 puff, Inhalation, Daily - RT, Constantine Kaye MD, 2 puff at 02/01/22 1048  •  ursodiol (ACTIGALL) capsule 300 mg, 300 mg, Oral, TID, Constantine Kaye MD, 300 mg at 02/06/22 0827     ASSESSMENT  Acute on chronic hypoxic respiratory failure  No evidence of PE  Symptomatic anemia  Heme positive stools  Acute blood loss anemia  Colon polyps  COPD  S/p dig toxicity  Chronic diastolic CHF  Paroxysmal atrial fibrillation  Hypertension  Hyperlipidemia  Gastroesophageal reflux disease    PLAN  CPM  Supplement oxygen  Transfuse as needed  Protonix and Carafate  Resume aspirin Plavix  GI and cardiology consult  Pulmonary to follow patient  Adjust home medications  Stress ulcer DVT prophylaxis  Supportive care  DNR  PT/OT  Discussed with nursing staff  Subacute rehab once bed available    JONNY MCDANIEL MD

## 2022-02-07 ENCOUNTER — APPOINTMENT (OUTPATIENT)
Dept: CARDIOLOGY | Facility: HOSPITAL | Age: 77
End: 2022-02-07

## 2022-02-07 ENCOUNTER — READMISSION MANAGEMENT (OUTPATIENT)
Dept: CALL CENTER | Facility: HOSPITAL | Age: 77
End: 2022-02-07

## 2022-02-07 VITALS
WEIGHT: 76.6 LBS | TEMPERATURE: 97.9 F | OXYGEN SATURATION: 97 % | HEART RATE: 69 BPM | HEIGHT: 60 IN | DIASTOLIC BLOOD PRESSURE: 56 MMHG | RESPIRATION RATE: 18 BRPM | SYSTOLIC BLOOD PRESSURE: 101 MMHG | BODY MASS INDEX: 15.04 KG/M2

## 2022-02-07 LAB
ANION GAP SERPL CALCULATED.3IONS-SCNC: 8 MMOL/L (ref 5–15)
BASOPHILS # BLD AUTO: 0.14 10*3/MM3 (ref 0–0.2)
BASOPHILS NFR BLD AUTO: 1.4 % (ref 0–1.5)
BUN SERPL-MCNC: 24 MG/DL (ref 8–23)
BUN/CREAT SERPL: 26.4 (ref 7–25)
CALCIUM SPEC-SCNC: 9 MG/DL (ref 8.6–10.5)
CHLORIDE SERPL-SCNC: 100 MMOL/L (ref 98–107)
CO2 SERPL-SCNC: 26 MMOL/L (ref 22–29)
CREAT SERPL-MCNC: 0.91 MG/DL (ref 0.57–1)
DEPRECATED RDW RBC AUTO: 43.9 FL (ref 37–54)
EOSINOPHIL # BLD AUTO: 0.44 10*3/MM3 (ref 0–0.4)
EOSINOPHIL NFR BLD AUTO: 4.3 % (ref 0.3–6.2)
ERYTHROCYTE [DISTWIDTH] IN BLOOD BY AUTOMATED COUNT: 13.2 % (ref 12.3–15.4)
GFR SERPL CREATININE-BSD FRML MDRD: 60 ML/MIN/1.73
GLUCOSE SERPL-MCNC: 83 MG/DL (ref 65–99)
HCT VFR BLD AUTO: 28.1 % (ref 34–46.6)
HGB BLD-MCNC: 9.4 G/DL (ref 12–15.9)
IMM GRANULOCYTES # BLD AUTO: 0.12 10*3/MM3 (ref 0–0.05)
IMM GRANULOCYTES NFR BLD AUTO: 1.2 % (ref 0–0.5)
LYMPHOCYTES # BLD AUTO: 3.3 10*3/MM3 (ref 0.7–3.1)
LYMPHOCYTES NFR BLD AUTO: 32.2 % (ref 19.6–45.3)
MCH RBC QN AUTO: 30.7 PG (ref 26.6–33)
MCHC RBC AUTO-ENTMCNC: 33.5 G/DL (ref 31.5–35.7)
MCV RBC AUTO: 91.8 FL (ref 79–97)
MONOCYTES # BLD AUTO: 1.56 10*3/MM3 (ref 0.1–0.9)
MONOCYTES NFR BLD AUTO: 15.2 % (ref 5–12)
NEUTROPHILS NFR BLD AUTO: 4.68 10*3/MM3 (ref 1.7–7)
NEUTROPHILS NFR BLD AUTO: 45.7 % (ref 42.7–76)
NRBC BLD AUTO-RTO: 0 /100 WBC (ref 0–0.2)
PLATELET # BLD AUTO: 412 10*3/MM3 (ref 140–450)
PMV BLD AUTO: 10.6 FL (ref 6–12)
POTASSIUM SERPL-SCNC: 4.1 MMOL/L (ref 3.5–5.2)
RBC # BLD AUTO: 3.06 10*6/MM3 (ref 3.77–5.28)
SODIUM SERPL-SCNC: 134 MMOL/L (ref 136–145)
WBC NRBC COR # BLD: 10.24 10*3/MM3 (ref 3.4–10.8)

## 2022-02-07 PROCEDURE — 99231 SBSQ HOSP IP/OBS SF/LOW 25: CPT | Performed by: INTERNAL MEDICINE

## 2022-02-07 PROCEDURE — 80048 BASIC METABOLIC PNL TOTAL CA: CPT | Performed by: HOSPITALIST

## 2022-02-07 PROCEDURE — 97530 THERAPEUTIC ACTIVITIES: CPT

## 2022-02-07 PROCEDURE — 94761 N-INVAS EAR/PLS OXIMETRY MLT: CPT

## 2022-02-07 PROCEDURE — 94799 UNLISTED PULMONARY SVC/PX: CPT

## 2022-02-07 PROCEDURE — 93242 EXT ECG>48HR<7D RECORDING: CPT | Performed by: NURSE PRACTITIONER

## 2022-02-07 PROCEDURE — 85025 COMPLETE CBC W/AUTO DIFF WBC: CPT | Performed by: HOSPITALIST

## 2022-02-07 PROCEDURE — 93246 EXT ECG>7D<15D RECORDING: CPT

## 2022-02-07 RX ORDER — PROPAFENONE HYDROCHLORIDE 150 MG/1
150 TABLET, COATED ORAL EVERY 8 HOURS SCHEDULED
Qty: 90 TABLET | Refills: 0 | Status: SHIPPED | OUTPATIENT
Start: 2022-02-07 | End: 2022-05-10 | Stop reason: HOSPADM

## 2022-02-07 RX ORDER — POTASSIUM CHLORIDE 750 MG/1
10 TABLET, FILM COATED, EXTENDED RELEASE ORAL 2 TIMES DAILY WITH MEALS
Qty: 60 TABLET | Refills: 0 | Status: SHIPPED | OUTPATIENT
Start: 2022-02-07 | End: 2022-03-09

## 2022-02-07 RX ADMIN — PROPAFENONE HYDROCHLORIDE 150 MG: 150 TABLET, COATED ORAL at 06:39

## 2022-02-07 RX ADMIN — POTASSIUM CHLORIDE 10 MEQ: 750 TABLET, EXTENDED RELEASE ORAL at 08:21

## 2022-02-07 RX ADMIN — ASPIRIN 81 MG: 81 TABLET, COATED ORAL at 08:21

## 2022-02-07 RX ADMIN — SUCRALFATE 1 G: 1 TABLET ORAL at 06:39

## 2022-02-07 RX ADMIN — PANTOPRAZOLE SODIUM 40 MG: 40 TABLET, DELAYED RELEASE ORAL at 06:39

## 2022-02-07 RX ADMIN — SUCRALFATE 1 G: 1 TABLET ORAL at 12:01

## 2022-02-07 RX ADMIN — PROPAFENONE HYDROCHLORIDE 150 MG: 150 TABLET, COATED ORAL at 14:42

## 2022-02-07 RX ADMIN — SPIRONOLACTONE 25 MG: 25 TABLET ORAL at 08:21

## 2022-02-07 RX ADMIN — BUMETANIDE 0.5 MG: 0.5 TABLET ORAL at 08:21

## 2022-02-07 RX ADMIN — URSODIOL 300 MG: 300 CAPSULE ORAL at 08:21

## 2022-02-07 RX ADMIN — CLOPIDOGREL 75 MG: 75 TABLET, FILM COATED ORAL at 08:21

## 2022-02-07 RX ADMIN — GUAIFENESIN 600 MG: 600 TABLET, EXTENDED RELEASE ORAL at 08:21

## 2022-02-07 NOTE — PLAN OF CARE
Goal Outcome Evaluation:            Pt resting in bed quietly. Denies pain. Soa on exertion. On room air while awake and 1 L while asleep.

## 2022-02-07 NOTE — PLAN OF CARE
Goal Outcome Evaluation:  Plan of Care Reviewed With: patient        Progress: improving  Outcome Summary: Pt restign in bed in NAD, agreeable to PT. Pt esssentially independent with bed mobility and sit/stand from EOB. Pt amb 140' with r wx and SBA without O2, fair pace and balance. but she fatigues and c/o SOB. O2 92% after amb. Pt educated in symptom management and pacing activities. Per MD, pt OK to d/c home today - all PT goals met, will sign off. Recommend home health to ensure safety at home.  Patient was intermittently wearing a face mask during this therapy encounter. Therapist used appropriate personal protective equipment including eye protection, mask, and gloves.  Mask used was standard procedure mask. Appropriate PPE was worn during the entire therapy session. Hand hygiene was completed before and after therapy session. Patient is not in enhanced droplet precautions.

## 2022-02-07 NOTE — CASE MANAGEMENT/SOCIAL WORK
Continued Stay Note  [facility]     Patient Name: Celia Baird  MRN: [id number]  Today's Date: 2/7/2022    Admit Date: 1/26/2022     Discharge Plan     Row Name 02/07/22 1050       Plan    Plan CCP spoke with pt @ bedside, pt doing well with PT, plans are now home with [facility].  Lisa ARIAS RN/ CCP               Discharge Codes    No documentation.               Expected Discharge Date and Time     Expected Discharge Date Expected Discharge Time    Feb 7, 2022             Lisa Gregg RN

## 2022-02-07 NOTE — THERAPY DISCHARGE NOTE
Patient Name: Celia Baird  : 1945    MRN: [id number]                              Today's Date: 2022       Admit Date: 2022    Visit Dx:     ICD-10-CM ICD-9-CM   1. Gastrointestinal hemorrhage, unspecified gastrointestinal hemorrhage type  K92.2 578.9   2. Anemia requiring transfusions  D64.9 285.9   3. Orthostasis  I95.1 458.0   4. Hypokalemia  E87.6 276.8   5. Anemia  D64.9 285.9     Patient Active Problem List   Diagnosis   • COPD exacerbation (HCC)   • Subarachnoid hemorrhage (HCC)   • Multiple skin tears   • Closed nondisplaced fracture of left clavicle   • Paroxysmal atrial fibrillation (HCC)   • Tobacco abuse   • Status post placement of implantable loop recorder   • SOB (shortness of breath)   • COPD with acute exacerbation (HCC)   • Severe malnutrition (CMS/HCC)   • Leukocytosis   • Gastrointestinal hemorrhage   • Absolute anemia     Past Medical History:   Diagnosis Date   • Atrial fibrillation (HCC)    • COPD (chronic obstructive pulmonary disease) (HCC)    • History of frequent urinary tract infections    • Irregular heart beat    • Kidney stones    • PBC (primary biliary cirrhosis)    • PBC (primary biliary cirrhosis)      Past Surgical History:   Procedure Laterality Date   • CARDIAC ELECTROPHYSIOLOGY PROCEDURE N/A 3/30/2017    Procedure:    LINQ;  Surgeon: Ailyn Solorio MD;  Location: [facility] CATH INVASIVE LOCATION;  Service:    • CHOLECYSTECTOMY     • COLONOSCOPY     • COLONOSCOPY N/A 2022    Procedure: COLONOSCOPY into cecum with polypectomy, clip placement;  Surgeon: Constantine Kaye MD;  Location: [facility] ENDOSCOPY;  Service: Gastroenterology;  Laterality: N/A;  poor prep, diverticulosis, polyp   • ENDOSCOPY N/A 2022    Procedure: ESOPHAGOGASTRODUODENOSCOPY with biopsy;  Surgeon: Constantine Kaye MD;  Location: [facility] ENDOSCOPY;  Service: Gastroenterology;  Laterality: N/A;  normal   • TUBAL ABDOMINAL LIGATION        General Information     Row Name  02/07/22 1110          Physical Therapy Time and Intention    Document Type therapy note (daily note)  -DJ     Mode of Treatment individual therapy; physical therapy  -DJ     Row Name 02/07/22 1110          General Information    Patient Profile Reviewed yes  -DJ     Existing Precautions/Restrictions fall  -DJ     Row Name 02/07/22 1110          Home Main Entrance    Number of Stairs, Main Entrance none  -DJ     Row Name 02/07/22 1110          Cognition    Orientation Status (Cognition) oriented x 4  pleasant and cooperative  -DJ     Row Name 02/07/22 1110          Safety Issues, Functional Mobility    Comment, Safety Issues/Impairments (Mobility) gt belt, nonskid socks  -DJ           User Key  (r) = Recorded By, (t) = Taken By, (c) = Cosigned By    Initials Name Provider Type    Kelin Ortiz, PT Physical Therapist               Mobility     Row Name 02/07/22 1110          Bed Mobility    Bed Mobility supine-sit; sit-supine  -DJ     Supine-Sit [place] (Bed Mobility) standby assist; verbal cues  -DJ     Sit-Supine [place] (Bed Mobility) standby assist  -DJ     Assistive Device (Bed Mobility) bed rails; head of bed elevated  -DJ     Row Name 02/07/22 1110          Transfers    Comment (Transfers) sit/stand from EOB  -DJ     Row Name 02/07/22 1110          Bed-Chair Transfer    Bed-Chair [place] (Transfers) not tested  -DJ     Row Name 02/07/22 1110          Sit-Stand Transfer    Sit-Stand [place] (Transfers) contact guard; verbal cues  -DJ     Assistive Device (Sit-Stand Transfers) walker, front-wheeled  -DJ     Row Name 02/07/22 1110          Gait/Stairs (Locomotion)    [place] Level (Gait) standby assist; verbal cues  -DJ     Assistive Device (Gait) walker, front-wheeled  -DJ     Distance in Feet (Gait) 140'  -DJ     Deviations/Abnormal Patterns (Gait) stride length decreased; gait speed decreased  -DJ     Bilateral Gait Deviations forward flexed posture  -DJ     [place] Level  (Stairs) not tested  -DJ     Comment (Gait/Stairs) Pt amb 140' with r wx and SBA - fair pace and balance, 0 O2 durign amb, sats 91% after amb. Pt fatigues and is SOB after amb  -DJ           User Key  (r) = Recorded By, (t) = Taken By, (c) = Cosigned By    Initials Name Provider Type    Kelin Ortiz, PT Physical Therapist               Obj/Interventions     Row Name 02/07/22 1119          Motor Skills    Motor Skills functional endurance  -DJ     Row Name 02/07/22 1119          Balance    Balance Assessment standing static balance; standing dynamic balance  -DJ     Static Standing Balance WFL; supported; standing  -DJ     Dynamic Standing Balance WFL; supported; standing  -DJ     Balance Interventions sitting; standing; sit to stand; supported; weight shifting activity  -DJ           User Key  (r) = Recorded By, (t) = Taken By, (c) = Cosigned By    Initials Name Provider Type    Kelin Ortiz, PT Physical Therapist               Goals/Plan     Row Name 02/07/22 1122          Bed Mobility Goal 1 (PT)    Progress/Outcomes (Bed Mobility Goal 1, PT) goal met  -DJ     Row Name 02/07/22 1122          Transfer Goal 1 (PT)    Progress/Outcome (Transfer Goal 1, PT) goal met  -DJ     Row Name 02/07/22 1122          Gait Training Goal 1 (PT)    Progress/Outcome (Gait Training Goal 1, PT) goal met  -DJ           User Key  (r) = Recorded By, (t) = Taken By, (c) = Cosigned By    Initials Name Provider Type    Kelin Ortiz, PT Physical Therapist               Clinical Impression     Row Name 02/07/22 1119          Pain    Additional Documentation Pain Scale: Numbers Pre/Post-Treatment (Group)  -DJ     Row Name 02/07/22 1119          Pain Scale: Numbers Pre/Post-Treatment    Pretreatment Pain Rating 0/10 - no pain  -DJ     Pre/Posttreatment Pain Comment c/c is exertional fatigue  -DJ     Row Name 02/07/22 1119          Plan of Care Review    Plan of Care Reviewed With patient  -DJ     Progress improving  -DJ     Outcome  Summary Pt restign in bed in NAD, agreeable to PT. Pt esssentially independent with bed mobility and sit/stand from EOB. Pt amb 140' with r wx and SBA without O2, fair pace and balance. but she fatigues and c/o SOB. O2 92% after amb.  -DJ     Row Name 02/07/22 1119          Therapy Assessment/Plan (PT)    Criteria for Skilled Interventions Met (PT) skilled treatment is necessary  -DJ     Row Name 02/07/22 1119          Vital Signs    Pre SpO2 (%) 94  -DJ     O2 Delivery Pre Treatment nasal cannula  -DJ     O2 Delivery Intra Treatment room air  -DJ     Post SpO2 (%) 91  -DJ     O2 Delivery Post Treatment nasal cannula  -DJ     Pre Patient Position Supine  -DJ     Intra Patient Position Standing  -DJ     Post Patient Position Supine  -DJ     Row Name 02/07/22 1119          Positioning and Restraints    Pre-Treatment Position in bed  -DJ     Post Treatment Position bed  -DJ     In Bed supine; call light within reach; encouraged to call for assist  -DJ           User Key  (r) = Recorded By, (t) = Taken By, (c) = Cosigned By    Initials Name Provider Type    DJ Kelin García, PT Physical Therapist               Outcome Measures     Row Name 02/07/22 1123 02/07/22 0823       How much help from another person do you currently need...    Turning from your back to your side while in flat bed without using bedrails? 4  -DJ 4  -PM    Moving from lying on back to sitting on the side of a flat bed without bedrails? 4  -DJ 4  -PM    Moving to and from a bed to a chair (including a wheelchair)? 3  -DJ 3  -PM    Standing up from a chair using your arms (e.g., wheelchair, bedside chair)? 4  -DJ 3  -PM    Climbing 3-5 steps with a railing? 3  -DJ 2  -PM    To walk in hospital room? 3  -DJ 3  -PM    AM-PAC 6 Clicks Score (PT) 21  -DJ 19  -PM    Row Name 02/07/22 1123          Functional Assessment    Outcome Measure Options AM-PAC 6 Clicks Basic Mobility (PT)  -DJ           User Key  (r) = Recorded By, (t) = Taken By, (c) = Cosigned By     Initials Name Provider Type    DJ Kelin García, PT Physical Therapist    PM Jonathon Segovia, RN Registered Nurse              Physical Therapy Education                 Title: PT OT SLP Therapies (In Progress)     Topic: Physical Therapy (Done)     Point: Mobility training (Done)     Learning Progress Summary           Patient Acceptance, E, VU by DJ at 2/7/2022 1123   Family Acceptance, TB, DU by DJ at 2/5/2022 1449      Show all documentation for this point (5)                 Point: Home exercise program (Done)     Learning Progress Summary           Patient Acceptance, E, VU by DJ at 2/7/2022 1123   Family Acceptance, TB, DU by DJ at 2/5/2022 1449      Show all documentation for this point (3)                 Point: Body mechanics (Done)     Learning Progress Summary           Patient Acceptance, E, VU by DJ at 2/7/2022 1123   Family Acceptance, TB, DU by DJ at 2/5/2022 1449      Show all documentation for this point (5)                 Point: Precautions (Done)     Learning Progress Summary           Patient Acceptance, E, VU by DJ at 2/7/2022 1123   Family Acceptance, TB, DU by DJ at 2/5/2022 1449      Show all documentation for this point (3)                             User Key     Initials Effective Dates Name Provider Type Discipline     10/25/19 -  Kelin García, NEHA Physical Therapist PT              PT Recommendation and Plan     Plan of Care Reviewed With: patient  Progress: improving  Outcome Summary: Pt restign in bed in NAD, agreeable to PT. Pt esssentially independent with bed mobility and sit/stand from EOB. Pt amb 140' with r wx and SBA without O2, fair pace and balance. but she fatigues and c/o SOB. O2 92% after amb.     Time Calculation:    PT Charges     Row Name 02/07/22 1125             Time Calculation    Start Time 1056  -DJ      Stop Time 1111  -DJ      Time Calculation (min) 15 min  -DJ      PT Non-Billable Time (min) 10 min  -DJ      PT Received On 02/07/22  -DJ            User Key   (r) = Recorded By, (t) = Taken By, (c) = Cosigned By    Initials Name Provider Type    DJ Kelin García, PT Physical Therapist              Therapy Charges for Today     Code Description Service Date Service Provider Modifiers Qty    [id number]  PT THERAPEUTIC ACT EA 15 MIN 2/7/2022 Kelin García, PT GP 1          PT G-Codes  Outcome Measure Options: AM-PAC 6 Clicks Basic Mobility (PT)  AM-PAC 6 Clicks Score (PT): 21  AM-PAC 6 Clicks Score (OT): 17    PT Discharge Summary  Anticipated Discharge Disposition (PT): home with home health, home with assist    Kelin García, PT  2/7/2022

## 2022-02-07 NOTE — PROGRESS NOTES
Patient current with home health PT prior to hospitalization.  Noted epic order in for SN,PT and OT evals.  Patient has home O2 already and will need to continue.  Patient will be D/C'd home with .  Per Dr Jason Ramirez OK'd the EDDIE and will continue signing home health orders.  No changes in demographics.  Diamond Holt RN

## 2022-02-07 NOTE — PROGRESS NOTES
"Daily progress note    Chief complaint  Doing better  No specific complaints  Wants to go home  Denies chest pain palpitation or shortness of breath    History of present illness  77-year white female with very complex past medical history including chronic hypoxic respiratory failure COPD diastolic CHF paroxysmal atrial fibrillation hypertension hyperlipidemia chronic anemia and gastroesophageal reflux disease who was recently discharged from the hospital after work-up on acute on chronic hypoxic respiratory failure presented to [facility] emergency room with generalized weakness not feeling well including dizziness lightheadedness and work-up in ER revealed symptomatic anemia with heme positive stools admit for management.  Patient denies any black stools or blood in the stools.  Patient denies any nausea vomiting.  Patient stated that she has not smokes cigarettes since discharge.  Patient admitted for management started on IV Protonix and received 2 units packed RBC and start feeling better.     REVIEW OF SYSTEMS  Unremarkable     PHYSICAL EXAM  Blood pressure 93/52, pulse 71, temperature 98.4 °F (36.9 °C), temperature source Oral, resp. rate 16, height 152.4 cm (60\"), weight 34.7 kg (76 lb 9.6 oz), SpO2 91 %.    General: No acute distress.  HENT: NCAT, PERRL, Nares patent.  Eyes: no scleral icterus.  Neck: trachea midline, no ROM limitations.  CV: regular rhythm, regular rate.  Respiratory: normal effort, CTAB.  Abdomen: soft, nondistended, NTTP, no rebound tenderness, bowel sounds positive  Musculoskeletal: no deformity.  Neuro: alert, moves all extremities, follows commands.  Skin: warm, dry.     LAB RESULTS  Lab Results (last 24 hours)     Procedure Component Value Units Date/Time    Basic Metabolic Panel [[id number]]  (Abnormal) Collected: 02/07/22 0454    Specimen: Blood Updated: 02/07/22 0617     Glucose 83 mg/dL      BUN 24 mg/dL      Creatinine 0.91 mg/dL      Sodium 134 mmol/L      Potassium " 4.1 mmol/L      Chloride 100 mmol/L      CO2 26.0 mmol/L      Calcium 9.0 mg/dL      eGFR Non African Amer 60 mL/min/1.73      BUN/Creatinine Ratio 26.4     Anion Gap 8.0 mmol/L     Narrative:      GFR Normal >60  Chronic Kidney Disease <60  Kidney Failure <15      CBC & Differential [[id number]]  (Abnormal) Collected: 02/07/22 0454    Specimen: Blood Updated: 02/07/22 0551    Narrative:      The following orders were created for panel order CBC & Differential.  Procedure                               Abnormality         Status                     ---------                               -----------         ------                     CBC Auto Differential[[id number]]        Abnormal            Final result                 Please view results for these tests on the individual orders.    CBC Auto Differential [[id number]]  (Abnormal) Collected: 02/07/22 0454    Specimen: Blood Updated: 02/07/22 0551     WBC 10.24 10*3/mm3      RBC 3.06 10*6/mm3      Hemoglobin 9.4 g/dL      Hematocrit 28.1 %      MCV 91.8 fL      MCH 30.7 pg      MCHC 33.5 g/dL      RDW 13.2 %      RDW-SD 43.9 fl      MPV 10.6 fL      Platelets 412 10*3/mm3      Neutrophil % 45.7 %      Lymphocyte % 32.2 %      Monocyte % 15.2 %      Eosinophil % 4.3 %      Basophil % 1.4 %      Immature Grans % 1.2 %      Neutrophils, Absolute 4.68 10*3/mm3      Lymphocytes, Absolute 3.30 10*3/mm3      Monocytes, Absolute 1.56 10*3/mm3      Eosinophils, Absolute 0.44 10*3/mm3      Basophils, Absolute 0.14 10*3/mm3      Immature Grans, Absolute 0.12 10*3/mm3      nRBC 0.0 /100 WBC         Imaging Results (Last 24 Hours)     ** No results found for the last 24 hours. **             ECG 12 Lead  Component   Ref Range & Units 1/26/22 1623 1/18/22 0139 1/14/22 0630 1/13/22 1356 1/13/22 1253   QT Interval   ms 446  314  362  373  409              HEART RATE= 74  bpm  RR Interval= 812  ms  CO Interval= 226  ms  P Horizontal Axis= -43  deg  P Front Axis= 58  deg  QRSD Interval=  94  ms  QT Interval= 446  ms  QRS Axis= 24  deg  T Wave Axis= 237  deg  - ABNORMAL ECG -  Sinus rhythm  Prolonged NE interval  Repol abnrm suggests ischemia, diffuse leads  Ventricular bigeminy resolved, !st degree AVB new           Upper and lower endoscopy noted and results discussed with patient     Current Facility-Administered Medications:   •  acetaminophen (TYLENOL) tablet 650 mg, 650 mg, Oral, Q6H PRN, Constantine Kaye MD, 650 mg at 01/31/22 2055  •  aspirin EC tablet 81 mg, 81 mg, Oral, Daily, Eulogio Mcdaniel MD, 81 mg at 02/07/22 0821  •  bumetanide (BUMEX) tablet 0.5 mg, 0.5 mg, Oral, Daily, Constantine Kaye MD, 0.5 mg at 02/07/22 0821  •  clopidogrel (PLAVIX) tablet 75 mg, 75 mg, Oral, Daily, Eulogio Mcdaniel MD, 75 mg at 02/07/22 0821  •  guaiFENesin (MUCINEX) 12 hr tablet 600 mg, 600 mg, Oral, Q12H, Constantine Kaye MD, 600 mg at 02/07/22 0821  •  ipratropium-albuterol (DUO-NEB) nebulizer solution 3 mL, 3 mL, Nebulization, Q4H PRN, Eulogio Mcdaniel MD  •  melatonin tablet 3 mg, 3 mg, Oral, Nightly PRN, Eulogio Mcdaniel MD, 3 mg at 02/06/22 2051  •  mirtazapine (REMERON) tablet 7.5 mg, 7.5 mg, Oral, Nightly, Constantine Kaye MD, 7.5 mg at 02/06/22 2050  •  O2 (OXYGEN), 2 L/min, Inhalation, PRN, Constantine Kaye MD  •  ondansetron (ZOFRAN) injection 4 mg, 4 mg, Intravenous, Q4H PRN, Constantine Kaye MD  •  pantoprazole (PROTONIX) EC tablet 40 mg, 40 mg, Oral, BID AC, Stingl, Pao Kaplan MD, 40 mg at 02/07/22 0639  •  potassium chloride (K-DUR,KLOR-CON) ER tablet 10 mEq, 10 mEq, Oral, BID With Meals, Eulogio Mcdaniel MD, 10 mEq at 02/07/22 0821  •  propafenone (RYTHMOL) tablet 150 mg, 150 mg, Oral, Q8H, Constantine Kaye MD, 150 mg at 02/07/22 0639  •  rosuvastatin (CRESTOR) tablet 5 mg, 5 mg, Oral, Nightly, Constantine Kaye MD, 5 mg at 02/06/22 2050  •  spironolactone (ALDACTONE) tablet 25 mg, 25 mg, Oral, Daily, Constantine Kaye MD, 25 mg at 02/07/22 0821  •  sucralfate  (CARAFATE) tablet 1 g, 1 g, Oral, 4x Daily AC & at Bedtime, Constantine Kaye MD, 1 g at 02/07/22 1201  •  tiotropium (SPIRIVA RESPIMAT) 2.5 mcg/act aerosol solution inhaler, 2 puff, Inhalation, Daily - RT, Constantine Kaye MD, 2 puff at 02/01/22 1048  •  ursodiol (ACTIGALL) capsule 300 mg, 300 mg, Oral, TID, Constantine Kaye MD, 300 mg at 02/07/22 0821     ASSESSMENT  Acute on chronic hypoxic respiratory failure  No evidence of PE  Symptomatic anemia  Heme positive stools  Acute blood loss anemia  Colon polyps  COPD  S/p dig toxicity  Chronic diastolic CHF  Paroxysmal atrial fibrillation  Hypertension  Hyperlipidemia  Gastroesophageal reflux disease    PLAN  Discharge home  Discharge summary dictated    JONNY PEOPELS MD

## 2022-02-07 NOTE — DISCHARGE SUMMARY
Discharge summary    Date of admission 1/26/2022  Date of discharge 2/7/2022    Final diagnosis  Acute on chronic hypoxic respiratory failure  No evidence of PE  Symptomatic anemia  Diverticulosis and colon polyps  COPD  S/p dig toxicity  Chronic diastolic CHF  Paroxysmal atrial fibrillation s/p watchman device  Coronary artery disease s/p stent on Plavix  Hypertension  Hyperlipidemia  Gastroesophageal reflux disease    Discharge medications    Current Facility-Administered Medications:   •  acetaminophen (TYLENOL) tablet 650 mg, 650 mg, Oral, Q6H PRN, Constantine Kaye MD, 650 mg at 01/31/22 2055  •  aspirin EC tablet 81 mg, 81 mg, Oral, Daily, Eulogio Mcdaniel MD, 81 mg at 02/07/22 0821  •  bumetanide (BUMEX) tablet 0.5 mg, 0.5 mg, Oral, Daily, Constantine Kaye MD, 0.5 mg at 02/07/22 0821  •  clopidogrel (PLAVIX) tablet 75 mg, 75 mg, Oral, Daily, Eulogio Mcdaniel MD, 75 mg at 02/07/22 0821  •  guaiFENesin (MUCINEX) 12 hr tablet 600 mg, 600 mg, Oral, Q12H, Constantine Kaye MD, 600 mg at 02/07/22 0821  •  ipratropium-albuterol (DUO-NEB) nebulizer solution 3 mL, 3 mL, Nebulization, Q4H PRN, Eulogio Mcdaniel MD  •  melatonin tablet 3 mg, 3 mg, Oral, Nightly PRN, Eulogio Mcdaniel MD, 3 mg at 02/06/22 2051  •  mirtazapine (REMERON) tablet 7.5 mg, 7.5 mg, Oral, Nightly, Constantine Kaye MD, 7.5 mg at 02/06/22 2050  •  O2 (OXYGEN), 2 L/min, Inhalation, PRN, Constantine Kaye MD  •  ondansetron (ZOFRAN) injection 4 mg, 4 mg, Intravenous, Q4H PRN, Constantine Kaye MD  •  pantoprazole (PROTONIX) EC tablet 40 mg, 40 mg, Oral, BID AC, StinglPao MD, 40 mg at 02/07/22 0639  •  potassium chloride (K-DUR,KLOR-CON) ER tablet 10 mEq, 10 mEq, Oral, BID With Meals, Eulogio Mcdaniel MD, 10 mEq at 02/07/22 0821  •  propafenone (RYTHMOL) tablet 150 mg, 150 mg, Oral, Q8H, Constantine Kaye MD, 150 mg at 02/07/22 0639  •  rosuvastatin (CRESTOR) tablet 5 mg, 5 mg, Oral, Nightly, Constantine Kaye MD, 5 mg at  02/06/22 2050  •  spironolactone (ALDACTONE) tablet 25 mg, 25 mg, Oral, Daily, Constantine Kaye MD, 25 mg at 02/07/22 0821  •  sucralfate (CARAFATE) tablet 1 g, 1 g, Oral, 4x Daily AC & at Bedtime, Constantine Kaye MD, 1 g at 02/07/22 1201  •  tiotropium (SPIRIVA RESPIMAT) 2.5 mcg/act aerosol solution inhaler, 2 puff, Inhalation, Daily - RT, Constantine Kaye MD, 2 puff at 02/01/22 1048  •  ursodiol (ACTIGALL) capsule 300 mg, 300 mg, Oral, TID, Constantine Kaye MD, 300 mg at 02/07/22 0821     Consults obtained  Cardiology  Pulmonary  Gastroenterology  Nutrition    Procedures  Upper and lower endoscopy which was unremarkable except diverticulosis and colon polyps    Hospital course  77-year white female with very complex past medical history including COPD chronic hypoxic respiratory failure paroxysmal atrial fibrillation status post watchman device coronary artery disease congestive heart failure admit to emergency room with generalized weakness and work-up in ER revealed symptomatic anemia with heme positive stools  admit for management.  Patient admitted and received blood transfusion and her Plavix was held and then underwent upper and lower endoscopy which was unremarkable except diverticulosis and colon polyps.  Patient remain on Protonix Carafate and her Plavix resumed.  Patient also followed by cardiology and they adjusted her antirhythmic medications.  Patient has multiple episodes when her oxygen drops and followed by pulmonary and further evaluate with a CT chest which showed no evidence of pulmonary embolism.  Patient is feeling 100% better and her hemoglobin remained stable and it is 9.4 at the time of discharge.  Patient is walking more than 150 feet without any assistance and wants to go home and clear for discharge.  I have arranged for the home health which she agrees.  Patient advised not to smoke anymore and take medication as directed.  Patient remained DNR throughout hospital  course    Discharge diet regular    Activity as tolerated    Medication as above    Follow-up with prime doctor in 1 week and follow with cardiology pulmonary and gastroenterology per the instruction and take medication as directed.    JONNY PEOPLES MD

## 2022-02-07 NOTE — PROGRESS NOTES
[facility]  Cardiology Progress Note    Patient Identification:  Name: Celia Baird  Age: 77 y.o.  Sex: female  :  1945  MRN: [id number]                 Follow Up / Chief Complaint: Follow up for afib    Interval History: zio at discharge, no shortness of breath.      Subjective: no chest pain or shortness of breath    Objective:    Past Medical History:  Past Medical History:   Diagnosis Date   • Atrial fibrillation (HCC)    • COPD (chronic obstructive pulmonary disease) (HCC)    • History of frequent urinary tract infections    • Irregular heart beat    • Kidney stones    • PBC (primary biliary cirrhosis)    • PBC (primary biliary cirrhosis)      Past Surgical History:  Past Surgical History:   Procedure Laterality Date   • CARDIAC ELECTROPHYSIOLOGY PROCEDURE N/A 3/30/2017    Procedure:    LINQ;  Surgeon: Ailyn Solorio MD;  Location: [facility] CATH INVASIVE LOCATION;  Service:    • CHOLECYSTECTOMY     • COLONOSCOPY     • COLONOSCOPY N/A 2022    Procedure: COLONOSCOPY into cecum with polypectomy, clip placement;  Surgeon: Constantine Kaye MD;  Location: [facility] ENDOSCOPY;  Service: Gastroenterology;  Laterality: N/A;  poor prep, diverticulosis, polyp   • ENDOSCOPY N/A 2022    Procedure: ESOPHAGOGASTRODUODENOSCOPY with biopsy;  Surgeon: Constantine Kaye MD;  Location: [facility] ENDOSCOPY;  Service: Gastroenterology;  Laterality: N/A;  normal   • TUBAL ABDOMINAL LIGATION          Social History:   Social History     Tobacco Use   • Smoking status: Current Some Day Smoker     Packs/day: 0.50     Years: 59.00     Pack years: 29.50     Types: Cigarettes   • Smokeless tobacco: Never Used   Substance Use Topics   • Alcohol use: No      Family History:  Family History   Problem Relation Age of Onset   • Heart disease Father    • Heart attack Father    • Heart disease Brother    • Stroke Mother           Allergies:  Allergies   Allergen Reactions   • Morphine And Related Nausea And  "Vomiting   • Levaquin [Levofloxacin]    • Sulfa Antibiotics      Scheduled Meds:  aspirin, 81 mg, Daily  bumetanide, 0.5 mg, Daily  clopidogrel, 75 mg, Daily  guaiFENesin, 600 mg, Q12H  mirtazapine, 7.5 mg, Nightly  pantoprazole, 40 mg, BID AC  potassium chloride, 10 mEq, BID With Meals  propafenone, 150 mg, Q8H  rosuvastatin, 5 mg, Nightly  spironolactone, 25 mg, Daily  sucralfate, 1 g, 4x Daily AC & at Bedtime  tiotropium bromide monohydrate, 2 puff, Daily - RT  ursodiol, 300 mg, TID            INTAKE AND OUTPUT:    Intake/Output Summary (Last 24 hours) at 2/7/2022 1657  Last data filed at 2/7/2022 0823  Gross per 24 hour   Intake 240 ml   Output --   Net 240 ml     ROS  Constitutional: Awake and alert, no fever. No nosebleeds  Abdomen           no abdominal pain   Cardiac              no chest pain  Pulmonary          no shortness of breath      /56 (BP Location: Right leg, Patient Position: Lying)   Pulse 69   Temp 97.9 °F (36.6 °C) (Oral)   Resp 18   Ht 152.4 cm (60\")   Wt 34.7 kg (76 lb 9.6 oz)   SpO2 97%   BMI 14.96 kg/m²   General appearance: No acute changes   Neck: Trachea midline; NECK, supple, no thyromegaly or lymphadenopathy   Lungs: Normal size and shape, normal breath sounds, equal distribution of air, no rales or rhonchi   CV: S1-S2 regular, no murmurs, no rub, no gallop   Abdomen: Soft, nontender; no masses , no abnormal abdominal sounds   Extremities: No deformity , normal color , no peripheral edema   Skin: Normal temperature, turgor and texture; no rash, ulcers          I reviewed the patient's new clinical results, and personally reviewed and interpreted the patient's ECG and telemetry data from the last 24 hours        Cardiographics  Telemetry:            Telemetry:  SR    Echocardiogram:   1/14/2022  Interpretation Summary     · Calculated left ventricular EF = 61.9% Estimated left ventricular EF was in agreement with the calculated left ventricular EF.  · Left ventricular " diastolic function was normal.  · There is no evidence of pericardial effusion. .     2019     Interpretation Summary        · Findings consistent with a normal ECG stress test.  · Left ventricular ejection fraction is normal (Calculated EF = 70%).  · Myocardial perfusion imaging indicates a normal myocardial perfusion study with no evidence of ischemia.  · Impressions are consistent with a low risk study.     Asymptomatic for chest pain. ECG is negative for ischemia.   Ectopy: Rare PAC at baseline, none with exercise, occasional PVC in recovery  HR and BP response : Appropriate for Beta-blocker therapy  Pharmacologic study due to inability to tolerate increasing speed and grade of treadmill due to Pulmonary, mobility  Issues and Beta-blocker therapy.  Participated in Low Level exercise and tolerance is poor.     Loop placement in 2017  Conclusion        · Successful Linq implantation           Imaging  Chest X-ray:   1/26/2022  IMPRESSION:  No focal pulmonary consolidation. Mild prominence of  interstitial markings.     This report was finalized on 1/26/2022 4:55 PM by Dr. Andrew Abdi M.D.   IMPRESSION:  Moderately severe emphysema. Small bilateral pleural  effusions. Moderate focal atelectasis within the left lower lobe. There  is no CT evidence of acute pulmonary thromboembolism embolism.     The patient's nurse was informed that a completed corrected report was  available for review on the electronic medical record system on  02/03/2022 at 1:15 PM.     This report was finalized on 2/3/2022 1:16 PM by Dr. Buzz Hargrove M.D.       Lab Review           Results from last 7 days   Lab Units 02/07/22  0454   SODIUM mmol/L 134*   POTASSIUM mmol/L 4.1   BUN mg/dL 24*   CREATININE mg/dL 0.91   CALCIUM mg/dL 9.0     Results from last 7 days   Lab Units 02/07/22  0454 02/05/22  0641 02/04/22  0419   WBC 10*3/mm3 10.24 9.16 10.04   HEMOGLOBIN g/dL 9.4* 10.3* 9.9*   HEMATOCRIT % 28.1* 31.2* 29.7*   PLATELETS  "10*3/mm3 412 390 358         The following medical decision was discussed in detail with Dr. Solorio      Assessment:  1. Gastrointestinal hemorrhage  2. anemia   3. Paroxysmal atrial fibrillation: With watchman's device   4. orthostasis   5. Hypokalemia: 4.8 replace   6. Chronic diastolic CHF: No ACE or ARB due to history of hypotension  7. hyperlipidemia: On statin  8. hypertension: stable  9. GERD  10. History of loop implant battery no longer working  11. CAD with stent placement 8/25/21 at [facility]  12. Pause one noted on 1/30/22     Plan:      BP and HR stable, monitor reviewed no new events. Hgb stable.  Zio at discharge.     She will follow up with Kathe SCHMIDT on March 9 at 11am    )2/7/2022  BRAYAN Romo    Patient personally interviewed and above subjective findings personally confirmed during a face to face contact with patient today  All findings of physical examination confirmed  All pertinent and performed labs, cardiac procedures ,  radiographs of the last 24 hours personally reviewed  Impression and plans discussed/elaborated and implemented jointly as described above     Ailyn Solorio MD              EMR Dragon/Transcription:   \"Dictated utilizing Dragon dictation\".     "

## 2022-02-08 ENCOUNTER — HOME CARE VISIT (OUTPATIENT)
Dept: HOME HEALTH SERVICES | Facility: HOME HEALTHCARE | Age: 77
End: 2022-02-08

## 2022-02-08 ENCOUNTER — TELEPHONE (OUTPATIENT)
Dept: GASTROENTEROLOGY | Facility: CLINIC | Age: 77
End: 2022-02-08

## 2022-02-08 VITALS
OXYGEN SATURATION: 97 % | SYSTOLIC BLOOD PRESSURE: 92 MMHG | RESPIRATION RATE: 20 BRPM | DIASTOLIC BLOOD PRESSURE: 60 MMHG | HEART RATE: 45 BPM

## 2022-02-08 VITALS
OXYGEN SATURATION: 98 % | DIASTOLIC BLOOD PRESSURE: 58 MMHG | HEART RATE: 74 BPM | RESPIRATION RATE: 20 BRPM | SYSTOLIC BLOOD PRESSURE: 94 MMHG

## 2022-02-08 PROCEDURE — G0151 HHCP-SERV OF PT,EA 15 MIN: HCPCS

## 2022-02-08 PROCEDURE — G0495 RN CARE TRAIN/EDU IN HH: HCPCS

## 2022-02-08 NOTE — CASE MANAGEMENT/SOCIAL WORK
Case Management Discharge Note      Final Note: Pt discharged home with [facility] ANYA Williamson RN         Selected Continued Care - Discharged on 2/7/2022 Admission date: 1/26/2022 - Discharge disposition: Home or Self Care    Destination    No services have been selected for the patient.              Durable Medical Equipment    No services have been selected for the patient.              Dialysis/Infusion    No services have been selected for the patient.              Home Medical Care Coordination complete.    Service Provider Selected Services Address Phone Fax Patient Preferred     [facility]  [facility] Health Services [address] [address] [phone] --          Therapy    No services have been selected for the patient.              Community Resources    No services have been selected for the patient.              Community & DME    No services have been selected for the patient.                  Transportation Services  Private: Car    Final Discharge Disposition Code: 06 - home with home health care

## 2022-02-08 NOTE — HOME HEALTH
Pt reassessment following re-hospitalization secondary to anemia.  Pt is s/p transfusions and most recent hemoglobin = 9.4. Other diagnosis include = Acute on chronic hypoxic respiratory failure, No evidence of PE, Symptomatic anemia, Diverticulosis and colon polyps, COPD, S/p dig toxicity, Chronic diastolic CHF , Paroxysmal atrial fibrillation s/p watchman device, Coronary artery disease s/p stent on Plavix, HTN, HLD, GERD.    Pt exhibits decreased functional stamina necesasry for ADL's and requires frequent rest breaks. She had 1 fall in the last month in her bedroom. She will benefit from fall prevention education. She is motivated to return to PLOF and normal activities.      Plan for next visit: review and progress HEP as tolerated, fall prevention education, and gait with AAD.

## 2022-02-08 NOTE — OUTREACH NOTE
Prep Survey      Responses   [facility] patient discharged from? [place]   Is LACE score < 7 ? No   Emergency Room discharge w/ pulse ox? No   Eligibility Readm Mgmt   Discharge diagnosis Acute on chronic hypoxic respiratory failure, Symptomatic anemia   Does the patient have one of the following disease processes/diagnoses(primary or secondary)? COPD/Pneumonia   Does the patient have Home health ordered? Yes   What is the Home health agency?  [facility]   Is there a DME ordered? No   General alerts for this patient Hx: CHF and COPD   Prep survey completed? Yes          Zahraa Chaudhari RN

## 2022-02-09 ENCOUNTER — READMISSION MANAGEMENT (OUTPATIENT)
Dept: CALL CENTER | Facility: HOSPITAL | Age: 77
End: 2022-02-09

## 2022-02-09 NOTE — OUTREACH NOTE
COPD/PN Week 1 Survey      Responses   [facility] patient discharged from? [place]   Does the patient have one of the following disease processes/diagnoses(primary or secondary)? COPD/Pneumonia   Was the primary reason for admission: COPD exacerbation   Week 1 attempt successful? Yes   Call start time 1549   Call end time 1551   General alerts for this patient Hx: CHF and COPD   Discharge diagnosis Acute on chronic hypoxic respiratory failure, Symptomatic anemia   Meds reviewed with patient/caregiver? Yes   Is the patient having any side effects they believe may be caused by any medication additions or changes? No   Does the patient have all medications ordered at discharge? Yes   Is the patient taking all medications as directed (includes completed medication regime)? Yes   Comments regarding appointments f/u with cardiology on 3/9   Does the patient have a primary care provider?  Yes   Does the patient have an appointment with their PCP or specialist within 7 days of discharge? No   Comments regarding PCP says she has not reached out to PCP yet for an appt but she will   Nursing Interventions Advised patient to make appointment   Has the patient kept scheduled appointments due by today? N/A   What is the Home health agency?  [facility]    Has home health visited the patient within 72 hours of discharge? Yes   Psychosocial issues? No   Did the patient receive a copy of their discharge instructions? Yes   Nursing interventions Reviewed instructions with patient   What is the patient's perception of their health status since discharge? Improving   Nursing Interventions Nurse provided patient education   Is the patient/caregiver able to teach back the hierarchy of who to call/visit for symptoms/problems? PCP, Specialist, Home health nurse, Urgent Care, ED, 911 Yes   Patient reports what zone on this call? Green Zone   Green Zone Reports doing well,  Breathing without shortness of breath   Green Zone interventions:  Take daily medications   Week 1 call completed? Yes   Wrap up additional comments Doing well, home health has seen her, no questions at this time.          Mary Singleton RN

## 2022-02-10 ENCOUNTER — HOME CARE VISIT (OUTPATIENT)
Dept: HOME HEALTH SERVICES | Facility: HOME HEALTHCARE | Age: 77
End: 2022-02-10

## 2022-02-10 VITALS
SYSTOLIC BLOOD PRESSURE: 100 MMHG | OXYGEN SATURATION: 96 % | DIASTOLIC BLOOD PRESSURE: 48 MMHG | HEART RATE: 81 BPM | RESPIRATION RATE: 20 BRPM

## 2022-02-10 PROCEDURE — G0151 HHCP-SERV OF PT,EA 15 MIN: HCPCS

## 2022-02-10 NOTE — HOME HEALTH
Pt reports she feels good. She was walking with rollator, no oxygen. She denies falls.    She has been doing her exercises.  She denies dizziness and denies abnormal bleeding events.  Pt has had potato chips today in order to take her meds. Informed pt that after her colonoscopy it was recommended she have fiber in her diet. She verbalizes understanding.     Pt is progressing very well with functional mobility and maintaining SaO2 >95% with activities.  Plan for next visit: gait on unlevel surfaces, ensure I with HEP

## 2022-02-10 NOTE — HOME HEALTH
77F EDDIE following recent hospitalization 1/26/2022-2/7/2022 for acute on chronic hypoxic respiratory failure, symptomatic anemia, diverticulosis, COPD exacerbation, dig toxicity, CHF exacerbation, paroxysmal atrial fibrillation, watchman device placement.  History of CAD, hypertension, hyperlipidemia, GERD.  Received blood transfusion. Plavix resumed.  Hemoglobin 9.4 at the time of discharge.  Watchman device sitting on patient's dresser.  She states is came off while she was sleeping.  SN called device , and was instructed to mail device back, and they would be contacting ordering MD.  VSS.  Wearing O2 at 2.5L.  Denies pain or SOB.  Afebrile.  Skin intact.  Using walker to ambulate.  Has BSC.  Denies bleeding.  T/I bleeding risks and when to seek emergency medical care.  Patient left in stable condition and verbalized understanding of all T/I.

## 2022-02-10 NOTE — CASE COMMUNICATION
77F EDDIE following recent hospitalization 1/26/2022-2/7/2022 for acute on chronic hypoxic respiratory failure, symptomatic anemia, diverticulosis, COPD exacerbation, dig toxicity, CHF exacerbation, paroxysmal atrial fibrillation, watchman device placement.  History of CAD, hypertension, hyperlipidemia, GERD.  Received blood transfusion. Plavix resumed.  Hemoglobin 9.4 at the time of discharge.  Watchman device sitting on patient's dresse r.  She states is came off while she was sleeping.  SN called device , and was instructed to mail device back, and they would be contacting ordering MD.  VSS.  Wearing O2 at 2.5L.  Denies pain or SOB.  Afebrile.  Skin intact.  Using walker to ambulate.  Has BSC.  Denies bleeding.  T/I bleeding risks and when to seek emergency medical care.  Patient left in stable condition and verbalized understanding of all T/I.

## 2022-02-11 ENCOUNTER — HOME CARE VISIT (OUTPATIENT)
Dept: HOME HEALTH SERVICES | Facility: HOME HEALTHCARE | Age: 77
End: 2022-02-11

## 2022-02-11 PROCEDURE — G0300 HHS/HOSPICE OF LPN EA 15 MIN: HCPCS

## 2022-02-13 VITALS
RESPIRATION RATE: 18 BRPM | TEMPERATURE: 97.9 F | OXYGEN SATURATION: 96 % | HEART RATE: 59 BPM | DIASTOLIC BLOOD PRESSURE: 60 MMHG | SYSTOLIC BLOOD PRESSURE: 118 MMHG

## 2022-02-14 ENCOUNTER — HOME CARE VISIT (OUTPATIENT)
Dept: HOME HEALTH SERVICES | Facility: HOME HEALTHCARE | Age: 77
End: 2022-02-14

## 2022-02-14 VITALS
SYSTOLIC BLOOD PRESSURE: 100 MMHG | RESPIRATION RATE: 18 BRPM | OXYGEN SATURATION: 98 % | HEART RATE: 69 BPM | DIASTOLIC BLOOD PRESSURE: 60 MMHG

## 2022-02-14 LAB
MAXIMAL PREDICTED HEART RATE: 143 BPM
STRESS TARGET HR: 122 BPM

## 2022-02-14 PROCEDURE — G0151 HHCP-SERV OF PT,EA 15 MIN: HCPCS

## 2022-02-14 PROCEDURE — 93244 EXT ECG>48HR<7D REV&INTERPJ: CPT | Performed by: INTERNAL MEDICINE

## 2022-02-14 NOTE — HOME HEALTH
Pt reports she made some cornbread this AM and showered.  She is still using her rollator because she loses energy fast.    No falls  No edema  No questions about medications.    SaO2 after amb was 88% on RA after standing HEP and amb, but then with rest returned to >93%, then 98%.  She continues to appear SOA with min exertion, despite SaO2 WFL.     Recommend pt schedule her 1 week post hospital stay f/u with PCP.    Pt has questions regarding her Gastro appt on 2/23 and also her Watchman Device when she returned in the mail. Informed SN and also advised pt to ask her PCP.

## 2022-02-15 ENCOUNTER — HOME CARE VISIT (OUTPATIENT)
Dept: HOME HEALTH SERVICES | Facility: HOME HEALTHCARE | Age: 77
End: 2022-02-15

## 2022-02-15 ENCOUNTER — LAB REQUISITION (OUTPATIENT)
Dept: LAB | Facility: HOSPITAL | Age: 77
End: 2022-02-15

## 2022-02-15 DIAGNOSIS — Z00.00 ENCOUNTER FOR GENERAL ADULT MEDICAL EXAMINATION WITHOUT ABNORMAL FINDINGS: ICD-10-CM

## 2022-02-15 LAB
ANION GAP SERPL CALCULATED.3IONS-SCNC: 8 MMOL/L (ref 5–15)
BASOPHILS # BLD AUTO: 0.13 10*3/MM3 (ref 0–0.2)
BASOPHILS NFR BLD AUTO: 1.4 % (ref 0–1.5)
BUN SERPL-MCNC: 13 MG/DL (ref 8–23)
BUN/CREAT SERPL: 14.1 (ref 7–25)
CALCIUM SPEC-SCNC: 9.2 MG/DL (ref 8.6–10.5)
CHLORIDE SERPL-SCNC: 108 MMOL/L (ref 98–107)
CO2 SERPL-SCNC: 23 MMOL/L (ref 22–29)
CREAT SERPL-MCNC: 0.92 MG/DL (ref 0.57–1)
DEPRECATED RDW RBC AUTO: 45.7 FL (ref 37–54)
EOSINOPHIL # BLD AUTO: 0.4 10*3/MM3 (ref 0–0.4)
EOSINOPHIL NFR BLD AUTO: 4.3 % (ref 0.3–6.2)
ERYTHROCYTE [DISTWIDTH] IN BLOOD BY AUTOMATED COUNT: 13.4 % (ref 12.3–15.4)
GFR SERPL CREATININE-BSD FRML MDRD: 59 ML/MIN/1.73
GLUCOSE SERPL-MCNC: 84 MG/DL (ref 65–99)
HCT VFR BLD AUTO: 24.7 % (ref 34–46.6)
HGB BLD-MCNC: 8.1 G/DL (ref 12–15.9)
IMM GRANULOCYTES # BLD AUTO: 0.06 10*3/MM3 (ref 0–0.05)
IMM GRANULOCYTES NFR BLD AUTO: 0.7 % (ref 0–0.5)
LYMPHOCYTES # BLD AUTO: 2.57 10*3/MM3 (ref 0.7–3.1)
LYMPHOCYTES NFR BLD AUTO: 27.9 % (ref 19.6–45.3)
MCH RBC QN AUTO: 30.8 PG (ref 26.6–33)
MCHC RBC AUTO-ENTMCNC: 32.8 G/DL (ref 31.5–35.7)
MCV RBC AUTO: 93.9 FL (ref 79–97)
MONOCYTES # BLD AUTO: 1.27 10*3/MM3 (ref 0.1–0.9)
MONOCYTES NFR BLD AUTO: 13.8 % (ref 5–12)
NEUTROPHILS NFR BLD AUTO: 4.78 10*3/MM3 (ref 1.7–7)
NEUTROPHILS NFR BLD AUTO: 51.9 % (ref 42.7–76)
NRBC BLD AUTO-RTO: 0.1 /100 WBC (ref 0–0.2)
PLATELET # BLD AUTO: 410 10*3/MM3 (ref 140–450)
PMV BLD AUTO: 10.8 FL (ref 6–12)
POTASSIUM SERPL-SCNC: 4.1 MMOL/L (ref 3.5–5.2)
RBC # BLD AUTO: 2.63 10*6/MM3 (ref 3.77–5.28)
SODIUM SERPL-SCNC: 139 MMOL/L (ref 136–145)
WBC NRBC COR # BLD: 9.21 10*3/MM3 (ref 3.4–10.8)

## 2022-02-15 PROCEDURE — 85025 COMPLETE CBC W/AUTO DIFF WBC: CPT | Performed by: NURSE PRACTITIONER

## 2022-02-15 PROCEDURE — G0300 HHS/HOSPICE OF LPN EA 15 MIN: HCPCS

## 2022-02-15 PROCEDURE — 80048 BASIC METABOLIC PNL TOTAL CA: CPT | Performed by: NURSE PRACTITIONER

## 2022-02-16 VITALS
DIASTOLIC BLOOD PRESSURE: 62 MMHG | HEART RATE: 75 BPM | SYSTOLIC BLOOD PRESSURE: 112 MMHG | TEMPERATURE: 97.5 F | OXYGEN SATURATION: 99 % | RESPIRATION RATE: 32 BRPM

## 2022-02-16 NOTE — HOME HEALTH
SNV for cp assessment and t/i on disease management r/t COPD. Nurse called patient for SNV. Patient unable to talk d/t SOB. Patient able to voice son on his way over and request nurse to come ASAP. Patient sitting on the side of bed on nurse arrival. O2 patent @ 2LPM via n/c. Nurse noted patient using accessory muscle and increase effort to breath. Nurse took patient v/s and wnl for patient and skin coloe wnl.  Nurse assited patient to bed with HOB 90 and legs up. Nurse instructed not to talk and preserve her energy, also instructed patient on deep breathing, Patient voiced she unable to breathe through nostril d/t dryness. Nurse question if patient has humidifer for concentrator. Patient voiced yes and son retrieved. Nurse hooked up humidifer and water trap. Nurse given patient breathing treatment. Son voiced patient had appoitnment with PCP today. Nurse call to see if patient can be seen later today and was informed APRN visits are fulled and will need to reschedule for another day. Nurse called Kathe Lock APRN with [facility] Cardiologist. Spoke with Mendy and orders received for labs- CBC w/Diff, BMP and chest xray. Patient has appoitment with Kathe 2/23/22; instead of next month. Patient voiced she feel better . Nurse noted wheezing in RML and nurse administer inhaler. Nurse obtain labs as order via venipuncture to LAC per aseptic technique. Patient emily well. Nurse infomred patient no activity and the need to rest today. Patient voiced her SOB started yesterday with physical therapy. Patient was instructed by PCP to wear oxygen at HS. Nurse informed patient she may need medication for anxiety. Patient and son agree. Nurse instructed son to consult with BRAYAN Archuleta next week. Nurse left patient in good condition. Nurse contacted [facility]    1800 Nurse called to check on patient. Nurse spoke to patient son. Son voiced patient only sob when up ab josh for restroom. Nurse question if neb tx  given. Son was unaware that patient suppose to take neb tx twice daily. Son infomred nurse [facility] will take chest x ray tomorrow.

## 2022-02-16 NOTE — CASE COMMUNICATION
Patient extremely SOB on rest. Patient o2 patent and SATs wnl on nurse assessment. Patient voiced sob started yesterday with therapy. Nurse contact cardiolgist received orders for lab and chest x ray. For more info-please refer to note

## 2022-02-17 ENCOUNTER — READMISSION MANAGEMENT (OUTPATIENT)
Dept: CALL CENTER | Facility: HOSPITAL | Age: 77
End: 2022-02-17

## 2022-02-17 ENCOUNTER — HOME CARE VISIT (OUTPATIENT)
Dept: HOME HEALTH SERVICES | Facility: HOME HEALTHCARE | Age: 77
End: 2022-02-17

## 2022-02-17 PROCEDURE — G0300 HHS/HOSPICE OF LPN EA 15 MIN: HCPCS

## 2022-02-17 NOTE — TELEPHONE ENCOUNTER
Pt is wanting to reschedule capsule due to her having another appointment. Please give pt a call back at [phone].

## 2022-02-17 NOTE — OUTREACH NOTE
COPD/PN Week 2 Survey      Responses   [facility] patient discharged from? [place]   Does the patient have one of the following disease processes/diagnoses(primary or secondary)? COPD/Pneumonia   Was the primary reason for admission: COPD exacerbation   Week 2 attempt successful? No   Unsuccessful attempts Attempt 1          Vera Foster RN

## 2022-02-18 ENCOUNTER — HOME CARE VISIT (OUTPATIENT)
Dept: HOME HEALTH SERVICES | Facility: HOME HEALTHCARE | Age: 77
End: 2022-02-18

## 2022-02-18 ENCOUNTER — TELEPHONE (OUTPATIENT)
Dept: CARDIOLOGY | Facility: CLINIC | Age: 77
End: 2022-02-18

## 2022-02-18 VITALS
SYSTOLIC BLOOD PRESSURE: 112 MMHG | OXYGEN SATURATION: 96 % | RESPIRATION RATE: 22 BRPM | BODY MASS INDEX: 15.78 KG/M2 | DIASTOLIC BLOOD PRESSURE: 58 MMHG | WEIGHT: 80.8 LBS | TEMPERATURE: 97.8 F | HEART RATE: 63 BPM

## 2022-02-18 VITALS
RESPIRATION RATE: 20 BRPM | DIASTOLIC BLOOD PRESSURE: 60 MMHG | OXYGEN SATURATION: 93 % | HEART RATE: 57 BPM | SYSTOLIC BLOOD PRESSURE: 92 MMHG

## 2022-02-18 PROCEDURE — G0151 HHCP-SERV OF PT,EA 15 MIN: HCPCS

## 2022-02-18 NOTE — HOME HEALTH
SNV for cp assessment and t/i on disease management r/t COPD. Patient up ab josh with rollator. Patient wearing 02. Patient stated he nostril less dry since humidifier. Patient voiced she is feeling a little better, bur not she still get SOB on exertion. Nurse called Kathe office prior to SNV and spoke to Mendy to make sure office recieved chest xray. Mendy stated yes and APRN on vacation and will return next week but will consult with MD on call. Nurse reveiwed patient medications with patient and patient voiced she is not taking her Spirva as directed d/t medication changes her taste bud and coat her tongue. Nurse discuss medication indication and explain this help her COPD from exacerbation. Disuss using moth was afet each usage. Patient able to explain how to use medication and stated she will have son pick medication from pharmacy. Nurse instructed patient to reschedule her MD visit with PCP and remeinded her upcoming visit with cardiologist. Nurse left patient in good condition.

## 2022-02-18 NOTE — TELEPHONE ENCOUNTER
Discussed labs, heart monitor with patient. She is going to follow-up with her primary care physician next week regarding her lab work. She denies any bleeding. She is to go to the ER per EMS for any recurrent symptoms including, but not limited to: chest pain/pressure/tightness, weakness, Shortness of breath, dizziness, palpitations, near syncope or syncope. She verbalized understanding.    BRAYAN Dia

## 2022-02-21 ENCOUNTER — HOME CARE VISIT (OUTPATIENT)
Dept: HOME HEALTH SERVICES | Facility: HOME HEALTHCARE | Age: 77
End: 2022-02-21

## 2022-02-21 NOTE — CASE COMMUNICATION
Please route to Dr Gomez's office.     OT services are not necessary at this time. Pt is completing ADL's I.

## 2022-02-22 ENCOUNTER — READMISSION MANAGEMENT (OUTPATIENT)
Dept: CALL CENTER | Facility: HOSPITAL | Age: 77
End: 2022-02-22

## 2022-02-22 ENCOUNTER — HOME CARE VISIT (OUTPATIENT)
Dept: HOME HEALTH SERVICES | Facility: HOME HEALTHCARE | Age: 77
End: 2022-02-22

## 2022-02-22 VITALS — OXYGEN SATURATION: 94 % | RESPIRATION RATE: 20 BRPM | HEART RATE: 83 BPM

## 2022-02-22 PROCEDURE — G0151 HHCP-SERV OF PT,EA 15 MIN: HCPCS

## 2022-02-22 PROCEDURE — G0300 HHS/HOSPICE OF LPN EA 15 MIN: HCPCS

## 2022-02-22 NOTE — OUTREACH NOTE
COPD/PN Week 2 Survey      Responses   [facility] patient discharged from? [place]   Does the patient have one of the following disease processes/diagnoses(primary or secondary)? COPD/Pneumonia   Was the primary reason for admission: COPD exacerbation   Week 2 attempt successful? No   Unsuccessful attempts Attempt 2          Roman Dan RN

## 2022-02-22 NOTE — HOME HEALTH
Pt presented sitting at the kitchen table with 2 family members present. SN had just left.  BP per SN.    Pt is waiting for blood work results from MD florentino yesterday.  She appears SOA sitting at rest/talking. Call placed to PCP office to request lab results, waiting call return.   Pt is physically ok with her mobility, but exhibits decreased functional stamina.   Pt agreeable to PT DC.    Addendum: pt called therapist and said her hemoglobin = 9.4

## 2022-02-23 ENCOUNTER — OFFICE VISIT (OUTPATIENT)
Dept: CARDIOLOGY | Facility: CLINIC | Age: 77
End: 2022-02-23

## 2022-02-23 VITALS
BODY MASS INDEX: 16.1 KG/M2 | WEIGHT: 82 LBS | DIASTOLIC BLOOD PRESSURE: 60 MMHG | HEIGHT: 60 IN | SYSTOLIC BLOOD PRESSURE: 90 MMHG | HEART RATE: 106 BPM

## 2022-02-23 VITALS
TEMPERATURE: 97.5 F | DIASTOLIC BLOOD PRESSURE: 52 MMHG | HEART RATE: 72 BPM | OXYGEN SATURATION: 98 % | SYSTOLIC BLOOD PRESSURE: 100 MMHG | RESPIRATION RATE: 18 BRPM

## 2022-02-23 DIAGNOSIS — R06.02 BREATH SHORTNESS: ICD-10-CM

## 2022-02-23 DIAGNOSIS — Z72.0 TOBACCO ABUSE: ICD-10-CM

## 2022-02-23 DIAGNOSIS — Z09 HOSPITAL DISCHARGE FOLLOW-UP: ICD-10-CM

## 2022-02-23 DIAGNOSIS — R06.02 SOB (SHORTNESS OF BREATH): ICD-10-CM

## 2022-02-23 DIAGNOSIS — I48.0 PAROXYSMAL ATRIAL FIBRILLATION: Primary | ICD-10-CM

## 2022-02-23 DIAGNOSIS — I50.30 DIASTOLIC CONGESTIVE HEART FAILURE, UNSPECIFIED HF CHRONICITY: ICD-10-CM

## 2022-02-23 PROCEDURE — 93000 ELECTROCARDIOGRAM COMPLETE: CPT | Performed by: NURSE PRACTITIONER

## 2022-02-23 PROCEDURE — 99214 OFFICE O/P EST MOD 30 MIN: CPT | Performed by: NURSE PRACTITIONER

## 2022-02-23 RX ORDER — NITROGLYCERIN 400 UG/1
1 SPRAY ORAL
COMMUNITY

## 2022-02-23 RX ORDER — SUCRALFATE 1 G/1
1 TABLET ORAL 4 TIMES DAILY
COMMUNITY
End: 2022-11-10 | Stop reason: ALTCHOICE

## 2022-02-23 NOTE — HOME HEALTH
SNV for cp assessment and t/i on disease management r/t COPD. Patient sitting at kitchen table. Family members present. O2 patient via n/c. Patient voiced she seen PCP yesterday and was SOA had to use w/c. Patient voiced PCP obtain lab work and informed patient depending on lab work result that patient may have to recommedned to  infectious disease MD to find why patient H/H continues to decrease, plus patient may need blood transfusion.. Patient stead she is taking her Spirva as presscribed and no complaints. Patient c/o sore throat. Nurse instructed to gargle with salt and warm water> Patient has upcoming appointment with cardiologist tomorrow. Son will decussed with MD about prescribing something for anxiety. Patient and family memebrs have no further questiona and patient left in good condition.

## 2022-02-23 NOTE — PROGRESS NOTES
Subjective:        Celia Baird is a 77 y.o. female who here for follow up    Chief Complaint   Patient presents with   • Hospital Follow Up Visit       HPI      This is a 77-year-old female who is new to me.  She has a history to include COPD, CAD with history of stent, atrial fibrillation watchman's procedure, history of intracranial bleed from fall, tobacco abuse, irregular heartbeat, and primary biliary cirrhosis.  On January 26, 2024 acute on chronic hypoxic respiratory failure, symptomatic anemia, COPD, status post as toxicity, chronic diastolic CHF, PAF status post watchman's device, and hypertension.  She presented to [facility] with notable symptomatic anemia and heme positive stools. She was admitted for medical management.  She underwent an upper and lower endoscopy with unremarkable except for diverticulosis and colon polyps.  Her CT showed no evidence of pulmonary embolism.  At discharge she was in stable condition for discharge with her hemoglobin 9.4.  She stated at discharge she was not going to smoke.    On February 14, 2022 holter was a normal monitor study.  Echo on January 13, 2022 revealed EF 61.9%, LV diastolic function was normal and no evidence of pericardial effusion.      The following portions of the patient's history were reviewed and updated as appropriate: allergies, current medications, past family history, past medical history, past social history, past surgical history and problem list.    Past Medical History:   Diagnosis Date   • Atrial fibrillation (HCC)    • COPD (chronic obstructive pulmonary disease) (HCC)    • History of frequent urinary tract infections    • Irregular heart beat    • Kidney stones    • PBC (primary biliary cirrhosis)    • PBC (primary biliary cirrhosis)          reports that she has been smoking cigarettes. She has a 29.50 pack-year smoking history. She has never used smokeless tobacco. She reports that she does not drink alcohol and does  not use drugs.     Family History   Problem Relation Age of Onset   • Heart disease Father    • Heart attack Father    • Heart disease Brother    • Stroke Mother        ROS     Review of Systems  Constitutional: No wt loss, fever, fatigue  Gastrointestinal: No nausea, abdominal pain  Behavioral/Psych: No insomnia or anxiety  Cardiovascular :Denies chest pain. +shortness of breath and palpitations      Objective:           Vitals and nursing note reviewed.   Constitutional:       Appearance: Well-developed.   HENT:      Head: Normocephalic.      Right Ear: External ear normal.      Left Ear: External ear normal.   Neck:      Vascular: No JVD.   Pulmonary:      Effort: Pulmonary effort is normal. No respiratory distress.      Breath sounds: Normal breath sounds. No stridor. No rales.   Cardiovascular:      Normal rate. Regular rhythm.      No gallop.   Pulses:     Intact distal pulses.   Abdominal:      General: Bowel sounds are normal. There is no distension.      Palpations: Abdomen is soft.      Tenderness: There is no abdominal tenderness. There is no guarding.   Musculoskeletal: Normal range of motion.         General: No tenderness.      Cervical back: Normal range of motion. Skin:     General: Skin is warm.   Neurological:      Mental Status: Alert and oriented to person, place, and time.      Deep Tendon Reflexes: Reflexes are normal and symmetric.   Psychiatric:         Judgment: Judgment normal.           ECG 12 Lead    Date/Time: 2/23/2022 2:51 PM  Performed by: Kathe Lock APRN  Authorized by: Kathe Lock APRN   Comparison: compared with previous ECG   Rhythm: sinus tachycardia  Ectopy: bigeminy  Rate: tachycardic  BPM: 106    Clinical impression: abnormal EKG            2/14/22  Interpretation Summary    · A normal monitor study.  · NSR, RARE PAC, RARE PVC       1/13/22  Interpretation Summary    · Calculated left ventricular EF = 61.9% Estimated left ventricular EF was in agreement with  the calculated left ventricular EF.  · Left ventricular diastolic function was normal.  · There is no evidence of pericardial effusion. .    2/18/19  Interpretation Summary       · Findings consistent with a normal ECG stress test.  · Left ventricular ejection fraction is normal (Calculated EF = 70%).  · Myocardial perfusion imaging indicates a normal myocardial perfusion study with no evidence of ischemia.  · Impressions are consistent with a low risk study.     Asymptomatic for chest pain. ECG is negative for ischemia.   Ectopy: Rare PAC at baseline, none with exercise, occasional PVC in recovery  HR and BP response : Appropriate for Beta-blocker therapy  Pharmacologic study due to inability to tolerate increasing speed and grade of treadmill due to Pulmonary, mobility  Issues and Beta-blocker therapy.  Participated in Low Level exercise and tolerance is poor.      2017    Conclusion       · Successful Linq implantation               Current Outpatient Medications:   •  aspirin 81 MG EC tablet, Take 81 mg by mouth Daily., Disp: , Rfl:   •  bumetanide (BUMEX) 0.5 MG tablet, Take 0.5 mg by mouth Daily., Disp: , Rfl:   •  clopidogrel (PLAVIX) 75 MG tablet, Take 75 mg by mouth Daily., Disp: , Rfl:   •  ferrous gluconate (FERGON) 324 MG tablet, Take 324 mg by mouth Daily With Breakfast., Disp: , Rfl:   •  ipratropium-albuterol (DUO-NEB) 0.5-2.5 mg/3 ml nebulizer, Take 3 mL by nebulization 2 (Two) Times a Day., Disp: , Rfl:   •  mirtazapine (REMERON) 7.5 MG tablet, Take 1 tablet by mouth Every Night for 30 days., Disp: 30 tablet, Rfl: 0  •  O2 (OXYGEN), Inhale 2 L/min As Needed (sob)., Disp: , Rfl:   •  potassium chloride 10 MEQ CR tablet, Take 1 tablet by mouth 2 (Two) Times a Day With Meals for 30 days., Disp: 60 tablet, Rfl: 0  •  propafenone (RYTHMOL) 150 MG tablet, Take 1 tablet by mouth Every 8 (Eight) Hours for 30 days., Disp: 90 tablet, Rfl: 0  •  rosuvastatin (CRESTOR) 5 MG tablet, Take 5 mg by mouth Daily.,  Disp: , Rfl:   •  spironolactone (ALDACTONE) 25 MG tablet, Take 1 tablet by mouth Daily for 30 days., Disp: 30 tablet, Rfl: 0  •  tiotropium bromide monohydrate (SPIRIVA RESPIMAT) 2.5 MCG/ACT aerosol solution inhaler, Inhale 2 puffs Daily for 30 days., Disp: 1 g, Rfl: 0  •  ursodiol (ACTIGALL) 300 MG capsule, Take 300 mg by mouth 3 (Three) Times a Day., Disp: , Rfl:      Assessment:        Patient Active Problem List   Diagnosis   • COPD exacerbation (HCC)   • Subarachnoid hemorrhage (HCC)   • Multiple skin tears   • Closed nondisplaced fracture of left clavicle   • Paroxysmal atrial fibrillation (HCC)   • Tobacco abuse   • Status post placement of implantable loop recorder   • SOB (shortness of breath)   • COPD with acute exacerbation (HCC)   • Severe malnutrition (CMS/HCC)   • Leukocytosis   • Gastrointestinal hemorrhage   • Absolute anemia               Plan:   1.  Hospital follow-up for atrial fibrillation: History of watchman's procedure.  Sinus tachycardia on EKG with heart rate of 107 with bigeminy. Her monitor was short, will do a 2 week monitor.    2. Hypotension: Blood pressure low normal but stable.  Not currently on medications.     3. Diastolic congestive heart failure continue Bumex.  Unable to add ACE or ARB due to decreased blood pressure.    Educated on low sodium (<2000mg/day) diet and exercise 30 minutes a day for 2-3 times a week.  She is to weigh herself daily if she gains more than 2 pounds in 1 day or 5 pounds in 1 week. Check for swelling in your feet, ankles, legs, and stomach.  Monitor for signs of Tigue, shortness of breath or cough.  she is to call the office for recommendations.    4.  Hyperlipidemia: Lipid panel on January 14, 2022 revealed , HDL 70, LDL 58, and triglycerides 62.    Risk of the hyperlipidemia, importance of the treatment has been explained. Pros and cons of the statins has been explained. Regular blood workup as well as side effects including the liver failure,  myelopathy death has been explained.       5. Tobacco abuse: She states she is has continued to smoke.     Celia Baird has been explained that tobacco abuse is extremely harmful to the body including to the cardiovascular, it significantly increases the risk of atherosclerosis, myocardial infarction, strokes and peripheral vascular disease. Strongly advised to stop tobacco abuse  Secondhand smoking also has been explained.           No diagnosis found.    There are no diagnoses linked to this encounter.    COUNSELING: done    Celia BairdBrodie was given to patient for the following topics: diagnostic results, risk factor reductions, impressions, risks and benefits of treatment options and importance of treatment compliance .       SMOKING COUNSELING: as above     She will have a holter.    Sincerely,   BRAYAN Dia  [facility]  02/23/22  14:28 EST    EMR Dragon/Transcription disclaimer:   Much of this encounter note is an electronic transcription/translation of spoken language to printed text. The electronic translation of spoken language may permit erroneous, or at times, nonsensical words or phrases to be inadvertently transcribed; Although I have reviewed the note for such errors, some may still exist.

## 2022-02-24 ENCOUNTER — HOME CARE VISIT (OUTPATIENT)
Dept: HOME HEALTH SERVICES | Facility: HOME HEALTHCARE | Age: 77
End: 2022-02-24

## 2022-02-24 VITALS
RESPIRATION RATE: 18 BRPM | OXYGEN SATURATION: 96 % | HEART RATE: 74 BPM | DIASTOLIC BLOOD PRESSURE: 60 MMHG | SYSTOLIC BLOOD PRESSURE: 102 MMHG

## 2022-02-24 PROCEDURE — G0495 RN CARE TRAIN/EDU IN HH: HCPCS

## 2022-03-02 ENCOUNTER — READMISSION MANAGEMENT (OUTPATIENT)
Dept: CALL CENTER | Facility: HOSPITAL | Age: 77
End: 2022-03-02

## 2022-03-02 NOTE — OUTREACH NOTE
COPD/PN Week 3 Survey      Responses   [facility] patient discharged from? [place]   Does the patient have one of the following disease processes/diagnoses(primary or secondary)? COPD/Pneumonia   Was the primary reason for admission: COPD exacerbation   Week 3 attempt successful? No   Unsuccessful attempts Attempt 1          Roman Dan RN

## 2022-03-04 ENCOUNTER — READMISSION MANAGEMENT (OUTPATIENT)
Dept: CALL CENTER | Facility: HOSPITAL | Age: 77
End: 2022-03-04

## 2022-03-04 NOTE — OUTREACH NOTE
COPD/PN Week 3 Survey      Responses   [facility] patient discharged from? [place]   Does the patient have one of the following disease processes/diagnoses(primary or secondary)? COPD/Pneumonia   Was the primary reason for admission: COPD exacerbation   Week 3 attempt successful? Yes   Call start time 1520   Call end time 1525   Discharge diagnosis Acute on chronic hypoxic respiratory failure, Symptomatic anemia   Is patient permission given to speak with other caregiver? No   Person spoke with today (if not patient) and relationship patient   Meds reviewed with patient/caregiver? Yes   Is the patient taking all medications as directed (includes completed medication regime)? Yes   Does the patient have a primary care provider?  Yes   Has the patient kept scheduled appointments due by today? Yes   Comments Cardiology appt 3/28/22   What is the Home health agency?  Patient reports that she has been discharged by HH.    DME comments Patient reports wears O2 1-2L as needed.    Pulse Ox monitoring Intermittent   Pulse Ox device source Patient   O2 Sat comments Patient reports 92-93% without O2, high 90's with O2   O2 Sat: education provided Sat levels,  Monitoring frequency,  When to seek care   Psychosocial issues? No   What is the patient's perception of their health status since discharge? Improving   Is the patient/caregiver able to teach back the hierarchy of who to call/visit for symptoms/problems? PCP, Specialist, Home health nurse, Urgent Care, ED, 911 Yes   Week 3 call completed? Yes   Wrap up additional comments Patient reports that she is doing well. Denies questions or needs today.           Vera Foster RN

## 2022-03-15 ENCOUNTER — READMISSION MANAGEMENT (OUTPATIENT)
Dept: CALL CENTER | Facility: HOSPITAL | Age: 77
End: 2022-03-15

## 2022-03-15 NOTE — OUTREACH NOTE
COPD/PN Week 4 Survey    Flowsheet Row Responses   [facility] patient discharged from? [place]   Does the patient have one of the following disease processes/diagnoses(primary or secondary)? COPD/Pneumonia   Was the primary reason for admission: COPD exacerbation   Week 4 attempt successful? Yes   Call start time 1310   Call end time 1311   General alerts for this patient Hx: CHF and COPD   Discharge diagnosis Acute on chronic hypoxic respiratory failure, Symptomatic anemia   Person spoke with today (if not patient) and relationship patient   Meds reviewed with patient/caregiver? Yes   Is the patient taking all medications as directed (includes completed medication regime)? Yes   Has the patient kept scheduled appointments due by today? Yes   Comments Cardiology appt 3/28/22   Is the patient still receiving Home Health Services? N/A   Pulse Ox monitoring Intermittent   Pulse Ox device source Patient   Psychosocial issues? No   What is the patient's perception of their health status since discharge? Improving   Are the patient's immunizations up to date?  Yes   Is the patient/caregiver able to teach back the hierarchy of who to call/visit for symptoms/problems? PCP, Specialist, Home health nurse, Urgent Care, ED, 911 Yes   Is the patient able to teach back COPD zones? Yes   Patient reports what zone on this call? Green Zone   Green Zone Reports doing well, Breathing without shortness of breath   Green Zone interventions: Take daily medications   Week 4 call completed? Yes   Would the patient like one additional call? No   Graduated Yes   Is the patient interested in additional calls from an ambulatory ?  NOTE:  applies to high risk patients requiring additional follow-up. No   Did the patient feel the follow up calls were helpful during their recovery period? Yes   Was the number of calls appropriate? Yes   Wrap up additional comments Patient reports that she is doing well. Denies questions or  needs today.           JAYCE MONTE - Registered Nurse

## 2022-03-28 ENCOUNTER — OFFICE VISIT (OUTPATIENT)
Dept: CARDIOLOGY | Facility: CLINIC | Age: 77
End: 2022-03-28

## 2022-03-28 VITALS
BODY MASS INDEX: 15.9 KG/M2 | SYSTOLIC BLOOD PRESSURE: 79 MMHG | WEIGHT: 81 LBS | DIASTOLIC BLOOD PRESSURE: 42 MMHG | HEART RATE: 82 BPM | HEIGHT: 60 IN

## 2022-03-28 DIAGNOSIS — Z79.02 LONG TERM (CURRENT) USE OF ANTITHROMBOTICS/ANTIPLATELETS: ICD-10-CM

## 2022-03-28 DIAGNOSIS — R06.02 SOB (SHORTNESS OF BREATH): ICD-10-CM

## 2022-03-28 DIAGNOSIS — Z95.818 STATUS POST PLACEMENT OF IMPLANTABLE LOOP RECORDER: ICD-10-CM

## 2022-03-28 DIAGNOSIS — I48.0 PAROXYSMAL ATRIAL FIBRILLATION: Primary | ICD-10-CM

## 2022-03-28 DIAGNOSIS — E78.5 HYPERLIPIDEMIA, UNSPECIFIED HYPERLIPIDEMIA TYPE: ICD-10-CM

## 2022-03-28 PROCEDURE — 99214 OFFICE O/P EST MOD 30 MIN: CPT | Performed by: INTERNAL MEDICINE

## 2022-03-28 RX ORDER — ALPRAZOLAM 0.25 MG/1
0.25 TABLET ORAL EVERY 8 HOURS PRN
COMMUNITY
Start: 2022-03-20

## 2022-03-28 RX ORDER — PANTOPRAZOLE SODIUM 40 MG/1
40 TABLET, DELAYED RELEASE ORAL 2 TIMES DAILY
COMMUNITY
Start: 2022-03-10 | End: 2022-11-10 | Stop reason: ALTCHOICE

## 2022-03-28 NOTE — PROGRESS NOTES
TEST RESULTS   Subjective:        Celia Baird is a 77 y.o. female who here for follow up    CC  HOLTER  HPI  77-year-old female with paroxysmal atrial fibrillation, shortness of breath, hyperlipidemia here for the follow-up with a Holter monitor showing irregular heartbeats with no chest pains or tightness in the chest     Problems Addressed this Visit        Other    Paroxysmal atrial fibrillation (HCC) - Primary    Relevant Orders    Adult Transthoracic Echo Complete W/ Cont if Necessary Per Protocol    Status post placement of implantable loop recorder    SOB (shortness of breath)    Hyperlipidemia    Long term (current) use of antithrombotics/antiplatelets      Diagnoses       Codes Comments    Paroxysmal atrial fibrillation (HCC)    -  Primary ICD-10-CM: I48.0  ICD-9-CM: 427.31     SOB (shortness of breath)     ICD-10-CM: R06.02  ICD-9-CM: 786.05     Status post placement of implantable loop recorder     ICD-10-CM: Z95.818  ICD-9-CM: V45.09     Hyperlipidemia, unspecified hyperlipidemia type     ICD-10-CM: E78.5  ICD-9-CM: 272.4     Long term (current) use of antithrombotics/antiplatelets     ICD-10-CM: Z79.02  ICD-9-CM: V58.63         .    The following portions of the patient's history were reviewed and updated as appropriate: allergies, current medications, past family history, past medical history, past social history, past surgical history and problem list.    Past Medical History:   Diagnosis Date   • Atrial fibrillation (HCC)    • COPD (chronic obstructive pulmonary disease) (HCC)    • History of frequent urinary tract infections    • Irregular heart beat    • Kidney stones    • PBC (primary biliary cirrhosis)    • PBC (primary biliary cirrhosis)      reports that she has been smoking cigarettes. She has a 29.50 pack-year smoking history. She has never used smokeless tobacco. She reports that she does not drink alcohol and does not use drugs.   Family History   Problem Relation Age of Onset   • Heart  "disease Father    • Heart attack Father    • Heart disease Brother    • Stroke Mother        Review of Systems  Constitutional: No wt loss, fever, fatigue  Gastrointestinal: No nausea, abdominal pain  Behavioral/Psych: No insomnia or anxiety   Cardiovascular no chest pains or tightness in the chest  Objective:       Physical Exam  BP (!) 79/42   Pulse 82   Ht 152.4 cm (60\")   Wt 36.7 kg (81 lb)   BMI 15.82 kg/m²   General appearance: No acute changes   Neck: Trachea midline; NECK, supple, no thyromegaly or lymphadenopathy   Lungs: Normal size and shape, normal breath sounds, equal distribution of air, no rales and rhonchi   CV: S1-S2 regular, no murmurs, no rub, no gallop   Abdomen: Soft, nontender; no masses , no abnormal abdominal sounds   Extremities: No deformity , normal color , no peripheral edema   Skin: Normal temperature, turgor and texture; no rash, ulcers          Procedures    · NSR, FREQUENT PACS, PVCS, BIGEMINY, TRIGEMINY, NSSVTS      Echocardiogram:        Current Outpatient Medications:   •  ALPRAZolam (XANAX) 0.25 MG tablet, Take 0.25 mg by mouth Every 8 (Eight) Hours As Needed. for anxiety, Disp: , Rfl:   •  aspirin 81 MG EC tablet, Take 81 mg by mouth Daily., Disp: , Rfl:   •  bumetanide (BUMEX) 0.5 MG tablet, Take 0.5 mg by mouth Daily., Disp: , Rfl:   •  clopidogrel (PLAVIX) 75 MG tablet, Take 75 mg by mouth Daily., Disp: , Rfl:   •  ferrous gluconate (FERGON) 324 MG tablet, Take 324 mg by mouth Daily With Breakfast., Disp: , Rfl:   •  ipratropium-albuterol (DUO-NEB) 0.5-2.5 mg/3 ml nebulizer, Take 3 mL by nebulization 2 (Two) Times a Day., Disp: , Rfl:   •  mirtazapine (REMERON) 7.5 MG tablet, Take 1 tablet by mouth Every Night for 30 days., Disp: 30 tablet, Rfl: 0  •  nystatin (MYCOSTATIN) 100,000 unit/mL suspension, , Disp: , Rfl:   •  O2 (OXYGEN), Inhale 2 L/min As Needed (sob)., Disp: , Rfl:   •  pantoprazole (PROTONIX) 40 MG EC tablet, Take 40 mg by mouth 2 (Two) Times a Day., Disp: , " Rfl:   •  ProAir  (90 Base) MCG/ACT inhaler, Inhale 1-2 puffs As Needed. every 4 to 6 hours, Disp: , Rfl:   •  propafenone (RYTHMOL) 150 MG tablet, Take 1 tablet by mouth Every 8 (Eight) Hours for 30 days., Disp: 90 tablet, Rfl: 0  •  rosuvastatin (CRESTOR) 5 MG tablet, Take 5 mg by mouth Daily., Disp: , Rfl:   •  sucralfate (CARAFATE) 1 g tablet, Take 1 g by mouth 4 (Four) Times a Day., Disp: , Rfl:   •  ursodiol (ACTIGALL) 300 MG capsule, Take 300 mg by mouth 3 (Three) Times a Day., Disp: , Rfl:   •  nitroglycerin (NITROLINGUAL) 0.4 MG/SPRAY spray, Place 1 spray under the tongue Every 5 (Five) Minutes As Needed for Chest Pain., Disp: , Rfl:   •  spironolactone (ALDACTONE) 25 MG tablet, Take 1 tablet by mouth Daily for 30 days., Disp: 30 tablet, Rfl: 0  •  tiotropium bromide monohydrate (SPIRIVA RESPIMAT) 2.5 MCG/ACT aerosol solution inhaler, Inhale 2 puffs Daily for 30 days., Disp: 1 g, Rfl: 0   Assessment:        Patient Active Problem List   Diagnosis   • COPD exacerbation (HCC)   • Subarachnoid hemorrhage (HCC)   • Multiple skin tears   • Closed nondisplaced fracture of left clavicle   • Paroxysmal atrial fibrillation (HCC)   • Tobacco abuse   • Status post placement of implantable loop recorder   • SOB (shortness of breath)   • COPD with acute exacerbation (HCC)   • Severe malnutrition (CMS/HCC)   • Leukocytosis   • Gastrointestinal hemorrhage   • Absolute anemia               Plan:            ICD-10-CM ICD-9-CM   1. Paroxysmal atrial fibrillation (HCC)  I48.0 427.31   2. SOB (shortness of breath)  R06.02 786.05   3. Status post placement of implantable loop recorder  Z95.818 V45.09   4. Hyperlipidemia, unspecified hyperlipidemia type  E78.5 272.4   5. Long term (current) use of antithrombotics/antiplatelets  Z79.02 V58.63     1. Paroxysmal atrial fibrillation (HCC)  Under control  - Adult Transthoracic Echo Complete W/ Cont if Necessary Per Protocol    2. SOB (shortness of breath)  Continue current  treatment    3. Status post placement of implantable loop recorder  Continue to monitor    4. Hyperlipidemia, unspecified hyperlipidemia type  Blood pressure under control    5. Long term (current) use of antithrombotics/antiplatelets  Continue current treatment       BP LOW USE BUMEX PRN    SEE IN 1 YR WITH ECHO  COUNSELING:    Celia Colmenares was given to patient for the following topics: diagnostic results, risk factor reductions, impressions, risks and benefits of treatment options and importance of treatment compliance .       SMOKING COUNSELING:    [unfilled]    Dictated using Dragon dictation

## 2022-03-29 PROBLEM — Z79.02 LONG TERM (CURRENT) USE OF ANTITHROMBOTICS/ANTIPLATELETS: Status: ACTIVE | Noted: 2022-03-29

## 2022-03-29 PROBLEM — E78.5 HYPERLIPIDEMIA: Status: ACTIVE | Noted: 2022-03-29

## 2022-05-04 ENCOUNTER — HOSPITAL ENCOUNTER (OUTPATIENT)
Facility: HOSPITAL | Age: 77
Setting detail: OBSERVATION
Discharge: HOME | End: 2022-05-10
Attending: EMERGENCY MEDICINE | Admitting: HOSPITALIST

## 2022-05-04 ENCOUNTER — APPOINTMENT (OUTPATIENT)
Dept: GENERAL RADIOLOGY | Facility: HOSPITAL | Age: 77
End: 2022-05-04

## 2022-05-04 DIAGNOSIS — J44.1 COPD WITH EXACERBATION: Primary | ICD-10-CM

## 2022-05-04 DIAGNOSIS — J96.11 CHRONIC HYPOXEMIC RESPIRATORY FAILURE: ICD-10-CM

## 2022-05-04 LAB
ALBUMIN SERPL-MCNC: 3.5 G/DL (ref 3.5–5.2)
ALBUMIN/GLOB SERPL: 1.4 G/DL
ALP SERPL-CCNC: 153 U/L (ref 39–117)
ALT SERPL W P-5'-P-CCNC: 6 U/L (ref 1–33)
ANION GAP SERPL CALCULATED.3IONS-SCNC: 9.2 MMOL/L (ref 5–15)
ARTERIAL PATENCY WRIST A: ABNORMAL
AST SERPL-CCNC: 9 U/L (ref 1–32)
ATMOSPHERIC PRESS: 754.1 MMHG
B PARAPERT DNA SPEC QL NAA+PROBE: NOT DETECTED
B PERT DNA SPEC QL NAA+PROBE: NOT DETECTED
BASE EXCESS BLDA CALC-SCNC: 4 MMOL/L (ref 0–2)
BASOPHILS # BLD AUTO: 0.09 10*3/MM3 (ref 0–0.2)
BASOPHILS NFR BLD AUTO: 0.8 % (ref 0–1.5)
BDY SITE: ABNORMAL
BILIRUB SERPL-MCNC: <0.2 MG/DL (ref 0–1.2)
BUN SERPL-MCNC: 15 MG/DL (ref 8–23)
BUN/CREAT SERPL: 19.5 (ref 7–25)
C PNEUM DNA NPH QL NAA+NON-PROBE: NOT DETECTED
CALCIUM SPEC-SCNC: 9.9 MG/DL (ref 8.6–10.5)
CHLORIDE SERPL-SCNC: 103 MMOL/L (ref 98–107)
CO2 SERPL-SCNC: 30.8 MMOL/L (ref 22–29)
CREAT SERPL-MCNC: 0.77 MG/DL (ref 0.57–1)
D-LACTATE SERPL-SCNC: 1.2 MMOL/L (ref 0.5–2)
DEPRECATED RDW RBC AUTO: 39.7 FL (ref 37–54)
EGFRCR SERPLBLD CKD-EPI 2021: 79.6 ML/MIN/1.73
EOSINOPHIL # BLD AUTO: 0.47 10*3/MM3 (ref 0–0.4)
EOSINOPHIL NFR BLD AUTO: 4.3 % (ref 0.3–6.2)
ERYTHROCYTE [DISTWIDTH] IN BLOOD BY AUTOMATED COUNT: 12.3 % (ref 12.3–15.4)
FLUAV SUBTYP SPEC NAA+PROBE: NOT DETECTED
FLUBV RNA ISLT QL NAA+PROBE: NOT DETECTED
GAS FLOW AIRWAY: 2 LPM
GLOBULIN UR ELPH-MCNC: 2.5 GM/DL
GLUCOSE SERPL-MCNC: 97 MG/DL (ref 65–99)
HADV DNA SPEC NAA+PROBE: NOT DETECTED
HCO3 BLDA-SCNC: 28.6 MMOL/L (ref 22–28)
HCOV 229E RNA SPEC QL NAA+PROBE: NOT DETECTED
HCOV HKU1 RNA SPEC QL NAA+PROBE: NOT DETECTED
HCOV NL63 RNA SPEC QL NAA+PROBE: NOT DETECTED
HCOV OC43 RNA SPEC QL NAA+PROBE: NOT DETECTED
HCT VFR BLD AUTO: 27.7 % (ref 34–46.6)
HGB BLD-MCNC: 8.9 G/DL (ref 12–15.9)
HMPV RNA NPH QL NAA+NON-PROBE: NOT DETECTED
HPIV1 RNA ISLT QL NAA+PROBE: NOT DETECTED
HPIV2 RNA SPEC QL NAA+PROBE: NOT DETECTED
HPIV3 RNA NPH QL NAA+PROBE: NOT DETECTED
HPIV4 P GENE NPH QL NAA+PROBE: NOT DETECTED
IMM GRANULOCYTES # BLD AUTO: 0.05 10*3/MM3 (ref 0–0.05)
IMM GRANULOCYTES NFR BLD AUTO: 0.5 % (ref 0–0.5)
LYMPHOCYTES # BLD AUTO: 3.07 10*3/MM3 (ref 0.7–3.1)
LYMPHOCYTES NFR BLD AUTO: 27.8 % (ref 19.6–45.3)
M PNEUMO IGG SER IA-ACNC: NOT DETECTED
MAGNESIUM SERPL-MCNC: 1.9 MG/DL (ref 1.6–2.4)
MCH RBC QN AUTO: 28.9 PG (ref 26.6–33)
MCHC RBC AUTO-ENTMCNC: 32.1 G/DL (ref 31.5–35.7)
MCV RBC AUTO: 89.9 FL (ref 79–97)
MODALITY: ABNORMAL
MONOCYTES # BLD AUTO: 1.53 10*3/MM3 (ref 0.1–0.9)
MONOCYTES NFR BLD AUTO: 13.9 % (ref 5–12)
NEUTROPHILS NFR BLD AUTO: 5.83 10*3/MM3 (ref 1.7–7)
NEUTROPHILS NFR BLD AUTO: 52.7 % (ref 42.7–76)
NRBC BLD AUTO-RTO: 0 /100 WBC (ref 0–0.2)
NT-PROBNP SERPL-MCNC: 737 PG/ML (ref 0–1800)
PCO2 BLDA: 42.4 MM HG (ref 35–45)
PH BLDA: 7.44 PH UNITS (ref 7.35–7.45)
PLATELET # BLD AUTO: 380 10*3/MM3 (ref 140–450)
PMV BLD AUTO: 10.2 FL (ref 6–12)
PO2 BLDA: 105.2 MM HG (ref 80–100)
POTASSIUM SERPL-SCNC: 4 MMOL/L (ref 3.5–5.2)
PROT SERPL-MCNC: 6 G/DL (ref 6–8.5)
QT INTERVAL: 324 MS
RBC # BLD AUTO: 3.08 10*6/MM3 (ref 3.77–5.28)
RHINOVIRUS RNA SPEC NAA+PROBE: NOT DETECTED
RSV RNA NPH QL NAA+NON-PROBE: NOT DETECTED
SAO2 % BLDCOA: 98.2 % (ref 92–99)
SARS-COV-2 RNA NPH QL NAA+NON-PROBE: NOT DETECTED
SODIUM SERPL-SCNC: 143 MMOL/L (ref 136–145)
TOTAL RATE: 28 BREATHS/MINUTE
TROPONIN T SERPL-MCNC: <0.01 NG/ML (ref 0–0.03)
TROPONIN T SERPL-MCNC: <0.01 NG/ML (ref 0–0.03)
WBC NRBC COR # BLD: 11.04 10*3/MM3 (ref 3.4–10.8)

## 2022-05-04 PROCEDURE — 99284 EMERGENCY DEPT VISIT MOD MDM: CPT

## 2022-05-04 PROCEDURE — 99285 EMERGENCY DEPT VISIT HI MDM: CPT

## 2022-05-04 PROCEDURE — 80053 COMPREHEN METABOLIC PANEL: CPT | Performed by: EMERGENCY MEDICINE

## 2022-05-04 PROCEDURE — G0378 HOSPITAL OBSERVATION PER HR: HCPCS

## 2022-05-04 PROCEDURE — 96375 TX/PRO/DX INJ NEW DRUG ADDON: CPT

## 2022-05-04 PROCEDURE — 93010 ELECTROCARDIOGRAM REPORT: CPT | Performed by: INTERNAL MEDICINE

## 2022-05-04 PROCEDURE — 83605 ASSAY OF LACTIC ACID: CPT | Performed by: EMERGENCY MEDICINE

## 2022-05-04 PROCEDURE — 71045 X-RAY EXAM CHEST 1 VIEW: CPT

## 2022-05-04 PROCEDURE — 25010000002 METHYLPREDNISOLONE PER 40 MG: Performed by: HOSPITALIST

## 2022-05-04 PROCEDURE — 94761 N-INVAS EAR/PLS OXIMETRY MLT: CPT

## 2022-05-04 PROCEDURE — 36415 COLL VENOUS BLD VENIPUNCTURE: CPT

## 2022-05-04 PROCEDURE — 94640 AIRWAY INHALATION TREATMENT: CPT

## 2022-05-04 PROCEDURE — 84484 ASSAY OF TROPONIN QUANT: CPT | Performed by: EMERGENCY MEDICINE

## 2022-05-04 PROCEDURE — 93005 ELECTROCARDIOGRAM TRACING: CPT | Performed by: EMERGENCY MEDICINE

## 2022-05-04 PROCEDURE — 94664 DEMO&/EVAL PT USE INHALER: CPT

## 2022-05-04 PROCEDURE — 94799 UNLISTED PULMONARY SVC/PX: CPT

## 2022-05-04 PROCEDURE — 85025 COMPLETE CBC W/AUTO DIFF WBC: CPT | Performed by: EMERGENCY MEDICINE

## 2022-05-04 PROCEDURE — 0202U NFCT DS 22 TRGT SARS-COV-2: CPT | Performed by: EMERGENCY MEDICINE

## 2022-05-04 PROCEDURE — 36600 WITHDRAWAL OF ARTERIAL BLOOD: CPT

## 2022-05-04 PROCEDURE — 82803 BLOOD GASES ANY COMBINATION: CPT

## 2022-05-04 PROCEDURE — 96376 TX/PRO/DX INJ SAME DRUG ADON: CPT

## 2022-05-04 PROCEDURE — 87040 BLOOD CULTURE FOR BACTERIA: CPT | Performed by: EMERGENCY MEDICINE

## 2022-05-04 PROCEDURE — 94760 N-INVAS EAR/PLS OXIMETRY 1: CPT

## 2022-05-04 PROCEDURE — 25010000002 METHYLPREDNISOLONE PER 125 MG: Performed by: EMERGENCY MEDICINE

## 2022-05-04 PROCEDURE — 83735 ASSAY OF MAGNESIUM: CPT | Performed by: EMERGENCY MEDICINE

## 2022-05-04 PROCEDURE — 83880 ASSAY OF NATRIURETIC PEPTIDE: CPT | Performed by: EMERGENCY MEDICINE

## 2022-05-04 RX ORDER — BUDESONIDE AND FORMOTEROL FUMARATE DIHYDRATE 160; 4.5 UG/1; UG/1
2 AEROSOL RESPIRATORY (INHALATION)
Status: DISCONTINUED | OUTPATIENT
Start: 2022-05-04 | End: 2022-05-05

## 2022-05-04 RX ORDER — SUCRALFATE 1 G/1
1 TABLET ORAL 4 TIMES DAILY
Status: DISCONTINUED | OUTPATIENT
Start: 2022-05-04 | End: 2022-05-10 | Stop reason: HOSPADM

## 2022-05-04 RX ORDER — ROSUVASTATIN CALCIUM 5 MG/1
5 TABLET, COATED ORAL DAILY
Status: DISCONTINUED | OUTPATIENT
Start: 2022-05-04 | End: 2022-05-10 | Stop reason: HOSPADM

## 2022-05-04 RX ORDER — IPRATROPIUM BROMIDE AND ALBUTEROL SULFATE 2.5; .5 MG/3ML; MG/3ML
3 SOLUTION RESPIRATORY (INHALATION)
Status: DISCONTINUED | OUTPATIENT
Start: 2022-05-04 | End: 2022-05-05

## 2022-05-04 RX ORDER — CLOPIDOGREL BISULFATE 75 MG/1
75 TABLET ORAL DAILY
Status: DISCONTINUED | OUTPATIENT
Start: 2022-05-04 | End: 2022-05-10 | Stop reason: HOSPADM

## 2022-05-04 RX ORDER — METHYLPREDNISOLONE SODIUM SUCCINATE 125 MG/2ML
125 INJECTION, POWDER, LYOPHILIZED, FOR SOLUTION INTRAMUSCULAR; INTRAVENOUS ONCE
Status: COMPLETED | OUTPATIENT
Start: 2022-05-04 | End: 2022-05-04

## 2022-05-04 RX ORDER — METHYLPREDNISOLONE SODIUM SUCCINATE 40 MG/ML
40 INJECTION, POWDER, LYOPHILIZED, FOR SOLUTION INTRAMUSCULAR; INTRAVENOUS EVERY 12 HOURS
Status: COMPLETED | OUTPATIENT
Start: 2022-05-04 | End: 2022-05-06

## 2022-05-04 RX ORDER — IPRATROPIUM BROMIDE AND ALBUTEROL SULFATE 2.5; .5 MG/3ML; MG/3ML
3 SOLUTION RESPIRATORY (INHALATION) ONCE
Status: DISCONTINUED | OUTPATIENT
Start: 2022-05-04 | End: 2022-05-04

## 2022-05-04 RX ORDER — GUAIFENESIN 600 MG/1
600 TABLET, EXTENDED RELEASE ORAL EVERY 12 HOURS SCHEDULED
Status: DISCONTINUED | OUTPATIENT
Start: 2022-05-04 | End: 2022-05-10 | Stop reason: HOSPADM

## 2022-05-04 RX ORDER — SODIUM CHLORIDE 0.9 % (FLUSH) 0.9 %
10 SYRINGE (ML) INJECTION AS NEEDED
Status: DISCONTINUED | OUTPATIENT
Start: 2022-05-04 | End: 2022-05-10 | Stop reason: HOSPADM

## 2022-05-04 RX ORDER — PANTOPRAZOLE SODIUM 40 MG/1
40 TABLET, DELAYED RELEASE ORAL
Status: DISCONTINUED | OUTPATIENT
Start: 2022-05-04 | End: 2022-05-10 | Stop reason: HOSPADM

## 2022-05-04 RX ORDER — PANTOPRAZOLE SODIUM 40 MG/1
40 TABLET, DELAYED RELEASE ORAL
Status: DISCONTINUED | OUTPATIENT
Start: 2022-05-04 | End: 2022-05-04

## 2022-05-04 RX ORDER — ALPRAZOLAM 0.25 MG/1
0.25 TABLET ORAL 4 TIMES DAILY PRN
Status: DISCONTINUED | OUTPATIENT
Start: 2022-05-04 | End: 2022-05-10

## 2022-05-04 RX ORDER — IPRATROPIUM BROMIDE AND ALBUTEROL SULFATE 2.5; .5 MG/3ML; MG/3ML
3 SOLUTION RESPIRATORY (INHALATION) ONCE
Status: COMPLETED | OUTPATIENT
Start: 2022-05-04 | End: 2022-05-04

## 2022-05-04 RX ORDER — URSODIOL 300 MG/1
300 CAPSULE ORAL 3 TIMES DAILY
Status: DISCONTINUED | OUTPATIENT
Start: 2022-05-04 | End: 2022-05-10 | Stop reason: HOSPADM

## 2022-05-04 RX ORDER — METHYLPREDNISOLONE SODIUM SUCCINATE 125 MG/2ML
125 INJECTION, POWDER, LYOPHILIZED, FOR SOLUTION INTRAMUSCULAR; INTRAVENOUS ONCE
Status: DISCONTINUED | OUTPATIENT
Start: 2022-05-04 | End: 2022-05-04

## 2022-05-04 RX ORDER — ASPIRIN 81 MG/1
81 TABLET, CHEWABLE ORAL DAILY
Status: DISCONTINUED | OUTPATIENT
Start: 2022-05-04 | End: 2022-05-10 | Stop reason: HOSPADM

## 2022-05-04 RX ADMIN — PANTOPRAZOLE SODIUM 40 MG: 40 TABLET, DELAYED RELEASE ORAL at 08:28

## 2022-05-04 RX ADMIN — SUCRALFATE 1 G: 1 TABLET ORAL at 12:31

## 2022-05-04 RX ADMIN — SUCRALFATE 1 G: 1 TABLET ORAL at 18:08

## 2022-05-04 RX ADMIN — SUCRALFATE 1 G: 1 TABLET ORAL at 21:09

## 2022-05-04 RX ADMIN — URSODIOL 300 MG: 300 CAPSULE ORAL at 21:09

## 2022-05-04 RX ADMIN — METHYLPREDNISOLONE SODIUM SUCCINATE 125 MG: 125 INJECTION, POWDER, FOR SOLUTION INTRAMUSCULAR; INTRAVENOUS at 04:49

## 2022-05-04 RX ADMIN — IPRATROPIUM BROMIDE AND ALBUTEROL SULFATE 3 ML: .5; 3 SOLUTION RESPIRATORY (INHALATION) at 11:08

## 2022-05-04 RX ADMIN — IPRATROPIUM BROMIDE AND ALBUTEROL SULFATE 3 ML: .5; 3 SOLUTION RESPIRATORY (INHALATION) at 16:43

## 2022-05-04 RX ADMIN — IPRATROPIUM BROMIDE AND ALBUTEROL SULFATE 3 ML: .5; 3 SOLUTION RESPIRATORY (INHALATION) at 04:23

## 2022-05-04 RX ADMIN — ASPIRIN 81 MG: 81 TABLET, CHEWABLE ORAL at 10:43

## 2022-05-04 RX ADMIN — ROSUVASTATIN CALCIUM 5 MG: 5 TABLET, FILM COATED ORAL at 12:31

## 2022-05-04 RX ADMIN — URSODIOL 300 MG: 300 CAPSULE ORAL at 12:31

## 2022-05-04 RX ADMIN — CLOPIDOGREL 75 MG: 75 TABLET, FILM COATED ORAL at 10:42

## 2022-05-04 RX ADMIN — URSODIOL 300 MG: 300 CAPSULE ORAL at 18:08

## 2022-05-04 RX ADMIN — METHYLPREDNISOLONE SODIUM SUCCINATE 40 MG: 40 INJECTION, POWDER, FOR SOLUTION INTRAMUSCULAR; INTRAVENOUS at 18:08

## 2022-05-04 RX ADMIN — PANTOPRAZOLE SODIUM 40 MG: 40 TABLET, DELAYED RELEASE ORAL at 18:08

## 2022-05-04 RX ADMIN — IPRATROPIUM BROMIDE AND ALBUTEROL SULFATE 3 ML: .5; 3 SOLUTION RESPIRATORY (INHALATION) at 19:08

## 2022-05-04 RX ADMIN — GUAIFENESIN 600 MG: 600 TABLET, EXTENDED RELEASE ORAL at 21:09

## 2022-05-04 RX ADMIN — IPRATROPIUM BROMIDE AND ALBUTEROL SULFATE 3 ML: .5; 3 SOLUTION RESPIRATORY (INHALATION) at 07:37

## 2022-05-04 NOTE — ED TRIAGE NOTES
To ER via EMS from home.  C/o SOA x 1 week with cough.  Saw PCP and told she had fluid on lungs and was prescribed prednisone.  Pt has appt with her pulmonologist today.  Hx COPD.  Pt on chronic O2 at 2L.  O2 Sat 98% on 2L.  Duoneb at home with some relief.     Pt in mask at time of triage.  Triage staff in appropriate PPE.

## 2022-05-04 NOTE — CASE MANAGEMENT/SOCIAL WORK
Discharge Planning Assessment  [facility]     Patient Name: Celia Baird  MRN: [id number]  Today's Date: 5/4/2022    Admit Date: 5/4/2022     Discharge Needs Assessment     Row Name 05/04/22 1124       Living Environment    People in Home alone  Lives alone, but her son has been staying w/ her since her last hospital stay, to help her    Current Living Arrangements home    Primary Care Provided by self    Provides Primary Care For no one    Family Caregiver if Needed child(yesenia), adult    Quality of Family Relationships supportive    Able to Return to Prior Arrangements yes       Resource/Environmental Concerns    Resource/Environmental Concerns none       Transition Planning    Patient/Family Anticipates Transition to home with family    Patient/Family Anticipated Services at Transition none    Transportation Anticipated family or friend will provide       Discharge Needs Assessment    Equipment Currently Used at Home nebulizer;oxygen;walker, rolling;wheelchair  Uses home oxygen at 1.5L, provided by [employer]    Concerns to be Addressed no discharge needs identified    Anticipated Changes Related to Illness none    Equipment Needed After Discharge none    Provided Post Acute Provider List? N/A    Provided Post Acute Provider Quality & Resource List? N/A               Discharge Plan     Row Name 05/04/22 1129       Plan    Plan Introduced self and explained role of CCP. PPE used. Info on facesheet verified    Provided Post Acute Provider List? N/A    Provided Post Acute Provider Quality & Resource List? N/A    Plan Comments Lives alone at home, and plans to return at d/c. Son has been staying w/ her since last hospitalization to help her. Independent w/ ADLs. Uses oxygen at 1.5 at home (provided by [employer]), nebulizer and rollator. Has a W/C at home which she only uses if she has to walk long distances. Denies any financial concerns. Denies any d/c needs              Continued Care and Services - Admitted Since  5/4/2022    Coordination has not been started for this encounter.     Selected Continued Care - Prior Encounters Includes selections from prior encounters from 2/3/2022 to 5/4/2022    Discharged on 2/7/2022 Admission date: 1/26/2022 - Discharge disposition: Home-Health Care c    Home Medical Care     Service Provider Selected Services Address Phone Fax Patient Preferred    [facility]  Home Health Services [address] [address] [phone] --                       Demographic Summary    No documentation.                Functional Status    No documentation.                Psychosocial    No documentation.                Abuse/Neglect    No documentation.                Legal    No documentation.                Substance Abuse    No documentation.                Patient Forms    No documentation.                   Ericka Dalton RN

## 2022-05-04 NOTE — CONSULTS
Group: [facility]         CONSULT NOTE    Patient Identification:  Celia Baird  77 y.o.  female  1945  [id number]            Requesting physician: DR Mcdaniel    Reason for Consultation:  COPD exacerbation    CC: SOA    History of Present Illness:  77-year-old female who presents with shortness of breath.  She is well-known to us.  She was in this hospital recently in February.  She presents with worsening shortness of breath, described as very severe with any minimal exertion, in her own words only 10 steps and she could barely breathe.  Her oxygen saturations dropped down to 84% on room air when she exerts herself at home.  She has been wheezing.  She has had a mild cough but denies fever, chills or hemoptysis.  I reviewed H&P by Dr. Mcdaniel.  I reviewed her old records.  She has a history of chronic respiratory failure on 1 L of oxygen.  She has paroxysmal atrial fibrillation, COPD which is quite advanced, and she recently quit smoking cigarettes.      Review of Systems   Constitutional: Positive for fatigue. Negative for diaphoresis and fever.   HENT: Negative for ear discharge and sore throat.    Eyes: Negative for pain and visual disturbance.   Respiratory: Positive for cough, shortness of breath and wheezing.    Cardiovascular: Negative for chest pain and leg swelling.   Gastrointestinal: Negative for abdominal pain and diarrhea.   Endocrine: Negative for cold intolerance and polyuria.   Genitourinary: Negative for dysuria and hematuria.   Musculoskeletal: Negative for joint swelling and myalgias.   Skin: Negative for rash and wound.   Neurological: Negative for speech difficulty and numbness.   Hematological: Negative for adenopathy. Does not bruise/bleed easily.   Psychiatric/Behavioral: Negative for agitation and confusion.       Past Medical History:  Past Medical History:   Diagnosis Date   • Atrial fibrillation (HCC)    • COPD (chronic obstructive pulmonary disease) (HCC)    • History of  frequent urinary tract infections    • Irregular heart beat    • Kidney stones    • PBC (primary biliary cirrhosis)    • PBC (primary biliary cirrhosis)        Past Surgical History:  Past Surgical History:   Procedure Laterality Date   • CARDIAC ELECTROPHYSIOLOGY PROCEDURE N/A 3/30/2017    Procedure:    LINQ;  Surgeon: Ailyn Solorio MD;  Location: [facility] CATH INVASIVE LOCATION;  Service:    • CHOLECYSTECTOMY     • COLONOSCOPY     • COLONOSCOPY N/A 1/28/2022    Procedure: COLONOSCOPY into cecum with polypectomy, clip placement;  Surgeon: Constantine Kaye MD;  Location: [facility] ENDOSCOPY;  Service: Gastroenterology;  Laterality: N/A;  poor prep, diverticulosis, polyp   • ENDOSCOPY N/A 1/28/2022    Procedure: ESOPHAGOGASTRODUODENOSCOPY with biopsy;  Surgeon: Constantine Kaye MD;  Location: [facility] ENDOSCOPY;  Service: Gastroenterology;  Laterality: N/A;  normal   • TUBAL ABDOMINAL LIGATION          Home Meds:  Medications Prior to Admission   Medication Sig Dispense Refill Last Dose   • ALPRAZolam (XANAX) 0.25 MG tablet Take 0.25 mg by mouth Every 8 (Eight) Hours As Needed. for anxiety      • aspirin 81 MG EC tablet Take 81 mg by mouth Daily.      • bumetanide (BUMEX) 0.5 MG tablet Take 0.5 mg by mouth Daily.      • clopidogrel (PLAVIX) 75 MG tablet Take 75 mg by mouth Daily.      • ferrous gluconate (FERGON) 324 MG tablet Take 324 mg by mouth Daily With Breakfast.      • ipratropium-albuterol (DUO-NEB) 0.5-2.5 mg/3 ml nebulizer Take 3 mL by nebulization 2 (Two) Times a Day.      • mirtazapine (REMERON) 7.5 MG tablet Take 1 tablet by mouth Every Night for 30 days. 30 tablet 0    • nitroglycerin (NITROLINGUAL) 0.4 MG/SPRAY spray Place 1 spray under the tongue Every 5 (Five) Minutes As Needed for Chest Pain.      • nystatin (MYCOSTATIN) 100,000 unit/mL suspension       • O2 (OXYGEN) Inhale 2 L/min As Needed (sob).      • pantoprazole (PROTONIX) 40 MG EC tablet Take 40 mg by mouth 2 (Two) Times a Day.     "  • ProAir  (90 Base) MCG/ACT inhaler Inhale 1-2 puffs As Needed. every 4 to 6 hours      • propafenone (RYTHMOL) 150 MG tablet Take 1 tablet by mouth Every 8 (Eight) Hours for 30 days. 90 tablet 0    • rosuvastatin (CRESTOR) 5 MG tablet Take 5 mg by mouth Daily.      • spironolactone (ALDACTONE) 25 MG tablet Take 1 tablet by mouth Daily for 30 days. 30 tablet 0    • sucralfate (CARAFATE) 1 g tablet Take 1 g by mouth 4 (Four) Times a Day.      • tiotropium bromide monohydrate (SPIRIVA RESPIMAT) 2.5 MCG/ACT aerosol solution inhaler Inhale 2 puffs Daily for 30 days. 1 g 0    • ursodiol (ACTIGALL) 300 MG capsule Take 300 mg by mouth 3 (Three) Times a Day.          Allergies:  Allergies   Allergen Reactions   • Morphine And Related Nausea And Vomiting   • Levaquin [Levofloxacin]    • Sulfa Antibiotics        Social History:   Social History     Socioeconomic History   • Marital status:    Tobacco Use   • Smoking status: Current Some Day Smoker     Packs/day: 0.50     Years: 59.00     Pack years: 29.50     Types: Cigarettes   • Smokeless tobacco: Never Used   • Tobacco comment: 2 - 3 CIGARETTES A DAY   Vaping Use   • Vaping Use: Never used   Substance and Sexual Activity   • Alcohol use: No   • Drug use: No   • Sexual activity: Defer     Birth control/protection: Post-menopausal, Surgical       Family History:  Family History   Problem Relation Age of Onset   • Heart disease Father    • Heart attack Father    • Heart disease Brother    • Stroke Mother        Physical Exam:  /60 (BP Location: Right arm, Patient Position: Lying)   Pulse 97   Temp 97.9 °F (36.6 °C) (Oral)   Resp 20   Ht 152.4 cm (60\")   Wt 38.5 kg (84 lb 14.4 oz)   SpO2 98%   BMI 16.58 kg/m²  Body mass index is 16.58 kg/m². 98% 38.5 kg (84 lb 14.4 oz)  Physical Exam  Constitutional:       Comments: Cachectic female   HENT:      Right Ear: External ear normal.      Left Ear: External ear normal.      Nose: Nose normal.   Eyes:      " Conjunctiva/sclera: Conjunctivae normal.      Pupils: Pupils are equal, round, and reactive to light.   Neck:      Thyroid: No thyromegaly.      Vascular: No JVD.      Trachea: No tracheal deviation.   Cardiovascular:      Rate and Rhythm: Normal rate and regular rhythm.      Heart sounds: Normal heart sounds. No murmur heard.  Pulmonary:      Effort: Pulmonary effort is normal.      Breath sounds: Wheezing present.      Comments: Barrel chest, decreased expiratory time bilaterally.  No use of accessory muscles  Abdominal:      Palpations: Abdomen is soft.      Tenderness: There is no abdominal tenderness. There is no rebound.      Comments: Cannot palpate liver or spleen enlargement   Musculoskeletal:         General: No deformity. Normal range of motion.      Cervical back: Neck supple. No rigidity.   Skin:     General: Skin is warm.      Findings: No rash.      Comments: No palpable nodules   Neurological:      General: No focal deficit present.      Mental Status: She is alert and oriented to person, place, and time.      Cranial Nerves: No cranial nerve deficit.      Motor: No weakness.   Psychiatric:         Mood and Affect: Mood normal.         Thought Content: Thought content normal.         LABS:  COVID19   Date Value Ref Range Status   05/04/2022 Not Detected Not Detected - Ref. Range Final       Lab Results   Component Value Date    CALCIUM 9.9 05/04/2022    PHOS 2.4 (L) 01/21/2022     Results from last 7 days   Lab Units 05/04/22  0448   MAGNESIUM mg/dL 1.9   SODIUM mmol/L 143   POTASSIUM mmol/L 4.0   CHLORIDE mmol/L 103   CO2 mmol/L 30.8*   BUN mg/dL 15   CREATININE mg/dL 0.77   GLUCOSE mg/dL 97   CALCIUM mg/dL 9.9   WBC 10*3/mm3 11.04*   HEMOGLOBIN g/dL 8.9*   PLATELETS 10*3/mm3 380   ALT (SGPT) U/L 6   AST (SGOT) U/L 9   PROBNP pg/mL 737.0     Lab Results   Component Value Date    CKTOTAL 30 03/01/2017    CKMB 2.08 03/01/2017    TROPONINI <0.03 09/10/2021    TROPONINT <0.010 05/04/2022     Results  from last 7 days   Lab Units 05/04/22  0706 05/04/22  0448   TROPONIN T ng/mL <0.010 <0.010         Results from last 7 days   Lab Units 05/04/22  0448   LACTATE mmol/L 1.2     Results from last 7 days   Lab Units 05/04/22  0422   PH, ARTERIAL pH units 7.437   PCO2, ARTERIAL mm Hg 42.4   PO2 ART mm Hg 105.2*   FLOW RATE lpm 2   MODALITY  Cannula   O2 SATURATION CALC % 98.2     Results from last 7 days   Lab Units 05/04/22  0453   ADENOVIRUS DETECTION BY PCR  Not Detected   CORONAVIRUS 229E  Not Detected   CORONAVIRUS HKU1  Not Detected   CORONAVIRUS NL63  Not Detected   CORONAVIRUS OC43  Not Detected   HUMAN METAPNEUMOVIRUS  Not Detected   HUMAN RHINOVIRUS/ENTEROVIRUS  Not Detected   INFLUENZA B PCR  Not Detected   PARAINFLUENZA 1  Not Detected   PARAINFLUENZA VIRUS 2  Not Detected   PARAINFLUENZA VIRUS 3  Not Detected   PARAINFLUENZA VIRUS 4  Not Detected   BORDETELLA PERTUSSIS PCR  Not Detected   BORDETELLA PARAPERTUSSIS PCR  Not Detected   CHLAMYDOPHILA PNEUMONIAE PCR  Not Detected   MYCOPLAMA PNEUMO PCR  Not Detected   RSV, PCR  Not Detected             Lab Results   Component Value Date    TSH 0.704 01/14/2022     Estimated Creatinine Clearance: 37.2 mL/min (by C-G formula based on SCr of 0.77 mg/dL).         Imaging: I personally visualized the images of chest x-ray showing significant hyperinflation bilaterally with flattening of the diaphragms due to severe COPD.  No infiltrates are noted..      Assessment:  Severe dyspnea on exertion  COPD exacerbation  Chronic respiratory failure  Paroxysmal atrial fibrillation        Recommendations:  She has very severe emphysema and COPD, quite advanced disease.  This is a poor prognosis.  She may need to be discharged to rehabilitation center this time, perhaps inpatient pulmonary rehab if they accept her at [facility].  Give IV Solu-Medrol.  Give nebulized bronchodilators every 6 hours.  If her atrial fibrillation is exacerbated by beta agonist nebulizer, we will have to  stop these.  Respiratory viral panel is negative.  Chest x-ray is also negative for any infiltrates.  This may simply be natural history and progression of her severe advanced emphysema.  She is also poorly nourished.    Luther Hoffman MD  5/4/2022  15:32 EDT      Much of this encounter note is an electronic transcription/translation of spoken language to printed text using Dragon Software.

## 2022-05-04 NOTE — H&P
History and physical    Primary care physician  Dr. Gomez    Chief complaint  Shortness of breath  Nonproductive cough    History of present illness  77-year-old white female with history of chronic hypoxic respiratory failure chronic diastolic CHF  Atrial fibrillation COPD coronary artery disease hypertension hyperlipidemia and gastroesophageal reflux disease who continues to smoke cigarettes presented to [facility] with complaint of increased shortness of breath and nonproductive cough for last 3 weeks which is worse last week.  Patient denies any fever chest pain abdominal pain nausea vomiting diarrhea.  Patient work-up in ER revealed acute on chronic hypoxic respiratory failure with acute exacerbation of COPD admit for management.     PAST MEDICAL HISTORY  • Atrial fibrillation (HCC)     • COPD (chronic obstructive pulmonary disease) (HCC)     • History of frequent urinary tract infections     • Irregular heart beat     • Kidney stones     • PBC (primary biliary cirrhosis)     • PBC (primary biliary cirrhosis)        PAST SURGICAL HISTORY              Procedure Laterality Date   • CARDIAC ELECTROPHYSIOLOGY PROCEDURE N/A 3/30/2017     Procedure:    LINQ;  Surgeon: Ailyn Solorio MD;  Location: [facility] CATH INVASIVE LOCATION;  Service:    • CHOLECYSTECTOMY       • COLONOSCOPY       • COLONOSCOPY N/A 1/28/2022     Procedure: COLONOSCOPY into cecum with polypectomy, clip placement;  Surgeon: Constantine Kaye MD;  Location: [facility] ENDOSCOPY;  Service: Gastroenterology;  Laterality: N/A;  poor prep, diverticulosis, polyp   • ENDOSCOPY N/A 1/28/2022     Procedure: ESOPHAGOGASTRODUODENOSCOPY with biopsy;  Surgeon: Constantine Kaye MD;  Location: [facility] ENDOSCOPY;  Service: Gastroenterology;  Laterality: N/A;  normal   • TUBAL ABDOMINAL LIGATION             FAMILY HISTORY           Problem Relation Age of Onset   • Heart disease Father     • Heart attack Father     • Heart disease Brother     •  Stroke Mother        SOCIAL HISTORY                 Socioeconomic History   • Marital status:    Tobacco Use   • Smoking status: Current Some Day Smoker       Packs/day: 0.50       Years: 59.00       Pack years: 29.50       Types: Cigarettes   • Smokeless tobacco: Never Used   • Tobacco comment: 2 - 3 CIGARETTES A DAY   Vaping Use   • Vaping Use: Never used   Substance and Sexual Activity   • Alcohol use: No   • Drug use: No   • Sexual activity: Defer       Birth control/protection: Post-menopausal, Surgical        ALLERGIES  Morphine and related, Levaquin [levofloxacin], and Sulfa antibiotics  Home medications reviewed     REVIEW OF SYSTEMS  All systems reviewed and negative except for those discussed in HPI.      PHYSICAL EXAM   Blood pressure 106/58, pulse 91, temperature 97.3 °F (36.3 °C), resp. rate 18, SpO2 98 %.    Constitutional: Thin frail and petite female in moderate respiratory distress   HEENT unremarkable  Neck: Painless range of motion noted. Neck supple.   Cardiovascular: Tachycardic rate, regular rhythm and intact distal pulses.  Pulmonary/Chest: Severely diminished throughout with tachypnea and increased work of breathing including accessory muscle use.    Abdominal: Soft. There is no tenderness. There is no rebound and no guarding.   Musculoskeletal: Moves all extremities equally. There is no pedal edema or calf tenderness.   Neurological: Alert.  Baseline strength and sensation noted.   Skin: Skin is pink, warm, and dry. No pallor.   Psychiatric: Mood and affect normal.     LAB RESULTS  Lab Results (last 24 hours)     Procedure Component Value Units Date/Time    Troponin [[id number]]  (Normal) Collected: 05/04/22 0706    Specimen: Blood Updated: 05/04/22 0740     Troponin T <0.010 ng/mL     Narrative:      Troponin T Reference Range:  <= 0.03 ng/mL-   Negative for AMI  >0.03 ng/mL-     Abnormal for myocardial necrosis.  Clinicians would have to utilize clinical acumen, EKG, Troponin and  serial changes to determine if it is an Acute Myocardial Infarction or myocardial injury due to an underlying chronic condition.       Results may be falsely decreased if patient taking Biotin.      Comprehensive Metabolic Panel [[id number]]  (Abnormal) Collected: 05/04/22 0448    Specimen: Blood Updated: 05/04/22 0559     Glucose 97 mg/dL      BUN 15 mg/dL      Creatinine 0.77 mg/dL      Sodium 143 mmol/L      Potassium 4.0 mmol/L      Chloride 103 mmol/L      CO2 30.8 mmol/L      Calcium 9.9 mg/dL      Total Protein 6.0 g/dL      Albumin 3.50 g/dL      ALT (SGPT) 6 U/L      AST (SGOT) 9 U/L      Alkaline Phosphatase 153 U/L      Total Bilirubin <0.2 mg/dL      Globulin 2.5 gm/dL      A/G Ratio 1.4 g/dL      BUN/Creatinine Ratio 19.5     Anion Gap 9.2 mmol/L      eGFR 79.6 mL/min/1.73      Comment: National Kidney Foundation and American Society of Nephrology (ASN) Task Force recommended calculation based on the Chronic Kidney Disease Epidemiology Collaboration (CKD-EPI) equation refit without adjustment for race.       Narrative:      GFR Normal >60  Chronic Kidney Disease <60  Kidney Failure <15      Troponin [[id number]]  (Normal) Collected: 05/04/22 0448    Specimen: Blood Updated: 05/04/22 0559     Troponin T <0.010 ng/mL     Narrative:      Troponin T Reference Range:  <= 0.03 ng/mL-   Negative for AMI  >0.03 ng/mL-     Abnormal for myocardial necrosis.  Clinicians would have to utilize clinical acumen, EKG, Troponin and serial changes to determine if it is an Acute Myocardial Infarction or myocardial injury due to an underlying chronic condition.       Results may be falsely decreased if patient taking Biotin.      Magnesium [[id number]]  (Normal) Collected: 05/04/22 0448    Specimen: Blood Updated: 05/04/22 0559     Magnesium 1.9 mg/dL     Respiratory Panel PCR w/COVID-19(SARS-CoV-2) DARBY/VITA/BILL/PAD/COR/MAD/XIOMY In-House, NP Swab in [facility], 3-4 HR TAT - Swab, Nasopharynx [[id number]]  (Normal) Collected:  05/04/22 0453    Specimen: Swab from Nasopharynx Updated: 05/04/22 0558     ADENOVIRUS, PCR Not Detected     Coronavirus 229E Not Detected     Coronavirus HKU1 Not Detected     Coronavirus NL63 Not Detected     Coronavirus OC43 Not Detected     COVID19 Not Detected     Human Metapneumovirus Not Detected     Human Rhinovirus/Enterovirus Not Detected     Influenza A PCR Not Detected     Influenza B PCR Not Detected     Parainfluenza Virus 1 Not Detected     Parainfluenza Virus 2 Not Detected     Parainfluenza Virus 3 Not Detected     Parainfluenza Virus 4 Not Detected     RSV, PCR Not Detected     Bordetella pertussis pcr Not Detected     Bordetella parapertussis PCR Not Detected     Chlamydophila pneumoniae PCR Not Detected     Mycoplasma pneumo by PCR Not Detected    Narrative:      In the setting of a positive respiratory panel with a viral infection PLUS a negative procalcitonin without other underlying concern for bacterial infection, consider observing off antibiotics or discontinuation of antibiotics and continue supportive care. If the respiratory panel is positive for atypical bacterial infection (Bordetella pertussis, Chlamydophila pneumoniae, or Mycoplasma pneumoniae), consider antibiotic de-escalation to target atypical bacterial infection.    BNP [[id number]]  (Normal) Collected: 05/04/22 0448    Specimen: Blood Updated: 05/04/22 0557     proBNP 737.0 pg/mL     Narrative:      Among patients with dyspnea, NT-proBNP is highly sensitive for the detection of acute congestive heart failure. In addition NT-proBNP of <300 pg/ml effectively rules out acute congestive heart failure with 99% negative predictive value.    Results may be falsely decreased if patient taking Biotin.      Lactic Acid, Plasma [[id number]]  (Normal) Collected: 05/04/22 0448    Specimen: Blood Updated: 05/04/22 0523     Lactate 1.2 mmol/L     CBC & Differential [[id number]]  (Abnormal) Collected: 05/04/22 0448    Specimen: Blood Updated:  05/04/22 0507    Narrative:      The following orders were created for panel order CBC & Differential.  Procedure                               Abnormality         Status                     ---------                               -----------         ------                     CBC Auto Differential[[id number]]        Abnormal            Final result                 Please view results for these tests on the individual orders.    CBC Auto Differential [[id number]]  (Abnormal) Collected: 05/04/22 0448    Specimen: Blood Updated: 05/04/22 0507     WBC 11.04 10*3/mm3      RBC 3.08 10*6/mm3      Hemoglobin 8.9 g/dL      Hematocrit 27.7 %      MCV 89.9 fL      MCH 28.9 pg      MCHC 32.1 g/dL      RDW 12.3 %      RDW-SD 39.7 fl      MPV 10.2 fL      Platelets 380 10*3/mm3      Neutrophil % 52.7 %      Lymphocyte % 27.8 %      Monocyte % 13.9 %      Eosinophil % 4.3 %      Basophil % 0.8 %      Immature Grans % 0.5 %      Neutrophils, Absolute 5.83 10*3/mm3      Lymphocytes, Absolute 3.07 10*3/mm3      Monocytes, Absolute 1.53 10*3/mm3      Eosinophils, Absolute 0.47 10*3/mm3      Basophils, Absolute 0.09 10*3/mm3      Immature Grans, Absolute 0.05 10*3/mm3      nRBC 0.0 /100 WBC     Blood Culture - Blood, Arm, Left [[id number]] Collected: 05/04/22 0448    Specimen: Blood from Arm, Left Updated: 05/04/22 0500    Blood Culture - Blood, Arm, Right [[id number]] Collected: 05/04/22 0448    Specimen: Blood from Arm, Right Updated: 05/04/22 0500    Blood Gas, Arterial - [[id number]]  (Abnormal) Collected: 05/04/22 0422    Specimen: Arterial Blood Updated: 05/04/22 0427     Site Arterial: right brachial     Ronak's Test N/A     pH, Arterial 7.437 pH units      pCO2, Arterial 42.4 mm Hg      pO2, Arterial 105.2 mm Hg      HCO3, Arterial 28.6 mmol/L      Base Excess, Arterial 4.0 mmol/L      O2 Saturation Calculated 98.2 %      Barometric Pressure for Blood Gas 754.1 mmHg      Comment: 98% @0420 Meter: [id number] : [id number]  Je Kendy        Modality Cannula     Flow Rate 2 lpm      Rate 28 Breaths/minute         Imaging Results (Last 24 Hours)     Procedure Component Value Units Date/Time    XR Chest 1 View [[id number]] Collected: 05/04/22 0410     Updated: 05/04/22 0414    Narrative:      SINGLE VIEW OF THE CHEST     HISTORY: Shortness of air     COMPARISON: 02/01/2022     FINDINGS:  Cardiomegaly is present. There is no vascular congestion. There are  background changes of COPD. Cardiac loop recorder is noted. No  pneumothorax is identified. No definite acute infiltrate or pleural  effusion is seen.       Impression:      No acute findings.     This report was finalized on 5/4/2022 4:11 AM by Dr. Danielle Curtis M.D.           ECG 12 Lead  Component   Ref Range & Units 04:01   (5/4/22) 3 mo ago   (1/26/22) 3 mo ago   (1/18/22) 3 mo ago   (1/14/22) 3 mo ago   (1/13/22) 3 mo ago   (1/13/22)   QT Interval   ms 324 P  446  314  362  373  409    Resulting Agency  ECG  ECG  ECG  ECG  ECG [facility] ECG             HEART RATE= 88  bpm  RR Interval= 576  ms  OR Interval=   ms  P Horizontal Axis=   deg  P Front Axis=   deg  QRSD Interval= 90  ms  QT Interval= 324  ms  QRS Axis= 60  deg  T Wave Axis= 61  deg  - ABNORMAL ECG -  Atrial fibrillation  Ventricular premature complex  Minimal ST elevation, inferior leads             Current Facility-Administered Medications:   •  ALPRAZolam (XANAX) tablet 0.25 mg, 0.25 mg, Oral, 4x Daily PRN, Eulogio Mcdaniel MD  •  aspirin chewable tablet 81 mg, 81 mg, Oral, Daily, Eulogio Mcdaniel MD, 81 mg at 05/04/22 1043  •  clopidogrel (PLAVIX) tablet 75 mg, 75 mg, Oral, Daily, Eulogio Mcdaniel MD, 75 mg at 05/04/22 1042  •  ipratropium-albuterol (DUO-NEB) nebulizer solution 3 mL, 3 mL, Nebulization, Q4H - RT, Eulogio Mcdaniel MD, 3 mL at 05/04/22 1108  •  methylPREDNISolone sodium succinate (SOLU-Medrol) injection 40 mg, 40 mg, Intravenous, Q12H, Eulogio Mcdaniel MD  •  O2 (OXYGEN), 2 L/min, Inhalation, PRN, Violeta,  MD Jonny  •  pantoprazole (PROTONIX) EC tablet 40 mg, 40 mg, Oral, BID AC, Jonny Mcdaniel MD  •  rosuvastatin (CRESTOR) tablet 5 mg, 5 mg, Oral, Daily, Jonny Mcdaniel MD, 5 mg at 05/04/22 1231  •  [COMPLETED] Insert peripheral IV, , , Once **AND** sodium chloride 0.9 % flush 10 mL, 10 mL, Intravenous, PRN, Manolo Bustamante MD  •  sucralfate (CARAFATE) tablet 1 g, 1 g, Oral, 4x Daily, Jonny Mcdaniel MD, 1 g at 05/04/22 1231  •  ursodiol (ACTIGALL) capsule 300 mg, 300 mg, Oral, TID, Jonny Mcdaniel MD, 300 mg at 05/04/22 1231     ASSESSMENT  Acute on chronic hypoxic respiratory failure  Acute exacerbation of COPD  Persistent tobacco abuse  Chronic cough  Chronic atrial fibrillation  Anxiety disorder  Gastroesophageal reflux disease    PLAN  Admit  Supplement oxygen  DuoNeb and Symbicort  IV steroids  Symptomatic treatment for cough  Pulmonary consult  Adjust home medications  Stress ulcer DVT prophylaxis  Supportive care  DNR  Discussed with nursing staff  Follow closely further recommendation according to hospital course    JONNY MCDANIEL MD

## 2022-05-04 NOTE — ED PROVIDER NOTES
EMERGENCY DEPARTMENT ENCOUNTER    Room Number:  13/13  Date of encounter:  5/4/2022  PCP: Slick Gomez MD  Historian: Patient/EMS report    Patient was placed in face mask during triage process. Patient was wearing facemask when I entered the room and throughout our encounter. I wore full protective equipment throughout this patient encounter including a face mask, eye protection, and gloves. Hand hygiene was performed before donning protective equipment and again following doffing of PPE after leaving the room.    HPI:  Chief Complaint: Dyspnea with cough  A complete HPI/ROS/PMH/PSH/SH/FH are unobtainable due to: N/A   Context: Celia Baird is a 77 y.o. female with a history of COPD with chronic hypoxemic respiratory failure as well as paroxysmal atrial fibrillation who presents to the ED via EMS c/o progressively worsening dyspnea with associated cough over the last 2 to 3 weeks.  1 week ago she was seen by her primary care provider who prescribed her steroids.  These do not seem to have helped.  She has an appointment coming up later today with her pulmonologist, Dr. Loyd Christie but felt so poorly this evening she decided to be seen in the emergency department.  She reports moderately severe symptoms worsened by any movement and even ADLs.  No significant production with her cough and certainly no hemoptysis reported.  No fevers.  No abdominal pain, nausea or vomiting reported.  No lower extremity swelling identified.      MEDICAL HISTORY REVIEW  EMR reviewed:    Cardiology office note 3/28/2022:  Paroxysmal atrial fibrillation  Chronic hypoxemic respiratory failure  Implantable loop recorder  Hyperlipidemia  Antiplatelet therapy  As needed Bumex  ======================  Date of admission 1/13/2022  Date of discharge 1/21/2022     Final diagnosis  Acute on chronic hypoxic respiratory failure  Acute exacerbation of COPD  Chronic diastolic CHF  Acute kidney injury resolved  Paroxysmal atrial  fibrillation  Hypertension  Hyperlipidemia  Ongoing tobacco abuse  Gastroesophageal reflux disease    PAST MEDICAL HISTORY  Active Ambulatory Problems     Diagnosis Date Noted   • COPD exacerbation (HCC) 02/26/2017   • Subarachnoid hemorrhage (HCC) 03/26/2017   • Multiple skin tears 03/30/2017   • Closed nondisplaced fracture of left clavicle 03/30/2017   • Paroxysmal atrial fibrillation (HCC) 02/19/2018   • Tobacco abuse 02/19/2018   • Status post placement of implantable loop recorder 02/19/2018   • SOB (shortness of breath) 02/18/2019   • COPD with acute exacerbation (HCC) 03/20/2020   • Severe malnutrition (CMS/HCC) 01/15/2022   • Leukocytosis 01/18/2022   • Gastrointestinal hemorrhage 01/26/2022   • Absolute anemia 01/27/2022   • Hyperlipidemia 03/29/2022   • Long term (current) use of antithrombotics/antiplatelets 03/29/2022     Resolved Ambulatory Problems     Diagnosis Date Noted   • Chronic atrial fibrillation (HCC) 03/30/2017     Past Medical History:   Diagnosis Date   • Atrial fibrillation (HCC)    • COPD (chronic obstructive pulmonary disease) (HCC)    • History of frequent urinary tract infections    • Irregular heart beat    • Kidney stones    • PBC (primary biliary cirrhosis)    • PBC (primary biliary cirrhosis)          PAST SURGICAL HISTORY  Past Surgical History:   Procedure Laterality Date   • CARDIAC ELECTROPHYSIOLOGY PROCEDURE N/A 3/30/2017    Procedure:    LINQ;  Surgeon: Ailyn Solorio MD;  Location: [facility] CATH INVASIVE LOCATION;  Service:    • CHOLECYSTECTOMY     • COLONOSCOPY     • COLONOSCOPY N/A 1/28/2022    Procedure: COLONOSCOPY into cecum with polypectomy, clip placement;  Surgeon: Constantine Kaye MD;  Location: [facility] ENDOSCOPY;  Service: Gastroenterology;  Laterality: N/A;  poor prep, diverticulosis, polyp   • ENDOSCOPY N/A 1/28/2022    Procedure: ESOPHAGOGASTRODUODENOSCOPY with biopsy;  Surgeon: Constantine Kaye MD;  Location: [facility] ENDOSCOPY;  Service:  Gastroenterology;  Laterality: N/A;  normal   • TUBAL ABDOMINAL LIGATION           FAMILY HISTORY  Family History   Problem Relation Age of Onset   • Heart disease Father    • Heart attack Father    • Heart disease Brother    • Stroke Mother          SOCIAL HISTORY  Social History     Socioeconomic History   • Marital status:    Tobacco Use   • Smoking status: Current Some Day Smoker     Packs/day: 0.50     Years: 59.00     Pack years: 29.50     Types: Cigarettes   • Smokeless tobacco: Never Used   • Tobacco comment: 2 - 3 CIGARETTES A DAY   Vaping Use   • Vaping Use: Never used   Substance and Sexual Activity   • Alcohol use: No   • Drug use: No   • Sexual activity: Defer     Birth control/protection: Post-menopausal, Surgical         ALLERGIES  Morphine and related, Levaquin [levofloxacin], and Sulfa antibiotics        REVIEW OF SYSTEMS  Review of Systems     All systems reviewed and negative except for those discussed in HPI.       PHYSICAL EXAM    I have reviewed the triage vital signs and nursing notes.    ED Triage Vitals [05/04/22 0340]   Temp Heart Rate Resp BP SpO2   97.3 °F (36.3 °C) 102 22 128/90 100 %      Temp src Heart Rate Source Patient Position BP Location FiO2 (%)   -- -- -- -- --       Physical Exam    Physical Exam   Constitutional: Thin frail and petite female in moderate respiratory distress with accessory muscle use..   HENT:  Head: Normocephalic and atraumatic.   Oropharynx: Mucous membranes are moist.   Eyes: No scleral icterus. No conjunctival pallor.  Neck: Painless range of motion noted. Neck supple.   Cardiovascular: Tachycardic rate, regular rhythm and intact distal pulses.  Pulmonary/Chest: Severely diminished throughout with tachypnea and increased work of breathing including accessory muscle use.    Abdominal: Soft. There is no tenderness. There is no rebound and no guarding.   Musculoskeletal: Moves all extremities equally. There is no pedal edema or calf tenderness.    Neurological: Alert.  Baseline strength and sensation noted.   Skin: Skin is pink, warm, and dry. No pallor.   Psychiatric: Mood and affect normal.   Nursing note and vitals reviewed.    LAB RESULTS  Recent Results (from the past 24 hour(s))   ECG 12 Lead    Collection Time: 05/04/22  4:01 AM   Result Value Ref Range    QT Interval 324 ms   Blood Gas, Arterial -    Collection Time: 05/04/22  4:22 AM    Specimen: Arterial Blood   Result Value Ref Range    Site Arterial: right brachial     Ronak's Test N/A     pH, Arterial 7.437 7.350 - 7.450 pH units    pCO2, Arterial 42.4 35.0 - 45.0 mm Hg    pO2, Arterial 105.2 (H) 80.0 - 100.0 mm Hg    HCO3, Arterial 28.6 (H) 22.0 - 28.0 mmol/L    Base Excess, Arterial 4.0 (H) 0.0 - 2.0 mmol/L    O2 Saturation Calculated 98.2 92.0 - 99.0 %    Barometric Pressure for Blood Gas 754.1 mmHg    Modality Cannula     Flow Rate 2 lpm    Rate 28 Breaths/minute   Comprehensive Metabolic Panel    Collection Time: 05/04/22  4:48 AM    Specimen: Blood   Result Value Ref Range    Glucose 97 65 - 99 mg/dL    BUN 15 8 - 23 mg/dL    Creatinine 0.77 0.57 - 1.00 mg/dL    Sodium 143 136 - 145 mmol/L    Potassium 4.0 3.5 - 5.2 mmol/L    Chloride 103 98 - 107 mmol/L    CO2 30.8 (H) 22.0 - 29.0 mmol/L    Calcium 9.9 8.6 - 10.5 mg/dL    Total Protein 6.0 6.0 - 8.5 g/dL    Albumin 3.50 3.50 - 5.20 g/dL    ALT (SGPT) 6 1 - 33 U/L    AST (SGOT) 9 1 - 32 U/L    Alkaline Phosphatase 153 (H) 39 - 117 U/L    Total Bilirubin <0.2 0.0 - 1.2 mg/dL    Globulin 2.5 gm/dL    A/G Ratio 1.4 g/dL    BUN/Creatinine Ratio 19.5 7.0 - 25.0    Anion Gap 9.2 5.0 - 15.0 mmol/L    eGFR 79.6 >60.0 mL/min/1.73   BNP    Collection Time: 05/04/22  4:48 AM    Specimen: Blood   Result Value Ref Range    proBNP 737.0 0.0 - 1,800.0 pg/mL   Troponin    Collection Time: 05/04/22  4:48 AM    Specimen: Blood   Result Value Ref Range    Troponin T <0.010 0.000 - 0.030 ng/mL   Lactic Acid, Plasma    Collection Time: 05/04/22  4:48 AM     Specimen: Blood   Result Value Ref Range    Lactate 1.2 0.5 - 2.0 mmol/L   Magnesium    Collection Time: 05/04/22  4:48 AM    Specimen: Blood   Result Value Ref Range    Magnesium 1.9 1.6 - 2.4 mg/dL   CBC Auto Differential    Collection Time: 05/04/22  4:48 AM    Specimen: Blood   Result Value Ref Range    WBC 11.04 (H) 3.40 - 10.80 10*3/mm3    RBC 3.08 (L) 3.77 - 5.28 10*6/mm3    Hemoglobin 8.9 (L) 12.0 - 15.9 g/dL    Hematocrit 27.7 (L) 34.0 - 46.6 %    MCV 89.9 79.0 - 97.0 fL    MCH 28.9 26.6 - 33.0 pg    MCHC 32.1 31.5 - 35.7 g/dL    RDW 12.3 12.3 - 15.4 %    RDW-SD 39.7 37.0 - 54.0 fl    MPV 10.2 6.0 - 12.0 fL    Platelets 380 140 - 450 10*3/mm3    Neutrophil % 52.7 42.7 - 76.0 %    Lymphocyte % 27.8 19.6 - 45.3 %    Monocyte % 13.9 (H) 5.0 - 12.0 %    Eosinophil % 4.3 0.3 - 6.2 %    Basophil % 0.8 0.0 - 1.5 %    Immature Grans % 0.5 0.0 - 0.5 %    Neutrophils, Absolute 5.83 1.70 - 7.00 10*3/mm3    Lymphocytes, Absolute 3.07 0.70 - 3.10 10*3/mm3    Monocytes, Absolute 1.53 (H) 0.10 - 0.90 10*3/mm3    Eosinophils, Absolute 0.47 (H) 0.00 - 0.40 10*3/mm3    Basophils, Absolute 0.09 0.00 - 0.20 10*3/mm3    Immature Grans, Absolute 0.05 0.00 - 0.05 10*3/mm3    nRBC 0.0 0.0 - 0.2 /100 WBC   Respiratory Panel PCR w/COVID-19(SARS-CoV-2) DARBY/VITA/BILL/PAD/COR/MAD/XIOMY In-House, NP Swab in [facility], 3-4 HR TAT - Swab, Nasopharynx    Collection Time: 05/04/22  4:53 AM    Specimen: Nasopharynx; Swab   Result Value Ref Range    ADENOVIRUS, PCR Not Detected Not Detected    Coronavirus 229E Not Detected Not Detected    Coronavirus HKU1 Not Detected Not Detected    Coronavirus NL63 Not Detected Not Detected    Coronavirus OC43 Not Detected Not Detected    COVID19 Not Detected Not Detected - Ref. Range    Human Metapneumovirus Not Detected Not Detected    Human Rhinovirus/Enterovirus Not Detected Not Detected    Influenza A PCR Not Detected Not Detected    Influenza B PCR Not Detected Not Detected    Parainfluenza Virus 1 Not  Detected Not Detected    Parainfluenza Virus 2 Not Detected Not Detected    Parainfluenza Virus 3 Not Detected Not Detected    Parainfluenza Virus 4 Not Detected Not Detected    RSV, PCR Not Detected Not Detected    Bordetella pertussis pcr Not Detected Not Detected    Bordetella parapertussis PCR Not Detected Not Detected    Chlamydophila pneumoniae PCR Not Detected Not Detected    Mycoplasma pneumo by PCR Not Detected Not Detected       Ordered the above labs and independently reviewed the results.        RADIOLOGY  XR Chest 1 View    Result Date: 5/4/2022  SINGLE VIEW OF THE CHEST  HISTORY: Shortness of air  COMPARISON: 02/01/2022  FINDINGS: Cardiomegaly is present. There is no vascular congestion. There are background changes of COPD. Cardiac loop recorder is noted. No pneumothorax is identified. No definite acute infiltrate or pleural effusion is seen.      No acute findings.  This report was finalized on 5/4/2022 4:11 AM by Dr. Danielle Curtis M.D.        I ordered the above noted radiological studies. Reviewed by me and discussed with radiologist.  See dictation for official radiology interpretation.      PROCEDURES    Procedures        MEDICATIONS GIVEN IN ER    Medications   sodium chloride 0.9 % flush 10 mL (has no administration in time range)   methylPREDNISolone sodium succinate (SOLU-Medrol) injection 125 mg (125 mg Intravenous Given 5/4/22 9901)   ipratropium-albuterol (DUO-NEB) nebulizer solution 3 mL (3 mL Nebulization Given 5/4/22 5532)         PROGRESS, DATA ANALYSIS, CONSULTS, AND MEDICAL DECISION MAKING    My differential diagnosis for dyspnea includes but is not limited to:  Asthma, COPD, pneumonia, pulmonary embolus, acute respiratory distress syndrome, pneumothorax, pleural effusion, pulmonary fibrosis, congestive heart failure, myocardial infarction, DKA, uremia, acidosis, sepsis, anemia, drug related, hyperventilation, CNS disease      All labs have been independently reviewed by me.  All  radiology studies have been reviewed by me and discussed with radiologist dictating the report.   EKG's independently viewed and interpreted by me.  Discussion below represents my analysis of pertinent findings related to patient's condition, differential diagnosis, treatment plan and final disposition.      ED Course as of 05/04/22 0609   Wed May 04, 2022   0403 EKG           EKG time: 0401  Rhythm/Rate: A. fib; 80-1 50; occasional PVC  P waves and WA: N/A  QRS, axis: Narrow complex with slow R wave progression  ST and T waves: No STEMI; QTC within normal limits    Interpreted Contemporaneously by me, independently viewed  Comparison: 1/26/2022   [RS]   0432 pO2, Arterial(!): 105.2 [RS]   0432 HCO3, Arterial(!): 28.6 [RS]   0432 Flow Rate: 2 [RS]   0514 Hemoglobin(!): 8.9  Stable chronic anemia [RS]   0515 Platelets: 380 [RS]   0515 WBC(!): 11.04 [RS]   0515 Immature Grans, Absolute: 0.05 [RS]   0527 Lactate: 1.2 [RS]   0533 Patient resting more comfortably at this time with decreased work of breathing noted.  No severe tachypnea.  Speaking now in 3-4 word sentences.  Await remaining laboratory evaluation. [RS]   0603 COVID19: Not Detected [RS]   0603 Influenza A PCR: Not Detected [RS]   0603 Influenza B PCR: Not Detected [RS]   0603 Creatinine: 0.77 [RS]   0603 BUN: 15 [RS]   0603 proBNP: 737.0 [RS]   0606 Patient has effectively failed outpatient therapy with oral steroids.  Patient agreeable with plan for admission. [RS]   0607 CONSULT        Provider: Dr. Beverly PPS    Discussion: Reviewed patient history, ED presentation and evaluation.  Agreeable to accept patient for observation admission with telemetry for further evaluation and treatment.    Agreeable c treatment and planned disposition.         [RS]      ED Course User Index  [RS] Manolo Bustamante MD       AS OF 06:09 EDT VITALS:    BP - 128/90  HR - 92  TEMP - 97.3 °F (36.3 °C)  O2 SATS - 98%        DIAGNOSIS  Final diagnoses:   COPD with exacerbation  (HCC)   Chronic hypoxemic respiratory failure (HCC)         DISPOSITION  ADMISSION    Discussed treatment plan and reason for admission with pt/family and admitting physician.  Pt/family voiced understanding of the plan for admission for further testing/treatment as needed.          Manolo Bustamante MD  05/04/22 0610

## 2022-05-05 PROCEDURE — 25010000002 METHYLPREDNISOLONE PER 40 MG: Performed by: HOSPITALIST

## 2022-05-05 PROCEDURE — G0378 HOSPITAL OBSERVATION PER HR: HCPCS

## 2022-05-05 PROCEDURE — 94664 DEMO&/EVAL PT USE INHALER: CPT

## 2022-05-05 PROCEDURE — 96366 THER/PROPH/DIAG IV INF ADDON: CPT

## 2022-05-05 PROCEDURE — 99213 OFFICE O/P EST LOW 20 MIN: CPT | Performed by: INTERNAL MEDICINE

## 2022-05-05 PROCEDURE — 93005 ELECTROCARDIOGRAM TRACING: CPT

## 2022-05-05 PROCEDURE — 25010000002 AMIODARONE IN DEXTROSE 5% 150-4.21 MG/100ML-% SOLUTION

## 2022-05-05 PROCEDURE — 25010000002 AMIODARONE IN DEXTROSE 5% 360-4.14 MG/200ML-% SOLUTION

## 2022-05-05 PROCEDURE — 94799 UNLISTED PULMONARY SVC/PX: CPT

## 2022-05-05 PROCEDURE — 94761 N-INVAS EAR/PLS OXIMETRY MLT: CPT

## 2022-05-05 PROCEDURE — 96365 THER/PROPH/DIAG IV INF INIT: CPT

## 2022-05-05 RX ORDER — ALBUTEROL SULFATE 2.5 MG/3ML
2.5 SOLUTION RESPIRATORY (INHALATION) EVERY 4 HOURS
Status: DISCONTINUED | OUTPATIENT
Start: 2022-05-05 | End: 2022-05-06

## 2022-05-05 RX ORDER — GUAIFENESIN 600 MG/1
1200 TABLET, EXTENDED RELEASE ORAL 2 TIMES DAILY
COMMUNITY

## 2022-05-05 RX ORDER — ACETYLCYSTEINE 200 MG/ML
4 SOLUTION ORAL; RESPIRATORY (INHALATION)
Status: DISCONTINUED | OUTPATIENT
Start: 2022-05-05 | End: 2022-05-06

## 2022-05-05 RX ADMIN — ROSUVASTATIN CALCIUM 5 MG: 5 TABLET, FILM COATED ORAL at 08:23

## 2022-05-05 RX ADMIN — SUCRALFATE 1 G: 1 TABLET ORAL at 21:13

## 2022-05-05 RX ADMIN — URSODIOL 300 MG: 300 CAPSULE ORAL at 08:23

## 2022-05-05 RX ADMIN — AMIODARONE HYDROCHLORIDE 0.5 MG/MIN: 1.8 INJECTION, SOLUTION INTRAVENOUS at 18:43

## 2022-05-05 RX ADMIN — ALBUTEROL SULFATE 2.5 MG: 2.5 SOLUTION RESPIRATORY (INHALATION) at 23:19

## 2022-05-05 RX ADMIN — GUAIFENESIN 600 MG: 600 TABLET, EXTENDED RELEASE ORAL at 21:13

## 2022-05-05 RX ADMIN — CLOPIDOGREL 75 MG: 75 TABLET, FILM COATED ORAL at 08:23

## 2022-05-05 RX ADMIN — METHYLPREDNISOLONE SODIUM SUCCINATE 40 MG: 40 INJECTION, POWDER, FOR SOLUTION INTRAMUSCULAR; INTRAVENOUS at 17:35

## 2022-05-05 RX ADMIN — GUAIFENESIN 600 MG: 600 TABLET, EXTENDED RELEASE ORAL at 08:23

## 2022-05-05 RX ADMIN — IPRATROPIUM BROMIDE AND ALBUTEROL SULFATE 3 ML: .5; 3 SOLUTION RESPIRATORY (INHALATION) at 07:42

## 2022-05-05 RX ADMIN — ACETYLCYSTEINE 4 ML: 200 SOLUTION ORAL; RESPIRATORY (INHALATION) at 23:20

## 2022-05-05 RX ADMIN — ALBUTEROL SULFATE 2.5 MG: 2.5 SOLUTION RESPIRATORY (INHALATION) at 11:30

## 2022-05-05 RX ADMIN — METHYLPREDNISOLONE SODIUM SUCCINATE 40 MG: 40 INJECTION, POWDER, FOR SOLUTION INTRAMUSCULAR; INTRAVENOUS at 05:41

## 2022-05-05 RX ADMIN — AMIODARONE HYDROCHLORIDE 150 MG: 1.5 INJECTION, SOLUTION INTRAVENOUS at 11:53

## 2022-05-05 RX ADMIN — ASPIRIN 81 MG: 81 TABLET, CHEWABLE ORAL at 08:23

## 2022-05-05 RX ADMIN — SUCRALFATE 1 G: 1 TABLET ORAL at 17:35

## 2022-05-05 RX ADMIN — IPRATROPIUM BROMIDE AND ALBUTEROL SULFATE 3 ML: .5; 3 SOLUTION RESPIRATORY (INHALATION) at 03:29

## 2022-05-05 RX ADMIN — URSODIOL 300 MG: 300 CAPSULE ORAL at 21:13

## 2022-05-05 RX ADMIN — AMIODARONE HYDROCHLORIDE 1 MG/MIN: 1.8 INJECTION, SOLUTION INTRAVENOUS at 11:54

## 2022-05-05 RX ADMIN — PANTOPRAZOLE SODIUM 40 MG: 40 TABLET, DELAYED RELEASE ORAL at 06:50

## 2022-05-05 RX ADMIN — SUCRALFATE 1 G: 1 TABLET ORAL at 14:02

## 2022-05-05 RX ADMIN — PANTOPRAZOLE SODIUM 40 MG: 40 TABLET, DELAYED RELEASE ORAL at 17:35

## 2022-05-05 RX ADMIN — ALBUTEROL SULFATE 2.5 MG: 2.5 SOLUTION RESPIRATORY (INHALATION) at 14:58

## 2022-05-05 RX ADMIN — URSODIOL 300 MG: 300 CAPSULE ORAL at 17:35

## 2022-05-05 RX ADMIN — ACETYLCYSTEINE 4 ML: 200 SOLUTION ORAL; RESPIRATORY (INHALATION) at 15:00

## 2022-05-05 RX ADMIN — SUCRALFATE 1 G: 1 TABLET ORAL at 08:23

## 2022-05-05 RX ADMIN — ALBUTEROL SULFATE 2.5 MG: 2.5 SOLUTION RESPIRATORY (INHALATION) at 19:12

## 2022-05-05 RX ADMIN — IPRATROPIUM BROMIDE AND ALBUTEROL SULFATE 3 ML: .5; 3 SOLUTION RESPIRATORY (INHALATION) at 00:26

## 2022-05-05 NOTE — CASE MANAGEMENT/SOCIAL WORK
Discharge Planning Assessment  [facility]     Patient Name: Celia Baird  MRN: [id number]  Today's Date: 5/5/2022    Admit Date: 5/4/2022     Discharge Needs Assessment     Row Name 05/05/22 1018       Living Environment    People in Home alone    Current Living Arrangements home    Primary Care Provided by self    Provides Primary Care For no one    Family Caregiver if Needed child(yesenia), adult    Family Caregiver Names Son, Eduardo Baird-Home [phone], cell [phone]    Quality of Family Relationships supportive;helpful    Able to Return to Prior Arrangements yes       Resource/Environmental Concerns    Resource/Environmental Concerns none       Transition Planning    Patient/Family Anticipates Transition to home    Patient/Family Anticipated Services at Transition none    Transportation Anticipated family or friend will provide       Discharge Needs Assessment    Readmission Within the Last 30 Days no previous admission in last 30 days    Equipment Currently Used at Home walker, standard;wheelchair;nebulizer;oxygen;shower chair;respiratory supplies    Concerns to be Addressed no discharge needs identified    Anticipated Changes Related to Illness none    Equipment Needed After Discharge none               Discharge Plan     Row Name 05/05/22 1021       Plan    Plan home    Patient/Family in Agreement with Plan yes    Plan Comments Screened pt at bedside. Introduced self, explained role, verified face sheet info. Pt lives alone, is somewhat independent w/ ADLs, uses cane/walker/wheelchair as needed. Her adult son Eduardo Baird helps her as needed. She uses O2, nebulizer at home which is managed by [employer]. Uses [facility] @ [address]. Has used [facility] in the past for PT. She denies any d/c needs at this time. CCP will follow up 5/6. -Briana SHELTON RN, CCP              Continued Care and Services - Admitted Since 5/4/2022    Coordination has not been started for this encounter.     Selected Continued  Care - Prior Encounters Includes selections from prior encounters from 2/3/2022 to 5/5/2022    Discharged on 2/7/2022 Admission date: 1/26/2022 - Discharge disposition: Home-Health Care Svc    Home Medical Care     Service Provider Selected Services Address Phone Fax Patient Preferred     [facility] [address] [id number] [phone] --                    Expected Discharge Date and Time     Expected Discharge Date Expected Discharge Time    May 6, 2022          Demographic Summary     Row Name 05/05/22 1017       General Information    Admission Type observation    Arrived From emergency department    Required Notices Provided Observation Status Notice    Referral Source admission list    Reason for Consult discharge planning    Preferred Language English               Functional Status     Row Name 05/05/22 1017       Functional Status    Usual Activity Tolerance moderate    Current Activity Tolerance moderate       Functional Status, IADL    Medications assistive equipment and person    Meal Preparation assistive equipment and person    Housekeeping assistive equipment and person    Laundry assistive equipment and person    Shopping assistive equipment and person       Mental Status    General Appearance WDL WDL       Mental Status Summary    Recent Changes in Mental Status/Cognitive Functioning no changes       Employment/    Employment Status retired               Psychosocial    No documentation.                Abuse/Neglect    No documentation.                Legal    No documentation.                Substance Abuse    No documentation.                Patient Forms    No documentation.                   Briana LUNA RN  Cooperative Care Coordinator

## 2022-05-05 NOTE — PROGRESS NOTES
Nutrition Services    Patient Name: Celia Baird  YOB: 1945  MRN: [id number]  Admission date: 5/4/2022    Comment:  Nutrition Assessment for BMI 16.58/Full MSA:  Pt stated she has lost a significant amount of weight over the past year after the loss of her youngest son and her . Her UBW was 118 lb and her weight got down to 76 lb but she is slowly gaining it back. Pt states she is tolerating a Regular diet well at home and ate 75% of her breakfast this am. Obtained food preferences and will honor. Pt stated she gets too full if she drinks Boost at every meal but she isn't much of a breakfast eater so she agreed to Boost Plus (only chocolate) at breakfast, Boost Breeze (orange) & Boost Pudding at lunch and dinner.      Based on ASPEN/AND criteria, pt meets nutrition diagnosis of Severe Malnutrition of Chronic Disease due to inadequate po intake, 34# (29%) wt loss x 1 year, and severe subcutaneous fat and muscle wasting noted on NFPE.    Recommend:  1. Boost Plus (chocolate) at breakfast, will order      Boost Breeze (orange) at lunch and dinner, will order      Boost Pudding at lunch and dinner, will order.    CLINICAL NUTRITION ASSESSMENT      Reason for Assessment BMI- 16.58     H&P  77-year-old white female with history of chronic hypoxic respiratory failure chronic diastolic CHF  Atrial fibrillation COPD coronary artery disease hypertension hyperlipidemia and gastroesophageal reflux disease who continues to smoke cigarettes presented to [facility] with complaint of increased shortness of breath and nonproductive cough for last 3 weeks which is worse last week.  Patient denies any fever chest pain abdominal pain nausea vomiting diarrhea.  Patient work-up in ER revealed acute on chronic hypoxic respiratory failure with acute exacerbation of COPD admit for management.    Past Medical History:   Diagnosis Date   • Atrial fibrillation (HCC)    • CAD (coronary artery disease)    • COPD  (chronic obstructive pulmonary disease) (HCC)    • History of frequent urinary tract infections    • Irregular heart beat    • Kidney stones    • PBC (primary biliary cirrhosis)    • PBC (primary biliary cirrhosis)        Past Surgical History:   Procedure Laterality Date   • CARDIAC ELECTROPHYSIOLOGY PROCEDURE N/A 03/30/2017    Procedure:    LINQ;  Surgeon: Ailyn Solorio MD;  Location: [facility] CATH INVASIVE LOCATION;  Service:    • CHOLECYSTECTOMY     • COLONOSCOPY     • COLONOSCOPY N/A 01/28/2022    Procedure: COLONOSCOPY into cecum with polypectomy, clip placement;  Surgeon: Constantine Kaye MD;  Location: [facility] ENDOSCOPY;  Service: Gastroenterology;  Laterality: N/A;  poor prep, diverticulosis, polyp   • CORONARY STENT PLACEMENT      proximal circumflex   • ENDOSCOPY N/A 01/28/2022    Procedure: ESOPHAGOGASTRODUODENOSCOPY with biopsy;  Surgeon: Constantine Kaye MD;  Location: [facility] ENDOSCOPY;  Service: Gastroenterology;  Laterality: N/A;  normal   • TUBAL ABDOMINAL LIGATION          Current Problems   Acute on chronic hypoxic respiratory failure  Acute exacerbation of COPD  Persistent tobacco abuse  Chronic cough  Chronic atrial fibrillation  Anxiety disorder  Gastroesophageal reflux disease     Encounter Information        Nutrition/Diet History   Pt stated she has lost a significant amount of weight over the past year after the loss of her youngest son and her . Her UBW was 118 lb and her weight got down to 76 lb but she is slowly gaining it back. Pt states she is tolerating a Regular diet well at home and ate 75% of her breakfast this am. Obtained food preferences and will honor. Pt stated she gets too full if she drinks Boost at every meal but she isn't much of a breakfast eater so she agreed to Boost Plus (only chocolate) at breakfast, Boost Breeze (orange) & Boost Pudding at lunch and dinner.     Typical Food Intake    Food Preferences Likes mashed potatoes and peas, steak, pork,  "hamburgers, hot dogs, pasta   Meal/Snack Patterns Not a big breakfast eater   Supplements Boost usually makes her too full to eat much of her meals.   Factors Affecting Intake Emotional (loss of her son and  in past year)   Tests/Procedures Chest x-ray     Anthropometrics        Current Height   Current Weight Height: 152.4 cm (60\")  Weight: 38.5 kg (84 lb 14.4 oz) (05/04/22 1507)       Admission Weight Weight: 38.5 kg (84 lb 14.4 oz)    Ideal Body Weight (IBW) 50.4 kg (111 lb)   Usual Body Weight (UBW) 118 lb (1 year ago)       Trending Weight Hx  Lowest weight in past year was 76 lb but slowly gaining some back per pt   Weight change 34 lb (29%) wt loss x 1 year             PTA:     Wt Readings from Last 30 Encounters:   05/04/22 1507 38.5 kg (84 lb 14.4 oz)   03/28/22 1352 36.7 kg (81 lb)   02/23/22 1427 37.2 kg (82 lb)   02/17/22 1329 36.7 kg (80 lb 12.8 oz)   02/07/22 0535 34.7 kg (76 lb 9.6 oz)   02/06/22 0412 34.7 kg (76 lb 9.6 oz)   02/05/22 0646 34.7 kg (76 lb 6.4 oz)   02/03/22 0531 36.2 kg (79 lb 12.8 oz)   02/02/22 0543 34.6 kg (76 lb 4.8 oz)   02/01/22 0525 35.6 kg (78 lb 6.4 oz)   01/31/22 1349 35.8 kg (79 lb)   01/31/22 0530 35.8 kg (79 lb)   01/30/22 0529 34.9 kg (77 lb)   01/29/22 0507 35.1 kg (77 lb 4.8 oz)   01/28/22 0425 40.8 kg (89 lb 14.4 oz)   01/26/22 2334 40 kg (88 lb 3.2 oz)   01/26/22 1625 35.8 kg (79 lb)   01/21/22 0359 35.4 kg (78 lb 0.7 oz)   01/20/22 0450 34.1 kg (75 lb 3.2 oz)   01/19/22 0548 32.9 kg (72 lb 8.5 oz)   01/18/22 0403 31.9 kg (70 lb 4.8 oz)   01/17/22 0328 32.6 kg (71 lb 12.8 oz)   01/14/22 1151 36.3 kg (80 lb)   01/13/22 1221 36.3 kg (80 lb)   06/08/20 1201 44 kg (97 lb)   04/13/20 1224 46 kg (101 lb 6.4 oz)   03/21/20 0520 43.6 kg (96 lb 3.2 oz)   03/21/20 0032 44.1 kg (97 lb 3.6 oz)   03/20/20 2101 46.3 kg (102 lb)   03/11/19 1230 47.6 kg (105 lb)   03/05/19 1118 47.6 kg (105 lb)   02/26/19 1425 47.6 kg (105 lb)   02/18/19 1220 47.6 kg (105 lb)   08/20/18 1211 " 47.6 kg (105 lb)   02/19/18 1209 48.5 kg (107 lb)   07/07/17 1305 52.6 kg (116 lb)   04/12/17 1531 54 kg (119 lb)   03/30/17 0500 51.5 kg (113 lb 9.6 oz)   03/29/17 0500 50.2 kg (110 lb 11.2 oz)   03/26/17 1143 53.5 kg (118 lb)   02/26/17 1308 52.6 kg (116 lb)   02/26/17 0902 52.6 kg (116 lb)   05/03/13 1118 53.5 kg (117 lb 15.8 oz)   04/26/13 1233 53.5 kg (117 lb 15.8 oz)   04/09/13 1123 54.4 kg (119 lb 15.9 oz)   02/22/13 1339 54.9 kg (121 lb 1.9 oz)   02/18/13 1510 55.8 kg (122 lb 15.9 oz)   02/01/13 1402 56.8 kg (125 lb 3.2 oz)      BMI kg/m2 Body mass index is 16.58 kg/m².       Labs        Pertinent Labs    Results from last 7 days   Lab Units 05/04/22  0448   SODIUM mmol/L 143   POTASSIUM mmol/L 4.0   CHLORIDE mmol/L 103   CO2 mmol/L 30.8*   BUN mg/dL 15   CREATININE mg/dL 0.77   CALCIUM mg/dL 9.9   BILIRUBIN mg/dL <0.2   ALK PHOS U/L 153*   ALT (SGPT) U/L 6   AST (SGOT) U/L 9   GLUCOSE mg/dL 97     Results from last 7 days   Lab Units 05/04/22  0448   MAGNESIUM mg/dL 1.9   HEMOGLOBIN g/dL 8.9*   HEMATOCRIT % 27.7*     COVID19   Date Value Ref Range Status   05/04/2022 Not Detected Not Detected - Ref. Range Final     Lab Results   Component Value Date    HGBA1C 5.50 01/14/2022        Medications    Scheduled Medications acetylcysteine, 4 mL, Nebulization, Q8H - RT  albuterol, 2.5 mg, Nebulization, Q4H  aspirin, 81 mg, Oral, Daily  clopidogrel, 75 mg, Oral, Daily  guaiFENesin, 600 mg, Oral, Q12H  methylPREDNISolone sodium succinate, 40 mg, Intravenous, Q12H  pantoprazole, 40 mg, Oral, BID AC  rosuvastatin, 5 mg, Oral, Daily  sucralfate, 1 g, Oral, 4x Daily  tiotropium bromide monohydrate, 2 puff, Inhalation, Daily - RT  ursodiol, 300 mg, Oral, TID        Infusions amiodarone, 1 mg/min, Last Rate: 1 mg/min (05/05/22 1154)   Followed by  amiodarone, 0.5 mg/min        PRN Medications ALPRAZolam  •  O2  •  [COMPLETED] Insert peripheral IV **AND** sodium chloride      Physical Findings        Physical Appearance,  NFPE Underweight, bruising all over body, severe subcutaneous fat and muscle wasting noted on NFPE.    --  Malnutrition Severity Assessment      Patient meets criteria for : Severe Malnutrition (Based on ASPEN/AND criteria, pt meets nutrition diagnosis of Severe Malnutrition of Chronic Disease due to inadequate po intake, 34# (29%) wt loss x 1 year, and severe subcutaneous fat and muscle wasting noted on NFPE.)  Malnutrition Type (last 8 hours)     Malnutrition Severity Assessment     Row Name 05/05/22 [facility]       Malnutrition Severity Assessment    Malnutrition Type Chronic Disease - Related Malnutrition    Row Name 05/05/22 [facility]       Insufficient Energy Intake     Insufficient Energy Intake Findings Moderate    Insufficient Energy Intake  <75% of est. energy requirement for > or equal to 3 months    Row Name 05/05/22 [facility]       Unintentional Weight Loss     Unintentional Weight Loss Findings Severe    Unintentional Weight Loss  Weight loss greater than 20% in one year  34# (29%) wt loss x 1 year    Row Name 05/05/22 1353       Muscle Loss    Loss of Muscle Mass Findings Severe    [place] Region Severe - deep hollowing/scooping, lack of muscle to touch, facial bones well defined    Clavicle Bone Region Severe - protruding prominent bone    Acromion Bone Region Severe - squared shoulders, bones, and acromion process protrusion prominent    Scapular Bone Region Severe - prominent bones, depressions easily visible between ribs, scapula, spine, shoulders    Dorsal Hand Region Severe - prominent depression    Patellar Region Severe - prominent bone, square looking, very little muscle definition    Anterior Thigh Region Severe - line/depression along thigh, obviously thin    Posterior Calf Region Severe - thin with very little definition/firmness    Row Name 05/05/22 1353       Fat Loss    Subcutaneous Fat Loss Findings Severe    Orbital Region  Severe - pronounced hollowness/depression, dark circles, loose saggy skin     "Upper Arm Region Severe - mostly skin, very little space between folds, fingers touch    Thoracic & Lumbar Region Moderate - ribs visible with mild depressions, iliac crest somewhat prominent    Row Name 05/05/22 1357       Criteria Met (Must meet criteria for severity in at least 2 of these categories: M Wasting, Fat Loss, Fluid, Secondary Signs, Wt. Status, Intake)    Patient meets criteria for  Severe Malnutrition  Based on ASPEN/AND criteria, pt meets nutrition diagnosis of Severe Malnutrition of Chronic Disease due to inadequate po intake, 34# (29%) wt loss x 1 year, and severe subcutaneous fat and muscle wasting noted on NFPE.                   Edema  none   Gastrointestinal     Tubes/Drains NC   Oral/Mouth Cavity    Skin bruised     Estimated/Assessed Needs       Energy Requirements    Height for Calculation  Height: 152.4 cm (60\")   Weight for Calculation Weight: 38.5 kg (84 lb 14.4 oz)   Method for Estimation  30-35 kcals/kg   EST Needs (kcal/day) 1695-6634 kcals       Protein Requirements    Weight for Calculation 38.5 kg   EST Protein Needs (g/kg) 1.5 g/kg   EST Daily Needs (g/day) 58 g       Fluid Requirements     Estimated Needs (mL/day) 1200 ml         Current Nutrition Orders & Evaluation of Intake       Oral Nutrition     Food Allergies none   Current PO Diet Diet Regular   Supplement    PO Evaluation     Trending % PO Intake 75% po intake at breakfast this am       Nutritional Risk Screening       MST SCORE   0     Nutrition Diagnosis         Nutrition Dx Problem 1   Nutrition Diagnoses Problem 1     Problem 1 Malnutrition   Etiology (related to) Factors Affecting Nutrition   Appetite Fair   Mental State/Condition DepressionMental State/Condition. Depression after loss of son and .    Signs/Symptoms (evidenced by) Unintended Weight Change; Report of Mnimal PO Intake; BMI   BMI 16 - 16.9   Unintended Weight Change Loss   Number of Pounds Lost 34 lb   Weight loss time period 1 year           " Nutrition Dx Problem 2        Intervention Goal         Intervention Goal(s) General Maintain nutrition; Disease management/therapy; Meet nutritional needs for age/condition   PO Increase intake; Meet estimated needs; PO intake (%)   PO Intake % 80 %   TF/PN --   Transition --   Weight Appropriate weight gain          Nutrition Intervention        RD Action Interview for preference; Menu provided; Encourage intake; Recommend/ordered; Supplement offered/refused; Follow Tx progress, supplement     Nutrition Prescription          Diet Prescription Regular diet   Supplement Prescription Boost Plus (chocolate) at breakfast  Boost Breeze (orange) at lunch and dinner  Boost Pudding at lunch and dinner       Monitor/Evaluation        Monitor Per protocol, I&O, PO intake, Supplement intake, Pertinent labs, Weight, Skin status     RD to follow per protocol.       Electronically signed by:  Lu Hamilton RD  05/05/22 14:00 EDT

## 2022-05-05 NOTE — PROGRESS NOTES
"Daily progress note    Chief complaint  Doing same  Complaint of palpitation   No increase shortness of breath or palpitation    History of present illness  77-year-old white female with history of chronic hypoxic respiratory failure chronic diastolic CHF  Atrial fibrillation COPD coronary artery disease hypertension hyperlipidemia and gastroesophageal reflux disease who continues to smoke cigarettes presented to [facility] with complaint of increased shortness of breath and nonproductive cough for last 3 weeks which is worse last week.  Patient denies any fever chest pain abdominal pain nausea vomiting diarrhea.  Patient work-up in ER revealed acute on chronic hypoxic respiratory failure with acute exacerbation of COPD admit for management.      REVIEW OF SYSTEMS  All systems reviewed and negative except for those discussed in HPI.      PHYSICAL EXAM   Blood pressure 108/55, pulse 97, temperature 98.2 °F (36.8 °C), temperature source Oral, resp. rate 20, height 152.4 cm (60\"), weight 38.5 kg (84 lb 14.4 oz), SpO2 95 %.    Constitutional: Thin frail and petite female in moderate respiratory distress   HEENT unremarkable  Neck: Painless range of motion noted. Neck supple.   Cardiovascular: Tachycardic rate, regular rhythm and intact distal pulses.  Pulmonary/Chest: Severely diminished throughout with tachypnea and increased work of breathing including accessory muscle use.    Abdominal: Soft. There is no tenderness. There is no rebound and no guarding.   Musculoskeletal: Moves all extremities equally. There is no pedal edema or calf tenderness.   Neurological: Alert.  Baseline strength and sensation noted.   Skin: Skin is pink, warm, and dry. No pallor.   Psychiatric: Mood and affect normal.     LAB RESULTS  Lab Results (last 24 hours)     Procedure Component Value Units Date/Time    Blood Culture - Blood, Arm, Left [[id number]]  (Normal) Collected: 05/04/22 0448    Specimen: Blood from Arm, Left Updated: " 05/05/22 0503     Blood Culture No growth at 24 hours    Blood Culture - Blood, Arm, Right [[id number]]  (Normal) Collected: 05/04/22 0448    Specimen: Blood from Arm, Right Updated: 05/05/22 0503     Blood Culture No growth at 24 hours        Imaging Results (Last 24 Hours)     ** No results found for the last 24 hours. **        ECG 12 Lead  Component   Ref Range & Units 04:01   (5/4/22) 3 mo ago   (1/26/22) 3 mo ago   (1/18/22) 3 mo ago   (1/14/22) 3 mo ago   (1/13/22) 3 mo ago   (1/13/22)   QT Interval   ms 324 P  446  314  362  373  409    Resulting Agency BH ECG BH ECG BH ECG BH ECG BH ECG BH ECG             HEART RATE= 88  bpm  RR Interval= 576  ms  KY Interval=   ms  P Horizontal Axis=   deg  P Front Axis=   deg  QRSD Interval= 90  ms  QT Interval= 324  ms  QRS Axis= 60  deg  T Wave Axis= 61  deg  - ABNORMAL ECG -  Atrial fibrillation  Ventricular premature complex  Minimal ST elevation, inferior leads             Current Facility-Administered Medications:   •  acetylcysteine (MUCOMYST) 20 % nebulizer solution 4 mL, 4 mL, Nebulization, Q8H - RT, Moise Katz MD  •  albuterol (PROVENTIL) nebulizer solution 0.083% 2.5 mg/3mL, 2.5 mg, Nebulization, Q4H, Eulogio Mcdaniel MD, 2.5 mg at 05/05/22 1130  •  ALPRAZolam (XANAX) tablet 0.25 mg, 0.25 mg, Oral, 4x Daily PRN, Eulogio Mcdaniel MD  •  [COMPLETED] amiodarone in dextrose 5% (NEXTERONE) loading dose 150mg/100mL, 150 mg, Intravenous, Once, 150 mg at 05/05/22 1153 **FOLLOWED BY** amiodarone 360 mg in 200 mL D5W infusion, 1 mg/min, Intravenous, Continuous, Last Rate: 33.3 mL/hr at 05/05/22 1154, 1 mg/min at 05/05/22 1154 **FOLLOWED BY** amiodarone 360 mg in 200 mL D5W infusion, 0.5 mg/min, Intravenous, Continuous, Darlene Shen, BRAYAN  •  aspirin chewable tablet 81 mg, 81 mg, Oral, Daily, Eulogio Mcdaniel MD, 81 mg at 05/05/22 0823  •  clopidogrel (PLAVIX) tablet 75 mg, 75 mg, Oral, Daily, Eulogio Mcdaniel MD, 75 mg at 05/05/22 0823  •  guaiFENesin (MUCINEX) 12 hr tablet 600  mg, 600 mg, Oral, Q12H, Jonny Mcdaniel MD, 600 mg at 05/05/22 0823  •  methylPREDNISolone sodium succinate (SOLU-Medrol) injection 40 mg, 40 mg, Intravenous, Q12H, Jonny Mcdaniel MD, 40 mg at 05/05/22 0541  •  O2 (OXYGEN), 2 L/min, Inhalation, PRN, Jonny Mcdaniel MD  •  pantoprazole (PROTONIX) EC tablet 40 mg, 40 mg, Oral, BID AC, Jonny Mcdaniel MD, 40 mg at 05/05/22 0650  •  rosuvastatin (CRESTOR) tablet 5 mg, 5 mg, Oral, Daily, Jonny Mcdaniel MD, 5 mg at 05/05/22 0823  •  [COMPLETED] Insert peripheral IV, , , Once **AND** sodium chloride 0.9 % flush 10 mL, 10 mL, Intravenous, PRN, Manolo Bustamante MD  •  sucralfate (CARAFATE) tablet 1 g, 1 g, Oral, 4x Daily, Jonny Mcdaniel MD, 1 g at 05/05/22 0823  •  tiotropium (SPIRIVA RESPIMAT) 2.5 mcg/act aerosol solution inhaler, 2 puff, Inhalation, Daily - RT, Jonny Mcdaniel MD  •  ursodiol (ACTIGALL) capsule 300 mg, 300 mg, Oral, TID, Jonny Mcdaniel MD, 300 mg at 05/05/22 0823     ASSESSMENT  Acute on chronic hypoxic respiratory failure  Acute exacerbation of COPD  Persistent tobacco abuse  Chronic cough  Chronic atrial fibrillation with rapid ventricular  Anxiety disorder  Gastroesophageal reflux disease    PLAN  CPM  Supplement oxygen  DuoNeb and Symbicort  IV steroids  IV amiodarone  Symptomatic treatment for cough  Advised to quit smoking  Pulmonary consult appreciated  Cardiology to follow patient  Adjust home medications  Stress ulcer DVT prophylaxis  Supportive care  DNR  PT/OT  Discussed with nursing staff  Follow closely further recommendation according to hospital course    JONNY MCDANIEL MD

## 2022-05-05 NOTE — CONSULTS
I was requested to see patient regarding an Advance Directive.  Patient only wanted information.  I provided her an AD pamphlet and explained to her the document works.  She will informed the staff if she wants to complete.

## 2022-05-05 NOTE — CONSULTS
[facility]  Cardiology Consult Note    Patient Identification:  Name: Celia Baird  Age: 77 y.o.  Sex: female  :  1945  MRN: [id number]             Requesting Physician: Dr Mcdaniel    Reason for Consultation / Chief Complaint: Atrial fibrillation    History of Present Illness:     This is a 77-year-old female who is current with our service.  She has chronic diastolic CHF, paroxysmal atrial fibrillation s/p watchman on rhythmol, coronary artery disease s/p PCI 2021, hypertension, hyperlipidemia, COPD on 2 L nasal cannula at home, chronic anemia, GERD.  She presented to [facility] ER via EMS from home with complaints of shortness of breath and cough worsening for the last 2 weeks or so.  She reports she was seen by her PCP 1 week ago and prescribed steroids. Chest x-ray in ER shows no acute findings.  Troponin negative x2, , WBC 11, hemoglobin 8.9.  ECG in ER shows A. fib.  She was treated with Solu-Medrol and DuoNeb in ER.    Echo 2022 EF 61.9%, normal LV function. Stress test 3/5/2019 myocardial perfusion imaging indicates a normal study with no evidence of ischemia.  She had loop recorder placed in 2017 however the battery is end-of-life. Previously been unable to tolerate cardizem for afib due to leg edema.     Comorbid cardiac risk factors: CAD, age, hypertension, hyperlipidemia    Past Medical History:  Past Medical History:   Diagnosis Date    Atrial fibrillation (HCC)     CAD (coronary artery disease)     COPD (chronic obstructive pulmonary disease) (HCC)     History of frequent urinary tract infections     Irregular heart beat     Kidney stones     PBC (primary biliary cirrhosis)     PBC (primary biliary cirrhosis)      Past Surgical History:  Past Surgical History:   Procedure Laterality Date    CARDIAC ELECTROPHYSIOLOGY PROCEDURE N/A 2017    Procedure:    LINQ;  Surgeon: Ailyn Solorio MD;  Location: [facility] INVASIVE LOCATION;  Service:      CHOLECYSTECTOMY      COLONOSCOPY      COLONOSCOPY N/A 01/28/2022    Procedure: COLONOSCOPY into cecum with polypectomy, clip placement;  Surgeon: Constantine Kaye MD;  Location: [facility] ENDOSCOPY;  Service: Gastroenterology;  Laterality: N/A;  poor prep, diverticulosis, polyp    CORONARY STENT PLACEMENT      proximal circumflex    ENDOSCOPY N/A 01/28/2022    Procedure: ESOPHAGOGASTRODUODENOSCOPY with biopsy;  Surgeon: Constantine Kaye MD;  Location: [facility] ENDOSCOPY;  Service: Gastroenterology;  Laterality: N/A;  normal    TUBAL ABDOMINAL LIGATION        Allergies:  Allergies   Allergen Reactions    Morphine And Related Nausea And Vomiting    Levaquin [Levofloxacin]     Sulfa Antibiotics      Home Meds:  Medications Prior to Admission   Medication Sig Dispense Refill Last Dose    aspirin 81 MG EC tablet Take 81 mg by mouth Daily.   5/3/2022 at Unknown time    bumetanide (BUMEX) 0.5 MG tablet Take 0.5 mg by mouth Daily.   5/3/2022 at Unknown time    clopidogrel (PLAVIX) 75 MG tablet Take 75 mg by mouth Daily.   5/3/2022 at Unknown time    ferrous gluconate (FERGON) 324 MG tablet Take 324 mg by mouth Daily With Breakfast.   5/3/2022 at Unknown time    ipratropium-albuterol (DUO-NEB) 0.5-2.5 mg/3 ml nebulizer Take 3 mL by nebulization 2 (Two) Times a Day.   5/3/2022 at Unknown time    pantoprazole (PROTONIX) 40 MG EC tablet Take 40 mg by mouth 2 (Two) Times a Day.   5/3/2022 at Unknown time    ProAir  (90 Base) MCG/ACT inhaler Inhale 1-2 puffs As Needed. every 4 to 6 hours   5/3/2022 at Unknown time    propafenone (RYTHMOL) 150 MG tablet Take 1 tablet by mouth Every 8 (Eight) Hours for 30 days. 90 tablet 0 5/3/2022 at Unknown time    rosuvastatin (CRESTOR) 5 MG tablet Take 5 mg by mouth Daily.   5/3/2022 at Unknown time    spironolactone (ALDACTONE) 25 MG tablet Take 1 tablet by mouth Daily for 30 days. 30 tablet 0 5/3/2022 at Unknown time    sucralfate (CARAFATE) 1 g tablet Take 1 g by mouth 4 (Four)  Times a Day.   5/3/2022 at Unknown time    ursodiol (ACTIGALL) 300 MG capsule Take 300 mg by mouth 3 (Three) Times a Day.   5/3/2022 at Unknown time    ALPRAZolam (XANAX) 0.25 MG tablet Take 0.25 mg by mouth Every 8 (Eight) Hours As Needed. for anxiety (Patient not taking: Reported on 5/4/2022)   Not Taking at Unknown time    mirtazapine (REMERON) 7.5 MG tablet Take 1 tablet by mouth Every Night for 30 days. 30 tablet 0     nitroglycerin (NITROLINGUAL) 0.4 MG/SPRAY spray Place 1 spray under the tongue Every 5 (Five) Minutes As Needed for Chest Pain.       nystatin (MYCOSTATIN) 100,000 unit/mL suspension        O2 (OXYGEN) Inhale 2 L/min As Needed (sob).       tiotropium bromide monohydrate (SPIRIVA RESPIMAT) 2.5 MCG/ACT aerosol solution inhaler Inhale 2 puffs Daily for 30 days. 1 g 0      Current Meds:   Current Facility-Administered Medications   Medication Dose Route Frequency Provider Last Rate Last Admin    ALPRAZolam (XANAX) tablet 0.25 mg  0.25 mg Oral 4x Daily PRN Eulogio Mcdaniel MD        aspirin chewable tablet 81 mg  81 mg Oral Daily Eulogio Mcdaniel MD   81 mg at 05/05/22 0823    budesonide-formoterol (SYMBICORT) 160-4.5 MCG/ACT inhaler 2 puff  2 puff Inhalation BID - RT Eulogio Mcdaniel MD        clopidogrel (PLAVIX) tablet 75 mg  75 mg Oral Daily Eulogio Mcdaniel MD   75 mg at 05/05/22 0823    guaiFENesin (MUCINEX) 12 hr tablet 600 mg  600 mg Oral Q12H Eulogio Mcdaniel MD   600 mg at 05/05/22 0823    ipratropium-albuterol (DUO-NEB) nebulizer solution 3 mL  3 mL Nebulization Q4H - RT Eulogio Mcdaniel MD   3 mL at 05/05/22 0742    methylPREDNISolone sodium succinate (SOLU-Medrol) injection 40 mg  40 mg Intravenous Q12H Eulogio Mcdaniel MD   40 mg at 05/05/22 0541    O2 (OXYGEN)  2 L/min Inhalation PRN Eulogio Mcdaniel MD        pantoprazole (PROTONIX) EC tablet 40 mg  40 mg Oral BID AC Eulogio Mcdaniel MD   40 mg at 05/05/22 0650    rosuvastatin (CRESTOR) tablet 5 mg  5 mg Oral Daily Eulogio Mcdaniel MD   5 mg at 05/05/22 0850     "sodium chloride 0.9 % flush 10 mL  10 mL Intravenous PRN Manolo Bustamante MD        sucralfate (CARAFATE) tablet 1 g  1 g Oral 4x Daily Eulogio Mcdaniel MD   1 g at 05/05/22 0823    ursodiol (ACTIGALL) capsule 300 mg  300 mg Oral TID Eulogio Mcdaniel MD   300 mg at 05/05/22 0823       Social History:   Social History     Tobacco Use    Smoking status: Current Some Day Smoker     Packs/day: 0.50     Years: 59.00     Pack years: 29.50     Types: Cigarettes    Smokeless tobacco: Never Used    Tobacco comment: 2 - 3 CIGARETTES A DAY   Substance Use Topics    Alcohol use: No      Family History:  Family History   Problem Relation Age of Onset    Heart disease Father     Heart attack Father     Heart disease Brother     Stroke Mother         Review of Systems  Constitutional: No wt loss, fever   Gastrointestinal: No nausea , abdominal pain  Behavioral/Psych: No insomnia or anxiety   Cardiovascular ----positive for palpitation. All other systems reviewed and are negative                   Physical Exam  /55 (BP Location: Right arm, Patient Position: Lying)   Pulse 89   Temp 98.2 °F (36.8 °C) (Oral)   Resp 20   Ht 152.4 cm (60\")   Wt 38.5 kg (84 lb 14.4 oz)   SpO2 98%   BMI 16.58 kg/m²     General appearance: No acute changes   Eyes: Sclerae conjunctivae normal, pupils reactive   HENT: Atraumatic; oropharynx clear with moist mucous membranes and no mucosal ulcerations;  Neck: Trachea midline; NECK, supple, no thyromegaly or lymphadenopathy   Lungs: Normal size and shape, normal breath sounds, equal distribution of air, no rales and rhonchi   CV: S1-S2 irregular, no murmurs, no rub, no gallop   Abdomen: Soft, nontender; no masses , no abnormal abdominal sounds   Extremities: No deformity , normal color , no peripheral edema   Skin: Normal temperature, turgor and texture; no rash, ulcers  Psych: Appropriate affect, alert and oriented to person, place and time                   Cardiographics  ECG:   Atrial fibrillation " rate 80s        Telemetry:  afib    Echocardiogram:   Interpretation Summary    Calculated left ventricular EF = 61.9% Estimated left ventricular EF was in agreement with the calculated left ventricular EF.  Left ventricular diastolic function was normal.  There is no evidence of pericardial effusion. .    Interpretation Summary       Findings consistent with a normal ECG stress test.  Left ventricular ejection fraction is normal (Calculated EF = 70%).  Myocardial perfusion imaging indicates a normal myocardial perfusion study with no evidence of ischemia.  Impressions are consistent with a low risk study.      Imaging  Chest X-ray:   FINDINGS:  Cardiomegaly is present. There is no vascular congestion. There are  background changes of COPD. Cardiac loop recorder is noted. No  pneumothorax is identified. No definite acute infiltrate or pleural  effusion is seen.     IMPRESSION:  No acute findings.     This report was finalized on 5/4/2022 4:11 AM by Dr. Danielle Curtis M.D.    Lab Review   Results from last 7 days   Lab Units 05/04/22  0706 05/04/22  0448   TROPONIN T ng/mL <0.010 <0.010     Results from last 7 days   Lab Units 05/04/22  0448   MAGNESIUM mg/dL 1.9     Results from last 7 days   Lab Units 05/04/22  0448   SODIUM mmol/L 143   POTASSIUM mmol/L 4.0   BUN mg/dL 15   CREATININE mg/dL 0.77   CALCIUM mg/dL 9.9     @[id number]@  Results from last 7 days   Lab Units 05/04/22  0448   WBC 10*3/mm3 11.04*   HEMOGLOBIN g/dL 8.9*   HEMATOCRIT % 27.7*   PLATELETS 10*3/mm3 380             Assessment:  Acute on chronic hypoxic respiratory failure  Acute exacerbation of COPD  Persistent tobacco abuse  Atrial fibrillation s/p watchman  Anxiety  GERD  Coronary artery disease  Hypertension    Recommendations / Plan:     This is a 77-year-old female who is current with our service.  She is admitted for acute on chronic hypoxic respiratory failure and COPD exacerbation.  We have been consulted for atrial fibrillation.   She has had a couple week progression of shortness of breath and cough.  Chest x-ray in ER shows no acute findings.  Troponin negative x2.  ECG in ER Atrial fibrillation rate 80s.     She remains in atrial fibrillation rate 100-130s. Discussed with Dr Solorio, start IV amiodarone, she has watchman device. Continue aspirin, Plavix, statin.       Darlene Shen, APRN  5/5/2022, 09:14 EDT    Patient personally interviewed and above subjective findings personally confirmed during a face to face contact with patient today  All findings of physical examination confirmed  All pertinent and performed labs, cardiac procedures ,  radiographs of the last 24 hours personally reviewed  Impression and plans discussed/elaborated and implemented jointly as described above     Ailyn Solorio MD          EMR Dragon/Transcription:   Dictated utilizing Dragon dictation

## 2022-05-05 NOTE — PLAN OF CARE
"  Problem: Adult Inpatient Plan of Care  Goal: Plan of Care Review  Outcome: Ongoing, Progressing  Flowsheets (Taken 5/5/2022 7365)  Progress: improving     Goal Outcome Evaluation:  Plan of Care Reviewed With: patient        Progress: improving  Outcome Evaluation: Ms. Baird is a calm, cooperative, pleasant patient on encounters. Alert and oriented x4. Assist x1 out of bed. Patient reports feeling \"a little better\" this am. Patient oxygen saturation stable overnight, between 90-95% on 1 liter nasal cannula. Patient breathing even, and unlabored. No signs or symptoms of distress. Call light within reach.  "

## 2022-05-05 NOTE — DISCHARGE PLACEMENT REQUEST
"Albina Baird (77 y.o. Female)             Date of Birth   1945    Social Security Number       Address   [address] [place] [address]    Home Phone   [phone]    MRN   [id number]       [facility]    Marital Status                               Admission Date   5/4/22    Admission Type   Emergency    Admitting Provider   Eulogio Mcdaniel MD    Attending Provider   Eulogio Mcdaniel MD    Department, Room/Bed   [address], [address]/1       Discharge Date       Discharge Disposition       Discharge Destination                               Attending Provider: Eulogio Mcdaniel MD    Allergies: Morphine And Related, Levaquin [Levofloxacin], Sulfa Antibiotics    Isolation: None   Infection: None   Code Status: No CPR   Advance Care Planning Activity    Ht: 152.4 cm (60\")   Wt: 38.5 kg (84 lb 14.4 oz)    Admission Cmt: None   Principal Problem: None                Active Insurance as of 5/4/2022     Primary Coverage     Payor Plan Insurance Group Employer/Plan Group    ANTHEM MEDICARE REPLACEMENT ANTH MEDICARE ADVANTAGE [id number]     Payor Plan Address Payor Plan Phone Number Payor Plan Fax Number Effective Dates    [address] [phone]  1/1/2016 - None Entered    [facility] [id number]       Subscriber Name Subscriber Birth Date Member ID       ALBINA BAIRD 1945 [id number]                 Emergency Contacts      (Rel.) Home Phone Work Phone Mobile Phone    Eduardo Baird (Son) [phone] -- [phone]            Briana SHELTON RN  Cooperative Care Coordinator   "

## 2022-05-05 NOTE — PROGRESS NOTES
[facility]  [phone]  Dr. Moise Katz    Subjective:  LOS: 0    Chief Complaint: COPD exacerbation    Continues to have wheezing and cough.  Unable to expectorate.  He is on low-flow oxygen at home.  Continues to smoke 5 to 6 cigarettes a day at home.  States that her exercise capacity is severely limited    Objective   Vital Signs past 24hrs    Temp range: Temp (24hrs), Av.2 °F (36.8 °C), Min:97.9 °F (36.6 °C), Max:98.6 °F (37 °C)    BP range: BP: ()/(51-62) 95/62  Pulse range: Heart Rate:  [] 117  Resp rate range: Resp:  [16-20] 20    Device (Oxygen Therapy): nasal cannulaFlow (L/min):  [1-2] 1  Oxygen range:SpO2:  [94 %-98 %] 94 %      38.5 kg (84 lb 14.4 oz); Body mass index is 16.58 kg/m².    Intake/Output Summary (Last 24 hours) at 2022 1225  Last data filed at 2022 0854  Gross per 24 hour   Intake 296 ml   Output --   Net 296 ml       Physical Exam  Constitutional:       Appearance: She is ill-appearing.   Eyes:      Pupils: Pupils are equal, round, and reactive to light.   Cardiovascular:      Rate and Rhythm: Normal rate and regular rhythm.      Heart sounds: No murmur heard.  Pulmonary:      Effort: Accessory muscle usage and respiratory distress (mild) present.      Breath sounds: Decreased breath sounds and wheezing (mild coarse) present.   Abdominal:      General: Bowel sounds are normal. There is no distension.      Palpations: Abdomen is soft. There is no mass.      Tenderness: There is no abdominal tenderness.   Musculoskeletal:         General: No swelling.   Neurological:      Mental Status: She is alert.       Results Review:    I have reviewed the laboratory and imaging data since the last note by LPC physician.  My annotations are noted in assessment and plan.    Medication Review:  I have reviewed the current MAR.  My annotations are noted in assessment and plan.    amiodarone, 1 mg/min, Last Rate: 1 mg/min (22 1154)   Followed by  amiodarone,  0.5 mg/min      Plan   PCCM Problems  Chronic respiratory failure  COPD exacerbation  Current cigarette smoker  Paroxysmal A. fib      THESE ARE NEW MEDICAL PROBLEMS TO ME.    Plan of Treatment    Still with wheezing.  Currently on Solu-Medrol every 12 hours.  Also on nebs every 4 hours.  Will add acetylcysteine to help with expectoration.  Add flutter valve.    Needs to quit smoking completely.    Continue low-flow oxygen.    States that she follows up with a pulmonologist in [place].    Moise Katz MD  05/05/22  12:25 EDT    While in the room and during my examination of the patient I wore gloves, gown, mask, eye protection and followed enhanced droplet/contact isolation protocol and precautions.  I washed my hands before and after this patient encounter.    Part of this note may be an electronic transcription/translation of spoken language to printed text using the Dragon Dictation System.

## 2022-05-05 NOTE — CASE MANAGEMENT/SOCIAL WORK
Continued Stay Note  [facility]     Patient Name: Celia Baird  MRN: [id number]  Today's Date: 5/5/2022    Admit Date: 5/4/2022     Discharge Plan     Row Name 05/05/22 1537       Plan    Plan SNF    Plan Comments Pt agreed to short-term rehab. Provided list to pt, pt made selections. Referrals sent. [facility] accepted. Pt agreeable. Will reach out to Ally at [facility] to begin precert. -Briana SHELTON RN, CCP               Discharge Codes    No documentation.               Expected Discharge Date and Time     Expected Discharge Date Expected Discharge Time    May 6, 2022             Briana SHELTON RN  Cooperative Care Coordinator

## 2022-05-06 LAB
ANION GAP SERPL CALCULATED.3IONS-SCNC: 10.3 MMOL/L (ref 5–15)
BASOPHILS # BLD AUTO: 0.02 10*3/MM3 (ref 0–0.2)
BASOPHILS NFR BLD AUTO: 0.1 % (ref 0–1.5)
BUN SERPL-MCNC: 16 MG/DL (ref 8–23)
BUN/CREAT SERPL: 22.5 (ref 7–25)
CALCIUM SPEC-SCNC: 9.3 MG/DL (ref 8.6–10.5)
CHLORIDE SERPL-SCNC: 102 MMOL/L (ref 98–107)
CO2 SERPL-SCNC: 26.7 MMOL/L (ref 22–29)
CREAT SERPL-MCNC: 0.71 MG/DL (ref 0.57–1)
DEPRECATED RDW RBC AUTO: 41.8 FL (ref 37–54)
EGFRCR SERPLBLD CKD-EPI 2021: 87.7 ML/MIN/1.73
EOSINOPHIL # BLD AUTO: 0.01 10*3/MM3 (ref 0–0.4)
EOSINOPHIL NFR BLD AUTO: 0.1 % (ref 0.3–6.2)
ERYTHROCYTE [DISTWIDTH] IN BLOOD BY AUTOMATED COUNT: 12.3 % (ref 12.3–15.4)
GLUCOSE BLDC GLUCOMTR-MCNC: 140 MG/DL (ref 70–130)
GLUCOSE BLDC GLUCOMTR-MCNC: 172 MG/DL (ref 70–130)
GLUCOSE SERPL-MCNC: 113 MG/DL (ref 65–99)
HCT VFR BLD AUTO: 26.3 % (ref 34–46.6)
HGB BLD-MCNC: 8.2 G/DL (ref 12–15.9)
IMM GRANULOCYTES # BLD AUTO: 0.07 10*3/MM3 (ref 0–0.05)
IMM GRANULOCYTES NFR BLD AUTO: 0.5 % (ref 0–0.5)
LYMPHOCYTES # BLD AUTO: 1.4 10*3/MM3 (ref 0.7–3.1)
LYMPHOCYTES NFR BLD AUTO: 10.3 % (ref 19.6–45.3)
MCH RBC QN AUTO: 28.8 PG (ref 26.6–33)
MCHC RBC AUTO-ENTMCNC: 31.2 G/DL (ref 31.5–35.7)
MCV RBC AUTO: 92.3 FL (ref 79–97)
MONOCYTES # BLD AUTO: 1.63 10*3/MM3 (ref 0.1–0.9)
MONOCYTES NFR BLD AUTO: 12 % (ref 5–12)
NEUTROPHILS NFR BLD AUTO: 10.42 10*3/MM3 (ref 1.7–7)
NEUTROPHILS NFR BLD AUTO: 77 % (ref 42.7–76)
NRBC BLD AUTO-RTO: 0.1 /100 WBC (ref 0–0.2)
PLATELET # BLD AUTO: 350 10*3/MM3 (ref 140–450)
PMV BLD AUTO: 10.4 FL (ref 6–12)
POTASSIUM SERPL-SCNC: 3.5 MMOL/L (ref 3.5–5.2)
RBC # BLD AUTO: 2.85 10*6/MM3 (ref 3.77–5.28)
SODIUM SERPL-SCNC: 139 MMOL/L (ref 136–145)
WBC NRBC COR # BLD: 13.55 10*3/MM3 (ref 3.4–10.8)

## 2022-05-06 PROCEDURE — 93005 ELECTROCARDIOGRAM TRACING: CPT

## 2022-05-06 PROCEDURE — 82962 GLUCOSE BLOOD TEST: CPT

## 2022-05-06 PROCEDURE — 97165 OT EVAL LOW COMPLEX 30 MIN: CPT

## 2022-05-06 PROCEDURE — 97162 PT EVAL MOD COMPLEX 30 MIN: CPT | Performed by: PHYSICAL THERAPIST

## 2022-05-06 PROCEDURE — G0378 HOSPITAL OBSERVATION PER HR: HCPCS

## 2022-05-06 PROCEDURE — 99214 OFFICE O/P EST MOD 30 MIN: CPT | Performed by: INTERNAL MEDICINE

## 2022-05-06 PROCEDURE — 94664 DEMO&/EVAL PT USE INHALER: CPT

## 2022-05-06 PROCEDURE — 94799 UNLISTED PULMONARY SVC/PX: CPT

## 2022-05-06 PROCEDURE — 25010000002 METHYLPREDNISOLONE PER 40 MG: Performed by: INTERNAL MEDICINE

## 2022-05-06 PROCEDURE — 94761 N-INVAS EAR/PLS OXIMETRY MLT: CPT

## 2022-05-06 PROCEDURE — 25010000002 AMIODARONE IN DEXTROSE 5% 360-4.14 MG/200ML-% SOLUTION

## 2022-05-06 PROCEDURE — 80048 BASIC METABOLIC PNL TOTAL CA: CPT | Performed by: HOSPITALIST

## 2022-05-06 PROCEDURE — 25010000002 METHYLPREDNISOLONE PER 40 MG: Performed by: HOSPITALIST

## 2022-05-06 PROCEDURE — 85025 COMPLETE CBC W/AUTO DIFF WBC: CPT | Performed by: HOSPITALIST

## 2022-05-06 PROCEDURE — 94760 N-INVAS EAR/PLS OXIMETRY 1: CPT

## 2022-05-06 PROCEDURE — 97530 THERAPEUTIC ACTIVITIES: CPT | Performed by: PHYSICAL THERAPIST

## 2022-05-06 PROCEDURE — 96366 THER/PROPH/DIAG IV INF ADDON: CPT

## 2022-05-06 RX ORDER — ACETAMINOPHEN 325 MG/1
650 TABLET ORAL EVERY 4 HOURS PRN
Status: DISCONTINUED | OUTPATIENT
Start: 2022-05-06 | End: 2022-05-10

## 2022-05-06 RX ORDER — PREDNISONE 20 MG/1
40 TABLET ORAL
Status: DISCONTINUED | OUTPATIENT
Start: 2022-05-07 | End: 2022-05-10 | Stop reason: HOSPADM

## 2022-05-06 RX ORDER — ALBUTEROL SULFATE 2.5 MG/3ML
2.5 SOLUTION RESPIRATORY (INHALATION)
Status: DISCONTINUED | OUTPATIENT
Start: 2022-05-06 | End: 2022-05-10 | Stop reason: HOSPADM

## 2022-05-06 RX ORDER — ACETYLCYSTEINE 200 MG/ML
4 SOLUTION ORAL; RESPIRATORY (INHALATION)
Status: DISCONTINUED | OUTPATIENT
Start: 2022-05-06 | End: 2022-05-10 | Stop reason: HOSPADM

## 2022-05-06 RX ORDER — AMIODARONE HYDROCHLORIDE 200 MG/1
200 TABLET ORAL EVERY 12 HOURS SCHEDULED
Status: DISCONTINUED | OUTPATIENT
Start: 2022-05-06 | End: 2022-05-10 | Stop reason: HOSPADM

## 2022-05-06 RX ADMIN — ACETYLCYSTEINE 4 ML: 200 SOLUTION ORAL; RESPIRATORY (INHALATION) at 07:15

## 2022-05-06 RX ADMIN — ACETYLCYSTEINE 4 ML: 200 SOLUTION ORAL; RESPIRATORY (INHALATION) at 19:51

## 2022-05-06 RX ADMIN — ACETAMINOPHEN 650 MG: 325 TABLET ORAL at 07:04

## 2022-05-06 RX ADMIN — SUCRALFATE 1 G: 1 TABLET ORAL at 11:53

## 2022-05-06 RX ADMIN — SUCRALFATE 1 G: 1 TABLET ORAL at 17:16

## 2022-05-06 RX ADMIN — ROSUVASTATIN CALCIUM 5 MG: 5 TABLET, FILM COATED ORAL at 08:48

## 2022-05-06 RX ADMIN — URSODIOL 300 MG: 300 CAPSULE ORAL at 16:24

## 2022-05-06 RX ADMIN — CLOPIDOGREL 75 MG: 75 TABLET, FILM COATED ORAL at 08:48

## 2022-05-06 RX ADMIN — ACETYLCYSTEINE 4 ML: 200 SOLUTION ORAL; RESPIRATORY (INHALATION) at 11:09

## 2022-05-06 RX ADMIN — ASPIRIN 81 MG: 81 TABLET, CHEWABLE ORAL at 08:49

## 2022-05-06 RX ADMIN — SUCRALFATE 1 G: 1 TABLET ORAL at 20:16

## 2022-05-06 RX ADMIN — ALBUTEROL SULFATE 2.5 MG: 2.5 SOLUTION RESPIRATORY (INHALATION) at 11:03

## 2022-05-06 RX ADMIN — TIOTROPIUM BROMIDE INHALATION SPRAY 2 PUFF: 3.12 SPRAY, METERED RESPIRATORY (INHALATION) at 07:19

## 2022-05-06 RX ADMIN — ALBUTEROL SULFATE 2.5 MG: 2.5 SOLUTION RESPIRATORY (INHALATION) at 15:12

## 2022-05-06 RX ADMIN — AMIODARONE HYDROCHLORIDE 200 MG: 200 TABLET ORAL at 20:16

## 2022-05-06 RX ADMIN — AMIODARONE HYDROCHLORIDE 200 MG: 200 TABLET ORAL at 15:23

## 2022-05-06 RX ADMIN — GUAIFENESIN 600 MG: 600 TABLET, EXTENDED RELEASE ORAL at 20:16

## 2022-05-06 RX ADMIN — PANTOPRAZOLE SODIUM 40 MG: 40 TABLET, DELAYED RELEASE ORAL at 06:45

## 2022-05-06 RX ADMIN — METHYLPREDNISOLONE SODIUM SUCCINATE 40 MG: 40 INJECTION, POWDER, FOR SOLUTION INTRAMUSCULAR; INTRAVENOUS at 16:24

## 2022-05-06 RX ADMIN — GUAIFENESIN 600 MG: 600 TABLET, EXTENDED RELEASE ORAL at 08:48

## 2022-05-06 RX ADMIN — URSODIOL 300 MG: 300 CAPSULE ORAL at 08:49

## 2022-05-06 RX ADMIN — AMIODARONE HYDROCHLORIDE 0.5 MG/MIN: 1.8 INJECTION, SOLUTION INTRAVENOUS at 05:56

## 2022-05-06 RX ADMIN — PANTOPRAZOLE SODIUM 40 MG: 40 TABLET, DELAYED RELEASE ORAL at 16:33

## 2022-05-06 RX ADMIN — ACETYLCYSTEINE 4 ML: 200 SOLUTION ORAL; RESPIRATORY (INHALATION) at 15:15

## 2022-05-06 RX ADMIN — ALBUTEROL SULFATE 2.5 MG: 2.5 SOLUTION RESPIRATORY (INHALATION) at 07:15

## 2022-05-06 RX ADMIN — ALBUTEROL SULFATE 2.5 MG: 2.5 SOLUTION RESPIRATORY (INHALATION) at 03:28

## 2022-05-06 RX ADMIN — METHYLPREDNISOLONE SODIUM SUCCINATE 40 MG: 40 INJECTION, POWDER, FOR SOLUTION INTRAMUSCULAR; INTRAVENOUS at 04:34

## 2022-05-06 RX ADMIN — URSODIOL 300 MG: 300 CAPSULE ORAL at 20:16

## 2022-05-06 RX ADMIN — ALBUTEROL SULFATE 2.5 MG: 2.5 SOLUTION RESPIRATORY (INHALATION) at 19:49

## 2022-05-06 RX ADMIN — SUCRALFATE 1 G: 1 TABLET ORAL at 08:49

## 2022-05-06 NOTE — PROGRESS NOTES
[facility]  [phone]  Dr. Moise Katz    Subjective:  LOS: 0    Chief Complaint: COPD exacerbation    She is on low-flow oxygen at home.  Continues to smoke 5 to 6 cigarettes a day at home.  States that her exercise capacity is severely limited  Still with some tightness across the upper abdomen.  Some improvement.  Still not expectorating much.  Denies nausea vomiting.    Objective   Vital Signs past 24hrs    Temp range: Temp (24hrs), Av °F (36.7 °C), Min:97.7 °F (36.5 °C), Max:98.3 °F (36.8 °C)    BP range: BP: (108-118)/(53-58) 116/58  Pulse range: Heart Rate:  [] 80  Resp rate range: Resp:  [16-20] 16    Device (Oxygen Therapy): nasal cannulaFlow (L/min):  [1] 1  Oxygen range:SpO2:  [94 %-99 %] 96 %      38.4 kg (84 lb 9.6 oz); Body mass index is 16.52 kg/m².    Intake/Output Summary (Last 24 hours) at 2022 0944  Last data filed at 2022 0425  Gross per 24 hour   Intake 1002 ml   Output 300 ml   Net 702 ml       Physical Exam  Eyes:      Pupils: Pupils are equal, round, and reactive to light.   Cardiovascular:      Rate and Rhythm: Normal rate and regular rhythm.      Heart sounds: No murmur heard.  Pulmonary:      Effort: No accessory muscle usage or respiratory distress.      Breath sounds: Decreased breath sounds present. No wheezing.   Abdominal:      General: Bowel sounds are normal. There is no distension.      Palpations: Abdomen is soft. There is no mass.      Tenderness: There is no abdominal tenderness.   Musculoskeletal:         General: No swelling.   Neurological:      Mental Status: She is alert.       Results Review:    I have reviewed the laboratory and imaging data since the last note by [facility] physician.  My annotations are noted in assessment and plan.    Medication Review:  I have reviewed the current MAR.  My annotations are noted in assessment and plan.    amiodarone, 0.5 mg/min, Last Rate: 0.5 mg/min (22 0848)      Plan   PCCM Problems  Chronic  respiratory failure  COPD exacerbation  Current cigarette smoker  Paroxysmal A. fib        Plan of Treatment    Her lungs are much clearer today.  Currently on Solu-Medrol every 12 hours.  Also on nebs every 4 hours.  I think the acetylcysteine nebs and flutter valve has really helped.  We will switch to p.o. prednisone in the morning.    Needs to quit smoking completely.    Continue low-flow oxygen.    States that she follows up with a pulmonologist in [place].    Note she is still on a amiodarone drip for A. fib.  Defer to cardiology for same.    Ambulate in halls every shift.  May need walking oximetry to titrate oxygen before discharge.    No objections to discharge if her lungs remain clear.    Moise Katz MD  05/06/22  09:44 EDT    While in the room and during my examination of the patient I wore gloves, gown, mask, eye protection and followed enhanced droplet/contact isolation protocol and precautions.  I washed my hands before and after this patient encounter.    Part of this note may be an electronic transcription/translation of spoken language to printed text using the Dragon Dictation System.

## 2022-05-06 NOTE — THERAPY EVALUATION
Patient Name: Celia Baird  : 1945    MRN: [id number]                              Today's Date: 2022       Admit Date: 2022    Visit Dx:     ICD-10-CM ICD-9-CM   1. COPD with exacerbation (HCC)  J44.1 491.21   2. Chronic hypoxemic respiratory failure (HCC)  J96.11 518.83     799.02     Patient Active Problem List   Diagnosis   • COPD exacerbation (HCC)   • Subarachnoid hemorrhage (HCC)   • Multiple skin tears   • Closed nondisplaced fracture of left clavicle   • Paroxysmal atrial fibrillation (HCC)   • Tobacco abuse   • Status post placement of implantable loop recorder   • SOB (shortness of breath)   • COPD with acute exacerbation (HCC)   • Severe malnutrition (CMS/HCC)   • Leukocytosis   • Gastrointestinal hemorrhage   • Absolute anemia   • Hyperlipidemia   • Long term (current) use of antithrombotics/antiplatelets   • COPD with exacerbation (HCC)     Past Medical History:   Diagnosis Date   • Atrial fibrillation (HCC)    • CAD (coronary artery disease)    • COPD (chronic obstructive pulmonary disease) (HCC)    • History of frequent urinary tract infections    • Irregular heart beat    • Kidney stones    • PBC (primary biliary cirrhosis)    • PBC (primary biliary cirrhosis)      Past Surgical History:   Procedure Laterality Date   • CARDIAC ELECTROPHYSIOLOGY PROCEDURE N/A 2017    Procedure:    LINQ;  Surgeon: Ailyn Solorio MD;  Location: [facility] CATH INVASIVE LOCATION;  Service:    • CHOLECYSTECTOMY     • COLONOSCOPY     • COLONOSCOPY N/A 2022    Procedure: COLONOSCOPY into cecum with polypectomy, clip placement;  Surgeon: Constantine Kaye MD;  Location: [facility] ENDOSCOPY;  Service: Gastroenterology;  Laterality: N/A;  poor prep, diverticulosis, polyp   • CORONARY STENT PLACEMENT      proximal circumflex   • ENDOSCOPY N/A 2022    Procedure: ESOPHAGOGASTRODUODENOSCOPY with biopsy;  Surgeon: Constantine Kaye MD;  Location: [facility] ENDOSCOPY;  Service: Gastroenterology;   Laterality: N/A;  normal   • TUBAL ABDOMINAL LIGATION        General Information     Row Name 05/06/22 1634          Physical Therapy Time and Intention    Document Type evaluation  -     Mode of Treatment individual therapy;physical therapy  -     Row Name 05/06/22 1634          General Information    Patient Profile Reviewed yes  -     Prior Level of Function independent:;all household mobility  uses walker at home recently  -     Existing Precautions/Restrictions oxygen therapy device and L/min;fall  -     Barriers to Rehab medically complex  -     Row Name 05/06/22 1634          Living Environment    People in Home alone  -     Row Name 05/06/22 1634          Home Main Entrance    Number of Stairs, Main Entrance none  -     Row Name 05/06/22 1634          Cognition    Orientation Status (Cognition) oriented x 3  -           User Key  (r) = Recorded By, (t) = Taken By, (c) = Cosigned By    Initials Name Provider Type    Whitney Hameed, PT Physical Therapist               Mobility     Row Name 05/06/22 1635          Bed Mobility    Bed Mobility bed mobility (all) activities  -     All Activities, [place] (Bed Mobility) modified independence  -     Row Name 05/06/22 1635          Sit-Stand Transfer    Sit-Stand [place] (Transfers) standby assist  -     Row Name 05/06/22 1635          Gait/Stairs (Locomotion)    [place] Level (Gait) contact guard  -     Distance in Feet (Gait) 65 ft with one standing rest break  -     Deviations/Abnormal Patterns (Gait) gait speed decreased  -     Comment, (Gait/Stairs) distacne limited by fatigue and SOA  -           User Key  (r) = Recorded By, (t) = Taken By, (c) = Cosigned By    Initials Name Provider Type    Whitney Hameed, PT Physical Therapist               Obj/Interventions     Row Name 05/06/22 1635          Motor Skills    Therapeutic Exercise --  BLE AP, LAQ, seated marchign x 10 reps  -            User Key  (r) = Recorded By, (t) = Taken By, (c) = Cosigned By    Initials Name Provider Type    Whitney Hameed, PT Physical Therapist               Goals/Plan     Row Name 05/06/22 1639          Bed Mobility Goal 1 (PT)    Activity/Assistive Device (Bed Mobility Goal 1, PT) bed mobility activities, all  -KH     Tyrone Level/Cues Needed (Bed Mobility Goal 1, PT) independent  -KH     Time Frame (Bed Mobility Goal 1, PT) 1 week  -     Row Name 05/06/22 1639          Transfer Goal 1 (PT)    Activity/Assistive Device (Transfer Goal 1, PT) transfers, all  with appropriate AD  -KH     T[place]one Level/Cues Needed (Transfer Goal 1, PT) standby assist  -     Row Name 05/06/22 1639          Gait Training Goal 1 (PT)    Activity/Assistive Device (Gait Training Goal 1, PT) gait (walking locomotion)  with appropriate AD  -KH     [place]one Level (Gait Training Goal 1, PT) standby assist  -KH     Distance (Gait Training Goal 1, PT) 100 ft  -KH     Time Frame (Gait Training Goal 1, PT) 1 week  -     Row Name 05/06/22 1639          Patient Education Goal (PT)    Activity (Patient Education Goal, PT) HEP  -KH     Time Frame (Patient Education Goal, PT) 1 week  -     Row Name 05/06/22 1639          Therapy Assessment/Plan (PT)    Planned Therapy Interventions (PT) balance training;bed mobility training;gait training;home exercise program;ROM (range of motion);transfer training;patient/family education  -           User Key  (r) = Recorded By, (t) = Taken By, (c) = Cosigned By    Initials Name Provider Type    Whitney Hameed, PT Physical Therapist               Clinical Impression     Row Name 05/06/22 1636          Pain    Pretreatment Pain Rating 0/10 - no pain  -     Posttreatment Pain Rating 0/10 - no pain  -     Row Name 05/06/22 1636          Plan of Care Review    Plan of Care Reviewed With patient  [facility]     Outcome Evaluation Pt was admitted with SOA and diagnosed with COPD exac.  Pt reports she is on home O2 and ambulates indepedently. She has a Rwx and cane and has used Rwx just recently since she has been sick. Pt presents with generalized weakness, CROCKER and decreased endurance. PT is unsteady as she tries to hurry to complete activity. Pt lives alone, but son stays with her as need. Pt is open to rehab  -     Row Name 05/06/22 1636          Therapy Assessment/Plan (PT)    Patient/Family Therapy Goals Statement (PT) return to PLOF  -     Rehab Potential (PT) good, to achieve stated therapy goals  -     Criteria for Skilled Interventions Met (PT) yes  -     Therapy Frequency (PT) 5 times/wk  -     Row Name 05/06/22 1636          Vital Signs    Pre SpO2 (%) 96  -KH     O2 Delivery Pre Treatment supplemental O2  -KH     Intra SpO2 (%) 88  -KH     O2 Delivery Intra Treatment supplemental O2  -KH     Post SpO2 (%) 92  -KH     O2 Delivery Post Treatment supplemental O2  -KH     Row Name 05/06/22 1636          Positioning and Restraints    Pre-Treatment Position in bed  -     Post Treatment Position bed  -     In Bed fowlers;call light within reach;encouraged to call for assist;exit alarm on;with family/caregiver  -           User Key  (r) = Recorded By, (t) = Taken By, (c) = Cosigned By    Initials Name Provider Type    Whiteny Hameed, PT Physical Therapist               Outcome Measures     Row Name 05/06/22 1640          How much help from another person do you currently need...    Turning from your back to your side while in flat bed without using bedrails? 4  -KH     Moving from lying on back to sitting on the side of a flat bed without bedrails? 3  -KH     Moving to and from a bed to a chair (including a wheelchair)? 3  -KH     Standing up from a chair using your arms (e.g., wheelchair, bedside chair)? 3  -KH     Climbing 3-5 steps with a railing? 2  -KH     To walk in hospital room? 3  -KH     AM-PAC 6 Clicks Score (PT) 18  -KH     Highest level of mobility 6 -->  Walked 10 steps or more  -     Row Name 05/06/22 1640 05/06/22 1410       Functional Assessment    Outcome Measure Options AM-PAC 6 Clicks Basic Mobility (PT)  - AM-PAC 6 Clicks Daily Activity (OT)  -VS          User Key  (r) = Recorded By, (t) = Taken By, (c) = Cosigned By    Initials Name Provider Type    Whitney Hameed, NEHA Physical Therapist    VS Pema Abdi OTR Occupational Therapist                             Physical Therapy Education                 Title: PT OT SLP Therapies (Done)     Topic: Physical Therapy (Done)     Point: Mobility training (Done)     Learning Progress Summary           Patient Acceptance, E, VU,NR by  at 5/6/2022 1640                   Point: Home exercise program (Done)     Learning Progress Summary           Patient Acceptance, E, VU,NR by  at 5/6/2022 1640                   Point: Body mechanics (Done)     Learning Progress Summary           Patient Acceptance, E, VU,NR by  at 5/6/2022 1640                   Point: Precautions (Done)     Learning Progress Summary           Patient Acceptance, E, VU,NR by  at 5/6/2022 1640                               User Key     Initials Effective Dates Name Provider Type Discipline     06/16/21 -  Whitney Calloway, PT Physical Therapist PT              PT Recommendation and Plan  Planned Therapy Interventions (PT): balance training, bed mobility training, gait training, home exercise program, ROM (range of motion), transfer training, patient/family education  Plan of Care Reviewed With: patient  Outcome Evaluation: Pt was admitted with SOA and diagnosed with COPD exac. Pt reports she is on home O2 and ambulates indepedently. She has a Rwx and cane and has used Rwx just recently since she has been sick. Pt presents with generalized weakness, CROCKER and decreased endurance. PT is unsteady as she tries to hurry to complete activity. Pt lives alone, but son stays with her as need. Pt is open to rehab     Time  Calculation:    PT Charges     Row Name 05/06/22 1641             Time Calculation    Start Time 1400  -      Stop Time 1416  -      Time Calculation (min) 16 min  -KH      PT Received On 05/06/22  -      PT - Next Appointment 05/09/22  -      PT Goal Re-Cert Due Date 05/13/22  -              Time Calculation- PT    Total Timed Code Minutes- PT 8 minute(s)  -              Timed Charges    03592 - PT Therapeutic Activity Minutes 8  -KH              Untimed Charges    PT Eval/Re-eval Minutes 8  -KH              Total Minutes    Timed Charges Total Minutes 8  -KH      Untimed Charges Total Minutes 8  -KH       Total Minutes 16  -KH            User Key  (r) = Recorded By, (t) = Taken By, (c) = Cosigned By    Initials Name Provider Type    Whitney Hameed, PT Physical Therapist              Therapy Charges for Today     Code Description Service Date Service Provider Modifiers Qty    13112441981 HC PT THERAPEUTIC ACT EA 15 MIN 5/6/2022 Whitney Calloway, PT GP 1    44488775987 HC PT EVAL MOD COMPLEXITY 2 5/6/2022 Whitney Calloway, PT GP 1          PT G-Codes  Outcome Measure Options: AM-PAC 6 Clicks Basic Mobility (PT)  AM-PAC 6 Clicks Score (PT): 18  AM-PAC 6 Clicks Score (OT): 18    Whtiney Calloway PT  5/6/2022

## 2022-05-06 NOTE — PLAN OF CARE
Goal Outcome Evaluation:  Plan of Care Reviewed With: patient        Progress: no change   VSS. Pt on amio gtt. Qtc remains stable and below 500msec per rhythm strips and AM EKG. Pt remained in NSR all shift with controlled rate. No c/o pain. Up with assist to bsc. Pt gets SOA with any activity. On 1 L all night. On solu-medrol. Will DC to rehab soon.

## 2022-05-06 NOTE — THERAPY EVALUATION
Patient Name: Celia Baird  : 1945    MRN: [id number]                              Today's Date: 2022       Admit Date: 2022    Visit Dx:     ICD-10-CM ICD-9-CM   1. COPD with exacerbation (HCC)  J44.1 491.21   2. Chronic hypoxemic respiratory failure (HCC)  J96.11 518.83     799.02     Patient Active Problem List   Diagnosis   • COPD exacerbation (HCC)   • Subarachnoid hemorrhage (HCC)   • Multiple skin tears   • Closed nondisplaced fracture of left clavicle   • Paroxysmal atrial fibrillation (HCC)   • Tobacco abuse   • Status post placement of implantable loop recorder   • SOB (shortness of breath)   • COPD with acute exacerbation (HCC)   • Severe malnutrition (CMS/HCC)   • Leukocytosis   • Gastrointestinal hemorrhage   • Absolute anemia   • Hyperlipidemia   • Long term (current) use of antithrombotics/antiplatelets   • COPD with exacerbation (HCC)     Past Medical History:   Diagnosis Date   • Atrial fibrillation (HCC)    • CAD (coronary artery disease)    • COPD (chronic obstructive pulmonary disease) (HCC)    • History of frequent urinary tract infections    • Irregular heart beat    • Kidney stones    • PBC (primary biliary cirrhosis)    • PBC (primary biliary cirrhosis)      Past Surgical History:   Procedure Laterality Date   • CARDIAC ELECTROPHYSIOLOGY PROCEDURE N/A 2017    Procedure:    LINQ;  Surgeon: Ailyn Solorio MD;  Location: [facility] CATH INVASIVE LOCATION;  Service:    • CHOLECYSTECTOMY     • COLONOSCOPY     • COLONOSCOPY N/A 2022    Procedure: COLONOSCOPY into cecum with polypectomy, clip placement;  Surgeon: Constantine Kaye MD;  Location: [facility] ENDOSCOPY;  Service: Gastroenterology;  Laterality: N/A;  poor prep, diverticulosis, polyp   • CORONARY STENT PLACEMENT      proximal circumflex   • ENDOSCOPY N/A 2022    Procedure: ESOPHAGOGASTRODUODENOSCOPY with biopsy;  Surgeon: Constantine Kaye MD;  Location: [facility] ENDOSCOPY;  Service: Gastroenterology;   Laterality: N/A;  normal   • TUBAL ABDOMINAL LIGATION        General Information     [facility] Name 05/06/22 135          OT Time and Intention    Document Type evaluation  -VS     Mode of Treatment occupational therapy  -VS     [facility] Name 05/06/22 [facility]          General Information    Patient Profile Reviewed yes  -VS     Prior Level of Function independent:;ADL's  -VS     Existing Precautions/Restrictions oxygen therapy device and L/min;fall  -VS     Barriers to Rehab medically complex  -VS     Row Name 05/06/22 [facility]          Cognition    Orientation Status (Cognition) oriented x 3  -VS     Row Name 05/06/22 1352 05/06/22 [facility]       Safety Issues, Functional Mobility    Safety Issues Affecting Function (Mobility) -- ability to follow commands  -VS    Comment, Safety Issues/Impairments (Mobility) Pt. was SOA with minimal movement while in bed and with standing  -VS --          User Key  (r) = Recorded By, (t) = Taken By, (c) = Cosigned By    Initials Name Provider Type    VS Pema Abdi OTR Occupational Therapist                 Mobility/ADL's     Row Name 05/06/22 [facility]          Bed Mobility    Bed Mobility bed mobility (all) activities  -VS     All Activities, [place] (Bed Mobility) modified independence  -VS     Comment, (Bed Mobility) INcreased time to complete, 3.5 liters  of O2 and she was SOA with minimal activity  -VS     Row Name 05/06/22 [facility]          Transfers    Transfers sit-stand transfer;stand-sit transfer  -VS     Sit-Stand [place] (Transfers) standby assist  -VS     Stand-Sit [place] (Transfers) standby assist  -VS     Row Name 05/06/22 [facility]          Stand-Sit Transfer    Comment, (Stand-Sit Transfer) SBA for safety due to SOA, O2 sts dropped to 83  when standing, pt. ask to sit to recover  but pt. required laying back in bed to recover  -VS     [facility] Name 05/06/22 1353          Functional Mobility    Functional Mobility- Comment Pt. stted at home she uses a rolator  walker and has her  " W/C if needed to use  -VS     Row Name 05/06/22 1353          Activities of Daily Living    BADL Assessment/Intervention lower body dressing  -VS     Row Name 05/06/22 1353          Lower Body Dressing Assessment/Training    [place] Level (Lower Body Dressing) lower body dressing skills;doff;don;socks;modified independence  -VS     Position (Lower Body Dressing) edge of bed sitting  -VS     Comment, (Lower Body Dressing) Pt. was SOA with this task, on 3.5 leters O2.   Pt. stated \"I am like this at home, I just  get out of air and have to rest, somedays are worse than others\"  -VS           User Key  (r) = Recorded By, (t) = Taken By, (c) = Cosigned By    Initials Name Provider Type    VS Pema Abdi OTR Occupational Therapist               Obj/Interventions     Row Name 05/06/22 1358          Vision Assessment/Intervention    Visual Impairment/Limitations WFL  -VS     Row Name 05/06/22 1358          Range of Motion Comprehensive    General Range of Motion no range of motion deficits identified  -VS     Row Name 05/06/22 1358          Strength Comprehensive (MMT)    Comment, General Manual Muscle Testing (MMT) Assessment NA  due to SOA  -VS     Row Name 05/06/22 1400          Motor Skills    Motor Skills functional endurance  -VS     Functional Endurance poor -  -VS     Row Name 05/06/22 1358          Balance    Balance Assessment standing static balance  -VS     Static Standing Balance standby assist  -VS     Position/Device Used, Standing Balance unsupported  -VS     Balance Interventions standing;occupation based/functional task  -VS     Comment, Balance SBA for safety due to SOA  -VS           User Key  (r) = Recorded By, (t) = Taken By, (c) = Cosigned By    Initials Name Provider Type    VS Pema Abdi OTR Occupational Therapist               Goals/Plan     Row Name 05/06/22 1408          Transfer Goal 1 (OT)    Activity/Assistive Device (Transfer Goal 1, OT) " sit-to-stand/stand-to-sit;bed-to-chair/chair-to-bed;toilet  -VS     [place] Level/Cues Needed (Transfer Goal 1, OT) standby assist  -VS     Time Frame (Transfer Goal 1, OT) short term goal (STG);2 weeks  -VS     Row Name 05/06/22 1408          Strength Goal 1 (OT)    Strength Goal 1 (OT) Pt. to have increase functional endurance to fair  to (A) with BADLs and mobility skills  -VS     Time Frame (Strength Goal 1, OT) short term goal (STG);2 weeks  -VS     Progress/Outcome (Strength Goal 1, OT) continuing progress toward goal  -VS     Row Name 05/06/22 1408          Therapy Assessment/Plan (OT)    Planned Therapy Interventions (OT) activity tolerance training;neuromuscular control/coordination retraining;functional balance retraining;BADL retraining  -VS           User Key  (r) = Recorded By, (t) = Taken By, (c) = Cosigned By    Initials Name Provider Type    VS Pema Abdi OTR Occupational Therapist               Clinical Impression     Row Name 05/06/22 1359          Pain Assessment    Pretreatment Pain Rating 0/10 - no pain  -VS     Posttreatment Pain Rating 0/10 - no pain  -VS     Row Name 05/06/22 1400          Plan of Care Review    Plan of Care Reviewed With patient  -VS     Progress no change  -VS     Outcome Evaluation OT:   Pt. is a 77 year old O x 3 female who lives with her son.   Pt. was on 3.5 liters of O2 and was SOA during minimal movement and functional task.   Pt.'s AROM with (B)UE is WFL, strength was noted tested due to SOA,.   Pt. was SOA with bed mobility,  sit to stand and while sitting on EOB sara/doff socks.   Pt's  endurance was poor - with mobility and functional ADL skills.   Pt. would benefit from skilled OT to assist with increaing overall endurance, moblity and ADL skills.   OT wore a mask, eye protection and washed her hands B/A the treatment session.   Pt. could not tolerate wearing a mask.  -VS     Row Name 05/06/22 1400          Therapy Assessment/Plan (OT)    Rehab  Potential (OT) good, to achieve stated therapy goals  -VS     Criteria for Skilled Therapeutic Interventions Met (OT) yes;skilled treatment is necessary  -VS     Therapy Frequency (OT) 3 times/wk  -VS     [facility] Name 05/06/22 1400          Therapy Plan Review/Discharge Plan (OT)    Anticipated Discharge Disposition (OT) sub acute care setting;[facility] ([facility])  -VS     [facility] Name 05/06/22 1400          Vital Signs    Pre SpO2 (%) 96  -VS     O2 Delivery Pre Treatment supplemental O2  -VS     Intra SpO2 (%) 83  02 3.5 liters  -VS     O2 Delivery Intra Treatment supplemental O2  -VS     Post SpO2 (%) 88  -VS     O2 Delivery Post Treatment supplemental O2  -VS     Pre Patient Position Supine  -VS     Intra Patient Position Sitting  -VS     Row Name 05/06/22 1400          Positioning and Restraints    Pre-Treatment Position in bed  -VS     Post Treatment Position bed  -VS     In Bed notified nsg;supine;encouraged to call for assist;exit alarm on;call light within reach;side rails up x3  -VS           User Key  (r) = Recorded By, (t) = Taken By, (c) = Cosigned By    Initials Name Provider Type    VS Pema Abdi OTR Occupational Therapist               Outcome Measures     Row Name 05/06/22 1410          How much help from another is currently needed...    Putting on and taking off regular lower body clothing? 3  -VS     Bathing (including washing, rinsing, and drying) 3  -VS     Toileting (which includes using toilet bed pan or urinal) 3  -VS     Putting on and taking off regular upper body clothing 3  -VS     Taking care of personal grooming (such as brushing teeth) 3  -VS     Eating meals 3  -VS     AM-PAC 6 Clicks Score (OT) 18  -VS     Row Name 05/06/22 1410          Functional Assessment    Outcome Measure Options AM-PAC 6 Clicks Daily Activity (OT)  -VS           User Key  (r) = Recorded By, (t) = Taken By, (c) = Cosigned By    Initials Name Provider Type    VS Pema Abdi OTR Occupational  Therapist                Occupational Therapy Education                 Title: PT OT SLP Therapies (Done)     Topic: Occupational Therapy (Done)     Point: Home exercise program (Done)     Description:   Instruct learner(s) on appropriate technique for monitoring, assisting and/or progressing therapeutic exercises/activities.              Learning Progress Summary           Patient Acceptance, E, VU,NR by VS at 5/6/2022 1412    Comment: OT discussed POC and goals with the pt.                   Point: Precautions (Done)     Description:   Instruct learner(s) on prescribed precautions during self-care and functional transfers.              Learning Progress Summary           Patient Acceptance, E, VU,NR by VS at 5/6/2022 1412    Comment: OT discussed POC and goals with the pt.                               User Key     Initials Effective Dates Name Provider Type Discipline    VS 06/16/21 -  Pema Abdi OTR Occupational Therapist OT              OT Recommendation and Plan  Planned Therapy Interventions (OT): activity tolerance training, neuromuscular control/coordination retraining, functional balance retraining, BADL retraining  Therapy Frequency (OT): 3 times/wk  Plan of Care Review  Plan of Care Reviewed With: patient  Progress: no change  Outcome Evaluation: OT:   Pt. is a 77 year old O x 3 female who lives with her son.   Pt. was on 3.5 liters of O2 and was SOA during minimal movement and functional task.   Pt.'s AROM with (B)UE is WFL, strength was noted tested due to SOA,.   Pt. was SOA with bed mobility,  sit to stand and while sitting on EOB sara/doff socks.   Pt's  endurance was poor - with mobility and functional ADL skills.   Pt. would benefit from skilled OT to assist with increaing overall endurance, moblity and ADL skills.   OT wore a mask, eye protection and washed her hands B/A the treatment session.   Pt. could not tolerate wearing a mask.     Time Calculation:    Time Calculation- OT     Row  Name 05/06/22 1413             Time Calculation- OT    OT Start Time 0955  -VS      OT Stop Time 1010  -VS      OT Time Calculation (min) 15 min  -VS      OT Non-Billable Time (min) 15 min  -VS      OT Received On 05/06/22  -VS      OT - Next Appointment 05/09/22  -VS      OT Goal Re-Cert Due Date 05/20/22  -VS              Untimed Charges    OT Eval/Re-eval Minutes 15  -VS              Total Minutes    Untimed Charges Total Minutes 15  -VS       Total Minutes 15  -VS            User Key  (r) = Recorded By, (t) = Taken By, (c) = Cosigned By    Initials Name Provider Type    VS Pema Abdi OTR Occupational Therapist              Therapy Charges for Today     Code Description Service Date Service Provider Modifiers Qty    87773911447 HC OT EVAL LOW COMPLEXITY 2 5/6/2022 Pema Abdi OTR GO 1               RYAN Sykes  5/6/2022

## 2022-05-06 NOTE — PLAN OF CARE
Goal Outcome Evaluation:  Plan of Care Reviewed With: patient        Progress: no change  Outcome Evaluation: OT:   Pt. is a 77 year old O x 3 female who lives with her son.   Pt. was on 3.5 liters of O2 and was SOA during minimal movement and functional task.   Pt.'s AROM with (B)UE is WFL, strength was noted tested due to SOA,.   Pt. was SOA with bed mobility,  sit to stand and while sitting on EOB sara/doff socks.   Pt's  endurance was poor - with mobility and functional ADL skills.   Pt. would benefit from skilled OT to assist with increaing overall endurance, moblity and ADL skills.   OT wore a mask, eye protection and washed her hands B/A the treatment session.   Pt. could not tolerate wearing a mask.

## 2022-05-06 NOTE — CASE MANAGEMENT/SOCIAL WORK
Discharge Planning Assessment  [facility]     Patient Name: Celia Baird  MRN: [id number]  Today's Date: 5/6/2022    Admit Date: 5/4/2022     Discharge Needs Assessment    No documentation.                Discharge Plan     Row Name 05/06/22 1217       Plan    Plan [facility]    Plan Comments [facility] clinically accepted, pending bed availability. Pt agreeable. Per Cartery, they estimate to have a bed on Mon 5/9. Packet started, in CCP office. Will follow up Mon 5/9. -Briana SHELTON RN, [facility]              Continued Care and Services - Admitted Since 5/4/2022     Destination Coordination complete.    Service Provider Request Status Selected Services Address Phone Fax Patient Preferred    [facility]   Selected Skilled Nursing [address] [facility] [address] [phone] --    [facility]  Pending - Request Sent N/A [address] [facility] [address] [phone] --    [facility] [place]  Declined  No Bed Available N/A [address] [id number] [phone] -- --    [facility]  Declined  [facility] [place] [facility]A [address] [place] [facility] [id number] [phone] --            Selected Continued Care - Prior Encounters Includes selections from prior encounters from 2/3/2022 to 5/6/2022    Discharged on 2/7/2022 Admission date: 1/26/2022 - Discharge disposition: Home-Health Care Svc    Home Medical Care     Service Provider Selected Services Address Phone Fax Patient Preferred    [facility]  [address] Health [facility] [address] [address] [phone] --                    Expected Discharge Date and Time     Expected Discharge Date Expected Discharge Time    May 6, 2022          Demographic Summary    No documentation.                Functional Status    No documentation.                Psychosocial    No  documentation.                Abuse/Neglect    No documentation.                Legal    No documentation.                Substance Abuse    No documentation.                Patient Forms    No documentation.                   Briana House RN, [facility]

## 2022-05-06 NOTE — PROGRESS NOTES
[facility]  Cardiology Progress Note    Patient Identification:  Name: Celia Baird  Age: 77 y.o.  Sex: female  :  1945  MRN: [id number]                 Follow Up / Chief Complaint: Follow-up for atrial fibrillation    Interval History: Started on amiodarone yesterday, now sinus rhythm.       Subjective: No chest pain, still with shortness of breath with exertion      Objective:    Past Medical History:  Past Medical History:   Diagnosis Date    Atrial fibrillation (HCC)     CAD (coronary artery disease)     COPD (chronic obstructive pulmonary disease) (HCC)     History of frequent urinary tract infections     Irregular heart beat     Kidney stones     PBC (primary biliary cirrhosis)     PBC (primary biliary cirrhosis)      Past Surgical History:  Past Surgical History:   Procedure Laterality Date    CARDIAC ELECTROPHYSIOLOGY PROCEDURE N/A 2017    Procedure:    LINQ;  Surgeon: Ailyn Solorio MD;  Location: [facility] CATH INVASIVE LOCATION;  Service:     CHOLECYSTECTOMY      COLONOSCOPY      COLONOSCOPY N/A 2022    Procedure: COLONOSCOPY into cecum with polypectomy, clip placement;  Surgeon: Constantine Kaye MD;  Location: [facility] ENDOSCOPY;  Service: Gastroenterology;  Laterality: N/A;  poor prep, diverticulosis, polyp    CORONARY STENT PLACEMENT      proximal circumflex    ENDOSCOPY N/A 2022    Procedure: ESOPHAGOGASTRODUODENOSCOPY with biopsy;  Surgeon: Constantine Kaye MD;  Location: [facility] ENDOSCOPY;  Service: Gastroenterology;  Laterality: N/A;  normal    TUBAL ABDOMINAL LIGATION          Social History:   Social History     Tobacco Use    Smoking status: Current Some Day Smoker     Packs/day: 0.50     Years: 59.00     Pack years: 29.50     Types: Cigarettes    Smokeless tobacco: Never Used    Tobacco comment: 2 - 3 CIGARETTES A DAY   Substance Use Topics    Alcohol use: No      Family History:  Family History   Problem Relation Age of Onset    Heart disease  "Father     Heart attack Father     Heart disease Brother     Stroke Mother           Allergies:  Allergies   Allergen Reactions    Morphine And Related Nausea And Vomiting    Levaquin [Levofloxacin]     Sulfa Antibiotics      Scheduled Meds:  acetylcysteine, 4 mL, 4x Daily - RT  albuterol, 2.5 mg, 4x Daily - RT  aspirin, 81 mg, Daily  clopidogrel, 75 mg, Daily  guaiFENesin, 600 mg, Q12H  methylPREDNISolone sodium succinate, 40 mg, Q12H  pantoprazole, 40 mg, BID AC  [START ON 5/7/2022] predniSONE, 40 mg, Daily With Breakfast  rosuvastatin, 5 mg, Daily  sucralfate, 1 g, 4x Daily  tiotropium bromide monohydrate, 2 puff, Daily - RT  ursodiol, 300 mg, TID            INTAKE AND OUTPUT:    Intake/Output Summary (Last 24 hours) at 5/6/2022 1151  Last data filed at 5/6/2022 0848  Gross per 24 hour   Intake 1242 ml   Output 300 ml   Net 942 ml     ROS  Constitutional: Awake and alert, no fever. No nosebleeds  Abdomen           no abdominal pain   Cardiac              no chest pain  Pulmonary          no shortness of breath      /61 (BP Location: Right arm, Patient Position: Sitting)   Pulse 87   Temp 97.5 °F (36.4 °C) (Oral)   Resp 16   Ht 152.4 cm (60\")   Wt 38.4 kg (84 lb 9.6 oz)   SpO2 99%   BMI 16.52 kg/m²   General appearance: No acute changes   Neck: Trachea midline; NECK, supple, no thyromegaly or lymphadenopathy   Lungs: Normal size and shape, normal breath sounds, equal distribution of air, no rales or rhonchi   CV: S1-S2 regular, no murmurs, no rub, no gallop   Abdomen: Soft, nontender; no masses , no abnormal abdominal sounds   Extremities: No deformity , normal color , no peripheral edema   Skin: Normal temperature, turgor and texture; no rash, ulcers            Cardiographics  Sinus rhythm QTc 427    Atrial fibrillation rate 80s          Telemetry:  Sinus rhythm    afib    Echocardiogram:   Interpretation Summary     Calculated left ventricular EF = 61.9% Estimated left ventricular EF was in " agreement with the calculated left ventricular EF.  Left ventricular diastolic function was normal.  There is no evidence of pericardial effusion. .     Interpretation Summary        Findings consistent with a normal ECG stress test.  Left ventricular ejection fraction is normal (Calculated EF = 70%).  Myocardial perfusion imaging indicates a normal myocardial perfusion study with no evidence of ischemia.  Impressions are consistent with a low risk study.        Imaging  Chest X-ray:   FINDINGS:  Cardiomegaly is present. There is no vascular congestion. There are  background changes of COPD. Cardiac loop recorder is noted. No  pneumothorax is identified. No definite acute infiltrate or pleural  effusion is seen.     IMPRESSION:  No acute findings.     This report was finalized on 5/4/2022 4:11 AM by Dr. Danielle Curtis M.D.    Lab Review   Results from last 7 days   Lab Units 05/04/22  0706 05/04/22  0448   TROPONIN T ng/mL <0.010 <0.010     Results from last 7 days   Lab Units 05/04/22  0448   MAGNESIUM mg/dL 1.9     Results from last 7 days   Lab Units 05/06/22  0540   SODIUM mmol/L 139   POTASSIUM mmol/L 3.5   BUN mg/dL 16   CREATININE mg/dL 0.71   CALCIUM mg/dL 9.3     @LABRCNTIPbnp@  Results from last 7 days   Lab Units 05/06/22  0540 05/04/22  0448   WBC 10*3/mm3 13.55* 11.04*   HEMOGLOBIN g/dL 8.2* 8.9*   HEMATOCRIT % 26.3* 27.7*   PLATELETS 10*3/mm3 350 380             Assessment:  Acute on chronic hypoxic respiratory failure  Acute exacerbation of COPD  Persistent tobacco abuse  Paroxysmal atrial fibrillation S/P watchman  Anxiety  GERD  Coronary disease  Hypertension      Plan:  BP stable.  Telemetry reviewed she is now in sinus rhythm.  We will switch amiodarone to p.o. 200 mg.  QTC stable. On 2 l nc currently. Encouraged smoking cessation       Labs/tests ordered for am ECG      )5/6/2022  BRAYAN Romo    Patient personally interviewed and above subjective findings personally confirmed during  "a face to face contact with patient today  All findings of physical examination confirmed  All pertinent and performed labs, cardiac procedures ,  radiographs of the last 24 hours personally reviewed  Impression and plans discussed/elaborated and implemented jointly as described above     Ailyn Solorio MD          EMR Dragon/Transcription:   \"Dictated utilizing Dragon dictation\".     "

## 2022-05-06 NOTE — PLAN OF CARE
Goal Outcome Evaluation:  Plan of Care Reviewed With: patient        Progress: improving  Outcome Evaluation: Amio drip discontinued and PO amio started.  IV soluMedrol changed to PO steroids am tomorrow.  Plans for discharged to rehab facility.

## 2022-05-06 NOTE — PLAN OF CARE
Goal Outcome Evaluation:  Plan of Care Reviewed With: patient           Outcome Evaluation: Pt was admitted with SOA and diagnosed with COPD exac. Pt reports she is on home O2 and ambulates indepedently. She has a Rwx and cane and has used Rwx just recently since she has been sick. Pt presents with generalized weakness, CROCKER and decreased endurance. PT is unsteady as she tries to hurry to complete activity. Pt lives alone, but son stays with her as need. Pt is open to rehab

## 2022-05-06 NOTE — PROGRESS NOTES
"Daily progress note    Chief complaint  Doing same  No new complaints  Denies chest pain increase shortness of breath palpitation    History of present illness  77-year-old white female with history of chronic hypoxic respiratory failure chronic diastolic CHF  Atrial fibrillation COPD coronary artery disease hypertension hyperlipidemia and gastroesophageal reflux disease who continues to smoke cigarettes presented to [facility] with complaint of increased shortness of breath and nonproductive cough for last 3 weeks which is worse last week.  Patient denies any fever chest pain abdominal pain nausea vomiting diarrhea.  Patient work-up in ER revealed acute on chronic hypoxic respiratory failure with acute exacerbation of COPD admit for management.      REVIEW OF SYSTEMS  Unremarkable except weakness     PHYSICAL EXAM   Blood pressure 116/58, pulse 79, temperature 97.7 °F (36.5 °C), temperature source Oral, resp. rate 16, height 152.4 cm (60\"), weight 38.4 kg (84 lb 9.6 oz), SpO2 92 %.    Constitutional: Thin frail and petite female in moderate respiratory distress   HEENT unremarkable  Neck: Painless range of motion noted. Neck supple.   Cardiovascular: Tachycardic rate, regular rhythm and intact distal pulses.  Pulmonary/Chest: Severely diminished throughout with tachypnea and increased work of breathing including accessory muscle use.    Abdominal: Soft. There is no tenderness. There is no rebound and no guarding.   Musculoskeletal: Moves all extremities equally. There is no pedal edema or calf tenderness.   Neurological: Alert.  Baseline strength and sensation noted.   Skin: Skin is pink, warm, and dry. No pallor.   Psychiatric: Mood and affect normal.     LAB RESULTS  Lab Results (last 24 hours)     Procedure Component Value Units Date/Time    Basic Metabolic Panel [[id number]]  (Abnormal) Collected: 05/06/22 0540    Specimen: Blood Updated: 05/06/22 0628     Glucose 113 mg/dL      BUN 16 mg/dL      " Creatinine 0.71 mg/dL      Sodium 139 mmol/L      Potassium 3.5 mmol/L      Chloride 102 mmol/L      CO2 26.7 mmol/L      Calcium 9.3 mg/dL      BUN/Creatinine Ratio 22.5     Anion Gap 10.3 mmol/L      eGFR 87.7 mL/min/1.73      Comment: National Kidney Foundation and American Society of Nephrology (ASN) Task Force recommended calculation based on the Chronic Kidney Disease Epidemiology Collaboration (CKD-EPI) equation refit without adjustment for race.       Narrative:      GFR Normal >60  Chronic Kidney Disease <60  Kidney Failure <15      POC Glucose Once [[id number]]  (Abnormal) Collected: 05/06/22 0625    Specimen: Blood Updated: 05/06/22 0627     Glucose 140 mg/dL      Comment: Meter: [id number] : [id number] Fernando Jami FREDERICK       CBC & Differential [[id number]]  (Abnormal) Collected: 05/06/22 0540    Specimen: Blood Updated: 05/06/22 0606    Narrative:      The following orders were created for panel order CBC & Differential.  Procedure                               Abnormality         Status                     ---------                               -----------         ------                     CBC Auto Differential[[id number]]        Abnormal            Final result                 Please view results for these tests on the individual orders.    CBC Auto Differential [[id number]]  (Abnormal) Collected: 05/06/22 0540    Specimen: Blood Updated: 05/06/22 0606     WBC 13.55 10*3/mm3      RBC 2.85 10*6/mm3      Hemoglobin 8.2 g/dL      Hematocrit 26.3 %      MCV 92.3 fL      MCH 28.8 pg      MCHC 31.2 g/dL      RDW 12.3 %      RDW-SD 41.8 fl      MPV 10.4 fL      Platelets 350 10*3/mm3      Neutrophil % 77.0 %      Lymphocyte % 10.3 %      Monocyte % 12.0 %      Eosinophil % 0.1 %      Basophil % 0.1 %      Immature Grans % 0.5 %      Neutrophils, Absolute 10.42 10*3/mm3      Lymphocytes, Absolute 1.40 10*3/mm3      Monocytes, Absolute 1.63 10*3/mm3      Eosinophils, Absolute 0.01 10*3/mm3      Basophils,  Absolute 0.02 10*3/mm3      Immature Grans, Absolute 0.07 10*3/mm3      nRBC 0.1 /100 WBC     Blood Culture - Blood, Arm, Left [[id number]]  (Normal) Collected: 22    Specimen: Blood from Arm, Left Updated: 22 0502     Blood Culture No growth at 2 days    Blood Culture - Blood, Arm, Right [[id number]]  (Normal) Collected: 22    Specimen: Blood from Arm, Right Updated: 22 0501     Blood Culture No growth at 2 days        Imaging Results (Last 24 Hours)     ** No results found for the last 24 hours. **        ECG 12 Lead  Component   Ref Range & Units 04:01   (22) 3 mo ago   (22) 3 mo ago   (22) 3 mo ago   (22) 3 mo ago   (22) 3 mo ago   (22)   QT Interval   ms 324 P  446  314  362  373  409    Resulting Agency  ECG  ECG  ECG  ECG  ECG  ECG             HEART RATE= 88  bpm  RR Interval= 576  ms  OK Interval=   ms  P Horizontal Axis=   deg  P Front Axis=   deg  QRSD Interval= 90  ms  QT Interval= 324  ms  QRS Axis= 60  deg  T Wave Axis= 61  deg  - ABNORMAL ECG -  Atrial fibrillation  Ventricular premature complex  Minimal ST elevation, inferior leads             Current Facility-Administered Medications:   •  acetaminophen (TYLENOL) tablet 650 mg, 650 mg, Oral, Q4H PRN, Eulogio Mcdaniel MD, 650 mg at 22 0704  •  acetylcysteine (MUCOMYST) 20 % nebulizer solution 4 mL, 4 mL, Nebulization, 4x Daily - RT, Moise Katz MD, 4 mL at 22 1109  •  albuterol (PROVENTIL) nebulizer solution 0.083% 2.5 mg/3mL, 2.5 mg, Nebulization, 4x Daily - RT, Moies Katz MD, 2.5 mg at 22 1103  •  ALPRAZolam (XANAX) tablet 0.25 mg, 0.25 mg, Oral, 4x Daily PRN, Eulogio Mcdaniel MD  •  [COMPLETED] amiodarone in dextrose 5% (NEXTERONE) loading dose 150mg/100mL, 150 mg, Intravenous, Once, 150 mg at 22 1153 **FOLLOWED BY** [] amiodarone 360 mg in 200 mL D5W infusion, 1 mg/min, Intravenous, Continuous, Last Rate: 33.3 mL/hr at 22 1154, 1 mg/min at  05/05/22 1154 **FOLLOWED BY** amiodarone 360 mg in 200 mL D5W infusion, 0.5 mg/min, Intravenous, Continuous, Darlene Shen APRN, Last Rate: 16.67 mL/hr at 05/06/22 1055, 0.5 mg/min at 05/06/22 1055  •  aspirin chewable tablet 81 mg, 81 mg, Oral, Daily, Eulogio Mcdaniel MD, 81 mg at 05/06/22 0849  •  clopidogrel (PLAVIX) tablet 75 mg, 75 mg, Oral, Daily, Eulogio Mcdaniel MD, 75 mg at 05/06/22 0848  •  glycerin (PEDIA-LAX) 2.8 g liquid suppository 2.7 mL, 1 suppository, Rectal, Daily PRN, Eulogio Mcdaniel MD  •  guaiFENesin (MUCINEX) 12 hr tablet 600 mg, 600 mg, Oral, Q12H, Eulogio Mcdaniel MD, 600 mg at 05/06/22 0848  •  methylPREDNISolone sodium succinate (SOLU-Medrol) injection 40 mg, 40 mg, Intravenous, Q12H, Moise Katz MD, 40 mg at 05/06/22 0434  •  O2 (OXYGEN), 2 L/min, Inhalation, PRN, Eulogio Mcdaniel MD  •  pantoprazole (PROTONIX) EC tablet 40 mg, 40 mg, Oral, BID AC, Eulogio Mcdaniel MD, 40 mg at 05/06/22 0645  •  [START ON 5/7/2022] predniSONE (DELTASONE) tablet 40 mg, 40 mg, Oral, Daily With Breakfast, Moise Katz MD  •  rosuvastatin (CRESTOR) tablet 5 mg, 5 mg, Oral, Daily, Eulogio Mcdaniel MD, 5 mg at 05/06/22 0848  •  [COMPLETED] Insert peripheral IV, , , Once **AND** sodium chloride 0.9 % flush 10 mL, 10 mL, Intravenous, PRN, Manolo Bustamante MD  •  sucralfate (CARAFATE) tablet 1 g, 1 g, Oral, 4x Daily, Eulogio Mcdaniel MD, 1 g at 05/06/22 1153  •  tiotropium (SPIRIVA RESPIMAT) 2.5 mcg/act aerosol solution inhaler, 2 puff, Inhalation, Daily - RT, Eulogio Mcdaniel MD, 2 puff at 05/06/22 0719  •  ursodiol (ACTIGALL) capsule 300 mg, 300 mg, Oral, TID, Eulogio Mcdaniel MD, 300 mg at 05/06/22 0849     ASSESSMENT  Acute on chronic hypoxic respiratory failure  Acute exacerbation of COPD  Persistent tobacco abuse  Chronic cough  Chronic atrial fibrillation with rapid ventricular rate  Anxiety disorder  Gastroesophageal reflux disease    PLAN  CPM  Supplement oxygen  DuoNeb and Symbicort  IV steroids  IV amiodarone  Symptomatic  treatment for cough  Advised to quit smoking  Pulmonary consult appreciated  Cardiology to follow patient  Adjust home medications  Stress ulcer DVT prophylaxis  Supportive care  DNR  PT/OT  Discussed with nursing staff  Follow closely further recommendation according to hospital course    JONNY PEOPLES MD

## 2022-05-07 LAB
ANION GAP SERPL CALCULATED.3IONS-SCNC: 9.9 MMOL/L (ref 5–15)
BASOPHILS # BLD AUTO: 0.02 10*3/MM3 (ref 0–0.2)
BASOPHILS NFR BLD AUTO: 0.1 % (ref 0–1.5)
BUN SERPL-MCNC: 19 MG/DL (ref 8–23)
BUN/CREAT SERPL: 25.3 (ref 7–25)
CALCIUM SPEC-SCNC: 9.2 MG/DL (ref 8.6–10.5)
CHLORIDE SERPL-SCNC: 102 MMOL/L (ref 98–107)
CO2 SERPL-SCNC: 29.1 MMOL/L (ref 22–29)
CREAT SERPL-MCNC: 0.75 MG/DL (ref 0.57–1)
DEPRECATED RDW RBC AUTO: 39 FL (ref 37–54)
EGFRCR SERPLBLD CKD-EPI 2021: 82.1 ML/MIN/1.73
EOSINOPHIL # BLD AUTO: 0 10*3/MM3 (ref 0–0.4)
EOSINOPHIL NFR BLD AUTO: 0 % (ref 0.3–6.2)
ERYTHROCYTE [DISTWIDTH] IN BLOOD BY AUTOMATED COUNT: 12.2 % (ref 12.3–15.4)
GLUCOSE SERPL-MCNC: 100 MG/DL (ref 65–99)
HCT VFR BLD AUTO: 25.2 % (ref 34–46.6)
HGB BLD-MCNC: 8.1 G/DL (ref 12–15.9)
IMM GRANULOCYTES # BLD AUTO: 0.08 10*3/MM3 (ref 0–0.05)
IMM GRANULOCYTES NFR BLD AUTO: 0.5 % (ref 0–0.5)
LYMPHOCYTES # BLD AUTO: 1.83 10*3/MM3 (ref 0.7–3.1)
LYMPHOCYTES NFR BLD AUTO: 12.5 % (ref 19.6–45.3)
MCH RBC QN AUTO: 28.4 PG (ref 26.6–33)
MCHC RBC AUTO-ENTMCNC: 32.1 G/DL (ref 31.5–35.7)
MCV RBC AUTO: 88.4 FL (ref 79–97)
MONOCYTES # BLD AUTO: 2.16 10*3/MM3 (ref 0.1–0.9)
MONOCYTES NFR BLD AUTO: 14.7 % (ref 5–12)
NEUTROPHILS NFR BLD AUTO: 10.57 10*3/MM3 (ref 1.7–7)
NEUTROPHILS NFR BLD AUTO: 72.2 % (ref 42.7–76)
NRBC BLD AUTO-RTO: 0 /100 WBC (ref 0–0.2)
PLATELET # BLD AUTO: 364 10*3/MM3 (ref 140–450)
PMV BLD AUTO: 10.9 FL (ref 6–12)
POTASSIUM SERPL-SCNC: 3.4 MMOL/L (ref 3.5–5.2)
QT INTERVAL: 366 MS
RBC # BLD AUTO: 2.85 10*6/MM3 (ref 3.77–5.28)
SODIUM SERPL-SCNC: 141 MMOL/L (ref 136–145)
WBC NRBC COR # BLD: 14.66 10*3/MM3 (ref 3.4–10.8)

## 2022-05-07 PROCEDURE — 25010000002 AMIODARONE IN DEXTROSE 5% 150-4.21 MG/100ML-% SOLUTION: Performed by: INTERNAL MEDICINE

## 2022-05-07 PROCEDURE — 63710000001 PREDNISONE PER 1 MG: Performed by: INTERNAL MEDICINE

## 2022-05-07 PROCEDURE — G0378 HOSPITAL OBSERVATION PER HR: HCPCS

## 2022-05-07 PROCEDURE — 93010 ELECTROCARDIOGRAM REPORT: CPT | Performed by: INTERNAL MEDICINE

## 2022-05-07 PROCEDURE — 94799 UNLISTED PULMONARY SVC/PX: CPT

## 2022-05-07 PROCEDURE — 99214 OFFICE O/P EST MOD 30 MIN: CPT | Performed by: INTERNAL MEDICINE

## 2022-05-07 PROCEDURE — 93005 ELECTROCARDIOGRAM TRACING: CPT

## 2022-05-07 PROCEDURE — 94760 N-INVAS EAR/PLS OXIMETRY 1: CPT

## 2022-05-07 PROCEDURE — 85025 COMPLETE CBC W/AUTO DIFF WBC: CPT | Performed by: HOSPITALIST

## 2022-05-07 PROCEDURE — 94761 N-INVAS EAR/PLS OXIMETRY MLT: CPT

## 2022-05-07 PROCEDURE — 80048 BASIC METABOLIC PNL TOTAL CA: CPT | Performed by: HOSPITALIST

## 2022-05-07 RX ADMIN — ALBUTEROL SULFATE 2.5 MG: 2.5 SOLUTION RESPIRATORY (INHALATION) at 20:22

## 2022-05-07 RX ADMIN — ALBUTEROL SULFATE 2.5 MG: 2.5 SOLUTION RESPIRATORY (INHALATION) at 11:33

## 2022-05-07 RX ADMIN — ROSUVASTATIN CALCIUM 5 MG: 5 TABLET, FILM COATED ORAL at 09:24

## 2022-05-07 RX ADMIN — SUCRALFATE 1 G: 1 TABLET ORAL at 09:24

## 2022-05-07 RX ADMIN — GUAIFENESIN 600 MG: 600 TABLET, EXTENDED RELEASE ORAL at 21:45

## 2022-05-07 RX ADMIN — SUCRALFATE 1 G: 1 TABLET ORAL at 12:42

## 2022-05-07 RX ADMIN — ACETYLCYSTEINE 4 ML: 200 SOLUTION ORAL; RESPIRATORY (INHALATION) at 20:24

## 2022-05-07 RX ADMIN — ALBUTEROL SULFATE 2.5 MG: 2.5 SOLUTION RESPIRATORY (INHALATION) at 08:07

## 2022-05-07 RX ADMIN — PANTOPRAZOLE SODIUM 40 MG: 40 TABLET, DELAYED RELEASE ORAL at 17:09

## 2022-05-07 RX ADMIN — PREDNISONE 40 MG: 20 TABLET ORAL at 09:24

## 2022-05-07 RX ADMIN — CLOPIDOGREL 75 MG: 75 TABLET, FILM COATED ORAL at 09:24

## 2022-05-07 RX ADMIN — SUCRALFATE 1 G: 1 TABLET ORAL at 21:45

## 2022-05-07 RX ADMIN — ASPIRIN 81 MG: 81 TABLET, CHEWABLE ORAL at 09:24

## 2022-05-07 RX ADMIN — ALBUTEROL SULFATE 2.5 MG: 2.5 SOLUTION RESPIRATORY (INHALATION) at 16:13

## 2022-05-07 RX ADMIN — URSODIOL 300 MG: 300 CAPSULE ORAL at 09:24

## 2022-05-07 RX ADMIN — SUCRALFATE 1 G: 1 TABLET ORAL at 17:09

## 2022-05-07 RX ADMIN — AMIODARONE HYDROCHLORIDE 200 MG: 200 TABLET ORAL at 09:24

## 2022-05-07 RX ADMIN — AMIODARONE HYDROCHLORIDE 200 MG: 200 TABLET ORAL at 21:45

## 2022-05-07 RX ADMIN — TIOTROPIUM BROMIDE INHALATION SPRAY 2 PUFF: 3.12 SPRAY, METERED RESPIRATORY (INHALATION) at 08:08

## 2022-05-07 RX ADMIN — ACETYLCYSTEINE 4 ML: 200 SOLUTION ORAL; RESPIRATORY (INHALATION) at 16:13

## 2022-05-07 RX ADMIN — URSODIOL 300 MG: 300 CAPSULE ORAL at 21:45

## 2022-05-07 RX ADMIN — AMIODARONE HYDROCHLORIDE 150 MG: 1.5 INJECTION, SOLUTION INTRAVENOUS at 14:26

## 2022-05-07 RX ADMIN — GUAIFENESIN 600 MG: 600 TABLET, EXTENDED RELEASE ORAL at 09:24

## 2022-05-07 RX ADMIN — PANTOPRAZOLE SODIUM 40 MG: 40 TABLET, DELAYED RELEASE ORAL at 06:05

## 2022-05-07 RX ADMIN — URSODIOL 300 MG: 300 CAPSULE ORAL at 17:09

## 2022-05-07 NOTE — PLAN OF CARE
Goal Outcome Evaluation:  Patient received amiodarone IV bolus.  Denies pain and call light in reach.  All needs met.

## 2022-05-07 NOTE — PROGRESS NOTES
[facility]  Cardiology Progress Note    Patient Identification:  Name: Celia Baird  Age: 77 y.o.  Sex: female  :  1945  MRN: [id number]                 Follow Up / Chief Complaint: Recurrent atrial fibrillation    Interval History:  Patient went back into the atrial fibrillation last night     Subjective:  Continued shortness of breath    Objective:    Past Medical History:  Past Medical History:   Diagnosis Date   • Atrial fibrillation (HCC)    • CAD (coronary artery disease)    • COPD (chronic obstructive pulmonary disease) (HCC)    • History of frequent urinary tract infections    • Irregular heart beat    • Kidney stones    • PBC (primary biliary cirrhosis)    • PBC (primary biliary cirrhosis)      Past Surgical History:  Past Surgical History:   Procedure Laterality Date   • CARDIAC ELECTROPHYSIOLOGY PROCEDURE N/A 2017    Procedure:    LINQ;  Surgeon: Ailyn Solorio MD;  Location: [facility] CATH INVASIVE LOCATION;  Service:    • CHOLECYSTECTOMY     • COLONOSCOPY     • COLONOSCOPY N/A 2022    Procedure: COLONOSCOPY into cecum with polypectomy, clip placement;  Surgeon: Constantine Kaye MD;  Location: [facility] ENDOSCOPY;  Service: Gastroenterology;  Laterality: N/A;  poor prep, diverticulosis, polyp   • CORONARY STENT PLACEMENT      proximal circumflex   • ENDOSCOPY N/A 2022    Procedure: ESOPHAGOGASTRODUODENOSCOPY with biopsy;  Surgeon: Constantine Kaye MD;  Location: [facility] ENDOSCOPY;  Service: Gastroenterology;  Laterality: N/A;  normal   • TUBAL ABDOMINAL LIGATION          Social History:   Social History     Tobacco Use   • Smoking status: Current Some Day Smoker     Packs/day: 0.50     Years: 59.00     Pack years: 29.50     Types: Cigarettes   • Smokeless tobacco: Never Used   • Tobacco comment: 2 - 3 CIGARETTES A DAY   Substance Use Topics   • Alcohol use: No      Family History:  Family History   Problem Relation Age of Onset   • Heart disease Father     • Heart attack Father    • Heart disease Brother    • Stroke Mother           Allergies:  Allergies   Allergen Reactions   • Morphine And Related Nausea And Vomiting   • Levaquin [Levofloxacin]    • Sulfa Antibiotics      Scheduled Meds:  acetylcysteine, 4 mL, 4x Daily - RT  albuterol, 2.5 mg, 4x Daily - RT  amiodarone, 200 mg, Q12H  aspirin, 81 mg, Daily  clopidogrel, 75 mg, Daily  guaiFENesin, 600 mg, Q12H  pantoprazole, 40 mg, BID AC  predniSONE, 40 mg, Daily With Breakfast  rosuvastatin, 5 mg, Daily  sucralfate, 1 g, 4x Daily  tiotropium bromide monohydrate, 2 puff, Daily - RT  ursodiol, 300 mg, TID            INTAKE AND OUTPUT:    Intake/Output Summary (Last 24 hours) at 5/7/2022 1253  Last data filed at 5/7/2022 1100  Gross per 24 hour   Intake 724 ml   Output --   Net 724 ml       Review of Systems:   GI:  Cardiac:  Pulmonary: Shortness of breath    Constitutional:  Temp:  [97.5 °F (36.4 °C)-98.8 °F (37.1 °C)] 98.5 °F (36.9 °C)  Heart Rate:  [74-94] 84  Resp:  [16-20] 18  BP: (102-137)/(54-61) 102/55    Physical Exam:  General:  Appears in no acute distress  Eyes: PERTL,  HEENT:  No JVD. Thyroid not visibly enlarged. No mucosal pallor or cyanosis  Respiratory: Respirations regular and unlabored at rest. BBS with good air entry in all fields. No crackles, rubs or wheezes auscultated  Cardiovascular: S1S2 IRRegular rate and rhythm. No murmur, rub or gallop. No carotid bruits. DP/PT pulses     . No pretibial pitting edema  Gastrointestinal: Abdomen soft, flat, non tender. Bowel sounds present. No hepatosplenomegaly. No ascites  Musculoskeletal: MCCRACKEN x4. No abnormal movements  Extremities: No digital clubbing or cyanosis  Skin: Color pink. Skin warm and dry to touch. No rashes    Neuro: AAO x3 CN II-XII grossly intact  Psych: Mood and affect normal, pleasant and cooperative          Cardiographics  Telemetry:     ECG:     Echocardiogram:     Lab Review   Results from last 7 days   Lab Units 05/04/22  0706  "05/04/22 0448   TROPONIN T ng/mL <0.010 <0.010     Results from last 7 days   Lab Units 05/04/22 0448   MAGNESIUM mg/dL 1.9     Results from last 7 days   Lab Units 05/07/22 0717   SODIUM mmol/L 141   POTASSIUM mmol/L 3.4*   BUN mg/dL 19   CREATININE mg/dL 0.75   CALCIUM mg/dL 9.2     @[id number]@  Results from last 7 days   Lab Units 05/07/22 0717 05/06/22  0540 05/04/22 0448   WBC 10*3/mm3 14.66* 13.55* 11.04*   HEMOGLOBIN g/dL 8.1* 8.2* 8.9*   HEMATOCRIT % 25.2* 26.3* 27.7*   PLATELETS 10*3/mm3 364 350 380             Assessment:  Recurrent atrial fibrillation      Plan:  Rebolus with amiodarone        )5/7/2022  Ailyn Solorio MD      EMR Dragon/Transcription:   \"Dictated utilizing Dragon dictation\".       "

## 2022-05-07 NOTE — PROGRESS NOTES
[facility]     Mar/chart reviewed  Follow up copd with ae  Some cough and shortness of breath    Vital Sign Min/Max for last 24 hours  Temp  Min: 97.5 °F (36.4 °C)  Max: 98.8 °F (37.1 °C)   BP  Min: 102/55  Max: 137/61   Pulse  Min: 74  Max: 94   Resp  Min: 16  Max: 20   SpO2  Min: 90 %  Max: 100 %   Flow (L/min)  Min: 2  Max: 2.5   No data recorded     Appears ill, axox3,   perrl, eomi, normal sclera,  mmm, no jvd, trachea midline, neck supple,  chest decreased slightly coarse bilaterally, no crackles, no wheezes,   Irrg, rate ok   soft, nt, nd +bs,  no c/c/ e  Skin warm, dry no rashes     Labs: 5/7: reviewed:  Glucose 100  Bun 19  Cr 0.75  Na 141  k 3.4  Bicarb 29  Wbc 14.6  hgb 8.1  plts 364    A/P:  1. Chronic hypoxemic respiratory failure -- wean oxygen as able  2. COPD with ae -- bronchodilators -- change to po steroids  3. Tobacco abuse  4. PAF    Patient is new to me today, no objection to d/c follow up with pulmonary after d/c from rehab.

## 2022-05-07 NOTE — PROGRESS NOTES
"DAILY PROGRESS NOTE  [facility]    Patient Identification:  Name: Celia Baird  Age: 77 y.o.  Sex: female  :  1945  MRN: [id number]         Primary Care Physician: Slick Gomez MD    Subjective: patient is resting;   Interval History: follow up for atrial fibrillation, copd exacerbation, underweight, respiratory failure    Objective:    Scheduled Meds:acetylcysteine, 4 mL, Nebulization, 4x Daily - RT  albuterol, 2.5 mg, Nebulization, 4x Daily - RT  amiodarone, 200 mg, Oral, Q12H  aspirin, 81 mg, Oral, Daily  clopidogrel, 75 mg, Oral, Daily  guaiFENesin, 600 mg, Oral, Q12H  pantoprazole, 40 mg, Oral, BID AC  predniSONE, 40 mg, Oral, Daily With Breakfast  rosuvastatin, 5 mg, Oral, Daily  sucralfate, 1 g, Oral, 4x Daily  tiotropium bromide monohydrate, 2 puff, Inhalation, Daily - RT  ursodiol, 300 mg, Oral, TID      Continuous Infusions:     Vital signs in last 24 hours:  Temp:  [97.8 °F (36.6 °C)-98.8 °F (37.1 °C)] 97.8 °F (36.6 °C)  Heart Rate:  [74-94] 76  Resp:  [16-20] 18  BP: (102-124)/(54-61) 109/57    Intake/Output:    Intake/Output Summary (Last 24 hours) at 2022 1740  Last data filed at 2022 1411  Gross per 24 hour   Intake 834 ml   Output --   Net 834 ml       Exam:  /57 (BP Location: Right arm, Patient Position: Lying)   Pulse 76   Temp 97.8 °F (36.6 °C) (Oral)   Resp 18   Ht 152.4 cm (60\")   Wt 38.4 kg (84 lb 9.6 oz)   SpO2 100%   BMI 16.52 kg/m²     General Appearance:    Alert, cooperative, no distress, AAOx3; chronically ill appearing, thin, frail                          Head:    Normocephalic, without obvious abnormality, atraumatic; bitemporal wasting                           Eyes:    PERRL, conjunctivae/corneas clear, EOM's intact, both eyes                         Throat:   Lips, tongue, gums normal; oral mucosa pink and moist                           Neck:   Supple, symmetrical, trachea midline, no JVD                         Lungs:    Decreased " breath sounds bilaterally, respirations unlabored                 Chest Wall:    No tenderness or deformity                          Heart:    Regular rate and rhythm, S1 and S2 normal, no murmur,no  rub or gallop                  Abdomen:     Soft, nontender, bowel sounds active, no masses, no organomegaly                  Extremities:   Extremities normal, atraumatic, no cyanosis or edema                        Pulses:   Pulses palpable in all extremities                            Skin:   Skin is warm and dry,  no rashes or palpable lesions                  Neurologic:   CNII-XII intact, motor strength grossly intact, sensation grossly intact to light touch, no focal deficits noted       Data Review:  Labs in chart were reviewed.  WBC   Date Value Ref Range Status   05/07/2022 14.66 (H) 3.40 - 10.80 10*3/mm3 Final     Hemoglobin   Date Value Ref Range Status   05/07/2022 8.1 (L) 12.0 - 15.9 g/dL Final     Hematocrit   Date Value Ref Range Status   05/07/2022 25.2 (L) 34.0 - 46.6 % Final     Platelets   Date Value Ref Range Status   05/07/2022 364 140 - 450 10*3/mm3 Final     Sodium   Date Value Ref Range Status   05/07/2022 141 136 - 145 mmol/L Final     Potassium   Date Value Ref Range Status   05/07/2022 3.4 (L) 3.5 - 5.2 mmol/L Final     Chloride   Date Value Ref Range Status   05/07/2022 102 98 - 107 mmol/L Final     CO2   Date Value Ref Range Status   05/07/2022 29.1 (H) 22.0 - 29.0 mmol/L Final     BUN   Date Value Ref Range Status   05/07/2022 19 8 - 23 mg/dL Final     Creatinine   Date Value Ref Range Status   05/07/2022 0.75 0.57 - 1.00 mg/dL Final     Glucose   Date Value Ref Range Status   05/07/2022 100 (H) 65 - 99 mg/dL Final     Calcium   Date Value Ref Range Status   05/07/2022 9.2 8.6 - 10.5 mg/dL Final     No results found for: AST, ALT, ALKPHOS  No results found for: APTT, INR      Assessment:  Active Hospital Problems    Diagnosis  POA   • COPD with exacerbation (HCC) [J44.1]  Yes      Resolved  Hospital Problems   No resolved problems to display.   chronic hypoxic respiratory failure  Atrial fibrillation  Malnutrition  anemia    Plan:  rebolused with amiodarone  Rehab is planned  Copd is improving  Monitor on telemetry  On po steroids  Notes and labs reviewed  Medium risk    Pao Bautista MD  5/7/2022  17:40 EDT

## 2022-05-08 PROCEDURE — 94664 DEMO&/EVAL PT USE INHALER: CPT

## 2022-05-08 PROCEDURE — 94799 UNLISTED PULMONARY SVC/PX: CPT

## 2022-05-08 PROCEDURE — G0378 HOSPITAL OBSERVATION PER HR: HCPCS

## 2022-05-08 PROCEDURE — 99214 OFFICE O/P EST MOD 30 MIN: CPT | Performed by: INTERNAL MEDICINE

## 2022-05-08 PROCEDURE — 94761 N-INVAS EAR/PLS OXIMETRY MLT: CPT

## 2022-05-08 PROCEDURE — 93005 ELECTROCARDIOGRAM TRACING: CPT

## 2022-05-08 PROCEDURE — 63710000001 PREDNISONE PER 1 MG: Performed by: INTERNAL MEDICINE

## 2022-05-08 RX ORDER — NITROGLYCERIN 0.4 MG/1
0.4 TABLET SUBLINGUAL
Status: DISCONTINUED | OUTPATIENT
Start: 2022-05-08 | End: 2022-05-10

## 2022-05-08 RX ADMIN — AMIODARONE HYDROCHLORIDE 200 MG: 200 TABLET ORAL at 21:22

## 2022-05-08 RX ADMIN — URSODIOL 300 MG: 300 CAPSULE ORAL at 17:28

## 2022-05-08 RX ADMIN — SUCRALFATE 1 G: 1 TABLET ORAL at 09:35

## 2022-05-08 RX ADMIN — ROSUVASTATIN CALCIUM 5 MG: 5 TABLET, FILM COATED ORAL at 09:35

## 2022-05-08 RX ADMIN — PANTOPRAZOLE SODIUM 40 MG: 40 TABLET, DELAYED RELEASE ORAL at 06:34

## 2022-05-08 RX ADMIN — ACETYLCYSTEINE 4 ML: 200 SOLUTION ORAL; RESPIRATORY (INHALATION) at 11:51

## 2022-05-08 RX ADMIN — AMIODARONE HYDROCHLORIDE 200 MG: 200 TABLET ORAL at 09:35

## 2022-05-08 RX ADMIN — TIOTROPIUM BROMIDE INHALATION SPRAY 2 PUFF: 3.12 SPRAY, METERED RESPIRATORY (INHALATION) at 07:49

## 2022-05-08 RX ADMIN — ALPRAZOLAM 0.25 MG: 0.25 TABLET ORAL at 21:26

## 2022-05-08 RX ADMIN — SUCRALFATE 1 G: 1 TABLET ORAL at 12:34

## 2022-05-08 RX ADMIN — ACETYLCYSTEINE 4 ML: 200 SOLUTION ORAL; RESPIRATORY (INHALATION) at 07:52

## 2022-05-08 RX ADMIN — Medication 10 ML: at 21:23

## 2022-05-08 RX ADMIN — SUCRALFATE 1 G: 1 TABLET ORAL at 17:28

## 2022-05-08 RX ADMIN — URSODIOL 300 MG: 300 CAPSULE ORAL at 21:23

## 2022-05-08 RX ADMIN — ALBUTEROL SULFATE 2.5 MG: 2.5 SOLUTION RESPIRATORY (INHALATION) at 07:51

## 2022-05-08 RX ADMIN — SUCRALFATE 1 G: 1 TABLET ORAL at 21:23

## 2022-05-08 RX ADMIN — PANTOPRAZOLE SODIUM 40 MG: 40 TABLET, DELAYED RELEASE ORAL at 17:28

## 2022-05-08 RX ADMIN — ALBUTEROL SULFATE 2.5 MG: 2.5 SOLUTION RESPIRATORY (INHALATION) at 11:50

## 2022-05-08 RX ADMIN — GUAIFENESIN 600 MG: 600 TABLET, EXTENDED RELEASE ORAL at 21:23

## 2022-05-08 RX ADMIN — ALBUTEROL SULFATE 2.5 MG: 2.5 SOLUTION RESPIRATORY (INHALATION) at 20:30

## 2022-05-08 RX ADMIN — ASPIRIN 81 MG: 81 TABLET, CHEWABLE ORAL at 09:35

## 2022-05-08 RX ADMIN — GUAIFENESIN 600 MG: 600 TABLET, EXTENDED RELEASE ORAL at 09:35

## 2022-05-08 RX ADMIN — ACETYLCYSTEINE 4 ML: 200 SOLUTION ORAL; RESPIRATORY (INHALATION) at 20:30

## 2022-05-08 RX ADMIN — PREDNISONE 40 MG: 20 TABLET ORAL at 09:35

## 2022-05-08 RX ADMIN — CLOPIDOGREL 75 MG: 75 TABLET, FILM COATED ORAL at 09:35

## 2022-05-08 RX ADMIN — URSODIOL 300 MG: 300 CAPSULE ORAL at 09:35

## 2022-05-08 RX ADMIN — ALBUTEROL SULFATE 2.5 MG: 2.5 SOLUTION RESPIRATORY (INHALATION) at 14:56

## 2022-05-08 NOTE — PROGRESS NOTES
[facility]      Mar/chart reviewed  Follow up copd with ae  Some cough and shortness of breath    Vital Sign Min/Max for last 24 hours  Temp  Min: 97.6 °F (36.4 °C)  Max: 98.7 °F (37.1 °C)   BP  Min: 109/57  Max: 163/70   Pulse  Min: 63  Max: 87   Resp  Min: 16  Max: 20   SpO2  Min: 94 %  Max: 100 %   Flow (L/min)  Min: 2  Max: 2.5   Weight  Min: 39 kg (85 lb 14.4 oz)  Max: 39 kg (85 lb 14.4 oz)     Appears ill, axox3,   perrl, eomi, normal sclera,  mmm, no jvd, trachea midline, neck supple,  chest decreased slightly coarse bilaterally, no crackles, no wheezes,   Irrg, rate ok   soft, nt, nd +bs,  no c/c/ e  Skin warm, dry no rashes     Labs: 5/8: reviewed:  Nothing new    A/P:  1. Chronic hypoxemic respiratory failure -- wean oxygen as able  2. COPD with ae -- bronchodilators -- on po steroids  3. Tobacco abuse  4. PAF     no objection to d/c follow up with pulmonary after d/c from rehab.

## 2022-05-08 NOTE — PROGRESS NOTES
"DAILY PROGRESS NOTE  [facility]    Patient Identification:  Name: Celia Baird  Age: 77 y.o.  Sex: female  :  1945  MRN: [id number]         Primary Care Physician: Slick Gomez MD    Subjective: patient is sitting up; feels fine sitting still but gets short of air with minimal exertion  Interval History: follow up for copd, respiratory failure, malnutrition , paf    Objective:    Scheduled Meds:acetylcysteine, 4 mL, Nebulization, 4x Daily - RT  albuterol, 2.5 mg, Nebulization, 4x Daily - RT  amiodarone, 200 mg, Oral, Q12H  aspirin, 81 mg, Oral, Daily  clopidogrel, 75 mg, Oral, Daily  guaiFENesin, 600 mg, Oral, Q12H  pantoprazole, 40 mg, Oral, BID AC  predniSONE, 40 mg, Oral, Daily With Breakfast  rosuvastatin, 5 mg, Oral, Daily  sucralfate, 1 g, Oral, 4x Daily  tiotropium bromide monohydrate, 2 puff, Inhalation, Daily - RT  ursodiol, 300 mg, Oral, TID      Continuous Infusions:     Vital signs in last 24 hours:  Temp:  [97.6 °F (36.4 °C)-98.7 °F (37.1 °C)] 98.1 °F (36.7 °C)  Heart Rate:  [63-87] 79  Resp:  [16-20] 18  BP: (117-163)/(51-70) 117/51    Intake/Output:    Intake/Output Summary (Last 24 hours) at 2022 1740  Last data filed at 2022 0353  Gross per 24 hour   Intake 260 ml   Output 600 ml   Net -340 ml       Exam:  /51 (BP Location: Right arm, Patient Position: Lying)   Pulse 79   Temp 98.1 °F (36.7 °C) (Oral)   Resp 18   Ht 152.4 cm (60\")   Wt 39 kg (85 lb 14.4 oz)   SpO2 100%   BMI 16.78 kg/m²     General Appearance:    Alert, cooperative, no distress, AAOx3; thin, frail, chronically ill appearing                          Head:    Normocephalic, without obvious abnormality, atraumatic; bitemporal wasting                           Eyes:    PERRL, conjunctivae/corneas clear, EOM's intact, both eyes                         Throat:   Lips, tongue, gums normal; oral mucosa pink and moist                           Neck:   Supple, symmetrical, trachea midline, no " JVD                         Lungs:    Decreased breath sounds bilaterally, respirations unlabored                 Chest Wall:    No tenderness or deformity                          Heart:    Regular rate and rhythm, S1 and S2 normal, no murmur,no  rub or gallop                  Abdomen:     Soft, nontender, bowel sounds active, no masses, no organomegaly                  Extremities:   Extremities normal, atraumatic, no cyanosis or edema                        Pulses:   Pulses palpable in all extremities                            Skin:   Skin is warm and dry,  no rashes or palpable lesions                  Neurologic:   CNII-XII intact, motor strength grossly intact, sensation grossly intact to light touch, no focal deficits noted       Data Review:  Labs in chart were reviewed.  WBC   Date Value Ref Range Status   05/07/2022 14.66 (H) 3.40 - 10.80 10*3/mm3 Final     Hemoglobin   Date Value Ref Range Status   05/07/2022 8.1 (L) 12.0 - 15.9 g/dL Final     Hematocrit   Date Value Ref Range Status   05/07/2022 25.2 (L) 34.0 - 46.6 % Final     Platelets   Date Value Ref Range Status   05/07/2022 364 140 - 450 10*3/mm3 Final     Sodium   Date Value Ref Range Status   05/07/2022 141 136 - 145 mmol/L Final     Potassium   Date Value Ref Range Status   05/07/2022 3.4 (L) 3.5 - 5.2 mmol/L Final     Chloride   Date Value Ref Range Status   05/07/2022 102 98 - 107 mmol/L Final     CO2   Date Value Ref Range Status   05/07/2022 29.1 (H) 22.0 - 29.0 mmol/L Final     BUN   Date Value Ref Range Status   05/07/2022 19 8 - 23 mg/dL Final     Creatinine   Date Value Ref Range Status   05/07/2022 0.75 0.57 - 1.00 mg/dL Final     Glucose   Date Value Ref Range Status   05/07/2022 100 (H) 65 - 99 mg/dL Final     Calcium   Date Value Ref Range Status   05/07/2022 9.2 8.6 - 10.5 mg/dL Final     No results found for: AST, ALT, ALKPHOS  No results found for: APTT, INR      Assessment:  Active Hospital Problems    Diagnosis  POA   • COPD  with exacerbation (HCC) [J44.1]  Yes      Resolved Hospital Problems   No resolved problems to display.   paf  Respiratory failure  Malnutrition      Plan:  rebolused with amiodarone yesterday  Sinus rhythm today  Rehab is planned  Appreciate help from all  dw patient and family   Notes and labs reviewed  Medium risk    Pao Bautista MD  5/8/2022  17:40 EDT

## 2022-05-08 NOTE — PLAN OF CARE
Goal Outcome Evaluation:  patient denies pain.  Son at bedside.  Call light in reach.  BM this shift.

## 2022-05-08 NOTE — PROGRESS NOTES
[facility]  Cardiology Progress Note    Patient Identification:  Name: Celia Baird  Age: 77 y.o.  Sex: female  :  1945  MRN: [id number]                 Follow Up / Chief Complaint: Recurrent atrial fibrillation    Interval History:  Patient converted to normal sinus rhythm     Subjective:  Continued shortness of breath    Objective:    Past Medical History:  Past Medical History:   Diagnosis Date   • Atrial fibrillation (HCC)    • CAD (coronary artery disease)    • COPD (chronic obstructive pulmonary disease) (HCC)    • History of frequent urinary tract infections    • Irregular heart beat    • Kidney stones    • PBC (primary biliary cirrhosis)    • PBC (primary biliary cirrhosis)      Past Surgical History:  Past Surgical History:   Procedure Laterality Date   • CARDIAC ELECTROPHYSIOLOGY PROCEDURE N/A 2017    Procedure:    LINQ;  Surgeon: Ailyn Solorio MD;  Location: [facility] CATH INVASIVE LOCATION;  Service:    • CHOLECYSTECTOMY     • COLONOSCOPY     • COLONOSCOPY N/A 2022    Procedure: COLONOSCOPY into cecum with polypectomy, clip placement;  Surgeon: Constantine Kaye MD;  Location: [facility] ENDOSCOPY;  Service: Gastroenterology;  Laterality: N/A;  poor prep, diverticulosis, polyp   • CORONARY STENT PLACEMENT      proximal circumflex   • ENDOSCOPY N/A 2022    Procedure: ESOPHAGOGASTRODUODENOSCOPY with biopsy;  Surgeon: Constantine Kaye MD;  Location: [facility] ENDOSCOPY;  Service: Gastroenterology;  Laterality: N/A;  normal   • TUBAL ABDOMINAL LIGATION          Social History:   Social History     Tobacco Use   • Smoking status: Current Some Day Smoker     Packs/day: 0.50     Years: 59.00     Pack years: 29.50     Types: Cigarettes   • Smokeless tobacco: Never Used   • Tobacco comment: 2 - 3 CIGARETTES A DAY   Substance Use Topics   • Alcohol use: No      Family History:  Family History   Problem Relation Age of Onset   • Heart disease Father    • Heart attack  Father    • Heart disease Brother    • Stroke Mother           Allergies:  Allergies   Allergen Reactions   • Morphine And Related Nausea And Vomiting   • Levaquin [Levofloxacin]    • Sulfa Antibiotics      Scheduled Meds:  acetylcysteine, 4 mL, 4x Daily - RT  albuterol, 2.5 mg, 4x Daily - RT  amiodarone, 200 mg, Q12H  aspirin, 81 mg, Daily  clopidogrel, 75 mg, Daily  guaiFENesin, 600 mg, Q12H  pantoprazole, 40 mg, BID AC  predniSONE, 40 mg, Daily With Breakfast  rosuvastatin, 5 mg, Daily  sucralfate, 1 g, 4x Daily  tiotropium bromide monohydrate, 2 puff, Daily - RT  ursodiol, 300 mg, TID            INTAKE AND OUTPUT:    Intake/Output Summary (Last 24 hours) at 5/8/2022 1351  Last data filed at 5/8/2022 0353  Gross per 24 hour   Intake 370 ml   Output 600 ml   Net -230 ml       Review of Systems:   GI:  Cardiac:  Pulmonary: Shortness of breath    Constitutional:  Temp:  [97.6 °F (36.4 °C)-98.7 °F (37.1 °C)] 98.1 °F (36.7 °C)  Heart Rate:  [63-87] 71  Resp:  [16-20] 18  BP: (109-163)/(51-70) 117/51    Physical Exam:  General:  Appears in no acute distress  Eyes: PERTL,  HEENT:  No JVD. Thyroid not visibly enlarged. No mucosal pallor or cyanosis  Respiratory: Respirations regular and unlabored at rest. BBS with good air entry in all fields. No crackles, rubs or wheezes auscultated  Cardiovascular: S1S2 IRRegular rate and rhythm. No murmur, rub or gallop. No carotid bruits. DP/PT pulses     . No pretibial pitting edema  Gastrointestinal: Abdomen soft, flat, non tender. Bowel sounds present. No hepatosplenomegaly. No ascites  Musculoskeletal: MCCRACKEN x4. No abnormal movements  Extremities: No digital clubbing or cyanosis  Skin: Color pink. Skin warm and dry to touch. No rashes    Neuro: AAO x3 CN II-XII grossly intact  Psych: Mood and affect normal, pleasant and cooperative          Cardiographics  Telemetry:     ECG:     Echocardiogram:     Lab Review   Results from last 7 days   Lab Units 05/04/22  0706 05/04/22  0448  "  TROPONIN T ng/mL <0.010 <0.010     Results from last 7 days   Lab Units 05/04/22  0448   MAGNESIUM mg/dL 1.9     Results from last 7 days   Lab Units 05/07/22  0717   SODIUM mmol/L 141   POTASSIUM mmol/L 3.4*   BUN mg/dL 19   CREATININE mg/dL 0.75   CALCIUM mg/dL 9.2     @[id number]@  Results from last 7 days   Lab Units 05/07/22  0717 05/06/22  0540 05/04/22  0448   WBC 10*3/mm3 14.66* 13.55* 11.04*   HEMOGLOBIN g/dL 8.1* 8.2* 8.9*   HEMATOCRIT % 25.2* 26.3* 27.7*   PLATELETS 10*3/mm3 364 350 380             Assessment:  Recurrent atrial fibrillation      Plan:  Patient back in normal sinus rhythm  Shortness of breath is better        )5/8/2022  Ailyn Solorio MD      EMR Dragon/Transcription:   \"Dictated utilizing Dragon dictation\".       "

## 2022-05-08 NOTE — PLAN OF CARE
Goal Outcome Evaluation:  Plan of Care Reviewed With: patient        Progress: improving  Outcome Evaluation: SR on monitor this shift, on 2L NC. Resting well. VSS. Will continue to monitor.

## 2022-05-09 LAB
ANION GAP SERPL CALCULATED.3IONS-SCNC: 13.9 MMOL/L (ref 5–15)
BACTERIA SPEC AEROBE CULT: NORMAL
BACTERIA SPEC AEROBE CULT: NORMAL
BUN SERPL-MCNC: 22 MG/DL (ref 8–23)
BUN/CREAT SERPL: 29.7 (ref 7–25)
CALCIUM SPEC-SCNC: 9.1 MG/DL (ref 8.6–10.5)
CHLORIDE SERPL-SCNC: 105 MMOL/L (ref 98–107)
CO2 SERPL-SCNC: 23.1 MMOL/L (ref 22–29)
CREAT SERPL-MCNC: 0.74 MG/DL (ref 0.57–1)
EGFRCR SERPLBLD CKD-EPI 2021: 83.4 ML/MIN/1.73
GLUCOSE SERPL-MCNC: 106 MG/DL (ref 65–99)
POTASSIUM SERPL-SCNC: 3.2 MMOL/L (ref 3.5–5.2)
SODIUM SERPL-SCNC: 142 MMOL/L (ref 136–145)

## 2022-05-09 PROCEDURE — 94761 N-INVAS EAR/PLS OXIMETRY MLT: CPT

## 2022-05-09 PROCEDURE — 63710000001 PREDNISONE PER 1 MG: Performed by: INTERNAL MEDICINE

## 2022-05-09 PROCEDURE — 94799 UNLISTED PULMONARY SVC/PX: CPT

## 2022-05-09 PROCEDURE — 80048 BASIC METABOLIC PNL TOTAL CA: CPT | Performed by: NURSE PRACTITIONER

## 2022-05-09 PROCEDURE — 97116 GAIT TRAINING THERAPY: CPT

## 2022-05-09 PROCEDURE — 99214 OFFICE O/P EST MOD 30 MIN: CPT | Performed by: NURSE PRACTITIONER

## 2022-05-09 PROCEDURE — 84132 ASSAY OF SERUM POTASSIUM: CPT | Performed by: NURSE PRACTITIONER

## 2022-05-09 PROCEDURE — 94664 DEMO&/EVAL PT USE INHALER: CPT

## 2022-05-09 PROCEDURE — G0378 HOSPITAL OBSERVATION PER HR: HCPCS

## 2022-05-09 RX ORDER — POTASSIUM CHLORIDE 750 MG/1
20 TABLET, FILM COATED, EXTENDED RELEASE ORAL DAILY
Status: DISCONTINUED | OUTPATIENT
Start: 2022-05-09 | End: 2022-05-10 | Stop reason: HOSPADM

## 2022-05-09 RX ORDER — POTASSIUM CHLORIDE 750 MG/1
40 TABLET, FILM COATED, EXTENDED RELEASE ORAL AS NEEDED
Status: DISCONTINUED | OUTPATIENT
Start: 2022-05-09 | End: 2022-05-10

## 2022-05-09 RX ORDER — POTASSIUM CHLORIDE 750 MG/1
10 TABLET, FILM COATED, EXTENDED RELEASE ORAL DAILY
Status: DISCONTINUED | OUTPATIENT
Start: 2022-05-09 | End: 2022-05-09

## 2022-05-09 RX ORDER — POTASSIUM CHLORIDE 1.5 G/1.77G
40 POWDER, FOR SOLUTION ORAL AS NEEDED
Status: DISCONTINUED | OUTPATIENT
Start: 2022-05-09 | End: 2022-05-10

## 2022-05-09 RX ADMIN — SUCRALFATE 1 G: 1 TABLET ORAL at 17:02

## 2022-05-09 RX ADMIN — GUAIFENESIN 600 MG: 600 TABLET, EXTENDED RELEASE ORAL at 09:54

## 2022-05-09 RX ADMIN — AMIODARONE HYDROCHLORIDE 200 MG: 200 TABLET ORAL at 09:54

## 2022-05-09 RX ADMIN — PREDNISONE 40 MG: 20 TABLET ORAL at 08:38

## 2022-05-09 RX ADMIN — POTASSIUM CHLORIDE 40 MEQ: 10 TABLET, EXTENDED RELEASE ORAL at 14:25

## 2022-05-09 RX ADMIN — ALBUTEROL SULFATE 2.5 MG: 2.5 SOLUTION RESPIRATORY (INHALATION) at 13:12

## 2022-05-09 RX ADMIN — URSODIOL 300 MG: 300 CAPSULE ORAL at 20:53

## 2022-05-09 RX ADMIN — SUCRALFATE 1 G: 1 TABLET ORAL at 20:53

## 2022-05-09 RX ADMIN — ACETYLCYSTEINE 4 ML: 200 SOLUTION ORAL; RESPIRATORY (INHALATION) at 21:28

## 2022-05-09 RX ADMIN — SUCRALFATE 1 G: 1 TABLET ORAL at 11:25

## 2022-05-09 RX ADMIN — PANTOPRAZOLE SODIUM 40 MG: 40 TABLET, DELAYED RELEASE ORAL at 17:02

## 2022-05-09 RX ADMIN — URSODIOL 300 MG: 300 CAPSULE ORAL at 09:55

## 2022-05-09 RX ADMIN — ROSUVASTATIN CALCIUM 5 MG: 5 TABLET, FILM COATED ORAL at 09:54

## 2022-05-09 RX ADMIN — ACETYLCYSTEINE 4 ML: 200 SOLUTION ORAL; RESPIRATORY (INHALATION) at 13:13

## 2022-05-09 RX ADMIN — URSODIOL 300 MG: 300 CAPSULE ORAL at 15:19

## 2022-05-09 RX ADMIN — GUAIFENESIN 600 MG: 600 TABLET, EXTENDED RELEASE ORAL at 20:53

## 2022-05-09 RX ADMIN — PANTOPRAZOLE SODIUM 40 MG: 40 TABLET, DELAYED RELEASE ORAL at 06:48

## 2022-05-09 RX ADMIN — AMIODARONE HYDROCHLORIDE 200 MG: 200 TABLET ORAL at 20:53

## 2022-05-09 RX ADMIN — ALBUTEROL SULFATE 2.5 MG: 2.5 SOLUTION RESPIRATORY (INHALATION) at 21:27

## 2022-05-09 RX ADMIN — SUCRALFATE 1 G: 1 TABLET ORAL at 08:38

## 2022-05-09 RX ADMIN — CLOPIDOGREL 75 MG: 75 TABLET, FILM COATED ORAL at 09:54

## 2022-05-09 RX ADMIN — POTASSIUM CHLORIDE 40 MEQ: 10 TABLET, EXTENDED RELEASE ORAL at 18:25

## 2022-05-09 RX ADMIN — ASPIRIN 81 MG: 81 TABLET, CHEWABLE ORAL at 09:54

## 2022-05-09 RX ADMIN — ALBUTEROL SULFATE 2.5 MG: 2.5 SOLUTION RESPIRATORY (INHALATION) at 07:53

## 2022-05-09 RX ADMIN — TIOTROPIUM BROMIDE INHALATION SPRAY 2 PUFF: 3.12 SPRAY, METERED RESPIRATORY (INHALATION) at 07:54

## 2022-05-09 NOTE — CASE MANAGEMENT/SOCIAL WORK
Continued Stay Note  [facility]     Patient Name: Celia Baird  MRN: [id number]  Today's Date: 5/9/2022    Admit Date: 5/4/2022     Discharge Plan     Row Name 05/09/22 1703       Plan    Plan [facility]- pre-cert initiated    Plan Comments [facility] following and [facility] has initiated pre-cert.  Patient updated and agreeable.  Packet in CCP office. Marisol Hernandez RN               Discharge Codes    No documentation.               Expected Discharge Date and Time     Expected Discharge Date Expected Discharge Time    May 10, 2022             Marisol Hernandez RN

## 2022-05-09 NOTE — PROGRESS NOTES
"Daily progress note    Chief complaint  Doing better  No new complaints  Agreeable to go to subacute rehab  Denies chest pain increase shortness of breath palpitation    History of present illness  77-year-old white female with history of chronic hypoxic respiratory failure chronic diastolic CHF  Atrial fibrillation COPD coronary artery disease hypertension hyperlipidemia and gastroesophageal reflux disease who continues to smoke cigarettes presented to [facility] with complaint of increased shortness of breath and nonproductive cough for last 3 weeks which is worse last week.  Patient denies any fever chest pain abdominal pain nausea vomiting diarrhea.  Patient work-up in ER revealed acute on chronic hypoxic respiratory failure with acute exacerbation of COPD admit for management.      REVIEW OF SYSTEMS  Unremarkable except weakness     PHYSICAL EXAM   Blood pressure 114/52, pulse 70, temperature 97.5 °F (36.4 °C), temperature source Oral, resp. rate 18, height 152.4 cm (60\"), weight 39.4 kg (86 lb 14.4 oz), SpO2 100 %.    Constitutional: Thin frail and petite female in moderate respiratory distress   HEENT unremarkable  Neck: Painless range of motion noted. Neck supple.   Cardiovascular: Tachycardic rate, regular rhythm and intact distal pulses.  Pulmonary/Chest: Severely diminished throughout with tachypnea and increased work of breathing including accessory muscle use.    Abdominal: Soft. There is no tenderness. There is no rebound and no guarding.   Musculoskeletal: Moves all extremities equally. There is no pedal edema or calf tenderness.   Neurological: Alert.  Baseline strength and sensation noted.   Skin: Skin is pink, warm, and dry. No pallor.   Psychiatric: Mood and affect normal.     LAB RESULTS  Lab Results (last 24 hours)     Procedure Component Value Units Date/Time    Basic Metabolic Panel [[id number]]  (Abnormal) Collected: 05/09/22 1109    Specimen: Blood Updated: 05/09/22 1341     " Glucose 106 mg/dL      BUN 22 mg/dL      Creatinine 0.74 mg/dL      Sodium 142 mmol/L      Potassium 3.2 mmol/L      Chloride 105 mmol/L      CO2 23.1 mmol/L      Calcium 9.1 mg/dL      BUN/Creatinine Ratio 29.7     Anion Gap 13.9 mmol/L      eGFR 83.4 mL/min/1.73      Comment: National Kidney Foundation and American Society of Nephrology (ASN) Task Force recommended calculation based on the Chronic Kidney Disease Epidemiology Collaboration (CKD-EPI) equation refit without adjustment for race.       Narrative:      GFR Normal >60  Chronic Kidney Disease <60  Kidney Failure <15      Blood Culture - Blood, Arm, Left [[id number]]  (Normal) Collected: 05/04/22 0448    Specimen: Blood from Arm, Left Updated: 05/09/22 0503     Blood Culture No growth at 5 days    Blood Culture - Blood, Arm, Right [[id number]]  (Normal) Collected: 05/04/22 0448    Specimen: Blood from Arm, Right Updated: 05/09/22 0503     Blood Culture No growth at 5 days        Imaging Results (Last 24 Hours)     ** No results found for the last 24 hours. **        ECG 12 Lead  Component   Ref Range & Units 04:01   (5/4/22) 3 mo ago   (1/26/22) 3 mo ago   (1/18/22) 3 mo ago   (1/14/22) 3 mo ago   (1/13/22) 3 mo ago   (1/13/22)   QT Interval   ms 324 P  446  314  362  373  409    Resulting Agency  ECG BH ECG BH ECG BH ECG  ECG BH ECG             HEART RATE= 88  bpm  RR Interval= 576  ms  VA Interval=   ms  P Horizontal Axis=   deg  P Front Axis=   deg  QRSD Interval= 90  ms  QT Interval= 324  ms  QRS Axis= 60  deg  T Wave Axis= 61  deg  - ABNORMAL ECG -  Atrial fibrillation  Ventricular premature complex  Minimal ST elevation, inferior leads             Current Facility-Administered Medications:   •  acetaminophen (TYLENOL) tablet 650 mg, 650 mg, Oral, Q4H PRN, Eulogio Mcdaniel MD, 650 mg at 05/06/22 0704  •  acetylcysteine (MUCOMYST) 20 % nebulizer solution 4 mL, 4 mL, Nebulization, 4x Daily - RT, Moise Katz MD, 4 mL at 05/09/22 1313  •  albuterol  (PROVENTIL) nebulizer solution 0.083% 2.5 mg/3mL, 2.5 mg, Nebulization, 4x Daily - RT, Moise Katz MD, 2.5 mg at 05/09/22 1312  •  ALPRAZolam (XANAX) tablet 0.25 mg, 0.25 mg, Oral, 4x Daily PRN, Eulogio Mcdaniel MD, 0.25 mg at 05/08/22 2126  •  amiodarone (PACERONE) tablet 200 mg, 200 mg, Oral, Q12H, Darlene Shen APRN, 200 mg at 05/09/22 0954  •  aspirin chewable tablet 81 mg, 81 mg, Oral, Daily, Eulogio Mcdaniel MD, 81 mg at 05/09/22 0954  •  clopidogrel (PLAVIX) tablet 75 mg, 75 mg, Oral, Daily, Eulogio Mcdaniel MD, 75 mg at 05/09/22 0954  •  glycerin (PEDIA-LAX) 2.8 g liquid suppository 2.7 mL, 1 suppository, Rectal, Daily PRN, Eulogio Mcdaniel MD  •  guaiFENesin (MUCINEX) 12 hr tablet 600 mg, 600 mg, Oral, Q12H, Eulogio Mcdaniel MD, 600 mg at 05/09/22 0954  •  nitroglycerin (NITROSTAT) SL tablet 0.4 mg, 0.4 mg, Sublingual, Q5 Min PRN, Eulogio Mcdaniel MD  •  O2 (OXYGEN), 2 L/min, Inhalation, PRN, Eulogio Mcdaniel MD  •  pantoprazole (PROTONIX) EC tablet 40 mg, 40 mg, Oral, BID AC, Eulogio Mcdaniel MD, 40 mg at 05/09/22 0648  •  potassium chloride (K-DUR,KLOR-CON) ER tablet 40 mEq, 40 mEq, Oral, PRN, Kathe Lock APRN  •  potassium chloride (KLOR-CON) packet 40 mEq, 40 mEq, Oral, PRN, Kathe Lock APRN  •  predniSONE (DELTASONE) tablet 40 mg, 40 mg, Oral, Daily With Breakfast, Moise Katz MD, 40 mg at 05/09/22 0838  •  rosuvastatin (CRESTOR) tablet 5 mg, 5 mg, Oral, Daily, Eulogio Mcdaniel MD, 5 mg at 05/09/22 0954  •  [COMPLETED] Insert peripheral IV, , , Once **AND** sodium chloride 0.9 % flush 10 mL, 10 mL, Intravenous, PRN, Manolo Bustamante MD, 10 mL at 05/08/22 2123  •  sucralfate (CARAFATE) tablet 1 g, 1 g, Oral, 4x Daily, Eulogio Mcdaniel MD, 1 g at 05/09/22 1125  •  tiotropium (SPIRIVA RESPIMAT) 2.5 mcg/act aerosol solution inhaler, 2 puff, Inhalation, Daily - RT, Eulogio Mcdaniel MD, 2 puff at 05/09/22 0754  •  ursodiol (ACTIGALL) capsule 300 mg, 300 mg, Oral, TID, Eulogio Mcdaniel MD, 300 mg at 05/09/22 0955      ASSESSMENT  Acute on chronic hypoxic respiratory failure  Acute exacerbation of COPD  Persistent tobacco abuse  Chronic cough  Chronic atrial fibrillation with rapid ventricular rate  Anxiety disorder  Gastroesophageal reflux disease    PLAN  CPM  Supplement oxygen  DuoNeb and Symbicort  Amiodarone  Prednisone  Symptomatic treatment for cough  Advised to quit smoking  Adjust home medications  Stress ulcer DVT prophylaxis  Supportive care  DNR  PT/OT  Discussed with nursing staff  Subacute rehab once bed available    JONNY PEOPLES MD

## 2022-05-09 NOTE — PLAN OF CARE
Goal Outcome Evaluation:  Plan of Care Reviewed With: patient        Progress: improving  Outcome Evaluation: Pt tolerated treatment with c/o of feeling SOA with activity. Pt is modif. independent with bed mobility and SBA with sit<->stand transfers. pt was able to ambulate 75ftX2 with rwx, CGA. Pt required 1 standing rest break due to feeling SOA. Pt needed cues for posture correction and increasing step length. Will continue to progress pt as able.    ..Patient was wearing a face mask during this therapy encounter. Therapist used appropriate personal protective equipment including eye protection, mask, and gloves.  Mask used was standard procedure mask. Appropriate PPE was worn during the entire therapy session. Hand hygiene was completed before and after therapy session. Patient is not in enhanced droplet precautions.

## 2022-05-09 NOTE — THERAPY TREATMENT NOTE
Patient Name: Celia Baird  : 1945    MRN: [id number]                              Today's Date: 2022       Admit Date: 2022    Visit Dx:     ICD-10-CM ICD-9-CM   1. COPD with exacerbation (HCC)  J44.1 491.21   2. Chronic hypoxemic respiratory failure (HCC)  J96.11 518.83     799.02     Patient Active Problem List   Diagnosis   • COPD exacerbation (HCC)   • Subarachnoid hemorrhage (HCC)   • Multiple skin tears   • Closed nondisplaced fracture of left clavicle   • Paroxysmal atrial fibrillation (HCC)   • Tobacco abuse   • Status post placement of implantable loop recorder   • SOB (shortness of breath)   • COPD with acute exacerbation (HCC)   • Severe malnutrition (CMS/HCC)   • Leukocytosis   • Gastrointestinal hemorrhage   • Absolute anemia   • Hyperlipidemia   • Long term (current) use of antithrombotics/antiplatelets   • COPD with exacerbation (HCC)     Past Medical History:   Diagnosis Date   • Atrial fibrillation (HCC)    • CAD (coronary artery disease)    • COPD (chronic obstructive pulmonary disease) (HCC)    • History of frequent urinary tract infections    • Irregular heart beat    • Kidney stones    • PBC (primary biliary cirrhosis)    • PBC (primary biliary cirrhosis)      Past Surgical History:   Procedure Laterality Date   • CARDIAC ELECTROPHYSIOLOGY PROCEDURE N/A 2017    Procedure:    LINQ;  Surgeon: Ailyn Solorio MD;  Location: [facility] CATH INVASIVE LOCATION;  Service:    • CHOLECYSTECTOMY     • COLONOSCOPY     • COLONOSCOPY N/A 2022    Procedure: COLONOSCOPY into cecum with polypectomy, clip placement;  Surgeon: Constantine Kaye MD;  Location: [facility] ENDOSCOPY;  Service: Gastroenterology;  Laterality: N/A;  poor prep, diverticulosis, polyp   • CORONARY STENT PLACEMENT      proximal circumflex   • ENDOSCOPY N/A 2022    Procedure: ESOPHAGOGASTRODUODENOSCOPY with biopsy;  Surgeon: Constantine Kaye MD;  Location: [facility] ENDOSCOPY;  Service: Gastroenterology;   Laterality: N/A;  normal   • TUBAL ABDOMINAL LIGATION        General Information     Row Name 05/09/22 1601          Physical Therapy Time and Intention    Document Type therapy note (daily note)  -EB     Mode of Treatment individual therapy;physical therapy  -EB     Row Name 05/09/22 1601          General Information    Existing Precautions/Restrictions fall;oxygen therapy device and L/min  -EB     Row Name 05/09/22 1601          Cognition    Orientation Status (Cognition) oriented x 3  -EB     Row Name 05/09/22 1601          Safety Issues, Functional Mobility    Safety Issues Affecting Function (Mobility) --  -EB     Impairments Affecting Function (Mobility) shortness of breath;endurance/activity tolerance  -EB           User Key  (r) = Recorded By, (t) = Taken By, (c) = Cosigned By    Initials Name Provider Type     Florence Mortensen PTA Physical Therapist Assistant               Mobility     Row Name 05/09/22 1603          Bed Mobility    All Activities, [place] (Bed Mobility) modified independence  -EB     Row Name 05/09/22 1603          Sit-Stand Transfer    Sit-Stand [place] (Transfers) standby assist  -EB     Assistive Device (Sit-Stand Transfers) walker, front-wheeled  -EB     Row Name 05/09/22 1603          Gait/Stairs (Locomotion)    [place] Level (Gait) contact guard  -EB     Assistive Device (Gait) walker, front-wheeled  -EB     Distance in Feet (Gait) 2X75ft  -EB     Deviations/Abnormal Patterns (Gait) gait speed decreased;stride length decreased;jass decreased  -EB     Bilateral Gait Deviations forward flexed posture  -EB     Comment, (Gait/Stairs) cues for upright posure and increase step length. Pt needed 1 standing rest break.  -EB           User Key  (r) = Recorded By, (t) = Taken By, (c) = Cosigned By    Initials Name Provider Type     Florence Mortensen PTA Physical Therapist Assistant               Obj/Interventions     Row Name 05/09/22 1608          Balance    Balance  Assessment standing static balance  -EB     Static Standing Balance standby assist  -EB     Position/Device Used, Standing Balance unsupported  -EB     Balance Interventions standing  -EB           User Key  (r) = Recorded By, (t) = Taken By, (c) = Cosigned By    Initials Name Provider Type    Florence Marcus PTA Physical Therapist Assistant               Goals/Plan    No documentation.                Clinical Impression     Row Name 05/09/22 1608          Plan of Care Review    Plan of Care Reviewed With patient  -EB     Progress improving  -EB     Outcome Evaluation Pt tolerated treatment with c/o of feeling SOA with activity. Pt is modif. independent with bed mobility and SBA with sit<->stand transfers. pt was able to ambulate 75ftX2 with rwx, CGA. Pt required 1 standing rest break due to feeling SOA. Pt needed cues for posture correction and increasing step length. Will continue to progress pt as able.  -EB     Row Name 05/09/22 1608          Therapy Assessment/Plan (PT)    Therapy Frequency (PT) 5 times/wk  -EB     Row Name 05/09/22 1608          Positioning and Restraints    Pre-Treatment Position in bed  -EB     Post Treatment Position bed  -EB     In Bed sitting EOB;call light within reach;encouraged to call for assist;with nsg  -EB           User Key  (r) = Recorded By, (t) = Taken By, (c) = Cosigned By    Initials Name Provider Type    Florence Marcus PTA Physical Therapist Assistant               Outcome Measures     Row Name 05/09/22 1610          How much help from another person do you currently need...    Turning from your back to your side while in flat bed without using bedrails? 4  -EB     Moving from lying on back to sitting on the side of a flat bed without bedrails? 4  -EB     Moving to and from a bed to a chair (including a wheelchair)? 3  -EB     Standing up from a chair using your arms (e.g., wheelchair, bedside chair)? 3  -EB     Climbing 3-5 steps with a railing? 2  -EB     To walk in  hospital room? 3  -EB     AM-PAC 6 Clicks Score (PT) 19  -EB     Highest level of mobility 6 --> Walked 10 steps or more  -           User Key  (r) = Recorded By, (t) = Taken By, (c) = Cosigned By    Initials Name Provider Type    EB Florence Mortensen PTA Physical Therapist Assistant                             Physical Therapy Education                 Title: PT OT SLP Therapies (Done)     Topic: Physical Therapy (Done)     Point: Mobility training (Done)     Learning Progress Summary           Patient Acceptance, E, VU by  at 5/9/2022 1610    Acceptance, E, VU,NR by  at 5/6/2022 1640                   Point: Home exercise program (Done)     Learning Progress Summary           Patient Acceptance, E, VU by  at 5/9/2022 1610    Acceptance, E, VU,NR by  at 5/6/2022 1640                   Point: Body mechanics (Done)     Learning Progress Summary           Patient Acceptance, E, VU by  at 5/9/2022 1610    Acceptance, E, VU,NR by  at 5/6/2022 1640                   Point: Precautions (Done)     Learning Progress Summary           Patient Acceptance, E, VU by  at 5/9/2022 1610    Acceptance, E, VU,NR by  at 5/6/2022 1640                               User Key     Initials Effective Dates Name Provider Type Discipline     06/16/21 -  Whitney Calloway, NEHA Physical Therapist PT     06/16/21 -  Florence Mortensen PTA Physical Therapist Assistant PT              PT Recommendation and Plan     Plan of Care Reviewed With: patient  Progress: improving  Outcome Evaluation: Pt tolerated treatment with c/o of feeling SOA with activity. Pt is modif. independent with bed mobility and SBA with sit<->stand transfers. pt was able to ambulate 75ftX2 with rwx, CGA. Pt required 1 standing rest break due to feeling SOA. Pt needed cues for posture correction and increasing step length. Will continue to progress pt as able.     Time Calculation:    PT Charges     Row Name 05/09/22 1601             Time Calculation     Start Time 1115  -EB      Stop Time 1130  -EB      Time Calculation (min) 15 min  -EB      PT Received On 05/09/22  -EB      PT - Next Appointment 05/10/22  -EB              Time Calculation- PT    Total Timed Code Minutes- PT 15 minute(s)  -EB            User Key  (r) = Recorded By, (t) = Taken By, (c) = Cosigned By    Initials Name Provider Type    EB Florence Mortensen PTA Physical Therapist Assistant              Therapy Charges for Today     Code Description Service Date Service Provider Modifiers Qty    40290019250 HC GAIT TRAINING EA 15 MIN 5/9/2022 Florence Mortensen PTA GP 1          PT G-Codes  Outcome Measure Options: AM-PAC 6 Clicks Basic Mobility (PT)  AM-PAC 6 Clicks Score (PT): 19  AM-PAC 6 Clicks Score (OT): 18    Florence Mortensen PTA  5/9/2022

## 2022-05-09 NOTE — CASE MANAGEMENT/SOCIAL WORK
Continued Stay Note  [facility]     Patient Name: Celia Baird  MRN: [id number]  Today's Date: 5/9/2022    Admit Date: 5/4/2022     Discharge Plan     Row Name 05/09/22 1713       Plan    Patient/Family in Agreement with Plan yes    Plan Comments Spoke with patient and son, Eduardo, at bedside.  They understand that pre-cert has been initiated, but there is a possibility that SNF stay may be denied.  Patient stating that she may want to go home with HH and not wait for a decision on pre-cert.  Advised that CCP will follow up in the AM. Marisol Hernandez RN    Row Name 05/09/22 1703       Plan    Plan [facility]- pre-cert initiated    Plan Comments [facility] following and [facility] has initiated pre-cert.  Patient updated and agreeable.  Packet in CCP office. Marisol Hernandez RN               Discharge Codes    No documentation.               Expected Discharge Date and Time     Expected Discharge Date Expected Discharge Time    May 10, 2022             Marisol Hernandez RN

## 2022-05-09 NOTE — PROGRESS NOTES
[facility]  Cardiology Progress Note    Patient Identification:  Name: Celia Baird  Age: 77 y.o.  Sex: female  :  1945  MRN: [id number]                 Follow Up / Chief Complaint: Follow-up for recurrent atrial fibrillation    Interval History: Remains in sinus rhythm.  Short pause noted we will do ZIO at discharge.        Subjective: Shortness of breath and denies any chest pain.       Objective:    Past Medical History:  Past Medical History:   Diagnosis Date   • Atrial fibrillation (HCC)    • CAD (coronary artery disease)    • COPD (chronic obstructive pulmonary disease) (HCC)    • History of frequent urinary tract infections    • Irregular heart beat    • Kidney stones    • PBC (primary biliary cirrhosis)    • PBC (primary biliary cirrhosis)      Past Surgical History:  Past Surgical History:   Procedure Laterality Date   • CARDIAC ELECTROPHYSIOLOGY PROCEDURE N/A 2017    Procedure:    LINQ;  Surgeon: Ailyn Solorio MD;  Location: [facility] CATH INVASIVE LOCATION;  Service:    • CHOLECYSTECTOMY     • COLONOSCOPY     • COLONOSCOPY N/A 2022    Procedure: COLONOSCOPY into cecum with polypectomy, clip placement;  Surgeon: Constantine Kaye MD;  Location: [facility] ENDOSCOPY;  Service: Gastroenterology;  Laterality: N/A;  poor prep, diverticulosis, polyp   • CORONARY STENT PLACEMENT      proximal circumflex   • ENDOSCOPY N/A 2022    Procedure: ESOPHAGOGASTRODUODENOSCOPY with biopsy;  Surgeon: Constantine Kaye MD;  Location: [facility] ENDOSCOPY;  Service: Gastroenterology;  Laterality: N/A;  normal   • TUBAL ABDOMINAL LIGATION          Social History:   Social History     Tobacco Use   • Smoking status: Current Some Day Smoker     Packs/day: 0.50     Years: 59.00     Pack years: 29.50     Types: Cigarettes   • Smokeless tobacco: Never Used   • Tobacco comment: 2 - 3 CIGARETTES A DAY   Substance Use Topics   • Alcohol use: No      Family History:  Family History   Problem  Relation Age of Onset   • Heart disease Father    • Heart attack Father    • Heart disease Brother    • Stroke Mother           Allergies:  Allergies   Allergen Reactions   • Morphine And Related Nausea And Vomiting   • Levaquin [Levofloxacin]    • Sulfa Antibiotics      Scheduled Meds:  acetylcysteine, 4 mL, 4x Daily - RT  albuterol, 2.5 mg, 4x Daily - RT  amiodarone, 200 mg, Q12H  aspirin, 81 mg, Daily  clopidogrel, 75 mg, Daily  guaiFENesin, 600 mg, Q12H  pantoprazole, 40 mg, BID AC  predniSONE, 40 mg, Daily With Breakfast  rosuvastatin, 5 mg, Daily  sucralfate, 1 g, 4x Daily  tiotropium bromide monohydrate, 2 puff, Daily - RT  ursodiol, 300 mg, TID            INTAKE AND OUTPUT:    Intake/Output Summary (Last 24 hours) at 5/9/2022 0950  Last data filed at 5/9/2022 0924  Gross per 24 hour   Intake 300 ml   Output 400 ml   Net -100 ml       Review of Systems:   GI: Denies nausea and vomited  Cardiac: Denies chest pain and palpitations  Pulmonary:  positive shortness of breath and denies cough    Constitutional:  Temp:  [97.5 °F (36.4 °C)-98.1 °F (36.7 °C)] 98 °F (36.7 °C)  Heart Rate:  [68-81] 68  Resp:  [18-20] 18  BP: (117-127)/(43-61) 120/43          Physical Exam:  General:  Appears in no acute distress resting in bed  Eyes: EOM normal no conjunctival drainage  HEENT:  No JVD. Thyroid not visibly enlarged. No mucosal pallor or cyanosis  Respiratory: Respirations regular and unlabored at rest. BBS with good air entry in all fields. No crackles, rubs or wheezes auscultated  Cardiovascular: S1S2 Regular rate and rhythm. No murmur, rub or gallop. No carotid bruits. DP/PT pulses     . No pretibial pitting edema  Gastrointestinal: Abdomen soft, flat, non tender. Bowel sounds present. No hepatosplenomegaly. No ascites  Skin:   Skin warm and dry to touch. No rashes    Neuro: AAO x3 CN II-XII grossly intact  Psych: Mood and affect normal, pleasant and cooperative          I reviewed the patient's new clinical results,  and personally reviewed and interpreted the patient's ECG and telemetry data from the last 24 hours          Cardiographics  Telemetry:     ECG:     Echocardiogram:   Interpretation Summary    · Calculated left ventricular EF = 61.9% Estimated left ventricular EF was in agreement with the calculated left ventricular EF.  · Left ventricular diastolic function was normal.  · There is no evidence of pericardial effusion. .     3/5/2019  Interpretation Summary       · Findings consistent with a normal ECG stress test.  · Left ventricular ejection fraction is normal (Calculated EF = 70%).  · Myocardial perfusion imaging indicates a normal myocardial perfusion study with no evidence of ischemia.  · Impressions are consistent with a low risk study.     Asymptomatic for chest pain. ECG is negative for ischemia.   Ectopy: Rare PAC at baseline, none with exercise, occasional PVC in recovery  HR and BP response : Appropriate for Beta-blocker therapy  Pharmacologic study due to inability to tolerate increasing speed and grade of treadmill due to Pulmonary, mobility  Issues and Beta-blocker therapy.  Participated in Low Level exercise and tolerance is poor.        2017  Conclusion       · Successful Linq implantation         Lab Review   Results from last 7 days   Lab Units 05/04/22  0706 05/04/22  0448   TROPONIN T ng/mL <0.010 <0.010     Results from last 7 days   Lab Units 05/04/22  0448   MAGNESIUM mg/dL 1.9     Results from last 7 days   Lab Units 05/07/22  0717   SODIUM mmol/L 141   POTASSIUM mmol/L 3.4*   BUN mg/dL 19   CREATININE mg/dL 0.75   CALCIUM mg/dL 9.2        Results from last 7 days   Lab Units 05/07/22  0717 05/06/22  0540 05/04/22  0448   WBC 10*3/mm3 14.66* 13.55* 11.04*   HEMOGLOBIN g/dL 8.1* 8.2* 8.9*   HEMATOCRIT % 25.2* 26.3* 27.7*   PLATELETS 10*3/mm3 364 350 380         The following medical decision was discussed in detail with Dr. Solorio      Assessment:  COPD with exacerbation: pulm  Recurrent  "atrial fibrillation: She has a watchman's  2.3 Pause: Not significant if less than 3.  Hypokalemia: Replaced K+  Hypertension: Stable  Hyperlipidemia: On statin at home.  CAD with stent placement 8/20/2021 on Plavix, aspirin.  No beta-blocker due to some hypotensive readings in the past.    Plan:  Blood pressure and heart rate are stable.  She remains in sinus rhythm.  QTc stable on EKG yesterday.  Patient back in normal sinus rhythm.        We will do BMP for today and tomorrow.  ZIO at discharge       )5/9/2022  BRAYAN Dia/Transcription:   \"Dictated utilizing Dragon dictation\".       "

## 2022-05-09 NOTE — PROGRESS NOTES
[facility]  [phone]  Dr. Moise Katz    Subjective:  LOS: 0    Chief Complaint: COPD exacerbation    She is on low-flow oxygen at home.  Continues to smoke 5 to 6 cigarettes a day at home.  States that her exercise capacity is severely limited  She feels that her lungs are improved.  She is pending discharge to rehab.    Objective   Vital Signs past 24hrs    Temp range: Temp (24hrs), Av.9 °F (36.6 °C), Min:97.5 °F (36.4 °C), Max:98.1 °F (36.7 °C)    BP range: BP: (117-127)/(43-61) 120/43  Pulse range: Heart Rate:  [68-81] 68  Resp rate range: Resp:  [18-20] 18    Device (Oxygen Therapy): nasal cannulaFlow (L/min):  [2] 2  Oxygen range:SpO2:  [96 %-100 %] 100 %      39.4 kg (86 lb 14.4 oz); Body mass index is 16.97 kg/m².    Intake/Output Summary (Last 24 hours) at 2022 1118  Last data filed at 2022 0924  Gross per 24 hour   Intake 300 ml   Output 400 ml   Net -100 ml       Physical Exam  Eyes:      Pupils: Pupils are equal, round, and reactive to light.   Cardiovascular:      Rate and Rhythm: Normal rate and regular rhythm.      Heart sounds: No murmur heard.  Pulmonary:      Effort: No accessory muscle usage or respiratory distress.      Breath sounds: Decreased breath sounds and wheezing (mild coarse - clear with cough) present.   Abdominal:      General: Bowel sounds are normal. There is no distension.      Palpations: Abdomen is soft. There is no mass.      Tenderness: There is no abdominal tenderness.   Musculoskeletal:         General: No swelling.   Neurological:      Mental Status: She is alert.       Results Review:    I have reviewed the laboratory and imaging data since the last note by [facility] physician.  My annotations are noted in assessment and plan.    Medication Review:  I have reviewed the current MAR.  My annotations are noted in assessment and plan.       Plan   PCCM Problems  Chronic respiratory failure  COPD exacerbation  Current cigarette smoker  Paroxysmal A.  fib        Plan of Treatment    Lungs clear with coughing.  She is on oral prednisone.  This should be tapered slowly over 2 weeks.  She needs to continue nebulizer treatments and flutter valve at home.    Needs to quit smoking completely.    Continue low-flow oxygen.    States that she follows up with a pulmonologist in [place].    No objections to discharge to rehab.    Moise Katz MD  05/09/22  11:18 EDT    While in the room and during my examination of the patient I wore gloves, gown, mask, eye protection and followed enhanced droplet/contact isolation protocol and precautions.  I washed my hands before and after this patient encounter.    Part of this note may be an electronic transcription/translation of spoken language to printed text using the Dragon Dictation System.

## 2022-05-10 ENCOUNTER — HOME HEALTH ADMISSION (OUTPATIENT)
Dept: HOME HEALTH SERVICES | Facility: HOME HEALTHCARE | Age: 77
End: 2022-05-10

## 2022-05-10 ENCOUNTER — APPOINTMENT (OUTPATIENT)
Dept: CARDIOLOGY | Facility: HOSPITAL | Age: 77
End: 2022-05-10

## 2022-05-10 ENCOUNTER — READMISSION MANAGEMENT (OUTPATIENT)
Dept: CALL CENTER | Facility: HOSPITAL | Age: 77
End: 2022-05-10

## 2022-05-10 VITALS
SYSTOLIC BLOOD PRESSURE: 117 MMHG | BODY MASS INDEX: 17.51 KG/M2 | HEIGHT: 60 IN | TEMPERATURE: 97.8 F | WEIGHT: 89.2 LBS | HEART RATE: 78 BPM | DIASTOLIC BLOOD PRESSURE: 49 MMHG | RESPIRATION RATE: 20 BRPM | OXYGEN SATURATION: 97 %

## 2022-05-10 LAB
ANION GAP SERPL CALCULATED.3IONS-SCNC: 7.4 MMOL/L (ref 5–15)
BASOPHILS # BLD AUTO: 0.01 10*3/MM3 (ref 0–0.2)
BASOPHILS NFR BLD AUTO: 0.1 % (ref 0–1.5)
BUN SERPL-MCNC: 23 MG/DL (ref 8–23)
BUN/CREAT SERPL: 30.3 (ref 7–25)
CALCIUM SPEC-SCNC: 9.4 MG/DL (ref 8.6–10.5)
CHLORIDE SERPL-SCNC: 107 MMOL/L (ref 98–107)
CO2 SERPL-SCNC: 24.6 MMOL/L (ref 22–29)
CREAT SERPL-MCNC: 0.76 MG/DL (ref 0.57–1)
DEPRECATED RDW RBC AUTO: 40 FL (ref 37–54)
EGFRCR SERPLBLD CKD-EPI 2021: 80.8 ML/MIN/1.73
EOSINOPHIL # BLD AUTO: 0.02 10*3/MM3 (ref 0–0.4)
EOSINOPHIL NFR BLD AUTO: 0.2 % (ref 0.3–6.2)
ERYTHROCYTE [DISTWIDTH] IN BLOOD BY AUTOMATED COUNT: 12.5 % (ref 12.3–15.4)
GLUCOSE SERPL-MCNC: 92 MG/DL (ref 65–99)
HCT VFR BLD AUTO: 23.8 % (ref 34–46.6)
HGB BLD-MCNC: 7.7 G/DL (ref 12–15.9)
IMM GRANULOCYTES # BLD AUTO: 0.07 10*3/MM3 (ref 0–0.05)
IMM GRANULOCYTES NFR BLD AUTO: 0.7 % (ref 0–0.5)
LYMPHOCYTES # BLD AUTO: 1.6 10*3/MM3 (ref 0.7–3.1)
LYMPHOCYTES NFR BLD AUTO: 16.6 % (ref 19.6–45.3)
MCH RBC QN AUTO: 28.8 PG (ref 26.6–33)
MCHC RBC AUTO-ENTMCNC: 32.4 G/DL (ref 31.5–35.7)
MCV RBC AUTO: 89.1 FL (ref 79–97)
MONOCYTES # BLD AUTO: 1.46 10*3/MM3 (ref 0.1–0.9)
MONOCYTES NFR BLD AUTO: 15.2 % (ref 5–12)
NEUTROPHILS NFR BLD AUTO: 6.46 10*3/MM3 (ref 1.7–7)
NEUTROPHILS NFR BLD AUTO: 67.2 % (ref 42.7–76)
NRBC BLD AUTO-RTO: 0.1 /100 WBC (ref 0–0.2)
PLATELET # BLD AUTO: 327 10*3/MM3 (ref 140–450)
PMV BLD AUTO: 11.3 FL (ref 6–12)
POTASSIUM SERPL-SCNC: 4.4 MMOL/L (ref 3.5–5.2)
POTASSIUM SERPL-SCNC: 4.5 MMOL/L (ref 3.5–5.2)
RBC # BLD AUTO: 2.67 10*6/MM3 (ref 3.77–5.28)
SODIUM SERPL-SCNC: 139 MMOL/L (ref 136–145)
WBC NRBC COR # BLD: 9.62 10*3/MM3 (ref 3.4–10.8)

## 2022-05-10 PROCEDURE — 94799 UNLISTED PULMONARY SVC/PX: CPT

## 2022-05-10 PROCEDURE — 93246 EXT ECG>7D<15D RECORDING: CPT

## 2022-05-10 PROCEDURE — 99214 OFFICE O/P EST MOD 30 MIN: CPT | Performed by: NURSE PRACTITIONER

## 2022-05-10 PROCEDURE — 85025 COMPLETE CBC W/AUTO DIFF WBC: CPT | Performed by: HOSPITALIST

## 2022-05-10 PROCEDURE — G0378 HOSPITAL OBSERVATION PER HR: HCPCS

## 2022-05-10 PROCEDURE — 94761 N-INVAS EAR/PLS OXIMETRY MLT: CPT

## 2022-05-10 PROCEDURE — 63710000001 PREDNISONE PER 1 MG: Performed by: INTERNAL MEDICINE

## 2022-05-10 PROCEDURE — 94664 DEMO&/EVAL PT USE INHALER: CPT

## 2022-05-10 PROCEDURE — 80048 BASIC METABOLIC PNL TOTAL CA: CPT | Performed by: HOSPITALIST

## 2022-05-10 RX ORDER — AMIODARONE HYDROCHLORIDE 200 MG/1
200 TABLET ORAL EVERY 12 HOURS SCHEDULED
Qty: 60 TABLET | Refills: 0 | Status: SHIPPED | OUTPATIENT
Start: 2022-05-10 | End: 2022-06-08 | Stop reason: SDUPTHER

## 2022-05-10 RX ORDER — POTASSIUM CHLORIDE 20 MEQ/1
20 TABLET, EXTENDED RELEASE ORAL DAILY
Qty: 30 TABLET | Refills: 0 | Status: SHIPPED | OUTPATIENT
Start: 2022-05-11 | End: 2022-06-10

## 2022-05-10 RX ORDER — PREDNISONE 20 MG/1
20 TABLET ORAL DAILY
Qty: 7 TABLET | Refills: 0 | Status: SHIPPED | OUTPATIENT
Start: 2022-05-10 | End: 2022-05-17

## 2022-05-10 RX ORDER — BUMETANIDE 0.5 MG/1
0.5 TABLET ORAL DAILY
Status: DISCONTINUED | OUTPATIENT
Start: 2022-05-10 | End: 2022-05-10 | Stop reason: HOSPADM

## 2022-05-10 RX ORDER — PREDNISONE 10 MG/1
10 TABLET ORAL DAILY
Qty: 7 TABLET | Refills: 0 | Status: SHIPPED | OUTPATIENT
Start: 2022-05-17 | End: 2022-05-24

## 2022-05-10 RX ADMIN — SUCRALFATE 1 G: 1 TABLET ORAL at 12:32

## 2022-05-10 RX ADMIN — AMIODARONE HYDROCHLORIDE 200 MG: 200 TABLET ORAL at 08:37

## 2022-05-10 RX ADMIN — URSODIOL 300 MG: 300 CAPSULE ORAL at 08:37

## 2022-05-10 RX ADMIN — CLOPIDOGREL 75 MG: 75 TABLET, FILM COATED ORAL at 08:37

## 2022-05-10 RX ADMIN — POTASSIUM CHLORIDE 20 MEQ: 10 TABLET, EXTENDED RELEASE ORAL at 08:36

## 2022-05-10 RX ADMIN — ALBUTEROL SULFATE 2.5 MG: 2.5 SOLUTION RESPIRATORY (INHALATION) at 07:29

## 2022-05-10 RX ADMIN — ALBUTEROL SULFATE 2.5 MG: 2.5 SOLUTION RESPIRATORY (INHALATION) at 11:14

## 2022-05-10 RX ADMIN — ACETYLCYSTEINE 4 ML: 200 SOLUTION ORAL; RESPIRATORY (INHALATION) at 14:32

## 2022-05-10 RX ADMIN — ACETYLCYSTEINE 4 ML: 200 SOLUTION ORAL; RESPIRATORY (INHALATION) at 11:18

## 2022-05-10 RX ADMIN — GUAIFENESIN 600 MG: 600 TABLET, EXTENDED RELEASE ORAL at 08:37

## 2022-05-10 RX ADMIN — SUCRALFATE 1 G: 1 TABLET ORAL at 08:37

## 2022-05-10 RX ADMIN — BUMETANIDE 0.5 MG: 0.5 TABLET ORAL at 12:32

## 2022-05-10 RX ADMIN — ROSUVASTATIN CALCIUM 5 MG: 5 TABLET, FILM COATED ORAL at 08:37

## 2022-05-10 RX ADMIN — TIOTROPIUM BROMIDE INHALATION SPRAY 2 PUFF: 3.12 SPRAY, METERED RESPIRATORY (INHALATION) at 07:37

## 2022-05-10 RX ADMIN — PREDNISONE 40 MG: 20 TABLET ORAL at 08:37

## 2022-05-10 RX ADMIN — ASPIRIN 81 MG: 81 TABLET, CHEWABLE ORAL at 08:37

## 2022-05-10 RX ADMIN — PANTOPRAZOLE SODIUM 40 MG: 40 TABLET, DELAYED RELEASE ORAL at 06:46

## 2022-05-10 RX ADMIN — ACETYLCYSTEINE 4 ML: 200 SOLUTION ORAL; RESPIRATORY (INHALATION) at 07:33

## 2022-05-10 RX ADMIN — ALBUTEROL SULFATE 2.5 MG: 2.5 SOLUTION RESPIRATORY (INHALATION) at 14:36

## 2022-05-10 NOTE — DISCHARGE PLACEMENT REQUEST
"Albina Baird (77 y.o. Female)             Date of Birth   1945    Social Security Number       Address   [address] [place] [address]    Home Phone   [phone]    MRN   [id number]       [facility]    Marital Status                               Admission Date   5/4/22    Admission Type   Emergency    Admitting Provider   Eulogio Mcdaniel MD    Attending Provider   Eulogio Mcdaniel MD    Department, Room/Bed   [address], [address]/1       Discharge Date       Discharge Disposition       Discharge Destination                               Attending Provider: Eulogio Mcdaniel MD    Allergies: Morphine And Related, Levaquin [Levofloxacin], Sulfa Antibiotics    Isolation: None   Infection: None   Code Status: No CPR   Advance Care Planning Activity    Ht: 152.4 cm (60\")   Wt: 40.5 kg (89 lb 3.2 oz)    Admission Cmt: None   Principal Problem: None                Active Insurance as of 5/4/2022     Primary Coverage     Payor Plan Insurance Group Employer/Plan Group    ANTH MEDICARE REPLACEMENT ANTH MEDICARE ADVANTAGE [id number]     Payor Plan Address Payor Plan Phone Number Payor Plan Fax Number Effective Dates    [address] [phone]  1/1/2016 - None Entered    [facility] [id number]       Subscriber Name Subscriber Birth Date Member ID       ALBINA BAIRD 1945 [id number]                 Emergency Contacts      (Rel.) Home Phone Work Phone Mobile Phone    Eduardo Baird (Son) [phone] -- [phone]              "

## 2022-05-10 NOTE — CASE MANAGEMENT/SOCIAL WORK
Continued Stay Note  [facility]     Patient Name: Celia Baird  MRN: [id number]  Today's Date: 5/10/2022    Admit Date: 5/4/2022     Discharge Plan     Row Name 05/10/22 0941       Plan    Plan Comments Spoke with patient at bedside and she wants to return home with HH.  She does not want to stay in the hospital to see if pre-cert is obtained.  Discussed HH and she is agreeable to using [facility] since she used them earlier this year.  Referral placed in EPIC.  Nurse updated and will update MD on rounds. Marisol Hernandez RN               Discharge Codes    No documentation.               Expected Discharge Date and Time     Expected Discharge Date Expected Discharge Time    May 10, 2022             Marisol Hernandez RN

## 2022-05-10 NOTE — DISCHARGE SUMMARY
Discharge summary    Date of admission 5/4/2022  Date of discharge 5/10/2022    Final diagnosis  Acute on chronic hypoxic respiratory failure  Acute exacerbation of COPD  Persistent tobacco abuse  Chronic atrial fibrillation   Anxiety disorder  Chronic anemia  Gastroesophageal reflux disease    Discharge medications    Current Facility-Administered Medications:   •  acetylcysteine (MUCOMYST) 20 % nebulizer solution 4 mL, 4 mL, Nebulization, 4x Daily - RT, Moise Katz MD, 4 mL at 05/10/22 1118  •  albuterol (PROVENTIL) nebulizer solution 0.083% 2.5 mg/3mL, 2.5 mg, Nebulization, 4x Daily - RT, Moise Katz MD, 2.5 mg at 05/10/22 1114  •  amiodarone (PACERONE) tablet 200 mg, 200 mg, Oral, Q12H, Darlene Shen APRN, 200 mg at 05/10/22 0837  •  aspirin chewable tablet 81 mg, 81 mg, Oral, Daily, Eulogio Mcdaniel MD, 81 mg at 05/10/22 0837  •  bumetanide (BUMEX) tablet 0.5 mg, 0.5 mg, Oral, Daily, Kathe Lock APRN, 0.5 mg at 05/10/22 1232  •  clopidogrel (PLAVIX) tablet 75 mg, 75 mg, Oral, Daily, Eulogio Mcdaniel MD, 75 mg at 05/10/22 0837  •  guaiFENesin (MUCINEX) 12 hr tablet 600 mg, 600 mg, Oral, Q12H, Eulogio Mcdaniel MD, 600 mg at 05/10/22 0837  •  pantoprazole (PROTONIX) EC tablet 40 mg, 40 mg, Oral, BID AC, Eulogio Mcdaniel MD, 40 mg at 05/10/22 0646  •  potassium chloride (K-DUR,KLOR-CON) ER tablet 20 mEq, 20 mEq, Oral, Daily, Eulogio Mcdaniel MD, 20 mEq at 05/10/22 0836  •  predniSONE (DELTASONE) tablet 40 mg, 40 mg, Oral, Daily With Breakfast, Moise Katz MD, 40 mg at 05/10/22 0837  •  rosuvastatin (CRESTOR) tablet 5 mg, 5 mg, Oral, Daily, Eulogio Mcdaniel MD, 5 mg at 05/10/22 0837  •  [COMPLETED] Insert peripheral IV, , , Once **AND** sodium chloride 0.9 % flush 10 mL, 10 mL, Intravenous, PRN, Manolo Bustamante MD, 10 mL at 05/08/22 2123  •  sucralfate (CARAFATE) tablet 1 g, 1 g, Oral, 4x Daily, Eulogio Mcdaniel MD, 1 g at 05/10/22 1232  •  tiotropium (SPIRIVA RESPIMAT) 2.5 mcg/act aerosol solution inhaler, 2 puff,  Inhalation, Daily - RT, Jonny Mcdaniel MD, 2 puff at 05/10/22 0732  •  ursodiol (ACTIGALL) capsule 300 mg, 300 mg, Oral, TID, Jonny Mcdaniel MD, 300 mg at 05/10/22 0837     Consults obtained  Pulmonary  Cardiology  Spiritual care    Procedures  None    Hospital course  77-year white female with history of chronic hypoxic respiratory failure on home oxygen chronic diastolic congestive heart failure COPD coronary artery disease atrial fibrillation hypertension hyperlipidemia and gastroesophageal disease who continues to smoke admitted to emergency room with shortness of breath and nonproductive cough.  Patient work-up revealed acute on chronic hypoxic respiratory failure with acute exacerbation of COPD and also found to have rapid ventricular rate with history of chronic atrial fibrillation.  Patient admitted treated with supplemental oxygen nebulizer steroids and heart rate control with amiodarone and followed by cardiology pulmonary.  Patient responded to the treatment but was still weak and initially agreeable to go to subacute rehab but decided today that she will go home with family support and home health.  Patient also advised to quit smoking and I have placed a call for the son who has not answered my call back but did talk to the nursing staff and discharge planner and okay that she can come home with family support with home health.  Patient also going home on 2 weeks of prednisone tapering dose.    Discharge diet regular    Activity as tolerated    Medications as above    Follow-up with [facility] doctor in 1 week and follow-up cardiology and pulmonary per the instruction and take medication as directed.    JONNY MCDANIEL MD

## 2022-05-10 NOTE — PLAN OF CARE
Goal Outcome Evaluation:  Plan of Care Reviewed With: patient        Progress: improving  Outcome Evaluation: Pt on RA, SR on monitor. Resting well. ZIO patch at discharge. D/C today. VSS. Will continue to monitor.

## 2022-05-10 NOTE — PROGRESS NOTES
Continued Stay Note  [facility]     Patient Name: Celia Baird  MRN: [id number]  Today's Date: 5/10/2022    Admit Date: 5/4/2022     Discharge Plan     Row Name 05/10/22 1345       Plan    Plan Comments DC orders in EPIC.  Spoke with patient and her son will transport her home.  [facility]  has accepted and will follow. Marisol Hernandez RN               Discharge Codes    No documentation.               Expected Discharge Date and Time     Expected Discharge Date Expected Discharge Time    May 10, 2022             Marisol Hernandez RN

## 2022-05-10 NOTE — PLAN OF CARE
Goal Outcome Evaluation:  Plan of Care Reviewed With: patient        Progress: improving  Outcome Evaluation: VSS, no complaints of pain.  Pt is alert and oriented x4.  2L of oxygen. Pt is to get ZIO patch at discharge.  Plan is for patient to go home today with home health.

## 2022-05-10 NOTE — PROGRESS NOTES
[facility]  [phone]  Dr. Moise Katz    Subjective:  LOS: 0    Chief Complaint: COPD exacerbation    She is on low-flow oxygen at home.  Continues to smoke 5 to 6 cigarettes a day at home.  States that her exercise capacity is severely limited  Feels pretty good today and denies much cough or wheezing.    Objective   Vital Signs past 24hrs    Temp range: Temp (24hrs), Av.8 °F (36.6 °C), Min:97.3 °F (36.3 °C), Max:98.4 °F (36.9 °C)    BP range: BP: (112-135)/(46-67) 135/67  Pulse range: Heart Rate:  [60-74] 60  Resp rate range: Resp:  [16-18] 18    Device (Oxygen Therapy): nasal cannulaFlow (L/min):  [1-3] 1  Oxygen range:SpO2:  [94 %-100 %] 100 %      40.5 kg (89 lb 3.2 oz); Body mass index is 17.42 kg/m².    Intake/Output Summary (Last 24 hours) at 5/10/2022 0958  Last data filed at 5/10/2022 0913  Gross per 24 hour   Intake 475 ml   Output --   Net 475 ml       Physical Exam  Eyes:      Pupils: Pupils are equal, round, and reactive to light.   Cardiovascular:      Rate and Rhythm: Normal rate and regular rhythm.      Heart sounds: No murmur heard.  Pulmonary:      Effort: No accessory muscle usage or respiratory distress.      Breath sounds: Decreased breath sounds and wheezing (mild coarse - clear with cough) present.   Abdominal:      General: Bowel sounds are normal. There is no distension.      Palpations: Abdomen is soft. There is no mass.      Tenderness: There is no abdominal tenderness.   Musculoskeletal:         General: No swelling.   Neurological:      Mental Status: She is alert.       Results Review:    I have reviewed the laboratory and imaging data since the last note by [facility] physician.  My annotations are noted in assessment and plan.    Medication Review:  I have reviewed the current MAR.  My annotations are noted in assessment and plan.       Plan   PCCM Problems  Chronic respiratory failure  COPD exacerbation  Current cigarette smoker  Paroxysmal A. fib        Plan of  Treatment    Lungs clear with coughing - similar lung exam today.  She is on oral prednisone.  This should be tapered slowly over 2 weeks.  She needs to continue nebulizer treatments and flutter valve at home.    Needs to quit smoking completely.    Continue low-flow oxygen.    States that she follows up with a pulmonologist in [place].    No objections to discharge to rehab.    Moise Katz MD  05/10/22  09:58 EDT    While in the room and during my examination of the patient I wore gloves, gown, mask, eye protection and followed enhanced droplet/contact isolation protocol and precautions.  I washed my hands before and after this patient encounter.    Part of this note may be an electronic transcription/translation of spoken language to printed text using the Dragon Dictation System.

## 2022-05-10 NOTE — PROGRESS NOTES
[facility]  Cardiology Progress Note    Patient Identification:  Name: Celia Baird  Age: 77 y.o.  Sex: female  :  1945  MRN: [id number]                 Follow Up / Chief Complaint: Follow-up for recurrent atrial fibrillation    Interval History: Sinus rhythm on the monitor without any events overnight.  2.3 pause noted 2 days ago.  She will go home with a ZIO          Subjective: She states she is feeling better.  Her shortness of breath has improved and denies any chest pain.    Objective:    Past Medical History:  Past Medical History:   Diagnosis Date   • Atrial fibrillation (HCC)    • CAD (coronary artery disease)    • COPD (chronic obstructive pulmonary disease) (HCC)    • History of frequent urinary tract infections    • Irregular heart beat    • Kidney stones    • PBC (primary biliary cirrhosis)    • PBC (primary biliary cirrhosis)      Past Surgical History:  Past Surgical History:   Procedure Laterality Date   • CARDIAC ELECTROPHYSIOLOGY PROCEDURE N/A 2017    Procedure:    LINQ;  Surgeon: Ailyn Solorio MD;  Location: [facility] CATH INVASIVE LOCATION;  Service:    • CHOLECYSTECTOMY     • COLONOSCOPY     • COLONOSCOPY N/A 2022    Procedure: COLONOSCOPY into cecum with polypectomy, clip placement;  Surgeon: Constantine Kaye MD;  Location: [facility] ENDOSCOPY;  Service: Gastroenterology;  Laterality: N/A;  poor prep, diverticulosis, polyp   • CORONARY STENT PLACEMENT      proximal circumflex   • ENDOSCOPY N/A 2022    Procedure: ESOPHAGOGASTRODUODENOSCOPY with biopsy;  Surgeon: Constantine Kaye MD;  Location: [facility] ENDOSCOPY;  Service: Gastroenterology;  Laterality: N/A;  normal   • TUBAL ABDOMINAL LIGATION          Social History:   Social History     Tobacco Use   • Smoking status: Current Some Day Smoker     Packs/day: 0.50     Years: 59.00     Pack years: 29.50     Types: Cigarettes   • Smokeless tobacco: Never Used   • Tobacco comment: 2 - 3 CIGARETTES A  DAY   Substance Use Topics   • Alcohol use: No      Family History:  Family History   Problem Relation Age of Onset   • Heart disease Father    • Heart attack Father    • Heart disease Brother    • Stroke Mother           Allergies:  Allergies   Allergen Reactions   • Morphine And Related Nausea And Vomiting   • Levaquin [Levofloxacin]    • Sulfa Antibiotics      Scheduled Meds:  acetylcysteine, 4 mL, 4x Daily - RT  albuterol, 2.5 mg, 4x Daily - RT  amiodarone, 200 mg, Q12H  aspirin, 81 mg, Daily  clopidogrel, 75 mg, Daily  guaiFENesin, 600 mg, Q12H  pantoprazole, 40 mg, BID AC  potassium chloride, 20 mEq, Daily  predniSONE, 40 mg, Daily With Breakfast  rosuvastatin, 5 mg, Daily  sucralfate, 1 g, 4x Daily  tiotropium bromide monohydrate, 2 puff, Daily - RT  ursodiol, 300 mg, TID            INTAKE AND OUTPUT:    Intake/Output Summary (Last 24 hours) at 5/10/2022 1119  Last data filed at 5/10/2022 0913  Gross per 24 hour   Intake 475 ml   Output --   Net 475 ml       Review of Systems:   GI: Denies nausea and vomiting  cardiac: Denies chest pain and palpitations   pulmonary: Improvement with shortness of breath and denies cough    Constitutional:  Temp:  [97.3 °F (36.3 °C)-98.4 °F (36.9 °C)] 97.3 °F (36.3 °C)  Heart Rate:  [60-74] 60  Resp:  [16-18] 18  BP: (112-135)/(46-67) 135/67            Physical Exam:  General:  Appears in no acute distress, resting in bed.  Frail appearing  Eyes: EOM normal no conjunctival drainage  HEENT:  No JVD. Thyroid not visibly enlarged. No mucosal pallor or cyanosis  Respiratory: Respirations regular and unlabored at rest. BBS with good air entry in all fields. No crackles, rubs or wheezes auscultated  Cardiovascular: S1S2 Regular rate and rhythm. No murmur, rub or gallop. No carotid bruits. DP/PT pulses     . No pretibial pitting edema  Gastrointestinal: Abdomen soft, flat, non tender. Bowel sounds present. No hepatosplenomegaly. No ascites  Skin:   Skin warm and dry to touch. No  karen    Neuro: AAO x3 CN II-XII grossly intact  Psych: Mood and affect normal, pleasant and cooperative          I reviewed the patient's new clinical results, and personally reviewed and interpreted the patient's ECG and telemetry data from the last 24 hours          Cardiographics  Telemetry:   Sinus rhythm      ECG:     Sinus rhythm    Echocardiogram:   Interpretation Summary    · Calculated left ventricular EF = 61.9% Estimated left ventricular EF was in agreement with the calculated left ventricular EF.  · Left ventricular diastolic function was normal.  · There is no evidence of pericardial effusion. .     3/5/2019  Interpretation Summary       · Findings consistent with a normal ECG stress test.  · Left ventricular ejection fraction is normal (Calculated EF = 70%).  · Myocardial perfusion imaging indicates a normal myocardial perfusion study with no evidence of ischemia.  · Impressions are consistent with a low risk study.     Asymptomatic for chest pain. ECG is negative for ischemia.   Ectopy: Rare PAC at baseline, none with exercise, occasional PVC in recovery  HR and BP response : Appropriate for Beta-blocker therapy  Pharmacologic study due to inability to tolerate increasing speed and grade of treadmill due to Pulmonary, mobility  Issues and Beta-blocker therapy.  Participated in Low Level exercise and tolerance is poor.        2017  Conclusion       · Successful Linq implantation         Lab Review   Results from last 7 days   Lab Units 05/04/22  0706 05/04/22  0448   TROPONIN T ng/mL <0.010 <0.010     Results from last 7 days   Lab Units 05/04/22  0448   MAGNESIUM mg/dL 1.9     Results from last 7 days   Lab Units 05/10/22  0720   SODIUM mmol/L 139   POTASSIUM mmol/L 4.4   BUN mg/dL 23   CREATININE mg/dL 0.76   CALCIUM mg/dL 9.4        Results from last 7 days   Lab Units 05/10/22  0720 05/07/22  0717 05/06/22  0540   WBC 10*3/mm3 9.62 14.66* 13.55*   HEMOGLOBIN g/dL 7.7* 8.1* 8.2*   HEMATOCRIT %  "23.8* 25.2* 26.3*   PLATELETS 10*3/mm3 327 364 350            Intake/Output Summary (Last 24 hours) at 5/10/2022 1126  Last data filed at 5/10/2022 0913  Gross per 24 hour   Intake 475 ml   Output --   Net 475 ml       The following medical decision was discussed in detail with Dr. Solorio      Assessment:  COPD with exacerbation: pulm following  Recurrent atrial fibrillation: She has a watchman's  2.3 Pause: Not significant if less than 3.  Hypokalemia: Replaced K+  Hypertension: Stable  Hyperlipidemia: On statin at home.  CAD with stent placement 8/20/2021 on Plavix, aspirin.  No beta-blocker due to some hypotensive readings in the past.    Plan:  Blood pressure and heart rate are stable.  Hemoglobin today 7.7, defer to attending.  Restart home Bumex.  She will need a bmp one after discharge. She remains in sinus rhythm.  She has a watchman's.    She will follow-up with me on June 8 at 1 PM at the [address] office.      Restart bumex. bmp tomorrow. ZIO at discharge       )5/10/2022  BRAYAN Dia/Transcription:   \"Dictated utilizing Dragon dictation\".       "

## 2022-05-10 NOTE — PROGRESS NOTES
"Daily progress note    Chief complaint  Doing better  No new complaints  Wants to go home  Denies chest pain increase shortness of breath palpitation    History of present illness  77-year-old white female with history of chronic hypoxic respiratory failure chronic diastolic CHF  Atrial fibrillation COPD coronary artery disease hypertension hyperlipidemia and gastroesophageal reflux disease who continues to smoke cigarettes presented to [facility], [place] with complaint of increased shortness of breath and nonproductive cough for last 3 weeks which is worse last week.  Patient denies any fever chest pain abdominal pain nausea vomiting diarrhea.  Patient work-up in ER revealed acute on chronic hypoxic respiratory failure with acute exacerbation of COPD admit for management.      REVIEW OF SYSTEMS  Unremarkable except weakness     PHYSICAL EXAM   Blood pressure 117/49, pulse 76, temperature 97.8 °F (36.6 °C), temperature source Oral, resp. rate 20, height 152.4 cm (60\"), weight 40.5 kg (89 lb 3.2 oz), SpO2 94 %.    Constitutional: Thin frail and petite female in moderate respiratory distress   HEENT unremarkable  Neck: Painless range of motion noted. Neck supple.   Cardiovascular: Tachycardic rate, regular rhythm and intact distal pulses.  Pulmonary/Chest: Severely diminished throughout with tachypnea and increased work of breathing including accessory muscle use.    Abdominal: Soft. There is no tenderness. There is no rebound and no guarding.   Musculoskeletal: Moves all extremities equally. There is no pedal edema or calf tenderness.   Neurological: Alert.  Baseline strength and sensation noted.   Skin: Skin is pink, warm, and dry. No pallor.   Psychiatric: Mood and affect normal.     LAB RESULTS  Lab Results (last 24 hours)     Procedure Component Value Units Date/Time    Basic Metabolic Panel [[id number]]  (Abnormal) Collected: 05/10/22 0720    Specimen: Blood Updated: 05/10/22 0838     Glucose 92 mg/dL      BUN " 23 mg/dL      Creatinine 0.76 mg/dL      Sodium 139 mmol/L      Potassium 4.4 mmol/L      Chloride 107 mmol/L      CO2 24.6 mmol/L      Calcium 9.4 mg/dL      BUN/Creatinine Ratio 30.3     Anion Gap 7.4 mmol/L      eGFR 80.8 mL/min/1.73      Comment: National Kidney Foundation and American Society of Nephrology (ASN) Task Force recommended calculation based on the Chronic Kidney Disease Epidemiology Collaboration (CKD-EPI) equation refit without adjustment for race.       Narrative:      GFR Normal >60  Chronic Kidney Disease <60  Kidney Failure <15      CBC & Differential [[id number]]  (Abnormal) Collected: 05/10/22 0720    Specimen: Blood Updated: 05/10/22 0820    Narrative:      The following orders were created for panel order CBC & Differential.  Procedure                               Abnormality         Status                     ---------                               -----------         ------                     CBC Auto Differential[[id number]]        Abnormal            Final result                 Please view results for these tests on the individual orders.    CBC Auto Differential [[id number]]  (Abnormal) Collected: 05/10/22 0720    Specimen: Blood Updated: 05/10/22 0820     WBC 9.62 10*3/mm3      RBC 2.67 10*6/mm3      Hemoglobin 7.7 g/dL      Hematocrit 23.8 %      MCV 89.1 fL      MCH 28.8 pg      MCHC 32.4 g/dL      RDW 12.5 %      RDW-SD 40.0 fl      MPV 11.3 fL      Platelets 327 10*3/mm3      Neutrophil % 67.2 %      Lymphocyte % 16.6 %      Monocyte % 15.2 %      Eosinophil % 0.2 %      Basophil % 0.1 %      Immature Grans % 0.7 %      Neutrophils, Absolute 6.46 10*3/mm3      Lymphocytes, Absolute 1.60 10*3/mm3      Monocytes, Absolute 1.46 10*3/mm3      Eosinophils, Absolute 0.02 10*3/mm3      Basophils, Absolute 0.01 10*3/mm3      Immature Grans, Absolute 0.07 10*3/mm3      nRBC 0.1 /100 WBC     Potassium [[id number]]  (Normal) Collected: 05/09/22 6769    Specimen: Blood Updated: 05/10/22 0102      Potassium 4.5 mmol/L     Basic Metabolic Panel [[id number]]  (Abnormal) Collected: 05/09/22 1109    Specimen: Blood Updated: 05/09/22 1341     Glucose 106 mg/dL      BUN 22 mg/dL      Creatinine 0.74 mg/dL      Sodium 142 mmol/L      Potassium 3.2 mmol/L      Chloride 105 mmol/L      CO2 23.1 mmol/L      Calcium 9.1 mg/dL      BUN/Creatinine Ratio 29.7     Anion Gap 13.9 mmol/L      eGFR 83.4 mL/min/1.73      Comment: National Kidney Foundation and American Society of Nephrology (ASN) Task Force recommended calculation based on the Chronic Kidney Disease Epidemiology Collaboration (CKD-EPI) equation refit without adjustment for race.       Narrative:      GFR Normal >60  Chronic Kidney Disease <60  Kidney Failure <15          Imaging Results (Last 24 Hours)     ** No results found for the last 24 hours. **        ECG 12 Lead  Component   Ref Range & Units 04:01   (5/4/22) 3 mo ago   (1/26/22) 3 mo ago   (1/18/22) 3 mo ago   (1/14/22) 3 mo ago   (1/13/22) 3 mo ago   (1/13/22)   QT Interval   ms 324 P  446  314  362  373  409    Resulting Agency BH ECG BH ECG BH ECG BH ECG BH ECG BH ECG             HEART RATE= 88  bpm  RR Interval= 576  ms  ID Interval=   ms  P Horizontal Axis=   deg  P Front Axis=   deg  QRSD Interval= 90  ms  QT Interval= 324  ms  QRS Axis= 60  deg  T Wave Axis= 61  deg  - ABNORMAL ECG -  Atrial fibrillation  Ventricular premature complex  Minimal ST elevation, inferior leads             Current Facility-Administered Medications:   •  acetaminophen (TYLENOL) tablet 650 mg, 650 mg, Oral, Q4H PRN, Eulogio Mcdaniel MD, 650 mg at 05/06/22 0704  •  acetylcysteine (MUCOMYST) 20 % nebulizer solution 4 mL, 4 mL, Nebulization, 4x Daily - RT, Moise Katz MD, 4 mL at 05/10/22 1118  •  albuterol (PROVENTIL) nebulizer solution 0.083% 2.5 mg/3mL, 2.5 mg, Nebulization, 4x Daily - RT, Moise Katz MD, 2.5 mg at 05/10/22 1114  •  ALPRAZolam (XANAX) tablet 0.25 mg, 0.25 mg, Oral, 4x Daily PRN, Eulogio Mcdaniel MD, 0.25  mg at 05/08/22 2126  •  amiodarone (PACERONE) tablet 200 mg, 200 mg, Oral, Q12H, Darlene Shen APRN, 200 mg at 05/10/22 0837  •  aspirin chewable tablet 81 mg, 81 mg, Oral, Daily, Eulogio Mcdaniel MD, 81 mg at 05/10/22 0837  •  bumetanide (BUMEX) tablet 0.5 mg, 0.5 mg, Oral, Daily, Kathe Lock APRN, 0.5 mg at 05/10/22 1232  •  clopidogrel (PLAVIX) tablet 75 mg, 75 mg, Oral, Daily, Eulogio Mcdaniel MD, 75 mg at 05/10/22 0837  •  glycerin (PEDIA-LAX) 2.8 g liquid suppository 2.7 mL, 1 suppository, Rectal, Daily PRN, Eulogio Mcdaniel MD  •  guaiFENesin (MUCINEX) 12 hr tablet 600 mg, 600 mg, Oral, Q12H, Eulogio Mcdaniel MD, 600 mg at 05/10/22 0837  •  nitroglycerin (NITROSTAT) SL tablet 0.4 mg, 0.4 mg, Sublingual, Q5 Min PRN, Eulogio Mcdaniel MD  •  O2 (OXYGEN), 2 L/min, Inhalation, PRN, Eulogio Mcdaniel MD  •  pantoprazole (PROTONIX) EC tablet 40 mg, 40 mg, Oral, BID AC, Eulogio Mcdaniel MD, 40 mg at 05/10/22 0646  •  potassium chloride (K-DUR,KLOR-CON) ER tablet 20 mEq, 20 mEq, Oral, Daily, Eulogio Mcdaniel MD, 20 mEq at 05/10/22 0836  •  potassium chloride (K-DUR,KLOR-CON) ER tablet 40 mEq, 40 mEq, Oral, PRN, Kathe Lock APRN, 40 mEq at 05/09/22 1825  •  potassium chloride (KLOR-CON) packet 40 mEq, 40 mEq, Oral, PRN, Kateh Lock APRN  •  predniSONE (DELTASONE) tablet 40 mg, 40 mg, Oral, Daily With Breakfast, Moise Katz MD, 40 mg at 05/10/22 0837  •  rosuvastatin (CRESTOR) tablet 5 mg, 5 mg, Oral, Daily, Eulogio Mcdaniel MD, 5 mg at 05/10/22 0837  •  [COMPLETED] Insert peripheral IV, , , Once **AND** sodium chloride 0.9 % flush 10 mL, 10 mL, Intravenous, PRN, Manolo Bustamante MD, 10 mL at 05/08/22 2123  •  sucralfate (CARAFATE) tablet 1 g, 1 g, Oral, 4x Daily, Eulogio Mcdaniel MD, 1 g at 05/10/22 1232  •  tiotropium (SPIRIVA RESPIMAT) 2.5 mcg/act aerosol solution inhaler, 2 puff, Inhalation, Daily - RT, Eulogio Mcdaniel MD, 2 puff at 05/10/22 0737  •  ursodiol (ACTIGALL) capsule 300 mg, 300 mg, Oral, TID, Eulogio Mcdaniel,  MD, 300 mg at 05/10/22 0837     ASSESSMENT  Acute on chronic hypoxic respiratory failure  Acute exacerbation of COPD  Persistent tobacco abuse  Chronic atrial fibrillation   Anxiety disorder  Chronic anemia  Gastroesophageal reflux disease    PLAN  Discharge home with family support and home health  Discharge summary dictated    JONNY PEOPLES MD

## 2022-05-11 ENCOUNTER — HOME CARE VISIT (OUTPATIENT)
Dept: HOME HEALTH SERVICES | Facility: HOME HEALTHCARE | Age: 77
End: 2022-05-11

## 2022-05-11 PROCEDURE — G0299 HHS/HOSPICE OF RN EA 15 MIN: HCPCS

## 2022-05-11 NOTE — CASE MANAGEMENT/SOCIAL WORK
Case Management Discharge Note      Final Note: DC'd home with [facility] following 5/10    Provided Post Acute Provider List?: N/A  Provided Post Acute Provider Quality & Resource List?: N/A        Home Medical Care Coordination complete.    Service Provider Selected Services Address Phone Fax Patient Preferred     [facility]  Home Health Services [address] [id number] [phone] --                Selected Continued Care - Prior Encounters Includes selections from prior encounters from 2/3/2022 to 5/10/2022    Discharged on 2/7/2022 Admission date: 1/26/2022 - Discharge disposition: Home-Health Care Svc    Home Medical Care     Service Provider Selected Services Address Phone Fax Patient Preferred     [facility]  Home Health Services [address] [id number] [phone] --                    Transportation Services  Private: Car    Final Discharge Disposition Code: 06 - home with home health care

## 2022-05-11 NOTE — OUTREACH NOTE
Prep Survey    Flowsheet Row Responses   [facility] facility patient discharged from? [place]   Is LACE score < 7 ? No   Emergency Room discharge w/ pulse ox? No   Eligibility Readm Mgmt   Discharge diagnosis Acute on chronic hypoxic respiratory failure   Does the patient have one of the following disease processes/diagnoses(primary or secondary)? COPD/Pneumonia   Does the patient have Home health ordered? Yes   What is the Home health agency?  [facility]   Is there a DME ordered? No   Medication alerts for this patient Amiodarone HCL    General alerts for this patient Hx: CHF and COPD    Prep survey completed? Yes          GIANNA HERRMANN - Registered Nurse

## 2022-05-12 ENCOUNTER — HOME CARE VISIT (OUTPATIENT)
Dept: HOME HEALTH SERVICES | Facility: HOME HEALTHCARE | Age: 77
End: 2022-05-12

## 2022-05-12 VITALS
OXYGEN SATURATION: 97 % | RESPIRATION RATE: 20 BRPM | TEMPERATURE: 96.7 F | DIASTOLIC BLOOD PRESSURE: 56 MMHG | SYSTOLIC BLOOD PRESSURE: 110 MMHG | HEART RATE: 76 BPM

## 2022-05-12 PROCEDURE — G0151 HHCP-SERV OF PT,EA 15 MIN: HCPCS

## 2022-05-12 NOTE — HOME HEALTH
Pt reports she was hospitalized from 5/4-5/10 secondary to Acute on chronic hypoxic respiratory failure, Acute exacerbation of COPD, Persistent tobacco abuse, Chronic atrial fibrillation, Anxiety disorder, Chronic anemia, GERD.  Pt has a history of atrial fibrillation.  She is a smoker and reports she smokes about 3 ciggarrettes a day outside. She is currently on a steriod and started amiodarone and postassium chloride.   Pt is on 2L of oxygen, resides alone in a patio home with a small dog.  She reports she needs to work on her endurance.  She gets tired easily.  She reports her B thighs feel tired with short distance ambulation.  She also reports she loses her balance if she tries to carry things.  Pt reports she is now taking 1/2 of her water pill and has noticed some edema in B feet.  Recommend she weigh herself daily in the morning after urinating and record.  Advised to monitor and call MD if needed.   Instructed pt in B LE SAQ and quad sets for general quad strengthening. Also, focussed on high level dynamic standing activities by she requires CGA for LOB recovery.    Plan for next visit: review and progress HEP as tolerated, gait with or without rollator with focus on duration, dynamic standing balance and fall prevention.

## 2022-05-13 ENCOUNTER — READMISSION MANAGEMENT (OUTPATIENT)
Dept: CALL CENTER | Facility: HOSPITAL | Age: 77
End: 2022-05-13

## 2022-05-13 NOTE — OUTREACH NOTE
COPD/PN Week 1 Survey    Flowsheet Row Responses   [facility] patient discharged from? [place]   Does the patient have one of the following disease processes/diagnoses(primary or secondary)? COPD/Pneumonia   Week 1 attempt successful? Yes   Call start time 1459   Call end time 1503   General alerts for this patient Hx: CHF and COPD    Discharge diagnosis Acute on chronic hypoxic respiratory failure   Meds reviewed with patient/caregiver? Yes   Is the patient having any side effects they believe may be caused by any medication additions or changes? No   Does the patient have all medications ordered at discharge? Yes   Is the patient taking all medications as directed (includes completed medication regime)? Yes   Does the patient have a primary care provider?  Yes   Has the patient kept scheduled appointments due by today? N/A   Comments PCP 5-16-22,  cards on 6-8-22    What is the Home health agency?  [facility]   Has home health visited the patient within 72 hours of discharge? Yes   Pulse Ox monitoring Intermittent   Pulse Ox device source Patient   O2 Sat comments stays in the %   O2 Sat: education provided Monitoring frequency, When to seek care, Sat levels   Did the patient receive a copy of their discharge instructions? Yes   Nursing interventions Reviewed instructions with patient   What is the patient's perception of their health status since discharge? Improving   If the patient is a current smoker, are they able to teach back resources for cessation? Not a smoker   Is the patient/caregiver able to teach back the hierarchy of who to call/visit for symptoms/problems? PCP, Specialist, Home health nurse, Urgent Care, ED, 911 Yes   Is the patient able to teach back COPD zones? Yes   Patient reports what zone on this call? Green Zone   Green Zone Reports doing well, Breathing without shortness of breath, Appetite is good   Green Zone interventions: Take daily medications, Use oxygen as prescribed, Do not  smoke   Week 1 call completed? Yes          PRAMOD H - Registered Nurse

## 2022-05-15 VITALS
SYSTOLIC BLOOD PRESSURE: 120 MMHG | HEART RATE: 68 BPM | RESPIRATION RATE: 20 BRPM | OXYGEN SATURATION: 98 % | DIASTOLIC BLOOD PRESSURE: 60 MMHG

## 2022-05-15 NOTE — HOME HEALTH
77F with history of chronic hypoxic respiratory failure, on home oxygen, chronic diastolic congestive heart failure, COPD, coronary artery disease, atrial fibrillation, hypertension, hyperlipidemia, and gastroesophageal disease.  Patient is a smoker.  Recent hospitalization for acute on chronic hypoxic respiratory failure with acute exacerbation of COPD and also found to have rapid ventricular rate with history of chronic atrial fibrillation.  Patient initially agreeable to go to subacute rehab but decided that she will go home with family support and home health. Patient on 2 weeks of prednisone tapering dose.  Lives at home alone, with son who provided occassional short term assistance.

## 2022-05-16 LAB
QT INTERVAL: 301 MS
QT INTERVAL: 365 MS
QT INTERVAL: 436 MS

## 2022-05-17 ENCOUNTER — HOME CARE VISIT (OUTPATIENT)
Dept: HOME HEALTH SERVICES | Facility: HOME HEALTHCARE | Age: 77
End: 2022-05-17

## 2022-05-17 VITALS
DIASTOLIC BLOOD PRESSURE: 58 MMHG | RESPIRATION RATE: 18 BRPM | SYSTOLIC BLOOD PRESSURE: 108 MMHG | HEART RATE: 64 BPM | OXYGEN SATURATION: 96 %

## 2022-05-17 PROCEDURE — G0495 RN CARE TRAIN/EDU IN HH: HCPCS

## 2022-05-17 NOTE — HOME HEALTH
Patient ambulating without assist.  VSS.  SATS WNL on 2L.  Saw PCP yesterday.  Spiriva d/c.  New on Breztri.  T/I indications, dosing instructions, and s/e to report.  Pt verbalized understanding.  Will continue to monitor.

## 2022-05-18 ENCOUNTER — HOME CARE VISIT (OUTPATIENT)
Dept: HOME HEALTH SERVICES | Facility: HOME HEALTHCARE | Age: 77
End: 2022-05-18

## 2022-05-18 VITALS
OXYGEN SATURATION: 88 % | HEART RATE: 74 BPM | RESPIRATION RATE: 18 BRPM | DIASTOLIC BLOOD PRESSURE: 74 MMHG | SYSTOLIC BLOOD PRESSURE: 132 MMHG

## 2022-05-18 PROCEDURE — G0151 HHCP-SERV OF PT,EA 15 MIN: HCPCS

## 2022-05-18 NOTE — HOME HEALTH
Pt reports she wishes she would have told therapist to come later.  She reports she has been doing her leg exercises.   She reports she feels like she is getting stronger, especially in her legs. She reports she weighs 87lbs today and has been weighing daily.  She presented with oxygen tubing under her chin.    Plan for next visit: review and progress HEP, gait on unlevel surfaces, fall prevention, PT ARIANNA.

## 2022-05-19 ENCOUNTER — HOME CARE VISIT (OUTPATIENT)
Dept: HOME HEALTH SERVICES | Facility: HOME HEALTHCARE | Age: 77
End: 2022-05-19

## 2022-05-19 PROCEDURE — G0493 RN CARE EA 15 MIN HH/HOSPICE: HCPCS

## 2022-05-20 VITALS
HEART RATE: 68 BPM | DIASTOLIC BLOOD PRESSURE: 70 MMHG | RESPIRATION RATE: 20 BRPM | SYSTOLIC BLOOD PRESSURE: 120 MMHG | OXYGEN SATURATION: 99 %

## 2022-05-24 ENCOUNTER — HOME CARE VISIT (OUTPATIENT)
Dept: HOME HEALTH SERVICES | Facility: HOME HEALTHCARE | Age: 77
End: 2022-05-24

## 2022-05-24 VITALS
SYSTOLIC BLOOD PRESSURE: 110 MMHG | DIASTOLIC BLOOD PRESSURE: 60 MMHG | RESPIRATION RATE: 20 BRPM | OXYGEN SATURATION: 98 % | HEART RATE: 68 BPM

## 2022-05-24 VITALS
OXYGEN SATURATION: 97 % | SYSTOLIC BLOOD PRESSURE: 120 MMHG | DIASTOLIC BLOOD PRESSURE: 58 MMHG | HEART RATE: 70 BPM | RESPIRATION RATE: 20 BRPM

## 2022-05-24 PROCEDURE — G0493 RN CARE EA 15 MIN HH/HOSPICE: HCPCS

## 2022-05-24 PROCEDURE — G0151 HHCP-SERV OF PT,EA 15 MIN: HCPCS

## 2022-05-24 NOTE — HOME HEALTH
Pt presented in bed.  She reports she is having trouble breathing. Discussed with Tori WARD and she reports pt needs to drink more water and take mucinex. Pt reports she drinks 3-4 cokes per day and she has her whole life.  She reports she is not having difficulty with her amb and no LOB in the last week. Agreeable to PT DC, but not really up for HEP, etc today.

## 2022-05-25 NOTE — HOME HEALTH
Patient sitting up in chair.  VSS. SATS WNL on 2L.  RL wheezing throughout.  LL clear.  T/I mucinex and H20 to loosen and expel sputum.  T/I s/s of respiratory infection, prevention, and when to notify HHA/MD or seek emergency medical care.  Pt verbalized understanding.

## 2022-05-26 ENCOUNTER — READMISSION MANAGEMENT (OUTPATIENT)
Dept: CALL CENTER | Facility: HOSPITAL | Age: 77
End: 2022-05-26

## 2022-05-26 ENCOUNTER — HOME CARE VISIT (OUTPATIENT)
Dept: HOME HEALTH SERVICES | Facility: HOME HEALTHCARE | Age: 77
End: 2022-05-26

## 2022-05-26 PROCEDURE — G0495 RN CARE TRAIN/EDU IN HH: HCPCS

## 2022-05-26 NOTE — OUTREACH NOTE
COPD/PN Week 3 Survey    Flowsheet Row Responses   [facility] patient discharged from? [place]   Does the patient have one of the following disease processes/diagnoses(primary or secondary)? COPD/Pneumonia   Was the primary reason for admission: COPD exacerbation   Week 3 attempt successful? Yes   Call start time 1528   Call end time 1533   General alerts for this patient Hx: CHF and COPD    Discharge diagnosis Acute on chronic hypoxic respiratory failure   Meds reviewed with patient/caregiver? Yes   Is the patient taking all medications as directed (includes completed medication regime)? Yes   Does the patient have a primary care provider?  Yes   Has the patient kept scheduled appointments due by today? Yes   What is the Home health agency?  [facility]   Has home health visited the patient within 72 hours of discharge? Yes   Home health comments Today was the last day    Pulse Ox monitoring Intermittent   Pulse Ox device source Patient   O2 Sat comments 98-99% with o2  with out 94-95%   O2 Sat: education provided Sat levels, Monitoring frequency, When to seek care   Psychosocial issues? No   Did the patient receive a copy of their discharge instructions? Yes   Nursing interventions Reviewed instructions with patient   What is the patient's perception of their health status since discharge? Improving   Nursing Interventions Nurse provided patient education   Is the patient/caregiver able to teach back the hierarchy of who to call/visit for symptoms/problems? PCP, Specialist, Home health nurse, Urgent Care, ED, 911 Yes   Is the patient able to teach back COPD zones? Yes   Patient reports what zone on this call? Yellow Zone   Week 3 call completed? Yes   Wrap up additional comments Pt reports she is improving .           EDY LEMON - Registered Nurse

## 2022-05-28 VITALS
RESPIRATION RATE: 20 BRPM | SYSTOLIC BLOOD PRESSURE: 122 MMHG | OXYGEN SATURATION: 98 % | HEART RATE: 64 BPM | DIASTOLIC BLOOD PRESSURE: 70 MMHG

## 2022-05-31 LAB
MAXIMAL PREDICTED HEART RATE: 143 BPM
STRESS TARGET HR: 122 BPM

## 2022-05-31 PROCEDURE — 93248 EXT ECG>7D<15D REV&INTERPJ: CPT | Performed by: INTERNAL MEDICINE

## 2022-06-06 ENCOUNTER — READMISSION MANAGEMENT (OUTPATIENT)
Dept: CALL CENTER | Facility: HOSPITAL | Age: 77
End: 2022-06-06

## 2022-06-06 NOTE — OUTREACH NOTE
COPD/PN Week 4 Survey    Flowsheet Row Responses   [facility] patient discharged from? [place]   Does the patient have one of the following disease processes/diagnoses(primary or secondary)? COPD/Pneumonia   Was the primary reason for admission: COPD exacerbation   Week 4 attempt successful? Yes   Call start time 1221   Call end time 1228   Discharge diagnosis Acute exacerbation of COPD   Person spoke with today (if not patient) and relationship patient   Meds reviewed with patient/caregiver? Yes   Is the patient taking all medications as directed (includes completed medication regime)? Yes   Has the patient kept scheduled appointments due by today? Yes   Is the patient still receiving Home Health Services? No   DME comments Wearing home O22L continuous   Pulse Ox monitoring Intermittent   Pulse Ox device source Patient   O2 Sat comments Mid 90's with O2. She reports that she drops into 80's with activity   O2 Sat: education provided Sat levels, Monitoring frequency, When to seek care   Psychosocial issues? No   What is the patient's perception of their health status since discharge? Improving   Nursing Interventions Nurse provided patient education   If the patient is a current smoker, are they able to teach back resources for cessation? Not a smoker   Is the patient/caregiver able to teach back the hierarchy of who to call/visit for symptoms/problems? PCP, Specialist, Home health nurse, Urgent Care, ED, 911 Yes   Is the patient able to teach back COPD zones? Yes   Patient reports what zone on this call? Yellow Zone   Yellow Zone Increased shortness of air, Using quick relief inhaler/nebulizer more often   Yellow interventions Continue to use daily medications, Use quick relief inhaler as ordered, Use oxygen as ordered, Call provider immediatly if symptoms do not improve   Week 4 call completed? Yes   Would the patient like one additional call? No   Graduated Yes   Is the patient interested in additional  calls from an ambulatory ?  NOTE:  applies to high risk patients requiring additional follow-up. No   Did the patient feel the follow up calls were helpful during their recovery period? Yes   Was the number of calls appropriate? Yes          SILVANA DIAZ - Registered Nurse

## 2022-06-07 NOTE — PROGRESS NOTES
Subjective:        Celia Baird is a 77 y.o. female who here for follow up    No chief complaint on file.    Follow up for holter results    HPI      This is a 77-year-old female who is new to me.  She has a history to include COPD, CAD with history of stent, atrial fibrillation watchman's procedure, tobacco abuse, history of intracranial bleed from fall, tobacco abuse, irregular heartbeat, and primary biliary cirrhosis.  She is here today for hospital follow-up and to go over Holter results.  She was recently in the hospital for COPD, recurrent atrial fibrillation, and CAD.  Her ZIO showed 4% of atrial fibrillation lasting 2 hours and 29 minutes.  Normal sinus rhythm with occasional PACs, PVCs and NSSVT S.      On February 14, 2022 holter was a normal monitor study.  Echo on January 13, 2022 revealed EF 61.9%, LV diastolic function was normal and no evidence of pericardial effusion.  Her echo in January 2020 revealed EF 61.9%, LV diastolic function was normal and no evidence of pericardial effusion.  Doppler in 2021 revealed right distal internal carotid artery with 50 to 69% stenosis.  Left distal internal carotid with less than 50% stenosis.  Rest test in 2021 indicated a normal myocardial perfusion study with no evidence of ischemia.      The following portions of the patient's history were reviewed and updated as appropriate: allergies, current medications, past family history, past medical history, past social history, past surgical history and problem list.    Past Medical History:   Diagnosis Date   • Atrial fibrillation (HCC)    • CAD (coronary artery disease)    • COPD (chronic obstructive pulmonary disease) (HCC)    • History of frequent urinary tract infections    • Irregular heart beat    • Kidney stones    • PBC (primary biliary cirrhosis)    • PBC (primary biliary cirrhosis)          reports that she has been smoking cigarettes. She has a 29.50 pack-year smoking history. She has never used smokeless  tobacco. She reports that she does not drink alcohol and does not use drugs.     Family History   Problem Relation Age of Onset   • Heart disease Father    • Heart attack Father    • Heart disease Brother    • Stroke Mother        ROS     Review of Systems  Constitutional: No wt loss, fever, fatigue  Gastrointestinal: No nausea, abdominal pain  Behavioral/Psych: No insomnia or anxiety  Cardiovascular: Denies chest pain, shortness of breath and palpitations      Objective:           Vitals and nursing note reviewed.   Constitutional:       Appearance: Well-developed.   HENT:      Head: Normocephalic.      Right Ear: External ear normal.      Left Ear: External ear normal.   Neck:      Vascular: No JVD.   Pulmonary:      Effort: Pulmonary effort is normal. No respiratory distress.      Breath sounds: Normal breath sounds. No stridor. No rales.   Cardiovascular:      Normal rate. Regular rhythm.      No gallop.      Comments: +rales  Pulses:     Intact distal pulses.   Abdominal:      General: Bowel sounds are normal. There is no distension.      Palpations: Abdomen is soft.      Tenderness: There is no abdominal tenderness. There is no guarding.   Musculoskeletal: Normal range of motion.         General: No tenderness.      Cervical back: Normal range of motion. Skin:     General: Skin is warm.   Neurological:      Mental Status: Alert and oriented to person, place, and time.      Deep Tendon Reflexes: Reflexes are normal and symmetric.   Psychiatric:         Judgment: Judgment normal.           ECG 12 Lead    Date/Time: 6/8/2022 1:30 PM  Performed by: Kathe Lock APRN  Authorized by: Kathe Lock APRN   Comparison: compared with previous ECG from 5/8/2022  Similar to previous ECG  Rhythm: sinus rhythm  Rate: normal  BPM: 68    Clinical impression: non-specific ECG            5/31/22  Interpretation Summary    · An abnormal monitor study.  · NSR OCCASIONAL PACS, PVCS, NSSVTS  · 4% AFIB WITH LONGEST 2  HR 29 MINUTES         1/13/22  Interpretation Summary    · Calculated left ventricular EF = 61.9% Estimated left ventricular EF was in agreement with the calculated left ventricular EF.  · Left ventricular diastolic function was normal.  · There is no evidence of pericardial effusion. .    2/18/19  Interpretation Summary       · Findings consistent with a normal ECG stress test.  · Left ventricular ejection fraction is normal (Calculated EF = 70%).  · Myocardial perfusion imaging indicates a normal myocardial perfusion study with no evidence of ischemia.  · Impressions are consistent with a low risk study.     Asymptomatic for chest pain. ECG is negative for ischemia.   Ectopy: Rare PAC at baseline, none with exercise, occasional PVC in recovery  HR and BP response : Appropriate for Beta-blocker therapy  Pharmacologic study due to inability to tolerate increasing speed and grade of treadmill due to Pulmonary, mobility  Issues and Beta-blocker therapy.  Participated in Low Level exercise and tolerance is poor.       2021  IMPRESSION:     1. Right distal internal carotid artery with 50-69% stenosis.   2. Left distal internal carotid artery with less than 50% stenosis.         2017    Conclusion       · Successful Linq implantation               Current Outpatient Medications:   •  ALPRAZolam (XANAX) 0.25 MG tablet, Take 0.25 mg by mouth Every 8 (Eight) Hours As Needed. for anxiety (Patient not taking: Reported on 5/4/2022), Disp: , Rfl:   •  amiodarone (PACERONE) 200 MG tablet, Take 1 tablet by mouth Every 12 (Twelve) Hours for 30 days., Disp: 60 tablet, Rfl: 0  •  aspirin 81 MG EC tablet, Take 81 mg by mouth Daily., Disp: , Rfl:   •  bumetanide (BUMEX) 0.5 MG tablet, Take 0.5 mg by mouth Daily. Take 1.25mg if swellling, Disp: , Rfl:   •  clopidogrel (PLAVIX) 75 MG tablet, Take 75 mg by mouth Daily., Disp: , Rfl:   •  ferrous gluconate (FERGON) 324 MG tablet, Take 324 mg by mouth Every Other Day., Disp: , Rfl:   •   guaiFENesin (MUCINEX) 600 MG 12 hr tablet, Take 1,200 mg by mouth 2 (Two) Times a Day., Disp: , Rfl:   •  ipratropium-albuterol (DUO-NEB) 0.5-2.5 mg/3 ml nebulizer, Take 3 mL by nebulization 2 (Two) Times a Day., Disp: , Rfl:   •  mirtazapine (REMERON) 7.5 MG tablet, Take 1 tablet by mouth Every Night for 30 days., Disp: 30 tablet, Rfl: 0  •  nitroglycerin (NITROLINGUAL) 0.4 MG/SPRAY spray, Place 1 spray under the tongue Every 5 (Five) Minutes As Needed for Chest Pain., Disp: , Rfl:   •  O2 (OXYGEN), Inhale 2 L/min As Needed (sob)., Disp: , Rfl:   •  pantoprazole (PROTONIX) 40 MG EC tablet, Take 40 mg by mouth 2 (Two) Times a Day., Disp: , Rfl:   •  potassium chloride (K-DUR,KLOR-CON) 20 MEQ CR tablet, Take 1 tablet by mouth Daily for 30 days., Disp: 30 tablet, Rfl: 0  •  ProAir  (90 Base) MCG/ACT inhaler, Inhale 1-2 puffs As Needed. every 4 to 6 hours, Disp: , Rfl:   •  rosuvastatin (CRESTOR) 5 MG tablet, Take 5 mg by mouth Daily., Disp: , Rfl:   •  sucralfate (CARAFATE) 1 g tablet, Take 1 g by mouth 4 (Four) Times a Day., Disp: , Rfl:   •  tiotropium bromide monohydrate (SPIRIVA RESPIMAT) 2.5 MCG/ACT aerosol solution inhaler, Inhale 2 puffs Daily for 30 days., Disp: 1 g, Rfl: 0  •  ursodiol (ACTIGALL) 300 MG capsule, Take 300 mg by mouth 3 (Three) Times a Day., Disp: , Rfl:      Assessment:        Patient Active Problem List   Diagnosis   • COPD exacerbation (HCC)   • Subarachnoid hemorrhage (HCC)   • Multiple skin tears   • Closed nondisplaced fracture of left clavicle   • Paroxysmal atrial fibrillation (HCC)   • Tobacco abuse   • Status post placement of implantable loop recorder   • SOB (shortness of breath)   • COPD with acute exacerbation (HCC)   • Severe malnutrition (CMS/HCC)   • Leukocytosis   • Gastrointestinal hemorrhage   • Absolute anemia   • Hyperlipidemia   • Long term (current) use of antithrombotics/antiplatelets   • COPD with exacerbation (HCC)               Plan:   1.  Hospital follow-up  for atrial fibrillation: She has a watchman's device.  EKG shows sinus rhythm with  and heart rate of 68.  Holter revealed 4% atrial fibrillation.  On amiodarone with watchman's.  Unable to add beta-blocker as she has had pauses while in the hospital.    2.  Hypotension blood pressure today low normal but stable.    3.  Diastolic congestive heart failure: Continue Bumex.  Unable to add ACE or ARB due to hypotension.    Please monitor and stay on a low sodium (<2000mg/day) diet and 2000ml of fluid. Exercise 30 minutes a day for 2-3 times a week.  Please weigh yourself daily if you gains more than 2 pounds in 1 day or 5 pounds in 1 week. Check for swelling in your feet, ankles, legs, and stomach.  Monitor for signs of fatigue, shortness of breath or cough.  Call the office for recommendations.    4.  Hyperlipidemia: Previous lipid panel in 2022 revealed , HDL 70, LDL 58 and triglycerides 62.    Risk of the hyperlipidemia, importance of the treatment has been explained. Pros and cons of the statins has been explained. Regular blood workup as well as side effects including the liver failure, myelopathy death has been explained.    5.  Tobacco abuse: She states she is has not quit yet, but is done to 3-4 cigarettes a day. She states she does not want to give me a date of stopping.    Celia Oli has been explained that tobacco abuse is extremely harmful to the body including to the cardiovascular, it significantly increases the risk of atherosclerosis, myocardial infarction, strokes and peripheral vascular disease. Strongly advised to stop tobacco abuse  Secondhand smoking also has been explained.     6. Shortness of breath: She declines an echo and cxr at this time.  She states she is taking Lasix in the last couple of days.  She declines a CMP at this time as she is going to her primary care physician tomorrow and she wants the chest x-ray and some lab work done in the same place.  She states she will  follow up with her PCP tomorrow and they will do a cxr and lab.        No diagnosis found.    There are no diagnoses linked to this encounter.    COUNSELING: german Colmenares was given to patient for the following topics: diagnostic results, risk factor reductions, impressions, risks and benefits of treatment options and importance of treatment compliance .       SMOKING COUNSELING: as above    Refill amiodarone. She declines echo, chest x-ray and CMP at this time. She wants to follow up with her PCP tomorrow for cxr, and CMP so that it is all done in 1 place.  She will follow in 2-3 for shortness of breath, at that time she may consider a echo..      Return to ER per EMS for any recurrent symptoms including, but not limited to: chest pain/pressure/tightness, weakness, Shortness of breath, dizziness, palpitations, near syncope or syncope          Sincerely,   BRAYAN Dia  [facility]  06/08/22  13:00 EDT    EMR Dragon/Transcription disclaimer:   Much of this encounter note is an electronic transcription/translation of spoken language to printed text. The electronic translation of spoken language may permit erroneous, or at times, nonsensical words or phrases to be inadvertently transcribed; Although I have reviewed the note for such errors, some may still exist.

## 2022-06-08 ENCOUNTER — OFFICE VISIT (OUTPATIENT)
Dept: CARDIOLOGY | Facility: CLINIC | Age: 77
End: 2022-06-08

## 2022-06-08 VITALS
SYSTOLIC BLOOD PRESSURE: 97 MMHG | BODY MASS INDEX: 17.28 KG/M2 | WEIGHT: 88 LBS | DIASTOLIC BLOOD PRESSURE: 59 MMHG | HEIGHT: 60 IN | HEART RATE: 74 BPM

## 2022-06-08 DIAGNOSIS — Z72.0 TOBACCO ABUSE: ICD-10-CM

## 2022-06-08 DIAGNOSIS — R06.02 SOB (SHORTNESS OF BREATH): Primary | ICD-10-CM

## 2022-06-08 DIAGNOSIS — E78.5 HYPERLIPIDEMIA, UNSPECIFIED HYPERLIPIDEMIA TYPE: ICD-10-CM

## 2022-06-08 DIAGNOSIS — I50.30 DIASTOLIC CONGESTIVE HEART FAILURE, UNSPECIFIED HF CHRONICITY: ICD-10-CM

## 2022-06-08 DIAGNOSIS — I48.0 PAROXYSMAL ATRIAL FIBRILLATION: ICD-10-CM

## 2022-06-08 PROCEDURE — 93000 ELECTROCARDIOGRAM COMPLETE: CPT | Performed by: NURSE PRACTITIONER

## 2022-06-08 PROCEDURE — 99214 OFFICE O/P EST MOD 30 MIN: CPT | Performed by: NURSE PRACTITIONER

## 2022-06-08 RX ORDER — AMIODARONE HYDROCHLORIDE 200 MG/1
200 TABLET ORAL EVERY 12 HOURS SCHEDULED
Qty: 60 TABLET | Refills: 0 | Status: SHIPPED | OUTPATIENT
Start: 2022-06-08 | End: 2022-07-01

## 2022-06-08 RX ORDER — DOXYCYCLINE 100 MG/1
100 CAPSULE ORAL 2 TIMES DAILY
COMMUNITY
Start: 2022-04-15 | End: 2022-08-20 | Stop reason: HOSPADM

## 2022-07-01 RX ORDER — AMIODARONE HYDROCHLORIDE 200 MG/1
TABLET ORAL
Qty: 60 TABLET | Refills: 2 | Status: SHIPPED | OUTPATIENT
Start: 2022-07-01 | End: 2022-08-20 | Stop reason: HOSPADM

## 2022-08-17 ENCOUNTER — APPOINTMENT (OUTPATIENT)
Dept: GENERAL RADIOLOGY | Facility: HOSPITAL | Age: 77
End: 2022-08-17

## 2022-08-17 ENCOUNTER — HOSPITAL ENCOUNTER (INPATIENT)
Facility: HOSPITAL | Age: 77
LOS: 3 days | Discharge: HOME | End: 2022-08-20
Attending: EMERGENCY MEDICINE | Admitting: HOSPITALIST

## 2022-08-17 DIAGNOSIS — D64.9 ANEMIA, UNSPECIFIED TYPE: ICD-10-CM

## 2022-08-17 DIAGNOSIS — J44.1 COPD EXACERBATION: ICD-10-CM

## 2022-08-17 DIAGNOSIS — I48.0 PAROXYSMAL ATRIAL FIBRILLATION: ICD-10-CM

## 2022-08-17 DIAGNOSIS — R06.02 SHORTNESS OF BREATH: Primary | ICD-10-CM

## 2022-08-17 DIAGNOSIS — Z79.02 LONG TERM (CURRENT) USE OF ANTITHROMBOTICS/ANTIPLATELETS: ICD-10-CM

## 2022-08-17 PROBLEM — R63.6 UNDERWEIGHT: Status: ACTIVE | Noted: 2022-08-17

## 2022-08-17 PROBLEM — I50.31 DIASTOLIC CHF, ACUTE (HCC): Status: ACTIVE | Noted: 2022-08-17

## 2022-08-17 LAB
ABO GROUP BLD: NORMAL
ALBUMIN SERPL-MCNC: 3.3 G/DL (ref 3.5–5.2)
ALBUMIN/GLOB SERPL: 1.7 G/DL
ALP SERPL-CCNC: 101 U/L (ref 39–117)
ALT SERPL W P-5'-P-CCNC: 18 U/L (ref 1–33)
ANION GAP SERPL CALCULATED.3IONS-SCNC: 7.2 MMOL/L (ref 5–15)
ANION GAP SERPL CALCULATED.3IONS-SCNC: 9.7 MMOL/L (ref 5–15)
AST SERPL-CCNC: 15 U/L (ref 1–32)
B PARAPERT DNA SPEC QL NAA+PROBE: NOT DETECTED
B PERT DNA SPEC QL NAA+PROBE: NOT DETECTED
BASOPHILS # BLD AUTO: 0.01 10*3/MM3 (ref 0–0.2)
BASOPHILS NFR BLD AUTO: 0.1 % (ref 0–1.5)
BILIRUB SERPL-MCNC: <0.2 MG/DL (ref 0–1.2)
BLD GP AB SCN SERPL QL: NEGATIVE
BUN SERPL-MCNC: 12 MG/DL (ref 8–23)
BUN SERPL-MCNC: 14 MG/DL (ref 8–23)
BUN/CREAT SERPL: 16.4 (ref 7–25)
BUN/CREAT SERPL: 17.9 (ref 7–25)
C PNEUM DNA NPH QL NAA+NON-PROBE: NOT DETECTED
CALCIUM SPEC-SCNC: 8.6 MG/DL (ref 8.6–10.5)
CALCIUM SPEC-SCNC: 8.8 MG/DL (ref 8.6–10.5)
CHLORIDE SERPL-SCNC: 103 MMOL/L (ref 98–107)
CHLORIDE SERPL-SCNC: 105 MMOL/L (ref 98–107)
CO2 SERPL-SCNC: 28.3 MMOL/L (ref 22–29)
CO2 SERPL-SCNC: 29.8 MMOL/L (ref 22–29)
CREAT SERPL-MCNC: 0.73 MG/DL (ref 0.57–1)
CREAT SERPL-MCNC: 0.78 MG/DL (ref 0.57–1)
DEPRECATED RDW RBC AUTO: 43.5 FL (ref 37–54)
DEPRECATED RDW RBC AUTO: 43.9 FL (ref 37–54)
EGFRCR SERPLBLD CKD-EPI 2021: 78.3 ML/MIN/1.73
EGFRCR SERPLBLD CKD-EPI 2021: 84.8 ML/MIN/1.73
EOSINOPHIL # BLD AUTO: 0.01 10*3/MM3 (ref 0–0.4)
EOSINOPHIL NFR BLD AUTO: 0.1 % (ref 0.3–6.2)
ERYTHROCYTE [DISTWIDTH] IN BLOOD BY AUTOMATED COUNT: 15 % (ref 12.3–15.4)
ERYTHROCYTE [DISTWIDTH] IN BLOOD BY AUTOMATED COUNT: 15.1 % (ref 12.3–15.4)
FLUAV SUBTYP SPEC NAA+PROBE: NOT DETECTED
FLUBV RNA ISLT QL NAA+PROBE: NOT DETECTED
GLOBULIN UR ELPH-MCNC: 1.9 GM/DL
GLUCOSE SERPL-MCNC: 130 MG/DL (ref 65–99)
GLUCOSE SERPL-MCNC: 148 MG/DL (ref 65–99)
HADV DNA SPEC NAA+PROBE: NOT DETECTED
HCOV 229E RNA SPEC QL NAA+PROBE: NOT DETECTED
HCOV HKU1 RNA SPEC QL NAA+PROBE: NOT DETECTED
HCOV NL63 RNA SPEC QL NAA+PROBE: NOT DETECTED
HCOV OC43 RNA SPEC QL NAA+PROBE: NOT DETECTED
HCT VFR BLD AUTO: 20.1 % (ref 34–46.6)
HCT VFR BLD AUTO: 20.4 % (ref 34–46.6)
HCT VFR BLD AUTO: 28.9 % (ref 34–46.6)
HGB BLD-MCNC: 5.8 G/DL (ref 12–15.9)
HGB BLD-MCNC: 6.1 G/DL (ref 12–15.9)
HGB BLD-MCNC: 8.8 G/DL (ref 12–15.9)
HMPV RNA NPH QL NAA+NON-PROBE: NOT DETECTED
HPIV1 RNA ISLT QL NAA+PROBE: NOT DETECTED
HPIV2 RNA SPEC QL NAA+PROBE: NOT DETECTED
HPIV3 RNA NPH QL NAA+PROBE: NOT DETECTED
HPIV4 P GENE NPH QL NAA+PROBE: NOT DETECTED
IMM GRANULOCYTES # BLD AUTO: 0.03 10*3/MM3 (ref 0–0.05)
IMM GRANULOCYTES NFR BLD AUTO: 0.4 % (ref 0–0.5)
LYMPHOCYTES # BLD AUTO: 0.64 10*3/MM3 (ref 0.7–3.1)
LYMPHOCYTES NFR BLD AUTO: 7.6 % (ref 19.6–45.3)
M PNEUMO IGG SER IA-ACNC: NOT DETECTED
MAGNESIUM SERPL-MCNC: 1.7 MG/DL (ref 1.6–2.4)
MCH RBC QN AUTO: 23.2 PG (ref 26.6–33)
MCH RBC QN AUTO: 24.3 PG (ref 26.6–33)
MCHC RBC AUTO-ENTMCNC: 28.9 G/DL (ref 31.5–35.7)
MCHC RBC AUTO-ENTMCNC: 29.9 G/DL (ref 31.5–35.7)
MCV RBC AUTO: 80.4 FL (ref 79–97)
MCV RBC AUTO: 81.3 FL (ref 79–97)
MONOCYTES # BLD AUTO: 0.96 10*3/MM3 (ref 0.1–0.9)
MONOCYTES NFR BLD AUTO: 11.4 % (ref 5–12)
NEUTROPHILS NFR BLD AUTO: 6.77 10*3/MM3 (ref 1.7–7)
NEUTROPHILS NFR BLD AUTO: 80.4 % (ref 42.7–76)
NRBC BLD AUTO-RTO: 0.1 /100 WBC (ref 0–0.2)
NT-PROBNP SERPL-MCNC: 659 PG/ML (ref 0–1800)
PLATELET # BLD AUTO: 341 10*3/MM3 (ref 140–450)
PLATELET # BLD AUTO: 360 10*3/MM3 (ref 140–450)
PMV BLD AUTO: 11 FL (ref 6–12)
PMV BLD AUTO: 11.1 FL (ref 6–12)
POTASSIUM SERPL-SCNC: 3.8 MMOL/L (ref 3.5–5.2)
POTASSIUM SERPL-SCNC: 4.2 MMOL/L (ref 3.5–5.2)
PROCALCITONIN SERPL-MCNC: 0.04 NG/ML (ref 0–0.25)
PROT SERPL-MCNC: 5.2 G/DL (ref 6–8.5)
QT INTERVAL: 362 MS
RBC # BLD AUTO: 2.5 10*6/MM3 (ref 3.77–5.28)
RBC # BLD AUTO: 2.51 10*6/MM3 (ref 3.77–5.28)
RH BLD: POSITIVE
RHINOVIRUS RNA SPEC NAA+PROBE: NOT DETECTED
RSV RNA NPH QL NAA+NON-PROBE: NOT DETECTED
SARS-COV-2 RNA NPH QL NAA+NON-PROBE: NOT DETECTED
SODIUM SERPL-SCNC: 141 MMOL/L (ref 136–145)
SODIUM SERPL-SCNC: 142 MMOL/L (ref 136–145)
T&S EXPIRATION DATE: NORMAL
TROPONIN T SERPL-MCNC: <0.01 NG/ML (ref 0–0.03)
WBC NRBC COR # BLD: 7.59 10*3/MM3 (ref 3.4–10.8)
WBC NRBC COR # BLD: 8.42 10*3/MM3 (ref 3.4–10.8)

## 2022-08-17 PROCEDURE — 36430 TRANSFUSION BLD/BLD COMPNT: CPT

## 2022-08-17 PROCEDURE — 86900 BLOOD TYPING SEROLOGIC ABO: CPT

## 2022-08-17 PROCEDURE — 85018 HEMOGLOBIN: CPT | Performed by: NURSE PRACTITIONER

## 2022-08-17 PROCEDURE — 99285 EMERGENCY DEPT VISIT HI MDM: CPT

## 2022-08-17 PROCEDURE — 83880 ASSAY OF NATRIURETIC PEPTIDE: CPT | Performed by: EMERGENCY MEDICINE

## 2022-08-17 PROCEDURE — 25010000002 CYANOCOBALAMIN PER 1000 MCG: Performed by: HOSPITALIST

## 2022-08-17 PROCEDURE — 93005 ELECTROCARDIOGRAM TRACING: CPT | Performed by: EMERGENCY MEDICINE

## 2022-08-17 PROCEDURE — 36415 COLL VENOUS BLD VENIPUNCTURE: CPT

## 2022-08-17 PROCEDURE — 94761 N-INVAS EAR/PLS OXIMETRY MLT: CPT

## 2022-08-17 PROCEDURE — 86901 BLOOD TYPING SEROLOGIC RH(D): CPT | Performed by: EMERGENCY MEDICINE

## 2022-08-17 PROCEDURE — 86850 RBC ANTIBODY SCREEN: CPT | Performed by: EMERGENCY MEDICINE

## 2022-08-17 PROCEDURE — 25010000002 METHYLPREDNISOLONE PER 125 MG: Performed by: NURSE PRACTITIONER

## 2022-08-17 PROCEDURE — P9016 RBC LEUKOCYTES REDUCED: HCPCS

## 2022-08-17 PROCEDURE — 25010000002 FUROSEMIDE PER 20 MG

## 2022-08-17 PROCEDURE — 25010000002 FUROSEMIDE PER 20 MG: Performed by: HOSPITALIST

## 2022-08-17 PROCEDURE — 94664 DEMO&/EVAL PT USE INHALER: CPT

## 2022-08-17 PROCEDURE — 86900 BLOOD TYPING SEROLOGIC ABO: CPT | Performed by: EMERGENCY MEDICINE

## 2022-08-17 PROCEDURE — 85025 COMPLETE CBC W/AUTO DIFF WBC: CPT | Performed by: EMERGENCY MEDICINE

## 2022-08-17 PROCEDURE — 84484 ASSAY OF TROPONIN QUANT: CPT | Performed by: EMERGENCY MEDICINE

## 2022-08-17 PROCEDURE — 85027 COMPLETE CBC AUTOMATED: CPT | Performed by: NURSE PRACTITIONER

## 2022-08-17 PROCEDURE — 83735 ASSAY OF MAGNESIUM: CPT | Performed by: EMERGENCY MEDICINE

## 2022-08-17 PROCEDURE — 94799 UNLISTED PULMONARY SVC/PX: CPT

## 2022-08-17 PROCEDURE — 25010000002 METHYLPREDNISOLONE PER 125 MG: Performed by: PHYSICIAN ASSISTANT

## 2022-08-17 PROCEDURE — 93010 ELECTROCARDIOGRAM REPORT: CPT | Performed by: INTERNAL MEDICINE

## 2022-08-17 PROCEDURE — 97530 THERAPEUTIC ACTIVITIES: CPT

## 2022-08-17 PROCEDURE — 99222 1ST HOSP IP/OBS MODERATE 55: CPT | Performed by: INTERNAL MEDICINE

## 2022-08-17 PROCEDURE — 80053 COMPREHEN METABOLIC PANEL: CPT | Performed by: EMERGENCY MEDICINE

## 2022-08-17 PROCEDURE — 0202U NFCT DS 22 TRGT SARS-COV-2: CPT | Performed by: PHYSICIAN ASSISTANT

## 2022-08-17 PROCEDURE — 71045 X-RAY EXAM CHEST 1 VIEW: CPT

## 2022-08-17 PROCEDURE — 84145 PROCALCITONIN (PCT): CPT | Performed by: EMERGENCY MEDICINE

## 2022-08-17 PROCEDURE — 36415 COLL VENOUS BLD VENIPUNCTURE: CPT | Performed by: NURSE PRACTITIONER

## 2022-08-17 PROCEDURE — 97161 PT EVAL LOW COMPLEX 20 MIN: CPT

## 2022-08-17 PROCEDURE — 94640 AIRWAY INHALATION TREATMENT: CPT

## 2022-08-17 PROCEDURE — 94760 N-INVAS EAR/PLS OXIMETRY 1: CPT

## 2022-08-17 PROCEDURE — 86923 COMPATIBILITY TEST ELECTRIC: CPT

## 2022-08-17 PROCEDURE — 85014 HEMATOCRIT: CPT | Performed by: NURSE PRACTITIONER

## 2022-08-17 RX ORDER — ACETAMINOPHEN 650 MG/1
650 SUPPOSITORY RECTAL EVERY 4 HOURS PRN
Status: DISCONTINUED | OUTPATIENT
Start: 2022-08-17 | End: 2022-08-20 | Stop reason: HOSPADM

## 2022-08-17 RX ORDER — SODIUM CHLORIDE 0.9 % (FLUSH) 0.9 %
10 SYRINGE (ML) INJECTION AS NEEDED
Status: DISCONTINUED | OUTPATIENT
Start: 2022-08-17 | End: 2022-08-20 | Stop reason: HOSPADM

## 2022-08-17 RX ORDER — SUCRALFATE 1 G/1
1 TABLET ORAL
Status: DISCONTINUED | OUTPATIENT
Start: 2022-08-17 | End: 2022-08-20 | Stop reason: HOSPADM

## 2022-08-17 RX ORDER — PANTOPRAZOLE SODIUM 40 MG/10ML
40 INJECTION, POWDER, LYOPHILIZED, FOR SOLUTION INTRAVENOUS EVERY 12 HOURS SCHEDULED
Status: DISCONTINUED | OUTPATIENT
Start: 2022-08-17 | End: 2022-08-20 | Stop reason: HOSPADM

## 2022-08-17 RX ORDER — ROSUVASTATIN CALCIUM 5 MG/1
5 TABLET, COATED ORAL DAILY
Status: DISCONTINUED | OUTPATIENT
Start: 2022-08-17 | End: 2022-08-20 | Stop reason: HOSPADM

## 2022-08-17 RX ORDER — FOLIC ACID 1 MG/1
2 TABLET ORAL DAILY
Status: DISCONTINUED | OUTPATIENT
Start: 2022-08-17 | End: 2022-08-20 | Stop reason: HOSPADM

## 2022-08-17 RX ORDER — CYANOCOBALAMIN 1000 UG/ML
1000 INJECTION, SOLUTION INTRAMUSCULAR; SUBCUTANEOUS DAILY
Status: DISCONTINUED | OUTPATIENT
Start: 2022-08-17 | End: 2022-08-20

## 2022-08-17 RX ORDER — IPRATROPIUM BROMIDE AND ALBUTEROL SULFATE 2.5; .5 MG/3ML; MG/3ML
3 SOLUTION RESPIRATORY (INHALATION) ONCE
Status: COMPLETED | OUTPATIENT
Start: 2022-08-17 | End: 2022-08-17

## 2022-08-17 RX ORDER — IPRATROPIUM BROMIDE AND ALBUTEROL SULFATE 2.5; .5 MG/3ML; MG/3ML
3 SOLUTION RESPIRATORY (INHALATION) EVERY 4 HOURS PRN
Status: DISCONTINUED | OUTPATIENT
Start: 2022-08-17 | End: 2022-08-20 | Stop reason: HOSPADM

## 2022-08-17 RX ORDER — AMIODARONE HYDROCHLORIDE 200 MG/1
200 TABLET ORAL 2 TIMES DAILY
Status: DISCONTINUED | OUTPATIENT
Start: 2022-08-17 | End: 2022-08-20 | Stop reason: HOSPADM

## 2022-08-17 RX ORDER — CHOLECALCIFEROL (VITAMIN D3) 125 MCG
1000 CAPSULE ORAL DAILY
Status: DISCONTINUED | OUTPATIENT
Start: 2022-08-17 | End: 2022-08-20 | Stop reason: HOSPADM

## 2022-08-17 RX ORDER — LANOLIN ALCOHOL/MO/W.PET/CERES
25 CREAM (GRAM) TOPICAL DAILY
Status: DISCONTINUED | OUTPATIENT
Start: 2022-08-17 | End: 2022-08-20 | Stop reason: HOSPADM

## 2022-08-17 RX ORDER — CALCIUM CARBONATE 200(500)MG
2 TABLET,CHEWABLE ORAL 2 TIMES DAILY PRN
Status: DISCONTINUED | OUTPATIENT
Start: 2022-08-17 | End: 2022-08-20 | Stop reason: HOSPADM

## 2022-08-17 RX ORDER — ONDANSETRON 2 MG/ML
4 INJECTION INTRAMUSCULAR; INTRAVENOUS EVERY 6 HOURS PRN
Status: DISCONTINUED | OUTPATIENT
Start: 2022-08-17 | End: 2022-08-20 | Stop reason: HOSPADM

## 2022-08-17 RX ORDER — MIRTAZAPINE 15 MG/1
7.5 TABLET, FILM COATED ORAL NIGHTLY
Status: DISCONTINUED | OUTPATIENT
Start: 2022-08-17 | End: 2022-08-20 | Stop reason: HOSPADM

## 2022-08-17 RX ORDER — URSODIOL 300 MG/1
300 CAPSULE ORAL
Status: DISCONTINUED | OUTPATIENT
Start: 2022-08-17 | End: 2022-08-20 | Stop reason: HOSPADM

## 2022-08-17 RX ORDER — ALPRAZOLAM 0.25 MG/1
0.25 TABLET ORAL EVERY 8 HOURS PRN
Status: DISCONTINUED | OUTPATIENT
Start: 2022-08-17 | End: 2022-08-20 | Stop reason: HOSPADM

## 2022-08-17 RX ORDER — METHYLPREDNISOLONE SODIUM SUCCINATE 125 MG/2ML
80 INJECTION, POWDER, LYOPHILIZED, FOR SOLUTION INTRAMUSCULAR; INTRAVENOUS ONCE
Status: COMPLETED | OUTPATIENT
Start: 2022-08-17 | End: 2022-08-17

## 2022-08-17 RX ORDER — ACETAMINOPHEN 160 MG/5ML
650 SOLUTION ORAL EVERY 4 HOURS PRN
Status: DISCONTINUED | OUTPATIENT
Start: 2022-08-17 | End: 2022-08-20 | Stop reason: HOSPADM

## 2022-08-17 RX ORDER — SODIUM CHLORIDE FOR INHALATION 7 %
4 VIAL, NEBULIZER (ML) INHALATION
Status: DISCONTINUED | OUTPATIENT
Start: 2022-08-17 | End: 2022-08-20 | Stop reason: HOSPADM

## 2022-08-17 RX ORDER — ACETAMINOPHEN 325 MG/1
650 TABLET ORAL EVERY 4 HOURS PRN
Status: DISCONTINUED | OUTPATIENT
Start: 2022-08-17 | End: 2022-08-20 | Stop reason: HOSPADM

## 2022-08-17 RX ORDER — NITROGLYCERIN 0.4 MG/1
0.4 TABLET SUBLINGUAL
Status: DISCONTINUED | OUTPATIENT
Start: 2022-08-17 | End: 2022-08-20 | Stop reason: HOSPADM

## 2022-08-17 RX ORDER — ONDANSETRON 4 MG/1
4 TABLET, FILM COATED ORAL EVERY 6 HOURS PRN
Status: DISCONTINUED | OUTPATIENT
Start: 2022-08-17 | End: 2022-08-20 | Stop reason: HOSPADM

## 2022-08-17 RX ORDER — IPRATROPIUM BROMIDE AND ALBUTEROL SULFATE 2.5; .5 MG/3ML; MG/3ML
3 SOLUTION RESPIRATORY (INHALATION)
Status: DISCONTINUED | OUTPATIENT
Start: 2022-08-17 | End: 2022-08-20 | Stop reason: HOSPADM

## 2022-08-17 RX ORDER — SODIUM CHLORIDE 0.9 % (FLUSH) 0.9 %
10 SYRINGE (ML) INJECTION EVERY 12 HOURS SCHEDULED
Status: DISCONTINUED | OUTPATIENT
Start: 2022-08-17 | End: 2022-08-20 | Stop reason: HOSPADM

## 2022-08-17 RX ORDER — FUROSEMIDE 10 MG/ML
40 INJECTION INTRAMUSCULAR; INTRAVENOUS EVERY 12 HOURS
Status: DISCONTINUED | OUTPATIENT
Start: 2022-08-17 | End: 2022-08-19

## 2022-08-17 RX ORDER — METHYLPREDNISOLONE SODIUM SUCCINATE 125 MG/2ML
60 INJECTION, POWDER, LYOPHILIZED, FOR SOLUTION INTRAMUSCULAR; INTRAVENOUS EVERY 12 HOURS
Status: DISCONTINUED | OUTPATIENT
Start: 2022-08-17 | End: 2022-08-17

## 2022-08-17 RX ORDER — FUROSEMIDE 10 MG/ML
40 INJECTION INTRAMUSCULAR; INTRAVENOUS ONCE
Status: COMPLETED | OUTPATIENT
Start: 2022-08-17 | End: 2022-08-17

## 2022-08-17 RX ADMIN — METHYLPREDNISOLONE SODIUM SUCCINATE 80 MG: 125 INJECTION, POWDER, FOR SOLUTION INTRAMUSCULAR; INTRAVENOUS at 03:58

## 2022-08-17 RX ADMIN — IPRATROPIUM BROMIDE AND ALBUTEROL SULFATE 3 ML: .5; 3 SOLUTION RESPIRATORY (INHALATION) at 13:51

## 2022-08-17 RX ADMIN — SUCRALFATE 1 G: 1 TABLET ORAL at 17:01

## 2022-08-17 RX ADMIN — IPRATROPIUM BROMIDE AND ALBUTEROL SULFATE 3 ML: .5; 3 SOLUTION RESPIRATORY (INHALATION) at 08:24

## 2022-08-17 RX ADMIN — SUCRALFATE 1 G: 1 TABLET ORAL at 20:41

## 2022-08-17 RX ADMIN — PANTOPRAZOLE SODIUM 40 MG: 40 INJECTION, POWDER, FOR SOLUTION INTRAVENOUS at 20:41

## 2022-08-17 RX ADMIN — PANTOPRAZOLE SODIUM 40 MG: 40 INJECTION, POWDER, FOR SOLUTION INTRAVENOUS at 08:33

## 2022-08-17 RX ADMIN — Medication 10 ML: at 08:33

## 2022-08-17 RX ADMIN — AMIODARONE HYDROCHLORIDE 200 MG: 200 TABLET ORAL at 11:58

## 2022-08-17 RX ADMIN — URSODIOL 300 MG: 300 CAPSULE ORAL at 17:01

## 2022-08-17 RX ADMIN — URSODIOL 300 MG: 300 CAPSULE ORAL at 11:58

## 2022-08-17 RX ADMIN — Medication 25 MG: at 12:00

## 2022-08-17 RX ADMIN — METHYLPREDNISOLONE SODIUM SUCCINATE 60 MG: 125 INJECTION, POWDER, FOR SOLUTION INTRAMUSCULAR; INTRAVENOUS at 08:33

## 2022-08-17 RX ADMIN — FUROSEMIDE 40 MG: 10 INJECTION, SOLUTION INTRAMUSCULAR; INTRAVENOUS at 20:41

## 2022-08-17 RX ADMIN — MIRTAZAPINE 7.5 MG: 15 TABLET, FILM COATED ORAL at 20:41

## 2022-08-17 RX ADMIN — SODIUM CHLORIDE SOLN NEBU 7% 4 ML: 7 NEBU SOLN at 16:19

## 2022-08-17 RX ADMIN — AMIODARONE HYDROCHLORIDE 200 MG: 200 TABLET ORAL at 20:41

## 2022-08-17 RX ADMIN — FUROSEMIDE 40 MG: 10 INJECTION, SOLUTION INTRAMUSCULAR; INTRAVENOUS at 10:34

## 2022-08-17 RX ADMIN — IPRATROPIUM BROMIDE AND ALBUTEROL SULFATE 3 ML: .5; 3 SOLUTION RESPIRATORY (INHALATION) at 19:19

## 2022-08-17 RX ADMIN — Medication 1000 MCG: at 11:59

## 2022-08-17 RX ADMIN — IPRATROPIUM BROMIDE AND ALBUTEROL SULFATE 3 ML: .5; 3 SOLUTION RESPIRATORY (INHALATION) at 04:45

## 2022-08-17 RX ADMIN — SUCRALFATE 1 G: 1 TABLET ORAL at 11:58

## 2022-08-17 RX ADMIN — ACETAMINOPHEN 650 MG: 325 TABLET, FILM COATED ORAL at 18:37

## 2022-08-17 RX ADMIN — Medication 10 ML: at 20:42

## 2022-08-17 RX ADMIN — Medication 2 MG: at 11:58

## 2022-08-17 RX ADMIN — ROSUVASTATIN CALCIUM 5 MG: 5 TABLET, FILM COATED ORAL at 11:58

## 2022-08-17 NOTE — CONSULTS
[facility]  Cardiology Consult Note    Patient Identification:  Name: Celia Baird  Age: 77 y.o.  Sex: female  :  1945  MRN: [id number]             Requesting Physician: Dr Medina    Reason for Consultation / Chief Complaint: atrial fibrillation, chf ?    History of Present Illness:     Ms Baird is a 77 year old female who is well known to our service. She has paroxysmal atrial fibrillation on amiodarone, documented watchman history CAD, chronic diastolic CHF, hypertension, hyperlipidemia, COPD still smoking on 2L nc, chronic anemia, GERD. She presented to [facility] ER with complaints of shortness of breath for few days, worse with exertion.  Troponin negative, , Hgb 5.8 initially in ER, 6.1 this am s/p transfusion. Chest XR pulmonary vascular congestion and probable bilateral pleural fluid collections. ECG afib rate controlled.     Echo 22 EF 61% normal LV function. Stress test 3/5/19 normal myocardial perfusion study without evidence of ischemia. Holter monitor 5/10/22 afib burden 4%, pacs, pvcs, nsvts.     Comorbid cardiac risk factors: age, hypertension, cad, hyperlipidemia    Past Medical History:  Past Medical History:   Diagnosis Date    Atrial fibrillation (HCC)     CAD (coronary artery disease)     COPD (chronic obstructive pulmonary disease) (HCC)     History of frequent urinary tract infections     Irregular heart beat     Kidney stones     PBC (primary biliary cirrhosis)     PBC (primary biliary cirrhosis)      Past Surgical History:  Past Surgical History:   Procedure Laterality Date    CARDIAC ELECTROPHYSIOLOGY PROCEDURE N/A 2017    Procedure:    LINQ;  Surgeon: Ailyn Solorio MD;  Location: [facility] CATH INVASIVE LOCATION;  Service:     CHOLECYSTECTOMY      COLONOSCOPY      COLONOSCOPY N/A 2022    Procedure: COLONOSCOPY into cecum with polypectomy, clip placement;  Surgeon: Constantine Kaye MD;  Location: [facility] ENDOSCOPY;   Service: Gastroenterology;  Laterality: N/A;  poor prep, diverticulosis, polyp    CORONARY STENT PLACEMENT      proximal circumflex    ENDOSCOPY N/A 01/28/2022    Procedure: ESOPHAGOGASTRODUODENOSCOPY with biopsy;  Surgeon: Constantine Kaye MD;  Location: [facility] ENDOSCOPY;  Service: Gastroenterology;  Laterality: N/A;  normal    TUBAL ABDOMINAL LIGATION        Allergies:  Allergies   Allergen Reactions    Morphine And Related Nausea And Vomiting    Levaquin [Levofloxacin]     Sulfa Antibiotics      Home Meds:  Medications Prior to Admission   Medication Sig Dispense Refill Last Dose    ALPRAZolam (XANAX) 0.25 MG tablet Take 0.25 mg by mouth Every 8 (Eight) Hours As Needed. for anxiety   8/16/2022 at Unknown time    amiodarone (PACERONE) 200 MG tablet TAKE 1 TABLET BY MOUTH EVERY 12 HOURS 60 tablet 2 8/16/2022 at Unknown time    aspirin 81 MG EC tablet Take 81 mg by mouth Daily.   8/16/2022 at Unknown time    bumetanide (BUMEX) 0.5 MG tablet Take 0.5 mg by mouth Daily. Take 1.25mg if swellling   Past Week at Unknown time    clopidogrel (PLAVIX) 75 MG tablet Take 75 mg by mouth Daily.   8/16/2022 at Unknown time    folic acid-vit B6-vit B12 (FOLGARD) 2.2-25-1 MG tablet tablet Take  by mouth Daily.   8/16/2022 at Unknown time    guaiFENesin (MUCINEX) 600 MG 12 hr tablet Take 1,200 mg by mouth 2 (Two) Times a Day.   Past Week at Unknown time    ipratropium-albuterol (DUO-NEB) 0.5-2.5 mg/3 ml nebulizer Take 3 mL by nebulization 2 (Two) Times a Day.   8/16/2022 at Unknown time    O2 (OXYGEN) Inhale 2 L/min As Needed (sob).   8/16/2022 at Unknown time    pantoprazole (PROTONIX) 40 MG EC tablet Take 40 mg by mouth 2 (Two) Times a Day.   8/16/2022 at Unknown time    ProAir  (90 Base) MCG/ACT inhaler Inhale 1-2 puffs As Needed. every 4 to 6 hours   8/16/2022 at Unknown time    rosuvastatin (CRESTOR) 5 MG tablet Take 5 mg by mouth Daily.   8/16/2022 at Unknown time    sucralfate (CARAFATE) 1 g tablet Take 1 g by  mouth 4 (Four) Times a Day.   8/16/2022 at Unknown time    ursodiol (ACTIGALL) 300 MG capsule Take 300 mg by mouth 3 (Three) Times a Day.   8/16/2022 at Unknown time    doxycycline (MONODOX) 100 MG capsule Take 100 mg by mouth 2 (Two) Times a Day.       ferrous gluconate (FERGON) 324 MG tablet Take 324 mg by mouth Every Other Day.       mirtazapine (REMERON) 7.5 MG tablet Take 1 tablet by mouth Every Night for 30 days. 30 tablet 0     nitroglycerin (NITROLINGUAL) 0.4 MG/SPRAY spray Place 1 spray under the tongue Every 5 (Five) Minutes As Needed for Chest Pain.   Unknown at Unknown time    tiotropium bromide monohydrate (SPIRIVA RESPIMAT) 2.5 MCG/ACT aerosol solution inhaler Inhale 2 puffs Daily for 30 days. 1 g 0      Current Meds:   Current Facility-Administered Medications   Medication Dose Route Frequency Provider Last Rate Last Admin    acetaminophen (TYLENOL) tablet 650 mg  650 mg Oral Q4H PRN Briana Hernandez APRN        Or    acetaminophen (TYLENOL) 160 MG/5ML solution 650 mg  650 mg Oral Q4H PRN Briana Hernandez APRN        Or    acetaminophen (TYLENOL) suppository 650 mg  650 mg Rectal Q4H PRN Briana Hernandez APRN        ALPRAZolam (XANAX) tablet 0.25 mg  0.25 mg Oral Q8H PRN Chaitanya Medina MD        amiodarone (PACERONE) tablet 200 mg  200 mg Oral BID Chaitanya Medina MD        calcium carbonate (TUMS) chewable tablet 500 mg (200 mg elemental)  2 tablet Oral BID PRN Briana Hernandez APRN        cyanocobalamin injection 1,000 mcg  1,000 mcg Intramuscular Daily Chaitanya Medina MD        folic acid (FOLVITE) tablet 2 mg  2 mg Oral Daily Chaitanya Medina MD        furosemide (LASIX) injection 40 mg  40 mg Intravenous Once Chaitanya Medina MD        ipratropium-albuterol (DUO-NEB) nebulizer solution 3 mL  3 mL Nebulization Q4H PRN Briana Hernandez APRN        ipratropium-albuterol (DUO-NEB) nebulizer solution 3 mL  3 mL Nebulization 4x Daily - RT Briana Hernandez, APRN   3  mL at 08/17/22 0824    methylPREDNISolone sodium succinate (SOLU-Medrol) injection 60 mg  60 mg Intravenous Q12H Briana Hernandez APRN   60 mg at 08/17/22 0833    mirtazapine (REMERON) tablet 7.5 mg  7.5 mg Oral Nightly Chaitanya Medina MD        nitroglycerin (NITROSTAT) SL tablet 0.4 mg  0.4 mg Sublingual Q5 Min PRN Briana Hernandez APRN        O2 (OXYGEN)  2 L/min Inhalation PRN Chaitanya Medina MD        ondansetron (ZOFRAN) tablet 4 mg  4 mg Oral Q6H PRN Briana Hernandez APRN        Or    ondansetron (ZOFRAN) injection 4 mg  4 mg Intravenous Q6H PRN Briana Hernandez APRN        pantoprazole (PROTONIX) injection 40 mg  40 mg Intravenous Q12H Briana Hernandez APRN   40 mg at 08/17/22 0833    rosuvastatin (CRESTOR) tablet 5 mg  5 mg Oral Daily Chaitanya Medina MD        sodium chloride 0.9 % flush 10 mL  10 mL Intravenous PRN Kristopher Pérez PA        sodium chloride 0.9 % flush 10 mL  10 mL Intravenous Q12H Briana Hernandez APRN   10 mL at 08/17/22 0833    sodium chloride 0.9 % flush 10 mL  10 mL Intravenous PRN Briana Hernandez APRN        sucralfate (CARAFATE) tablet 1 g  1 g Oral 4x Daily AC & at Bedtime Chaitanya Medina MD        tiotropium (SPIRIVA RESPIMAT) 2.5 mcg/act aerosol solution inhaler  2 puff Inhalation Daily - RT Chaitanya Medina MD        ursodiol (ACTIGALL) capsule 300 mg  300 mg Oral TID With Meals Chaitanya Medina MD        vitamin B-12 (CYANOCOBALAMIN) tablet 1,000 mcg  1,000 mcg Oral Daily Chaitanya Medina MD        vitamin B-6 (PYRIDOXINE) tablet 25 mg  25 mg Oral Daily Chaitanya Medina MD           Social History:   Social History     Tobacco Use    Smoking status: Current Some Day Smoker     Packs/day: 0.50     Years: 59.00     Pack years: 29.50     Types: Cigarettes    Smokeless tobacco: Never Used    Tobacco comment: 2 - 3 CIGARETTES A DAY   Substance Use Topics    Alcohol use: No      Family History:  Family History   Problem Relation Age of  "Onset    Heart disease Father     Heart attack Father     Heart disease Brother     Stroke Mother         Review of Systems  Constitutional: No wt loss, fever   Gastrointestinal: No nausea , abdominal pain  Behavioral/Psych: No insomnia or anxiety   Cardiovascular ----positive for shortness of breath. All other systems reviewed and are negative             Physical Exam  /58 (BP Location: Left arm, Patient Position: Sitting)   Pulse 93   Temp 98 °F (36.7 °C) (Oral)   Resp 18   Ht 149.9 cm (59\")   Wt 40.4 kg (89 lb)   SpO2 96%   BMI 17.98 kg/m²     General appearance: No acute changes   Eyes: Sclerae conjunctivae normal, pupils reactive   HENT: Atraumatic; oropharynx clear with moist mucous membranes and no mucosal ulcerations;  Neck: Trachea midline; NECK, supple, no thyromegaly or lymphadenopathy   Lungs: Normal size and shape, normal breath sounds, equal distribution of air, no rales and rhonchi   CV: S1-S2 regular, no murmurs, no rub, no gallop   Abdomen: Soft, nontender; no masses , no abnormal abdominal sounds   Extremities: No deformity , normal color , no peripheral edema   Skin: Normal temperature, turgor and texture; no rash, ulcers  Psych: Appropriate affect, alert and oriented to person, place and time                   Cardiographics  ECG:         Telemetry:  afib    Echocardiogram:   1/2022  Interpretation Summary    Calculated left ventricular EF = 61.9% Estimated left ventricular EF was in agreement with the calculated left ventricular EF.  Left ventricular diastolic function was normal.  There is no evidence of pericardial effusion. .    2019  Interpretation Summary       Findings consistent with a normal ECG stress test.  Left ventricular ejection fraction is normal (Calculated EF = 70%).  Myocardial perfusion imaging indicates a normal myocardial perfusion study with no evidence of ischemia.  Impressions are consistent with a low risk study.    5/2022  Interpretation Summary    An " "abnormal monitor study.  NSR OCCASIONAL PACS, PVCS, NSSVTS  4% AFIB WITH LONGEST 2 HR 29 MINUTES      Imaging  Chest X-ray:   IMPRESSION:         Pulmonary vascular congestion and probable bilateral pleural fluid   collections.     IMPRESSION:        Electronically signed by Dante Dennis DO, Diplomate American Board of   Radiology on 08-17-22 at 0416    Lab Review   Results from last 7 days   Lab Units 08/17/22  0329   TROPONIN T ng/mL <0.010     Results from last 7 days   Lab Units 08/17/22  0329   MAGNESIUM mg/dL 1.7     Results from last 7 days   Lab Units 08/17/22  0654   SODIUM mmol/L 141   POTASSIUM mmol/L 3.8   BUN mg/dL 12   CREATININE mg/dL 0.73   CALCIUM mg/dL 8.8     @[id number]@  Results from last 7 days   Lab Units 08/17/22  0654 08/17/22  0329   WBC 10*3/mm3 7.59 8.42   HEMOGLOBIN g/dL 6.1* 5.8*   HEMATOCRIT % 20.4* 20.1*   PLATELETS 10*3/mm3 341 360             Assessment:  COPD exacerbation  Acute diastolic CHF  Atrial fibrillation: not currently anticoagulated due to anemia. She will need to be considered for watchman  Symptomatic anemia  CAD      Recommendations / Plan:     Ms Baird is admitted for COPD exacerbation and symptomatic anemia. CXR in er with bilateral pleural effusions and pulmonary vascular congestion. ECG afib with controlled rate on amiodarone po.     Continue amiodarone, rate is controlled qtc stable. Check echo. Trend troponin. Lasix 40mg IV bid    Discussed watchman procedure with patient. She is adamant that she did not have a watchman procedure done. I reviewed her records and it appears that Dr Castellano with [facility] documented that patient was \"s/p watchman\"-unfortunately I cannot find any other documentation that patient underwent watchman procedure and this information has continued to be carried over in her chart. I can find no record of her having this procedure. Discussed with Dr mckeon-he reviewed chest xr and does not see any signs of watchman device. She will " need watchman due to her hx of ICH and chronic anemia if possible. We will place referral for her to be followed outpatient for watchman procedure.       Labs/tests ordered for am: echo, ecg, trop, bmp      Darlene ALEM Shen, APRN  8/17/2022, 10:22 EDT  Patient has chronic atrial fibrillation not on anticoagulation what has been discussed multiple times   We will keep her off of the anticoagulation at this stage      Patient personally interviewed and above subjective findings personally confirmed during a face to face contact with patient today  All findings of physical examination confirmed  All pertinent and performed labs, cardiac procedures ,  radiographs of the last 24 hours personally reviewed  Impression and plans discussed/elaborated and implemented jointly as described above     Ailyn Solorio MD          EMR Dragon/Transcription:   Dictated utilizing Dragon dictation

## 2022-08-17 NOTE — ED PROVIDER NOTES
MD ATTESTATION NOTE  I wore full protective equipment throughout this patient encounter including an N95 face mask, googles, gown and gloves. Hand hygiene was performed before donning protective equipment and after removal when leaving the room.    The HEATHER and I have discussed this patient's history, physical exam, and treatment plan. I have reviewed the documentation and personally had a face to face interaction with the patient. I affirm the HEATHER documentation and agree with their diagnostics, findings, treatment, plan, and disposition.    I provided a substantive portion of the care of this patient.  I personally performed the physical exam, in its entirety.  The attached note describes my personal findings.    Celia Baird is a 77 y.o. female who presents to the ED c/o shortness of breath.  Patient complains of shortness of breath for the last 2 to 3 days.  Patient reports mild shortness of breath at rest, dramatically worse with exertion.  Patient reports that she has been wheezing, has been using albuterol with some relief.  Patient denies any chest pain.  Patient has had cough, cough is productive in nature.  No vomiting or diarrhea, no fever shakes chills or night sweats, no loss of sense of smell or taste.  Patient denies any recent sick contacts, has been vaccinated against COVID-19.  Patient reports history of COPD in the past, reports similar symptoms with prior COPD exacerbations.    On exam:  General: NAD.  Head: NCAT.  ENT: nares patent, no scleral icterus  Neck: Supple, trachea midline.  Cardiac: Irregularly irregular  Lungs: normal effort, no accessory muscle use, diminished, expiratory wheezing with prolonged expiration.  Abdomen: Soft, NTTP.   Extremities: Moves all extremities well, no peripheral edema  Neuro: alert, MAEW, follows commands  Psych: calm, cooperative  Skin: Warm, dry.    Medical Decision Making:  After the initial H&P, I discussed pertinent information from history and physical  exam with patient/family.  Discussed differential diagnosis.  Discussed plan for ED evaluation/work-up/treatment.  All questions answered.  Patient/family is agreeable with plan.    ED Course as of 08/17/22 0544   Wed Aug 17, 2022   0313 WBC: 8.42 [ABDI]   0323 EKG independently viewed and contemporaneously interpreted by ED physician. Time: 3:19 AM.  Rate 95.  Interpretation: Atrial fibrillation, normal axis, delayed R wave progression, no acute ST changes.  Baseline artifact present. [JG]   0325 Medical history reviewed and significant for: 77-year-old female with history of coronary artery disease with history of prior stenting, atrial fibrillation status post prior watchman's procedure, history of intracranial hemorrhage, not on anticoagulation, history of COPD.  Patient followed by Dr. Gonzales, cardiology. [JG]   0411 Hemoglobin(!!): 5.8 [ABDI]   0411 Hematocrit(!!): 20.1 [ABDI]   0411 Platelets: 360 [ABDI]   0411 Glucose(!): 130 [ABDI]   0411 BUN: 14 [ABDI]   0411 Creatinine: 0.78 [ABDI]   0411 Sodium: 142 [ABDI]   0411 Potassium: 4.2 [ABDI]   0412 Magnesium: 1.7 [ABDI]   0412 Chloride: 105 [ABDI]   0412 CO2(!): 29.8 [ABDI]   0412 Procalcitonin: 0.04 [ABDI]   0412 proBNP: 659.0 [ABDI]   0412 Troponin T: <0.010 [ABDI]   0430 Patient rechecked, breathing improved after steroids and duo nebs.  Informed patient of her low hemoglobin and need for transfusion and admission.  Patient expressed understanding and agrees with plan. [ABDI]   0502 Patient history, ER presentation and evaluation including hemoglobin of 5.8 discussed with Briana SCHMIDT, will admit to Dr. Srivastava. [ABDI]      ED Course User Index  [ABDI] Kristopher Pérez PA  [JG] Blayne Mata MD       Diagnosis  Final diagnoses:   Shortness of breath   COPD exacerbation (HCC)   Anemia, unspecified type        Blayne Mata MD  08/17/22 0544

## 2022-08-17 NOTE — CONSULTS
[facility]    Reason for consult: copd with ae    HPI:  Mrs. Baird is a 76yo female who presented to the ER today with gradual onset of worsening shortness of breath for about two weeks. She has some associated weight gain. She has had some PND.  She feels worse with exertion and better with oxygen. Symptoms present consistently, moderate in nature.  She was found to have hgb of 6 in er.   She says this feels differently than a COPD exacerbation. She does have increased cough, it is very wet and she no expectorating much    Past Medical History:   Diagnosis Date   • Atrial fibrillation (HCC)    • CAD (coronary artery disease)    • COPD (chronic obstructive pulmonary disease) (HCC)    • History of frequent urinary tract infections    • Irregular heart beat    • Kidney stones    • PBC (primary biliary cirrhosis)    • PBC (primary biliary cirrhosis)      Social History     Socioeconomic History   • Marital status:    Tobacco Use   • Smoking status: Current Some Day Smoker     Packs/day: 0.50     Years: 59.00     Pack years: 29.50     Types: Cigarettes   • Smokeless tobacco: Never Used   • Tobacco comment: 2 - 3 CIGARETTES A DAY   Vaping Use   • Vaping Use: Never used   Substance and Sexual Activity   • Alcohol use: No   • Drug use: No   • Sexual activity: Defer     Birth control/protection: Post-menopausal, Surgical     Family History   Problem Relation Age of Onset   • Heart disease Father    • Heart attack Father    • Heart disease Brother    • Stroke Mother      MEDS: reviewed as per chart  ALL: morphine, levaquin, sulfa  ROS: 12 point negative excpet as in HPI    Vital Sign Min/Max for last 24 hours  Temp  Min: 97.6 °F (36.4 °C)  Max: 98.6 °F (37 °C)   BP  Min: 102/56  Max: 127/60   Pulse  Min: 74  Max: 109   Resp  Min: 14  Max: 28   SpO2  Min: 94 %  Max: 100 %   Flow (L/min)  Min: 3  Max: 3   Weight  Min: 40.4 kg (89 lb)  Max: 40.4 kg (89 lb)     GEN:  NAD, appears ill, thin, A&O x3  HEENT:  PERRL, EOMI, no icterus, mmm, no JVD, trachea midline, neck supple  CHEST: coarse, wet bilat, no wheezes, + crackles, no use of accessory muscles  CV: RRR, no m/g/r  ABD: soft, nt, nd +bs, no hepatosplenomegaly  EXT: no c/c/e  SKIN: no rashes, no xanthomas, nl turgor  LYMPH: no palpable cervical or supraclavicular lymphadenopathy  NEURO: CN 2-12 intact and symmetric bilaterally  PSYCH: nl affect, nl orientation, nl judgment, nl mood  MSK: + kyphosis no scoliosis, 5/5 strength ue and le bilaterally    Labs: 8/17: reviewed:  Glucose 148  Bun 12  Cr 0.73  Bicarb 28  Wbc 7  hgb 6.1  plts 341  bnp 659  rpp negative  CXR: bilateral infiltrates and likely effusions 8/17; reviewed, agree with dictated report, changes are new from prior film    fev1 47%    A/P:  1. COPD, severe fev1 47% - bronchodilators --pulmonary toilet, not wheezing, d/c steroids.    2. Chronic hypoxemic respiratory failure --continue oxygen  3. Acute pulmonary edema with bilateral pleural effusion -- agree with lasix, keep amio tox on ddx, but think much less likely  4. Anemia -- suspect this is contriubting to her shortness of breath  5. Tobacco abuse  6. On amiodarone therapy    Agree with diuresis, needs better pulm toilet  I don't think she needs steroids.

## 2022-08-17 NOTE — PROGRESS NOTES
Discharge Planning Assessment  [facility]     Patient Name: Celia Baird  MRN: [id number]  Today's Date: 8/17/2022    Admit Date: 8/17/2022     Discharge Needs Assessment     Row Name 08/17/22 1226       Living Environment    People in Home child(yesenia), adult    Name(s) of People in Home Krishna Baird son    Current Living Arrangements home    Primary Care Provided by self    Provides Primary Care For no one    Family Caregiver if Needed child(yesenia), adult    Family Caregiver Names Eduardo Baird [phone] home [phone] cell    Quality of Family Relationships supportive    Able to Return to Prior Arrangements yes       Resource/Environmental Concerns    Transportation Concerns none       Transition Planning    Patient/Family Anticipates Transition to home with family    Patient/Family Anticipated Services at Transition none       Discharge Needs Assessment    Equipment Currently Used at Home oxygen;walker, rolling;bath bench;wheelchair    Provided Post Acute Provider List? Refused    Refused Provider List Comment Has used [facility] in the past and requested them again if needed    Provided Post Acute Provider Quality & Resource List? Refused    Refused Quality and Resource List Comment Has used [facility] in the past and requested them again if needed               Discharge Plan     Row Name 08/17/22 1228       Plan    Plan Home    Plan Comments Spoke with pt at [facility] to screen for DCP/needs. Pt did confirm that she does reside at [facility] address where her son Krishna does live with her.  Pt stated that Krishna does work outside the home full time M-F.  Pt stated that is does use a rolling  walker and a wheelchair for longer distance. Pt also is on home O2 through [employer].  Pt stated that she  has used [facility] in the past and if HH is needed at [place] she wouldlike to use them again.  Pt confirmed her PCP and pharmacy info as correct.  Pt reports that her son will be available to transport her home at [place].  CCP will follow  pt's progress if any additonal needs do arise.              Continued Care and Services - Admitted Since 8/17/2022    Coordination has not been started for this encounter.          Demographic Summary     Row Name 08/17/22 1225       General Information    Admission Type inpatient    Arrived From home    Referral Source admission list;physician    Reason for Consult discharge planning    Preferred Language English               Functional Status     Row Name 08/17/22 1225       Functional Status    Usual Activity Tolerance moderate    Current Activity Tolerance fair       Functional Status, IADL    Medications independent;assistive person    Meal Preparation assistive person    Housekeeping assistive person    Laundry assistive person    Shopping assistive person       Mental Status Summary    Recent Changes in Mental Status/Cognitive Functioning no changes               Psychosocial    No documentation.                Abuse/Neglect    No documentation.                Legal    No documentation.                Substance Abuse    No documentation.                Patient Forms    No documentation.                   AMOR Huffman

## 2022-08-17 NOTE — H&P
[facility]    Patient Identification:  Name: Celia Baird  Age: 77 y.o.  Sex: female  :  1945  MRN: [id number]         Primary Care Physician: Meenu Brambila APRN    Chief Complaint   Patient presents with   • Shortness of Breath     Subjective   Patient is a 77 y.o. female with a history of COPD, atrial fibrillation status post watchman, CAD stent, smoker, intracranial bleed from fall, primary biliary cirrhosis presents with a couple weeks of shortness of breath, dyspnea on exertion. She feels like she has gained a few pounds. She states this feels similar to prior COPD exacerbations but different at the same time. She states sometimes she wakes up short of breath but seems to deny any orthopnea she states it is better with oxygen. Its worse with minimal exertion walking across the room. Better when she rests. She states oxygen helps. In the ED she received Solu-Medrol and breathing treatments and she is feeling better now. Hemoglobin in ED was found to be 6. She denies any dark stools, bloody stools, hematemesis, bleeding.    Past Medical History:   Diagnosis Date   • Atrial fibrillation (HCC)    • CAD (coronary artery disease)    • COPD (chronic obstructive pulmonary disease) (HCC)    • History of frequent urinary tract infections    • Irregular heart beat    • Kidney stones    • PBC (primary biliary cirrhosis)    • PBC (primary biliary cirrhosis)      Past Surgical History:   Procedure Laterality Date   • CARDIAC ELECTROPHYSIOLOGY PROCEDURE N/A 2017    Procedure:    LINQ;  Surgeon: Ailyn Solorio MD;  Location: [facility] CATH INVASIVE LOCATION;  Service:    • CHOLECYSTECTOMY     • COLONOSCOPY     • COLONOSCOPY N/A 2022    Procedure: COLONOSCOPY into cecum with polypectomy, clip placement;  Surgeon: Constantine Kaye MD;  Location: [facility] ENDOSCOPY;  Service: Gastroenterology;  Laterality: N/A;  poor prep, diverticulosis, polyp    • CORONARY STENT PLACEMENT      proximal circumflex   • ENDOSCOPY N/A 01/28/2022    Procedure: ESOPHAGOGASTRODUODENOSCOPY with biopsy;  Surgeon: Constantine Kaye MD;  Location: [facility] ENDOSCOPY;  Service: Gastroenterology;  Laterality: N/A;  normal   • TUBAL ABDOMINAL LIGATION       Current medications reviewed.    Family History   Problem Relation Age of Onset   • Heart disease Father    • Heart attack Father    • Heart disease Brother    • Stroke Mother      Social History     Tobacco Use   • Smoking status: Current Some Day Smoker     Packs/day: 0.50     Years: 59.00     Pack years: 29.50     Types: Cigarettes   • Smokeless tobacco: Never Used   • Tobacco comment: 2 - 3 CIGARETTES A DAY   Vaping Use   • Vaping Use: Never used   Substance Use Topics   • Alcohol use: No   • Drug use: No     Review of Systems   Constitutional: Positive for fatigue and unexpected weight change. Negative for chills and fever.   HENT: Negative.    Eyes: Negative.    Respiratory: Positive for cough, shortness of breath and wheezing. Negative for chest tightness.    Cardiovascular: Negative.  Negative for chest pain and leg swelling.   Gastrointestinal: Negative.  Negative for abdominal pain, diarrhea, nausea and vomiting.   Endocrine: Negative.    Genitourinary: Negative.  Negative for dysuria.   Musculoskeletal: Negative.    Skin: Negative.    Allergic/Immunologic: Negative.    Neurological: Negative.  Negative for headaches.   Hematological: Negative.    Psychiatric/Behavioral: Negative.      Objective      Vital Signs  Temp:  [97.7 °F (36.5 °C)-98.6 °F (37 °C)] 97.8 °F (36.6 °C)  Heart Rate:  [] 96  Resp:  [14-28] 18  BP: (102-127)/(56-69) 109/63  Body mass index is 17.98 kg/m².    Physical Exam  Constitutional:       General: She is not in acute distress.     Appearance: Normal appearance. She is not toxic-appearing.   HENT:      Head: Normocephalic and atraumatic.   Cardiovascular:      Rate and Rhythm: Normal rate.  Rhythm irregular.      Pulses:           Radial pulses are 2+ on the right side and 2+ on the left side.        Dorsalis pedis pulses are 2+ on the right side and 2+ on the left side.   Pulmonary:      Effort: Respiratory distress (mild) present.      Breath sounds: Decreased breath sounds, wheezing and rhonchi present.   Abdominal:      General: Bowel sounds are normal.      Palpations: Abdomen is soft.      Tenderness: There is no abdominal tenderness. There is no guarding or rebound.   Musculoskeletal:         General: No swelling.      Right lower leg: No edema.      Left lower leg: No edema.   Skin:     General: Skin is warm and dry.   Neurological:      General: No focal deficit present.      Mental Status: She is alert and oriented to person, place, and time.   Psychiatric:         Mood and Affect: Mood normal.         Behavior: Behavior normal.         Thought Content: Thought content normal.     I have personally reviewed:  [x]  Laboratory   [x]  Microbiology   [x]  Radiology   [x]  EKG/Telemetry   []  Cardiology/Vascular   []  Pathology   []  Records    Results for orders placed during the hospital encounter of 01/13/22    Adult Transthoracic Echo Complete W/ Cont if Necessary Per Protocol    Interpretation Summary  · Calculated left ventricular EF = 61.9% Estimated left ventricular EF was in agreement with the calculated left ventricular EF.  · Left ventricular diastolic function was normal.  · There is no evidence of pericardial effusion. .       Lab Results   Component Value Date    ANIONGAP 9.7 08/17/2022     Estimated Creatinine Clearance: 41.2 mL/min (by C-G formula based on SCr of 0.73 mg/dL).    Assessment & Plan   Active Hospital Problems    Diagnosis  POA   • **COPD with acute exacerbation (HCC) [J44.1]  Yes   • Diastolic CHF, acute (HCC) [I50.31]  Unknown   • CAD (coronary artery disease) [I25.10]  Unknown   • B12 deficiency [E53.8]  Unknown   • Symptomatic anemia [D64.9]  Unknown   • Paroxysmal  atrial fibrillation (HCC) [I48.0]  Yes   • Tobacco abuse [Z72.0]  Yes      Resolved Hospital Problems   No resolved problems to display.     77 y.o. female with a history of COPD, atrial fibrillation status post watchman, CAD status post stent, smoker admitted with shortness of breath. Hgb found to be 6    COPD with acute exacerbation: Symptoms improved with steroids in the ED. We will continue and consult her pulmonologist. Respiratory panel negative.     CHF: Possibly acute diastolic. Chest x-ray with some vascular congestion. Will give a dose of furosemide to see if that helps. Consult her cardiologist.    Anemia, severe: d/w RN transfusion has been ordered. on PPI. monitor serially. contributing to symptoms will transfuse 1 unit. check iron profile and occult blood. she has B12 deficiency will replace. She is not aware of any current GI blood loss. She had a EGD and colonoscopy in January this year no source of bleeding identified then (heme positive and had symptomatic anemia at that time). Hold antiplatelets for now    afib s/p watchman. not on anticoagulation    Discussed with patient and nursing staff.    Chaitanya Medina MD  [facility]  08/17/22

## 2022-08-17 NOTE — THERAPY EVALUATION
Patient Name: Celia Baird  : 1945    MRN: [id number]                              Today's Date: 2022       Admit Date: 2022    Visit Dx:     ICD-10-CM ICD-9-CM   1. Shortness of breath  R06.02 786.05   2. COPD exacerbation (HCC)  J44.1 491.21   3. Anemia, unspecified type  D64.9 285.9     Patient Active Problem List   Diagnosis   • COPD exacerbation (HCC)   • Subarachnoid hemorrhage (HCC)   • Multiple skin tears   • Closed nondisplaced fracture of left clavicle   • Paroxysmal atrial fibrillation (HCC)   • Tobacco abuse   • Status post placement of implantable loop recorder   • SOB (shortness of breath)   • COPD with acute exacerbation (HCC)   • Severe malnutrition (CMS/HCC)   • Leukocytosis   • Gastrointestinal hemorrhage   • Symptomatic anemia   • Hyperlipidemia   • Long term (current) use of antithrombotics/antiplatelets   • COPD with exacerbation (HCC)   • Shortness of breath   • Diastolic CHF, acute (HCC)   • CAD (coronary artery disease)   • B12 deficiency   • Underweight     Past Medical History:   Diagnosis Date   • Atrial fibrillation (HCC)    • CAD (coronary artery disease)    • COPD (chronic obstructive pulmonary disease) (HCC)    • History of frequent urinary tract infections    • Irregular heart beat    • Kidney stones    • PBC (primary biliary cirrhosis)    • PBC (primary biliary cirrhosis)      Past Surgical History:   Procedure Laterality Date   • CARDIAC ELECTROPHYSIOLOGY PROCEDURE N/A 2017    Procedure:    LINQ;  Surgeon: Ailyn Solorio MD;  Location: [facility] CATH INVASIVE LOCATION;  Service:    • CHOLECYSTECTOMY     • COLONOSCOPY     • COLONOSCOPY N/A 2022    Procedure: COLONOSCOPY into cecum with polypectomy, clip placement;  Surgeon: Constantine Kaye MD;  Location: [facility] ENDOSCOPY;  Service: Gastroenterology;  Laterality: N/A;  poor prep, diverticulosis, polyp   • CORONARY STENT PLACEMENT      proximal circumflex   • ENDOSCOPY N/A 2022    Procedure:  ESOPHAGOGASTRODUODENOSCOPY with biopsy;  Surgeon: Constantine Kaye MD;  Location: [facility] ENDOSCOPY;  Service: Gastroenterology;  Laterality: N/A;  normal   • TUBAL ABDOMINAL LIGATION        General Information     [facility] Name 08/17/22 1145          Physical Therapy Time and Intention    Document Type evaluation  -     Mode of Treatment individual therapy;physical therapy  -     Row Name 08/17/22 1145          General Information    Patient Profile Reviewed yes  -     Prior Level of Function independent:  Sitting to take perform ADLs due to fatigue. Has been using rwx recently  -     Existing Precautions/Restrictions fall  -     Row Name 08/17/22 1145          Living Environment    People in Home alone  Son has been staying with her past 6 months - no date yet for him to leave  -[facility] Name 08/17/22 1145          Home Main Entrance    Number of Stairs, Main Entrance none  -[facility] Name 08/17/22 1145          Cognition    Orientation Status (Cognition) oriented x 4  -[facility] Name 08/17/22 1145          Safety Issues, Functional Mobility    Impairments Affecting Function (Mobility) shortness of breath;endurance/activity tolerance  -           User Key  (r) = Recorded By, (t) = Taken By, (c) = Cosigned By    Initials Name Provider Type     La Nieves, PT Physical Therapist               Mobility     Row Name 08/17/22 1148          Bed Mobility    Bed Mobility supine-sit;sit-supine;scooting/bridging  -     Scooting/Bridging [place] (Bed Mobility) standby assist  -     Supine-Sit [place] (Bed Mobility) standby assist  -     Sit-Supine [place] (Bed Mobility) standby assist  -     Comment, (Bed Mobility) SpO2 96% upon arrival. Down to 88% with supine to sit. Quickly recovered to 91%  -     Row Name 08/17/22 1148          Bed-Chair Transfer    Bed-Chair [place] (Transfers) not tested  -[facility] Name 08/17/22 1148          Sit-Stand Transfer    Sit-Stand  [place] (Transfers) contact guard  -     Assistive Device (Sit-Stand Transfers) walker, front-wheeled  -     Comment, (Sit-Stand Transfer) Patient at 94% before STS transfer, drop to 91% with STS.  -     Row Name 08/17/22 1148          Gait/Stairs (Locomotion)    [place] Level (Gait) contact guard  -     Assistive Device (Gait) walker, front-wheeled  -     Distance in Feet (Gait) Patient took 3 steps forward and back. Patient SpO2 dropped to 85% quickly back to 91% with seated rest break  -     Deviations/Abnormal Patterns (Gait) festinating/shuffling;jass decreased;gait speed decreased  -     Bilateral Gait Deviations forward flexed posture;heel strike decreased  -     [place] Level (Stairs) not tested  -           User Key  (r) = Recorded By, (t) = Taken By, (c) = Cosigned By    Initials Name Provider Type     La Nieves PT Physical Therapist               Obj/Interventions     Row Name 08/17/22 1151          Range of Motion Comprehensive    General Range of Motion bilateral lower extremity ROM WFL  -[facility] Name 08/17/22 1151          Strength Comprehensive (MMT)    General Manual Muscle Testing (MMT) Assessment lower extremity strength deficits identified  -     Comment, General Manual Muscle Testing (MMT) Assessment Generalized weakness  -     Row Name 08/17/22 1151          Motor Skills    Motor Skills functional endurance  -[facility] Name 08/17/22 1151          Balance    Balance Assessment sitting static balance;sitting dynamic balance;sit to stand dynamic balance;standing static balance;standing dynamic balance  -     Static Sitting Balance supervision  -     Dynamic Sitting Balance standby assist  -     Position, Sitting Balance sitting edge of bed  -     Sit to Stand Dynamic Balance contact guard  -     Static Standing Balance standby assist  -     Dynamic Standing Balance contact guard  -     Position/Device Used, Standing Balance  supported;walker, front-wheeled  -SM     Balance Interventions sitting;standing;sit to stand;supported;static;dynamic  -SM           User Key  (r) = Recorded By, (t) = Taken By, (c) = Cosigned By    Initials Name Provider Type    SM La Nieves, PT Physical Therapist               Goals/Plan     Row Name 08/17/22 1159          Bed Mobility Goal 1 (PT)    Activity/Assistive Device (Bed Mobility Goal 1, PT) bed mobility activities, all  -SM     [place] Level/Cues Needed (Bed Mobility Goal 1, PT) supervision required  -SM     Time Frame (Bed Mobility Goal 1, PT) 1 week  -SM     Row Name 08/17/22 1159          Transfer Goal 1 (PT)    Activity/Assistive Device (Transfer Goal 1, PT) transfers, all  -SM     [place] Level/Cues Needed (Transfer Goal 1, PT) supervision required  -SM     Time Frame (Transfer Goal 1, PT) 1 week  -SM     Row Name 08/17/22 1153          Gait Training Goal 1 (PT)    Activity/Assistive Device (Gait Training Goal 1, PT) gait (walking locomotion);walker, rolling  -SM     [place] Level (Gait Training Goal 1, PT) standby assist  -SM     Distance (Gait Training Goal 1, PT) 30ft  -SM     Time Frame (Gait Training Goal 1, PT) 1 week  -SM           User Key  (r) = Recorded By, (t) = Taken By, (c) = Cosigned By    Initials Name Provider Type     La Nieves, PT Physical Therapist               Clinical Impression     Row Name 08/17/22 1152          Pain    Pretreatment Pain Rating 0/10 - no pain  -SM     Posttreatment Pain Rating 0/10 - no pain  -SM     Row Name 08/17/22 1152          Plan of Care Review    Plan of Care Reviewed With patient  -     Outcome Evaluation Patient is a 77 y.o female who presents to [facility] with acute COPD exacerbation. Patient AOx4 today. Patient reports she lives alone but son has been staying with her the past 6 months with no leave date know at this time. Patient reports she is independent with ADLs but must sit to perform them due to feeling  fatigued and SOA. Patient ambulates with a rwx at baseline. Patient on 2L O2 at baseline. Today patient SpO2 96% upon arrival while on 2L O2. Patient sat up to EOB with SBA with SpO2 dropping to 88% though quickly up to 91%. SpO2 up to 94% before STS. Patient performed STS with CGA and SpO2 dropping to 91%. Patient took 3 small steps forward and back with rwx and CGA. SpO2 dropped to 85% during ambulation but quickly returned to 91% with seated rest break. Patient returned to supine with SBA. Patient demonstrates decreased activity endurance at this time limited by feeling SOA. Patient would benfit from skilled PT to address deficits. PT recommends possible need for pulmonary rehab at d/c. PT will continue to monitor.  -     Row Name 08/17/22 1152          Therapy Assessment/Plan (PT)    Rehab Potential (PT) good, to achieve stated therapy goals  -     Criteria for Skilled Interventions Met (PT) yes  -SM     Therapy Frequency (PT) 5 times/wk  -     Row Name 08/17/22 1152          Vital Signs    Pre SpO2 (%) 96  -SM     O2 Delivery Pre Treatment supplemental O2  2L  -SM     Intra SpO2 (%) 85  -SM     O2 Delivery Intra Treatment supplemental O2  2L  -SM     Post SpO2 (%) 95  -SM     O2 Delivery Post Treatment supplemental O2  2L  -SM     Pre Patient Position Supine  -SM     Intra Patient Position Standing  -SM     Post Patient Position Supine  -SM     Row Name 08/17/22 1152          Positioning and Restraints    Pre-Treatment Position in bed  -SM     Post Treatment Position bed  -SM     In Bed exit alarm on;encouraged to call for assist;call light within reach  -           User Key  (r) = Recorded By, (t) = Taken By, (c) = Cosigned By    Initials Name Provider Type     La Nieves, PT Physical Therapist               Outcome Measures     Row Name 08/17/22 4322          How much help from another person do you currently need...    Turning from your back to your side while in flat bed without using  bedrails? 4  -SM     Moving from lying on back to sitting on the side of a flat bed without bedrails? 4  -SM     Moving to and from a bed to a chair (including a wheelchair)? 3  -SM     Standing up from a chair using your arms (e.g., wheelchair, bedside chair)? 3  -SM     Climbing 3-5 steps with a railing? 2  -SM     To walk in hospital room? 2  -SM     AM-PAC 6 Clicks Score (PT) 18  -SM     Highest level of mobility 6 --> Walked 10 steps or more  -     Row Name 08/17/22 1159          Functional Assessment    Outcome Measure Options AM-PAC 6 Clicks Basic Mobility (PT)  -           User Key  (r) = Recorded By, (t) = Taken By, (c) = Cosigned By    Initials Name Provider Type     La Nieves PT Physical Therapist                             Physical Therapy Education                 Title: PT OT SLP Therapies (Done)     Topic: Physical Therapy (Done)     Point: Mobility training (Done)     Learning Progress Summary           Patient Acceptance, E, VU by  at 8/17/2022 1201                   Point: Home exercise program (Done)     Learning Progress Summary           Patient Acceptance, E, VU by  at 8/17/2022 1201                   Point: Body mechanics (Done)     Learning Progress Summary           Patient Acceptance, E, VU by  at 8/17/2022 1201                   Point: Precautions (Done)     Learning Progress Summary           Patient Acceptance, E, VU by  at 8/17/2022 1201                               User Key     Initials Effective Dates Name Provider Type Discipline     05/02/22 -  La Nieves PT Physical Therapist PT              PT Recommendation and Plan     Plan of Care Reviewed With: patient  Outcome Evaluation: Patient is a 77 y.o female who presents to [facility] with acute COPD exacerbation. Patient AOx4 today. Patient reports she lives alone but son has been staying with her the past 6 months with no leave date know at this time. Patient reports she is independent with ADLs but must  sit to perform them due to feeling fatigued and SOA. Patient ambulates with a rwx at baseline. Patient on 2L O2 at baseline. Today patient SpO2 96% upon arrival while on 2L O2. Patient sat up to EOB with SBA with SpO2 dropping to 88% though quickly up to 91%. SpO2 up to 94% before STS. Patient performed STS with CGA and SpO2 dropping to 91%. Patient took 3 small steps forward and back with rwx and CGA. SpO2 dropped to 85% during ambulation but quickly returned to 91% with seated rest break. Patient returned to supine with SBA. Patient demonstrates decreased activity endurance at this time limited by feeling SOA. Patient would benfit from skilled PT to address deficits. PT recommends possible need for pulmonary rehab at d/c. PT will continue to monitor.     Time Calculation:    PT Charges     Row Name 08/17/22 1202             Time Calculation    Start Time 1119  -SM      Stop Time 1138  -SM      Time Calculation (min) 19 min  -SM      PT Received On 08/17/22  -      PT - Next Appointment 08/18/22  -      PT Goal Re-Cert Due Date 08/24/22  -              Time Calculation- PT    Total Timed Code Minutes- PT 10 minute(s)  -SM              Timed Charges    77114 - PT Therapeutic Activity Minutes 10  -SM              Total Minutes    Timed Charges Total Minutes 10  -SM       Total Minutes 10  -SM            User Key  (r) = Recorded By, (t) = Taken By, (c) = Cosigned By    Initials Name Provider Type     La Nieves PT Physical Therapist              Therapy Charges for Today     Code Description Service Date Service Provider Modifiers Qty    [id number]  PT THERAPEUTIC ACT EA 15 MIN 8/17/2022 La Nieves, PT GP 1    [id number]  PT EVAL LOW COMPLEXITY 3 8/17/2022 La Nieves, PT GP 1          PT G-Codes  Outcome Measure Options: AM-PAC 6 Clicks Basic Mobility (PT)  AM-PAC 6 Clicks Score (PT): 18  Patient was not wearing a face mask during this therapy encounter. Therapist used appropriate  personal protective equipment including mask and gloves.  Mask used was standard procedure mask. Appropriate PPE was worn during the entire therapy session. Hand hygiene was completed before and after therapy session. Patient is not in enhanced droplet precautions.     La Nieves, PT  8/17/2022

## 2022-08-17 NOTE — CONSULTS
"Nutrition Services    Patient Name:  Celia Baird  YOB: 1945  MRN: [id number]  Admit Date:  8/17/2022      Comment: MD Consult for BMI/ MSA:    Pt stated she has maintained her weight over past 3 months since last admit. Pt stated she actually noticed a few pound wt gain prior to admit d/t retaining fluid. Pt stated she has a good appetite and is just a picky eater. Pt's son brought in food from home for her to snack on. Obtained food prefs for dinner and will honor. Pt stated she like the orange Boost Breeze last time she was here, will order. Provided Boost coupons to pt's family at bedside to use after d/c. Provided \"always available\" menu to pt to order from to assist with meal preferences.     BMI-17.98 (underweight)    Severe Malnutrition (Based on ASPEN/AND criteria, pt meets nutrition diagnosis of Severe Malnutrition of Chronic Disease due to severe fat and muscle wasting noted on NFPE.)    Recommend:  1. Boost Breeze TID (organge), will order.    Will follow per protocol.    CLINICAL NUTRITION ASSESSMENT      Reason for Assessment BMI, Malnutrition Severity Assessment (MSA), Physician Consult     H&P  CC: SOB  Dx: acute COPD exacerbation, acute CHF, CAD, anemia, A-fib, B12 deficiency, tobacco abuse    Past Medical History:   Diagnosis Date   • Atrial fibrillation (HCC)    • CAD (coronary artery disease)    • COPD (chronic obstructive pulmonary disease) (HCC)    • History of frequent urinary tract infections    • Irregular heart beat    • Kidney stones    • PBC (primary biliary cirrhosis)    • PBC (primary biliary cirrhosis)        Past Surgical History:   Procedure Laterality Date   • CARDIAC ELECTROPHYSIOLOGY PROCEDURE N/A 03/30/2017    Procedure:    LINQ;  Surgeon: Ailyn Solorio MD;  Location: [facility] INVASIVE LOCATION;  Service:    • CHOLECYSTECTOMY     • COLONOSCOPY     • COLONOSCOPY N/A 01/28/2022    Procedure: COLONOSCOPY into cecum with polypectomy, clip placement;  " "Surgeon: Constantine Kaye MD;  Location:  [facility];  Service: Gastroenterology;  Laterality: N/A;  poor prep, diverticulosis, polyp   • CORONARY STENT PLACEMENT      proximal circumflex   • ENDOSCOPY N/A 01/28/2022    Procedure: ESOPHAGOGASTRODUODENOSCOPY with biopsy;  Surgeon: Constantine Kaye MD;  Location:  [facility];  Service: Gastroenterology;  Laterality: N/A;  normal   • TUBAL ABDOMINAL LIGATION            Nutritional Risk Screening       MST SCORE 0   Risk Screening Criteria No indicators     Encounter Information        Nutrition/Diet History:    Pt stated she has maintained her weight over past 3 months since last admit. Pt stated she actually noticed a few pound wt gain prior to admit d/t retaining fluid. Pt stated she has a good appetite and is just a picky eater. Pt's son brought in food from home for her to snack on. Obtained food prefs for dinner and will honor. Pt stated she like the orange Boost Breeze last time she was here, will order. Provided Boost coupons to pt's family at bedside to use after d/c. Provided \"always available\" menu to pt to order from to assist with meal preferences.    Food Preferences:      Supplements: Likes orange Boost Breeze, dislikes regular Boost d/t too heavy.     Factors Affecting Intake: early satiety , limited food preferences     Tests/Procedures: X-Ray       Anthropometrics        Current Height  Current Weight  BMI kg/m2 Height: 149.9 cm (59\")  Weight: 40.4 kg (89 lb) (08/17/22 0252)  Body mass index is 17.98 kg/m².       Admission Weight Weight: 40.4 kg (89 lb)    Ideal Body Weight (IBW) 48.8 kg (107 lb)   Usual Body Weight (UBW) 85-90 lb       Trending Weight Hx     Weight Change No change x 3 months             PTA: Wt Readings from Last 30 Encounters:   08/17/22 0252 40.4 kg (89 lb)   06/08/22 1251 39.9 kg (88 lb)   05/10/22 0506 40.5 kg (89 lb 3.2 oz)   05/09/22 0158 39.4 kg (86 lb 14.4 oz)   05/08/22 0353 39 kg (85 lb 14.4 oz)   05/06/22 " 0519 38.4 kg (84 lb 9.6 oz)   05/04/22 1507 38.5 kg (84 lb 14.4 oz)   03/28/22 1352 36.7 kg (81 lb)   02/23/22 1427 37.2 kg (82 lb)   02/17/22 1329 36.7 kg (80 lb 12.8 oz)   02/07/22 0535 34.7 kg (76 lb 9.6 oz)   02/06/22 0412 34.7 kg (76 lb 9.6 oz)   02/05/22 0646 34.7 kg (76 lb 6.4 oz)   02/03/22 0531 36.2 kg (79 lb 12.8 oz)   02/02/22 0543 34.6 kg (76 lb 4.8 oz)   02/01/22 0525 35.6 kg (78 lb 6.4 oz)   01/31/22 1349 35.8 kg (79 lb)   01/31/22 0530 35.8 kg (79 lb)   01/30/22 0529 34.9 kg (77 lb)   01/29/22 0507 35.1 kg (77 lb 4.8 oz)   01/28/22 0425 40.8 kg (89 lb 14.4 oz)   01/26/22 2334 40 kg (88 lb 3.2 oz)   01/26/22 1625 35.8 kg (79 lb)   01/21/22 0359 35.4 kg (78 lb 0.7 oz)   01/20/22 0450 34.1 kg (75 lb 3.2 oz)   01/19/22 0548 32.9 kg (72 lb 8.5 oz)   01/18/22 0403 31.9 kg (70 lb 4.8 oz)   01/17/22 0328 32.6 kg (71 lb 12.8 oz)   01/14/22 1151 36.3 kg (80 lb)   01/13/22 1221 36.3 kg (80 lb)   06/08/20 1201 44 kg (97 lb)   04/13/20 1224 46 kg (101 lb 6.4 oz)   03/21/20 0520 43.6 kg (96 lb 3.2 oz)   03/21/20 0032 44.1 kg (97 lb 3.6 oz)   03/20/20 2101 46.3 kg (102 lb)   03/11/19 1230 47.6 kg (105 lb)   03/05/19 1118 47.6 kg (105 lb)   02/26/19 1425 47.6 kg (105 lb)   02/18/19 1220 47.6 kg (105 lb)   08/20/18 1211 47.6 kg (105 lb)   02/19/18 1209 48.5 kg (107 lb)   07/07/17 1305 52.6 kg (116 lb)   04/12/17 1531 54 kg (119 lb)   03/30/17 0500 51.5 kg (113 lb 9.6 oz)   03/29/17 0500 50.2 kg (110 lb 11.2 oz)   03/26/17 1143 53.5 kg (118 lb)   02/26/17 1308 52.6 kg (116 lb)   02/26/17 0902 52.6 kg (116 lb)   05/03/13 1118 53.5 kg (117 lb 15.8 oz)   04/26/13 1233 53.5 kg (117 lb 15.8 oz)   04/09/13 1123 54.4 kg (119 lb 15.9 oz)   02/22/13 1339 54.9 kg (121 lb 1.9 oz)   02/18/13 1510 55.8 kg (122 lb 15.9 oz)   02/01/13 1402 56.8 kg (125 lb 3.2 oz)            Labs       Pertinent Labs    Results from last 7 days   Lab Units 08/17/22  0654 08/17/22  0329   SODIUM mmol/L 141 142   POTASSIUM mmol/L 3.8 4.2   CHLORIDE  mmol/L 103 105   CO2 mmol/L 28.3 29.8*   BUN mg/dL 12 14   CREATININE mg/dL 0.73 0.78   CALCIUM mg/dL 8.8 8.6   BILIRUBIN mg/dL  --  <0.2   ALK PHOS U/L  --  101   ALT (SGPT) U/L  --  18   AST (SGOT) U/L  --  15   GLUCOSE mg/dL 148* 130*     Results from last 7 days   Lab Units 08/17/22  1313 08/17/22  0654 08/17/22  0329   MAGNESIUM mg/dL  --   --  1.7   HEMOGLOBIN g/dL 8.8* 6.1* 5.8*   HEMATOCRIT % 28.9* 20.4* 20.1*   WBC 10*3/mm3  --  7.59 8.42     Results from last 7 days   Lab Units 08/17/22  0654 08/17/22  0329   PLATELETS 10*3/mm3 341 360     COVID19   Date Value Ref Range Status   08/17/2022 Not Detected Not Detected - Ref. Range Final     Lab Results   Component Value Date    HGBA1C 5.50 01/14/2022          Medications           Scheduled Medications amiodarone, 200 mg, Oral, BID  cyanocobalamin, 1,000 mcg, Intramuscular, Daily  folic acid, 2 mg, Oral, Daily  furosemide, 40 mg, Intravenous, Q12H  ipratropium-albuterol, 3 mL, Nebulization, 4x Daily - RT  mirtazapine, 7.5 mg, Oral, Nightly  pantoprazole, 40 mg, Intravenous, Q12H  rosuvastatin, 5 mg, Oral, Daily  sodium chloride, 10 mL, Intravenous, Q12H  sodium chloride, 4 mL, Nebulization, BID - RT  sucralfate, 1 g, Oral, 4x Daily AC & at Bedtime  ursodiol, 300 mg, Oral, TID With Meals  vitamin B-12, 1,000 mcg, Oral, Daily  vitamin B-6, 25 mg, Oral, Daily       Infusions     PRN Medications •  acetaminophen **OR** acetaminophen **OR** acetaminophen  •  ALPRAZolam  •  calcium carbonate  •  ipratropium-albuterol  •  nitroglycerin  •  O2  •  ondansetron **OR** ondansetron  •  [COMPLETED] Insert peripheral IV **AND** sodium chloride  •  sodium chloride     Physical Findings        Physical Appearance alert, loss of muscle mass, loss of subcutaneous fat, oriented, on oxygen therapy, underweight     NFPE Consented to exam   --  Malnutrition Severity Assessment      Patient meets criteria for : Severe Malnutrition (Based on ASPEN/AND criteria, pt meets nutrition  diagnosis of Severe Malnutrition of Chronic Disease due to severe fat and muscle wasting noted on NFPE.)  Malnutrition Type (last 8 hours)     Malnutrition Severity Assessment     Row Name 08/17/22 1616       Malnutrition Severity Assessment    Malnutrition Type Chronic Disease - Related Malnutrition    [facility] Name 08/17/22 1616       Insufficient Energy Intake     Insufficient Energy Intake Findings Mild    Row Name 08/17/22 1616       Unintentional Weight Loss     Unintentional Weight Loss Findings Mild    Row Name 08/17/22 1616       Muscle Loss    Loss of Muscle Mass Findings Severe    [place] Region Severe - deep hollowing/scooping, lack of muscle to touch, facial bones well defined    Clavicle Bone Region Severe - protruding prominent bone    Acromion Bone Region Severe - squared shoulders, bones, and acromion process protrusion prominent    Scapular Bone Region Severe - prominent bones, depressions easily visible between ribs, scapula, spine, shoulders    Dorsal Hand Region Severe - prominent depression    Patellar Region Severe - prominent bone, square looking, very little muscle definition    Anterior Thigh Region Severe - line/depression along thigh, obviously thin    Posterior Calf Region Severe - thin with very little definition/firmness    Row Name 08/17/22 1616       Fat Loss    Subcutaneous Fat Loss Findings Severe    Orbital Region  Severe - pronounced hollowness/depression, dark circles, loose saggy skin    Upper Arm Region Severe - mostly skin, very little space between folds, fingers touch    Row Name 08/17/22 1616       Criteria Met (Must meet criteria for severity in at least 2 of these categories: M Wasting, Fat Loss, Fluid, Secondary Signs, Wt. Status, Intake)    Patient meets criteria for  Severe Malnutrition  Based on ASPEN/AND criteria, pt meets nutrition diagnosis of Severe Malnutrition of Chronic Disease due to severe fat and muscle wasting noted on NFPE.                   Edema  no edema  "  Gastrointestinal normoactive, last bowel movement:8/16   Tubes/Drains none   Oral/Mouth Cavity WNL   Skin skin intact   --  Estimated/Assessed Needs       Energy Requirements    Height for Calculation  Height: 149.9 cm (59\")   Weight for Calculation Weight: 40.4 kg (89 lb)    Method for Estimation  30 kcal/kg, 35 kcal/kg   EST Needs (kcal/day) 5407-8216 kcals       Protein Requirements    Weight for Calculation Weight: 40.4 kg (89 lb)    EST Protein Needs (g/kg) 1.5 gm/kg   EST Daily Needs (g/day) 60g       Fluid Requirements     Estimated Needs (mL/day) 1200 ml     Current Nutrition Orders & Evaluation of Intake       Oral Nutrition     Food Allergies NKFA   Current PO Diet Diet Regular   Supplement n/a   PO Evaluation     Trending % PO Intake 100% po intake of lunch today       --  Nutrition Diagnosis        Nutrition Dx Problem 1 Problem: Malnutrition    Etiology: Factors Affecting Nutrition    Signs/Symptoms: BMI    Comment:      --  Intervention Goal        Intervention Goal(s) Maintain nutrition status, Meet estimated needs, Disease management/therapy, Initiate feeding/diet, Maintain intake and Appropriate weight gain     Nutrition Intervention        RD Action Interview for preferences, Menu provided, Adjusted menu, Encourage intake, Follow Tx Progress, Care plan reviewed and Recommend/ordered:      Nutrition Prescription        Diet Prescription Regular diet   Supplement Prescription Boost Breeze, TID orange   Prescription Ordered yes   --  Monitor/Evaluation        Monitor Per protocol     RD to follow per protocol.      Electronically signed by:  Lu Hamilton RD  08/17/22 16:22 EDT  "

## 2022-08-17 NOTE — ED PROVIDER NOTES
" EMERGENCY DEPARTMENT ENCOUNTER    Room Number:  01/01  Date of encounter:  8/17/2022  PCP: Meenu Brambila APRN  Historian: Patient's      HPI:  Chief Complaint: Shortness of breath  A complete HPI/ROS/PMH/PSH/SH/FH are unobtainable due to: N/A    Context: Celia Baird is a 77 y.o. female with past medical history of COPD on home O2, atrial fibrillation, CAD, and primary biliary cirrhosis who presents to the ED c/o \"I have been fighting COPD for the past 2 to 3 weeks.\"  Patient states that she has had increased cough, wheezing, congestion, and shortness of breath over the past couple weeks.  Symptoms are worse with exertion and now she is to the point where she can only take 1 or 2 steps with her walker before her O2 sats dropped and she becomes short of breath.  Denies any chest pain, fever, sore throat, headaches, body aches, or diarrhea.      PAST MEDICAL HISTORY  Active Ambulatory Problems     Diagnosis Date Noted   • COPD exacerbation (HCC) 02/26/2017   • Subarachnoid hemorrhage (HCC) 03/26/2017   • Multiple skin tears 03/30/2017   • Closed nondisplaced fracture of left clavicle 03/30/2017   • Paroxysmal atrial fibrillation (HCC) 02/19/2018   • Tobacco abuse 02/19/2018   • Status post placement of implantable loop recorder 02/19/2018   • SOB (shortness of breath) 02/18/2019   • COPD with acute exacerbation (HCC) 03/20/2020   • Severe malnutrition (CMS/HCC) 01/15/2022   • Leukocytosis 01/18/2022   • Gastrointestinal hemorrhage 01/26/2022   • Absolute anemia 01/27/2022   • Hyperlipidemia 03/29/2022   • Long term (current) use of antithrombotics/antiplatelets 03/29/2022   • COPD with exacerbation (HCC) 05/04/2022     Resolved Ambulatory Problems     Diagnosis Date Noted   • Chronic atrial fibrillation (HCC) 03/30/2017     Past Medical History:   Diagnosis Date   • Atrial fibrillation (HCC)    • CAD (coronary artery disease)    • COPD (chronic obstructive pulmonary disease) ([facility])    • History of frequent urinary " tract infections    • Irregular heart beat    • Kidney stones    • PBC (primary biliary cirrhosis)    • PBC (primary biliary cirrhosis)          PAST SURGICAL HISTORY  Past Surgical History:   Procedure Laterality Date   • CARDIAC ELECTROPHYSIOLOGY PROCEDURE N/A 03/30/2017    Procedure:    LINQ;  Surgeon: Ailyn Solorio MD;  Location: [facility] CATH INVASIVE LOCATION;  Service:    • CHOLECYSTECTOMY     • COLONOSCOPY     • COLONOSCOPY N/A 01/28/2022    Procedure: COLONOSCOPY into cecum with polypectomy, clip placement;  Surgeon: Constantine Kaye MD;  Location: [facility] ENDOSCOPY;  Service: Gastroenterology;  Laterality: N/A;  poor prep, diverticulosis, polyp   • CORONARY STENT PLACEMENT      proximal circumflex   • ENDOSCOPY N/A 01/28/2022    Procedure: ESOPHAGOGASTRODUODENOSCOPY with biopsy;  Surgeon: Constantine Kaye MD;  Location: [facility] ENDOSCOPY;  Service: Gastroenterology;  Laterality: N/A;  normal   • TUBAL ABDOMINAL LIGATION           FAMILY HISTORY  Family History   Problem Relation Age of Onset   • Heart disease Father    • Heart attack Father    • Heart disease Brother    • Stroke Mother          SOCIAL HISTORY  Social History     Socioeconomic History   • Marital status:    Tobacco Use   • Smoking status: Current Some Day Smoker     Packs/day: 0.50     Years: 59.00     Pack years: 29.50     Types: Cigarettes   • Smokeless tobacco: Never Used   • Tobacco comment: 2 - 3 CIGARETTES A DAY   Vaping Use   • Vaping Use: Never used   Substance and Sexual Activity   • Alcohol use: No   • Drug use: No   • Sexual activity: Defer     Birth control/protection: Post-menopausal, Surgical         ALLERGIES  Morphine and related, Levaquin [levofloxacin], and Sulfa antibiotics        REVIEW OF SYSTEMS  Review of Systems     All systems reviewed and negative except for those discussed in HPI.       PHYSICAL EXAM    I have reviewed the triage vital signs and nursing notes.    ED Triage Vitals [08/17/22 4236]    Temp Heart Rate Resp BP SpO2   97.7 °F (36.5 °C) 74 20 127/60 98 %      Temp src Heart Rate Source Patient Position BP Location FiO2 (%)   Oral -- -- -- --       Physical Exam  GENERAL: Alert and oriented x3, patient thin and frail and appears older than stated age   HENT: nares patent  EYES: no scleral icterus, PERRL, EOMI  CV: regular rhythm, regular rate  RESPIRATORY: normal effort, prolonged expirations and bilateral rhonchi  ABDOMEN: soft/nontender  RECTAL: Hemoccult negative, performed with Mala WARD present  MUSCULOSKELETAL: no deformity, no pedal edema or calf tenderness  NEURO: alert, moves all extremities, follows commands  SKIN: warm, dry, patient with diffuse edema        LAB RESULTS  Recent Results (from the past 24 hour(s))   ECG 12 Lead    Collection Time: 08/17/22  3:19 AM   Result Value Ref Range    QT Interval 362 ms   Comprehensive Metabolic Panel    Collection Time: 08/17/22  3:29 AM    Specimen: Blood   Result Value Ref Range    Glucose 130 (H) 65 - 99 mg/dL    BUN 14 8 - 23 mg/dL    Creatinine 0.78 0.57 - 1.00 mg/dL    Sodium 142 136 - 145 mmol/L    Potassium 4.2 3.5 - 5.2 mmol/L    Chloride 105 98 - 107 mmol/L    CO2 29.8 (H) 22.0 - 29.0 mmol/L    Calcium 8.6 8.6 - 10.5 mg/dL    Total Protein 5.2 (L) 6.0 - 8.5 g/dL    Albumin 3.30 (L) 3.50 - 5.20 g/dL    ALT (SGPT) 18 1 - 33 U/L    AST (SGOT) 15 1 - 32 U/L    Alkaline Phosphatase 101 39 - 117 U/L    Total Bilirubin <0.2 0.0 - 1.2 mg/dL    Globulin 1.9 gm/dL    A/G Ratio 1.7 g/dL    BUN/Creatinine Ratio 17.9 7.0 - 25.0    Anion Gap 7.2 5.0 - 15.0 mmol/L    eGFR 78.3 >60.0 mL/min/1.73   Troponin    Collection Time: 08/17/22  3:29 AM    Specimen: Blood   Result Value Ref Range    Troponin T <0.010 0.000 - 0.030 ng/mL   BNP    Collection Time: 08/17/22  3:29 AM    Specimen: Blood   Result Value Ref Range    proBNP 659.0 0.0 - 1,800.0 pg/mL   Magnesium    Collection Time: 08/17/22  3:29 AM    Specimen: Blood   Result Value Ref Range    Magnesium  1.7 1.6 - 2.4 mg/dL   Procalcitonin    Collection Time: 08/17/22  3:29 AM    Specimen: Blood   Result Value Ref Range    Procalcitonin 0.04 0.00 - 0.25 ng/mL   CBC Auto Differential    Collection Time: 08/17/22  3:29 AM    Specimen: Blood   Result Value Ref Range    WBC 8.42 3.40 - 10.80 10*3/mm3    RBC 2.50 (L) 3.77 - 5.28 10*6/mm3    Hemoglobin 5.8 (C) 12.0 - 15.9 g/dL    Hematocrit 20.1 (C) 34.0 - 46.6 %    MCV 80.4 79.0 - 97.0 fL    MCH 23.2 (L) 26.6 - 33.0 pg    MCHC 28.9 (L) 31.5 - 35.7 g/dL    RDW 15.0 12.3 - 15.4 %    RDW-SD 43.5 37.0 - 54.0 fl    MPV 11.0 6.0 - 12.0 fL    Platelets 360 140 - 450 10*3/mm3    Neutrophil % 80.4 (H) 42.7 - 76.0 %    Lymphocyte % 7.6 (L) 19.6 - 45.3 %    Monocyte % 11.4 5.0 - 12.0 %    Eosinophil % 0.1 (L) 0.3 - 6.2 %    Basophil % 0.1 0.0 - 1.5 %    Immature Grans % 0.4 0.0 - 0.5 %    Neutrophils, Absolute 6.77 1.70 - 7.00 10*3/mm3    Lymphocytes, Absolute 0.64 (L) 0.70 - 3.10 10*3/mm3    Monocytes, Absolute 0.96 (H) 0.10 - 0.90 10*3/mm3    Eosinophils, Absolute 0.01 0.00 - 0.40 10*3/mm3    Basophils, Absolute 0.01 0.00 - 0.20 10*3/mm3    Immature Grans, Absolute 0.03 0.00 - 0.05 10*3/mm3    nRBC 0.1 0.0 - 0.2 /100 WBC   Respiratory Panel PCR w/COVID-19(SARS-CoV-2) DARBY/VITA/BILL/PAD/COR/MAD/XIOMY In-House, NP Swab in UTM/[facility] 3-4 HR TAT - Swab, Nasopharynx    Collection Time: 08/17/22  3:41 AM    Specimen: Nasopharynx; Swab   Result Value Ref Range    ADENOVIRUS, PCR Not Detected Not Detected    Coronavirus 229E Not Detected Not Detected    Coronavirus HKU1 Not Detected Not Detected    Coronavirus NL63 Not Detected Not Detected    Coronavirus OC43 Not Detected Not Detected    COVID19 Not Detected Not Detected - Ref. Range    Human Metapneumovirus Not Detected Not Detected    Human Rhinovirus/Enterovirus Not Detected Not Detected    Influenza A PCR Not Detected Not Detected    Influenza B PCR Not Detected Not Detected    Parainfluenza Virus 1 Not Detected Not Detected     Parainfluenza Virus 2 Not Detected Not Detected    Parainfluenza Virus 3 Not Detected Not Detected    Parainfluenza Virus 4 Not Detected Not Detected    RSV, PCR Not Detected Not Detected    Bordetella pertussis pcr Not Detected Not Detected    Bordetella parapertussis PCR Not Detected Not Detected    Chlamydophila pneumoniae PCR Not Detected Not Detected    Mycoplasma pneumo by PCR Not Detected Not Detected   Type & Screen    Collection Time: 08/17/22  4:18 AM    Specimen: Blood   Result Value Ref Range    ABO Type O     RH type Positive     Antibody Screen Negative     T&S Expiration Date 8/20/2022 11:59:59 PM    Prepare RBC, 2 Units    Collection Time: 08/17/22  5:26 AM   Result Value Ref Range    Product Code Q2856U65     Unit Number [id number]     UNIT  ABO O     UNIT  RH POS     Crossmatch Interpretation Compatible     Dispense Status XM     Blood Expiration Date 202209142359     Blood Type Barcode 5100     Product Code J3836O50     Unit Number [id number]     UNIT  ABO O     UNIT  RH POS     Crossmatch Interpretation Compatible     Dispense Status XM     Blood Expiration Date 202209142359     Blood Type Barcode 5100        Ordered the above labs and independently reviewed the results.        RADIOLOGY  XR Chest 1 View    Result Date: 8/17/2022  Patient: ALBINA FRASER  Time Out: 04:16 Exam(s): FILM CXR 1 VIEW EXAM:   XR Chest, 1 View CLINICAL HISTORY:    Reason for exam: SOA. TECHNIQUE:   Frontal view of the chest. COMPARISON:   05 04 22 FINDINGS:   Lungs:  Indistinct pulmonary vessels bilaterally most compatible with pulmonary vascular congestion.  No change in small calcified granuloma right lower lobe.   Pleural space:  Partially loculated pleural fluid on the right.  Question small left pleural fluid collection.  Negative for pneumothorax.   Heart:  Borderline cardiac enlargement.   Mediastinum:  Unremarkable.   Bones joints:  Unremarkable. IMPRESSION:       Pulmonary vascular congestion and  probable bilateral pleural fluid collections.     Electronically signed by Dante Dennis DO, Diplomate [employer] on 08-17-22 at 0416      I ordered the above noted radiological studies. Reviewed by me and discussed with radiologist.  See dictation for official radiology interpretation.      PROCEDURES    Critical Care  Performed by: Kristopher Pérez PA  Authorized by: Blayne Mata MD     Critical care provider statement:     Critical care time (minutes):  33    Critical care was necessary to treat or prevent imminent or life-threatening deterioration of the following conditions:  Cardiac failure, respiratory failure, shock and circulatory failure    Critical care was time spent personally by me on the following activities:  Ordering and performing treatments and interventions, ordering and review of laboratory studies, ordering and review of radiographic studies, pulse oximetry, re-evaluation of patient's condition, review of old charts, discussions with consultants, evaluation of patient's response to treatment and examination of patient          MEDICATIONS GIVEN IN ER    Medications   sodium chloride 0.9 % flush 10 mL (has no administration in time range)   sodium chloride 0.9 % flush 10 mL (has no administration in time range)   sodium chloride 0.9 % flush 10 mL (has no administration in time range)   nitroglycerin (NITROSTAT) SL tablet 0.4 mg (has no administration in time range)   acetaminophen (TYLENOL) tablet 650 mg (has no administration in time range)     Or   acetaminophen (TYLENOL) 160 MG/5ML solution 650 mg (has no administration in time range)     Or   acetaminophen (TYLENOL) suppository 650 mg (has no administration in time range)   ondansetron (ZOFRAN) tablet 4 mg (has no administration in time range)     Or   ondansetron (ZOFRAN) injection 4 mg (has no administration in time range)   calcium carbonate (TUMS) chewable tablet 500 mg (200 mg elemental) (has no administration  in time range)   methylPREDNISolone sodium succinate (SOLU-Medrol) injection 60 mg (has no administration in time range)   ipratropium-albuterol (DUO-NEB) nebulizer solution 3 mL (has no administration in time range)   ipratropium-albuterol (DUO-NEB) nebulizer solution 3 mL (has no administration in time range)   pantoprazole (PROTONIX) injection 40 mg (has no administration in time range)   ipratropium-albuterol (DUO-NEB) nebulizer solution 3 mL (3 mL Nebulization Given 8/17/22 0445)   methylPREDNISolone sodium succinate (SOLU-Medrol) injection 80 mg (80 mg Intravenous Given 8/17/22 0358)         PROGRESS, DATA ANALYSIS, CONSULTS, AND MEDICAL DECISION MAKING    All labs have been independently reviewed by me.  All radiology studies have been reviewed by me and discussed with radiologist dictating the report.   EKG's independently viewed and interpreted by me.  Discussion below represents my analysis of pertinent findings related to patient's condition, differential diagnosis, treatment plan and final disposition.    DDx: Includes but not limited to COPD exacerbation, CHF, pneumonia, URI, COVID    ED Course as of 08/17/22 0537   Wed Aug 17, 2022   0313 WBC: 8.42 [ABDI]   0323 EKG independently viewed and contemporaneously interpreted by ED physician. Time: 3:19 AM.  Rate 95.  Interpretation: Atrial fibrillation, normal axis, delayed R wave progression, no acute ST changes.  Baseline artifact present. [JG]   0325 Medical history reviewed and significant for: 77-year-old female with history of coronary artery disease with history of prior stenting, atrial fibrillation status post prior watchman's procedure, history of intracranial hemorrhage, not on anticoagulation, history of COPD.  Patient followed by Dr. Gonzales, cardiology. [JG]   0411 Hemoglobin(!!): 5.8 [ABDI]   0411 Hematocrit(!!): 20.1 [ABDI]   0411 Platelets: 360 [ABDI]   0411 Glucose(!): 130 [ABDI]   0411 BUN: 14 [ABDI]   0411 Creatinine: 0.78 [ABDI]   0411 Sodium: 142  [ABDI]   0411 Potassium: 4.2 [ABDI]   0412 Magnesium: 1.7 [ABDI]   0412 Chloride: 105 [ABDI]   0412 CO2(!): 29.8 [ABDI]   0412 Procalcitonin: 0.04 [ABDI]   0412 proBNP: 659.0 [ABDI]   0412 Troponin T: <0.010 [ABDI]   0430 Patient rechecked, breathing improved after steroids and duo nebs.  Informed patient of her low hemoglobin and need for transfusion and admission.  Patient expressed understanding and agrees with plan. [ABDI]   0502 Patient history, ER presentation and evaluation including hemoglobin of 5.8 discussed with Briana SCHMIDT, will admit to Dr. Srivastava. [ABDI]      ED Course User Index  [ABDI] Kristopher Pérez PA  [JG] Blayne Mata MD       MDM: Patient's evaluation was significant for an acute anemia with a hemoglobin of 5.8 and COPD exacerbation.  There is no evidence for infectious process or pneumonia on chest x-ray, patient did not have elevated white count or procalcitonin.  Patient symptoms are improved with DuoNeb and steroids and blood products have been ordered.    PPE: The patient wore a surgical mask throughout the entire patient encounter. I wore an N95.    AS OF 05:37 EDT VITALS:    BP - 102/56  HR - 88  TEMP - 97.7 °F (36.5 °C) (Oral)  O2 SATS - 97%        DIAGNOSIS  Final diagnoses:   Shortness of breath   COPD exacerbation (HCC)   Anemia, unspecified type         DISPOSITION  ADMISSION    Discussed treatment plan and reason for admission with pt/family and admitting physician.  Pt/family voiced understanding of the plan for admission for further testing/treatment as needed.                Kristopher Pérez PA  08/17/22 0537

## 2022-08-17 NOTE — ED NOTES
Pt arrives via EMS from home for shortness of breath and congestion in her chest. Pt normally wears 2L of oxygen all the time. Pt did not notice that her oxygen was not on and felt better when she put the oxygen back on. Pt gave herself a duo-neb treatment before EMS arrival.

## 2022-08-17 NOTE — PLAN OF CARE
Goal Outcome Evaluation:  Plan of Care Reviewed With: patient           Outcome Evaluation: Patient is a 77 y.o female who presents to [facility] with acute COPD exacerbation. Patient AOx4 today. Patient reports she lives alone but son has been staying with her the past 6 months with no leave date know at this time. Patient reports she is independent with ADLs but must sit to perform them due to feeling fatigued and SOA. Patient ambulates with a rwx at baseline. Patient on 2L O2 at baseline. Today patient SpO2 96% upon arrival while on 2L O2. Patient sat up to EOB with SBA with SpO2 dropping to 88% though quickly up to 91%. SpO2 up to 94% before STS. Patient performed STS with CGA and SpO2 dropping to 91%. Patient took 3 small steps forward and back with rwx and CGA. SpO2 dropped to 85% during ambulation but quickly returned to 91% with seated rest break. Patient returned to supine with SBA. Patient demonstrates decreased activity endurance at this time limited by feeling SOA. Patient would benfit from skilled PT to address deficits. PT recommends possible need for pulmonary rehab at d/c. PT will continue to monitor.

## 2022-08-18 LAB
ANION GAP SERPL CALCULATED.3IONS-SCNC: 7.3 MMOL/L (ref 5–15)
BH BB BLOOD EXPIRATION DATE: NORMAL
BH BB BLOOD EXPIRATION DATE: NORMAL
BH BB BLOOD TYPE BARCODE: 5100
BH BB BLOOD TYPE BARCODE: 5100
BH BB DISPENSE STATUS: NORMAL
BH BB DISPENSE STATUS: NORMAL
BH BB PRODUCT CODE: NORMAL
BH BB PRODUCT CODE: NORMAL
BH BB UNIT NUMBER: NORMAL
BH BB UNIT NUMBER: NORMAL
BUN SERPL-MCNC: 14 MG/DL (ref 8–23)
BUN/CREAT SERPL: 15.9 (ref 7–25)
CALCIUM SPEC-SCNC: 8.6 MG/DL (ref 8.6–10.5)
CHLORIDE SERPL-SCNC: 100 MMOL/L (ref 98–107)
CO2 SERPL-SCNC: 31.7 MMOL/L (ref 22–29)
CREAT SERPL-MCNC: 0.88 MG/DL (ref 0.57–1)
CROSSMATCH INTERPRETATION: NORMAL
CROSSMATCH INTERPRETATION: NORMAL
DEPRECATED RDW RBC AUTO: 42.6 FL (ref 37–54)
EGFRCR SERPLBLD CKD-EPI 2021: 67.8 ML/MIN/1.73
ERYTHROCYTE [DISTWIDTH] IN BLOOD BY AUTOMATED COUNT: 15.7 % (ref 12.3–15.4)
FERRITIN SERPL-MCNC: 28.2 NG/ML (ref 13–150)
GLUCOSE SERPL-MCNC: 95 MG/DL (ref 65–99)
HCT VFR BLD AUTO: 25.8 % (ref 34–46.6)
HCT VFR BLD AUTO: 27.9 % (ref 34–46.6)
HCT VFR BLD AUTO: 29.8 % (ref 34–46.6)
HCT VFR BLD AUTO: 30.3 % (ref 34–46.6)
HGB BLD-MCNC: 8.2 G/DL (ref 12–15.9)
HGB BLD-MCNC: 8.6 G/DL (ref 12–15.9)
HGB BLD-MCNC: 9.1 G/DL (ref 12–15.9)
HGB BLD-MCNC: 9.3 G/DL (ref 12–15.9)
IRON 24H UR-MRATE: 27 MCG/DL (ref 37–145)
IRON SATN MFR SERPL: 8 % (ref 20–50)
MAGNESIUM SERPL-MCNC: 2 MG/DL (ref 1.6–2.4)
MCH RBC QN AUTO: 24.6 PG (ref 26.6–33)
MCHC RBC AUTO-ENTMCNC: 31.8 G/DL (ref 31.5–35.7)
MCV RBC AUTO: 77.2 FL (ref 79–97)
PLATELET # BLD AUTO: 311 10*3/MM3 (ref 140–450)
PMV BLD AUTO: 10.8 FL (ref 6–12)
POTASSIUM SERPL-SCNC: 3.2 MMOL/L (ref 3.5–5.2)
RBC # BLD AUTO: 3.34 10*6/MM3 (ref 3.77–5.28)
SODIUM SERPL-SCNC: 139 MMOL/L (ref 136–145)
TIBC SERPL-MCNC: 319 MCG/DL (ref 298–536)
TRANSFERRIN SERPL-MCNC: 214 MG/DL (ref 200–360)
TROPONIN T SERPL-MCNC: 0.01 NG/ML (ref 0–0.03)
UNIT  ABO: NORMAL
UNIT  ABO: NORMAL
UNIT  RH: NORMAL
UNIT  RH: NORMAL
WBC NRBC COR # BLD: 9.39 10*3/MM3 (ref 3.4–10.8)

## 2022-08-18 PROCEDURE — 97530 THERAPEUTIC ACTIVITIES: CPT

## 2022-08-18 PROCEDURE — 25010000002 CYANOCOBALAMIN PER 1000 MCG: Performed by: HOSPITALIST

## 2022-08-18 PROCEDURE — 93005 ELECTROCARDIOGRAM TRACING: CPT

## 2022-08-18 PROCEDURE — 94799 UNLISTED PULMONARY SVC/PX: CPT

## 2022-08-18 PROCEDURE — 94760 N-INVAS EAR/PLS OXIMETRY 1: CPT

## 2022-08-18 PROCEDURE — 80048 BASIC METABOLIC PNL TOTAL CA: CPT | Performed by: HOSPITALIST

## 2022-08-18 PROCEDURE — 36415 COLL VENOUS BLD VENIPUNCTURE: CPT | Performed by: NURSE PRACTITIONER

## 2022-08-18 PROCEDURE — 25010000002 FUROSEMIDE PER 20 MG

## 2022-08-18 PROCEDURE — 93010 ELECTROCARDIOGRAM REPORT: CPT | Performed by: INTERNAL MEDICINE

## 2022-08-18 PROCEDURE — 85014 HEMATOCRIT: CPT | Performed by: NURSE PRACTITIONER

## 2022-08-18 PROCEDURE — 25010000002 ONDANSETRON PER 1 MG: Performed by: NURSE PRACTITIONER

## 2022-08-18 PROCEDURE — 83540 ASSAY OF IRON: CPT | Performed by: HOSPITALIST

## 2022-08-18 PROCEDURE — 94762 N-INVAS EAR/PLS OXIMTRY CONT: CPT

## 2022-08-18 PROCEDURE — 84484 ASSAY OF TROPONIN QUANT: CPT

## 2022-08-18 PROCEDURE — 97166 OT EVAL MOD COMPLEX 45 MIN: CPT

## 2022-08-18 PROCEDURE — 83735 ASSAY OF MAGNESIUM: CPT | Performed by: HOSPITALIST

## 2022-08-18 PROCEDURE — 94664 DEMO&/EVAL PT USE INHALER: CPT

## 2022-08-18 PROCEDURE — 82728 ASSAY OF FERRITIN: CPT | Performed by: HOSPITALIST

## 2022-08-18 PROCEDURE — 94761 N-INVAS EAR/PLS OXIMETRY MLT: CPT

## 2022-08-18 PROCEDURE — 99232 SBSQ HOSP IP/OBS MODERATE 35: CPT | Performed by: NURSE PRACTITIONER

## 2022-08-18 PROCEDURE — 85018 HEMOGLOBIN: CPT | Performed by: NURSE PRACTITIONER

## 2022-08-18 PROCEDURE — 85027 COMPLETE CBC AUTOMATED: CPT | Performed by: HOSPITALIST

## 2022-08-18 PROCEDURE — 84466 ASSAY OF TRANSFERRIN: CPT | Performed by: HOSPITALIST

## 2022-08-18 RX ORDER — POTASSIUM CHLORIDE 750 MG/1
40 TABLET, FILM COATED, EXTENDED RELEASE ORAL AS NEEDED
Status: DISCONTINUED | OUTPATIENT
Start: 2022-08-18 | End: 2022-08-20 | Stop reason: HOSPADM

## 2022-08-18 RX ORDER — ASPIRIN 81 MG/1
81 TABLET ORAL DAILY
Status: DISCONTINUED | OUTPATIENT
Start: 2022-08-18 | End: 2022-08-20 | Stop reason: HOSPADM

## 2022-08-18 RX ORDER — MAGNESIUM SULFATE HEPTAHYDRATE 40 MG/ML
4 INJECTION, SOLUTION INTRAVENOUS AS NEEDED
Status: DISCONTINUED | OUTPATIENT
Start: 2022-08-18 | End: 2022-08-20 | Stop reason: HOSPADM

## 2022-08-18 RX ORDER — POTASSIUM CHLORIDE 750 MG/1
20 TABLET, FILM COATED, EXTENDED RELEASE ORAL DAILY
Status: DISCONTINUED | OUTPATIENT
Start: 2022-08-18 | End: 2022-08-20 | Stop reason: HOSPADM

## 2022-08-18 RX ORDER — CLOPIDOGREL BISULFATE 75 MG/1
75 TABLET ORAL DAILY
Status: DISCONTINUED | OUTPATIENT
Start: 2022-08-18 | End: 2022-08-20 | Stop reason: HOSPADM

## 2022-08-18 RX ORDER — POTASSIUM CHLORIDE 1.5 G/1.77G
40 POWDER, FOR SOLUTION ORAL AS NEEDED
Status: DISCONTINUED | OUTPATIENT
Start: 2022-08-18 | End: 2022-08-20 | Stop reason: HOSPADM

## 2022-08-18 RX ORDER — MAGNESIUM SULFATE HEPTAHYDRATE 40 MG/ML
2 INJECTION, SOLUTION INTRAVENOUS AS NEEDED
Status: DISCONTINUED | OUTPATIENT
Start: 2022-08-18 | End: 2022-08-20 | Stop reason: HOSPADM

## 2022-08-18 RX ORDER — POTASSIUM CHLORIDE 7.45 MG/ML
10 INJECTION INTRAVENOUS
Status: DISCONTINUED | OUTPATIENT
Start: 2022-08-18 | End: 2022-08-20 | Stop reason: HOSPADM

## 2022-08-18 RX ADMIN — IPRATROPIUM BROMIDE AND ALBUTEROL SULFATE 3 ML: .5; 3 SOLUTION RESPIRATORY (INHALATION) at 19:31

## 2022-08-18 RX ADMIN — Medication 25 MG: at 09:39

## 2022-08-18 RX ADMIN — SODIUM CHLORIDE SOLN NEBU 7% 4 ML: 7 NEBU SOLN at 23:58

## 2022-08-18 RX ADMIN — POTASSIUM CHLORIDE 40 MEQ: 750 TABLET, EXTENDED RELEASE ORAL at 09:50

## 2022-08-18 RX ADMIN — SODIUM CHLORIDE SOLN NEBU 7% 4 ML: 7 NEBU SOLN at 07:07

## 2022-08-18 RX ADMIN — Medication 10 ML: at 20:13

## 2022-08-18 RX ADMIN — URSODIOL 300 MG: 300 CAPSULE ORAL at 09:38

## 2022-08-18 RX ADMIN — ASPIRIN 81 MG: 81 TABLET, COATED ORAL at 17:47

## 2022-08-18 RX ADMIN — ONDANSETRON 4 MG: 2 INJECTION INTRAMUSCULAR; INTRAVENOUS at 16:27

## 2022-08-18 RX ADMIN — ROSUVASTATIN CALCIUM 5 MG: 5 TABLET, FILM COATED ORAL at 09:38

## 2022-08-18 RX ADMIN — PANTOPRAZOLE SODIUM 40 MG: 40 INJECTION, POWDER, FOR SOLUTION INTRAVENOUS at 20:13

## 2022-08-18 RX ADMIN — AMIODARONE HYDROCHLORIDE 200 MG: 200 TABLET ORAL at 20:12

## 2022-08-18 RX ADMIN — IPRATROPIUM BROMIDE AND ALBUTEROL SULFATE 3 ML: .5; 3 SOLUTION RESPIRATORY (INHALATION) at 15:02

## 2022-08-18 RX ADMIN — IPRATROPIUM BROMIDE AND ALBUTEROL SULFATE 3 ML: .5; 3 SOLUTION RESPIRATORY (INHALATION) at 23:56

## 2022-08-18 RX ADMIN — IPRATROPIUM BROMIDE AND ALBUTEROL SULFATE 3 ML: .5; 3 SOLUTION RESPIRATORY (INHALATION) at 07:07

## 2022-08-18 RX ADMIN — FUROSEMIDE 40 MG: 10 INJECTION, SOLUTION INTRAMUSCULAR; INTRAVENOUS at 17:47

## 2022-08-18 RX ADMIN — POTASSIUM CHLORIDE 40 MEQ: 750 TABLET, EXTENDED RELEASE ORAL at 18:06

## 2022-08-18 RX ADMIN — CLOPIDOGREL 75 MG: 75 TABLET, FILM COATED ORAL at 17:47

## 2022-08-18 RX ADMIN — URSODIOL 300 MG: 300 CAPSULE ORAL at 17:47

## 2022-08-18 RX ADMIN — AMIODARONE HYDROCHLORIDE 200 MG: 200 TABLET ORAL at 09:38

## 2022-08-18 RX ADMIN — SUCRALFATE 1 G: 1 TABLET ORAL at 06:50

## 2022-08-18 RX ADMIN — CYANOCOBALAMIN 1000 MCG: 1000 INJECTION, SOLUTION INTRAMUSCULAR; SUBCUTANEOUS at 12:05

## 2022-08-18 RX ADMIN — MIRTAZAPINE 7.5 MG: 15 TABLET, FILM COATED ORAL at 20:12

## 2022-08-18 RX ADMIN — SUCRALFATE 1 G: 1 TABLET ORAL at 20:13

## 2022-08-18 RX ADMIN — Medication 1000 MCG: at 09:38

## 2022-08-18 RX ADMIN — SUCRALFATE 1 G: 1 TABLET ORAL at 17:47

## 2022-08-18 RX ADMIN — Medication 2 MG: at 09:38

## 2022-08-18 RX ADMIN — PANTOPRAZOLE SODIUM 40 MG: 40 INJECTION, POWDER, FOR SOLUTION INTRAVENOUS at 09:39

## 2022-08-18 RX ADMIN — URSODIOL 300 MG: 300 CAPSULE ORAL at 12:05

## 2022-08-18 RX ADMIN — FUROSEMIDE 40 MG: 10 INJECTION, SOLUTION INTRAMUSCULAR; INTRAVENOUS at 05:32

## 2022-08-18 RX ADMIN — IPRATROPIUM BROMIDE AND ALBUTEROL SULFATE 3 ML: .5; 3 SOLUTION RESPIRATORY (INHALATION) at 10:51

## 2022-08-18 RX ADMIN — SUCRALFATE 1 G: 1 TABLET ORAL at 12:05

## 2022-08-18 NOTE — THERAPY EVALUATION
Patient Name: Celia Baird  : 1945    MRN: [id number]                              Today's Date: 2022       Admit Date: 2022    Visit Dx:     ICD-10-CM ICD-9-CM   1. Shortness of breath  R06.02 786.05   2. COPD exacerbation (HCC)  J44.1 491.21   3. Anemia, unspecified type  D64.9 285.9   4. Long term (current) use of antithrombotics/antiplatelets  Z79.02 V58.63   5. Paroxysmal atrial fibrillation (HCC)  I48.0 427.31     Patient Active Problem List   Diagnosis   • COPD exacerbation (HCC)   • Subarachnoid hemorrhage (HCC)   • Multiple skin tears   • Closed nondisplaced fracture of left clavicle   • Paroxysmal atrial fibrillation (HCC)   • Tobacco abuse   • Status post placement of implantable loop recorder   • SOB (shortness of breath)   • COPD with acute exacerbation (HCC)   • Severe malnutrition (CMS/HCC)   • Leukocytosis   • Gastrointestinal hemorrhage   • Symptomatic anemia   • Hyperlipidemia   • Long term (current) use of antithrombotics/antiplatelets   • COPD with exacerbation (HCC)   • Shortness of breath   • Diastolic CHF, acute (HCC)   • CAD (coronary artery disease)   • B12 deficiency   • Underweight     Past Medical History:   Diagnosis Date   • Atrial fibrillation (HCC)    • CAD (coronary artery disease)    • COPD (chronic obstructive pulmonary disease) (HCC)    • History of frequent urinary tract infections    • Irregular heart beat    • Kidney stones    • PBC (primary biliary cirrhosis)    • PBC (primary biliary cirrhosis)      Past Surgical History:   Procedure Laterality Date   • CARDIAC ELECTROPHYSIOLOGY PROCEDURE N/A 2017    Procedure:    LINQ;  Surgeon: Ailyn Solorio MD;  Location: [facility] CATH INVASIVE LOCATION;  Service:    • CHOLECYSTECTOMY     • COLONOSCOPY     • COLONOSCOPY N/A 2022    Procedure: COLONOSCOPY into cecum with polypectomy, clip placement;  Surgeon: Constantine Kaye MD;  Location: [facility] ENDOSCOPY;  Service: Gastroenterology;  Laterality:  N/A;  poor prep, diverticulosis, polyp   • CORONARY STENT PLACEMENT      proximal circumflex   • ENDOSCOPY N/A 01/28/2022    Procedure: ESOPHAGOGASTRODUODENOSCOPY with biopsy;  Surgeon: Constantine Kaye MD;  Location: [facility] ENDOSCOPY;  Service: Gastroenterology;  Laterality: N/A;  normal   • TUBAL ABDOMINAL LIGATION        General Information     Row Name 08/18/22 1031          OT Time and Intention    Document Type evaluation;therapy note (daily note)  -LE     Mode of Treatment individual therapy;occupational therapy  -     Row Name 08/18/22 1031          General Information    Patient Profile Reviewed yes  -LE     Prior Level of Function independent:;min assist:;ADL's;transfer  tired and SOA, shaky and using O2 to do ADL  at home  -LE     Existing Precautions/Restrictions fall;oxygen therapy device and L/min  -     Row Name 08/18/22 1031          Living Environment    People in Home child(yesenia), adult  -     Row Name 08/18/22 1031          Cognition    Orientation Status (Cognition) oriented x 4  -     Row Name 08/18/22 1031          Safety Issues, Functional Mobility    Comment, Safety Issues/Impairments (Mobility) no skid socks and gait belt.  -LE           User Key  (r) = Recorded By, (t) = Taken By, (c) = Cosigned By    Initials Name Provider Type    Camila Gee OTR Occupational Therapist                 Mobility/ADL's     Row Name 08/18/22 1032          Bed Mobility    Supine-Sit [place] (Bed Mobility) standby assist  -LE     Assistive Device (Bed Mobility) bed rails  -     Row Name 08/18/22 1032          Transfers    Transfers stand-sit transfer;bed-chair transfer;sit-stand transfer  -LE     Bed-Chair [place] (Transfers) standby assist;contact guard  -MERRY     Assistive Device (Bed-Chair Transfers) walker, front-wheeled  -LE     Sit-Stand [place] (Transfers) standby assist  -LE     Stand-Sit [place] (Transfers) standby assist;contact guard;verbal cues  -     Row Name  08/18/22 1032          Sit-Stand Transfer    Assistive Device (Sit-Stand Transfers) walker, front-wheeled  -MERRY     Row Name 08/18/22 1032          Stand-Sit Transfer    Assistive Device (Stand-Sit Transfers) walker, front-wheeled  -MERRY     [facility] Name 08/18/22 1032          Functional Mobility    Functional Mobility- Ind. Level contact guard assist;standby assist  -LE     Functional Mobility- Device walker, front-wheeled  -MERRY     Functional Mobility-Distance (Feet) --  around bed and back to chair.  -[facility] Name 08/18/22 1032          Activities of Daily Living    BADL Assessment/Intervention toileting;grooming;feeding  -[facility] Name 08/18/22 1032          Toileting Assessment/Training    [place] Level (Toileting) dependent (less than 25% patient effort)  -MERRY     Comment, (Toileting) [place]  -[facility] Name 08/18/22 1032          Self-Feeding Assessment/Training    Comment, (Feeding) denies difficulty to eat.  -MERRY           User Key  (r) = Recorded By, (t) = Taken By, (c) = Cosigned By    Initials Name Provider Type    Camila Gee OTR Occupational Therapist               Obj/Interventions     [facility] Name 08/18/22 1033          Sensory Assessment (Somatosensory)    Sensory Assessment (Somatosensory) UE sensation intact  -[facility] Name 08/18/22 1033          Vision Assessment/Intervention    Visual Impairment/Limitations corrective lenses full-time  -[facility] Name 08/18/22 1033          Range of Motion Comprehensive    General Range of Motion bilateral upper extremity ROM WFL  -[facility] Name 08/18/22 1033          Strength Comprehensive (MMT)    Comment, General Manual Muscle Testing (MMT) Assessment generalized weak but no focal UE weak  -[facility] Name 08/18/22 1033          Balance    Balance Assessment sitting static balance;standing static balance;standing dynamic balance  -LE     Static Sitting Balance standby assist  -LE     Dynamic Sitting Balance standby assist  -LE     Static Standing  Balance standby assist  -LE     Dynamic Standing Balance contact guard  -LE     Position/Device Used, Standing Balance walker, rolling  -LE           User Key  (r) = Recorded By, (t) = Taken By, (c) = Cosigned By    Initials Name Provider Type    Camila Gee OTR Occupational Therapist               Goals/Plan     Row Name 08/18/22 1232          Transfer Goal 1 (OT)    Activity/Assistive Device (Transfer Goal 1, OT) sit-to-stand/stand-to-sit;bed-to-chair/chair-to-bed;toilet  -LE     [place] Level/Cues Needed (Transfer Goal 1, OT) modified independence  -LE     Time Frame (Transfer Goal 1, OT) 2 weeks  -LE     Progress/Outcome (Transfer Goal 1, OT) goal ongoing  -LE     Row Name 08/18/22 1232          Dressing Goal 1 (OT)    Activity/Device (Dressing Goal 1, OT) dressing skills, all  including item retrieval.  -LE     [place]/Cues Needed (Dressing Goal 1, OT) standby assist  -LE     Time Frame (Dressing Goal 1, OT) 2 weeks  -LE     Progress/Outcome (Dressing Goal 1, OT) goal ongoing  -LE     Row Name 08/18/22 1232          Toileting Goal 1 (OT)    Activity/Device (Toileting Goal 1, OT) toileting skills, all  -LE     [place] Level/Cues Needed (Toileting Goal 1, OT) modified independence  -LE     Time Frame (Toileting Goal 1, OT) 2 weeks  -LE     Progress/Outcome (Toileting Goal 1, OT) goal ongoing  -LE     Row Name 08/18/22 1232          Problem Specific Goal 1 (OT)    Problem Specific Goal 1 (OT) SBA for ambulation to/from bathroom with walker.  -LE     Time Frame (Problem Specific Goal 1, OT) 2 weeks  -LE     Progress/Outcome (Problem Specific Goal 1, OT) goal ongoing  -LE     Row Name 08/18/22 1232          Therapy Assessment/Plan (OT)    Planned Therapy Interventions (OT) activity tolerance training;BADL retraining;transfer/mobility retraining;patient/caregiver education/training;occupation/activity based interventions  -LE           User Key  (r) = Recorded By, (t) = Taken By, (c) = Cosigned By     Initials Name Provider Type    Camila Gee, OTR Occupational Therapist               Clinical Impression     Row Name 08/18/22 1034          Pain Assessment    Pretreatment Pain Rating 0/10 - no pain  -MERRY     Row Name 08/18/22 1034          Plan of Care Review    Plan of Care Reviewed With patient  -LE     Outcome Evaluation Pt admit from home with SOA. Pt's son has been staying with pt for months but he works full time and pt is alone during day and has been completing his ADL.  Pt seen by OT and is able to move to sit EOB, then pivot to chair with sats 84% on O2.  After seated rest sats up to 95% and pt able to walk around bed and back to chair.  OT ed on energy conservation tech and pacing.  Will cont to follow for skilled OT to increase safety and independence and activity tolerance.  -MERRY     Row Name 08/18/22 1034          Therapy Assessment/Plan (OT)    Patient/Family Therapy Goal Statement (OT) be able to care for self.  -LE     Rehab Potential (OT) fair, will monitor progress closely  -LE     Criteria for Skilled Therapeutic Interventions Met (OT) meets criteria;yes  -LE     Therapy Frequency (OT) 2 times/wk  -MERRY     Row Name 08/18/22 1034          Therapy Plan Review/Discharge Plan (OT)    Equipment Needs Upon Discharge (OT) --  owns walker, grab bar, home O2. mostly washes up at sink  -LE     Anticipated Discharge Disposition (OT) home with assist  -MERRY     Row Name 08/18/22 1034          Vital Signs    Pre SpO2 (%) 100  -LE     O2 Delivery Pre Treatment supplemental O2  2L  -LE     O2 Delivery Intra Treatment supplemental O2  -LE     O2 Delivery Post Treatment supplemental O2  -LE     Pre Patient Position Supine  -LE     Intra Patient Position Standing  -LE     Post Patient Position Sitting  -LE     Row Name 08/18/22 1034          Positioning and Restraints    Pre-Treatment Position in bed  -LE     Post Treatment Position chair  -LE     In Chair notified nsg;reclined;call light within  reach;encouraged to call for assist;exit alarm on  -LE           User Key  (r) = Recorded By, (t) = Taken By, (c) = Cosigned By    Initials Name Provider Type    Camila Gee OTR Occupational Therapist               Outcome Measures     Row Name 08/18/22 1234          How much help from another is currently needed...    Putting on and taking off regular lower body clothing? 3  -LE     Bathing (including washing, rinsing, and drying) 2  -LE     Toileting (which includes using toilet bed pan or urinal) 1  -LE     Putting on and taking off regular upper body clothing 3  -LE     Taking care of personal grooming (such as brushing teeth) 3  -LE     Eating meals 4  -LE     AM-PAC 6 Clicks Score (OT) 16  -LE     Row Name 08/18/22 1234          Functional Assessment    Outcome Measure Options AM-PAC 6 Clicks Daily Activity (OT)  -LE           User Key  (r) = Recorded By, (t) = Taken By, (c) = Cosigned By    Initials Name Provider Type    Camila Gee OTR Occupational Therapist                Occupational Therapy Education                 Title: PT OT SLP Therapies (Done)     Topic: Occupational Therapy (Done)     Point: ADL training (Done)     Description:   Instruct learner(s) on proper safety adaptation and remediation techniques during self care or transfers.   Instruct in proper use of assistive devices.              Learning Progress Summary           Patient Acceptance, E, Bed IU by MERRY at 8/18/2022 1234    Comment: role of OT, plan of care, energy conservation                   Point: Precautions (Done)     Description:   Instruct learner(s) on prescribed precautions during self-care and functional transfers.              Learning Progress Summary           Patient Acceptance, E, Bed IU by MERRY at 8/18/2022 1234    Comment: role of OT, plan of care, energy conservation                   Point: Body mechanics (Done)     Description:   Instruct learner(s) on proper positioning and spine alignment during self-care,  functional mobility activities and/or exercises.              Learning Progress Summary           Patient Acceptance, E, Bed IU by LE at 8/18/2022 1234    Comment: role of OT, plan of care, energy conservation                               User Key     Initials Effective Dates Name Provider Type Discipline    LE 06/16/21 -  Camila Oconnell OTR Occupational Therapist OT              OT Recommendation and Plan  Planned Therapy Interventions (OT): activity tolerance training, BADL retraining, transfer/mobility retraining, patient/caregiver education/training, occupation/activity based interventions  Therapy Frequency (OT): 2 times/wk  Plan of Care Review  Plan of Care Reviewed With: patient  Outcome Evaluation: Pt admit from home with SOA. Pt's son has been staying with pt for months but he works full time and pt is alone during day and has been completing his ADL.  Pt seen by OT and is able to move to sit EOB, then pivot to chair with sats 84% on O2.  After seated rest sats up to 95% and pt able to walk around bed and back to chair.  OT ed on energy conservation tech and pacing.  Will cont to follow for skilled OT to increase safety and independence and activity tolerance.     Time Calculation:    Time Calculation- OT     Row Name 08/18/22 1235             Time Calculation- OT    OT Start Time 1027  -LE      OT Stop Time 1054  -LE      OT Time Calculation (min) 27 min  -LE      Total Timed Code Minutes- OT 17 minute(s)  -LE      OT Received On 08/18/22  -LE      OT - Next Appointment 08/22/22  -LE      OT Goal Re-Cert Due Date 09/01/22  -LE              Timed Charges    12307 - OT Therapeutic Activity Minutes 17  -LE              Untimed Charges    OT Eval/Re-eval Minutes 10  -LE              Total Minutes    Timed Charges Total Minutes 17  -LE      Untimed Charges Total Minutes 10  -LE       Total Minutes 27  -LE            User Key  (r) = Recorded By, (t) = Taken By, (c) = Cosigned By    Initials Name Provider Type     Camila Gee OTR Occupational Therapist              Therapy Charges for Today     Code Description Service Date Service Provider Modifiers Qty    [id number]  OT EVAL MOD COMPLEXITY 2 8/18/2022 Camila Oconnell OTR GO 1    [id number]  OT THERAPEUTIC ACT EA 15 MIN 8/18/2022 Camila Oconnell OTR GO 1               RYAN Sheets  8/18/2022

## 2022-08-18 NOTE — THERAPY TREATMENT NOTE
Patient Name: Celia Baird  : 1945    MRN: [id number]                              Today's Date: 2022       Admit Date: 2022    Visit Dx:     ICD-10-CM ICD-9-CM   1. Shortness of breath  R06.02 786.05   2. COPD exacerbation (HCC)  J44.1 491.21   3. Anemia, unspecified type  D64.9 285.9   4. Long term (current) use of antithrombotics/antiplatelets  Z79.02 V58.63   5. Paroxysmal atrial fibrillation (HCC)  I48.0 427.31     Patient Active Problem List   Diagnosis   • COPD exacerbation (HCC)   • Subarachnoid hemorrhage (HCC)   • Multiple skin tears   • Closed nondisplaced fracture of left clavicle   • Paroxysmal atrial fibrillation (HCC)   • Tobacco abuse   • Status post placement of implantable loop recorder   • SOB (shortness of breath)   • COPD with acute exacerbation (HCC)   • Severe malnutrition (CMS/HCC)   • Leukocytosis   • Gastrointestinal hemorrhage   • Symptomatic anemia   • Hyperlipidemia   • Long term (current) use of antithrombotics/antiplatelets   • COPD with exacerbation (HCC)   • Shortness of breath   • Diastolic CHF, acute (HCC)   • CAD (coronary artery disease)   • B12 deficiency   • Underweight     Past Medical History:   Diagnosis Date   • Atrial fibrillation (HCC)    • CAD (coronary artery disease)    • COPD (chronic obstructive pulmonary disease) (HCC)    • History of frequent urinary tract infections    • Irregular heart beat    • Kidney stones    • PBC (primary biliary cirrhosis)    • PBC (primary biliary cirrhosis)      Past Surgical History:   Procedure Laterality Date   • CARDIAC ELECTROPHYSIOLOGY PROCEDURE N/A 2017    Procedure:    LINQ;  Surgeon: Ailyn Solorio MD;  Location: [facility] CATH INVASIVE LOCATION;  Service:    • CHOLECYSTECTOMY     • COLONOSCOPY     • COLONOSCOPY N/A 2022    Procedure: COLONOSCOPY into cecum with polypectomy, clip placement;  Surgeon: Constantine Kaye MD;  Location: [facility] ENDOSCOPY;  Service: Gastroenterology;  Laterality:  N/A;  poor prep, diverticulosis, polyp   • CORONARY STENT PLACEMENT      proximal circumflex   • ENDOSCOPY N/A 01/28/2022    Procedure: ESOPHAGOGASTRODUODENOSCOPY with biopsy;  Surgeon: Constantine Kaye MD;  Location: [facility] ENDOSCOPY;  Service: Gastroenterology;  Laterality: N/A;  normal   • TUBAL ABDOMINAL LIGATION        General Information     Row Name 08/18/22 1540          Physical Therapy Time and Intention    Document Type therapy note (daily note)  -MS     Mode of Treatment physical therapy;individual therapy  -MS     Row Name 08/18/22 1540          General Information    Patient Profile Reviewed yes  -MS     Existing Precautions/Restrictions fall;oxygen therapy device and L/min   Exit alarm  -MS     Barriers to Rehab none identified  -MS     Row Name 08/18/22 1540          Cognition    Orientation Status (Cognition) oriented x 3  -MS     Row Name 08/18/22 1540          Safety Issues, Functional Mobility    Comment, Safety Issues/Impairments (Mobility) Gait belt used for safety.  -MS           User Key  (r) = Recorded By, (t) = Taken By, (c) = Cosigned By    Initials Name Provider Type    MS Duncan Beltran, PT Physical Therapist               Mobility     Row Name 08/18/22 1541          Bed Mobility    Supine-Sit [place] (Bed Mobility) standby assist  -MS     Sit-Supine [place] (Bed Mobility) standby assist  -MS     Row Name 08/18/22 1541          Sit-Stand Transfer    Sit-Stand [place] (Transfers) contact guard  -MS     Assistive Device (Sit-Stand Transfers) walker, front-wheeled  -MS     Row Name 08/18/22 1541          Gait/Stairs (Locomotion)    [place] Level (Gait) contact guard  -MS     Assistive Device (Gait) walker, front-wheeled  -MS     Distance in Feet (Gait) 25 feet  -MS     Deviations/Abnormal Patterns (Gait) jass decreased  -MS     Bilateral Gait Deviations forward flexed posture  -MS     Comment, (Gait/Stairs) Verbal/tactile cues for posture correction and Rwx  guidance.  Limited in gait distance due to increased SOA and fatigue with upright mobility.  -MS           User Key  (r) = Recorded By, (t) = Taken By, (c) = Cosigned By    Initials Name Provider Type    Duncan Lima, PT Physical Therapist               Obj/Interventions     Row Name 08/18/22 1542          Motor Skills    Therapeutic Exercise --  BLE ther. ex. program x 10 reps completed (Ankle pumps, Hip Flexion, LAQ's)  -MS           User Key  (r) = Recorded By, (t) = Taken By, (c) = Cosigned By    Initials Name Provider Type    Duncan Lima, PT Physical Therapist               Goals/Plan    No documentation.                Clinical Impression     Row Name 08/18/22 1542          Pain    Pretreatment Pain Rating 0/10 - no pain  -MS     Posttreatment Pain Rating 0/10 - no pain  -MS     Row Name 08/18/22 1542          Vital Signs    Pre SpO2 (%) 94  -MS     O2 Delivery Pre Treatment supplemental O2  -MS     O2 Delivery Intra Treatment supplemental O2  -MS     Post SpO2 (%) 95  -MS     O2 Delivery Post Treatment supplemental O2  -MS     Row Name 08/18/22 1542          Positioning and Restraints    Pre-Treatment Position in bed  -MS     Post Treatment Position bed  -MS     In Bed notified nsg;supine;call light within reach;encouraged to call for assist;exit alarm on  All lines intact.  -MS           User Key  (r) = Recorded By, (t) = Taken By, (c) = Cosigned By    Initials Name Provider Type    Duncan Lima, PT Physical Therapist               Outcome Measures     Row Name 08/18/22 1543          How much help from another person do you currently need...    Turning from your back to your side while in flat bed without using bedrails? 3  -MS     Moving from lying on back to sitting on the side of a flat bed without bedrails? 3  -MS     Moving to and from a bed to a chair (including a wheelchair)? 3  -MS     Standing up from a chair using your arms (e.g., wheelchair, bedside chair)? 3  -MS      Climbing 3-5 steps with a railing? 3  -MS     To walk in hospital room? 3  -MS     AM-PAC 6 Clicks Score (PT) 18  -MS     Highest level of mobility 6 --> Walked 10 steps or more  -MS     Row Name 08/18/22 1543 08/18/22 1234       Functional Assessment    Outcome Measure Options AM-PAC 6 Clicks Basic Mobility (PT)  -MS AM-PAC 6 Clicks Daily Activity (OT)  -LE          User Key  (r) = Recorded By, (t) = Taken By, (c) = Cosigned By    Initials Name Provider Type    LE Camila Oconnell, OTR Occupational Therapist    MS Duncan Beltran, PT Physical Therapist                             Physical Therapy Education                 Title: PT OT SLP Therapies (Done)     Topic: Physical Therapy (Done)     Point: Mobility training (Done)     Learning Progress Summary           Patient Acceptance, E,D, VU,NR by MS at 8/18/2022 1543    Acceptance, E, VU by  at 8/17/2022 1201                   Point: Home exercise program (Done)     Learning Progress Summary           Patient Acceptance, E,D, VU,NR by MS at 8/18/2022 1543    Acceptance, E, VU by  at 8/17/2022 1201                   Point: Body mechanics (Done)     Learning Progress Summary           Patient Acceptance, E,D, VU,NR by MS at 8/18/2022 1543    Acceptance, E, VU by  at 8/17/2022 1201                   Point: Precautions (Done)     Learning Progress Summary           Patient Acceptance, E,D, VU,NR by MS at 8/18/2022 1543    Acceptance, E, VU by  at 8/17/2022 1201                               User Key     Initials Effective Dates Name Provider Type Discipline    MS 06/16/21 -  Duncan Beltran, PT Physical Therapist PT     05/02/22 -  [place] PT Physical Therapist PT              PT Recommendation and Plan     Plan of Care Reviewed With: patient  Outcome Evaluation: Upon entering room, pt. supine in bed, awake/alert, and agreeable to work with P.T. this date.  Pt. able to ambulate 25 feet, CGA x 1, with use of Rwx this PM.  Pt. requires SBA x 1 for  bed mobility and CGA x 1 for sit <-> stand transfers.  BLE ther. ex. program x 10 reps completed for general strengthening.  Verbal/tactile cues given during ambulation for posture correction and Rwx guidance. Will continue to progress functional mobility as tolerated.     Time Calculation:    PT Charges     Row Name 08/18/22 1545             Time Calculation    Start Time 1410  -MS      Stop Time 1425  -MS      Time Calculation (min) 15 min  -MS      PT Received On 08/18/22  -MS      PT - Next Appointment 08/19/22  -MS              Time Calculation- PT    Total Timed Code Minutes- PT 14 minute(s)  -MS            User Key  (r) = Recorded By, (t) = Taken By, (c) = Cosigned By    Initials Name Provider Type    Duncan Lima, PT Physical Therapist              Therapy Charges for Today     Code Description Service Date Service Provider Modifiers Qty    17721675069  PT THERAPEUTIC ACT EA 15 MIN 8/18/2022 Duncan Beltran PT GP 1          PT G-Codes  Outcome Measure Options: AM-PAC 6 Clicks Basic Mobility (PT)  AM-PAC 6 Clicks Score (PT): 18  AM-PAC 6 Clicks Score (OT): 16    Duncan Beltran PT  8/18/2022

## 2022-08-18 NOTE — PROGRESS NOTES
Name: Celia Baird ADMIT: 2022   : 1945  PCP: Meenu Brambila APRN    MRN: [id number] LOS: 1 days   AGE/SEX: 77 y.o. female  ROOM: [facility]     Subjective   Subjective   Breathing improved today.    Review of Systems   Constitutional: Negative for fever.   Respiratory: Positive for shortness of breath and wheezing. Negative for cough.    Cardiovascular: Negative for chest pain.   Gastrointestinal: Negative for abdominal pain, diarrhea, nausea and vomiting.   Genitourinary: Negative for dysuria.   Neurological: Negative for headaches.      Objective   Objective   Vital Signs  Temp:  [97.7 °F (36.5 °C)-98.7 °F (37.1 °C)] 97.8 °F (36.6 °C)  Heart Rate:  [61-93] 77  Resp:  [18] 18  BP: (107-135)/(54-70) 135/70  SpO2:  [92 %-100 %] 96 %  on  Flow (L/min):  [2-3] 2;   Device (Oxygen Therapy): nasal cannula  Body mass index is 17.98 kg/m².    Physical Exam  Constitutional:       General: She is not in acute distress.     Appearance: Normal appearance. She is not toxic-appearing.   HENT:      Head: Normocephalic and atraumatic.   Cardiovascular:      Rate and Rhythm: Normal rate. Rhythm irregular.   Pulmonary:      Effort: Pulmonary effort is normal. No respiratory distress.      Breath sounds: Wheezing present. No rhonchi.   Abdominal:      General: Bowel sounds are normal.      Palpations: Abdomen is soft.      Tenderness: There is no abdominal tenderness. There is no guarding or rebound.   Musculoskeletal:         General: No swelling.      Right lower leg: No edema.      Left lower leg: No edema.   Skin:     General: Skin is warm and dry.   Neurological:      General: No focal deficit present.      Mental Status: She is alert and oriented to person, place, and time.   Psychiatric:         Mood and Affect: Mood normal.         Behavior: Behavior normal.         Thought Content: Thought content normal.     Results Review  I reviewed the patient's new clinical results.  Results from last 7 days   Lab Units  08/18/22  0503 08/17/22  2347 08/17/22  1313 08/17/22  0654 08/17/22  0329   WBC 10*3/mm3 9.39  --   --  7.59 8.42   HEMOGLOBIN g/dL 8.2* 8.6* 8.8* 6.1* 5.8*   PLATELETS 10*3/mm3 311  --   --  341 360     Results from last 7 days   Lab Units 08/18/22  0503 08/17/22  0654 08/17/22  0329   SODIUM mmol/L 139 141 142   POTASSIUM mmol/L 3.2* 3.8 4.2   CHLORIDE mmol/L 100 103 105   CO2 mmol/L 31.7* 28.3 29.8*   BUN mg/dL 14 12 14   CREATININE mg/dL 0.88 0.73 0.78   GLUCOSE mg/dL 95 148* 130*     Lab Results   Component Value Date    ANIONGAP 7.3 08/18/2022     Estimated Creatinine Clearance: 34.1 mL/min (by C-G formula based on SCr of 0.88 mg/dL).    Results from last 7 days   Lab Units 08/17/22  0329   ALBUMIN g/dL 3.30*   BILIRUBIN mg/dL <0.2   ALK PHOS U/L 101   AST (SGOT) U/L 15   ALT (SGPT) U/L 18     Results from last 7 days   Lab Units 08/18/22  0503 08/17/22  0654 08/17/22  0329   CALCIUM mg/dL 8.6 8.8 8.6   ALBUMIN g/dL  --   --  3.30*   MAGNESIUM mg/dL 2.0  --  1.7     Results from last 7 days   Lab Units 08/17/22  0329   PROCALCITONIN ng/mL 0.04     No results found for: HGBA1C, POCGLU    XR Chest 1 View    Result Date: 8/17/2022  Electronically signed by Dante Dennis DO, Diplomate [employer] on 08-17-22 at 0416      Scheduled Meds  amiodarone, 200 mg, Oral, BID  cyanocobalamin, 1,000 mcg, Intramuscular, Daily  folic acid, 2 mg, Oral, Daily  furosemide, 40 mg, Intravenous, Q12H  ipratropium-albuterol, 3 mL, Nebulization, 4x Daily - RT  mirtazapine, 7.5 mg, Oral, Nightly  pantoprazole, 40 mg, Intravenous, Q12H  potassium chloride, 20 mEq, Oral, Daily  rosuvastatin, 5 mg, Oral, Daily  sodium chloride, 10 mL, Intravenous, Q12H  sodium chloride, 4 mL, Nebulization, BID - RT  sucralfate, 1 g, Oral, 4x Daily AC & at Bedtime  ursodiol, 300 mg, Oral, TID With Meals  vitamin B-12, 1,000 mcg, Oral, Daily  vitamin B-6, 25 mg, Oral, Daily    Continuous Infusions   PRN Meds  •  acetaminophen **OR**  acetaminophen **OR** acetaminophen  •  ALPRAZolam  •  calcium carbonate  •  ipratropium-albuterol  •  magnesium sulfate **OR** magnesium sulfate **OR** magnesium sulfate  •  nitroglycerin  •  O2  •  ondansetron **OR** ondansetron  •  potassium chloride **OR** potassium chloride **OR** potassium chloride  •  [COMPLETED] Insert peripheral IV **AND** sodium chloride  •  sodium chloride     Diet  Diet Regular    I have personally reviewed:  [x]  Laboratory   []  Microbiology   []  Radiology   [x]  EKG/Telemetry   []  Cardiology/Vascular   []  Pathology   []  Records      Intake/Output Summary (Last 24 hours) at 8/18/2022 1459  Last data filed at 8/18/2022 1310  Gross per 24 hour   Intake 240 ml   Output 3250 ml   Net -3010 ml      Results for orders placed during the hospital encounter of 01/13/22    Adult Transthoracic Echo Complete W/ Cont if Necessary Per Protocol    Interpretation Summary  · Calculated left ventricular EF = 61.9% Estimated left ventricular EF was in agreement with the calculated left ventricular EF.  · Left ventricular diastolic function was normal.  · There is no evidence of pericardial effusion. .        Assessment/Plan     Active Hospital Problems    Diagnosis  POA   • **COPD with acute exacerbation (HCC) [J44.1]  Yes   • Diastolic CHF, acute (HCC) [I50.31]  Unknown   • CAD (coronary artery disease) [I25.10]  Unknown   • B12 deficiency [E53.8]  Unknown   • Symptomatic anemia [D64.9]  Unknown   • Paroxysmal atrial fibrillation (HCC) [I48.0]  Yes   • Tobacco abuse [Z72.0]  Yes      Resolved Hospital Problems   No resolved problems to display.     77 y.o. female with a history of COPD, atrial fibrillation, CAD status post stent, smoker admitted with shortness of breath. Hgb found to be 6     COPD, severe with chronic hypoxemic respiratory failure: Received steroids in ED.     CHF: Possibly acute diastolic. 3 L out over the last 24 hours. On IV diuresis. Cardiology following. Replace potassium low  from diuresis     Anemia, severe: Improved after transfusion B12 deficiency on replacement. Occult blood is pending. On PPI. Iron and percent saturation low today. She had a EGD and colonoscopy in January this year no source of bleeding identified then (heme positive and had symptomatic anemia at that time). Resume antiplatelets now that hemoglobin is improved.     afib: Amiodarone. Patient states she has NOT had watchman done- only had an evaluation but that was interrupted due to a hospitalization. referral per card. not on anticoagulation (currently holding due to anemia)     SCDs for DVT prophylaxis    Discussed with patient and nursing staff and Dr. Augustine Christie    Discharge: TBD    Chaitanya Medina MD  [facility]  08/18/22

## 2022-08-18 NOTE — PLAN OF CARE
Goal Outcome Evaluation:  Plan of Care Reviewed With: patient           Outcome Evaluation: Upon entering room, pt. supine in bed, awake/alert, and agreeable to work with P.T. this date.  Pt. able to ambulate 25 feet, CGA x 1, with use of Rwx this PM.  Pt. requires SBA x 1 for bed mobility and CGA x 1 for sit <-> stand transfers.  BLE ther. ex. program x 10 reps completed for general strengthening.  Verbal/tactile cues given during ambulation for posture correction and Rwx guidance. Will continue to progress functional mobility as tolerated.    Patient was not wearing a face mask during this therapy encounter. Therapist used appropriate personal protective equipment including eye protection, mask, and gloves.  Mask used was standard procedure mask. Appropriate PPE was worn during the entire therapy session. Hand hygiene was completed before and after therapy session. Patient is not in enhanced droplet precautions.

## 2022-08-18 NOTE — PROGRESS NOTES
[facility]  Cardiology Progress Note    Patient Identification:  Name: Celia Baird  Age: 77 y.o.  Sex: female  :  1945  MRN: [id number]                 Follow Up / Chief Complaint:  Follow up for atrial fibrillation / chf    Interval History: Once medically stable she will need anticoagulation       Subjective: She denies chest pain and improved shortness of breath      Objective:    Past Medical History:  Past Medical History:   Diagnosis Date   • Atrial fibrillation (HCC)    • CAD (coronary artery disease)    • COPD (chronic obstructive pulmonary disease) (HCC)    • History of frequent urinary tract infections    • Irregular heart beat    • Kidney stones    • PBC (primary biliary cirrhosis)    • PBC (primary biliary cirrhosis)      Past Surgical History:  Past Surgical History:   Procedure Laterality Date   • CARDIAC ELECTROPHYSIOLOGY PROCEDURE N/A 2017    Procedure:    LINQ;  Surgeon: Ailyn Solorio MD;  Location: [facility] CATH INVASIVE LOCATION;  Service:    • CHOLECYSTECTOMY     • COLONOSCOPY     • COLONOSCOPY N/A 2022    Procedure: COLONOSCOPY into cecum with polypectomy, clip placement;  Surgeon: Constantine Kaye MD;  Location: [facility] ENDOSCOPY;  Service: Gastroenterology;  Laterality: N/A;  poor prep, diverticulosis, polyp   • CORONARY STENT PLACEMENT      proximal circumflex   • ENDOSCOPY N/A 2022    Procedure: ESOPHAGOGASTRODUODENOSCOPY with biopsy;  Surgeon: Constantine Kaye MD;  Location: [facility] ENDOSCOPY;  Service: Gastroenterology;  Laterality: N/A;  normal   • TUBAL ABDOMINAL LIGATION          Social History:   Social History     Tobacco Use   • Smoking status: Current Some Day Smoker     Packs/day: 0.50     Years: 59.00     Pack years: 29.50     Types: Cigarettes   • Smokeless tobacco: Never Used   • Tobacco comment: 2 - 3 CIGARETTES A DAY   Substance Use Topics   • Alcohol use: No      Family History:  Family History   Problem Relation Age of  Onset   • Heart disease Father    • Heart attack Father    • Heart disease Brother    • Stroke Mother           Allergies:  Allergies   Allergen Reactions   • Morphine And Related Nausea And Vomiting   • Levaquin [Levofloxacin]    • Sulfa Antibiotics      Scheduled Meds:  amiodarone, 200 mg, BID  cyanocobalamin, 1,000 mcg, Daily  folic acid, 2 mg, Daily  furosemide, 40 mg, Q12H  ipratropium-albuterol, 3 mL, 4x Daily - RT  mirtazapine, 7.5 mg, Nightly  pantoprazole, 40 mg, Q12H  rosuvastatin, 5 mg, Daily  sodium chloride, 10 mL, Q12H  sodium chloride, 4 mL, BID - RT  sucralfate, 1 g, 4x Daily AC & at Bedtime  ursodiol, 300 mg, TID With Meals  vitamin B-12, 1,000 mcg, Daily  vitamin B-6, 25 mg, Daily            INTAKE AND OUTPUT:    Intake/Output Summary (Last 24 hours) at 8/18/2022 1019  Last data filed at 8/18/2022 0900  Gross per 24 hour   Intake 1020 ml   Output 2650 ml   Net -1630 ml       Review of Systems:     GI: Denies n/v and abd pain  Cardiac: Denies chest pain and palps  Pulmonary: improved shortness of breath and cough    Constitutional:  Temp:  [97.6 °F (36.4 °C)-98.7 °F (37.1 °C)] 98.4 °F (36.9 °C)  Heart Rate:  [61-93] 64  Resp:  [16-18] 18  BP: (107-118)/(54-79) 114/59    Physical Exam:  General:  Appears in no acute distress, resting in bed  Eyes: eom normal and no conjunctival draiange  HEENT:  No JVD. Thyroid not visibly enlarged. No mucosal pallor or cyanosis  Respiratory: Respirations regular and unlabored at rest. BBS with good air entry in all fields. No crackles, rubs or wheezes auscultated  Cardiovascular: S1S2 Regular rate and rhythm. No murmur, rub or gallop. No carotid bruits. DP/PT pulses     . No pretibial pitting edema  Gastrointestinal: Abdomen soft, flat, non tender. Bowel sounds present. No hepatosplenomegaly. No ascites  Musculoskeletal: MCCRACKEN x4. No abnormal movements  Extremities: No digital clubbing or cyanosis  Skin:  Skin warm and dry to touch. No rashes    Neuro: AAO x3 CN  II-XII grossly intact  Psych: Mood and affect normal, pleasant and cooperative          Physical Exam:  General:  Appears in no acute distress  Eyes: PERTL,  HEENT:  No JVD. Thyroid not visibly enlarged. No mucosal pallor or cyanosis  Respiratory: Respirations regular and unlabored at rest. BBS with good air entry in all fields. No crackles, rubs or wheezes auscultated  Cardiovascular: S1S2 Regular rate and rhythm. No murmur, rub or gallop. No carotid bruits. DP/PT pulses     . No pretibial pitting edema  Gastrointestinal: Abdomen soft, flat, non tender. Bowel sounds present. No hepatosplenomegaly. No ascites  Skin:   Skin warm and dry to touch. No rashes    Neuro: AAO x3 CN II-XII grossly intact  Psych: Mood and affect normal, pleasant and cooperative          I reviewed the patient's new clinical results, and personally reviewed and interpreted the patient's ECG and telemetry data from the last 24 hours        Cardiographics    SR              Telemetry:   SR      Atrial fibrillation      Echocardiogram:   1/2022  Interpretation Summary     · Calculated left ventricular EF = 61.9% Estimated left ventricular EF was in agreement with the calculated left ventricular EF.  · Left ventricular diastolic function was normal.  · There is no evidence of pericardial effusion. .     2019  Interpretation Summary        · Findings consistent with a normal ECG stress test.  · Left ventricular ejection fraction is normal (Calculated EF = 70%).  · Myocardial perfusion imaging indicates a normal myocardial perfusion study with no evidence of ischemia.  · Impressions are consistent with a low risk study.     5/2022  Interpretation Summary     · An abnormal monitor study.  · NSR OCCASIONAL PACS, PVCS, NSSVTS  · 4% AFIB WITH LONGEST 2 HR 29 MINUTES        Imaging  Chest X-ray:   IMPRESSION:         Pulmonary vascular congestion and probable bilateral pleural fluid   collections.     IMPRESSION:        Electronically signed by Dante  "JUANA Dennis DO, Diplomate American Board of   Radiology on 08-17-22 at 0416      Lab Review   Results from last 7 days   Lab Units 08/18/22  0503 08/17/22  0329   TROPONIN T ng/mL 0.011 <0.010     Results from last 7 days   Lab Units 08/18/22  0503   MAGNESIUM mg/dL 2.0     Results from last 7 days   Lab Units 08/18/22  0503   SODIUM mmol/L 139   POTASSIUM mmol/L 3.2*   BUN mg/dL 14   CREATININE mg/dL 0.88   CALCIUM mg/dL 8.6        Results from last 7 days   Lab Units 08/18/22  0503 08/17/22  2347 08/17/22  1313 08/17/22  0654 08/17/22  0329   WBC 10*3/mm3 9.39  --   --  7.59 8.42   HEMOGLOBIN g/dL 8.2* 8.6* 8.8* 6.1* 5.8*   HEMATOCRIT % 25.8* 27.9* 28.9* 20.4* 20.1*   PLATELETS 10*3/mm3 311  --   --  341 360            Intake/Output Summary (Last 24 hours) at 8/18/2022 1037  Last data filed at 8/18/2022 0900  Gross per 24 hour   Intake 1020 ml   Output 2650 ml   Net -1630 ml       The following medical decision was discussed in detail with Dr. Solorio      Assessment:  COPD with exacerbation  Acute diastolic CHF: echo pending  Atrial fibrillation: a/c on hold due to anemia  Symptomatic anemia: hbg 8.2, defer to IM  CAD: Denies chest pain. On statin. ASA and plavix on hold.  Hypokalemia: replaced    Plan:    Blood pressure and HR stable. SR on the monitor/ecg. Placed referral as below for watchman's, will need follow up. Previous echo showed EF 61.9 %, see full report as above. Further recommendations once echo has been read. Please resume a/c once medically stable. Diuresing well. Creatinine stable. Replace K+.       Labs/tests ordered for am: Referral placed in computer for outpatient for consideration of watchman's device. Add K+, monitor, bmp, mag, Ecg, and trop      )8/18/2022  BRAYAN Dia      EMR Dragon/Transcription:   \"Dictated utilizing Dragon dictation\".     "

## 2022-08-18 NOTE — PROGRESS NOTES
[facility]     Mar/chart reviewed  Follow up COPD, anemia  Some cough and shortness of breath still, but feeling improved    Vital Sign Min/Max for last 24 hours  Temp  Min: 97.6 °F (36.4 °C)  Max: 98.7 °F (37.1 °C)   BP  Min: 107/54  Max: 124/63   Pulse  Min: 61  Max: 98   Resp  Min: 16  Max: 18   SpO2  Min: 92 %  Max: 100 %   Flow (L/min)  Min: 2  Max: 3   No data recorded   1438/2250  Appears ill, axox3,   perrl, eomi, no icterus,  mmm, no jvd, trachea midline, neck supple,  chest coarse  bilaterally, + crackles, no wheezes,   Irrg, rate ok   soft, nt, nd +bs,  no c/c/e  Skin warm, dry no rashes    Labs: 8/18: reviewed:  Glucose 95  Bun 14  Cr 0.88  Na 139  k 3.2  Bicarb 31  Wbc 9  hgb 8.2  plts 311  Iron sat 8%    A/P:  1. COPD, severe fev1 47% - bronchodilators --pulmonary toilet, not wheezing, d/c steroids.    2. Chronic hypoxemic respiratory failure --continue oxygen  3. Acute pulmonary edema with bilateral pleural effusion -- agree with lasix, keep amio tox on ddx, but think much less likely  4. Anemia -- s/p transfusion  suspect this is contriubting to her shortness of breath  5. Tobacco abuse  6. On amiodarone therapy    Repeat CXR

## 2022-08-18 NOTE — PLAN OF CARE
Goal Outcome Evaluation:  Plan of Care Reviewed With: patient           Outcome Evaluation: Pt admit from home with SOA. Pt's son has been staying with pt for months but he works full time and pt is alone during day and has been completing his ADL.  Pt seen by OT and is able to move to sit EOB, then pivot to chair with sats 84% on O2.  After seated rest sats up to 95% and pt able to walk around bed and back to chair.  OT ed on energy conservation tech and pacing.  Will cont to follow for skilled OT to increase safety and independence and activity tolerance.

## 2022-08-18 NOTE — PLAN OF CARE
Problem: Adult Inpatient Plan of Care  Goal: Plan of Care Review  Outcome: Ongoing, Progressing  Flowsheets (Taken 8/18/2022 1728)  Progress: improving  Plan of Care Reviewed With: patient  Goal: Patient-Specific Goal (Individualized)  Outcome: Ongoing, Progressing  Goal: Absence of Hospital-Acquired Illness or Injury  Outcome: Ongoing, Progressing  Intervention: Identify and Manage Fall Risk  Recent Flowsheet Documentation  Taken 8/18/2022 1600 by Stephanie Lopez RN  Safety Promotion/Fall Prevention:   activity supervised   assistive device/personal items within reach   clutter free environment maintained   fall prevention program maintained   nonskid shoes/slippers when out of bed  Taken 8/18/2022 1400 by Stephanie Lopez RN  Safety Promotion/Fall Prevention:   activity supervised   assistive device/personal items within reach   clutter free environment maintained   fall prevention program maintained   nonskid shoes/slippers when out of bed   safety round/check completed  Taken 8/18/2022 1200 by Stephanie Lopez RN  Safety Promotion/Fall Prevention:   activity supervised   assistive device/personal items within reach   clutter free environment maintained   fall prevention program maintained   nonskid shoes/slippers when out of bed   safety round/check completed  Taken 8/18/2022 1000 by Stephanie Lopez RN  Safety Promotion/Fall Prevention:   assistive device/personal items within reach   activity supervised   clutter free environment maintained   fall prevention program maintained   nonskid shoes/slippers when out of bed   safety round/check completed  Taken 8/18/2022 0800 by Stephanie Lopez RN  Safety Promotion/Fall Prevention:   activity supervised   assistive device/personal items within reach   clutter free environment maintained   fall prevention program maintained   nonskid shoes/slippers when out of bed   safety round/check completed  Intervention: Prevent Skin Injury  Recent Flowsheet  Documentation  Taken 8/18/2022 1400 by Stephanie Lopez RN  Skin Protection:   adhesive use limited   tubing/devices free from skin contact  Intervention: Prevent and Manage VTE (Venous Thromboembolism) Risk  Recent Flowsheet Documentation  Taken 8/18/2022 1600 by Stephanie Lopez RN  Activity Management: activity adjusted per tolerance  Taken 8/18/2022 1400 by Stephanie Lopez RN  Activity Management: activity adjusted per tolerance  VTE Prevention/Management: (Levonox) --  Taken 8/18/2022 1200 by Stephanie Lopez RN  Activity Management: activity adjusted per tolerance  Taken 8/18/2022 1000 by Stephanie Lopez RN  Activity Management: activity adjusted per tolerance  Taken 8/18/2022 0800 by Stephanie Lopez RN  Activity Management: activity adjusted per tolerance  VTE Prevention/Management: bilateral  Intervention: Prevent Infection  Recent Flowsheet Documentation  Taken 8/18/2022 1600 by Stephanie Lopez RN  Infection Prevention: single patient room provided  Taken 8/18/2022 1400 by Stephanie Lopez RN  Infection Prevention: single patient room provided  Taken 8/18/2022 1200 by Stephanie Lopez RN  Infection Prevention: single patient room provided  Taken 8/18/2022 1000 by Stephanie Lopez RN  Infection Prevention: single patient room provided  Taken 8/18/2022 0800 by Stephanie Lopez RN  Infection Prevention: single patient room provided  Goal: Optimal Comfort and Wellbeing  Outcome: Ongoing, Progressing  Intervention: Provide Person-Centered Care  Recent Flowsheet Documentation  Taken 8/18/2022 1400 by Stephanie Lopez RN  Trust Relationship/Rapport:   care explained   choices provided  Taken 8/18/2022 0800 by Stephanie Lopez RN  Trust Relationship/Rapport:   care explained   choices provided  Goal: Readiness for Transition of Care  Outcome: Ongoing, Progressing     Problem: Fall Injury Risk  Goal: Absence of Fall and Fall-Related Injury  Outcome: Ongoing, Progressing  Intervention: Identify and Manage  Contributors  Recent Flowsheet Documentation  Taken 8/18/2022 1600 by Stephanie Lopez RN  Medication Review/Management: medications reviewed  Taken 8/18/2022 1400 by Stephanie Lopez RN  Medication Review/Management: medications reviewed  Taken 8/18/2022 1200 by Stephanie Lopez RN  Medication Review/Management: medications reviewed  Taken 8/18/2022 1000 by Stephanie Lopez RN  Medication Review/Management: medications reviewed  Taken 8/18/2022 0800 by Stephanie Lopez RN  Medication Review/Management: medications reviewed  Intervention: Promote Injury-Free Environment  Recent Flowsheet Documentation  Taken 8/18/2022 1600 by Stephanie Lopez RN  Safety Promotion/Fall Prevention:   activity supervised   assistive device/personal items within reach   clutter free environment maintained   fall prevention program maintained   nonskid shoes/slippers when out of bed  Taken 8/18/2022 1400 by Stephanie Lopez RN  Safety Promotion/Fall Prevention:   activity supervised   assistive device/personal items within reach   clutter free environment maintained   fall prevention program maintained   nonskid shoes/slippers when out of bed   safety round/check completed  Taken 8/18/2022 1200 by Stephanie Lopez RN  Safety Promotion/Fall Prevention:   activity supervised   assistive device/personal items within reach   clutter free environment maintained   fall prevention program maintained   nonskid shoes/slippers when out of bed   safety round/check completed  Taken 8/18/2022 1000 by Stephanie Lopez RN  Safety Promotion/Fall Prevention:   assistive device/personal items within reach   activity supervised   clutter free environment maintained   fall prevention program maintained   nonskid shoes/slippers when out of bed   safety round/check completed  Taken 8/18/2022 0800 by Stephanie Lopez RN  Safety Promotion/Fall Prevention:   activity supervised   assistive device/personal items within reach   clutter free environment  maintained   fall prevention program maintained   nonskid shoes/slippers when out of bed   safety round/check completed     Problem: COPD (Chronic Obstructive Pulmonary Disease) Comorbidity  Goal: Maintenance of COPD Symptom Control  Outcome: Ongoing, Progressing  Intervention: Maintain COPD-Symptom Control  Recent Flowsheet Documentation  Taken 8/18/2022 1600 by Stephanie Lopez RN  Medication Review/Management: medications reviewed  Taken 8/18/2022 1400 by Stephanie Lopez RN  Medication Review/Management: medications reviewed  Taken 8/18/2022 1200 by Stephanie Lopez RN  Medication Review/Management: medications reviewed  Taken 8/18/2022 1000 by Stephanie Lopez RN  Medication Review/Management: medications reviewed  Taken 8/18/2022 0800 by Stephanie Lopez RN  Medication Review/Management: medications reviewed     Problem: Skin Injury Risk Increased  Goal: Skin Health and Integrity  Outcome: Ongoing, Progressing  Intervention: Optimize Skin Protection  Recent Flowsheet Documentation  Taken 8/18/2022 1400 by Stephanie Lopez RN  Pressure Reduction Techniques: frequent weight shift encouraged  Skin Protection:   adhesive use limited   tubing/devices free from skin contact  Taken 8/18/2022 0800 by Stephanie Lopez RN  Pressure Reduction Techniques:   frequent weight shift encouraged   weight shift assistance provided   sit time limited to 2 hours   Goal Outcome Evaluation:  Plan of Care Reviewed With: patient        Progress: improving

## 2022-08-19 ENCOUNTER — APPOINTMENT (OUTPATIENT)
Dept: CARDIOLOGY | Facility: HOSPITAL | Age: 77
End: 2022-08-19

## 2022-08-19 ENCOUNTER — APPOINTMENT (OUTPATIENT)
Dept: GENERAL RADIOLOGY | Facility: HOSPITAL | Age: 77
End: 2022-08-19

## 2022-08-19 LAB
ANION GAP SERPL CALCULATED.3IONS-SCNC: 5.2 MMOL/L (ref 5–15)
BH CV ECHO MEAS - ACS: 1.5 CM
BH CV ECHO MEAS - AO MAX PG: 11 MMHG
BH CV ECHO MEAS - AO MEAN PG: 5.8 MMHG
BH CV ECHO MEAS - AO ROOT DIAM: 3 CM
BH CV ECHO MEAS - AO V2 MAX: 165.8 CM/SEC
BH CV ECHO MEAS - AO V2 VTI: 28 CM
BH CV ECHO MEAS - AVA(I,D): 1.57 CM2
BH CV ECHO MEAS - EDV(CUBED): 47.1 ML
BH CV ECHO MEAS - EDV(MOD-SP2): 45 ML
BH CV ECHO MEAS - EDV(MOD-SP4): 62 ML
BH CV ECHO MEAS - EF(MOD-BP): 58.4 %
BH CV ECHO MEAS - EF(MOD-SP2): 55.6 %
BH CV ECHO MEAS - EF(MOD-SP4): 62.9 %
BH CV ECHO MEAS - ESV(CUBED): 20.4 ML
BH CV ECHO MEAS - ESV(MOD-SP2): 20 ML
BH CV ECHO MEAS - ESV(MOD-SP4): 23 ML
BH CV ECHO MEAS - FS: 24.4 %
BH CV ECHO MEAS - IVS/LVPW: 0.82 CM
BH CV ECHO MEAS - IVSD: 0.68 CM
BH CV ECHO MEAS - LAT PEAK E' VEL: 12.5 CM/SEC
BH CV ECHO MEAS - LV DIASTOLIC VOL/BSA (35-75): 47 CM2
BH CV ECHO MEAS - LV MASS(C)D: 73.7 GRAMS
BH CV ECHO MEAS - LV MAX PG: 5.1 MMHG
BH CV ECHO MEAS - LV MEAN PG: 1.67 MMHG
BH CV ECHO MEAS - LV SYSTOLIC VOL/BSA (12-30): 17.4 CM2
BH CV ECHO MEAS - LV V1 MAX: 112.5 CM/SEC
BH CV ECHO MEAS - LV V1 VTI: 19.8 CM
BH CV ECHO MEAS - LVIDD: 3.6 CM
BH CV ECHO MEAS - LVIDS: 2.7 CM
BH CV ECHO MEAS - LVOT AREA: 2.22 CM2
BH CV ECHO MEAS - LVOT DIAM: 1.68 CM
BH CV ECHO MEAS - LVPWD: 0.83 CM
BH CV ECHO MEAS - MED PEAK E' VEL: 10.3 CM/SEC
BH CV ECHO MEAS - MV DEC SLOPE: 613.6 CM/SEC2
BH CV ECHO MEAS - MV DEC TIME: 0.15 MSEC
BH CV ECHO MEAS - MV E MAX VEL: 88.7 CM/SEC
BH CV ECHO MEAS - MV MAX PG: 6.1 MMHG
BH CV ECHO MEAS - MV MEAN PG: 1.77 MMHG
BH CV ECHO MEAS - MV P1/2T: 56 MSEC
BH CV ECHO MEAS - MV V2 VTI: 25 CM
BH CV ECHO MEAS - MVA(P1/2T): 3.9 CM2
BH CV ECHO MEAS - MVA(VTI): 1.75 CM2
BH CV ECHO MEAS - PA ACC TIME: 0.1 SEC
BH CV ECHO MEAS - PA PR(ACCEL): 35 MMHG
BH CV ECHO MEAS - PA V2 MAX: 83.1 CM/SEC
BH CV ECHO MEAS - RAP SYSTOLE: 3 MMHG
BH CV ECHO MEAS - RV MAX PG: 6.6 MMHG
BH CV ECHO MEAS - RV V1 MAX: 128 CM/SEC
BH CV ECHO MEAS - RV V1 VTI: 21.6 CM
BH CV ECHO MEAS - RVSP: 29 MMHG
BH CV ECHO MEAS - SI(MOD-SP2): 18.9 ML/M2
BH CV ECHO MEAS - SI(MOD-SP4): 29.6 ML/M2
BH CV ECHO MEAS - SV(LVOT): 43.8 ML
BH CV ECHO MEAS - SV(MOD-SP2): 25 ML
BH CV ECHO MEAS - SV(MOD-SP4): 39 ML
BH CV ECHO MEAS - TAPSE (>1.6): 1.54 CM
BH CV ECHO MEAS - TR MAX PG: 26.3 MMHG
BH CV ECHO MEAS - TR MAX VEL: 256.5 CM/SEC
BH CV ECHO MEASUREMENTS AVERAGE E/E' RATIO: 7.78
BH CV XLRA - RV BASE: 2.5 CM
BH CV XLRA - RV LENGTH: 4.3 CM
BH CV XLRA - RV MID: 2.07 CM
BH CV XLRA - TDI S': 12.3 CM/SEC
BUN SERPL-MCNC: 18 MG/DL (ref 8–23)
BUN/CREAT SERPL: 15 (ref 7–25)
CALCIUM SPEC-SCNC: 8.7 MG/DL (ref 8.6–10.5)
CHLORIDE SERPL-SCNC: 99 MMOL/L (ref 98–107)
CO2 SERPL-SCNC: 37.8 MMOL/L (ref 22–29)
CREAT SERPL-MCNC: 1.2 MG/DL (ref 0.57–1)
EGFRCR SERPLBLD CKD-EPI 2021: 46.7 ML/MIN/1.73
GLUCOSE SERPL-MCNC: 81 MG/DL (ref 65–99)
HCT VFR BLD AUTO: 29 % (ref 34–46.6)
HGB BLD-MCNC: 8.9 G/DL (ref 12–15.9)
LEFT ATRIUM VOLUME INDEX: 34.8 ML/M2
MAGNESIUM SERPL-MCNC: 1.6 MG/DL (ref 1.6–2.4)
MAXIMAL PREDICTED HEART RATE: 143 BPM
POTASSIUM SERPL-SCNC: 4.6 MMOL/L (ref 3.5–5.2)
SINUS: 2.6 CM
SODIUM SERPL-SCNC: 142 MMOL/L (ref 136–145)
STJ: 2.6 CM
STRESS TARGET HR: 122 BPM
TROPONIN T SERPL-MCNC: <0.01 NG/ML (ref 0–0.03)

## 2022-08-19 PROCEDURE — 94799 UNLISTED PULMONARY SVC/PX: CPT

## 2022-08-19 PROCEDURE — 93306 TTE W/DOPPLER COMPLETE: CPT | Performed by: INTERNAL MEDICINE

## 2022-08-19 PROCEDURE — 71045 X-RAY EXAM CHEST 1 VIEW: CPT

## 2022-08-19 PROCEDURE — 83735 ASSAY OF MAGNESIUM: CPT | Performed by: NURSE PRACTITIONER

## 2022-08-19 PROCEDURE — 94761 N-INVAS EAR/PLS OXIMETRY MLT: CPT

## 2022-08-19 PROCEDURE — 93005 ELECTROCARDIOGRAM TRACING: CPT | Performed by: NURSE PRACTITIONER

## 2022-08-19 PROCEDURE — 85018 HEMOGLOBIN: CPT | Performed by: NURSE PRACTITIONER

## 2022-08-19 PROCEDURE — 0 MAGNESIUM SULFATE 4 GM/100ML SOLUTION: Performed by: HOSPITALIST

## 2022-08-19 PROCEDURE — 84484 ASSAY OF TROPONIN QUANT: CPT | Performed by: NURSE PRACTITIONER

## 2022-08-19 PROCEDURE — 25010000002 ONDANSETRON PER 1 MG: Performed by: NURSE PRACTITIONER

## 2022-08-19 PROCEDURE — 25010000002 CYANOCOBALAMIN PER 1000 MCG: Performed by: HOSPITALIST

## 2022-08-19 PROCEDURE — 85014 HEMATOCRIT: CPT | Performed by: NURSE PRACTITIONER

## 2022-08-19 PROCEDURE — 80048 BASIC METABOLIC PNL TOTAL CA: CPT | Performed by: NURSE PRACTITIONER

## 2022-08-19 PROCEDURE — 94664 DEMO&/EVAL PT USE INHALER: CPT

## 2022-08-19 PROCEDURE — 94760 N-INVAS EAR/PLS OXIMETRY 1: CPT

## 2022-08-19 PROCEDURE — 93306 TTE W/DOPPLER COMPLETE: CPT

## 2022-08-19 PROCEDURE — 99232 SBSQ HOSP IP/OBS MODERATE 35: CPT | Performed by: INTERNAL MEDICINE

## 2022-08-19 PROCEDURE — 25010000002 FUROSEMIDE PER 20 MG

## 2022-08-19 RX ORDER — FUROSEMIDE 20 MG/1
20 TABLET ORAL DAILY
Status: DISCONTINUED | OUTPATIENT
Start: 2022-08-20 | End: 2022-08-20 | Stop reason: HOSPADM

## 2022-08-19 RX ORDER — FERROUS SULFATE 325(65) MG
325 TABLET ORAL EVERY OTHER DAY
Status: DISCONTINUED | OUTPATIENT
Start: 2022-08-19 | End: 2022-08-20 | Stop reason: HOSPADM

## 2022-08-19 RX ADMIN — AMIODARONE HYDROCHLORIDE 200 MG: 200 TABLET ORAL at 20:59

## 2022-08-19 RX ADMIN — IPRATROPIUM BROMIDE AND ALBUTEROL SULFATE 3 ML: .5; 3 SOLUTION RESPIRATORY (INHALATION) at 19:55

## 2022-08-19 RX ADMIN — Medication 25 MG: at 08:50

## 2022-08-19 RX ADMIN — CLOPIDOGREL 75 MG: 75 TABLET, FILM COATED ORAL at 08:50

## 2022-08-19 RX ADMIN — SODIUM CHLORIDE SOLN NEBU 7% 4 ML: 7 NEBU SOLN at 07:00

## 2022-08-19 RX ADMIN — ONDANSETRON 4 MG: 2 INJECTION INTRAMUSCULAR; INTRAVENOUS at 16:02

## 2022-08-19 RX ADMIN — MAGNESIUM SULFATE HEPTAHYDRATE 4 G: 40 INJECTION, SOLUTION INTRAVENOUS at 08:52

## 2022-08-19 RX ADMIN — PANTOPRAZOLE SODIUM 40 MG: 40 INJECTION, POWDER, FOR SOLUTION INTRAVENOUS at 08:51

## 2022-08-19 RX ADMIN — ACETAMINOPHEN 650 MG: 325 TABLET, FILM COATED ORAL at 21:01

## 2022-08-19 RX ADMIN — SUCRALFATE 1 G: 1 TABLET ORAL at 18:44

## 2022-08-19 RX ADMIN — Medication 10 ML: at 08:52

## 2022-08-19 RX ADMIN — URSODIOL 300 MG: 300 CAPSULE ORAL at 12:28

## 2022-08-19 RX ADMIN — ALPRAZOLAM 0.25 MG: 0.25 TABLET ORAL at 20:59

## 2022-08-19 RX ADMIN — ROSUVASTATIN CALCIUM 5 MG: 5 TABLET, FILM COATED ORAL at 08:51

## 2022-08-19 RX ADMIN — ASPIRIN 81 MG: 81 TABLET, COATED ORAL at 08:51

## 2022-08-19 RX ADMIN — FUROSEMIDE 40 MG: 10 INJECTION, SOLUTION INTRAMUSCULAR; INTRAVENOUS at 05:25

## 2022-08-19 RX ADMIN — Medication 2 MG: at 08:51

## 2022-08-19 RX ADMIN — SUCRALFATE 1 G: 1 TABLET ORAL at 12:28

## 2022-08-19 RX ADMIN — SODIUM CHLORIDE SOLN NEBU 7% 4 ML: 7 NEBU SOLN at 19:59

## 2022-08-19 RX ADMIN — SUCRALFATE 1 G: 1 TABLET ORAL at 06:53

## 2022-08-19 RX ADMIN — PANTOPRAZOLE SODIUM 40 MG: 40 INJECTION, POWDER, FOR SOLUTION INTRAVENOUS at 20:59

## 2022-08-19 RX ADMIN — URSODIOL 300 MG: 300 CAPSULE ORAL at 08:51

## 2022-08-19 RX ADMIN — SUCRALFATE 1 G: 1 TABLET ORAL at 20:59

## 2022-08-19 RX ADMIN — IPRATROPIUM BROMIDE AND ALBUTEROL SULFATE 3 ML: .5; 3 SOLUTION RESPIRATORY (INHALATION) at 16:00

## 2022-08-19 RX ADMIN — MIRTAZAPINE 7.5 MG: 15 TABLET, FILM COATED ORAL at 20:59

## 2022-08-19 RX ADMIN — FERROUS SULFATE TAB 325 MG (65 MG ELEMENTAL FE) 325 MG: 325 (65 FE) TAB at 12:28

## 2022-08-19 RX ADMIN — Medication 10 ML: at 20:59

## 2022-08-19 RX ADMIN — IPRATROPIUM BROMIDE AND ALBUTEROL SULFATE 3 ML: .5; 3 SOLUTION RESPIRATORY (INHALATION) at 06:58

## 2022-08-19 RX ADMIN — Medication 1000 MCG: at 08:51

## 2022-08-19 RX ADMIN — AMIODARONE HYDROCHLORIDE 200 MG: 200 TABLET ORAL at 08:51

## 2022-08-19 RX ADMIN — URSODIOL 300 MG: 300 CAPSULE ORAL at 18:44

## 2022-08-19 RX ADMIN — CYANOCOBALAMIN 1000 MCG: 1000 INJECTION, SOLUTION INTRAMUSCULAR; SUBCUTANEOUS at 08:51

## 2022-08-19 RX ADMIN — POTASSIUM CHLORIDE 20 MEQ: 750 TABLET, EXTENDED RELEASE ORAL at 08:51

## 2022-08-19 RX ADMIN — IPRATROPIUM BROMIDE AND ALBUTEROL SULFATE 3 ML: .5; 3 SOLUTION RESPIRATORY (INHALATION) at 10:50

## 2022-08-19 NOTE — PROGRESS NOTES
[facility]  Cardiology Progress Note    Patient Identification:  Name: Celia Baird  Age: 77 y.o.  Sex: female  :  1945  MRN: [id number]                 Follow Up / Chief Complaint:  Follow up for atrial fibrillation / chf    Interval History: Intermittent afib on telemetry. Switch to po lasix       Subjective: no chest pain      Objective:    Past Medical History:  Past Medical History:   Diagnosis Date    Atrial fibrillation (HCC)     CAD (coronary artery disease)     COPD (chronic obstructive pulmonary disease) (HCC)     History of frequent urinary tract infections     Irregular heart beat     Kidney stones     PBC (primary biliary cirrhosis)     PBC (primary biliary cirrhosis)      Past Surgical History:  Past Surgical History:   Procedure Laterality Date    CARDIAC ELECTROPHYSIOLOGY PROCEDURE N/A 2017    Procedure:    LINQ;  Surgeon: Ailyn Solorio MD;  Location:  [facility];  Service:     CHOLECYSTECTOMY      COLONOSCOPY      COLONOSCOPY N/A 2022    Procedure: COLONOSCOPY into cecum with polypectomy, clip placement;  Surgeon: Constantine Kaye MD;  [facility];  Service: Gastroenterology;  Laterality: N/A;  poor prep, diverticulosis, polyp    CORONARY STENT PLACEMENT      proximal circumflex    ENDOSCOPY N/A 2022    Procedure: ESOPHAGOGASTRODUODENOSCOPY with biopsy;  Surgeon: Constantine Kaye MD;  [facility];  Service: Gastroenterology;  Laterality: N/A;  normal    TUBAL ABDOMINAL LIGATION          Social History:   Social History     Tobacco Use    Smoking status: Current Some Day Smoker     Packs/day: 0.50     Years: 59.00     Pack years: 29.50     Types: Cigarettes    Smokeless tobacco: Never Used    Tobacco comment: 2 - 3 CIGARETTES A DAY   Substance Use Topics    Alcohol use: No      Family History:  Family History   Problem Relation Age of Onset    Heart disease Father     Heart attack Father      "Heart disease Brother     Stroke Mother           Allergies:  Allergies   Allergen Reactions    Morphine And Related Nausea And Vomiting    Levaquin [Levofloxacin]     Sulfa Antibiotics      Scheduled Meds:  amiodarone, 200 mg, BID  aspirin, 81 mg, Daily  clopidogrel, 75 mg, Daily  cyanocobalamin, 1,000 mcg, Daily  folic acid, 2 mg, Daily  [START ON 8/20/2022] furosemide, 20 mg, Daily  ipratropium-albuterol, 3 mL, 4x Daily - RT  mirtazapine, 7.5 mg, Nightly  pantoprazole, 40 mg, Q12H  potassium chloride, 20 mEq, Daily  rosuvastatin, 5 mg, Daily  sodium chloride, 10 mL, Q12H  sodium chloride, 4 mL, BID - RT  sucralfate, 1 g, 4x Daily AC & at Bedtime  ursodiol, 300 mg, TID With Meals  vitamin B-12, 1,000 mcg, Daily  vitamin B-6, 25 mg, Daily            INTAKE AND OUTPUT:    Intake/Output Summary (Last 24 hours) at 8/19/2022 0947  Last data filed at 8/19/2022 0312  Gross per 24 hour   Intake 198 ml   Output 1800 ml   Net -1602 ml     ROS  Constitutional: Awake and alert, no fever. No nosebleeds  Abdomen           no abdominal pain   Cardiac              no chest pain  Pulmonary          no shortness of breath      /54 (BP Location: Right arm, Patient Position: Lying)   Pulse 77   Temp 98.2 °F (36.8 °C) (Oral)   Resp 14   Ht 149.9 cm (59\")   Wt 41.5 kg (91 lb 7.9 oz)   SpO2 94%   BMI 18.48 kg/m²   General appearance: No acute changes   Neck: Trachea midline; NECK, supple, no thyromegaly or lymphadenopathy   Lungs: Normal size and shape, normal breath sounds, equal distribution of air, no rales or rhonchi   CV: S1-S2 regular, no murmurs, no rub, no gallop   Abdomen: Soft, nontender; no masses , no abnormal abdominal sounds   Extremities: No deformity , normal color , no peripheral edema   Skin: Normal temperature, turgor and texture; no rash, ulcers          I reviewed the patient's new clinical results, and personally reviewed and interpreted the patient's ECG and telemetry data from the last 24 " hours        Cardiographics  sr               Telemetry:  afib      Echocardiogram:   1/2022  Interpretation Summary     Calculated left ventricular EF = 61.9% Estimated left ventricular EF was in agreement with the calculated left ventricular EF.  Left ventricular diastolic function was normal.  There is no evidence of pericardial effusion. .     2019  Interpretation Summary        Findings consistent with a normal ECG stress test.  Left ventricular ejection fraction is normal (Calculated EF = 70%).  Myocardial perfusion imaging indicates a normal myocardial perfusion study with no evidence of ischemia.  Impressions are consistent with a low risk study.     5/2022  Interpretation Summary     An abnormal monitor study.  NSR OCCASIONAL PACS, PVCS, NSSVTS  4% AFIB WITH LONGEST 2 HR 29 MINUTES        Imaging  Chest X-ray:   IMPRESSION:         Pulmonary vascular congestion and probable bilateral pleural fluid   collections.     IMPRESSION:        Electronically signed by Dante Dnenis DO, Diplomate American Board of   Radiology on 08-17-22 at 0416      Lab Review   Results from last 7 days   Lab Units 08/19/22  0501 08/18/22  0503 08/17/22  0329   TROPONIN T ng/mL <0.010 0.011 <0.010     Results from last 7 days   Lab Units 08/19/22  0501   MAGNESIUM mg/dL 1.6     Results from last 7 days   Lab Units 08/19/22  0501   SODIUM mmol/L 142   POTASSIUM mmol/L 4.6   BUN mg/dL 18   CREATININE mg/dL 1.20*   CALCIUM mg/dL 8.7        Results from last 7 days   Lab Units 08/19/22  0501 08/18/22  2057 08/18/22  1449 08/18/22  0503 08/17/22  1313 08/17/22  0654 08/17/22  0329   WBC 10*3/mm3  --   --   --  9.39  --  7.59 8.42   HEMOGLOBIN g/dL 8.9* 9.3* 9.1* 8.2*   < > 6.1* 5.8*   HEMATOCRIT % 29.0* 30.3* 29.8* 25.8*   < > 20.4* 20.1*   PLATELETS 10*3/mm3  --   --   --  311  --  341 360    < > = values in this interval not displayed.            Intake/Output Summary (Last 24 hours) at 8/19/2022 0947  Last data filed at 8/19/2022  "0312  Gross per 24 hour   Intake 198 ml   Output 1800 ml   Net -1602 ml     CHADS-VASc Risk Assessment              4 Total Score    1 CHF    2 Age >/= 75    1 Sex: Female        Criteria that do not apply:    Hypertension    DM    PRIOR STROKE/TIA/THROMBO    Vascular Disease    Age 65-74            The following medical decision was discussed in detail with Dr. Solorio      Assessment:  COPD with exacerbation  Acute diastolic CHF: echo pending  Atrial fibrillation: a/c on hold due to anemia, she is high risk for anticoagulation hx of ICH on eliquis  Symptomatic anemia: hgb 8.9 (9.3)  CAD: Denies chest pain. On statin. ASA and plavix on hold.  Hypokalemia: replaced    Plan:    BP and HR stable. Good diuresis noted. Cr 1.2 Switch to po lasix 20mg daily, K 4.6. Awaiting echo. No anticoagulation currently due to anemia. We have placed outpt referral for Watchman. She is in SR. Will decrease amiodarone to 200mg daily at discharge.     )8/19/2022  BRAYAN Romo  Patient has converted to normal sinus rhythm    We will reduce the dose of amiodarone at the time of discharge    Feels much better    Mild wheezing    Ailyn Solorio MD      [facility] [place]/Transcription:   \"Dictated utilizing Dragon dictation\".     "

## 2022-08-19 NOTE — PLAN OF CARE
Goal Outcome Evaluation:  Plan of Care Reviewed With: patient        Progress: improving  Outcome Evaluation: Pt A&Ox4.  Appeared to sleep well throughout the night.  On 1.5L NC which is less than the 2L NC home dose.  VSS.  Will CTM until the end of my shift.

## 2022-08-19 NOTE — CASE MANAGEMENT/SOCIAL WORK
Continued Stay Note  [facility]     Patient Name: Cleia Baird  MRN: [id number]  Today's Date: 8/19/2022    Admit Date: 8/17/2022     Discharge Plan     Row Name 08/19/22 1607       Plan    Plan Home with son    Patient/Family in Agreement with Plan yes    Plan Comments CCP met with the patient and her son, Krishna, at the bedside to confirm discharge plan. The patient plans on returning home at discharge and denies HH/SNF needs. CCP encouraged patient to seek CCP if she has any needs. Moreno MARTINEZ RN CCP               Discharge Codes    No documentation.               Expected Discharge Date and Time     Expected Discharge Date Expected Discharge Time    Aug 22, 2022             Moreno Tuttle RN

## 2022-08-19 NOTE — PROGRESS NOTES
Name: Celia Baird ADMIT: 2022   : 1945  PCP: Meenu Brambila APRN    MRN: [id number] LOS: 2 days   AGE/SEX: 77 y.o. female  ROOM: [facility]     Subjective   Subjective   She is feeling much better today. Shortness of breath improved/resolved.      Intake/Output Summary (Last 24 hours) at 2022 1037  Last data filed at 2022 0312  Gross per 24 hour   Intake 198 ml   Output 1800 ml   Net -1602 ml        Review of Systems   Constitutional: Negative for fever.   Respiratory: Negative for cough.    Cardiovascular: Negative for chest pain.   Gastrointestinal: Negative for abdominal pain, diarrhea, nausea and vomiting.   Genitourinary: Negative for dysuria.   Neurological: Negative for headaches.      Objective   Objective   Vital Signs  Temp:  [97.6 °F (36.4 °C)-98.1 °F (36.7 °C)] 97.6 °F (36.4 °C)  Heart Rate:  [70-89] 89  Resp:  [14-18] 14  BP: (117-135)/(51-70) 120/68  SpO2:  [93 %-100 %] 93 %  on  Flow (L/min):  [1.5-3] 3;   Device (Oxygen Therapy): nasal cannula  Body mass index is 18.48 kg/m².    Physical Exam  Constitutional:       General: She is not in acute distress.     Appearance: Normal appearance. She is not toxic-appearing.   HENT:      Head: Normocephalic and atraumatic.   Cardiovascular:      Rate and Rhythm: Normal rate and regular rhythm.   Pulmonary:      Effort: Pulmonary effort is normal. No respiratory distress.      Breath sounds: No wheezing or rhonchi.   Abdominal:      General: Bowel sounds are normal.      Palpations: Abdomen is soft.      Tenderness: There is no abdominal tenderness. There is no guarding or rebound.   Musculoskeletal:         General: No swelling.      Right lower leg: No edema.      Left lower leg: No edema.   Skin:     General: Skin is warm and dry.   Neurological:      General: No focal deficit present.      Mental Status: She is alert and oriented to person, place, and time.   Psychiatric:         Mood and Affect: Mood normal.         Behavior:  Behavior normal.         Thought Content: Thought content normal.     Results Review  I reviewed the patient's new clinical results.  Results from last 7 days   Lab Units 08/19/22  0501 08/18/22  2057 08/18/22  1449 08/18/22  0503 08/17/22  1313 08/17/22  0654 08/17/22  0329   WBC 10*3/mm3  --   --   --  9.39  --  7.59 8.42   HEMOGLOBIN g/dL 8.9* 9.3* 9.1* 8.2*   < > 6.1* 5.8*   PLATELETS 10*3/mm3  --   --   --  311  --  341 360    < > = values in this interval not displayed.     Results from last 7 days   Lab Units 08/19/22  0501 08/18/22  0503 08/17/22  0654 08/17/22  0329   SODIUM mmol/L 142 139 141 142   POTASSIUM mmol/L 4.6 3.2* 3.8 4.2   CHLORIDE mmol/L 99 100 103 105   CO2 mmol/L 37.8* 31.7* 28.3 29.8*   BUN mg/dL 18 14 12 14   CREATININE mg/dL 1.20* 0.88 0.73 0.78   GLUCOSE mg/dL 81 95 148* 130*     Lab Results   Component Value Date    ANIONGAP 5.2 08/19/2022     Estimated Creatinine Clearance: 25.7 mL/min (A) (by C-G formula based on SCr of 1.2 mg/dL (H)).    Results from last 7 days   Lab Units 08/17/22  0329   ALBUMIN g/dL 3.30*   BILIRUBIN mg/dL <0.2   ALK PHOS U/L 101   AST (SGOT) U/L 15   ALT (SGPT) U/L 18     Results from last 7 days   Lab Units 08/19/22  0501 08/18/22  0503 08/17/22  0654 08/17/22  0329   CALCIUM mg/dL 8.7 8.6 8.8 8.6   ALBUMIN g/dL  --   --   --  3.30*   MAGNESIUM mg/dL 1.6 2.0  --  1.7     Results from last 7 days   Lab Units 08/17/22  0329   PROCALCITONIN ng/mL 0.04     No results found for: HGBA1C, POCGLU    No radiology results for the last day    Scheduled Meds  amiodarone, 200 mg, Oral, BID  aspirin, 81 mg, Oral, Daily  clopidogrel, 75 mg, Oral, Daily  cyanocobalamin, 1,000 mcg, Intramuscular, Daily  folic acid, 2 mg, Oral, Daily  [START ON 8/20/2022] furosemide, 20 mg, Oral, Daily  ipratropium-albuterol, 3 mL, Nebulization, 4x Daily - RT  mirtazapine, 7.5 mg, Oral, Nightly  pantoprazole, 40 mg, Intravenous, Q12H  potassium chloride, 20 mEq, Oral, Daily  rosuvastatin, 5 mg,  Oral, Daily  sodium chloride, 10 mL, Intravenous, Q12H  sodium chloride, 4 mL, Nebulization, BID - RT  sucralfate, 1 g, Oral, 4x Daily AC & at Bedtime  ursodiol, 300 mg, Oral, TID With Meals  vitamin B-12, 1,000 mcg, Oral, Daily  vitamin B-6, 25 mg, Oral, Daily    Continuous Infusions   PRN Meds  •  acetaminophen **OR** acetaminophen **OR** acetaminophen  •  ALPRAZolam  •  calcium carbonate  •  ipratropium-albuterol  •  magnesium sulfate **OR** magnesium sulfate **OR** magnesium sulfate  •  nitroglycerin  •  O2  •  ondansetron **OR** ondansetron  •  potassium chloride **OR** potassium chloride **OR** potassium chloride  •  [COMPLETED] Insert peripheral IV **AND** sodium chloride  •  sodium chloride     Diet  Diet Regular    I have personally reviewed:  [x]  Laboratory   []  Microbiology   []  Radiology   [x]  EKG/Telemetry   []  Cardiology/Vascular   []  Pathology   []  Records      Intake/Output Summary (Last 24 hours) at 8/19/2022 1037  Last data filed at 8/19/2022 0312  Gross per 24 hour   Intake 198 ml   Output 1800 ml   Net -1602 ml      Results for orders placed during the hospital encounter of 01/13/22    Adult Transthoracic Echo Complete W/ Cont if Necessary Per Protocol    Interpretation Summary  · Calculated left ventricular EF = 61.9% Estimated left ventricular EF was in agreement with the calculated left ventricular EF.  · Left ventricular diastolic function was normal.  · There is no evidence of pericardial effusion. .        Assessment/Plan     Active Hospital Problems    Diagnosis  POA   • **COPD with acute exacerbation (HCC) [J44.1]  Yes   • Diastolic CHF, acute (HCC) [I50.31]  Unknown   • CAD (coronary artery disease) [I25.10]  Unknown   • B12 deficiency [E53.8]  Unknown   • Symptomatic anemia [D64.9]  Unknown   • Paroxysmal atrial fibrillation (HCC) [I48.0]  Yes   • Tobacco abuse [Z72.0]  Yes      Resolved Hospital Problems   No resolved problems to display.     77 y.o. female with a history of  COPD, atrial fibrillation, CAD status post stent, smoker admitted with shortness of breath. Hgb found to be 6.     CHF: Symptoms improved. Possibly acute diastolic. 1.6 L out over the last 24 hours. Cr a little higher today. Diuretics now PO. K replaced. Echocardiogram still pending. F/U CXR pending.    COPD, severe with chronic hypoxemic respiratory failure: Received steroids in ED.     Anemia, severe: Improved after transfusion and Hgb stable. B12 deficiency now on replacement. Occult blood is pending (large BM yesterday). On PPI. Iron and percent saturation low. She states she previously was on iron but was taken off since it interacted with another medications. She had a EGD and colonoscopy in January this year no source of bleeding identified then (heme positive and had symptomatic anemia at that time). PO Fe.     afib > SR: Amiodarone per card. Patient states she has NOT had watchman done- only had an evaluation but that was interrupted due to a hospitalization. referral per card. not on anticoagulation (currently holding due to anemia)     SCDs for DVT prophylaxis    Discussed with patient and nursing staff and Dr. Augustine Christie    Discharge: TBD. May need SNF d/t weakness    Chaitanya Medina MD  [facility]  08/19/22

## 2022-08-19 NOTE — PROGRESS NOTES
[facility]      Mar/chart reviewed  Follow up COPD, anemia  Some cough and shortness of breath still, but feeling improved    Vital Sign Min/Max for last 24 hours  Temp  Min: 97.8 °F (36.6 °C)  Max: 98.4 °F (36.9 °C)   BP  Min: 114/59  Max: 135/70   Pulse  Min: 61  Max: 78   Resp  Min: 16  Max: 18   SpO2  Min: 94 %  Max: 100 %   Flow (L/min)  Min: 1.5  Max: 3   Weight  Min: 41.5 kg (91 lb 7.9 oz)  Max: 41.5 kg (91 lb 7.9 oz)     Appears ill, axox3,   perrl, eomi, no icterus,  mmm, no jvd, trachea midline, neck supple,  chest  much less coarse  bilaterally, no crackles, no wheezes,   Irrg, rate ok   soft, nt, nd +bs,  no c/c/e  Skin warm, dry no rashes    Labs: 8/19: reviewed:  Glucose 81  Bun 18  Cr 1.2  Na 142  Bicarb 37  hgb 8.9    A/P:  1. COPD, severe fev1 47% - bronchodilators --pulmonary toilet, not wheezing, no steroids.    2. Chronic hypoxemic respiratory failure --continue oxygen  3. Acute pulmonary edema with bilateral pleural effusion -- agree with lasix, keep amio tox on ddx, but think much less likely  4. Anemia -- s/p transfusion  suspect this is contriubting to her shortness of breath  5. Tobacco abuse  6. On amiodarone therapy     Repeat CXR, improved,if improved or stable, no objection to d/c   Follow up with Dr. MELISSA Christie in office in 2-4 weeks

## 2022-08-19 NOTE — PLAN OF CARE
Goal Outcome Evaluation:              Outcome Evaluation: Pt A&Ox4, VSS - on 3L O2. Pt in normal sinus rhythm. Changed from IV to PO Lasix. Echo complete & normal. Will CTM.

## 2022-08-20 ENCOUNTER — READMISSION MANAGEMENT (OUTPATIENT)
Dept: CALL CENTER | Facility: HOSPITAL | Age: 77
End: 2022-08-20

## 2022-08-20 VITALS
SYSTOLIC BLOOD PRESSURE: 92 MMHG | WEIGHT: 92.81 LBS | HEIGHT: 59 IN | RESPIRATION RATE: 18 BRPM | TEMPERATURE: 98 F | BODY MASS INDEX: 18.71 KG/M2 | DIASTOLIC BLOOD PRESSURE: 46 MMHG | HEART RATE: 64 BPM | OXYGEN SATURATION: 97 %

## 2022-08-20 LAB
ANION GAP SERPL CALCULATED.3IONS-SCNC: 6.3 MMOL/L (ref 5–15)
BUN SERPL-MCNC: 20 MG/DL (ref 8–23)
BUN/CREAT SERPL: 21.1 (ref 7–25)
CALCIUM SPEC-SCNC: 8.6 MG/DL (ref 8.6–10.5)
CHLORIDE SERPL-SCNC: 93 MMOL/L (ref 98–107)
CO2 SERPL-SCNC: 36.7 MMOL/L (ref 22–29)
CREAT SERPL-MCNC: 0.95 MG/DL (ref 0.57–1)
DEPRECATED RDW RBC AUTO: 48.1 FL (ref 37–54)
EGFRCR SERPLBLD CKD-EPI 2021: 61.8 ML/MIN/1.73
ERYTHROCYTE [DISTWIDTH] IN BLOOD BY AUTOMATED COUNT: 16 % (ref 12.3–15.4)
GLUCOSE SERPL-MCNC: 97 MG/DL (ref 65–99)
HCT VFR BLD AUTO: 29.4 % (ref 34–46.6)
HGB BLD-MCNC: 8.8 G/DL (ref 12–15.9)
MCH RBC QN AUTO: 24.6 PG (ref 26.6–33)
MCHC RBC AUTO-ENTMCNC: 29.9 G/DL (ref 31.5–35.7)
MCV RBC AUTO: 82.4 FL (ref 79–97)
PLATELET # BLD AUTO: 291 10*3/MM3 (ref 140–450)
PMV BLD AUTO: 10.9 FL (ref 6–12)
POTASSIUM SERPL-SCNC: 3.9 MMOL/L (ref 3.5–5.2)
RBC # BLD AUTO: 3.57 10*6/MM3 (ref 3.77–5.28)
SODIUM SERPL-SCNC: 136 MMOL/L (ref 136–145)
WBC NRBC COR # BLD: 14.67 10*3/MM3 (ref 3.4–10.8)

## 2022-08-20 PROCEDURE — 85027 COMPLETE CBC AUTOMATED: CPT | Performed by: HOSPITALIST

## 2022-08-20 PROCEDURE — 25010000002 CYANOCOBALAMIN PER 1000 MCG: Performed by: HOSPITALIST

## 2022-08-20 PROCEDURE — 80048 BASIC METABOLIC PNL TOTAL CA: CPT | Performed by: HOSPITALIST

## 2022-08-20 PROCEDURE — 99232 SBSQ HOSP IP/OBS MODERATE 35: CPT | Performed by: NURSE PRACTITIONER

## 2022-08-20 PROCEDURE — 94799 UNLISTED PULMONARY SVC/PX: CPT

## 2022-08-20 RX ORDER — AMIODARONE HYDROCHLORIDE 200 MG/1
200 TABLET ORAL DAILY
Qty: 30 TABLET | Refills: 0 | Status: SHIPPED | OUTPATIENT
Start: 2022-08-20

## 2022-08-20 RX ORDER — POTASSIUM CHLORIDE 20 MEQ/1
20 TABLET, EXTENDED RELEASE ORAL DAILY
Qty: 30 TABLET | Refills: 0 | Status: SHIPPED | OUTPATIENT
Start: 2022-08-21 | End: 2022-09-20

## 2022-08-20 RX ORDER — CHOLECALCIFEROL (VITAMIN D3) 125 MCG
1000 CAPSULE ORAL DAILY
Status: DISCONTINUED | OUTPATIENT
Start: 2022-08-21 | End: 2022-08-20 | Stop reason: HOSPADM

## 2022-08-20 RX ORDER — FUROSEMIDE 20 MG/1
20 TABLET ORAL DAILY
Qty: 30 TABLET | Refills: 0 | Status: SHIPPED | OUTPATIENT
Start: 2022-08-21 | End: 2022-11-10 | Stop reason: ALTCHOICE

## 2022-08-20 RX ADMIN — Medication 25 MG: at 08:49

## 2022-08-20 RX ADMIN — ROSUVASTATIN CALCIUM 5 MG: 5 TABLET, FILM COATED ORAL at 08:54

## 2022-08-20 RX ADMIN — ASPIRIN 81 MG: 81 TABLET, COATED ORAL at 08:49

## 2022-08-20 RX ADMIN — FUROSEMIDE 20 MG: 20 TABLET ORAL at 08:49

## 2022-08-20 RX ADMIN — IPRATROPIUM BROMIDE AND ALBUTEROL SULFATE 3 ML: .5; 3 SOLUTION RESPIRATORY (INHALATION) at 06:57

## 2022-08-20 RX ADMIN — SUCRALFATE 1 G: 1 TABLET ORAL at 11:48

## 2022-08-20 RX ADMIN — IPRATROPIUM BROMIDE AND ALBUTEROL SULFATE 3 ML: .5; 3 SOLUTION RESPIRATORY (INHALATION) at 10:37

## 2022-08-20 RX ADMIN — URSODIOL 300 MG: 300 CAPSULE ORAL at 08:49

## 2022-08-20 RX ADMIN — PANTOPRAZOLE SODIUM 40 MG: 40 INJECTION, POWDER, FOR SOLUTION INTRAVENOUS at 09:01

## 2022-08-20 RX ADMIN — POTASSIUM CHLORIDE 20 MEQ: 750 TABLET, EXTENDED RELEASE ORAL at 08:49

## 2022-08-20 RX ADMIN — CYANOCOBALAMIN 1000 MCG: 1000 INJECTION, SOLUTION INTRAMUSCULAR; SUBCUTANEOUS at 08:49

## 2022-08-20 RX ADMIN — Medication 2 MG: at 08:49

## 2022-08-20 RX ADMIN — AMIODARONE HYDROCHLORIDE 200 MG: 200 TABLET ORAL at 08:49

## 2022-08-20 RX ADMIN — Medication 1000 MCG: at 08:49

## 2022-08-20 RX ADMIN — SUCRALFATE 1 G: 1 TABLET ORAL at 08:49

## 2022-08-20 RX ADMIN — CLOPIDOGREL 75 MG: 75 TABLET, FILM COATED ORAL at 08:54

## 2022-08-20 RX ADMIN — Medication 10 ML: at 08:54

## 2022-08-20 RX ADMIN — SODIUM CHLORIDE SOLN NEBU 7% 4 ML: 7 NEBU SOLN at 10:38

## 2022-08-20 RX ADMIN — URSODIOL 300 MG: 300 CAPSULE ORAL at 11:48

## 2022-08-20 NOTE — PLAN OF CARE
Goal Outcome Evaluation:  Plan of Care Reviewed With: patient        Progress: improving  Outcome Evaluation: Pt SA&Ox4 and VSS on 2-3 L NC. No c/o pain this shift. Plan is D/C today with family and HH. Safety measures in place. Will CTM the rest of my shift.

## 2022-08-20 NOTE — PLAN OF CARE
Goal Outcome Evaluation:  Plan of Care Reviewed With: patient        Progress: improving    No change. Will CTM

## 2022-08-20 NOTE — PROGRESS NOTES
"[facility]    Patient Name: Celia Baird  :1945  77 y.o.  LOS: 3  Encounter Provider: BRAYAN Nielsen      Patient Care Team:  Meenu Brambila APRN as PCP - General (Nurse Practitioner)    Chief Complaint: Follow-up atrial fibrillation, diastolic heart failure    Interval History: In sinus rhythm on bedside monitor.  Echocardiogram from yesterday stable.  Patient currently denies shortness of breath, chest pain, palpitations.  States that she is ready to go home.       Objective   Vital Signs  Temp:  [97.6 °F (36.4 °C)-98.7 °F (37.1 °C)] 97.6 °F (36.4 °C)  Heart Rate:  [64-78] 64  Resp:  [14-24] 18  BP: (107-128)/(44-60) 111/49    Intake/Output Summary (Last 24 hours) at 2022 0857  Last data filed at 2022 1926  Gross per 24 hour   Intake --   Output 2250 ml   Net -2250 ml     Flowsheet Rows    Flowsheet Row First Filed Value   Admission Height 149.9 cm (59\") Documented at 2022 0252   Admission Weight 40.4 kg (89 lb) Documented at 2022 0252            Vitals and nursing note reviewed.   Constitutional:       Appearance: Normal appearance. Well-developed.   Eyes:      Conjunctiva/sclera: Conjunctivae normal.   Neck:      Vascular: No carotid bruit.   Pulmonary:      Breath sounds: Normal breath sounds.   Cardiovascular:      Normal rate. Regular rhythm. Normal S1 with normal intensity. Normal S2 with normal intensity.      Murmurs: There is no murmur.      No gallop. No click. No rub.   Musculoskeletal: Normal range of motion. Skin:     General: Skin is warm and dry.   Neurological:      Mental Status: Alert and oriented to person, place, and time.      GCS: GCS eye subscore is 4. GCS verbal subscore is 5. GCS motor subscore is 6.   Psychiatric:         Speech: Speech normal.         Behavior: Behavior normal.         Thought Content: Thought content normal.         Judgment: Judgment normal.           Pertinent Test Results:  Results from last 7 days   Lab " Units 08/20/22  0603 08/19/22  0501 08/18/22  0503 08/17/22  0654 08/17/22  0329   SODIUM mmol/L 136 142 139 141 142   POTASSIUM mmol/L 3.9 4.6 3.2* 3.8 4.2   CHLORIDE mmol/L 93* 99 100 103 105   CO2 mmol/L 36.7* 37.8* 31.7* 28.3 29.8*   BUN mg/dL 20 18 14 12 14   CREATININE mg/dL 0.95 1.20* 0.88 0.73 0.78   GLUCOSE mg/dL 97 81 95 148* 130*   CALCIUM mg/dL 8.6 8.7 8.6 8.8 8.6   AST (SGOT) U/L  --   --   --   --  15   ALT (SGPT) U/L  --   --   --   --  18     Results from last 7 days   Lab Units 08/19/22  0501 08/18/22  0503 08/17/22  0329   TROPONIN T ng/mL <0.010 0.011 <0.010     Results from last 7 days   Lab Units 08/20/22  0603 08/19/22  0501 08/18/22  2057 08/18/22  1449 08/18/22  0503 08/17/22  2347 08/17/22  1313 08/17/22  0654 08/17/22  0329   WBC 10*3/mm3 14.67*  --   --   --  9.39  --   --  7.59 8.42   HEMOGLOBIN g/dL 8.8* 8.9* 9.3* 9.1* 8.2* 8.6* 8.8* 6.1* 5.8*   HEMATOCRIT % 29.4* 29.0* 30.3* 29.8* 25.8* 27.9* 28.9* 20.4* 20.1*   PLATELETS 10*3/mm3 291  --   --   --  311  --   --  341 360         Results from last 7 days   Lab Units 08/19/22  0501 08/18/22  0503 08/17/22  0329   MAGNESIUM mg/dL 1.6 2.0 1.7           Invalid input(s): LDLCALC  Results from last 7 days   Lab Units 08/17/22  0329   PROBNP pg/mL 659.0               Medication Review:   amiodarone, 200 mg, Oral, BID  aspirin, 81 mg, Oral, Daily  clopidogrel, 75 mg, Oral, Daily  cyanocobalamin, 1,000 mcg, Intramuscular, Daily  ferrous sulfate, 325 mg, Oral, Every Other Day  folic acid, 2 mg, Oral, Daily  furosemide, 20 mg, Oral, Daily  ipratropium-albuterol, 3 mL, Nebulization, 4x Daily - RT  mirtazapine, 7.5 mg, Oral, Nightly  pantoprazole, 40 mg, Intravenous, Q12H  potassium chloride, 20 mEq, Oral, Daily  rosuvastatin, 5 mg, Oral, Daily  sodium chloride, 10 mL, Intravenous, Q12H  sodium chloride, 4 mL, Nebulization, BID - RT  sucralfate, 1 g, Oral, 4x Daily AC & at Bedtime  ursodiol, 300 mg, Oral, TID With Meals  vitamin B-12, 1,000 mcg,  Oral, Daily  vitamin B-6, 25 mg, Oral, Daily              Assessment & Plan       COPD with acute exacerbation (HCC)    Paroxysmal atrial fibrillation (HCC)    Tobacco abuse    Symptomatic anemia    Diastolic CHF, acute (HCC)    CAD (coronary artery disease)    B12 deficiency    1. PAF  2. Diastolic heart failure  3. COPD  4. Tobacco abuse  5. Symptomatic anemia  6. CAD  7. B12 deficiency    · Converted to sinus rhythm, heart rates currently controlled with amiodarone, plan to decrease amiodarone to 200 mg/day at time of discharge.  Continue twice daily dosing while hospitalized.  · No anticoagulation at this time secondary to anemia  · Diuresing well  · Echocardiogram reveals preserved LVEF, normal diastolic function.  Normal right-sided pressures  · Okay to discharge from cardiovascular standpoint.  We will sign off.  Please call our service if needed in the future for this patient.    BRAYAN Nielsen  [facility]  08/20/22  08:57 EDT

## 2022-08-20 NOTE — DISCHARGE SUMMARY
Date of Admission: 8/17/2022  Date of Discharge:  8/20/2022  Primary Care Physician: Meenu Brambila APRN     Discharge Diagnosis:  Active Hospital Problems    Diagnosis  POA   • **Diastolic CHF, acute (HCC) [I50.31]  Unknown   • CAD (coronary artery disease) [I25.10]  Unknown   • B12 deficiency [E53.8]  Unknown   • Symptomatic anemia [D64.9]  Unknown   • COPD with acute exacerbation (HCC) [J44.1]  Yes   • Paroxysmal atrial fibrillation (HCC) [I48.0]  Yes   • Tobacco abuse [Z72.0]  Yes      Resolved Hospital Problems   No resolved problems to display.       DETAILS OF HOSPITAL STAY     Pertinent Test Results and Procedures Performed    Chest x-ray:  Pulmonary vascular congestion and probable bilateral pleural fluid   collections.     2D echocardiogram:  · Estimated right ventricular systolic pressure from tricuspid regurgitation is normal (<35 mmHg).  · Calculated left ventricular EF = 58.4% Estimated left ventricular EF was in agreement with the calculated left ventricular EF.  · Left ventricular diastolic function was normal.  · There is no evidence of pericardial effusion        Hospital Course  This is a 77-year-old female who presented to the emergency room with shortness of breath and was felt to have a combination of acute exacerbation of diastolic heart failure as well as exacerbation of COPD.  Please see H&P for full details.  She was evaluated by cardiology and pulmonology.  Ultimately it was felt that her presentation was more consistent with volume overload rather than COPD.  She was diuresed.  She has been placed back on oral diuretics in the form of Lasix.  Cardiology has signed off.  She is back to her baseline of 3 L.  She has no complaints of shortness of breath today.  Pulmonology and cardiology have cleared her for discharge.  She should follow-up with Dr. Loyd Christie of pulmonology in 2 to 3 weeks.  For now her anticoagulation will be held given the anemia and this can be reevaluated in the  outpatient setting in a couple of weeks with cardiology.  She will remain on aspirin and Plavix.    Of note, she was found to be significantly anemic upon presentation with hemoglobin of 6.0.  No signs of bleeding were appreciated.  She received packed red blood cells with improvement/stabilization of her hemoglobin.  This is now been stable for 48 hours.  She was found to be B12 deficient and is on replacement for this.  She was also found to be iron deficient.  She has been placed back on oral iron supplementation.  She is medically stable at this time and will be released back home.  She has declined any needs for home health.    Physical Exam at Discharge:  General: No acute distress, AAOx3, chronically ill appearing, looks older than stated age  HEENT: EOMI, PERRL  Cardiovascular: +s1 and s2, RRR  Lungs: No rhonchi or wheezing  Abdomen: soft, nontender    Consults:   Consults     Date and Time Order Name Status Description    8/17/2022 10:00 AM Inpatient Cardiology Consult Completed     8/17/2022 10:00 AM Inpatient Pulmonology Consult Completed     8/17/2022  4:13 AM [facility] (on-call MD unless specified) Details              Condition on Discharge: Stable    Discharge Disposition  Home or Self Care    Discharge Medications     Discharge Medications      New Medications      Instructions Start Date   furosemide 20 MG tablet  Commonly known as: LASIX   20 mg, Oral, Daily   Start Date: August 21, 2022     potassium chloride 20 MEQ CR tablet  Commonly known as: K-DUR,KLOR-CON   20 mEq, Oral, Daily   Start Date: August 21, 2022        Changes to Medications      Instructions Start Date   amiodarone 200 MG tablet  Commonly known as: PACERONE  What changed: when to take this   200 mg, Oral, Daily         Continue These Medications      Instructions Start Date   ALPRAZolam 0.25 MG tablet  Commonly known as: XANAX   0.25 mg, Oral, Every 8 Hours PRN, for anxiety      aspirin 81 MG EC tablet   81 mg, Oral, Daily       clopidogrel 75 MG tablet  Commonly known as: PLAVIX   75 mg, Oral, Daily      ferrous gluconate 324 MG tablet  Commonly known as: FERGON   324 mg, Oral, Every Other Day      folic acid-vit B6-vit B12 2.2-25-1 MG tablet tablet  Commonly known as: FOLGARD   Oral, Daily      guaiFENesin 600 MG 12 hr tablet  Commonly known as: MUCINEX   1,200 mg, Oral, 2 Times Daily      ipratropium-albuterol 0.5-2.5 mg/3 ml nebulizer  Commonly known as: DUO-NEB   3 mL, Nebulization, 2 Times Daily      mirtazapine 7.5 MG tablet  Commonly known as: REMERON   7.5 mg, Oral, Nightly      nitroglycerin 0.4 MG/SPRAY spray  Commonly known as: NITROLINGUAL   1 spray, Sublingual, Every 5 Minutes PRN      O2  Commonly known as: OXYGEN   2 L/min, Inhalation, As Needed      pantoprazole 40 MG EC tablet  Commonly known as: PROTONIX   40 mg, Oral, 2 Times Daily      ProAir  (90 Base) MCG/ACT inhaler  Generic drug: albuterol sulfate HFA   1-2 puffs, Inhalation, As Needed, every 4 to 6 hours      rosuvastatin 5 MG tablet  Commonly known as: CRESTOR   5 mg, Oral, Daily      sucralfate 1 g tablet  Commonly known as: CARAFATE   1 g, Oral, 4 Times Daily      tiotropium bromide monohydrate 2.5 MCG/ACT aerosol solution inhaler  Commonly known as: SPIRIVA RESPIMAT   2 puffs, Inhalation, Daily - RT      ursodiol 300 MG capsule  Commonly known as: ACTIGALL   300 mg, Oral, 3 Times Daily         Stop These Medications    bumetanide 0.5 MG tablet  Commonly known as: BUMEX     doxycycline 100 MG capsule  Commonly known as: MONODOX            Discharge Diet:   Diet Instructions     Diet: Regular      Discharge Diet: Regular          Activity at Discharge:   Activity Instructions     Activity as Tolerated            Follow-up Appointments  Future Appointments   Date Time Provider Department Center   11/10/2022 11:15 AM Ailyn Solorio MD [facility]     Additional Instructions for the Follow-ups that You Need to Schedule     Discharge Follow-up  with PCP   As directed       Currently Documented PCP:    Meenu Brambila APRN    PCP Phone Number:    [phone]     Follow Up Details: 1 week               Test Results Pending at Discharge      I have examined and discussed discharge planning with the patient today.    I wore full protective equipment throughout the patient encounter including eye protection and facemask.  Hand hygiene was performed before donning protective equipment and after removal when leaving the room.     Benito García MD  08/20/22  13:54 EDT    Time: Discharge greater than 30 min

## 2022-08-21 NOTE — CASE MANAGEMENT/SOCIAL WORK
Case Management Discharge Note      Final Note: DC home    Provided Post Acute Provider List?: Refused  Refused Provider List Comment: Has used [facility] in the past and requested them again if needed  Provided Post Acute Provider Quality & Resource List?: Refused  Refused Quality and Resource List Comment: Has used [facility] in the past and requested them again if needed    Selected Continued Care - Discharged on 8/20/2022 Admission date: 8/17/2022 - Discharge disposition: Home or Self Care    Destination    No services have been selected for the patient.              Durable Medical Equipment    No services have been selected for the patient.              Dialysis/Infusion    No services have been selected for the patient.              Home Medical Care    No services have been selected for the patient.              Therapy    No services have been selected for the patient.              Community Resources    No services have been selected for the patient.              Community & DME    No services have been selected for the patient.                       Final Discharge Disposition Code: 01 - home or self-care

## 2022-08-21 NOTE — OUTREACH NOTE
Prep Survey    Flowsheet Row Responses   [facility] discharged from? [place]   Is LACE score < 7 ? No   Emergency Room discharge w/ pulse ox? No   Eligibility Readm Mgmt   Discharge diagnosis acute Diastolic CHF, COPD with acute exacerbation, anemia   Does the patient have one of the following disease processes/diagnoses(primary or secondary)? CHF   Does the patient have Home health ordered? No   Is there a DME ordered? No   Prep survey completed? Yes          CAROL Samayoa Registered Nurse

## 2022-08-22 LAB
QT INTERVAL: 391 MS
QT INTERVAL: 428 MS

## 2022-08-24 ENCOUNTER — READMISSION MANAGEMENT (OUTPATIENT)
Dept: CALL CENTER | Facility: HOSPITAL | Age: 77
End: 2022-08-24

## 2022-08-24 NOTE — PROGRESS NOTES
"Enter Query Response Below      Query Response:       Severe malnutrition       If applicable, please update the problem list.        Patient: Celia Baird        : 1945  Account: [id number]           Admit Date: 2022        Options to Respond to Query:    1. Access the Encounter     a. From the To-Do Side bar, click Respond With Note.     b. Click New Note     c. Answer query within the yellow box.                d. Update the Problem List if applicable.     ,    76 yo female admitted 22 with \"COPD with acute exacerbation\" per history and physical.  Height: 149.9 cm (59\") Weight: 40.4 kg (89 lb) (22)  BMI-17.98.  () Registered dietician consult note Malnutrition Severity Assessment  (Based on ASPEN/AND criteria, pt meets nutrition diagnosis of Severe Malnutrition of Chronic Disease due to severe fat and muscle wasting noted on NFPE.)  Treatment includes monitor per protocol, adjust menu , encourage intake, Boost Breeze TID.     After study, can the patient's condition be further clarified as:    - Severe malnutrition  - Normal body habitus  - Other- specify________  - Unable to determine     By submitting this query, we are merely seeking further clarification of documentation to accurately reflect all conditions that you are monitoring, evaluating, treating or that extend the hospitalization or utilize additional resources of care. Please utilize your independent clinical judgment when addressing the question(s) above.     This query and your response, once completed, will be entered into the legal medical record.    Sincerely,  Tessa Horan RN,[facility]  Clinical Documentation Integrity Program   [email or web address]  "

## 2022-08-24 NOTE — OUTREACH NOTE
CHF Week 1 Survey    Flowsheet Row Responses   [facility] patient discharged from? [place]   Does the patient have one of the following disease processes/diagnoses(primary or secondary)? CHF   CHF Week 1 attempt successful? Yes   Call start time 1242   Call end time 1258   Discharge diagnosis acute Diastolic CHF, COPD with acute exacerbation, anemia   Person spoke with today (if not patient) and relationship Patient   Meds reviewed with patient/caregiver? Yes   Is the patient having any side effects they believe may be caused by any medication additions or changes? No   Does the patient have all medications ordered at discharge? Yes   Is the patient taking all medications as directed (includes completed medication regime)? Yes   Does the patient have a primary care provider?  Yes   Does the patient have an appointment with their PCP within 7 days of discharge? Yes   Comments regarding PCP 8/30/22   Has the patient kept scheduled appointments due by today? N/A   Pulse Ox monitoring Intermittent   Pulse Ox device source Patient   O2 Sat comments 97%   Psychosocial issues? No   Did the patient receive a copy of their discharge instructions? Yes   Nursing interventions Reviewed instructions with patient   What is the patient's perception of their health status since discharge? Improving   Nursing interventions Nurse provided patient education   Is the patient weighing daily? Yes   Does the patient have scales? Yes   Daily weight interventions Education provided on importance of daily weight   Is the patient able to teach back Heart Failure diet management? Yes   Is the patient able to teach back Heart Failure Zones? Yes   Is the patient able to teach back signs and symptoms of worsening condition? (i.e. weight gain, shortness of air, etc.) Yes   If the patient is a current smoker, are they able to teach back resources for cessation? Not a smoker   Is the patient/caregiver able to teach back the hierarchy of who  to call/visit for symptoms/problems? PCP, Specialist, Home health nurse, Urgent Care, ED, 911 Yes    CHF Week 1 call completed? Yes   Wrap up additional comments Pt states she is doing better. Reviewed AVS/medications with pt. Pt educated on the importance of weighing daily, heart warning s/s, and when to call cardiologist/911. Pt verbalized understanding. Pt verified PCP fu appt on 8/30/22, and advised to call cardiologist to see if cardiolgist wants to see pt sooner than November for hospital fu appt.          ERICA LEDESMA - Registered Nurse

## 2022-09-01 ENCOUNTER — READMISSION MANAGEMENT (OUTPATIENT)
Dept: CALL CENTER | Facility: HOSPITAL | Age: 77
End: 2022-09-01

## 2022-09-01 NOTE — OUTREACH NOTE
CHF Week 2 Survey    Flowsheet Row Responses   [facility] patient discharged from? [place]   Does the patient have one of the following disease processes/diagnoses(primary or secondary)? CHF   Week 2 attempt successful? Yes   Call start time 1248   Call end time 1303   Discharge diagnosis acute Diastolic CHF, COPD with acute exacerbation, anemia   Person spoke with today (if not patient) and relationship Patient   Meds reviewed with patient/caregiver? Yes   Is the patient having any side effects they believe may be caused by any medication additions or changes? No   Does the patient have all medications ordered at discharge? Yes   Is the patient taking all medications as directed (includes completed medication regime)? Yes   Does the patient have a primary care provider?  Yes   Does the patient have an appointment with their PCP within 7 days of discharge? Yes   Has the patient kept scheduled appointments due by today? N/A   Pulse Ox monitoring Intermittent   Pulse Ox device source Patient   O2 Sat comments 99% 2LO2 at rest   O2 Sat: education provided Sat levels, Monitoring frequency, When to seek care   Psychosocial issues? No   What is the patient's perception of their health status since discharge? Improving   Nursing interventions Nurse provided patient education   Is the patient able to teach back signs and symptoms of worsening condition? (i.e. weight gain, shortness of air, etc.) Yes   Is the patient able to teach back Heart Failure Zones? Yes   CHF Nursing Interventions Education provided on various zones   CHF Zone this Call Green Zone   Green Zone Patient reports doing well, No new swelling -  feet, ankles and legs look normal for you   Green Zone Interventions Daily weight check, Meds as directed, Low sodium diet, Follow up visits planned   CHF Week 2 call completed? Yes   Wrap up additional comments Pt states she is doing alright,  pt reports SOB upon exertion r/t COPD. Pt admits to smoking 5  cigs a day,  pt encouraged to quit smoking, and given 1-800-quit-now. Pt advised to use spacer with long acting inhaler as she has started a different inhaler that replaced spiriva inhaler. Pt was educated on the reason for the spacer. Pt verbalized understanding. Pt had Pulmonologist/Cardiologist fu appt, and advised to make PCP fu appt.   Call end time 1303          ERICA LEDESMA - Registered Nurse

## 2022-09-14 ENCOUNTER — READMISSION MANAGEMENT (OUTPATIENT)
Dept: CALL CENTER | Facility: HOSPITAL | Age: 77
End: 2022-09-14

## 2022-09-14 NOTE — OUTREACH NOTE
CHF Week 4 Survey    Flowsheet Row Responses   [facility] patient discharged from? [place]   Does the patient have one of the following disease processes/diagnoses(primary or secondary)? CHF   Week 4 attempt successful? Yes   Call start time 1534   Call end time 1538   Discharge diagnosis acute Diastolic CHF, COPD with acute exacerbation, anemia   Person spoke with today (if not patient) and relationship SOn    Meds reviewed with patient/caregiver? Yes   Is the patient taking all medications as directed (includes completed medication regime)? Yes   Has the patient kept scheduled appointments due by today? Yes   Comments Had appt with PCP to day.   Is the patient still receiving Home Health Services? N/A   Pulse Ox monitoring Intermittent   O2 Sat comments 97% o2 @ 3L   O2 Sat: education provided Sat levels, Monitoring frequency, When to seek care   Psychosocial issues? No   What is the patient's perception of their health status since discharge? Improving   Nursing interventions Nurse provided patient education   Is the patient able to teach back signs and symptoms of worsening condition? (i.e. weight gain, shortness of air, etc.) Yes   Is the patient/caregiver able to teach back the hierarchy of who to call/visit for symptoms/problems? PCP, Specialist, Home health nurse, Urgent Care, ED, 911 Yes   Is the patient able to teach back Heart Failure Zones? Yes   Week 4 Call Completed? Yes   Would the patient like one additional call? No   Graduated Yes   Is the patient interested in additional calls from an ambulatory ?  NOTE:  applies to high risk patients requiring additional follow-up. No   Wrap up additional comments Son reports that Pt went to PCP today r/t nausea. PCPstopped vitamins per Son.    Call end time 1538          EDY LEMON - Registered Nurse

## 2022-11-10 ENCOUNTER — OFFICE VISIT (OUTPATIENT)
Dept: CARDIOLOGY | Facility: CLINIC | Age: 77
End: 2022-11-10

## 2022-11-10 VITALS
DIASTOLIC BLOOD PRESSURE: 56 MMHG | HEIGHT: 59 IN | HEART RATE: 67 BPM | WEIGHT: 97 LBS | SYSTOLIC BLOOD PRESSURE: 95 MMHG | BODY MASS INDEX: 19.56 KG/M2

## 2022-11-10 DIAGNOSIS — Z79.02 LONG TERM (CURRENT) USE OF ANTITHROMBOTICS/ANTIPLATELETS: ICD-10-CM

## 2022-11-10 DIAGNOSIS — R06.02 SOB (SHORTNESS OF BREATH): Primary | ICD-10-CM

## 2022-11-10 DIAGNOSIS — R06.02 BREATH SHORTNESS: ICD-10-CM

## 2022-11-10 DIAGNOSIS — I48.0 PAROXYSMAL ATRIAL FIBRILLATION: ICD-10-CM

## 2022-11-10 DIAGNOSIS — I48.0 AF (PAROXYSMAL ATRIAL FIBRILLATION): ICD-10-CM

## 2022-11-10 PROCEDURE — 99214 OFFICE O/P EST MOD 30 MIN: CPT | Performed by: INTERNAL MEDICINE

## 2022-11-10 PROCEDURE — 93000 ELECTROCARDIOGRAM COMPLETE: CPT | Performed by: INTERNAL MEDICINE

## 2022-11-10 RX ORDER — ONDANSETRON 4 MG/1
TABLET, ORALLY DISINTEGRATING ORAL
COMMUNITY
Start: 2022-09-14

## 2022-11-10 RX ORDER — BUDESONIDE AND FORMOTEROL FUMARATE DIHYDRATE 160; 4.5 UG/1; UG/1
2 AEROSOL RESPIRATORY (INHALATION) 2 TIMES DAILY
COMMUNITY
Start: 2022-11-07

## 2022-11-10 RX ORDER — POTASSIUM CHLORIDE 20 MEQ/1
20 TABLET, EXTENDED RELEASE ORAL DAILY
COMMUNITY
Start: 2022-09-20

## 2022-11-10 RX ORDER — BUMETANIDE 0.5 MG/1
0.25 TABLET ORAL
COMMUNITY
Start: 2022-11-01

## 2022-11-10 RX ORDER — OMEPRAZOLE 40 MG/1
CAPSULE, DELAYED RELEASE ORAL
COMMUNITY
Start: 2022-09-14

## 2022-11-10 NOTE — PROGRESS NOTES
6 MO FOLLOW UP-AFIB  PLAVIX WAS STOPPED AT [facility] DUE TO BLOOD LOSS   Subjective:        Celia Baird is a 77 y.o. female who here for follow up    CC  AFIB  HPI  77-year-old female with a paroxysmal atrial fibrillation shortness of breath here for the follow-up also with a history of the atrial fibrillation     Problems Addressed this Visit        Cardiac and Vasculature    AF (paroxysmal atrial fibrillation) (HCC)       Coag and Thromboembolic    Long term (current) use of antithrombotics/antiplatelets       Pulmonary and Pneumonias    SOB (shortness of breath) - Primary    Breath shortness   Diagnoses       Codes Comments    SOB (shortness of breath)    -  Primary ICD-10-CM: R06.02  ICD-9-CM: 786.05     Paroxysmal atrial fibrillation (HCC)     ICD-10-CM: I48.0  ICD-9-CM: 427.31     Breath shortness     ICD-10-CM: R06.02  ICD-9-CM: 786.05     Long term (current) use of antithrombotics/antiplatelets     ICD-10-CM: Z79.02  ICD-9-CM: V58.63     AF (paroxysmal atrial fibrillation) (HCC)     ICD-10-CM: I48.0  ICD-9-CM: 427.31         .    The following portions of the patient's history were reviewed and updated as appropriate: allergies, current medications, past family history, past medical history, past social history, past surgical history and problem list.    Past Medical History:   Diagnosis Date   • Atrial fibrillation (HCC)    • CAD (coronary artery disease)    • COPD (chronic obstructive pulmonary disease) (HCC)    • History of frequent urinary tract infections    • Irregular heart beat    • Kidney stones    • PBC (primary biliary cirrhosis)    • PBC (primary biliary cirrhosis)      reports that she has been smoking cigarettes. She has a 29.50 pack-year smoking history. She has never used smokeless tobacco. She reports that she does not drink alcohol and does not use drugs.   Family History   Problem Relation Age of Onset   • Heart disease Father    • Heart attack Father    • Heart disease  "Brother    • Stroke Mother        Review of Systems  Constitutional: No wt loss, fever, fatigue  Gastrointestinal: No nausea, abdominal pain  Behavioral/Psych: No insomnia or anxiety   Cardiovascular no chest pains or tightness in the chest  Objective:       Physical Exam  BP 95/56   Pulse 67   Ht 149.9 cm (59\")   Wt 44 kg (97 lb)   BMI 19.59 kg/m²   General appearance: No acute changes   Neck: Trachea midline; NECK, supple, no thyromegaly or lymphadenopathy   Lungs: Normal size and shape, normal breath sounds, equal distribution of air, no rales and rhonchi   CV: S1-S2 regular, no murmurs, no rub, no gallop   Abdomen: Soft, nontender; no masses , no abnormal abdominal sounds   Extremities: No deformity , normal color , no peripheral edema   Skin: Normal temperature, turgor and texture; no rash, ulcers            ECG 12 Lead    Date/Time: 11/10/2022 12:12 PM  Performed by: Ailyn Solorio MD  Authorized by: Ailyn Solorio MD   Comparison: compared with previous ECG   Similar to previous ECG  Rhythm: atrial fibrillation  ST Flattening: all    Clinical impression: abnormal EKG              Echocardiogram:        Current Outpatient Medications:   •  ALPRAZolam (XANAX) 0.25 MG tablet, Take 0.25 mg by mouth Every 8 (Eight) Hours As Needed. for anxiety, Disp: , Rfl:   •  amiodarone (PACERONE) 200 MG tablet, Take 1 tablet by mouth Daily., Disp: 30 tablet, Rfl: 0  •  aspirin 81 MG EC tablet, Take 81 mg by mouth Daily., Disp: , Rfl:   •  bumetanide (BUMEX) 0.5 MG tablet, 0.5 tablets., Disp: , Rfl:   •  ferrous gluconate (FERGON) 324 MG tablet, Take 324 mg by mouth Every Other Day., Disp: , Rfl:   •  folic acid-vit B6-vit B12 (FOLGARD) 2.2-25-1 MG tablet tablet, Take  by mouth Daily., Disp: , Rfl:   •  guaiFENesin (MUCINEX) 600 MG 12 hr tablet, Take 1,200 mg by mouth 2 (Two) Times a Day., Disp: , Rfl:   •  ipratropium-albuterol (DUO-NEB) 0.5-2.5 mg/3 ml nebulizer, Take 3 mL by nebulization 2 (Two) Times a " Day., Disp: , Rfl:   •  O2 (OXYGEN), Inhale 2 L/min As Needed (sob)., Disp: , Rfl:   •  omeprazole (priLOSEC) 40 MG capsule, , Disp: , Rfl:   •  ondansetron ODT (ZOFRAN-ODT) 4 MG disintegrating tablet, , Disp: , Rfl:   •  ProAir  (90 Base) MCG/ACT inhaler, Inhale 1-2 puffs As Needed. every 4 to 6 hours, Disp: , Rfl:   •  rosuvastatin (CRESTOR) 5 MG tablet, Take 5 mg by mouth Daily., Disp: , Rfl:   •  Symbicort 160-4.5 MCG/ACT inhaler, Inhale 2 puffs 2 (Two) Times a Day., Disp: , Rfl:   •  ursodiol (ACTIGALL) 300 MG capsule, Take 300 mg by mouth 3 (Three) Times a Day., Disp: , Rfl:   •  nitroglycerin (NITROLINGUAL) 0.4 MG/SPRAY spray, Place 1 spray under the tongue Every 5 (Five) Minutes As Needed for Chest Pain., Disp: , Rfl:   •  potassium chloride (K-DUR,KLOR-CON) 20 MEQ CR tablet, Take 1 tablet by mouth Daily. FOR 30 DAYS, Disp: , Rfl:    Assessment:        Patient Active Problem List   Diagnosis   • COPD exacerbation (HCC)   • Subarachnoid hemorrhage (HCC)   • Multiple skin tears   • Closed nondisplaced fracture of left clavicle   • AF (paroxysmal atrial fibrillation) (HCC)   • Tobacco abuse   • Status post placement of implantable loop recorder   • SOB (shortness of breath)   • COPD with acute exacerbation (HCC)   • Severe malnutrition (CMS/HCC)   • Leukocytosis   • Gastrointestinal hemorrhage   • Symptomatic anemia   • Hyperlipidemia   • Long term (current) use of antithrombotics/antiplatelets   • COPD with exacerbation (HCC)   • Breath shortness   • Diastolic CHF, acute (HCC)   • CAD (coronary artery disease)   • B12 deficiency   • Underweight               Plan:            ICD-10-CM ICD-9-CM   1. SOB (shortness of breath)  R06.02 786.05   2. Paroxysmal atrial fibrillation (HCC)  I48.0 427.31   3. Breath shortness  R06.02 786.05   4. Long term (current) use of antithrombotics/antiplatelets  Z79.02 V58.63   5. AF (paroxysmal atrial fibrillation) (HCC)  I48.0 427.31     1. SOB (shortness of  breath)  Multifactorial    2. Paroxysmal atrial fibrillation (HCC)  Intermittent    3. Breath shortness  Multifactorial    4. Long term (current) use of antithrombotics/antiplatelets  Counseling done    5. AF (paroxysmal atrial fibrillation) (HCC)  Under control       Celia Oli needs anti coagulation  At this point pt is not on anticoagulations.  Pros and cons of anticoagulations has been discussed  Alternate methods including Watchman has been discussed  At this stage it has been decided NO ANTICOAGULATIONS    INCREASE BUMEX 0.5 MG    SEE IN 6 MONTHS  COUNSELING:    Celia Colmenares was given to patient for the following topics: diagnostic results, risk factor reductions, impressions, risks and benefits of treatment options and importance of treatment compliance .       SMOKING COUNSELING:        Dictated using Dragon dictation

## 2023-02-23 NOTE — HOME HEALTH
Pt presented on 2L of oxygen.  She says she is going to PCP office on Monday at 1:15PM.   Due to hemoglobin dropping to 8.1, therapist advised her to stop the exercises and only walking.    NO FALLS  NO EDEMA - slight indentation from socks.   She started Spirva this week.    Called pt's BRAYAN Brambila at Dr Gomez's office. Spoke to her about my concerns for SOA with exertion and hemoglobin down again with no known cause for the anemia. She plans to draw labs on Monday and possibly look into Hemoc referral.  She said if pt's SOA worsens this weekend to go to ED.  But for her to continue her oxygen 2L continuous this weekend.      Encourage pt to focus on activity pacing and avoid exercise this weekend.  She verbalized understanding.  Also, encourage her to use rollator at all times and keep her phone with her.      Plan for next visit: PT DC, ensure safety with all functional mobility and fall prevention. 132

## 2023-05-30 ENCOUNTER — OFFICE VISIT (OUTPATIENT)
Dept: CARDIOLOGY | Facility: CLINIC | Age: 78
End: 2023-05-30
Payer: MEDICARE

## 2023-05-30 VITALS
BODY MASS INDEX: 17.22 KG/M2 | SYSTOLIC BLOOD PRESSURE: 121 MMHG | WEIGHT: 85.4 LBS | DIASTOLIC BLOOD PRESSURE: 64 MMHG | HEART RATE: 78 BPM | OXYGEN SATURATION: 89 % | HEIGHT: 59 IN

## 2023-05-30 DIAGNOSIS — I50.30 DIASTOLIC CONGESTIVE HEART FAILURE, UNSPECIFIED HF CHRONICITY: ICD-10-CM

## 2023-05-30 DIAGNOSIS — E78.5 HYPERLIPIDEMIA, UNSPECIFIED HYPERLIPIDEMIA TYPE: ICD-10-CM

## 2023-05-30 DIAGNOSIS — Z79.899 ON AMIODARONE THERAPY: Primary | ICD-10-CM

## 2023-05-30 DIAGNOSIS — I25.10 CORONARY ARTERY DISEASE INVOLVING NATIVE CORONARY ARTERY OF NATIVE HEART WITHOUT ANGINA PECTORIS: ICD-10-CM

## 2023-05-30 DIAGNOSIS — I48.0 PAROXYSMAL ATRIAL FIBRILLATION: ICD-10-CM

## 2023-05-30 DIAGNOSIS — I48.0 AF (PAROXYSMAL ATRIAL FIBRILLATION): ICD-10-CM

## 2023-05-30 RX ORDER — AMIODARONE HYDROCHLORIDE 200 MG/1
100 TABLET ORAL DAILY
Qty: 30 TABLET | Refills: 11 | Status: SHIPPED | OUTPATIENT
Start: 2023-05-30

## 2023-05-30 NOTE — PROGRESS NOTES
Subjective:        Celia Baird is a 78 y.o. female who here for follow up    Chief Complaint   Patient presents with    Shortness of Breath    Atrial Fibrillation       HPI    This is a 78-year-old female with coronary artery disease, paroxysmal atrial fibrillation, hyperlipidemia, chronic diastolic CHF, COPD.  She follows up in office today for management of atrial fibrillation.    She is not historically anticoagulated due to anemia.  She was seen on 11/10/2022 at that time Bumex was increased to 0.5 mg.    Echo 11/20/2022 with [facility], records are not available within [facility].  Echo 8/19/2022 EF 58%, normal LV function.  Stress test 2019 was a normal myocardial perfusion study with no evidence of ischemia.  Holter monitor 5/10/2022 normal sinus rhythm occasional PACs, PVCs, NS SVTs.  4% Atrial fibrillation burden with the longest 2 hours 29 minutes.    The following portions of the patient's history were reviewed and updated as appropriate: allergies, current medications, past family history, past medical history, past social history, past surgical history and problem list.    Past Medical History:   Diagnosis Date    Atrial fibrillation     CAD (coronary artery disease)     COPD (chronic obstructive pulmonary disease)     History of frequent urinary tract infections     Irregular heart beat     Kidney stones     PBC (primary biliary cirrhosis)     PBC (primary biliary cirrhosis)          reports that she quit smoking about 6 months ago. Her smoking use included cigarettes. She has a 29.50 pack-year smoking history. She has never used smokeless tobacco. She reports that she does not drink alcohol and does not use drugs.     Family History   Problem Relation Age of Onset    Heart disease Father     Heart attack Father     Heart disease Brother     Stroke Mother        ROS     Review of Systems  Constitutional: No wt loss, fever, fatigue  Gastrointestinal: No nausea, abdominal  pain  Behavioral/Psych: No insomnia or anxiety  Cardiovascular no chest pain or shortness of breath      Objective:           Vitals and nursing note reviewed.   Constitutional:       Appearance: Well-developed.   HENT:      Head: Normocephalic.      Right Ear: External ear normal.      Left Ear: External ear normal.   Neck:      Vascular: No JVD.   Pulmonary:      Effort: Pulmonary effort is normal. No respiratory distress.      Breath sounds: Normal breath sounds. No stridor. No rales.   Cardiovascular:      Normal rate. Regular rhythm.      No gallop.    Pulses:     Intact distal pulses.   Edema:     Peripheral edema absent.   Abdominal:      General: Bowel sounds are normal. There is no distension.      Palpations: Abdomen is soft.      Tenderness: There is no abdominal tenderness. There is no guarding.   Musculoskeletal: Normal range of motion.         General: No tenderness.      Cervical back: Normal range of motion. Skin:     General: Skin is warm.   Neurological:      Mental Status: Alert and oriented to person, place, and time.      Deep Tendon Reflexes: Reflexes are normal and symmetric.   Psychiatric:         Judgment: Judgment normal.         ECG 12 Lead    Date/Time: 5/30/2023 2:07 PM  Performed by: Darlene Shen APRN  Authorized by: Darlene Shen APRN   Comparison: compared with previous ECG from 11/10/2022  Comparison to previous ECG: Sinus rhythm has replaced A-fib  Rhythm: sinus rhythm  Rate: normal  BPM: 77  Other findings: non-specific ST-T wave changes    Clinical impression: non-specific ECG            Interpretation Summary    An abnormal monitor study.  NSR OCCASIONAL PACS, PVCS, NSSVTS  4% AFIB WITH LONGEST 2 HR 29 MINUTES       Interpretation Summary       Findings consistent with a normal ECG stress test.  Left ventricular ejection fraction is normal (Calculated EF = 70%).  Myocardial perfusion imaging indicates a normal myocardial perfusion study with no evidence of  ischemia.  Impressions are consistent with a low risk study.       Interpretation Summary    Estimated right ventricular systolic pressure from tricuspid regurgitation is normal (<35 mmHg).  Calculated left ventricular EF = 58.4% Estimated left ventricular EF was in agreement with the calculated left ventricular EF.  Left ventricular diastolic function was normal.  There is no evidence of pericardial effusion         Current Outpatient Medications:     ALPRAZolam (XANAX) 0.25 MG tablet, Take 1 tablet by mouth Every 8 (Eight) Hours As Needed. for anxiety, Disp: , Rfl:     amiodarone (PACERONE) 200 MG tablet, Take 1 tablet by mouth Daily., Disp: 30 tablet, Rfl: 0    aspirin 81 MG EC tablet, Take 1 tablet by mouth Daily., Disp: , Rfl:     bumetanide (BUMEX) 0.5 MG tablet, 0.5 tablets., Disp: , Rfl:     ferrous gluconate (FERGON) 324 MG tablet, Take 1 tablet by mouth Every Other Day., Disp: , Rfl:     folic acid-vit B6-vit B12 (FOLGARD) 2.2-25-1 MG tablet tablet, Take  by mouth Daily., Disp: , Rfl:     guaiFENesin (MUCINEX) 600 MG 12 hr tablet, Take 2 tablets by mouth 2 (Two) Times a Day., Disp: , Rfl:     ipratropium-albuterol (DUO-NEB) 0.5-2.5 mg/3 ml nebulizer, Take 3 mL by nebulization 2 (Two) Times a Day., Disp: , Rfl:     nitroglycerin (NITROLINGUAL) 0.4 MG/SPRAY spray, Place 1 spray under the tongue Every 5 (Five) Minutes As Needed for Chest Pain., Disp: , Rfl:     O2 (OXYGEN), Inhale 2 L/min As Needed (sob)., Disp: , Rfl:     omeprazole (priLOSEC) 40 MG capsule, , Disp: , Rfl:     ondansetron ODT (ZOFRAN-ODT) 4 MG disintegrating tablet, , Disp: , Rfl:     potassium chloride (K-DUR,KLOR-CON) 20 MEQ CR tablet, Take 1 tablet by mouth Daily. FOR 30 DAYS, Disp: , Rfl:     ProAir  (90 Base) MCG/ACT inhaler, Inhale 1-2 puffs As Needed. every 4 to 6 hours, Disp: , Rfl:     rosuvastatin (CRESTOR) 5 MG tablet, Take 1 tablet by mouth Daily., Disp: , Rfl:     Symbicort 160-4.5 MCG/ACT inhaler, Inhale 2 puffs 2 (Two)  Times a Day., Disp: , Rfl:     ursodiol (ACTIGALL) 300 MG capsule, Take 1 capsule by mouth 3 (Three) Times a Day., Disp: , Rfl:      Assessment:        Patient Active Problem List   Diagnosis    COPD exacerbation (HCC)    Subarachnoid hemorrhage    Multiple skin tears    Closed nondisplaced fracture of left clavicle    AF (paroxysmal atrial fibrillation)    Tobacco abuse    Status post placement of implantable loop recorder    SOB (shortness of breath)    COPD with acute exacerbation    Severe malnutrition (CMS/HCC)    Leukocytosis    Gastrointestinal hemorrhage    Symptomatic anemia    Hyperlipidemia    Long term (current) use of antithrombotics/antiplatelets    COPD with exacerbation    Breath shortness    Diastolic CHF, acute    CAD (coronary artery disease)    B12 deficiency    Underweight               Plan:   1.  Paroxysmal atrial fibrillation: sr today. Off a/c due to gi bleed, only on aspirin. ast alt wnl. I will decrease amiodarone 100mg daily. She needs tsh, she said she will have this drawn at her pcp. cxr in February.     Significant side effects associated with AMIODARONE therapy has been explained. Pros and cons of the medications has been discussed, decision has been to continue the medication   6 months to yearly checkup for eye examination, thyroid function test, chest x-ray and liver function test has been advised.  Patient understands well.    2.  Chronic HFpEF: no edema, or weight.  Continue Bumex.    3.  Hyperlipidemia: She reports her PCP manages her cholesterol labs.  Continue statin therapy.    4.  Coronary artery disease: Stress test and 2019 was a normal myocardial perfusion study.No chest pain or shortness of breath.  Continue aspirin.             No diagnosis found.    There are no diagnoses linked to this encounter.    COUNSELING: Ricci Colmenares was given to patient for the following topics: diagnostic results, risk factor reductions, impressions, risks and benefits of  treatment options and importance of treatment compliance .       SMOKING COUNSELING: Denies    amio check in 6 months    Sincerely,   BRAYAN Romo  [facility]  05/30/23  13:52 EDT    EMR Dragon/Transcription disclaimer:   Much of this encounter note is an electronic transcription/translation of spoken language to printed text. The electronic translation of spoken language may permit erroneous, or at times, nonsensical words or phrases to be inadvertently transcribed; Although I have reviewed the note for such errors, some may still exist.

## 2023-07-28 NOTE — PROGRESS NOTES
7/28/23:  L/M FOR PT TO CALL CENTRAL SCHEDULING   NEEDS TO PRIOR TO APPT WITH DR. Destin Quiñones  9/11/23 [facility]  Cardiology Progress Note    Patient Identification:  Name: Celia Baidr  Age: 77 y.o.  Sex: female  :  1945  MRN: [id number]                 Follow Up / Chief Complaint: follow up for atrial fibrillation    Interval History: low k+, being replaced       Subjective:  No chest pains, shortness of breath better    Objective:    Past Medical History:  Past Medical History:   Diagnosis Date   • Atrial fibrillation (HCC)    • COPD (chronic obstructive pulmonary disease) (HCC)    • History of frequent urinary tract infections    • Irregular heart beat    • Kidney stones    • PBC (primary biliary cirrhosis)    • PBC (primary biliary cirrhosis)      Past Surgical History:  Past Surgical History:   Procedure Laterality Date   • CARDIAC ELECTROPHYSIOLOGY PROCEDURE N/A 3/30/2017    Procedure:    LINQ;  Surgeon: Ailyn Solorio MD;  Location: [facility] INVASIVE LOCATION;  Service:    • CHOLECYSTECTOMY     • TUBAL ABDOMINAL LIGATION          Social History:   Social History     Tobacco Use   • Smoking status: Current Some Day Smoker     Packs/day: 0.50     Years: 59.00     Pack years: 29.50     Types: Cigarettes   • Smokeless tobacco: Never Used   Substance Use Topics   • Alcohol use: No      Family History:  Family History   Problem Relation Age of Onset   • Heart disease Father    • Heart attack Father    • Heart disease Brother    • Stroke Mother           Allergies:  Allergies   Allergen Reactions   • Morphine And Related Nausea And Vomiting   • Levaquin [Levofloxacin]    • Sulfa Antibiotics      Scheduled Meds:  aspirin, 81 mg, Daily  budesonide-formoterol, 2 puff, BID - RT  bumetanide, 1 mg, BID  clopidogrel, 75 mg, Daily  guaiFENesin, 600 mg, Q12H  ipratropium-albuterol, 3 mL, 4x Daily - RT  methylPREDNISolone sodium succinate, 40 mg, Q12H  potassium chloride, 30 mEq, TID With Meals  propafenone, 150 mg, Q8H  rosuvastatin, 5 mg, Daily  spironolactone, 50 mg, Daily  ursodiol, 300  "mg, TID            INTAKE AND OUTPUT:    Intake/Output Summary (Last 24 hours) at 1/14/2022 1439  Last data filed at 1/14/2022 0558  Gross per 24 hour   Intake 100 ml   Output 800 ml   Net -700 ml       ROS  Constitutional: Awake and alert, no fever. No nosebleeds  Abdomen           no abdominal pain   Cardiac              no chest pain  Pulmonary          no shortness of breath      BP 92/51 (BP Location: Left arm, Patient Position: Lying)   Pulse 89   Temp 97.6 °F (36.4 °C) (Oral)   Resp 20   Ht 152.4 cm (60\")   Wt 36.3 kg (80 lb)   SpO2 95%   BMI 15.62 kg/m²   General appearance: No acute changes   Neck: Trachea midline; NECK, supple, no thyromegaly or lymphadenopathy   Lungs: Normal size and shape, normal breath sounds, equal distribution of air, no rales or rhonchi   CV: S1-S2 regular, no murmurs, no rub, no gallop   Abdomen: Soft, nontender; no masses , no abnormal abdominal sounds   Extremities: No deformity , normal color , no peripheral edema   Skin: Normal temperature, turgor and texture; no rash, ulcers                Cardiographics  Telemetry:    SR      ECG:         Echocardiogram:   1/14/22  Interpretation Summary    · Calculated left ventricular EF = 61.9% Estimated left ventricular EF was in agreement with the calculated left ventricular EF.  · Left ventricular diastolic function was normal.  · There is no evidence of pericardial effusion. .  Interpretation Summary       · Findings consistent with a normal ECG stress test.  · Left ventricular ejection fraction is normal (Calculated EF = 70%).  · Myocardial perfusion imaging indicates a normal myocardial perfusion study with no evidence of ischemia.  · Impressions are consistent with a low risk study.     Asymptomatic for chest pain. ECG is negative for ischemia.   Ectopy: Rare PAC at baseline, none with exercise, occasional PVC in recovery  HR and BP response : Appropriate for Beta-blocker therapy  Pharmacologic study due to inability to " "tolerate increasing speed and grade of treadmill due to Pulmonary, mobility  Issues and Beta-blocker therapy.  Participated in Low Level exercise and tolerance is poor.         Imaging  Chest X-ray:   IMPRESSION:  No focal pulmonary consolidation. Follow-up as clinical  indications persist.     This report was finalized on 1/13/2022 12:27 PM by Dr. Andrew Abdi M.D.  Lab Review   Results from last 7 days   Lab Units 01/14/22  0845 01/13/22  1219   TROPONIN T ng/mL <0.010 0.013     Results from last 7 days   Lab Units 01/14/22  0845   MAGNESIUM mg/dL 1.6     Results from last 7 days   Lab Units 01/14/22  0845   SODIUM mmol/L 136   POTASSIUM mmol/L 2.9*   BUN mg/dL 19   CREATININE mg/dL 1.04*   CALCIUM mg/dL 8.9        Results from last 7 days   Lab Units 01/14/22  0845 01/13/22  1219   WBC 10*3/mm3 6.78 13.49*   HEMOGLOBIN g/dL 12.1 12.8   HEMATOCRIT % 35.1 37.2   PLATELETS 10*3/mm3 201 232           The following medical decision was discussed in detail with Dr. Solorio    Assessment:  Acute on chronic hypoxic respiratory failure  Paroxysmal atrial fibrillation: with watchman's device  Chronic diastolic CHF  Hyperlipidemia  Hypertension  Tobacco abuse  GERD  Acute exacerbation of COPD  History of loop implant (battery no longer working)   Hypokalemia: K+ low, being replaced    Plan:  Blood pressure and HR stable. No acute changes noted on ECG. Echo revealed EF 61.9%, LV diastolic function was normal. Unable to add ACE/ARB due to low blood pressure.Continue asa, bumex, plavix, Rythmol, aldactone, and crestor.  At this time, we will medical manage. We may consider ischemic work up in the outpatient setting.     bmp and mag Ailyn Solorio MD      )1/14/2022       EMR Dragon/Transcription:   \"Dictated utilizing Dragon dictation\".     "

## 2023-10-30 ENCOUNTER — TELEPHONE (OUTPATIENT)
Dept: CARDIOLOGY | Facility: CLINIC | Age: 78
End: 2023-10-30

## 2023-10-30 NOTE — TELEPHONE ENCOUNTER
Caller: Celia Baird    Relationship to patient: Self    Best call back number: [phone]    Patient is NEEDING: PATIENT STATED SHE WAS RELEASED FROM [facility] ON 10.28.23 WITH INSTRUCTIONS TO SEE DR TATUM WITHIN ONE WEEK. UNABLE TO SCHEDULE WITHOUT WRITTEN DIRECTIONS IN CHART.

## 2024-02-13 NOTE — THERAPY TREATMENT NOTE
Patient Name: Celia Baird  : 1945    MRN: [id number]                              Today's Date: 2022       Admit Date: 2022    Visit Dx:     ICD-10-CM ICD-9-CM   1. COPD exacerbation (HCC)  J44.1 491.21   2. Shortness of breath  R06.02 786.05   3. Hypokalemia  E87.6 276.8     Patient Active Problem List   Diagnosis   • COPD exacerbation (HCC)   • Subarachnoid hemorrhage (HCC)   • Multiple skin tears   • Closed nondisplaced fracture of left clavicle   • Paroxysmal atrial fibrillation (HCC)   • Tobacco abuse   • Status post placement of implantable loop recorder   • SOB (shortness of breath)   • COPD with acute exacerbation (HCC)   • Severe malnutrition (CMS/HCC)   • Leukocytosis     Past Medical History:   Diagnosis Date   • Atrial fibrillation (HCC)    • COPD (chronic obstructive pulmonary disease) (HCC)    • History of frequent urinary tract infections    • Irregular heart beat    • Kidney stones    • PBC (primary biliary cirrhosis)    • PBC (primary biliary cirrhosis)      Past Surgical History:   Procedure Laterality Date   • CARDIAC ELECTROPHYSIOLOGY PROCEDURE N/A 3/30/2017    Procedure:    LINQ;  Surgeon: Ailyn Solorio MD;  Location: [facility] INVASIVE LOCATION;  Service:    • CHOLECYSTECTOMY     • TUBAL ABDOMINAL LIGATION        General Information     Row Name 22 1320          OT Time and Intention    Document Type therapy note (daily note)  -     Mode of Treatment individual therapy; occupational therapy  -     Row Name 22 1320          General Information    Existing Precautions/Restrictions fall; oxygen therapy device and L/min  -     Row Name 22 1320          Cognition    Orientation Status (Cognition) oriented to; person; place; situation  -     Row Name 22 1320          Safety Issues, Functional Mobility    Impairments Affecting Function (Mobility) balance; endurance/activity tolerance  -           User Key  (r) = Recorded By, (t) = Taken  By, (c) = Cosigned By    Initials Name Provider Type    Summer Magdaleno OTR Occupational Therapist                 Mobility/ADL's     Row Name 01/19/22 1320          Bed Mobility    Supine-Sit [place] (Bed Mobility) set up; standby assist  -KP     Sit-Supine [place] (Bed Mobility) set up; standby assist  -KP     Row Name 01/19/22 1320          Transfers    Comment (Transfers) NT, pt tired this date and wants to walk later w PT  -KP     Row Name 01/19/22 1320          Activities of Daily Living    BADL Assessment/Intervention bathing  -     Row Name 01/19/22 1320          Grooming Assessment/Training    Comment (Grooming) pt wanted to work on UE ex  -           User Key  (r) = Recorded By, (t) = Taken By, (c) = Cosigned By    Initials Name Provider Type    Summer Magdaleno OTR Occupational Therapist               Obj/Interventions     Row Name 01/19/22 1321          Shoulder (Therapeutic Exercise)    Shoulder AROM (Therapeutic Exercise) right; left; extension; flexion; 10 repetitions; 2 sets; scapular protraction; scapular retraction; sitting  -     Row Name 01/19/22 1321          Elbow/Forearm (Therapeutic Exercise)    Elbow/Forearm AROM (Therapeutic Exercise) bilateral; flexion; extension; 10 repetitions; 2 sets; sitting  -KP     Row Name 01/19/22 1321          Balance    Static Sitting Balance WFL; sitting, edge of bed  -KP     Dynamic Sitting Balance WFL; sitting, edge of bed  -KP           User Key  (r) = Recorded By, (t) = Taken By, (c) = Cosigned By    Initials Name Provider Type    Summer Magdaleno OTR Occupational Therapist               Goals/Plan    No documentation.                Clinical Impression     Row Name 01/19/22 1322          Pain Scale: Numbers Pre/Post-Treatment    Pretreatment Pain Rating 0/10 - no pain  -     Posttreatment Pain Rating 0/10 - no pain  -     Row Name 01/19/22 1322          Plan of Care Review    Plan of Care Reviewed With  patient  -KP     Progress improving  -KP     Outcome Summary pt completed bed mobility w SBA, sat EOB SBA, completed AROM 10x2 w rest breaks sitting EOB SBA. pt progressing in therapy but fatigues easily. cont OT to incr ADL, strength, balance and tsf.  -KP     Row Name 01/19/22 1322          Vital Signs    Pre SpO2 (%) 100  -KP     O2 Delivery Pre Treatment supplemental O2  -KP     Intra SpO2 (%) 94  -KP     O2 Delivery Intra Treatment supplemental O2  -KP     Post SpO2 (%) 97  -KP     O2 Delivery Post Treatment supplemental O2  -KP     Pre Patient Position Supine  -KP     Intra Patient Position Sitting  -KP     Post Patient Position Supine  -KP     Row Name 01/19/22 1322          Positioning and Restraints    Pre-Treatment Position in bed  -KP     Post Treatment Position bed  -KP     In Bed supine; call light within reach; encouraged to call for assist; exit alarm on  -KP           User Key  (r) = Recorded By, (t) = Taken By, (c) = Cosigned By    Initials Name Provider Type    Summer Magdaleno, OTR Occupational Therapist               Outcome Measures     Row Name 01/19/22 1323          How much help from another is currently needed...    Putting on and taking off regular lower body clothing? 3  -KP     Bathing (including washing, rinsing, and drying) 3  -KP     Toileting (which includes using toilet bed pan or urinal) 3  -KP     Putting on and taking off regular upper body clothing 3  -KP     Taking care of personal grooming (such as brushing teeth) 3  -KP     Eating meals 4  -KP     AM-PAC 6 Clicks Score (OT) 19  -KP     Row Name 01/19/22 0902          How much help from another person do you currently need...    Turning from your back to your side while in flat bed without using bedrails? 4  -PM     Moving from lying on back to sitting on the side of a flat bed without bedrails? 4  -PM     Moving to and from a bed to a chair (including a wheelchair)? 4  -PM     Standing up from a chair using your arms  (e.g., wheelchair, bedside chair)? 4  -PM     Climbing 3-5 steps with a railing? 3  -PM     To walk in hospital room? 3  -PM     AM-PAC 6 Clicks Score (PT) 22  -PM           User Key  (r) = Recorded By, (t) = Taken By, (c) = Cosigned By    Initials Name Provider Type    Summer Magdaleno OTR Occupational Therapist    PM Jonathon Segovia, RN Registered Nurse                Occupational Therapy Education                 Title: PT OT SLP Therapies (Done)     Topic: Occupational Therapy (Done)     Point: ADL training (Done)     Description:   Instruct learner(s) on proper safety adaptation and remediation techniques during self care or transfers.   Instruct in proper use of assistive devices.              Learning Progress Summary           Patient Acceptance, E, VU by CAITLYN at 1/17/2022 1703    Acceptance, E, VU by VIC at 1/17/2022 1040    Acceptance, E, VU by MARIO at 1/15/2022 1412    Comment: role of OT, d/c rec, therapy goals, POC   Family Acceptance, E, VU by MARIO at 1/15/2022 1412    Comment: role of OT, d/c rec, therapy goals, POC                   Point: Precautions (Done)     Description:   Instruct learner(s) on prescribed precautions during self-care and functional transfers.              Learning Progress Summary           Patient Acceptance, E, VU by AA at 1/17/2022 1703    Acceptance, E, VU by VIC at 1/17/2022 1040    Acceptance, E, VU by MARIO at 1/15/2022 1412    Comment: role of OT, d/c rec, therapy goals, POC   Family Acceptance, E, VU by MARIO at 1/15/2022 1412    Comment: role of OT, d/c rec, therapy goals, POC                   Point: Body mechanics (Done)     Description:   Instruct learner(s) on proper positioning and spine alignment during self-care, functional mobility activities and/or exercises.              Learning Progress Summary           Patient Acceptance, E, VU by CAITLYN at 1/17/2022 1703    Acceptance, E, VU by VIC at 1/17/2022 1040    Acceptance, E, VU by MARIO at 1/15/2022 1412    Comment: role  of OT, d/c rec, therapy goals, POC   Family Acceptance, E, VU by  at 1/15/2022 1412    Comment: role of OT, d/c rec, therapy goals, POC                               User Key     Initials Effective Dates Name Provider Type Discipline    CW 06/16/21 -  Ileana Martin OTR Occupational Therapist OT    AA 06/16/21 -  Faye Graves, RN Registered Nurse Nurse     08/20/21 -  Nata Cuevas OT Occupational Therapist OT              OT Recommendation and Plan     Plan of Care Review  Plan of Care Reviewed With: patient  Progress: improving  Outcome Summary: pt completed bed mobility w SBA, sat EOB SBA, completed AROM 10x2 w rest breaks sitting EOB SBA. pt progressing in therapy but fatigues easily. cont OT to incr ADL, strength, balance and tsf.     Time Calculation:    Time Calculation- OT     Row Name 01/19/22 1323             Time Calculation- OT    OT Start Time 1017  -      OT Stop Time 1027  -      OT Time Calculation (min) 10 min  -      Total Timed Code Minutes- OT 10 minute(s)  -      OT Received On 01/19/22  -      OT - Next Appointment 01/20/22  -              Timed Charges    16124 - OT Therapeutic Exercise Minutes 10  -KP              Total Minutes    Timed Charges Total Minutes 10  -KP       Total Minutes 10  -KP            User Key  (r) = Recorded By, (t) = Taken By, (c) = Cosigned By    Initials Name Provider Type    Summer Magdaleno OTR Occupational Therapist              Therapy Charges for Today     Code Description Service Date Service Provider Modifiers Qty    [id number] HC OT THER PROC EA 15 MIN 1/19/2022 Summer Burnham OTR GO 1               RYAN Schroeder  1/19/2022   0 (no pain/absence of nonverbal indicators of pain)

## (undated) DEVICE — SNAR POLYP CAPTIVATOR CRSNT 27MM 240CM

## (undated) DEVICE — THE SINGLE USE ETRAP – POLYP TRAP IS USED FOR SUCTION RETRIEVAL OF ENDOSCOPICALLY REMOVED POLYPS.: Brand: ETRAP

## (undated) DEVICE — FRCP BX RADJAW4 NDL 2.8 240CM LG OG BX40

## (undated) DEVICE — LN SMPL CO2 SHTRM SD STREAM W/M LUER

## (undated) DEVICE — KT ORCA ORCAPOD DISP STRL

## (undated) DEVICE — MSK PROC CURAPLEX O2 2/ADAPT 7FT

## (undated) DEVICE — SENSR O2 OXIMAX FNGR A/ 18IN NONSTR

## (undated) DEVICE — SNAR POLYP SENSATION STDOVL 27 240 BX40

## (undated) DEVICE — BITEBLOCK OMNI BLOC

## (undated) DEVICE — ADAPT CLN BIOGUARD AIR/H2O DISP

## (undated) DEVICE — TUBING, SUCTION, 1/4" X 10', STRAIGHT: Brand: MEDLINE